# Patient Record
Sex: MALE | Race: WHITE | NOT HISPANIC OR LATINO | Employment: PART TIME | URBAN - NONMETROPOLITAN AREA
[De-identification: names, ages, dates, MRNs, and addresses within clinical notes are randomized per-mention and may not be internally consistent; named-entity substitution may affect disease eponyms.]

---

## 2017-01-19 DIAGNOSIS — C61 CANCER OF PROSTATE (HCC): ICD-10-CM

## 2017-01-19 LAB — PSA SERPL-MCNC: 0.07 NG/ML (ref 0–4)

## 2017-01-26 ENCOUNTER — OFFICE VISIT (OUTPATIENT)
Dept: UROLOGY | Facility: CLINIC | Age: 62
End: 2017-01-26

## 2017-01-26 VITALS
BODY MASS INDEX: 30.35 KG/M2 | TEMPERATURE: 96.7 F | DIASTOLIC BLOOD PRESSURE: 58 MMHG | WEIGHT: 216.8 LBS | HEIGHT: 71 IN | SYSTOLIC BLOOD PRESSURE: 140 MMHG

## 2017-01-26 DIAGNOSIS — N52.9 IMPOTENCE OF ORGANIC ORIGIN: ICD-10-CM

## 2017-01-26 DIAGNOSIS — C61 PROSTATE CANCER (HCC): Primary | ICD-10-CM

## 2017-01-26 DIAGNOSIS — N39.3 URINARY INCONTINENCE, MALE, STRESS: ICD-10-CM

## 2017-01-26 LAB
BILIRUB BLD-MCNC: NEGATIVE MG/DL
CLARITY, POC: CLEAR
COLOR UR: YELLOW
GLUCOSE UR STRIP-MCNC: NEGATIVE MG/DL
KETONES UR QL: NEGATIVE
LEUKOCYTE EST, POC: NEGATIVE
NITRITE UR-MCNC: NEGATIVE MG/ML
PH UR: 5.5 [PH] (ref 5–8)
PROT UR STRIP-MCNC: NEGATIVE MG/DL
RBC # UR STRIP: NEGATIVE /UL
SP GR UR: 1.03 (ref 1–1.03)
UROBILINOGEN UR QL: NORMAL

## 2017-01-26 PROCEDURE — 81003 URINALYSIS AUTO W/O SCOPE: CPT | Performed by: UROLOGY

## 2017-01-26 PROCEDURE — 99214 OFFICE O/P EST MOD 30 MIN: CPT | Performed by: UROLOGY

## 2017-01-26 RX ORDER — ASPIRIN 325 MG
325 TABLET ORAL DAILY
COMMUNITY
End: 2020-12-23

## 2017-01-26 NOTE — MR AVS SNAPSHOT
Dayron ENA Lubin   1/26/2017 9:10 AM   Office Visit    Dept Phone:  [phone]   Encounter #:  [id number]    Provider:  Dayron Love MD   Department:  [facility] UROLOGY                Your Full Care Plan              Your Updated Medication List          This list is accurate as of: 1/26/17  9:41 AM.  Always use your most recent med list.                aspirin 325 MG tablet       atorvastatin 20 MG tablet   Commonly known as:  LIPITOR       FLUVIRIN suspension injection   Generic drug:  Influenza Vac Typ A&B Surf Ant       losartan-hydrochlorothiazide 100-25 MG per tablet   Commonly known as:  HYZAAR       temazepam 30 MG capsule   Commonly known as:  RESTORIL       TOPROL XL 50 MG 24 hr tablet   Generic drug:  metoprolol succinate XL               We Performed the Following     POC Urinalysis Dipstick, Automated       You Were Diagnosed With        Codes Comments    Prostate cancer    -  Primary ICD-10-CM: C61  ICD-9-CM: 185     Impotence of organic origin     ICD-10-CM: N52.9  ICD-9-CM: 607.84     Urinary incontinence, male, stress     ICD-10-CM: N39.3  ICD-9-CM: 788.32       Instructions     None    Patient Instructions History      Upcoming Appointments     Visit Type Date Time Department    FOLLOW UP 1/26/2017  9:10 AM [facility]    [facility] 4/24/2017  8:10 AM [facility]    FOLLOW UP 5/3/2017  8:00 AM [facility]    FOLLOW UP - ONCOLOGY 6/23/2017  9:00 AM [facility] ONC PAD      MyChart Signup     Our records indicate that you have declined [facility] MyChart signup. If you would like to sign up for MyChart, please email [email or web address] or call [phone] to obtain an activation code.             Other Info from Your Visit           Your Appointments     Apr 24, 2017  8:10 AM CDT   [facility] with [facility]   [facility] UROLOGY (--)    [address] [place] [id number] [phone]    "           May 03, 2017  8:00 AM CDT   Follow Up with Dayron Love MD   [facility] UROLOGY (--)    [address]  [facility] [address] [phone]           Arrive 15 minutes prior to appointment.            Jun 23, 2017  9:00 AM CDT   FOLLOW UP with Eloy Lindsay III, MD   [facility] ONC PAD (--)    [address]  [facility] [address] [phone]              Allergies     No Known Allergies      Reason for Visit     Prostate Cancer           Vital Signs     Blood Pressure Temperature Height Weight Body Mass Index Smoking Status    140/58 96.7 °F (35.9 °C) 71\" (180.3 cm) 216 lb 12.8 oz (98.3 kg) 30.24 kg/m2 Never Smoker      Problems and Diagnoses Noted     Prostate cancer    -  Primary    Failure of erection        Urinary incontinence, male, stress          Results     POC Urinalysis Dipstick, Automated      Component Value Standard Range & Units    Color Yellow Yellow, Straw, Dark Yellow, Ying    Clarity, UA Clear Clear    Glucose, UA Negative Negative, 1000 mg/dL (3+) mg/dL    Bilirubin Negative Negative    Ketones, UA Negative Negative    Specific Gravity  1.030 1.005 - 1.030    Blood, UA Negative Negative    pH, Urine 5.5 5.0 - 8.0    Protein, POC Negative Negative mg/dL    Urobilinogen, UA Normal Normal    Leukocytes Negative Negative    Nitrite, UA Negative Negative                    "

## 2017-01-26 NOTE — LETTER
January 26, 2017     KWAN Vega  [address]  [place] [address]    Patient: Dayron Lubin   YOB: 1955   Date of Visit: 1/26/2017       Dear KWAN Quinones:    Thank you for referring Dayron Lubin to me for evaluation. Below are the relevant portions of my assessment and plan of care.    If you have questions, please do not hesitate to call me. I look forward to following Dayron along with you.         Sincerely,        Dayron Love MD        CC: No Recipients  Dayron Love MD  1/26/2017  9:39 AM  Signed  Subjective    Mr. Lubin is 61 y.o. male    Chief Complaint: Prostate Cancer    History of Present Illness    Prostate Cancer  Pt. presents with history of prostate cancer diagnosed in July 2015. Current disease state ishigh risk disease Pt. underwent robot assisted laparoscpic prostatectomy and external beam radiotherapy Path showing T3b 7 adenocarcinoma of prostate with Negative surgical margins. Patient has received XRT completed in May 2016 .He is on nothing for management of prostate cancer. Pt. having 1 ppd incontinence. Associated voiding symptoms include No evidence of voiding symptoms . Pt. Is having erectile dysfunction thatdoes not to PDE5 inhibitors. There has been no bone pain, weight loss, abdominal pain, back pain, or gross hematuria. Most recent PSA pending.     Erectile Dysfunction  Patient complains of erectile dysfunction. Onset of dysfunction was 2 years ago and was sudden in onset. Patient states the nature of difficulty is attaining erection. Full erections occur never. Partial erections occur never. Libido is not affected. Risk factors for ED include urologic disease, prostate cancer. Patient denies history of hypogonadism. Previous treatment of ED includes PDE5 inhibitors.     Urinary Incontinence  Patient complains of urinary incontinence. This has been present for 2 years. Symptoms have remained static . Patient uses 1 pads/day. The patient  leaks urine with bending, coughing, lifting, standing. Patient describes the symptoms as urine leakage with coughing/heavy physical activity. Factors associated with symptoms include prostate surgery. Evaluation to date includes none. Treatment to date includes Kegel exercises, which was somewhat effective.     The following portions of the patient's history were reviewed and updated as appropriate: allergies, current medications, past family history, past medical history, past social history, past surgical history and problem list.    Review of Systems   Constitutional: Negative for chills and fever.   Gastrointestinal: Negative for abdominal pain, anal bleeding and blood in stool.   Genitourinary: Negative for difficulty urinating, flank pain, frequency, hematuria, penile pain, penile swelling and urgency.         Current Outpatient Prescriptions:   •  aspirin 325 MG tablet, Take 325 mg by mouth Daily., Disp: , Rfl:   •  atorvastatin (LIPITOR) 20 MG tablet, , Disp: , Rfl:   •  FLUVIRIN suspension injection, ADM 0.5ML IM UTD, Disp: , Rfl: 0  •  losartan-hydrochlorothiazide (HYZAAR) 100-25 MG per tablet, , Disp: , Rfl:   •  temazepam (RESTORIL) 30 MG capsule, TAKE ONE CAPSULE BY MOUTH AT BEDTIME AS NEEDED FOR SLEEP, Disp: , Rfl: 2  •  TOPROL XL 50 MG 24 hr tablet, , Disp: , Rfl:     Past Medical History   Diagnosis Date   • Prostate cancer        Past Surgical History   Procedure Laterality Date   • Prostate surgery         Social History     Social History   • Marital status: Single     Spouse name: N/A   • Number of children: N/A   • Years of education: N/A     Social History Main Topics   • Smoking status: Never Smoker   • Smokeless tobacco: None   • Alcohol use Yes      Comment: very little    • Drug use: None   • Sexual activity: Not Asked     Other Topics Concern   • None     Social History Narrative       Family History   Problem Relation Age of Onset   • No Known Problems Father    • No Known Problems Mother  "       Objective    Visit Vitals   • /58   • Temp 96.7 °F (35.9 °C)   • Ht 71\" (180.3 cm)   • Wt 216 lb 12.8 oz (98.3 kg)   • BMI 30.24 kg/m2       Physical Exam   Constitutional: He is oriented to person, place, and time. He appears well-developed and well-nourished. No distress.   Pulmonary/Chest: Effort normal.   Abdominal: Soft. He exhibits no distension and no mass. There is no tenderness. There is no rebound and no guarding. No hernia.   Neurological: He is alert and oriented to person, place, and time.   Skin: Skin is warm and dry. He is not diaphoretic.   Psychiatric: He has a normal mood and affect.   Vitals reviewed.          Results for orders placed or performed in visit on 01/26/17   POC Urinalysis Dipstick, Automated   Result Value Ref Range    Color Yellow Yellow, Straw, Dark Yellow, Ying    Clarity, UA Clear Clear    Glucose, UA Negative Negative, 1000 mg/dL (3+) mg/dL    Bilirubin Negative Negative    Ketones, UA Negative Negative    Specific Gravity  1.030 1.005 - 1.030    Blood, UA Negative Negative    pH, Urine 5.5 5.0 - 8.0    Protein, POC Negative Negative mg/dL    Urobilinogen, UA Normal Normal    Leukocytes Negative Negative    Nitrite, UA Negative Negative     Assessment and Plan      Dayron was seen today for prostate cancer.    Diagnoses and all orders for this visit:    Prostate cancer  -     POC Urinalysis Dipstick, Automated  -     PSA; Future    Impotence of organic origin    Urinary incontinence, male, stress      Patient underwent R ALP on 7/28/15. His pretreatment PSA was 7.7. His pathology revealed [place] 3+4 = 7 adenocarcinoma the prostate. Pathology showed [id number] with negative surgical margins. His posttreatment PSA was 0.1 he completed XRT in May 2016. His last PSA was <0.07.      His last PSA was undetectable.  PSA today is 0.074.  I had a long discussion with him today.  I think the best course of action is to continue to observe this.  His PSA has barely gone up.  " If the trend is to continue to go up and I would recommend 2 years of hormonal deprivation.  We talked about hormonal deprivation including pros versus cons and risk of treatment.  He voiced understanding and wishes to proceed if his PSA continues to increase.  FU in 3 months with PSA.

## 2017-01-26 NOTE — PROGRESS NOTES
Subjective    Mr. Lubin is 61 y.o. male    Chief Complaint: Prostate Cancer    History of Present Illness    Prostate Cancer  Pt. presents with history of prostate cancer diagnosed in July 2015. Current disease state ishigh risk disease Pt. underwent robot assisted laparoscpic prostatectomy and external beam radiotherapy Path showing T3b 7 adenocarcinoma of prostate with Negative surgical margins. Patient has received XRT completed in May 2016 .He is on nothing for management of prostate cancer. Pt. having 1 ppd incontinence. Associated voiding symptoms include No evidence of voiding symptoms . Pt. Is having erectile dysfunction thatdoes not to PDE5 inhibitors. There has been no bone pain, weight loss, abdominal pain, back pain, or gross hematuria. Most recent PSA pending.     Erectile Dysfunction  Patient complains of erectile dysfunction. Onset of dysfunction was 2 years ago and was sudden in onset. Patient states the nature of difficulty is attaining erection. Full erections occur never. Partial erections occur never. Libido is not affected. Risk factors for ED include urologic disease, prostate cancer. Patient denies history of hypogonadism. Previous treatment of ED includes PDE5 inhibitors.     Urinary Incontinence  Patient complains of urinary incontinence. This has been present for 2 years. Symptoms have remained static . Patient uses 1 pads/day. The patient leaks urine with bending, coughing, lifting, standing. Patient describes the symptoms as urine leakage with coughing/heavy physical activity. Factors associated with symptoms include prostate surgery. Evaluation to date includes none. Treatment to date includes Kegel exercises, which was somewhat effective.     The following portions of the patient's history were reviewed and updated as appropriate: allergies, current medications, past family history, past medical history, past social history, past surgical history and problem list.    Review of Systems  "  Constitutional: Negative for chills and fever.   Gastrointestinal: Negative for abdominal pain, anal bleeding and blood in stool.   Genitourinary: Negative for difficulty urinating, flank pain, frequency, hematuria, penile pain, penile swelling and urgency.         Current Outpatient Prescriptions:   •  aspirin 325 MG tablet, Take 325 mg by mouth Daily., Disp: , Rfl:   •  atorvastatin (LIPITOR) 20 MG tablet, , Disp: , Rfl:   •  FLUVIRIN suspension injection, ADM 0.5ML IM UTD, Disp: , Rfl: 0  •  losartan-hydrochlorothiazide (HYZAAR) 100-25 MG per tablet, , Disp: , Rfl:   •  temazepam (RESTORIL) 30 MG capsule, TAKE ONE CAPSULE BY MOUTH AT BEDTIME AS NEEDED FOR SLEEP, Disp: , Rfl: 2  •  TOPROL XL 50 MG 24 hr tablet, , Disp: , Rfl:     Past Medical History   Diagnosis Date   • Prostate cancer        Past Surgical History   Procedure Laterality Date   • Prostate surgery         Social History     Social History   • Marital status: Single     Spouse name: N/A   • Number of children: N/A   • Years of education: N/A     Social History Main Topics   • Smoking status: Never Smoker   • Smokeless tobacco: None   • Alcohol use Yes      Comment: very little    • Drug use: None   • Sexual activity: Not Asked     Other Topics Concern   • None     Social History Narrative       Family History   Problem Relation Age of Onset   • No Known Problems Father    • No Known Problems Mother        Objective    Visit Vitals   • /58   • Temp 96.7 °F (35.9 °C)   • Ht 71\" (180.3 cm)   • Wt 216 lb 12.8 oz (98.3 kg)   • BMI 30.24 kg/m2       Physical Exam   Constitutional: He is oriented to person, place, and time. He appears well-developed and well-nourished. No distress.   Pulmonary/Chest: Effort normal.   Abdominal: Soft. He exhibits no distension and no mass. There is no tenderness. There is no rebound and no guarding. No hernia.   Neurological: He is alert and oriented to person, place, and time.   Skin: Skin is warm and dry. He is " not diaphoretic.   Psychiatric: He has a normal mood and affect.   Vitals reviewed.          Results for orders placed or performed in visit on 01/26/17   POC Urinalysis Dipstick, Automated   Result Value Ref Range    Color Yellow Yellow, Straw, Dark Yellow, Ying    Clarity, UA Clear Clear    Glucose, UA Negative Negative, 1000 mg/dL (3+) mg/dL    Bilirubin Negative Negative    Ketones, UA Negative Negative    Specific Gravity  1.030 1.005 - 1.030    Blood, UA Negative Negative    pH, Urine 5.5 5.0 - 8.0    Protein, POC Negative Negative mg/dL    Urobilinogen, UA Normal Normal    Leukocytes Negative Negative    Nitrite, UA Negative Negative     Assessment and Plan      Dayron was seen today for prostate cancer.    Diagnoses and all orders for this visit:    Prostate cancer  -     POC Urinalysis Dipstick, Automated  -     PSA; Future    Impotence of organic origin    Urinary incontinence, male, stress      Patient underwent R ALP on 7/28/15. His pretreatment PSA was 7.7. His pathology revealed [place] 3+4 = 7 adenocarcinoma the prostate. Pathology showed [id number] with negative surgical margins. His posttreatment PSA was 0.1 he completed XRT in May 2016. His last PSA was <0.07.      His last PSA was undetectable.  PSA today is 0.074.  I had a long discussion with him today.  I think the best course of action is to continue to observe this.  His PSA has barely gone up.  If the trend is to continue to go up and I would recommend 2 years of hormonal deprivation.  We talked about hormonal deprivation including pros versus cons and risk of treatment.  He voiced understanding and wishes to proceed if his PSA continues to increase.  FU in 3 months with PSA.

## 2017-04-24 LAB — PSA SERPL-MCNC: 0.12 NG/ML (ref 0–4)

## 2017-04-26 ENCOUNTER — RESULTS ENCOUNTER (OUTPATIENT)
Dept: UROLOGY | Facility: CLINIC | Age: 62
End: 2017-04-26

## 2017-04-26 DIAGNOSIS — C61 PROSTATE CANCER (HCC): ICD-10-CM

## 2017-05-03 ENCOUNTER — OFFICE VISIT (OUTPATIENT)
Dept: UROLOGY | Facility: CLINIC | Age: 62
End: 2017-05-03

## 2017-05-03 VITALS
HEIGHT: 71 IN | TEMPERATURE: 97.5 F | SYSTOLIC BLOOD PRESSURE: 142 MMHG | WEIGHT: 216 LBS | DIASTOLIC BLOOD PRESSURE: 86 MMHG | BODY MASS INDEX: 30.24 KG/M2

## 2017-05-03 DIAGNOSIS — N39.3 URINARY INCONTINENCE, MALE, STRESS: ICD-10-CM

## 2017-05-03 DIAGNOSIS — C61 PROSTATE CANCER (HCC): Primary | ICD-10-CM

## 2017-05-03 DIAGNOSIS — N52.9 IMPOTENCE OF ORGANIC ORIGIN: ICD-10-CM

## 2017-05-03 LAB
BILIRUB BLD-MCNC: NEGATIVE MG/DL
CLARITY, POC: CLEAR
COLOR UR: YELLOW
GLUCOSE UR STRIP-MCNC: NEGATIVE MG/DL
KETONES UR QL: NEGATIVE
LEUKOCYTE EST, POC: NEGATIVE
NITRITE UR-MCNC: NEGATIVE MG/ML
PH UR: 6.5 [PH] (ref 5–8)
PROT UR STRIP-MCNC: NEGATIVE MG/DL
RBC # UR STRIP: ABNORMAL /UL
SP GR UR: 1.02 (ref 1–1.03)
UROBILINOGEN UR QL: NORMAL

## 2017-05-03 PROCEDURE — 99213 OFFICE O/P EST LOW 20 MIN: CPT | Performed by: UROLOGY

## 2017-05-03 PROCEDURE — 81003 URINALYSIS AUTO W/O SCOPE: CPT | Performed by: UROLOGY

## 2017-06-23 ENCOUNTER — OFFICE VISIT (OUTPATIENT)
Dept: RADIATION ONCOLOGY | Facility: HOSPITAL | Age: 62
End: 2017-06-23

## 2017-06-23 VITALS
DIASTOLIC BLOOD PRESSURE: 78 MMHG | BODY MASS INDEX: 30.38 KG/M2 | WEIGHT: 217 LBS | SYSTOLIC BLOOD PRESSURE: 136 MMHG | HEIGHT: 71 IN

## 2017-06-23 DIAGNOSIS — Z92.3 HX OF RADIATION THERAPY: ICD-10-CM

## 2017-06-23 DIAGNOSIS — C61 PROSTATE CANCER (HCC): Primary | ICD-10-CM

## 2017-06-23 DIAGNOSIS — Z85.46 ENCOUNTER FOR FOLLOW-UP SURVEILLANCE OF PROSTATE CANCER: ICD-10-CM

## 2017-06-23 DIAGNOSIS — Z08 ENCOUNTER FOR FOLLOW-UP SURVEILLANCE OF PROSTATE CANCER: ICD-10-CM

## 2017-06-23 DIAGNOSIS — R97.20 ELEVATED PSA: ICD-10-CM

## 2017-06-23 NOTE — PROGRESS NOTES
[facility]  MD Maricel Sanchez MSN, APRN, FNP-BC  ANUM VillelaN, PA-ROSIE  ____________________________________________________________  [facility]  Department of Radiation Oncology  [address] [place] [id number]  Office:  [phone]  Fax: [phone]      06/23/2017    PATIENT:        Dayron Lubin     1955  MEDICAL RECORD #:  [id number]      Reason for visit:   1. Prostate cancer    2. Hx of radiation therapy    3. Elevated PSA    4. Encounter for follow-up surveillance of prostate cancer        BRIEF HISTORY:  Dayron Lubin is a very pleasant 61 y.o. male that is status post Prostatectomy in July 2015 for T3b [place] 7 prostate adenocarcinoma and now post completion of definitive radiation therapy for biochemical recurrence in March 2016. Mr. Lubin completed radiation in May 2016.  Pt received 6840 cGy without unexpected side effects. Patient returns to the clinic today for routine follow up exam.  Pt is doing well today with no new significant treatment or disease related complaints, continues to follow with Dr. Love.  Pretreatment PSA was 0.03, October 26, 2016 his PSA was 0.070, in January 2017 His PSA was 0.074 in April 2017, it had increased to 0.121. Patient states Dr. Love offered androgen deprivation therapy but Mr Lubin is hoping not to have to do that. He reports stress Incontinence which is stable since his surgery in 2015.  His next appointment with Dr. Love is in September 2017 with a repeat PSA.    ONCOLOGY HISTORY      Prostate cancer    1/12/2015 Procedure    PSA 7.7      7/28/2015 Initial Diagnosis    Stage III (T3b, N0, M0) Prostate cancer      7/28/2015 Surgery    Final Diagnosis  1.     Prostate, biopsy of prostatic tissue margin:        Benign fibromuscular stroma with rare cauterized glands, consistent with  prostatic parenchyma.     2.     Prostate, additional biopsies of prostatic margin:        Benign  fibromuscular stroma with urothelial and prostatic parenchyma,  non-cauterized margin negative for prostatic tissue.     3.     Prostate, prostatectomy:             A.     Prostatic adenocarcinoma (Ryann's grade 3+4 equals 7).             B.     Largest tumor focus measures 1.8 cm in greatest dimension.             C.     Tumor occupies approximately 67% of the submitted gland volume.              D.     Focal perineural invasion is identified.             E.     Areas highly suspicious for endolymphatic invasion are  identified.             F.     Focal right seminal vesicle invasion is identified.              G.     Surgical excision margins are negative for evidence of  malignancy.      4.     Lymph node, left obturator node dissection:             One lymph node, negative for evidence of malignancy.     5.     Lymph node, right obturator node dissection:             Benign mature adipose tissue, no lymph node tissue identified.      AJCC stage:   pT3b, pN0, pMX.          9/8/2015 Procedure    PSA 0.1      11/4/2015 Procedure    PSA 0.1      2/10/2016 Procedure    PSA 0.3      2/10/2016 Progression    Biochemical Recurrence      3/22/2016 - 5/12/2016 Radiation    Radiation OncologyTreatment Course:  Dayron Lubin received 6840 cGy in 38 fractions to prostate bed via external beam radiation therapy.      10/26/2016 Procedure    PSA <0.070      1/19/2017 Procedure    PSA 0.074      4/24/2017 Procedure    PSA 0.121       History obtained from  PATIENT, FAMILY and CHART    PAST MEDICAL HISTORY   Past Medical History:   Diagnosis Date   • History of external beam radiation therapy 05/12/2016    6840 cGy to prostate bed   • Prostate cancer       PAST SURGICAL HISTORY   Past Surgical History:   Procedure Laterality Date   • PROSTATE SURGERY        SOCIAL HISTORY   Social History   Substance Use Topics   • Smoking status: Never Smoker   • Smokeless tobacco: None   • Alcohol use Yes      Comment: very little      "  ALLERGIES   Review of patient's allergies indicates no known allergies.     MEDICATIONS   Current Outpatient Prescriptions   Medication Sig Dispense Refill   • aspirin 325 MG tablet Take 325 mg by mouth Daily.     • atorvastatin (LIPITOR) 20 MG tablet      • losartan-hydrochlorothiazide (HYZAAR) 100-25 MG per tablet      • temazepam (RESTORIL) 30 MG capsule TAKE ONE CAPSULE BY MOUTH AT BEDTIME AS NEEDED FOR SLEEP  2   • TOPROL XL 50 MG 24 hr tablet        No current facility-administered medications for this visit.         The following portions of the patient's history were reviewed and updated as appropriate: allergies, current medications, past family history, past medical history, past social history, past surgical history and problem list.    REVIEW OF SYSTEMS   Review of Systems   Constitutional: Negative for appetite change, fatigue and unexpected weight change.   HENT: Negative.    Eyes: Negative.    Respiratory: Negative.    Cardiovascular: Negative.    Gastrointestinal: Negative for constipation and diarrhea.   Endocrine: Negative.    Genitourinary: Positive for frequency (nocturia 2-3x). Negative for decreased urine volume, difficulty urinating, dysuria and urgency.        Stress incontinence     Musculoskeletal: Negative.    Skin: Negative.    Allergic/Immunologic: Negative.    Neurological: Negative.    Hematological: Negative.    Psychiatric/Behavioral: Negative.        PHYSICAL EXAM   VITAL SIGNS:   Vitals:    06/23/17 0907   BP: 136/78   Weight: 217 lb (98.4 kg)   Height: 71\" (180.3 cm)   PainSc: 0-No pain   Body mass index is 30.27 kg/(m^2).    General:  Alert and oriented, appears stated age, cooperative and no acute distress. Vitals reviewed.    HEENT:  Normocephalic, atraumatic, conjunctivae/corneas clear. PERRL, EOM's intact. Normal external ear canals bilaterally   Nares normal. Nasal septum midline. No drainage or sinus tenderness.  lips, mucosa, and tongue normal; teeth and gums normal.  " Neck supple, no JVD noted. Thyroid ML, not enlarged, no tenderness/mass/nodules        Heart: regular rate and rhythm, S1, S2 normal, no murmur, click, rub or gallop    Lungs: Thoracic dimension normal, lungs clear to auscultation bilaterally    Abdomen: soft, non-tender; bowel sounds normal; no masses,  no organomegaly.  No CVA tenderness.        Rectal exam: deferred     Extremities: CORBETT well, atraumatic, no cyanosis or edema. Pulse 2+ and symmetric.    Skin: Skin color, texture, turgor normal. No rashes or lesions    Lymph nodes: Cervical, supraclavicular, and axillary nodes normal.    Neurologic: Alert and oriented X 3, normal strength and tone. Normal symmetric reflexes. Normal coordination and gait    Psychiatric: Mood and affect are appropriate.    PERTINENT LABS and IMAGING       PSA   Date Value Ref Range Status   04/24/2017 0.121 0.000 - 4.000 ng/mL Final   01/19/2017 0.074 0.000 - 4.000 ng/mL Final   10/26/2016 <0.070 0.000 - 4.000 ng/mL Final        No Images in the past 120 days found..    Clinical Quality Measures   Pain Documented PQRS #131 Pain severity quantified; no pain present, no followup plan required.   Care Plan: ADVANCED CARE PQRS #47 Care plan discussed, no care plan provided   Performance Status: ECOG (0) Fully active, able to carry on all predisease performance without restriction   TOBACCO SCREENING AND INTERVENTION  PQRS #226 Screened & identified as tobacco non-user. Never smoker    Medications Documented PQRS #130 Medications documented   WEIGHT SCREENING/BMI  Calculated BMI within normal parameteres & documented      PROSTATE PQRS #102   Bone Scan Use: Bone Scan not done Pre-treatment or Post Diagnosis  Risk of Recurrence: Recurrent 2016  Measure #104  Adjuvant Hormonal TX: Hormonal Therapy Not Prescribed/Administered. Patient Reasons  Recurrence Risk: Recurrent  LUIS MIGUEL Score: Date: 06/23/2017  Result: 1    ASSESSMENT AND PLAN   1. Prostate cancer    2. Hx of radiation therapy    3.  Elevated PSA    4. Encounter for follow-up surveillance of prostate cancer        RECOMMENDATIONS: Mr. Lubin returns to our clinic today for routine follow up exam. He is doing well physically, he is concerned over the rising PSA. Long discussion over the rising PSA and the recommended androgen deprivation therapy.  Mr Lubin says he will think about it and will follow with Dr. Love and call us anytime as needed.        Today, total time spent with this patient was 35  minutes and of that time, well over 50% was spent in counselling and coordination of care as follows: diagnosis, post-treatment coordination of care, radiation therapy in general and standard of care in for this stage of this cancer  Damaris Mir PA-C for Dr. Eloy Lindsay  06/23/2017    EMR Dragon/Transcription Disclaimer:  Much of this encounter note is an electronic transcription/translation of spoken language to printed text. The electronic translation of spoken language may permit erroneous, or at times, nonsensical words or phrases to be inadvertently transcribed; although I have reviewed the note for such errors, some may still exist.

## 2017-06-26 PROBLEM — Z08 ENCOUNTER FOR FOLLOW-UP SURVEILLANCE OF PROSTATE CANCER: Status: ACTIVE | Noted: 2017-06-26

## 2017-06-26 PROBLEM — Z85.46 ENCOUNTER FOR FOLLOW-UP SURVEILLANCE OF PROSTATE CANCER: Status: ACTIVE | Noted: 2017-06-26

## 2017-06-26 PROBLEM — R97.20 ELEVATED PSA: Status: ACTIVE | Noted: 2017-06-26

## 2017-06-26 PROBLEM — Z92.3 HX OF RADIATION THERAPY: Status: ACTIVE | Noted: 2017-06-26

## 2017-07-17 ENCOUNTER — TELEPHONE (OUTPATIENT)
Dept: UROLOGY | Facility: CLINIC | Age: 62
End: 2017-07-17

## 2017-07-24 DIAGNOSIS — C61 PROSTATE CANCER (HCC): Primary | ICD-10-CM

## 2017-08-01 ENCOUNTER — TRANSCRIBE ORDERS (OUTPATIENT)
Dept: ADMINISTRATIVE | Facility: HOSPITAL | Age: 62
End: 2017-08-01

## 2017-08-01 DIAGNOSIS — C61 PROSTATE CANCER (HCC): Primary | ICD-10-CM

## 2017-08-04 ENCOUNTER — HOSPITAL ENCOUNTER (OUTPATIENT)
Dept: NUCLEAR MEDICINE | Facility: HOSPITAL | Age: 62
Discharge: HOME | End: 2017-08-04
Attending: INTERNAL MEDICINE

## 2017-08-04 ENCOUNTER — HOSPITAL ENCOUNTER (OUTPATIENT)
Dept: CT IMAGING | Facility: HOSPITAL | Age: 62
Discharge: HOME | End: 2017-08-04
Attending: INTERNAL MEDICINE | Admitting: INTERNAL MEDICINE

## 2017-08-04 ENCOUNTER — HOSPITAL ENCOUNTER (OUTPATIENT)
Dept: GENERAL RADIOLOGY | Facility: HOSPITAL | Age: 62
Discharge: HOME | End: 2017-08-04
Attending: INTERNAL MEDICINE

## 2017-08-04 ENCOUNTER — TRANSCRIBE ORDERS (OUTPATIENT)
Dept: ADMINISTRATIVE | Facility: HOSPITAL | Age: 62
End: 2017-08-04

## 2017-08-04 DIAGNOSIS — C61 PROSTATE CANCER (HCC): Primary | ICD-10-CM

## 2017-08-04 DIAGNOSIS — C61 PROSTATE CANCER (HCC): ICD-10-CM

## 2017-08-04 LAB — CREAT BLDA-MCNC: 1.1 MG/DL (ref 0.6–1.3)

## 2017-08-04 PROCEDURE — A9561 TC99M OXIDRONATE: HCPCS | Performed by: INTERNAL MEDICINE

## 2017-08-04 PROCEDURE — 82565 ASSAY OF CREATININE: CPT

## 2017-08-04 PROCEDURE — 71020 HC CHEST PA AND LATERAL: CPT

## 2017-08-04 PROCEDURE — 0 TECHNETIUM OXIDRONATE KIT: Performed by: INTERNAL MEDICINE

## 2017-08-04 PROCEDURE — 78306 BONE IMAGING WHOLE BODY: CPT

## 2017-08-04 PROCEDURE — 0 IOPAMIDOL 61 % SOLUTION: Performed by: INTERNAL MEDICINE

## 2017-08-04 PROCEDURE — 74177 CT ABD & PELVIS W/CONTRAST: CPT

## 2017-08-04 RX ADMIN — TECHNETIUM TC 99M OXIDRONATE 1 DOSE: 3.15 INJECTION, POWDER, LYOPHILIZED, FOR SOLUTION INTRAVENOUS at 10:45

## 2017-08-04 RX ADMIN — IOPAMIDOL 100 ML: 612 INJECTION, SOLUTION INTRAVENOUS at 10:30

## 2017-08-30 LAB — PSA SERPL-MCNC: 0.32 NG/ML (ref 0–4)

## 2017-08-31 ENCOUNTER — RESULTS ENCOUNTER (OUTPATIENT)
Dept: UROLOGY | Facility: CLINIC | Age: 62
End: 2017-08-31

## 2017-08-31 DIAGNOSIS — C61 PROSTATE CANCER (HCC): ICD-10-CM

## 2017-09-06 ENCOUNTER — OFFICE VISIT (OUTPATIENT)
Dept: UROLOGY | Facility: CLINIC | Age: 62
End: 2017-09-06

## 2017-09-06 VITALS
BODY MASS INDEX: 29.34 KG/M2 | HEIGHT: 71 IN | SYSTOLIC BLOOD PRESSURE: 138 MMHG | DIASTOLIC BLOOD PRESSURE: 84 MMHG | WEIGHT: 209.6 LBS | TEMPERATURE: 97.4 F

## 2017-09-06 DIAGNOSIS — N52.9 IMPOTENCE OF ORGANIC ORIGIN: ICD-10-CM

## 2017-09-06 DIAGNOSIS — N39.3 URINARY INCONTINENCE, MALE, STRESS: ICD-10-CM

## 2017-09-06 DIAGNOSIS — C61 PROSTATE CANCER (HCC): Primary | ICD-10-CM

## 2017-09-06 LAB
BILIRUB BLD-MCNC: NEGATIVE MG/DL
CLARITY, POC: CLEAR
COLOR UR: YELLOW
GLUCOSE UR STRIP-MCNC: NEGATIVE MG/DL
KETONES UR QL: NEGATIVE
LEUKOCYTE EST, POC: NEGATIVE
NITRITE UR-MCNC: NEGATIVE MG/ML
PH UR: 5.5 [PH] (ref 5–8)
PROT UR STRIP-MCNC: NEGATIVE MG/DL
RBC # UR STRIP: NEGATIVE /UL
SP GR UR: 1.02 (ref 1–1.03)
UROBILINOGEN UR QL: NORMAL

## 2017-09-06 PROCEDURE — 81003 URINALYSIS AUTO W/O SCOPE: CPT | Performed by: UROLOGY

## 2017-09-06 PROCEDURE — 99214 OFFICE O/P EST MOD 30 MIN: CPT | Performed by: UROLOGY

## 2017-09-06 NOTE — PROGRESS NOTES
Subjective    Mr. Lubin is 62 y.o. male    Chief Complaint: Prostate Cancer    History of Present Illness     Prostate Cancer  Pt. presents with history of prostate cancer diagnosed in July 2015. Current disease state ishigh risk disease Pt. underwent robot assisted laparoscpic prostatectomy and external beam radiotherapy Path showing T3b 7 adenocarcinoma of prostate with Negative surgical margins. Patient has received XRT completed in May 2016 .He is on nothing for management of prostate cancer. Pt. having 1 ppd incontinence. Associated voiding symptoms include No evidence of voiding symptoms . Pt. Is having erectile dysfunction thatdoes not to PDE5 inhibitors. There has been no bone pain, weight loss, abdominal pain, back pain, or gross hematuria. Most recent PSA 0.121.      Erectile Dysfunction  Patient complains of erectile dysfunction. Onset of dysfunction was 2 years ago and was sudden in onset. Patient states the nature of difficulty is attaining erection. Full erections occur never. Partial erections occur never. Libido is not affected. Risk factors for ED include urologic disease, prostate cancer. Patient denies history of hypogonadism. Previous treatment of ED includes PDE5 inhibitors.      Urinary Incontinence  Patient complains of urinary incontinence. This has been present for 2 years. Symptoms have remained static . Patient uses 1 pads/day. The patient leaks urine with bending, coughing, lifting, standing. Patient describes the symptoms as urine leakage with coughing/heavy physical activity. Factors associated with symptoms include prostate surgery. Evaluation to date includes none. Treatment to date includes Kegel exercises, which was somewhat effective.    The following portions of the patient's history were reviewed and updated as appropriate: allergies, current medications, past family history, past medical history, past social history, past surgical history and problem list.    Review of Systems  "  Constitutional: Negative for chills and fever.   Gastrointestinal: Negative for abdominal pain, anal bleeding and blood in stool.   Genitourinary: Negative for flank pain and hematuria.         Current Outpatient Prescriptions:   •  aspirin 325 MG tablet, Take 325 mg by mouth Daily., Disp: , Rfl:   •  atorvastatin (LIPITOR) 20 MG tablet, , Disp: , Rfl:   •  losartan-hydrochlorothiazide (HYZAAR) 100-25 MG per tablet, , Disp: , Rfl:   •  temazepam (RESTORIL) 30 MG capsule, TAKE ONE CAPSULE BY MOUTH AT BEDTIME AS NEEDED FOR SLEEP, Disp: , Rfl: 2  •  TOPROL XL 50 MG 24 hr tablet, , Disp: , Rfl:     Past Medical History:   Diagnosis Date   • History of external beam radiation therapy 05/12/2016    6840 cGy to prostate bed   • Prostate cancer        Past Surgical History:   Procedure Laterality Date   • PROSTATE SURGERY         Social History     Social History   • Marital status: Single     Spouse name: N/A   • Number of children: N/A   • Years of education: N/A     Social History Main Topics   • Smoking status: Never Smoker   • Smokeless tobacco: None   • Alcohol use Yes      Comment: very little    • Drug use: None   • Sexual activity: Not Asked     Other Topics Concern   • None     Social History Narrative       Family History   Problem Relation Age of Onset   • No Known Problems Father    • No Known Problems Mother        Objective    /84  Temp 97.4 °F (36.3 °C)  Ht 71\" (180.3 cm)  Wt 209 lb 9.6 oz (95.1 kg)  BMI 29.23 kg/m2    Physical Exam   Constitutional: He is oriented to person, place, and time. He appears well-developed and well-nourished. No distress.   Pulmonary/Chest: Effort normal.   Abdominal: Soft. He exhibits no distension and no mass. There is no tenderness. There is no rebound and no guarding. No hernia.   Neurological: He is alert and oriented to person, place, and time.   Skin: Skin is warm and dry. He is not diaphoretic.   Psychiatric: He has a normal mood and affect.   Vitals " reviewed.          Results for orders placed or performed in visit on 09/06/17   POC Urinalysis Dipstick, Automated   Result Value Ref Range    Color Yellow Yellow, Straw, Dark Yellow, Ying    Clarity, UA Clear Clear    Glucose, UA Negative Negative, 1000 mg/dL (3+) mg/dL    Bilirubin Negative Negative    Ketones, UA Negative Negative    Specific Gravity  1.025 1.005 - 1.030    Blood, UA Negative Negative    pH, Urine 5.5 5.0 - 8.0    Protein, POC Negative Negative mg/dL    Urobilinogen, UA Normal Normal    Leukocytes Negative Negative    Nitrite, UA Negative Negative   CT independent review  The CT scan of the abdomen/pelvis done without contrast is available for me to review.  Treatment recommendations require an independent review.  First I scanned the liver, spleen, and bowel pattern.  The retroperitoneum including the major vessels and lymphatic packages are briefly reviewed.  This film as been reviewed by the radiologist to determine any non urologic abnormalities that are present.  The kidneys are closely inspected for size, symmetry, contour, parenchymal thickness, perinephric reaction, presence of calcifications, and intrarenal dilation of the collecting system.  The ureters are inspected for their course, caliber, and any calcifications.  The bladder is inspected for its thickness, size, and presence of any calcifications.  This scan shows:    The right kidney appears normal on this non-contrasted CT scan.  The renal parenchymal is norml in thickness.  There are no solid masses or cysts.  There is no hydronephrosis.  There are no stones.      The left kidney appears normal on this non-contrasted CT scan.  The renal parenchymal is norml in thickness.  There are no solid masses or cysts.  There is no hydronephrosis.  There are no stones.      The bladder appears normal on this non-contrasted CT scan.  The bladder appears normal in thickness.  There no masses or stones seen on this exam.      Assessment and  Mark Padgett was seen today for prostate cancer.    Diagnoses and all orders for this visit:    Prostate cancer  -     POC Urinalysis Dipstick, Automated    Impotence of organic origin    Urinary incontinence, male, stress    Other orders  -     SCANNED - LABS          Patient underwent R ALP on 7/28/15. His pretreatment PSA was 7.7. His pathology revealed Ryann 3+4 = 7 adenocarcinoma the prostate. Pathology showed [id number] with negative surgical margins. His posttreatment PSA was 0.1 he completed XRT in May 2016. His PSA became undetectable to <0.7.  He was last seen in May 2017.  His PSA today is 0.3.  He saw Dr. Héctor Franco in the meantime and I reviewed records for him summarizes follows his PSA was 0.29.  Staging imaging was ordered including a chest x-ray bone scan and CT scan.  I personally reviewed his CT scan which showed no evidence of metastatic disease.      I have recommended 2 years of ADT.  I discussed the recent trial in the [place] Journal of Medical Which showed a survival advantage when patients have recurrent prostate cancer with radiation and androgen deprivation.  He does not meet the the typical picture of this study however I think this is the best option for him at this point.  We will start him on 6 month Lupron he will follow-up with me in 3 months with a repeat PSA.

## 2017-09-14 ENCOUNTER — PROCEDURE VISIT (OUTPATIENT)
Dept: UROLOGY | Facility: CLINIC | Age: 62
End: 2017-09-14

## 2017-09-14 DIAGNOSIS — C61 PROSTATE CANCER (HCC): Primary | ICD-10-CM

## 2017-09-14 PROCEDURE — 96402 CHEMO HORMON ANTINEOPL SQ/IM: CPT | Performed by: UROLOGY

## 2017-09-14 NOTE — PROGRESS NOTES
Patient of Dr. Love states he is here for his 6 month Lupron injection. Patient denies any fever, chills, N&V or hematuria. I administered 45 mg/6 month Lupron injection IM left hip with no complications. Dr. Santillan was in the office today at the time of injection. The patient was advised to follow up in 6 months for his next Lupron injection. Patient verbalized understanding.

## 2017-10-25 ENCOUNTER — OFFICE VISIT (OUTPATIENT)
Dept: GASTROENTEROLOGY | Age: 62
End: 2017-10-25
Payer: COMMERCIAL

## 2017-10-25 VITALS
HEART RATE: 71 BPM | HEIGHT: 71 IN | BODY MASS INDEX: 29.29 KG/M2 | OXYGEN SATURATION: 97 % | WEIGHT: 209.2 LBS | SYSTOLIC BLOOD PRESSURE: 130 MMHG | DIASTOLIC BLOOD PRESSURE: 70 MMHG

## 2017-10-25 DIAGNOSIS — Z83.71 FAMILY HISTORY OF COLONIC POLYPS: ICD-10-CM

## 2017-10-25 DIAGNOSIS — Z86.010 HISTORY OF COLON POLYPS: Primary | ICD-10-CM

## 2017-10-25 PROBLEM — Z83.719 FAMILY HISTORY OF COLONIC POLYPS: Status: ACTIVE | Noted: 2017-10-25

## 2017-10-25 PROCEDURE — 99203 OFFICE O/P NEW LOW 30 MIN: CPT | Performed by: NURSE PRACTITIONER

## 2017-10-25 RX ORDER — ATORVASTATIN CALCIUM 20 MG/1
20 TABLET, FILM COATED ORAL NIGHTLY
COMMUNITY
Start: 2016-09-07

## 2017-10-25 RX ORDER — TEMAZEPAM 30 MG/1
30 CAPSULE ORAL NIGHTLY PRN
COMMUNITY
Start: 2016-09-23

## 2017-10-25 RX ORDER — ASPIRIN 325 MG
325 TABLET ORAL
COMMUNITY
End: 2020-06-11 | Stop reason: ALTCHOICE

## 2017-10-25 ASSESSMENT — ENCOUNTER SYMPTOMS
DIARRHEA: 0
VOICE CHANGE: 0
BLOOD IN STOOL: 0
VOMITING: 0
CONSTIPATION: 0
SHORTNESS OF BREATH: 0
SORE THROAT: 0
ABDOMINAL PAIN: 0
NAUSEA: 0
COUGH: 0
RECTAL PAIN: 0
ABDOMINAL DISTENTION: 0
BACK PAIN: 0
CHEST TIGHTNESS: 0

## 2017-10-25 NOTE — PROGRESS NOTES
sore throat and voice change. Respiratory: Negative for cough, chest tightness and shortness of breath. Cardiovascular: Negative for chest pain, palpitations and leg swelling. Gastrointestinal: Negative for abdominal distention, abdominal pain, blood in stool, constipation, diarrhea, nausea, rectal pain and vomiting. Musculoskeletal: Negative for arthralgias, back pain and gait problem. Skin: Negative for pallor, rash and wound. Neurological: Negative for dizziness, weakness and light-headedness. Hematological: Negative for adenopathy. Does not bruise/bleed easily. All other systems reviewed and are negative. Objective:   Physical Exam   Constitutional: He is oriented to person, place, and time. He appears well-developed and well-nourished. No distress. /70   Pulse 71   Ht 5' 11\" (1.803 m)   Wt 209 lb 3.2 oz (94.9 kg)   SpO2 97%   BMI 29.18 kg/m²      Eyes: Conjunctivae are normal. No scleral icterus. Neck: No tracheal deviation present. Cardiovascular: Normal rate and regular rhythm. Exam reveals no gallop and no friction rub. No murmur heard. Pulmonary/Chest: Effort normal and breath sounds normal. No respiratory distress. He has no wheezes. He has no rhonchi. He has no rales. Abdominal: Soft. Normal appearance and bowel sounds are normal. He exhibits no distension and no mass. There is no hepatomegaly. There is no tenderness. There is no rebound and no guarding. Musculoskeletal: He exhibits no edema. Neurological: He is alert and oriented to person, place, and time. He has normal strength. Skin: Skin is warm, dry and intact. No cyanosis. No pallor. Psychiatric: He has a normal mood and affect. His behavior is normal. Thought content normal. Cognition and memory are normal.       Assessment:      1. History of colon polyps    2. Family history of colonic polyps            Plan:      Schedule colonoscopy  Instruct on bowel prep.    Nothing to eat or drink after midnight the day of the exam.  Unable to drive for 24 hours after the procedure. No aspirin or nonsteroidal anti-inflammatories for 5 days before procedure. Risks, benefits and alternatives to colonoscopy were discussed. Patient voices understanding of risk of perforation, bleeding and infection. Time was allowed for questions which were answered to patients satisfaction. Patient is agreeable to proceed. Plan for anesthesia: MAC  no reported complications    I have discussed with the patient and/or the patient representative the indication, alternatives, and the possible risks and/or complications of the planned anesthesia methods.

## 2017-10-25 NOTE — PATIENT INSTRUCTIONS
Schedule colonoscopy. No aspirin, ibuprofen, naproxen, fish oil or vitamin E for 5 days before procedure. Do not eat or drink after midnight the day of the procedure. Allowed medications can be taken with a small sip of water. Please review your prep instructions for allowed medications. You will not be able to drive for 24 hours after the procedure due to sedation. Bring a  with you the day of the procedure. If you are on blood thinners, clearance from the prescribing physician will be obtained before your procedure is scheduled. If it is determined it is not safe to hold these medications for a short time an alternative procedure for evaluation may be recommended. Risks of colonoscopy include, but are not limited to, perforation, bleeding, and infection, Risk of perforation and bleeding are increased if there is a polyp removed. Anesthesia risks will be reviewed with you before the procedure by a member of the anesthesia department. Your physician may also schedule a follow up appointment with the nurse practitioner to discuss pathology, symptoms or to check if you have had any problems related to your procedure. If you prefer not to return to the office after your procedure please discuss this with your physician on the day of your colonoscopy. The physician will talk with you and/or your family after the procedure is completed. Final recommendations are based on the pathologist report if biopsies or specimens are taken. For Colonoscopy: You will be given specific directions regarding restrictions to diet and bowel prep instructions including laxatives. Please read these instructions one week prior to your scheduled procedure to ensure that you are prepared. If you have any questions regarding these instructions please call our office Mon through Fri from 8:00 am to 4:00 pm.     Follow prep instructions provided for bowel prep. Take all of the bowel prep as directed.  If you are having problems with nausea, stop your prep for 30-45 min to allow the nausea to subside before resuming your prep. It is important to drink plenty of fluids throughout the day before taking your laxatives. This will help to protect your kidneys, prevent dehydration and maximize the effect of the bowel prep. If polyps are removed during the procedure they will be sent to a pathologist for analysis. Unless you have a follow up appointment scheduled, you will be notified by mail of the pathology results within 4 weeks. If you have not received results after 4 weeks you may call the office to obtain this information. Your diet before a colonoscopy bowel preparation is very important to ensure a successful colon exam. It is recommended to consider certain changes to your diet three to four days prior to the procedure. Remember that your bowels need to be empty for the exam.    What foods are good to eat? Cut down on heavy solid foods three to four days before the procedure and start introducing lighter meals to your diet. The following food suggestions are a good part of your diet before a colonoscopy bowel preparation. Light meat that is easily digestible such as chicken (without the skin)   Potatoes without skin   Cheese   Eggs   A light meal of steamed white fish   Light clear soups    Foods and drinks to avoid  Avoid foods that contain too much fiber. Stay clear of dark colored beverages. They can stick to the walls of the digestive tract and make it difficult to differentiate from blood. Some of these foods are:  Red meat, rice, nuts and vegetables   Milk, other milk based fluids and cream   Most fruit and puddings   Whole grain pasta   Cereals, bran and seeds   Colored beverages, especially those that are red or purple in color   Red colored Jell-O   On the day before the colonoscopy, continue to drink plenty of clear fluids.  It is important   to keep yourself hydrated before the exam.

## 2017-12-01 ENCOUNTER — ANESTHESIA EVENT (OUTPATIENT)
Dept: OPERATING ROOM | Age: 62
End: 2017-12-01

## 2017-12-04 LAB — PSA SERPL-MCNC: <0.07 NG/ML (ref 0–4)

## 2017-12-05 ENCOUNTER — RESULTS ENCOUNTER (OUTPATIENT)
Dept: UROLOGY | Facility: CLINIC | Age: 62
End: 2017-12-05

## 2017-12-05 DIAGNOSIS — C61 PROSTATE CANCER (HCC): ICD-10-CM

## 2017-12-06 ENCOUNTER — ANESTHESIA (OUTPATIENT)
Dept: OPERATING ROOM | Age: 62
End: 2017-12-06

## 2017-12-06 ENCOUNTER — HOSPITAL ENCOUNTER (OUTPATIENT)
Age: 62
Setting detail: OUTPATIENT SURGERY
Discharge: HOME | End: 2017-12-06
Attending: INTERNAL MEDICINE | Admitting: INTERNAL MEDICINE
Payer: COMMERCIAL

## 2017-12-06 VITALS
DIASTOLIC BLOOD PRESSURE: 65 MMHG | OXYGEN SATURATION: 97 % | RESPIRATION RATE: 18 BRPM | TEMPERATURE: 98.4 F | SYSTOLIC BLOOD PRESSURE: 118 MMHG | BODY MASS INDEX: 29.12 KG/M2 | WEIGHT: 208 LBS | HEIGHT: 71 IN | HEART RATE: 69 BPM

## 2017-12-06 VITALS — DIASTOLIC BLOOD PRESSURE: 56 MMHG | SYSTOLIC BLOOD PRESSURE: 105 MMHG | OXYGEN SATURATION: 97 %

## 2017-12-06 PROCEDURE — G0105 COLORECTAL SCRN; HI RISK IND: HCPCS

## 2017-12-06 PROCEDURE — G8907 PT DOC NO EVENTS ON DISCHARG: HCPCS | Performed by: NURSE PRACTITIONER

## 2017-12-06 PROCEDURE — G0105 COLORECTAL SCRN; HI RISK IND: HCPCS | Performed by: INTERNAL MEDICINE

## 2017-12-06 PROCEDURE — G8918 PT W/O PREOP ORDER IV AB PRO: HCPCS | Performed by: NURSE PRACTITIONER

## 2017-12-06 RX ORDER — PROPOFOL 10 MG/ML
INJECTION, EMULSION INTRAVENOUS PRN
Status: DISCONTINUED | OUTPATIENT
Start: 2017-12-06 | End: 2017-12-06 | Stop reason: SDUPTHER

## 2017-12-06 RX ORDER — LIDOCAINE HYDROCHLORIDE 10 MG/ML
1 INJECTION, SOLUTION EPIDURAL; INFILTRATION; INTRACAUDAL; PERINEURAL
Status: COMPLETED | OUTPATIENT
Start: 2017-12-06 | End: 2017-12-06

## 2017-12-06 RX ORDER — SODIUM CHLORIDE, SODIUM LACTATE, POTASSIUM CHLORIDE, CALCIUM CHLORIDE 600; 310; 30; 20 MG/100ML; MG/100ML; MG/100ML; MG/100ML
INJECTION, SOLUTION INTRAVENOUS CONTINUOUS
Status: DISCONTINUED | OUTPATIENT
Start: 2017-12-06 | End: 2017-12-06 | Stop reason: HOSPADM

## 2017-12-06 RX ADMIN — PROPOFOL 260 MG: 10 INJECTION, EMULSION INTRAVENOUS at 08:04

## 2017-12-06 RX ADMIN — SODIUM CHLORIDE, SODIUM LACTATE, POTASSIUM CHLORIDE, CALCIUM CHLORIDE: 600; 310; 30; 20 INJECTION, SOLUTION INTRAVENOUS at 08:00

## 2017-12-06 RX ADMIN — SODIUM CHLORIDE, SODIUM LACTATE, POTASSIUM CHLORIDE, CALCIUM CHLORIDE: 600; 310; 30; 20 INJECTION, SOLUTION INTRAVENOUS at 07:14

## 2017-12-06 RX ADMIN — LIDOCAINE HYDROCHLORIDE 5 ML: 10 INJECTION, SOLUTION EPIDURAL; INFILTRATION; INTRACAUDAL; PERINEURAL at 08:04

## 2017-12-06 NOTE — ANESTHESIA PRE PROCEDURE
History   Substance Use Topics    Smoking status: Never Smoker    Smokeless tobacco: Never Used    Alcohol use Yes      Comment: Rare social                                Counseling given: Not Answered      Vital Signs (Current):   Vitals:    12/06/17 0707   BP: (!) 145/89   Pulse: 69   Resp: 20   Temp: 98.4 °F (36.9 °C)   TempSrc: Temporal   SpO2: 94%   Weight: 208 lb (94.3 kg)   Height: 5' 11\" (1.803 m)                                              BP Readings from Last 3 Encounters:   12/06/17 (!) 145/89   10/25/17 130/70   01/26/12 160/80       NPO Status: Time of last liquid consumption: 2300                        Time of last solid consumption: 1600                        Date of last liquid consumption: 12/05/17                        Date of last solid food consumption: 12/04/17    BMI:   Wt Readings from Last 3 Encounters:   12/06/17 208 lb (94.3 kg)   10/25/17 209 lb 3.2 oz (94.9 kg)   01/26/12 229 lb (103.9 kg)     Body mass index is 29.01 kg/m². CBC: No results found for: WBC, RBC, HGB, HCT, MCV, RDW, PLT    CMP: No results found for: NA, K, CL, CO2, BUN, CREATININE, GFRAA, AGRATIO, LABGLOM, GLUCOSE, PROT, CALCIUM, BILITOT, ALKPHOS, AST, ALT    POC Tests: No results for input(s): POCGLU, POCNA, POCK, POCCL, POCBUN, POCHEMO, POCHCT in the last 72 hours.     Coags: No results found for: PROTIME, INR, APTT    HCG (If Applicable): No results found for: PREGTESTUR, PREGSERUM, HCG, HCGQUANT     ABGs: No results found for: PHART, PO2ART, WOA3ZPF, OYD7GUS, BEART, G3QIKBEZ     Type & Screen (If Applicable):  No results found for: LABABO, LABRH    Anesthesia Evaluation   no history of anesthetic complications:   Airway: Mallampati: II  TM distance: >3 FB   Neck ROM: full  Mouth opening: > = 3 FB Dental: normal exam         Pulmonary:Negative Pulmonary ROS and normal exam                               Cardiovascular:    (+) hypertension: moderate, valvular problems/murmurs (\"murmur per dr Marcie Sevilla"):, CABG/stent (stent in 2010.   ): no interval change, dysrhythmias (\"irregular heartbeat\" ):,          Beta Blocker:  Dose within 24 Hrs         Neuro/Psych:   Negative Neuro/Psych ROS              GI/Hepatic/Renal:   (+) bowel prep,           Endo/Other:    (+) malignancy/cancer (prostate cancer with radiation in 2016). Pt had no PAT visit       Abdominal:           Vascular: negative vascular ROS. Anesthesia Plan      general     ASA 2       Induction: intravenous. Anesthetic plan and risks discussed with patient.                       Eliza Kramer CRNA   12/6/2017

## 2017-12-06 NOTE — ANESTHESIA POSTPROCEDURE EVALUATION
Department of Anesthesiology  Postprocedure Note    Patient: Kenan Garcia  MRN: [id number]  YOB: 1955  Date of evaluation: 12/6/2017  Time:  8:20 AM     Procedure Summary     Date:  12/06/17 Room / Location:  [facility] ENDO 01 / [facility] OR    Anesthesia Start:  0800 Anesthesia Stop:      Procedure:  COLONOSCOPY DIAGNOSTIC OR SCREENING (N/A ) Diagnosis:  (SCREEN, HX POLYPS, FH CLN POLYPS)    Surgeon:  Diannah Rinne, DO Responsible Provider:  Duke Nguyễn CRNA    Anesthesia Type:  general ASA Status:  2          Anesthesia Type: general    Jessica Phase I:      Jessica Phase II:      Last vitals: Reviewed and per EMR flowsheets.        Anesthesia Post Evaluation    Patient location during evaluation: bedside  Patient participation: complete - patient participated  Level of consciousness: sleepy but conscious  Pain score: 0  Airway patency: patent  Nausea & Vomiting: no nausea and no vomiting  Complications: no  Cardiovascular status: hemodynamically stable and blood pressure returned to baseline  Respiratory status: acceptable and nasal cannula  Hydration status: stable

## 2017-12-07 ENCOUNTER — OFFICE VISIT (OUTPATIENT)
Dept: CARDIOLOGY | Facility: CLINIC | Age: 62
End: 2017-12-07

## 2017-12-07 VITALS
DIASTOLIC BLOOD PRESSURE: 72 MMHG | WEIGHT: 207 LBS | BODY MASS INDEX: 28.98 KG/M2 | HEART RATE: 64 BPM | OXYGEN SATURATION: 98 % | SYSTOLIC BLOOD PRESSURE: 130 MMHG | HEIGHT: 71 IN

## 2017-12-07 DIAGNOSIS — I10 ESSENTIAL HYPERTENSION: ICD-10-CM

## 2017-12-07 DIAGNOSIS — I42.1 HOCM (HYPERTROPHIC OBSTRUCTIVE CARDIOMYOPATHY) (HCC): Primary | ICD-10-CM

## 2017-12-07 DIAGNOSIS — I25.10 CORONARY ARTERY DISEASE INVOLVING NATIVE CORONARY ARTERY OF NATIVE HEART WITHOUT ANGINA PECTORIS: ICD-10-CM

## 2017-12-07 DIAGNOSIS — E78.49 OTHER HYPERLIPIDEMIA: ICD-10-CM

## 2017-12-07 PROCEDURE — 93000 ELECTROCARDIOGRAM COMPLETE: CPT | Performed by: NURSE PRACTITIONER

## 2017-12-07 PROCEDURE — 99213 OFFICE O/P EST LOW 20 MIN: CPT | Performed by: NURSE PRACTITIONER

## 2017-12-07 RX ORDER — NIACIN 500 MG
500 TABLET ORAL NIGHTLY
COMMUNITY

## 2017-12-07 NOTE — PROGRESS NOTES
"    Subjective:     Encounter Date:12/07/2017      Patient ID: Dayron Lubin is a 62 y.o. male.    Chief Complaint:  HPI Comments: The patient reports he is feeling well. He denies chest pain, shortness of breath, edema, weight gain, syncope, or pre syncope. He as undergone radiation treatment for prostate cancer and states he just received his first hormone treatment a few months ago. He also reports a recent normal colonoscopy.     Coronary Artery Disease   Presents for follow-up visit. Pertinent negatives include no chest pain, chest pressure, chest tightness, dizziness, leg swelling, muscle weakness, palpitations, shortness of breath or weight gain. The symptoms have been stable. Compliance with diet is good. Compliance with exercise is good. Compliance with medications is good.       The following portions of the patient's history were reviewed and updated as appropriate: allergies, current medications, past family history, past medical history, past social history, past surgical history and problem list.  /72 (BP Location: Left arm, Patient Position: Sitting)  Pulse 64  Ht 180.3 cm (71\")  Wt 93.9 kg (207 lb)  SpO2 98%  BMI 28.87 kg/m2  No Known Allergies    Current Outpatient Prescriptions:   •  aspirin 325 MG tablet, Take 325 mg by mouth Daily., Disp: , Rfl:   •  atorvastatin (LIPITOR) 20 MG tablet, Take 20 mg by mouth Daily., Disp: , Rfl:   •  losartan-hydrochlorothiazide (HYZAAR) 100-25 MG per tablet, Take 1 tablet by mouth Daily., Disp: , Rfl:   •  niacin 500 MG tablet, Take 500 mg by mouth Every Night., Disp: , Rfl:   •  temazepam (RESTORIL) 30 MG capsule, TAKE ONE CAPSULE BY MOUTH AT BEDTIME AS NEEDED FOR SLEEP, Disp: , Rfl: 2  •  TOPROL XL 50 MG 24 hr tablet, Take 50 mg by mouth Daily., Disp: , Rfl:   Past Medical History:   Diagnosis Date   • CAD (coronary artery disease)    • History of external beam radiation therapy 05/12/2016    6840 cGy to prostate bed   • Hyperlipidemia    • " Hypertension    • Prostate cancer      Past Surgical History:   Procedure Laterality Date   • CARDIAC CATHETERIZATION     • CORONARY ANGIOPLASTY WITH STENT PLACEMENT     • PROSTATE SURGERY     • TONSILLECTOMY       Social History     Social History   • Marital status: Single     Spouse name: N/A   • Number of children: N/A   • Years of education: N/A     Occupational History   • Not on file.     Social History Main Topics   • Smoking status: Never Smoker   • Smokeless tobacco: Never Used   • Alcohol use Yes      Comment: very little    • Drug use: No   • Sexual activity: Defer     Other Topics Concern   • Not on file     Social History Narrative     Family History   Problem Relation Age of Onset   • Coronary artery disease Mother    • Stroke Mother        Review of Systems   Constitution: Negative for chills, diaphoresis, fever, weakness and weight gain.   HENT: Negative for nosebleeds.    Eyes: Negative for visual disturbance.   Cardiovascular: Negative for chest pain, claudication, cyanosis, dyspnea on exertion, irregular heartbeat, leg swelling, near-syncope, orthopnea, palpitations, paroxysmal nocturnal dyspnea and syncope.   Respiratory: Negative for chest tightness, cough, hemoptysis, shortness of breath, sputum production and wheezing.    Hematologic/Lymphatic: Negative for bleeding problem.   Skin: Negative for color change and flushing.   Musculoskeletal: Negative for falls and muscle weakness.   Gastrointestinal: Negative for bloating, abdominal pain, hematemesis, hematochezia, melena, nausea and vomiting.   Genitourinary: Negative for hematuria.   Neurological: Negative for dizziness and light-headedness.   Psychiatric/Behavioral: Negative for altered mental status.         ECG 12 Lead  Date/Time: 12/7/2017 12:24 PM  Performed by: ANI TEMPLETON  Authorized by: ANI TEMPLETON   Comparison: compared with previous ECG from 7/21/2015  Similar to previous ECG  Comparison to previous ECG: Biphasic T waves V3-V6,  LVH, QRS duration now 124  Rhythm: sinus rhythm               Objective:     Physical Exam   Constitutional: He is oriented to person, place, and time. He appears well-developed and well-nourished. No distress.   HENT:   Head: Normocephalic and atraumatic.   Eyes: Pupils are equal, round, and reactive to light.   Neck: Normal range of motion. Neck supple. No JVD present. No thyromegaly present.   Cardiovascular: Normal rate, regular rhythm and intact distal pulses.  Exam reveals no gallop and no friction rub.    Murmur (3/6 systolic murmur ) heard.  Pulmonary/Chest: Effort normal and breath sounds normal. No respiratory distress. He has no wheezes. He has no rales. He exhibits no tenderness.   Abdominal: Soft. Bowel sounds are normal. He exhibits no distension. There is no tenderness.   Musculoskeletal: Normal range of motion. He exhibits no edema.   Neurological: He is alert and oriented to person, place, and time. No cranial nerve deficit.   Skin: Skin is warm and dry. He is not diaphoretic.   Psychiatric: He has a normal mood and affect. His behavior is normal.       Lab Review:       Assessment:          Diagnosis Plan   1. HOCM (hypertrophic obstructive cardiomyopathy)  Asymptomatic. 10/2014 echo normal LVEF, mild AS, mild MR, mild LVOT gradient, moderate to severe LVH, septal wall hypertrophy, LV diastolic dysfunction    2. Coronary artery disease involving native coronary artery of native heart without angina pectoris  PCI to RCA 2009. Negative stress echo 2014. Continue ASA, statin, BB, ARB   3. Essential hypertension  Controlled    4. Other hyperlipidemia  On statin, followed by pcp           Plan:       Follow up 1 year, sooner with new symptoms or concerns.  Continue current medical therapy.

## 2017-12-11 ENCOUNTER — OFFICE VISIT (OUTPATIENT)
Dept: UROLOGY | Facility: CLINIC | Age: 62
End: 2017-12-11

## 2017-12-11 VITALS
HEIGHT: 71 IN | BODY MASS INDEX: 29.15 KG/M2 | TEMPERATURE: 97.4 F | DIASTOLIC BLOOD PRESSURE: 68 MMHG | WEIGHT: 208.2 LBS | SYSTOLIC BLOOD PRESSURE: 116 MMHG

## 2017-12-11 DIAGNOSIS — N39.3 URINARY INCONTINENCE, MALE, STRESS: ICD-10-CM

## 2017-12-11 DIAGNOSIS — C61 PROSTATE CANCER (HCC): Primary | ICD-10-CM

## 2017-12-11 DIAGNOSIS — N52.9 IMPOTENCE OF ORGANIC ORIGIN: ICD-10-CM

## 2017-12-11 LAB
BILIRUB BLD-MCNC: NEGATIVE MG/DL
CLARITY, POC: CLEAR
COLOR UR: YELLOW
GLUCOSE UR STRIP-MCNC: NEGATIVE MG/DL
KETONES UR QL: NEGATIVE
LEUKOCYTE EST, POC: NEGATIVE
NITRITE UR-MCNC: NEGATIVE MG/ML
PH UR: 5 [PH] (ref 5–8)
PROT UR STRIP-MCNC: NEGATIVE MG/DL
RBC # UR STRIP: NEGATIVE /UL
SP GR UR: 1.02 (ref 1–1.03)
UROBILINOGEN UR QL: NORMAL

## 2017-12-11 PROCEDURE — 81003 URINALYSIS AUTO W/O SCOPE: CPT | Performed by: UROLOGY

## 2017-12-11 PROCEDURE — 99214 OFFICE O/P EST MOD 30 MIN: CPT | Performed by: UROLOGY

## 2017-12-11 NOTE — PROGRESS NOTES
Subjective    Mr. Lubin is 62 y.o. male    Chief Complaint: Prostate Cancer    History of Present Illness     Prostate Cancer  Pt. presents with history of prostate cancer diagnosed in July 2015. Current disease state ishigh risk disease Pt. underwent robot assisted laparoscpic prostatectomy and external beam radiotherapy Path showing T3b 7 adenocarcinoma of prostate with Negative surgical margins. Patient has received XRT completed in May 2016 .He is on Lupronfor management of prostate cancer. Pt. having 1 ppd incontinence. Associated voiding symptoms include No evidence of voiding symptoms . Pt. Is having erectile dysfunction thatdoes not to PDE5 inhibitors. There has been no bone pain, weight loss, abdominal pain, back pain, or gross hematuria. Most recent PSA 0<0.07.      Erectile Dysfunction  Patient complains of erectile dysfunction. Onset of dysfunction was 2 years ago and was sudden in onset. Patient states the nature of difficulty is attaining erection. Full erections occur never. Partial erections occur never. Libido is not affected. Risk factors for ED include urologic disease, prostate cancer. Patient denies history of hypogonadism. Previous treatment of ED includes PDE5 inhibitors.      Urinary Incontinence  Patient complains of urinary incontinence. This has been present for 2 years. Symptoms have remained static . Patient uses 1 pads/day. The patient leaks urine with bending, coughing, lifting, standing. Patient describes the symptoms as urine leakage with coughing/heavy physical activity. Factors associated with symptoms include prostate surgery. Evaluation to date includes none. Treatment to date includes Kegel exercises, which was somewhat effective.       The following portions of the patient's history were reviewed and updated as appropriate: allergies, current medications, past family history, past medical history, past social history, past surgical history and problem list.    Review of Systems  "  Constitutional: Negative for chills and fever.   Gastrointestinal: Negative for abdominal pain, anal bleeding and blood in stool.   Genitourinary: Negative for flank pain and hematuria.         Current Outpatient Prescriptions:   •  aspirin 325 MG tablet, Take 325 mg by mouth Daily., Disp: , Rfl:   •  atorvastatin (LIPITOR) 20 MG tablet, Take 20 mg by mouth Daily., Disp: , Rfl:   •  losartan-hydrochlorothiazide (HYZAAR) 100-25 MG per tablet, Take 1 tablet by mouth Daily., Disp: , Rfl:   •  niacin 500 MG tablet, Take 500 mg by mouth Every Night., Disp: , Rfl:   •  temazepam (RESTORIL) 30 MG capsule, TAKE ONE CAPSULE BY MOUTH AT BEDTIME AS NEEDED FOR SLEEP, Disp: , Rfl: 2  •  TOPROL XL 50 MG 24 hr tablet, Take 50 mg by mouth Daily., Disp: , Rfl:     Past Medical History:   Diagnosis Date   • CAD (coronary artery disease)    • History of external beam radiation therapy 05/12/2016    6840 cGy to prostate bed   • Hyperlipidemia    • Hypertension    • Prostate cancer        Past Surgical History:   Procedure Laterality Date   • CARDIAC CATHETERIZATION     • CORONARY ANGIOPLASTY WITH STENT PLACEMENT     • PROSTATE SURGERY     • TONSILLECTOMY         Social History     Social History   • Marital status: Single     Spouse name: N/A   • Number of children: N/A   • Years of education: N/A     Social History Main Topics   • Smoking status: Never Smoker   • Smokeless tobacco: Never Used   • Alcohol use Yes      Comment: very little    • Drug use: No   • Sexual activity: Defer     Other Topics Concern   • None     Social History Narrative       Family History   Problem Relation Age of Onset   • Coronary artery disease Mother    • Stroke Mother        Objective    /68  Temp 97.4 °F (36.3 °C)  Ht 180.3 cm (71\")  Wt 94.4 kg (208 lb 3.2 oz)  BMI 29.04 kg/m2    Physical Exam   Constitutional: He is oriented to person, place, and time. He appears well-developed and well-nourished. No distress.   Pulmonary/Chest: Effort " normal.   Abdominal: Soft. He exhibits no distension and no mass. There is no tenderness. There is no rebound and no guarding. No hernia.   Neurological: He is alert and oriented to person, place, and time.   Skin: Skin is warm and dry. He is not diaphoretic.   Psychiatric: He has a normal mood and affect.   Vitals reviewed.          Results for orders placed or performed in visit on 12/11/17   POC Urinalysis Dipstick, Automated   Result Value Ref Range    Color Yellow Yellow, Straw, Dark Yellow, Ying    Clarity, UA Clear Clear    Glucose, UA Negative Negative, 1000 mg/dL (3+) mg/dL    Bilirubin Negative Negative    Ketones, UA Negative Negative    Specific Gravity  1.025 1.005 - 1.030    Blood, UA Negative Negative    pH, Urine 5.0 5.0 - 8.0    Protein, POC Negative Negative mg/dL    Urobilinogen, UA Normal Normal    Leukocytes Negative Negative    Nitrite, UA Negative Negative     Assessment and Plan    Diagnoses and all orders for this visit:    Prostate cancer  -     POC Urinalysis Dipstick, Automated  -     PSA; Future    Impotence of organic origin    Urinary incontinence, male, stress          Patient underwent RALP on 7/28/15. His pretreatment PSA was 7.7. His pathology revealed [place] 3+4 = 7 adenocarcinoma the prostate. Pathology showed [id number] with negative surgical margins. His posttreatment PSA was 0.1 he completed XRT in May 2016 his PSA in September 2017 lavonne to 0.3 and he was started on 2 years of ADT.  His PSA is undetectable today he will follow-up in 6 months.  He is having some hot flashes.  I discussed giving him Megace.  He wants to hold off on this for now.

## 2018-03-19 ENCOUNTER — PROCEDURE VISIT (OUTPATIENT)
Dept: UROLOGY | Facility: CLINIC | Age: 63
End: 2018-03-19

## 2018-03-19 DIAGNOSIS — C61 PROSTATE CANCER (HCC): Primary | ICD-10-CM

## 2018-03-19 PROCEDURE — 96402 CHEMO HORMON ANTINEOPL SQ/IM: CPT | Performed by: UROLOGY

## 2018-03-19 NOTE — PROGRESS NOTES
Patient of Dr. Love states he is here for his 6 month Lupron injection. Patient denies any fever, chills, N&V or hematuria. I administered 45 mg/6 month Lupron injection IM right hip with no complications. Dr. Love was in the office today at the time of injection. The patient was advised to follow up in 6 months for his next Lupron injection. Patient verbalized understanding.

## 2018-06-07 LAB — PSA SERPL-MCNC: <0.07 NG/ML (ref 0–4)

## 2018-06-09 ENCOUNTER — RESULTS ENCOUNTER (OUTPATIENT)
Dept: UROLOGY | Facility: CLINIC | Age: 63
End: 2018-06-09

## 2018-06-09 DIAGNOSIS — C61 PROSTATE CANCER (HCC): ICD-10-CM

## 2018-06-12 NOTE — PROGRESS NOTES
Subjective    Mr. Lubin is 62 y.o. male    Chief Complaint: Prostate Cancer    History of Present Illness    Prostate Cancer  Pt. presents with history of prostate cancer diagnosed in July 2015. Current disease state ishigh risk disease Pt. underwent robot assisted laparoscpic prostatectomy and external beam radiotherapy Path showing T3b 7 adenocarcinoma of prostate with Negative surgical margins. Patient has received XRT completed in May 2016 .He is on Lupronfor management of prostate cancer. Pt. having 1 ppd incontinence. Associated voiding symptoms include No evidence of voiding symptoms . Pt. Is having erectile dysfunction thatdoes not to PDE5 inhibitors. There has been no bone pain, weight loss, abdominal pain, back pain, or gross hematuria. Most recent PSA <0.070 on 06/07/2018.      Erectile Dysfunction  Patient complains of erectile dysfunction. Onset of dysfunction was 2 years ago and was sudden in onset. Patient states the nature of difficulty is attaining erection. Full erections occur never. Partial erections occur never. Libido is not affected. Risk factors for ED include urologic disease, prostate cancer. Patient denies history of hypogonadism. Previous treatment of ED includes PDE5 inhibitors.      Urinary Incontinence  Patient complains of urinary incontinence. This has been present for 2 years. Symptoms have remained static . Patient uses 1 pads/day. The patient leaks urine with bending, coughing, lifting, standing. Patient describes the symptoms as urine leakage with coughing/heavy physical activity. Factors associated with symptoms include prostate surgery. Evaluation to date includes none. Treatment to date includes Kegel exercises, which was somewhat effective.    The following portions of the patient's history were reviewed and updated as appropriate: allergies, current medications, past family history, past medical history, past social history, past surgical history and problem list.    Review  "of Systems   Constitutional: Negative for chills and fever.   Gastrointestinal: Negative for abdominal pain, anal bleeding and blood in stool.   Genitourinary: Positive for frequency and urgency. Negative for flank pain and hematuria.         Current Outpatient Prescriptions:   •  aspirin 325 MG tablet, Take 325 mg by mouth Daily., Disp: , Rfl:   •  atorvastatin (LIPITOR) 20 MG tablet, Take 20 mg by mouth Daily., Disp: , Rfl:   •  losartan-hydrochlorothiazide (HYZAAR) 100-25 MG per tablet, Take 1 tablet by mouth Daily., Disp: , Rfl:   •  niacin 500 MG tablet, Take 500 mg by mouth Every Night., Disp: , Rfl:   •  temazepam (RESTORIL) 30 MG capsule, TAKE ONE CAPSULE BY MOUTH AT BEDTIME AS NEEDED FOR SLEEP, Disp: , Rfl: 2  •  TOPROL XL 50 MG 24 hr tablet, Take 50 mg by mouth Daily., Disp: , Rfl:     Past Medical History:   Diagnosis Date   • CAD (coronary artery disease)    • History of external beam radiation therapy 05/12/2016    6840 cGy to prostate bed   • Hyperlipidemia    • Hypertension    • Prostate cancer        Past Surgical History:   Procedure Laterality Date   • CARDIAC CATHETERIZATION     • CORONARY ANGIOPLASTY WITH STENT PLACEMENT     • PROSTATE SURGERY     • TONSILLECTOMY         Social History     Social History   • Marital status: Single     Social History Main Topics   • Smoking status: Never Smoker   • Smokeless tobacco: Never Used   • Alcohol use Yes      Comment: very little    • Drug use: No   • Sexual activity: Defer     Other Topics Concern   • Not on file       Family History   Problem Relation Age of Onset   • Coronary artery disease Mother    • Stroke Mother        Objective    /86   Temp 97.5 °F (36.4 °C)   Ht 180.3 cm (71\")   Wt 98.3 kg (216 lb 12.8 oz)   BMI 30.24 kg/m²     Physical Exam   Constitutional: He is oriented to person, place, and time. He appears well-developed and well-nourished. No distress.   Pulmonary/Chest: Effort normal.   Abdominal: Soft. He exhibits no " distension and no mass. There is no tenderness. There is no rebound and no guarding. No hernia.   Neurological: He is alert and oriented to person, place, and time.   Skin: Skin is warm and dry. He is not diaphoretic.   Psychiatric: He has a normal mood and affect.   Vitals reviewed.          Results for orders placed or performed in visit on 06/14/18   POC Urinalysis Dipstick, Multipro   Result Value Ref Range    Color Yellow Yellow, Straw, Dark Yellow, Ying    Clarity, UA Clear Clear    Glucose, UA Negative Negative, 1000 mg/dL (3+) mg/dL    Bilirubin Negative Negative    Ketones, UA Negative Negative    Specific Gravity  1.025 1.005 - 1.030    Blood, UA Trace (A) Negative    pH, Urine 5.0 5.0 - 8.0    Protein, POC Negative Negative mg/dL    Urobilinogen, UA Normal Normal    Nitrite, UA Negative Negative    Leukocytes Negative Negative     Assessment and Plan    Dayron was seen today for prostate cancer.    Diagnoses and all orders for this visit:    Impotence of organic origin    Prostate cancer  -     POC Urinalysis Dipstick, Multipro  -     PSA DIAGNOSTIC; Future    Urinary incontinence, male, stress          Patient underwent RALP on 7/28/15. His pretreatment PSA was 7.7. His pathology revealed [place] 3+4 = 7 adenocarcinoma the prostate. Pathology showed [id number] with negative surgical margins. His posttreatment PSA was 0.1 he completed XRT in May 2016 his PSA in September 2017 lavonne to 0.3 and he was started on 2 years of ADT.    His last Lupron injection was 03/19/2018 he will be due in September. He still having hot flashes I discussed starting Megace again with him today and he does not want to pursue this.      His PSA is undetectable today he will follow-up in 6 months.   We will continue Lupron.

## 2018-06-14 ENCOUNTER — OFFICE VISIT (OUTPATIENT)
Dept: UROLOGY | Facility: CLINIC | Age: 63
End: 2018-06-14

## 2018-06-14 VITALS
WEIGHT: 216.8 LBS | SYSTOLIC BLOOD PRESSURE: 132 MMHG | DIASTOLIC BLOOD PRESSURE: 86 MMHG | BODY MASS INDEX: 30.35 KG/M2 | TEMPERATURE: 97.5 F | HEIGHT: 71 IN

## 2018-06-14 DIAGNOSIS — N52.9 IMPOTENCE OF ORGANIC ORIGIN: Primary | ICD-10-CM

## 2018-06-14 DIAGNOSIS — C61 PROSTATE CANCER (HCC): ICD-10-CM

## 2018-06-14 DIAGNOSIS — N39.3 URINARY INCONTINENCE, MALE, STRESS: ICD-10-CM

## 2018-06-14 LAB
BILIRUB BLD-MCNC: NEGATIVE MG/DL
CLARITY, POC: CLEAR
COLOR UR: YELLOW
GLUCOSE UR STRIP-MCNC: NEGATIVE MG/DL
KETONES UR QL: NEGATIVE
LEUKOCYTE EST, POC: NEGATIVE
NITRITE UR-MCNC: NEGATIVE MG/ML
PH UR: 5 [PH] (ref 5–8)
PROT UR STRIP-MCNC: NEGATIVE MG/DL
RBC # UR STRIP: ABNORMAL /UL
SP GR UR: 1.02 (ref 1–1.03)
UROBILINOGEN UR QL: NORMAL

## 2018-06-14 PROCEDURE — 81001 URINALYSIS AUTO W/SCOPE: CPT | Performed by: UROLOGY

## 2018-06-14 PROCEDURE — 99214 OFFICE O/P EST MOD 30 MIN: CPT | Performed by: UROLOGY

## 2018-06-14 NOTE — PATIENT INSTRUCTIONS

## 2018-09-24 ENCOUNTER — PROCEDURE VISIT (OUTPATIENT)
Dept: UROLOGY | Facility: CLINIC | Age: 63
End: 2018-09-24

## 2018-09-24 DIAGNOSIS — C61 PROSTATE CANCER (HCC): Primary | ICD-10-CM

## 2018-09-24 PROCEDURE — 96402 CHEMO HORMON ANTINEOPL SQ/IM: CPT | Performed by: UROLOGY

## 2018-09-24 NOTE — PROGRESS NOTES
Patient of Dr. Love states he is here for his 6 month Lupron injection. Patient denies any fever, chills, N&V or hematuria. I administered 45 mg/6 month Lupron injection IM left hip with no complications. Dr. Ratliff was in the office today at the time of injection. The patient was advised to follow up in 6 months for his next Lupron injection. Patient verbalized understanding.     Reviewed and agreed with above

## 2018-12-12 ENCOUNTER — RESULTS ENCOUNTER (OUTPATIENT)
Dept: UROLOGY | Facility: CLINIC | Age: 63
End: 2018-12-12

## 2018-12-12 ENCOUNTER — OFFICE VISIT (OUTPATIENT)
Dept: CARDIOLOGY | Facility: CLINIC | Age: 63
End: 2018-12-12

## 2018-12-12 VITALS
OXYGEN SATURATION: 99 % | HEIGHT: 71 IN | WEIGHT: 218 LBS | BODY MASS INDEX: 30.52 KG/M2 | SYSTOLIC BLOOD PRESSURE: 136 MMHG | HEART RATE: 83 BPM | DIASTOLIC BLOOD PRESSURE: 76 MMHG

## 2018-12-12 DIAGNOSIS — C61 PROSTATE CANCER (HCC): ICD-10-CM

## 2018-12-12 DIAGNOSIS — I10 ESSENTIAL HYPERTENSION: ICD-10-CM

## 2018-12-12 DIAGNOSIS — I25.10 CORONARY ARTERY DISEASE INVOLVING NATIVE CORONARY ARTERY OF NATIVE HEART WITHOUT ANGINA PECTORIS: Primary | ICD-10-CM

## 2018-12-12 DIAGNOSIS — I42.1 HOCM (HYPERTROPHIC OBSTRUCTIVE CARDIOMYOPATHY) (HCC): ICD-10-CM

## 2018-12-12 LAB — PSA SERPL-MCNC: <0.07 NG/ML (ref 0–4)

## 2018-12-12 PROCEDURE — 99214 OFFICE O/P EST MOD 30 MIN: CPT | Performed by: INTERNAL MEDICINE

## 2018-12-12 NOTE — PROGRESS NOTES
Subjective:     Encounter Date:12/12/2018      Patient ID: Dayron Lubin is a 63 y.o. male with a history of coronary artery disease, status post PCI to the right coronary artery in 2009, hypertrophic obstructive cardiomyopathy on the basis of previous echocardiography, chronic diastolic dysfunction, hypertension, hyperlipidemia who presents today for yearly follow-up.    Chief Complaint: Follow-up    Coronary Artery Disease   Presents for follow-up visit. Pertinent negatives include no chest pain, chest pressure, chest tightness, dizziness, leg swelling, palpitations or shortness of breath. The symptoms have been stable. Compliance with diet is variable. Compliance with exercise is variable. Compliance with medications is good.   Hypertension   This is a chronic problem. The problem is unchanged. The problem is controlled. Pertinent negatives include no chest pain, headaches, malaise/fatigue, orthopnea, palpitations, peripheral edema, PND or shortness of breath. There are no associated agents to hypertension. Risk factors for coronary artery disease include male gender and dyslipidemia. Current antihypertension treatment includes beta blockers, angiotensin blockers and diuretics. The current treatment provides significant improvement. There are no compliance problems.  Hypertensive end-organ damage includes CAD/MI. There is no history of angina or CVA. There is no history of chronic renal disease.     This patient presents today for follow-up.  He has remote history of PCI to the right coronary artery.  He denies any recent chest pain, shortness breath, dyspnea on exertion.  Patient has a history of hypertrophic cardiomyopathy.  He denies palpitations, lightheadedness, dizziness comes a become orthopnea, PND, edema.  His blood pressure is generally well-controlled.  The patient checks this periodically.  He remains on statin therapy.  He tells me that he will have lipid panel checked sometime in the near  future.  He reports historically that his lipids have been well-controlled.  Otherwise, the patient is without any complaints at this time.  He is still undergoing hormonal treatment for prostate cancer and is tolerating this reasonably well.  Otherwise, no specific complaints or problems today.      Current Outpatient Medications:   •  aspirin 325 MG tablet, Take 325 mg by mouth Daily., Disp: , Rfl:   •  atorvastatin (LIPITOR) 20 MG tablet, Take 20 mg by mouth Daily., Disp: , Rfl:   •  leuprolide (LUPRON) 30 MG injection, Inject 30 mg into the appropriate muscle as directed by prescriber Every 6 (Six) Months., Disp: , Rfl:   •  losartan-hydrochlorothiazide (HYZAAR) 100-25 MG per tablet, Take 1 tablet by mouth Daily., Disp: , Rfl:   •  niacin 500 MG tablet, Take 500 mg by mouth Every Night., Disp: , Rfl:   •  temazepam (RESTORIL) 30 MG capsule, TAKE ONE CAPSULE BY MOUTH AT BEDTIME AS NEEDED FOR SLEEP, Disp: , Rfl: 2  •  TOPROL XL 50 MG 24 hr tablet, Take 50 mg by mouth Daily., Disp: , Rfl:     No Known Allergies    Social History     Tobacco Use   • Smoking status: Never Smoker   • Smokeless tobacco: Never Used   Substance Use Topics   • Alcohol use: Yes     Comment: very little      Review of Systems   Constitution: Negative for chills, fever, weakness, malaise/fatigue, night sweats and weight loss.   Cardiovascular: Negative for chest pain, claudication, dyspnea on exertion, irregular heartbeat, leg swelling, orthopnea, palpitations, paroxysmal nocturnal dyspnea and syncope.   Respiratory: Negative for chest tightness, cough, hemoptysis, shortness of breath and wheezing.    Endocrine: Negative for cold intolerance and heat intolerance.   Hematologic/Lymphatic: Negative for bleeding problem. Does not bruise/bleed easily.   Gastrointestinal: Negative for abdominal pain, hematemesis, hematochezia, melena, nausea and vomiting.   Genitourinary: Negative for dysuria and hematuria.   Neurological: Negative for dizziness,  "headaches, loss of balance and numbness.         ECG 12 Lead  Date/Time: 12/13/2018 8:06 AM  Performed by: Dayron Stephens MD  Authorized by: Dayron Stephens MD   Comparison: compared with previous ECG from 12/7/2017  Similar to previous ECG  Rhythm: sinus rhythm  Rate: normal  Conduction: conduction normal  QRS axis: normal  Other findings: LVH  Clinical impression: abnormal ECG  Comments: ST-T changes in the anterolateral leads (chronic)               Objective:     Physical Exam   Constitutional: He is oriented to person, place, and time. He appears well-developed and well-nourished. No distress.   HENT:   Head: Normocephalic and atraumatic.   Mouth/Throat: Oropharynx is clear and moist.   Eyes: EOM are normal. Pupils are equal, round, and reactive to light.   Neck: Normal range of motion. Neck supple. No JVD present. No thyromegaly present.   Cardiovascular: Normal rate, regular rhythm, S1 normal, S2 normal and intact distal pulses. Exam reveals no gallop and no friction rub.   Murmur heard.  Pulmonary/Chest: Effort normal and breath sounds normal.   Abdominal: Soft. Bowel sounds are normal. He exhibits no distension. There is no tenderness.   Musculoskeletal: Normal range of motion. He exhibits no edema.   Neurological: He is alert and oriented to person, place, and time. No cranial nerve deficit.   Skin: Skin is warm and dry. No rash noted. No cyanosis or erythema. Nails show no clubbing.   Psychiatric: He has a normal mood and affect.   Vitals reviewed.    /76   Pulse 83   Ht 180.3 cm (71\")   Wt 98.9 kg (218 lb)   SpO2 99%   BMI 30.40 kg/m²     Data/Lab Review:     I reviewed the report of an echocardiogram from October 2014: Left ventricular ejection fraction greater than 65%, abnormal diastolic function, moderate to severe concentric left ventricular hypertrophy.  Reportedly mild aortic stenosis, mild mitral and tricuspid regurgitation.      Assessment:          Diagnosis Plan "   1. Coronary artery disease involving native coronary artery of native heart without angina pectoris  ECG 12 Lead   2. HOCM (hypertrophic obstructive cardiomyopathy) (CMS/[facility])     3. Essential hypertension          Plan:       1.  Coronary artery disease: Clinically stable at this time with no ischemic symptoms.  Continue aspirin, beta blocker, statin therapies.    2.  Hypertrophic cardiomyopathy: The patient is stable at this time.  He does have a murmur is present on examination.  He also has a history of what is reported to be mild aortic stenosis.  He is not expressing any symptoms of hypertrophic cardiomyopathy or worsening aortic stenosis.  Consider repeat echocardiography at next visit as this will be approximately 5 years from his previous assessment.    3.  Essential hypertension: Blood pressure is reasonably well-controlled at this time.  Continue current medications.    Patient's Body mass index is 30.4 kg/m². BMI is above normal parameters. Recommendations include: exercise counseling and nutrition counseling.    Follow-up: 12 months unless needed sooner

## 2018-12-13 PROCEDURE — 93000 ELECTROCARDIOGRAM COMPLETE: CPT | Performed by: INTERNAL MEDICINE

## 2018-12-18 NOTE — PROGRESS NOTES
Subjective    Mr. Lubin is 63 y.o. male    Chief Complaint: Prostate Cancer    History of Present Illness     Prostate Cancer  Pt. presents with history of prostate cancer diagnosed in July 2015. Current disease state ishigh risk disease Pt. underwent robot assisted laparoscpic prostatectomy and external beam radiotherapy Path showing T3b 7 adenocarcinoma of prostate with Negative surgical margins. Patient has received XRT completed in May 2016 .He is on Lupronfor management of prostate cancer. Pt. having 1 ppd incontinence. Associated voiding symptoms include frequency, urgency, nocturia AUA score 3/35 . Pt. Is having erectile dysfunction thatdoes not to PDE5 inhibitors. There has been no bone pain, weight loss, abdominal pain, back pain, or gross hematuria. Most recent PSA <0.070.  Lab Results   Component Value Date    PSA <0.070 12/12/2018    PSA <0.070 06/07/2018    PSA <0.070 12/04/2017           Erectile Dysfunction  Patient complains of erectile dysfunction. Onset of dysfunction was 3 years ago and was sudden in onset. Patient states the nature of difficulty is attaining erection. Full erections occur never. Partial erections occur never. Libido is not affected. Risk factors for ED include urologic disease, prostate cancer. Patient denies history of hypogonadism. Previous treatment of ED includes PDE5 inhibitors.      Urinary Incontinence  Patient complains of urinary incontinence. This has been present for 3 years. Symptoms have remained static . Patient uses 1 pads/day. The patient leaks urine with bending, coughing, lifting, standing. Patient describes the symptoms as urine leakage with coughing/heavy physical activity. Factors associated with symptoms include prostate surgery. Evaluation to date includes none. Treatment to date includes Kegel exercises, which was somewhat effective.    The following portions of the patient's history were reviewed and updated as appropriate: allergies, current medications,  past family history, past medical history, past social history, past surgical history and problem list.    Review of Systems   Constitutional: Negative for chills and fever.   Gastrointestinal: Negative for abdominal pain, anal bleeding and blood in stool.   Genitourinary: Negative for decreased urine volume, difficulty urinating, discharge, dysuria, enuresis, flank pain, frequency, genital sores, hematuria, penile pain, penile swelling, scrotal swelling, testicular pain and urgency.         Current Outpatient Medications:   •  aspirin 325 MG tablet, Take 325 mg by mouth Daily., Disp: , Rfl:   •  atorvastatin (LIPITOR) 20 MG tablet, Take 20 mg by mouth Daily., Disp: , Rfl:   •  leuprolide (LUPRON) 30 MG injection, Inject 30 mg into the appropriate muscle as directed by prescriber Every 6 (Six) Months., Disp: , Rfl:   •  losartan-hydrochlorothiazide (HYZAAR) 100-25 MG per tablet, Take 1 tablet by mouth Daily., Disp: , Rfl:   •  niacin 500 MG tablet, Take 500 mg by mouth Every Night., Disp: , Rfl:   •  temazepam (RESTORIL) 30 MG capsule, TAKE ONE CAPSULE BY MOUTH AT BEDTIME AS NEEDED FOR SLEEP, Disp: , Rfl: 2  •  TOPROL XL 50 MG 24 hr tablet, Take 50 mg by mouth Daily., Disp: , Rfl:     Past Medical History:   Diagnosis Date   • CAD (coronary artery disease)    • History of external beam radiation therapy 05/12/2016    6840 cGy to prostate bed   • Hyperlipidemia    • Hypertension    • Prostate cancer (CMS/HCC)        Past Surgical History:   Procedure Laterality Date   • CARDIAC CATHETERIZATION     • CORONARY ANGIOPLASTY WITH STENT PLACEMENT     • PROSTATE SURGERY     • TONSILLECTOMY         Social History     Socioeconomic History   • Marital status: Single     Spouse name: Not on file   • Number of children: Not on file   • Years of education: Not on file   • Highest education level: Not on file   Tobacco Use   • Smoking status: Never Smoker   • Smokeless tobacco: Never Used   Substance and Sexual Activity   •  "Alcohol use: Yes     Comment: very little    • Drug use: No   • Sexual activity: Defer       Family History   Problem Relation Age of Onset   • Coronary artery disease Mother    • Stroke Mother        Objective    Temp 98.3 °F (36.8 °C)   Ht 180.3 cm (71\")   Wt 95.7 kg (211 lb)   BMI 29.43 kg/m²     Physical Exam   Constitutional: He is oriented to person, place, and time. He appears well-developed and well-nourished. No distress.   Pulmonary/Chest: Effort normal.   Abdominal: Soft. He exhibits no distension and no mass. There is no tenderness. There is no rebound and no guarding. No hernia.   Neurological: He is alert and oriented to person, place, and time.   Skin: Skin is warm and dry. He is not diaphoretic.   Psychiatric: He has a normal mood and affect.   Vitals reviewed.          Results for orders placed or performed in visit on 12/19/18   POC Urinalysis Dipstick, Multipro   Result Value Ref Range    Color Yellow Yellow, Straw, Dark Yellow, Ying    Clarity, UA Clear Clear    Glucose, UA Negative Negative, 1000 mg/dL (3+) mg/dL    Bilirubin Negative Negative    Ketones, UA Negative Negative    Specific Gravity  1.020 1.005 - 1.030    Blood, UA Negative Negative    pH, Urine 5.5 5.0 - 8.0    Protein, POC Negative Negative mg/dL    Urobilinogen, UA Normal Normal    Nitrite, UA Negative Negative    Leukocytes Negative Negative     Assessment and Plan    Diagnoses and all orders for this visit:    Prostate cancer (CMS/[facility])  -     POC Urinalysis Dipstick, Multipro    Patient underwent RALP on 7/28/15. His pretreatment PSA was 7.7. His pathology revealed Ryann 3+4 = 7 adenocarcinoma the prostate. Pathology showed [id number] with negative surgical margins. His posttreatment PSA was 0.1 he completed XRT in May 2016 his PSA in September 2017 lavonne to 0.3 and he was started on 2 years of ADT.     His last Lupron injection was 09/24/2018 he will be due in March, which will be his final injection. He still having hot " flashes I discussed starting Megace again with him today and he does not want to pursue this.       His PSA is undetectable today he will follow-up in 6 months.   We will continue Eve and March will be his last injection.

## 2018-12-19 ENCOUNTER — OFFICE VISIT (OUTPATIENT)
Dept: UROLOGY | Facility: CLINIC | Age: 63
End: 2018-12-19

## 2018-12-19 VITALS — BODY MASS INDEX: 29.54 KG/M2 | WEIGHT: 211 LBS | HEIGHT: 71 IN | TEMPERATURE: 98.3 F

## 2018-12-19 DIAGNOSIS — R23.2 HOT FLASHES: ICD-10-CM

## 2018-12-19 DIAGNOSIS — C61 PROSTATE CANCER (HCC): Primary | ICD-10-CM

## 2018-12-19 DIAGNOSIS — N39.3 URINARY INCONTINENCE, MALE, STRESS: ICD-10-CM

## 2018-12-19 DIAGNOSIS — N52.9 IMPOTENCE OF ORGANIC ORIGIN: ICD-10-CM

## 2018-12-19 PROCEDURE — 81001 URINALYSIS AUTO W/SCOPE: CPT | Performed by: UROLOGY

## 2018-12-19 PROCEDURE — 99214 OFFICE O/P EST MOD 30 MIN: CPT | Performed by: UROLOGY

## 2018-12-19 RX ORDER — MEGESTROL ACETATE 20 MG/1
20 TABLET ORAL 2 TIMES DAILY
Qty: 180 TABLET | Refills: 3 | Status: SHIPPED | OUTPATIENT
Start: 2018-12-19 | End: 2020-07-17

## 2018-12-19 NOTE — PATIENT INSTRUCTIONS

## 2019-03-11 ENCOUNTER — INFUSION (OUTPATIENT)
Dept: ONCOLOGY | Facility: HOSPITAL | Age: 64
End: 2019-03-11

## 2019-03-11 VITALS
OXYGEN SATURATION: 97 % | HEART RATE: 68 BPM | BODY MASS INDEX: 30.38 KG/M2 | DIASTOLIC BLOOD PRESSURE: 66 MMHG | HEIGHT: 71 IN | TEMPERATURE: 97.9 F | SYSTOLIC BLOOD PRESSURE: 123 MMHG | WEIGHT: 217 LBS

## 2019-03-11 DIAGNOSIS — C61 PROSTATE CANCER (HCC): Primary | ICD-10-CM

## 2019-03-11 PROCEDURE — 96402 CHEMO HORMON ANTINEOPL SQ/IM: CPT

## 2019-03-11 PROCEDURE — 96372 THER/PROPH/DIAG INJ SC/IM: CPT

## 2019-03-11 PROCEDURE — 25010000002 LEUPROLIDE 45 MG KIT: Performed by: UROLOGY

## 2019-03-11 RX ADMIN — LEUPROLIDE ACETATE 45 MG: KIT at 10:03

## 2019-06-14 DIAGNOSIS — C61 PROSTATE CANCER (HCC): ICD-10-CM

## 2019-06-15 LAB — PSA SERPL-MCNC: <0.014 NG/ML (ref 0–4)

## 2019-06-18 NOTE — PROGRESS NOTES
Subjective    Mr. Lubin is 63 y.o. male    Chief Complaint:  Prostate Cancer    History of Present Illness     Prostate Cancer  Pt. presents with history of prostate cancer diagnosed in July 2015. Current disease state ishigh risk disease Pt. underwent robot assisted laparoscpic prostatectomy and external beam radiotherapy Path showing T3b 7 adenocarcinoma of prostate with Negative surgical margins. Patient has received XRT completed in May 2016 .He has completed Lupron x 2 years management of prostate cancer. Pt. having 1 ppd incontinence. Associated voiding symptoms include frequency, urgency, nocturia AUA score 10/35 . Pt. Is having erectile dysfunction thatdoes not to PDE5 inhibitors. There has been no bone pain, weight loss, abdominal pain, back pain, or gross hematuria. Most recent PSA <0.014  Lab Results   Component Value Date    PSA <0.014 06/14/2019    PSA <0.070 12/12/2018    PSA <0.070 06/07/2018           The following portions of the patient's history were reviewed and updated as appropriate: allergies, current medications, past family history, past medical history, past social history, past surgical history and problem list.    Review of Systems   Constitutional: Negative for chills and fever.   Gastrointestinal: Negative for abdominal pain, anal bleeding and blood in stool.   Genitourinary: Positive for difficulty urinating (leakage ), frequency and urgency. Negative for decreased urine volume, discharge, dysuria, enuresis, flank pain, genital sores, hematuria, penile pain, penile swelling, scrotal swelling and testicular pain.         Current Outpatient Medications:   •  aspirin 325 MG tablet, Take 325 mg by mouth Daily., Disp: , Rfl:   •  atorvastatin (LIPITOR) 20 MG tablet, Take 20 mg by mouth Daily., Disp: , Rfl:   •  leuprolide (LUPRON) 30 MG injection, Inject 30 mg into the appropriate muscle as directed by prescriber Every 6 (Six) Months., Disp: , Rfl:   •  losartan-hydrochlorothiazide (HYZAAR)  "100-25 MG per tablet, Take 1 tablet by mouth Daily., Disp: , Rfl:   •  megestrol (MEGACE) 20 MG tablet, Take 1 tablet by mouth 2 (Two) Times a Day., Disp: 180 tablet, Rfl: 3  •  niacin 500 MG tablet, Take 500 mg by mouth Every Night., Disp: , Rfl:   •  temazepam (RESTORIL) 30 MG capsule, TAKE ONE CAPSULE BY MOUTH AT BEDTIME AS NEEDED FOR SLEEP, Disp: , Rfl: 2  •  TOPROL XL 50 MG 24 hr tablet, Take 50 mg by mouth Daily., Disp: , Rfl:     Past Medical History:   Diagnosis Date   • CAD (coronary artery disease)    • History of external beam radiation therapy 05/12/2016    6840 cGy to prostate bed   • Hyperlipidemia    • Hypertension    • Prostate cancer (CMS/HCC)        Past Surgical History:   Procedure Laterality Date   • CARDIAC CATHETERIZATION     • CORONARY ANGIOPLASTY WITH STENT PLACEMENT     • PROSTATE SURGERY     • TONSILLECTOMY         Social History     Socioeconomic History   • Marital status: Single     Spouse name: Not on file   • Number of children: Not on file   • Years of education: Not on file   • Highest education level: Not on file   Tobacco Use   • Smoking status: Never Smoker   • Smokeless tobacco: Never Used   Substance and Sexual Activity   • Alcohol use: Yes     Comment: very little    • Drug use: No   • Sexual activity: Defer       Family History   Problem Relation Age of Onset   • Coronary artery disease Mother    • Stroke Mother        Objective    Temp 98.3 °F (36.8 °C)   Ht 180.3 cm (71\")   Wt 96.6 kg (213 lb)   BMI 29.71 kg/m²     Physical Exam   Constitutional: He is oriented to person, place, and time. He appears well-developed and well-nourished. No distress.   Pulmonary/Chest: Effort normal.   Abdominal: Soft. He exhibits no distension and no mass. There is no tenderness. There is no rebound and no guarding. No hernia.   Neurological: He is alert and oriented to person, place, and time.   Skin: Skin is warm and dry. He is not diaphoretic.   Psychiatric: He has a normal mood and " affect.   Vitals reviewed.          Results for orders placed or performed in visit on 06/20/19   POC Urinalysis Dipstick, Multipro   Result Value Ref Range    Color Yellow Yellow, Straw, Dark Yellow, Ying    Clarity, UA Clear Clear    Glucose, UA Negative Negative, 1000 mg/dL (3+) mg/dL    Bilirubin Negative Negative    Ketones, UA Negative Negative    Specific Gravity  1.020 1.005 - 1.030    Blood, UA Negative Negative    pH, Urine 7.0 5.0 - 8.0    Protein, POC Negative Negative mg/dL    Urobilinogen, UA Normal Normal    Nitrite, UA Negative Negative    Leukocytes Negative Negative   Patient's Body mass index is 29.71 kg/m². BMI is above normal parameters. Recommendations include: educational material.    Assessment and Plan    Diagnoses and all orders for this visit:    Prostate cancer (CMS/[facility])  -     POC Urinalysis Dipstick, Multipro  -     PSA DIAGNOSTIC; Future    Impotence of organic origin    Urinary incontinence, male, stress      Patient underwent RALP on 7/28/15. His pretreatment PSA was 7.7. His pathology revealed Ryann 3+4 = 7 adenocarcinoma the prostate. Pathology showed [id number] with negative surgical margins. His posttreatment PSA was 0.1 he completed XRT in May 2016 his PSA in September 2017 lavonne to 0.3 and he was started on 2 years of ADT which he completed March 2019.        His PSA is undetectable today he will follow-up in 6 months with PSA.

## 2019-06-20 ENCOUNTER — OFFICE VISIT (OUTPATIENT)
Dept: UROLOGY | Facility: CLINIC | Age: 64
End: 2019-06-20

## 2019-06-20 VITALS — WEIGHT: 213 LBS | HEIGHT: 71 IN | BODY MASS INDEX: 29.82 KG/M2 | TEMPERATURE: 98.3 F

## 2019-06-20 DIAGNOSIS — N39.3 URINARY INCONTINENCE, MALE, STRESS: ICD-10-CM

## 2019-06-20 DIAGNOSIS — C61 PROSTATE CANCER (HCC): Primary | ICD-10-CM

## 2019-06-20 DIAGNOSIS — N52.9 IMPOTENCE OF ORGANIC ORIGIN: ICD-10-CM

## 2019-06-20 LAB
BILIRUB BLD-MCNC: NEGATIVE MG/DL
CLARITY, POC: CLEAR
COLOR UR: YELLOW
GLUCOSE UR STRIP-MCNC: NEGATIVE MG/DL
KETONES UR QL: NEGATIVE
LEUKOCYTE EST, POC: NEGATIVE
NITRITE UR-MCNC: NEGATIVE MG/ML
PH UR: 7 [PH] (ref 5–8)
PROT UR STRIP-MCNC: NEGATIVE MG/DL
RBC # UR STRIP: NEGATIVE /UL
SP GR UR: 1.02 (ref 1–1.03)
UROBILINOGEN UR QL: NORMAL

## 2019-06-20 PROCEDURE — 81001 URINALYSIS AUTO W/SCOPE: CPT | Performed by: UROLOGY

## 2019-06-20 PROCEDURE — 99213 OFFICE O/P EST LOW 20 MIN: CPT | Performed by: UROLOGY

## 2019-06-20 NOTE — PATIENT INSTRUCTIONS

## 2019-12-12 ENCOUNTER — OFFICE VISIT (OUTPATIENT)
Dept: CARDIOLOGY | Facility: CLINIC | Age: 64
End: 2019-12-12

## 2019-12-12 VITALS
SYSTOLIC BLOOD PRESSURE: 132 MMHG | HEART RATE: 78 BPM | WEIGHT: 218 LBS | HEIGHT: 71 IN | BODY MASS INDEX: 30.52 KG/M2 | DIASTOLIC BLOOD PRESSURE: 74 MMHG | OXYGEN SATURATION: 98 %

## 2019-12-12 DIAGNOSIS — I25.10 CORONARY ARTERY DISEASE INVOLVING NATIVE CORONARY ARTERY OF NATIVE HEART WITHOUT ANGINA PECTORIS: Primary | ICD-10-CM

## 2019-12-12 DIAGNOSIS — I10 ESSENTIAL HYPERTENSION: ICD-10-CM

## 2019-12-12 DIAGNOSIS — I42.1 HOCM (HYPERTROPHIC OBSTRUCTIVE CARDIOMYOPATHY) (HCC): ICD-10-CM

## 2019-12-12 DIAGNOSIS — C61 PROSTATE CANCER (HCC): ICD-10-CM

## 2019-12-12 LAB — PSA SERPL-MCNC: <0.014 NG/ML (ref 0–4)

## 2019-12-12 PROCEDURE — 93000 ELECTROCARDIOGRAM COMPLETE: CPT | Performed by: INTERNAL MEDICINE

## 2019-12-12 PROCEDURE — 99214 OFFICE O/P EST MOD 30 MIN: CPT | Performed by: INTERNAL MEDICINE

## 2019-12-12 RX ORDER — PHENOL 1.4 %
600 AEROSOL, SPRAY (ML) MUCOUS MEMBRANE DAILY
COMMUNITY
End: 2021-12-22

## 2019-12-12 NOTE — PROGRESS NOTES
Subjective:     Encounter Date:12/12/2019      Patient ID: Dayron Lubin is a 64 y.o. male with a history of coronary artery disease, status post PCI to the right coronary artery in 2009, hypertrophic obstructive cardiomyopathy on the basis of previous echocardiography, chronic diastolic dysfunction, hypertension, hyperlipidemia who presents today for yearly follow-up.    Chief Complaint: Routine follow-up    Coronary Artery Disease   Presents for follow-up visit. Pertinent negatives include no chest pain, dizziness, leg swelling, palpitations or shortness of breath. The symptoms have been stable. Compliance with diet is variable. Compliance with exercise is variable. Compliance with medications is good.   Hypertension   This is a chronic problem. The problem is unchanged. The problem is controlled. Associated symptoms include malaise/fatigue. Pertinent negatives include no chest pain, headaches, orthopnea, palpitations, peripheral edema, PND or shortness of breath. There are no associated agents to hypertension. Risk factors for coronary artery disease include male gender and dyslipidemia. Current antihypertension treatment includes angiotensin blockers, beta blockers and diuretics. The current treatment provides significant improvement. There are no compliance problems.  Hypertensive end-organ damage includes CAD/MI. There is no history of angina, CVA or PVD.     This patient presents today for routine follow-up of coronary artery disease.  He has been doing well since his last visit with no recurrent chest pain.  No significant shortness of breath, dyspnea on exertion, palpitations, lightheadedness, dizziness, syncope, orthopnea, PND or edema.  He reports that his blood pressure has been well controlled.  Dr. Nguyen recently checked his lipids and he reports that his lipids were well controlled.  He has not had any side effects or problems with his medications.  Overall, he says that he is doing well and  feeling well at this time.  He is receiving treatment for prostate cancer this gives him some hot flashes at times and causes some generalized weakness but otherwise he has noticed no decline in his exercise tolerance.      Current Outpatient Medications:   •  aspirin 325 MG tablet, Take 325 mg by mouth Daily., Disp: , Rfl:   •  atorvastatin (LIPITOR) 20 MG tablet, Take 20 mg by mouth Daily., Disp: , Rfl:   •  calcium carbonate (OS-NASIMA) 600 MG tablet, Take 600 mg by mouth Daily., Disp: , Rfl:   •  leuprolide (LUPRON) 30 MG injection, Inject 30 mg into the appropriate muscle as directed by prescriber Every 6 (Six) Months., Disp: , Rfl:   •  losartan-hydrochlorothiazide (HYZAAR) 100-25 MG per tablet, Take 1 tablet by mouth Daily., Disp: , Rfl:   •  megestrol (MEGACE) 20 MG tablet, Take 1 tablet by mouth 2 (Two) Times a Day., Disp: 180 tablet, Rfl: 3  •  niacin 500 MG tablet, Take 500 mg by mouth Every Night., Disp: , Rfl:   •  temazepam (RESTORIL) 30 MG capsule, TAKE ONE CAPSULE BY MOUTH AT BEDTIME AS NEEDED FOR SLEEP, Disp: , Rfl: 2  •  TOPROL XL 50 MG 24 hr tablet, Take 50 mg by mouth Daily., Disp: , Rfl:     No Known Allergies    Social History     Tobacco Use   • Smoking status: Never Smoker   • Smokeless tobacco: Never Used   Substance Use Topics   • Alcohol use: Yes     Comment: very little      Review of Systems   Constitution: Positive for malaise/fatigue. Negative for fever and weight loss.   Cardiovascular: Negative for chest pain, dyspnea on exertion, leg swelling, orthopnea, palpitations, paroxysmal nocturnal dyspnea and syncope.   Respiratory: Negative for cough, shortness of breath and wheezing.    Endocrine: Negative for cold intolerance and heat intolerance.   Hematologic/Lymphatic: Negative for bleeding problem. Does not bruise/bleed easily.   Skin: Positive for flushing (related to Lupron).   Gastrointestinal: Negative for abdominal pain, hematemesis, hematochezia, melena, nausea and vomiting.  "  Neurological: Negative for dizziness, headaches, loss of balance and weakness.         ECG 12 Lead  Date/Time: 12/12/2019 8:38 AM  Performed by: Dayron Stephens MD  Authorized by: Dayron Stephens MD   Comparison: compared with previous ECG from 12/12/2018  Similar to previous ECG  Rhythm: sinus rhythm  Rate: normal  BPM: 80  Conduction: conduction normal  T inversion: V2, V3, V5, V6 and V4  QRS axis: normal  Other findings: left ventricular hypertrophy               Objective:     Physical Exam   Constitutional: He is oriented to person, place, and time. He appears well-developed and well-nourished. No distress.   HENT:   Head: Normocephalic and atraumatic.   Mouth/Throat: Oropharynx is clear and moist.   Eyes: Pupils are equal, round, and reactive to light. EOM are normal.   Neck: Normal range of motion. Neck supple. No JVD present. No thyromegaly present.   Cardiovascular: Normal rate, regular rhythm, S1 normal, S2 normal and intact distal pulses. Exam reveals no gallop and no friction rub.   Murmur heard.   Harsh mid to late systolic murmur is present with a grade of 2/6 at the upper right sternal border. The intensity does not change with respiration.  Pulmonary/Chest: Effort normal and breath sounds normal.   Abdominal: Soft. Bowel sounds are normal. He exhibits no distension. There is no tenderness.   Musculoskeletal: Normal range of motion. He exhibits no edema.   Neurological: He is alert and oriented to person, place, and time. No cranial nerve deficit.   Skin: Skin is warm and dry. No rash noted. No cyanosis or erythema. Nails show no clubbing.   Psychiatric: He has a normal mood and affect.   Vitals reviewed.    /74   Pulse 78   Ht 180.3 cm (71\")   Wt 98.9 kg (218 lb)   SpO2 98%   BMI 30.40 kg/m²     Data/Lab Review:     Echocardiogram on 10/7/2014: Normal left ventricular chamber size with moderate to severe concentric left ventricular hypertrophy, considered hypertrophic " cardiomyopathy with MIGDALIA noted in an outflow gradient, normal left ventricular ejection fraction with grade 1 diastolic dysfunction.  Mild mitral valve regurgitation and tricuspid valve regurgitation noted.      Assessment:          Diagnosis Plan   1. Coronary artery disease involving native coronary artery of native heart without angina pectoris  ECG 12 Lead   2. HOCM (hypertrophic obstructive cardiomyopathy) (CMS/HCC)  Adult Transthoracic Echo Complete W/ Cont if Necessary Per Protocol   3. Essential hypertension            Plan:       1.  Coronary artery disease: Stable at this time with no ischemic symptoms.  The patient has been taking 325 mg daily of aspirin.  We discussed that 81 mg is reasonable for him therefore he will change his dose to 81 mg daily.  He remains on beta-blocker and statin therapies.  No other changes at this time.     2.  Hypertrophic cardiomyopathy: The patient had an echocardiogram in 2014 showing findings of hypertrophic cardiomyopathy.  Patient denies any unstable symptoms at this time but I think it is reasonable for a surveillance echocardiogram to be ordered to further evaluate.     3.  Essential hypertension: Blood pressure is well controlled.  Continue current medications.     Patient's Body mass index is 30.4 kg/m². BMI is above normal parameters. Recommendations include: exercise counseling and nutrition counseling.    Follow-up: 12 months unless needed sooner

## 2019-12-18 ENCOUNTER — HOSPITAL ENCOUNTER (OUTPATIENT)
Dept: CARDIOLOGY | Facility: HOSPITAL | Age: 64
Discharge: HOME | End: 2019-12-18
Admitting: INTERNAL MEDICINE

## 2019-12-18 VITALS
SYSTOLIC BLOOD PRESSURE: 132 MMHG | DIASTOLIC BLOOD PRESSURE: 74 MMHG | WEIGHT: 218 LBS | BODY MASS INDEX: 30.52 KG/M2 | HEIGHT: 71 IN

## 2019-12-18 DIAGNOSIS — I42.1 HOCM (HYPERTROPHIC OBSTRUCTIVE CARDIOMYOPATHY) (HCC): ICD-10-CM

## 2019-12-18 PROCEDURE — 93306 TTE W/DOPPLER COMPLETE: CPT

## 2019-12-18 PROCEDURE — 93306 TTE W/DOPPLER COMPLETE: CPT | Performed by: INTERNAL MEDICINE

## 2019-12-19 LAB
BH CV ECHO MEAS - AO MAX PG (FULL): 41.7 MMHG
BH CV ECHO MEAS - AO MAX PG: 44.1 MMHG
BH CV ECHO MEAS - AO MEAN PG (FULL): 18 MMHG
BH CV ECHO MEAS - AO MEAN PG: 19 MMHG
BH CV ECHO MEAS - AO ROOT AREA (BSA CORRECTED): 1.6
BH CV ECHO MEAS - AO ROOT AREA: 9.1 CM^2
BH CV ECHO MEAS - AO ROOT DIAM: 3.4 CM
BH CV ECHO MEAS - AO V2 MAX: 332 CM/SEC
BH CV ECHO MEAS - AO V2 MEAN: 198 CM/SEC
BH CV ECHO MEAS - AO V2 VTI: 45 CM
BH CV ECHO MEAS - AVA(I,A): 0.66 CM^2
BH CV ECHO MEAS - AVA(I,D): 0.66 CM^2
BH CV ECHO MEAS - AVA(V,A): 0.73 CM^2
BH CV ECHO MEAS - AVA(V,D): 0.73 CM^2
BH CV ECHO MEAS - BSA(HAYCOCK): 2.3 M^2
BH CV ECHO MEAS - BSA: 2.2 M^2
BH CV ECHO MEAS - BZI_BMI: 30.4 KILOGRAMS/M^2
BH CV ECHO MEAS - BZI_METRIC_HEIGHT: 180.3 CM
BH CV ECHO MEAS - BZI_METRIC_WEIGHT: 98.9 KG
BH CV ECHO MEAS - EDV(CUBED): 71.5 ML
BH CV ECHO MEAS - EDV(MOD-SP4): 98.5 ML
BH CV ECHO MEAS - EDV(TEICH): 76.4 ML
BH CV ECHO MEAS - EF(CUBED): 73.4 %
BH CV ECHO MEAS - EF(MOD-SP4): 66.4 %
BH CV ECHO MEAS - EF(TEICH): 65.6 %
BH CV ECHO MEAS - ESV(CUBED): 19 ML
BH CV ECHO MEAS - ESV(MOD-SP4): 33.1 ML
BH CV ECHO MEAS - ESV(TEICH): 26.3 ML
BH CV ECHO MEAS - FS: 35.7 %
BH CV ECHO MEAS - IVS/LVPW: 1.1
BH CV ECHO MEAS - IVSD: 1.4 CM
BH CV ECHO MEAS - LA DIMENSION: 3.8 CM
BH CV ECHO MEAS - LA/AO: 1.1
BH CV ECHO MEAS - LAT PEAK E' VEL: 6.4 CM/SEC
BH CV ECHO MEAS - LV DIASTOLIC VOL/BSA (35-75): 45 ML/M^2
BH CV ECHO MEAS - LV MASS(C)D: 191.4 GRAMS
BH CV ECHO MEAS - LV MASS(C)DI: 87.5 GRAMS/M^2
BH CV ECHO MEAS - LV MAX PG: 2.4 MMHG
BH CV ECHO MEAS - LV MEAN PG: 1 MMHG
BH CV ECHO MEAS - LV SYSTOLIC VOL/BSA (12-30): 15.1 ML/M^2
BH CV ECHO MEAS - LV V1 MAX: 77.6 CM/SEC
BH CV ECHO MEAS - LV V1 MEAN: 44.6 CM/SEC
BH CV ECHO MEAS - LV V1 VTI: 9.4 CM
BH CV ECHO MEAS - LVIDD: 4.2 CM
BH CV ECHO MEAS - LVIDS: 2.7 CM
BH CV ECHO MEAS - LVLD AP4: 11.4 CM
BH CV ECHO MEAS - LVLS AP4: 9.9 CM
BH CV ECHO MEAS - LVOT AREA (M): 3.1 CM^2
BH CV ECHO MEAS - LVOT AREA: 3.1 CM^2
BH CV ECHO MEAS - LVOT DIAM: 2 CM
BH CV ECHO MEAS - LVPWD: 1.2 CM
BH CV ECHO MEAS - MED PEAK E' VEL: 4.4 CM/SEC
BH CV ECHO MEAS - MR MAX PG: 70.2 MMHG
BH CV ECHO MEAS - MR MAX VEL: 419 CM/SEC
BH CV ECHO MEAS - MV A MAX VEL: 119 CM/SEC
BH CV ECHO MEAS - MV DEC SLOPE: 716 CM/SEC^2
BH CV ECHO MEAS - MV DEC TIME: 0.13 SEC
BH CV ECHO MEAS - MV E MAX VEL: 60.6 CM/SEC
BH CV ECHO MEAS - MV E/A: 0.51
BH CV ECHO MEAS - MV P1/2T MAX VEL: 97.7 CM/SEC
BH CV ECHO MEAS - MV P1/2T: 40 MSEC
BH CV ECHO MEAS - MVA P1/2T LCG: 2.3 CM^2
BH CV ECHO MEAS - MVA(P1/2T): 5.5 CM^2
BH CV ECHO MEAS - PA MAX PG: 17.6 MMHG
BH CV ECHO MEAS - PA V2 MAX: 210 CM/SEC
BH CV ECHO MEAS - RAP SYSTOLE: 5 MMHG
BH CV ECHO MEAS - RVSP: 62.2 MMHG
BH CV ECHO MEAS - SI(AO): 186.8 ML/M^2
BH CV ECHO MEAS - SI(CUBED): 24 ML/M^2
BH CV ECHO MEAS - SI(LVOT): 13.5 ML/M^2
BH CV ECHO MEAS - SI(MOD-SP4): 29.9 ML/M^2
BH CV ECHO MEAS - SI(TEICH): 22.9 ML/M^2
BH CV ECHO MEAS - SV(AO): 408.6 ML
BH CV ECHO MEAS - SV(CUBED): 52.4 ML
BH CV ECHO MEAS - SV(LVOT): 29.5 ML
BH CV ECHO MEAS - SV(MOD-SP4): 65.4 ML
BH CV ECHO MEAS - SV(TEICH): 50.1 ML
BH CV ECHO MEAS - TR MAX VEL: 378 CM/SEC
BH CV ECHO MEASUREMENTS AVERAGE E/E' RATIO: 11.22
LEFT ATRIUM VOLUME INDEX: 31 ML/M2
MAXIMAL PREDICTED HEART RATE: 155 BPM
STRESS TARGET HR: 132 BPM

## 2019-12-23 NOTE — PROGRESS NOTES
Subjective    Mr. Lubin is 64 y.o. male    Chief Complaint: Prostate Cancer    History of Present Illness   Prostate Cancer  Pt. presents with history of prostate cancer diagnosed in July 2015. Current disease state ishigh risk disease Pt. underwent robot assisted laparoscpic prostatectomy and external beam radiotherapy Path showing T3b 7 adenocarcinoma of prostate with Negative surgical margins. Patient has received XRT completed in May 2016 .He has completed Lupron x 2 years management of prostate cancer. Pt. having 1 ppd (security) incontinence. Associated voiding symptoms include frequency, urgency, nocturia AUA score 8/35 . Pt. Is having erectile dysfunction thatdoes not to PDE5 inhibitors. There has been no bone pain, weight loss, abdominal pain, back pain, or gross hematuria.   Lab Results   Component Value Date    PSA <0.014 12/12/2019    PSA <0.014 06/14/2019    PSA <0.070 12/12/2018           The following portions of the patient's history were reviewed and updated as appropriate: allergies, current medications, past family history, past medical history, past social history, past surgical history and problem list.    Review of Systems   Constitutional: Negative for chills and fever.   Gastrointestinal: Negative for abdominal pain, anal bleeding and blood in stool.   Genitourinary: Positive for frequency and urgency. Negative for decreased urine volume, difficulty urinating, discharge, dysuria, enuresis, flank pain, genital sores, hematuria, penile pain, penile swelling, scrotal swelling and testicular pain.         Current Outpatient Medications:   •  aspirin 325 MG tablet, Take 325 mg by mouth Daily., Disp: , Rfl:   •  atorvastatin (LIPITOR) 20 MG tablet, Take 20 mg by mouth Daily., Disp: , Rfl:   •  calcium carbonate (OS-NASIMA) 600 MG tablet, Take 600 mg by mouth Daily., Disp: , Rfl:   •  leuprolide (LUPRON) 30 MG injection, Inject 30 mg into the appropriate muscle as directed by prescriber Every 6 (Six)  "Months., Disp: , Rfl:   •  losartan-hydrochlorothiazide (HYZAAR) 100-25 MG per tablet, Take 1 tablet by mouth Daily., Disp: , Rfl:   •  megestrol (MEGACE) 20 MG tablet, Take 1 tablet by mouth 2 (Two) Times a Day., Disp: 180 tablet, Rfl: 3  •  niacin 500 MG tablet, Take 500 mg by mouth Every Night., Disp: , Rfl:   •  temazepam (RESTORIL) 30 MG capsule, TAKE ONE CAPSULE BY MOUTH AT BEDTIME AS NEEDED FOR SLEEP, Disp: , Rfl: 2  •  TOPROL XL 50 MG 24 hr tablet, Take 50 mg by mouth Daily., Disp: , Rfl:     Past Medical History:   Diagnosis Date   • CAD (coronary artery disease)    • History of external beam radiation therapy 05/12/2016    6840 cGy to prostate bed   • Hyperlipidemia    • Hypertension    • Prostate cancer (CMS/HCC)        Past Surgical History:   Procedure Laterality Date   • CARDIAC CATHETERIZATION     • CORONARY ANGIOPLASTY WITH STENT PLACEMENT     • PROSTATE SURGERY     • TONSILLECTOMY         Social History     Socioeconomic History   • Marital status: Single     Spouse name: Not on file   • Number of children: Not on file   • Years of education: Not on file   • Highest education level: Not on file   Tobacco Use   • Smoking status: Never Smoker   • Smokeless tobacco: Never Used   Substance and Sexual Activity   • Alcohol use: Yes     Comment: very little    • Drug use: No   • Sexual activity: Defer       Family History   Problem Relation Age of Onset   • Coronary artery disease Mother    • Stroke Mother        Objective    Temp 98 °F (36.7 °C)   Ht 180.3 cm (70.98\")   Wt 97.5 kg (215 lb)   BMI 30.00 kg/m²     Physical Exam   Constitutional: He is oriented to person, place, and time. He appears well-developed and well-nourished. No distress.   Pulmonary/Chest: Effort normal.   Abdominal: Soft. He exhibits no distension and no mass. There is no tenderness. There is no rebound and no guarding. No hernia.   Neurological: He is alert and oriented to person, place, and time.   Skin: Skin is warm and dry. " He is not diaphoretic.   Psychiatric: He has a normal mood and affect.   Vitals reviewed.          Results for orders placed or performed in visit on 12/27/19   POC Urinalysis Dipstick, Multipro   Result Value Ref Range    Color Yellow Yellow, Straw, Dark Yellow, Ying    Clarity, UA Clear Clear    Glucose, UA Negative Negative, 1000 mg/dL (3+) mg/dL    Bilirubin Negative Negative    Ketones, UA Negative Negative    Specific Gravity  1.010 1.005 - 1.030    Blood, UA Trace (A) Negative    pH, Urine 5.5 5.0 - 8.0    Protein, POC Negative Negative mg/dL    Urobilinogen, UA Normal Normal    Nitrite, UA Negative Negative    Leukocytes Negative Negative     Assessment and Plan    Diagnoses and all orders for this visit:    Prostate cancer (CMS/[facility])  -     POC Urinalysis Dipstick, Multipro    Impotence of organic origin      Patient underwent RALP on 7/28/15. His pretreatment PSA was 7.7. His pathology revealed Ryann 3+4 = 7 adenocarcinoma the prostate. Pathology showed [id number] with negative surgical margins. His posttreatment PSA was 0.1 he completed XRT in May 2016 his PSA in September 2017 lavonne to 0.3 and he was started on 2 years of ADT which he completed March 2019.         His PSA is undetectable today he will follow-up in 6 months with PSA.

## 2019-12-27 ENCOUNTER — OFFICE VISIT (OUTPATIENT)
Dept: UROLOGY | Facility: CLINIC | Age: 64
End: 2019-12-27

## 2019-12-27 VITALS — BODY MASS INDEX: 30.1 KG/M2 | WEIGHT: 215 LBS | TEMPERATURE: 98 F | HEIGHT: 71 IN

## 2019-12-27 DIAGNOSIS — C61 PROSTATE CANCER (HCC): Primary | ICD-10-CM

## 2019-12-27 DIAGNOSIS — N52.9 IMPOTENCE OF ORGANIC ORIGIN: ICD-10-CM

## 2019-12-27 LAB
BILIRUB BLD-MCNC: NEGATIVE MG/DL
CLARITY, POC: CLEAR
COLOR UR: YELLOW
GLUCOSE UR STRIP-MCNC: NEGATIVE MG/DL
KETONES UR QL: NEGATIVE
LEUKOCYTE EST, POC: NEGATIVE
NITRITE UR-MCNC: NEGATIVE MG/ML
PH UR: 5.5 [PH] (ref 5–8)
PROT UR STRIP-MCNC: NEGATIVE MG/DL
RBC # UR STRIP: ABNORMAL /UL
SP GR UR: 1.01 (ref 1–1.03)
UROBILINOGEN UR QL: NORMAL

## 2019-12-27 PROCEDURE — 81003 URINALYSIS AUTO W/O SCOPE: CPT | Performed by: UROLOGY

## 2019-12-27 PROCEDURE — 99213 OFFICE O/P EST LOW 20 MIN: CPT | Performed by: UROLOGY

## 2020-06-10 RX ORDER — FERROUS SULFATE 325(65) MG
325 TABLET ORAL 2 TIMES DAILY
COMMUNITY
End: 2022-03-08

## 2020-06-10 RX ORDER — ONDANSETRON 4 MG/1
4 TABLET, FILM COATED ORAL EVERY 8 HOURS PRN
COMMUNITY
End: 2020-06-11 | Stop reason: ALTCHOICE

## 2020-06-10 RX ORDER — PANTOPRAZOLE SODIUM 40 MG/1
40 TABLET, DELAYED RELEASE ORAL DAILY
COMMUNITY
End: 2020-07-07 | Stop reason: SDUPTHER

## 2020-06-10 RX ORDER — METOPROLOL SUCCINATE 50 MG/1
100 TABLET, EXTENDED RELEASE ORAL
COMMUNITY

## 2020-06-11 ENCOUNTER — OFFICE VISIT (OUTPATIENT)
Dept: GASTROENTEROLOGY | Age: 65
End: 2020-06-11
Payer: MEDICARE

## 2020-06-11 VITALS
WEIGHT: 211.4 LBS | DIASTOLIC BLOOD PRESSURE: 86 MMHG | HEIGHT: 71 IN | HEART RATE: 74 BPM | SYSTOLIC BLOOD PRESSURE: 146 MMHG | BODY MASS INDEX: 29.6 KG/M2

## 2020-06-11 PROCEDURE — 99214 OFFICE O/P EST MOD 30 MIN: CPT | Performed by: NURSE PRACTITIONER

## 2020-06-11 ASSESSMENT — ENCOUNTER SYMPTOMS
SORE THROAT: 0
CONSTIPATION: 0
NAUSEA: 0
ABDOMINAL DISTENTION: 0
COUGH: 0
RECTAL PAIN: 0
DIARRHEA: 0
BACK PAIN: 0
ABDOMINAL PAIN: 0
CHEST TIGHTNESS: 0
BLOOD IN STOOL: 0
VOMITING: 0
SHORTNESS OF BREATH: 0
VOICE CHANGE: 0

## 2020-06-11 NOTE — PROGRESS NOTES
Subjective:      Patient ID: Shirley Lui is a 71 y.o. male  MD Zohreh Carey MD    HPI  Chief Complaint   Patient presents with    Anemia       Patient referred for anemia and blood in stool. Labs sent for review from 5/19/2020. hgb 7.7  Last c-scope 12/2017 per Dr. Rosmery Rene. He has personal and family history of colon polyps. Anemia  Patient presents for evaluation of anemia. It has been present for a few months. Associated signs & symptoms: fatigue. Patient denies abdominal pain, dyspnea, hematochezia and melena    In November he began to have persistent indigestion and burping. He was prescribed PPI and has taken since that time. The indigestion seems to have improved but still has burping throughout the day every day. He denies dysphagia, n/v.     He denies any lower gi complaints. No weight loss      Assessment:      1. Iron deficiency anemia, unspecified iron deficiency anemia type    2. Heme positive stool    3. Indigestion    4. Burping    5. History of colon polyps    6. Family history of colonic polyps            Plan:      Schedule colonoscopy and endoscopy   Biopsies r/o celiac sprue      Instruct on bowel prep. Nothing to eat or drink after midnight the day of the exam.  Unable to drive for 24 hours after the procedure. No aspirin or nonsteroidal anti-inflammatories for 5 days before procedure. I have discussed the benefits, alternatives, and risks (including bleeding, perforation and death)  for pursuing Endoscopy (EGD/Colonscopy/EUS/ERCP) with the patient and they are willing to continue. We also discussed the need for anesthesia, IV access, proper dietary changes, medication changes if necessary, and need for bowel prep (if ordered) prior to their Endoscopic procedure. They are aware they must have someone accompany them to their scheduled procedure to drive them home - they agree to the above and are willing to continue.       Plan for anesthesia: voice change. Respiratory: Negative for cough, chest tightness and shortness of breath. Cardiovascular: Negative for chest pain, palpitations and leg swelling. Gastrointestinal: Negative for abdominal distention, abdominal pain, blood in stool, constipation, diarrhea, nausea, rectal pain and vomiting. Indigestion   Musculoskeletal: Negative for arthralgias, back pain and gait problem. Skin: Negative for pallor, rash and wound. Neurological: Negative for dizziness, weakness and light-headedness. Hematological: Negative for adenopathy. Does not bruise/bleed easily. Anemia   All other systems reviewed and are negative. Objective:   Physical Exam  Constitutional:       General: He is not in acute distress. Appearance: Normal appearance. He is well-developed. Comments: BP (!) 146/86   Pulse 74   Ht 5' 11\" (1.803 m)   Wt 211 lb 6.4 oz (95.9 kg)   BMI 29.48 kg/m²      Eyes:      General: No scleral icterus. Conjunctiva/sclera: Conjunctivae normal.   Neck:      Trachea: No tracheal deviation. Cardiovascular:      Rate and Rhythm: Normal rate and regular rhythm. Heart sounds: No murmur. No friction rub. No gallop. Pulmonary:      Effort: Pulmonary effort is normal. No respiratory distress. Breath sounds: Normal breath sounds. No wheezing, rhonchi or rales. Abdominal:      General: Bowel sounds are normal. There is no distension. Palpations: Abdomen is soft. There is no hepatomegaly or mass. Tenderness: There is no abdominal tenderness. There is no guarding or rebound. Skin:     General: Skin is warm and dry. Coloration: Skin is not pale. Neurological:      Mental Status: He is alert and oriented to person, place, and time. Psychiatric:         Behavior: Behavior normal.         Thought Content:  Thought content normal.

## 2020-06-11 NOTE — PATIENT INSTRUCTIONS
Schedule colonoscopy and endoscopy. Do not eat or drink after midnight the day of the procedure. Allowed medications can be taken with a small sip of water. Please review your prep instructions for allowed medications. You will not be able to drive for 24 hours after the procedure due to sedation. Bring a  with you the day of the procedure. If you are on blood thinners, clearance from the prescribing physician will be obtained before your procedure is scheduled. If it is determined it is not safe to hold these medications for a short time an alternative procedure for evaluation may be recommended. No aspirin, ibuprofen, naproxen, fish oil or vitamin E for 5 days before procedure. Risks of colonoscopy and endoscopy include, but are not limited to, perforation, bleeding, and infection, Risk of perforation and bleeding are increased if there is a polyp removed or dilation completed. Anesthesia risks will be reviewed with you before the procedure by a member of the anesthesia department. Your physician may also schedule a follow up appointment with the nurse practitioner to discuss pathology, symptoms or to check if you have had any problems related to your procedure. If you prefer not to return to the office after your procedure please discuss this with your physician on the day of your colonoscopy. The physician will talk with you and/or your family after the procedure is completed. Final recommendations are based on the pathologist report if biopsies or specimens are taken. For Colonoscopy: You will be given specific directions regarding restrictions to diet and bowel prep instructions including laxatives. Please read these instructions one week prior to your scheduled procedure to ensure that you are prepared.  If you have any questions regarding these instructions please call our office Mon through Fri from 8:00 am to 4:00 pm.     Follow prep instructions provided for bowel to keep yourself hydrated before the exam.     Please follow all instructions as provided for cleansing the bowel. Failure to have an adequately prepped colon may cause you to have incomplete exam with further testing required.      [email or web address]

## 2020-06-18 ENCOUNTER — TELEPHONE (OUTPATIENT)
Dept: GASTROENTEROLOGY | Age: 65
End: 2020-06-18

## 2020-06-24 LAB — PSA SERPL-MCNC: <0.014 NG/ML (ref 0–4)

## 2020-06-25 ENCOUNTER — ANESTHESIA EVENT (OUTPATIENT)
Dept: OPERATING ROOM | Age: 65
End: 2020-06-25

## 2020-06-25 ENCOUNTER — OFFICE VISIT (OUTPATIENT)
Age: 65
End: 2020-06-25

## 2020-06-25 ENCOUNTER — RESULTS ENCOUNTER (OUTPATIENT)
Dept: UROLOGY | Facility: CLINIC | Age: 65
End: 2020-06-25

## 2020-06-25 VITALS — TEMPERATURE: 97.1 F | OXYGEN SATURATION: 97 %

## 2020-06-25 DIAGNOSIS — C61 PROSTATE CANCER (HCC): ICD-10-CM

## 2020-06-25 NOTE — PROGRESS NOTES
Patient was not seen//assessed by provider in the flu clinic today. COVID swab was order in compliance with requirement for testing prior to surgery or invasive procedure. Nasopharyngeal swab was collected and ordered through [facility] vendor.

## 2020-06-26 LAB
REPORT: NORMAL
SARS-COV-2: NOT DETECTED
THIS TEST SENT TO: NORMAL

## 2020-06-29 ENCOUNTER — HOSPITAL ENCOUNTER (OUTPATIENT)
Age: 65
Setting detail: OUTPATIENT SURGERY
Discharge: HOME | End: 2020-06-29
Attending: INTERNAL MEDICINE | Admitting: INTERNAL MEDICINE
Payer: MEDICARE

## 2020-06-29 ENCOUNTER — APPOINTMENT (OUTPATIENT)
Dept: OPERATING ROOM | Age: 65
End: 2020-06-29

## 2020-06-29 ENCOUNTER — ANESTHESIA (OUTPATIENT)
Dept: OPERATING ROOM | Age: 65
End: 2020-06-29

## 2020-06-29 ENCOUNTER — HOSPITAL ENCOUNTER (OUTPATIENT)
Age: 65
Setting detail: SPECIMEN
Discharge: HOME | End: 2020-06-29
Payer: MEDICARE

## 2020-06-29 VITALS
DIASTOLIC BLOOD PRESSURE: 86 MMHG | WEIGHT: 210 LBS | RESPIRATION RATE: 16 BRPM | BODY MASS INDEX: 29.4 KG/M2 | SYSTOLIC BLOOD PRESSURE: 146 MMHG | HEART RATE: 68 BPM | TEMPERATURE: 97 F | OXYGEN SATURATION: 98 % | HEIGHT: 71 IN

## 2020-06-29 VITALS — DIASTOLIC BLOOD PRESSURE: 79 MMHG | OXYGEN SATURATION: 97 % | SYSTOLIC BLOOD PRESSURE: 127 MMHG

## 2020-06-29 PROCEDURE — 88342 IMHCHEM/IMCYTCHM 1ST ANTB: CPT

## 2020-06-29 PROCEDURE — 88305 TISSUE EXAM BY PATHOLOGIST: CPT

## 2020-06-29 PROCEDURE — 43239 EGD BIOPSY SINGLE/MULTIPLE: CPT

## 2020-06-29 PROCEDURE — G8907 PT DOC NO EVENTS ON DISCHARG: HCPCS

## 2020-06-29 PROCEDURE — 45385 COLONOSCOPY W/LESION REMOVAL: CPT

## 2020-06-29 PROCEDURE — 43239 EGD BIOPSY SINGLE/MULTIPLE: CPT | Performed by: INTERNAL MEDICINE

## 2020-06-29 PROCEDURE — G8918 PT W/O PREOP ORDER IV AB PRO: HCPCS

## 2020-06-29 PROCEDURE — 45385 COLONOSCOPY W/LESION REMOVAL: CPT | Performed by: INTERNAL MEDICINE

## 2020-06-29 RX ORDER — PROPOFOL 10 MG/ML
INJECTION, EMULSION INTRAVENOUS PRN
Status: DISCONTINUED | OUTPATIENT
Start: 2020-06-29 | End: 2020-06-29 | Stop reason: SDUPTHER

## 2020-06-29 RX ORDER — FENTANYL CITRATE 50 UG/ML
INJECTION, SOLUTION INTRAMUSCULAR; INTRAVENOUS PRN
Status: DISCONTINUED | OUTPATIENT
Start: 2020-06-29 | End: 2020-06-29 | Stop reason: SDUPTHER

## 2020-06-29 RX ORDER — LIDOCAINE HYDROCHLORIDE 10 MG/ML
INJECTION, SOLUTION INFILTRATION; PERINEURAL PRN
Status: DISCONTINUED | OUTPATIENT
Start: 2020-06-29 | End: 2020-06-29 | Stop reason: SDUPTHER

## 2020-06-29 RX ORDER — SODIUM CHLORIDE 9 MG/ML
INJECTION, SOLUTION INTRAVENOUS CONTINUOUS
Status: DISCONTINUED | OUTPATIENT
Start: 2020-06-29 | End: 2020-06-29 | Stop reason: HOSPADM

## 2020-06-29 RX ORDER — LABETALOL HYDROCHLORIDE 5 MG/ML
INJECTION, SOLUTION INTRAVENOUS PRN
Status: DISCONTINUED | OUTPATIENT
Start: 2020-06-29 | End: 2020-06-29 | Stop reason: SDUPTHER

## 2020-06-29 RX ADMIN — FENTANYL CITRATE 50 MCG: 50 INJECTION, SOLUTION INTRAMUSCULAR; INTRAVENOUS at 12:56

## 2020-06-29 RX ADMIN — LABETALOL HYDROCHLORIDE 5 MG: 5 INJECTION, SOLUTION INTRAVENOUS at 12:54

## 2020-06-29 RX ADMIN — LIDOCAINE HYDROCHLORIDE 40 MG: 10 INJECTION, SOLUTION INFILTRATION; PERINEURAL at 12:55

## 2020-06-29 RX ADMIN — SODIUM CHLORIDE: 9 INJECTION, SOLUTION INTRAVENOUS at 11:51

## 2020-06-29 RX ADMIN — PROPOFOL 650 MG: 10 INJECTION, EMULSION INTRAVENOUS at 12:55

## 2020-06-29 NOTE — H&P
Patient Name: Wing Miller  : 1955  MRN: [id number]  DATE: 20    Allergies: No Known Allergies     ENDOSCOPY  History and Physical    Procedure:    [x] Diagnostic Colonoscopy       [] Screening Colonoscopy  [x] EGD      [] ERCP      [] EUS       [] Other    [x] Previous office notes/History and Physical reviewed from the patients chart. Please see EMR for further details of HPI. I have examined the patient's status immediately prior to the procedure and:      Indications/HPI:    []Abdominal Pain   []Cancer- GI/Lung     []Fhx of colon CA/polyps  []History of Polyps  []Barretts            []Melena  []Abnormal Imaging              []Dysphagia              []Persistent Pneumonia   [x]Anemia                            []Food Impaction        []History of Polyps  [] GI Bleed             []Pulmonary nodule/Mass   []Change in bowel habits []Heartburn/Reflux  []Rectal Bleed (BRBPR)  []Chest Pain - Non Cardiac []Heme (+) Stool []Ulcers  []Constipation  []Hemoptysis  []Varices  []Diarrhea  []Hypoxemia    []Nausea/Vomiting   []Screening   []Crohns/Colitis  []Other:     Anesthesia:   [x] MAC [] Moderate Sedation   [] General   [] None     ROS: 12 pt Review of Symptoms was negative unless mentioned above    Medications:   Prior to Admission medications    Medication Sig Start Date End Date Taking?  Authorizing Provider   pantoprazole (PROTONIX) 40 MG tablet Take 40 mg by mouth daily    Historical Provider, MD   metoprolol succinate (TOPROL XL) 50 MG extended release tablet Take 50 mg by mouth daily    Historical Provider, MD   ferrous sulfate (IRON 325) 325 (65 Fe) MG tablet Take 325 mg by mouth 2 times daily    Historical Provider, MD   atorvastatin (LIPITOR) 20 MG tablet nightly  16   Historical Provider, MD   temazepam (RESTORIL) 30 MG capsule TAKE ONE CAPSULE BY MOUTH AT BEDTIME AS NEEDED FOR SLEEP 16   Historical Provider, MD   niacin (NIASPAN) 500 MG extended release tablet Take 1 capsule by mouth nightly     Historical Provider, MD   losartan-hydrochlorothiazide (HYZAAR) 100-25 MG per tablet Take 1 tablet by mouth daily     Historical Provider, MD       Past Medical History:  Past Medical History:   Diagnosis Date    Adjustment disorder with anxiety     Adjustment insomnia     Asymptomatic arteriosclerosis of coronary artery     Dyspepsia     GERD (gastroesophageal reflux disease)     Heart block 2010    Hyperlipidemia     Hypertension     Iron deficiency anemia     Irregular heart beat     [facility] ([facility] [address]     Vitamin D deficiency        Past Surgical History:  Past Surgical History:   Procedure Laterality Date    CARDIAC SURGERY  2009    COLONOSCOPY  03/2012    [facility] (5 yr)    NC COLONOSCOPY FLX DX W/COLLJ SPEC WHEN PFRMD N/A 12/6/2017    Dr Isatu Bolton, 5 yr recall    PROSTATECTOMY  06/2015    da [place]    TONSILLECTOMY AND ADENOIDECTOMY         Social History:  Social History     Tobacco Use    Smoking status: Never Smoker    Smokeless tobacco: Never Used   Substance Use Topics    Alcohol use: Yes     Comment: Rare social    Drug use: No       Vital Signs:   Vitals:    06/29/20 1344   BP: 113/66   Pulse: 73   Resp: 18   Temp: 97 °F (36.1 °C)   SpO2: 96%        Physical Exam:  Cardiac:  [x]WNL  []Comments:  Pulmonary:  [x]WNL   []Comments:  Neuro/Mental Status:  [x]WNL  []Comments:  Abdominal:  [x]WNL    []Comments:  Other:   []WNL  []Comments:    Informed Consent:  The risks and benefits of the procedure have been discussed with either the patient or if they cannot consent, their representative. Assessment:  Patient examined and appropriate for planned sedation and procedure. Plan:  Proceed with planned sedation and procedure as above.          Anne Marie Alvarado MD

## 2020-06-29 NOTE — OP NOTE
Patient: Waqar Álvarez : [birth year]  [facility] Rec#: [id number] Acc#: [id number]   Primary Care Provider Marcelino Olivarez MD    Date of Procedure:  2020    Endoscopist: Lacie Mitchell MD    Referring Provider: Marcelino Olivarez MD, Sonja RAY    Operation Performed: Colonoscopy with snare polypectomy    Indications: anemia, heme + stools, hx of polyps    Anesthesia:  Sedation was administered by anesthesia who monitored the patient during the procedure. I met with Waqar Álvarez prior to procedure. We discussed the procedure itself, and I have discussed the risks of endoscopy (including-- but not limited to-- pain, discomfort, bleeding potentially requiring second endoscopic procedure and/or blood transfusion, organ perforation requiring operative repair, damage to organs near the colon, infection, aspiration, cardiopulmonary/allergic reaction), benefits, indications to endoscopy. Additionally, we discussed options other than colonoscopy. The patient expressed understanding. All questions answered. The patient decided to proceed with the procedure. Signed informed consent was placed on the chart. Blood Loss: minimal    Withdrawal time: > 6 minutes  Bowel Prep: adequate     Complications: no immediate complications    DESCRIPTION OF PROCEDURE:     A time out was performed. After written informed consent was obtained, the patient was placed in the left lateral position. The perianal area was inspected, and a digital rectal exam was performed. A rectal exam was performed: normal tone, no palpable lesions. At this point, a forward viewing Olympus colonoscope was inserted into the anus and carefully advanced to the cecum. The cecum was identified by the ileocecal valve and the appendiceal orifice. The colonoscope was then slowly withdrawn with careful inspection of the mucosa in a linear and circumferential fashion. The scope was retroflexed in the rectum.  Suction was utilized during the procedure to

## 2020-07-07 RX ORDER — AMOXICILLIN 500 MG/1
CAPSULE ORAL
Qty: 56 CAPSULE | Refills: 0 | Status: SHIPPED | OUTPATIENT
Start: 2020-07-07 | End: 2020-07-28 | Stop reason: ALTCHOICE

## 2020-07-07 RX ORDER — CLARITHROMYCIN 500 MG/1
500 TABLET, COATED ORAL 2 TIMES DAILY
Qty: 28 TABLET | Refills: 0 | Status: SHIPPED | OUTPATIENT
Start: 2020-07-07 | End: 2020-07-28 | Stop reason: ALTCHOICE

## 2020-07-07 RX ORDER — PANTOPRAZOLE SODIUM 40 MG/1
40 TABLET, DELAYED RELEASE ORAL 2 TIMES DAILY
Qty: 28 TABLET | Refills: 0 | Status: SHIPPED | OUTPATIENT
Start: 2020-07-07 | End: 2020-07-28

## 2020-07-16 NOTE — PROGRESS NOTES
Subjective    Mr. Lubin is 65 y.o. male    Chief Complaint: Prostate Cancer.    History of Present Illness  Prostate Cancer  Pt. presents with history of prostate cancer diagnosed in July 2015. Current disease state ishigh risk disease Pt. underwent robot assisted laparoscpic prostatectomy and external beam radiotherapy Path showing T3b 7 adenocarcinoma of prostate with Negative surgical margins. Patient has received XRT completed in May 2016 .He has completed Lupron x 2 years management of prostate cancer. Pt. having 1 ppd (security) incontinence. Associated voiding symptoms include frequency, urgency, nocturia AUA score 8/35 . Pt. Is having erectile dysfunction thatdoes not to PDE5 inhibitors. There has been no bone pain, weight loss, abdominal pain, back pain, or gross hematuria.     Lab Results   Component Value Date    PSA <0.014 06/23/2020    PSA <0.014 12/12/2019    PSA <0.014 06/14/2019         The following portions of the patient's history were reviewed and updated as appropriate: allergies, current medications, past family history, past medical history, past social history, past surgical history and problem list.    Review of Systems   Constitutional: Negative for chills and fever.   Gastrointestinal: Negative for abdominal pain, anal bleeding and blood in stool.   Genitourinary: Negative for dysuria, frequency, hematuria and urgency.         Current Outpatient Medications:   •  amoxicillin (AMOXIL) 500 MG capsule, 2 tablets  p.o. BID x 14 days for treatment of  h pylori infection, Disp: , Rfl:   •  atorvastatin (LIPITOR) 20 MG tablet, Take 20 mg by mouth Daily., Disp: , Rfl:   •  calcium carbonate (OS-NASIMA) 600 MG tablet, Take 600 mg by mouth Daily., Disp: , Rfl:   •  clarithromycin (BIAXIN) 500 MG tablet, Take 500 mg by mouth., Disp: , Rfl:   •  leuprolide (LUPRON) 30 MG injection, Inject 30 mg into the appropriate muscle as directed by prescriber Every 6 (Six) Months., Disp: , Rfl:   •   "losartan-hydrochlorothiazide (HYZAAR) 100-25 MG per tablet, Take 1 tablet by mouth Daily., Disp: , Rfl:   •  niacin 500 MG tablet, Take 500 mg by mouth Every Night., Disp: , Rfl:   •  pantoprazole (PROTONIX) 40 MG EC tablet, Take 40 mg by mouth., Disp: , Rfl:   •  temazepam (RESTORIL) 30 MG capsule, TAKE ONE CAPSULE BY MOUTH AT BEDTIME AS NEEDED FOR SLEEP, Disp: , Rfl: 2  •  TOPROL XL 50 MG 24 hr tablet, Take 50 mg by mouth Daily., Disp: , Rfl:   •  aspirin 325 MG tablet, Take 325 mg by mouth Daily., Disp: , Rfl:   •  ferrous sulfate 325 (65 FE) MG tablet, Take 325 mg by mouth., Disp: , Rfl:   •  megestrol (MEGACE) 20 MG tablet, Take 1 tablet by mouth 2 (Two) Times a Day., Disp: 180 tablet, Rfl: 3    Past Medical History:   Diagnosis Date   • CAD (coronary artery disease)    • History of external beam radiation therapy 05/12/2016    6840 cGy to prostate bed   • Hyperlipidemia    • Hypertension    • Prostate cancer (CMS/HCC)        Past Surgical History:   Procedure Laterality Date   • CARDIAC CATHETERIZATION     • CORONARY ANGIOPLASTY WITH STENT PLACEMENT     • PROSTATE SURGERY     • TONSILLECTOMY         Social History     Socioeconomic History   • Marital status: Single     Spouse name: Not on file   • Number of children: Not on file   • Years of education: Not on file   • Highest education level: Not on file   Tobacco Use   • Smoking status: Never Smoker   • Smokeless tobacco: Never Used   Substance and Sexual Activity   • Alcohol use: Yes     Comment: very little    • Drug use: No   • Sexual activity: Defer       Family History   Problem Relation Age of Onset   • Coronary artery disease Mother    • Stroke Mother        Objective    Temp 96.6 °F (35.9 °C) (Temporal)   Ht 180.3 cm (71\")   Wt 99 kg (218 lb 3.2 oz)   BMI 30.43 kg/m²     Physical Exam   Constitutional: He is oriented to person, place, and time. He appears well-developed and well-nourished. No distress.   Pulmonary/Chest: Effort normal. "   Abdominal: Soft. He exhibits no distension and no mass. There is no tenderness. There is no rebound and no guarding. No hernia.   Neurological: He is alert and oriented to person, place, and time.   Skin: Skin is warm and dry. He is not diaphoretic.   Psychiatric: He has a normal mood and affect.   Vitals reviewed.          Results for orders placed or performed in visit on 07/17/20   POC Urinalysis Dipstick, Multipro   Result Value Ref Range    Color Yellow Yellow, Straw, Dark Yellow, Ying    Clarity, UA Clear Clear    Glucose, UA Negative Negative, 1000 mg/dL (3+) mg/dL    Bilirubin Negative Negative    Ketones, UA Negative Negative    Specific Gravity  1.020 1.005 - 1.030    Blood, UA Negative Negative    pH, Urine 0.2 (A) 5.0 - 8.0    Protein, POC Negative Negative mg/dL    Urobilinogen, UA Normal Normal    Nitrite, UA Negative Negative    Leukocytes Negative Negative     Assessment and Plan    Diagnoses and all orders for this visit:    Prostate cancer (CMS/[facility])  -     POC Urinalysis Dipstick, Multipro  -     PSA DIAGNOSTIC; Future    Impotence of organic origin    Urinary incontinence, male, stress    Patient underwent RALP on 7/28/15. His pretreatment PSA was 7.7. His pathology revealed [place] 3+4 = 7 adenocarcinoma the prostate. Pathology showed [id number] with negative surgical margins. His posttreatment PSA was 0.1 he completed XRT in May 2016 his PSA in September 2017 [place] to 0.3 and he was started on 2 years of ADT which he completed March 2019.         His PSA is undetectable today he will follow-up in 6 months with PSA.

## 2020-07-17 ENCOUNTER — OFFICE VISIT (OUTPATIENT)
Dept: UROLOGY | Facility: CLINIC | Age: 65
End: 2020-07-17

## 2020-07-17 VITALS — HEIGHT: 71 IN | WEIGHT: 218.2 LBS | TEMPERATURE: 96.6 F | BODY MASS INDEX: 30.55 KG/M2

## 2020-07-17 DIAGNOSIS — C61 PROSTATE CANCER (HCC): Primary | ICD-10-CM

## 2020-07-17 DIAGNOSIS — N39.3 URINARY INCONTINENCE, MALE, STRESS: ICD-10-CM

## 2020-07-17 DIAGNOSIS — N52.9 IMPOTENCE OF ORGANIC ORIGIN: ICD-10-CM

## 2020-07-17 LAB
BILIRUB BLD-MCNC: NEGATIVE MG/DL
CLARITY, POC: CLEAR
COLOR UR: YELLOW
GLUCOSE UR STRIP-MCNC: NEGATIVE MG/DL
KETONES UR QL: NEGATIVE
LEUKOCYTE EST, POC: NEGATIVE
NITRITE UR-MCNC: NEGATIVE MG/ML
PH UR: 0.2 [PH] (ref 5–8)
PROT UR STRIP-MCNC: NEGATIVE MG/DL
RBC # UR STRIP: NEGATIVE /UL
SP GR UR: 1.02 (ref 1–1.03)
UROBILINOGEN UR QL: NORMAL

## 2020-07-17 PROCEDURE — 99213 OFFICE O/P EST LOW 20 MIN: CPT | Performed by: UROLOGY

## 2020-07-17 PROCEDURE — 81003 URINALYSIS AUTO W/O SCOPE: CPT | Performed by: UROLOGY

## 2020-07-17 RX ORDER — PANTOPRAZOLE SODIUM 40 MG/1
40 TABLET, DELAYED RELEASE ORAL
COMMUNITY
Start: 2020-07-07 | End: 2020-12-23

## 2020-07-17 RX ORDER — FERROUS SULFATE 325(65) MG
325 TABLET ORAL
COMMUNITY
End: 2020-12-23

## 2020-07-17 RX ORDER — AMOXICILLIN 500 MG/1
CAPSULE ORAL
COMMUNITY
Start: 2020-07-07 | End: 2020-12-23

## 2020-07-17 RX ORDER — CLARITHROMYCIN 500 MG/1
500 TABLET, COATED ORAL
COMMUNITY
Start: 2020-07-07 | End: 2020-12-23

## 2020-07-23 NOTE — PROGRESS NOTES
Subjective:      Patient ID: Wing Miller is a 76 y.o. male  Denver Garfinkel, MD  No ref. provider found    HPI  Chief Complaint   Patient presents with    Anemia     4 wk egd/colon f/u       F/u after egd and colonoscopy. Seen in office prior to procedure with following complaints:   1. Iron deficiency anemia, unspecified iron deficiency anemia type    2. Heme positive stool    3. Indigestion    4. Burping      Findings:   Esophagus: normal     Stomach:  abnormal: mild mucosal changes suggestive of gastritis noted -  Gastric biopsies were taken from the antrum and body to rule out Helicobacter pylori infection. Duodenum: normal mucosal appearance - biopsied for histology. IMPRESSION:  1. No obvious cause for anemia noted. - biopsies pending     RECOMMENDATIONS:    1. Await path results, the patient will be contacted in 7-10 days with biopsy results. 2.  NSAID avoidance    Findings: The mucosa appeared normal throughout the entire examined colon   In proximal the transverse colon, a 1 cm sessile polyp was removed completely with hot snare polypectomy. There was evidence of diverticular disease throughout the left colon. Retroflexion in the rectum was normal and revealed no further abnormalities       Recommendations:  1. Repeat colonoscopy: pending pathology - max of 3 yrs given size of polyp    A. Small bowel, duodenum, biopsy:   Benign duodenal mucosa   Negative for evidence of celiac disease   Negative for active inflammation   Negative for Giardia organisms     B. Stomach, gastric biopsy:   Fundic and antral-type gastric mucosa with moderate chronic active   inflammation   Positive for Helicobacter pylori organisms by immunohistochemical   staining   Negative for intestinal metaplasia   Negative for dysplasia     C. Colon, transverse polyp, polypectomy:   Tubular adenoma   Negative for high-grade dysplasia    CLR/CLR       h- pylori was treated with amoxicillin and flagyl.    He had stool testing done after treatment, negative for h pylori    He continues to have some indigestion and belching. He has history of prostate cancer with prostatectomy and also radiation treatments and hormones. Feels unwell a lot of the time since this. He has f/u with his pcp next month to get labs rechecked. He has not seen heme / onc  He denies BRRB. He takes iron and says his stools have been darker since starting this medication. Assessment:      1. Iron deficiency anemia, unspecified iron deficiency anemia type    2. Indigestion    3. History of adenomatous polyp of colon    4. Family history of colonic polyps    5. History of Helicobacter pylori infection            Plan:      Repeat colonoscopy 3 years  Repeat egd prn    Continue ppi for symptoms of indigestion and belching    F/u with pcp for lab monitoring. If no improvement with iron supplement we discussed possible referral to heme/onc, proceeding with Video Capsule Endoscopy to check small bowel for bleeding source, and/or transfusion of iron or blood.  He will discuss these with Dr. Kimberlyn Lehman    rto one year or prn problems            Family History   Problem Relation Age of Onset    Heart Disease Mother     Colon Polyps Father     Colon Cancer Neg Hx     Liver Cancer Neg Hx     Liver Disease Neg Hx     Esophageal Cancer Neg Hx     Rectal Cancer Neg Hx     Stomach Cancer Neg Hx        Past Medical History:   Diagnosis Date    Adjustment disorder with anxiety     Adjustment insomnia     Asymptomatic arteriosclerosis of coronary artery     Dyspepsia     GERD (gastroesophageal reflux disease)     Heart block 2010    Hyperlipidemia     Hypertension     Iron deficiency anemia     Irregular heart beat     Prostate cancer (HCC)     Vitamin D deficiency        Past Surgical History:   Procedure Laterality Date    CARDIAC SURGERY  2009    COLONOSCOPY  03/2012    [facility] (5 yr)    COLONOSCOPY N/A 6/29/2020    Dr Josette Hdz [place]-Diverticular disease-AP, 3 yr recall    ID COLONOSCOPY FLX DX W/COLLJ SPEC WHEN PFRMD N/A 12/6/2017    Dr Feliz Davis, 5 yr recall    PROSTATECTOMY  06/2015    da [place]    TONSILLECTOMY AND ADENOIDECTOMY      UPPER GASTROINTESTINAL ENDOSCOPY N/A 6/29/2020    Dr Veronica Junior-Gastritis, (+) H pylori       Current Outpatient Medications   Medication Sig Dispense Refill    amoxicillin (AMOXIL) 500 MG capsule 2 tablets  p.o. BID x 14 days for treatment of  h pylori infection 56 capsule 0    clarithromycin (BIAXIN) 500 MG tablet Take 1 tablet by mouth 2 times daily Take 1 tablet p.o. BID for 14 days for treatment of  h pylori infection. 28 tablet 0    pantoprazole (PROTONIX) 40 MG tablet Take 1 tablet by mouth 2 times daily 28 tablet 0    metoprolol succinate (TOPROL XL) 50 MG extended release tablet Take 50 mg by mouth daily      ferrous sulfate (IRON 325) 325 (65 Fe) MG tablet Take 325 mg by mouth 2 times daily      atorvastatin (LIPITOR) 20 MG tablet nightly       temazepam (RESTORIL) 30 MG capsule TAKE ONE CAPSULE BY MOUTH AT BEDTIME AS NEEDED FOR SLEEP      niacin (NIASPAN) 500 MG extended release tablet Take 1 capsule by mouth nightly       losartan-hydrochlorothiazide (HYZAAR) 100-25 MG per tablet Take 1 tablet by mouth daily        No current facility-administered medications for this visit. No Known Allergies     reports that he has never smoked. He has never used smokeless tobacco. He reports current alcohol use. He reports that he does not use drugs. Review of Systems   Constitutional: Positive for fatigue. Negative for appetite change, fever and unexpected weight change. HENT: Negative for sore throat and voice change. Respiratory: Negative for cough, chest tightness and shortness of breath. Cardiovascular: Negative for chest pain, palpitations and leg swelling.    Gastrointestinal: Negative for abdominal distention, abdominal pain, blood in stool, constipation, diarrhea,

## 2020-07-28 ENCOUNTER — OFFICE VISIT (OUTPATIENT)
Dept: GASTROENTEROLOGY | Age: 65
End: 2020-07-28
Payer: MEDICARE

## 2020-07-28 VITALS — HEART RATE: 74 BPM | HEIGHT: 71 IN | WEIGHT: 216 LBS | BODY MASS INDEX: 30.24 KG/M2 | OXYGEN SATURATION: 98 %

## 2020-07-28 PROCEDURE — 99214 OFFICE O/P EST MOD 30 MIN: CPT | Performed by: NURSE PRACTITIONER

## 2020-07-28 ASSESSMENT — ENCOUNTER SYMPTOMS
SORE THROAT: 0
RECTAL PAIN: 0
CONSTIPATION: 0
DIARRHEA: 0
VOICE CHANGE: 0
VOMITING: 0
BLOOD IN STOOL: 0
BACK PAIN: 0
NAUSEA: 0
ABDOMINAL DISTENTION: 0
CHEST TIGHTNESS: 0
ABDOMINAL PAIN: 0
SHORTNESS OF BREATH: 0
COUGH: 0

## 2020-12-23 ENCOUNTER — OFFICE VISIT (OUTPATIENT)
Dept: CARDIOLOGY | Facility: CLINIC | Age: 65
End: 2020-12-23

## 2020-12-23 VITALS
OXYGEN SATURATION: 99 % | SYSTOLIC BLOOD PRESSURE: 138 MMHG | HEART RATE: 74 BPM | WEIGHT: 208 LBS | BODY MASS INDEX: 29.12 KG/M2 | HEIGHT: 71 IN | DIASTOLIC BLOOD PRESSURE: 80 MMHG

## 2020-12-23 DIAGNOSIS — I42.1 HOCM (HYPERTROPHIC OBSTRUCTIVE CARDIOMYOPATHY) (HCC): ICD-10-CM

## 2020-12-23 DIAGNOSIS — I25.10 CORONARY ARTERY DISEASE INVOLVING NATIVE CORONARY ARTERY OF NATIVE HEART WITHOUT ANGINA PECTORIS: Primary | ICD-10-CM

## 2020-12-23 DIAGNOSIS — E78.49 OTHER HYPERLIPIDEMIA: ICD-10-CM

## 2020-12-23 DIAGNOSIS — I10 ESSENTIAL HYPERTENSION: ICD-10-CM

## 2020-12-23 PROCEDURE — 99214 OFFICE O/P EST MOD 30 MIN: CPT | Performed by: NURSE PRACTITIONER

## 2020-12-23 PROCEDURE — 93000 ELECTROCARDIOGRAM COMPLETE: CPT | Performed by: NURSE PRACTITIONER

## 2020-12-23 RX ORDER — ASPIRIN 81 MG/1
81 TABLET ORAL DAILY
Start: 2020-12-23

## 2020-12-23 NOTE — PATIENT INSTRUCTIONS
Coronary Artery Disease, Male  Coronary artery disease (CAD) is a condition in which the arteries that lead to the heart (coronary arteries) become narrow or blocked. The narrowing or blockage can lead to decreased blood flow to the heart. Prolonged reduced blood flow can cause a heart attack (myocardial infarction or MI). This condition may also be called coronary heart disease.  Because CAD is the leading cause of death in men, it is important to understand what causes this condition and how it is treated.  What are the causes?  CAD is most often caused by atherosclerosis. This is the buildup of fat and cholesterol (plaque) on the inside of the arteries. Over time, the plaque may narrow or block the artery, reducing blood flow to the heart. Plaque can also become weak and break off within a coronary artery and cause a sudden blockage. Other less common causes of CAD include:  · A blood clot or a piece of a blood clot or other substance that blocks the flow of blood in a coronary artery (embolism).  · A tearing of the artery (spontaneous coronary artery dissection).  · An enlargement of an artery (aneurysm).  · Inflammation (vasculitis) in the artery wall.  What increases the risk?  The following factors may make you more likely to develop this condition:  · Age. Men over age 45 are at a greater risk of CAD.  · Family history of CAD.  · Gender. Men often develop CAD earlier in life than women.  · High blood pressure (hypertension).  · Diabetes.  · High cholesterol levels.  · Tobacco use.  · Excessive alcohol use.  · Lack of exercise.  · A diet high in saturated and trans fats, such as fried food and processed meat.  Other possible risk factors include:  · High stress levels.  · Depression.  · Obesity.  · Sleep apnea.  What are the signs or symptoms?  Many people do not have any symptoms during the early stages of CAD. As the condition progresses, symptoms may include:  · Chest pain (angina). The pain can:  ? Feel  like crushing or squeezing, or like a tightness, pressure, fullness, or heaviness in the chest.  ? Last more than a few minutes or can stop and recur. The pain tends to get worse with exercise or stress and to fade with rest.  · Pain in the arms, neck, jaw, ear, or back.  · Unexplained heartburn or indigestion.  · Shortness of breath.  · Nausea or vomiting.  · Sudden light-headedness.  · Sudden cold sweats.  · Fluttering or fast heartbeat (palpitations).  How is this diagnosed?  This condition is diagnosed based on:  · Your family and medical history.  · A physical exam.  · Tests, including:  ? A test to check the electrical signals in your heart (electrocardiogram).  ? Exercise stress test. This looks for signs of blockage when the heart is stressed with exercise, such as running on a treadmill.  ? Pharmacologic stress test. This test looks for signs of blockage when the heart is being stressed with a medicine.  ? Blood tests.  ? Coronary angiogram. This is a procedure to look at the coronary arteries to see if there is any blockage. During this test, a dye is injected into your arteries so they appear on an X-ray.  ? Coronary artery CT scan. This CT scan helps detect calcium deposits in your coronary arteries. Calcium deposits are an indicator of CAD.  ? A test that uses sound waves to take a picture of your heart (echocardiogram).  ? Chest X-ray.  How is this treated?  This condition may be treated by:  · Healthy lifestyle changes to reduce risk factors.  · Medicines such as:  ? Antiplatelet medicines and blood-thinning medicines, such as aspirin. These help to prevent blood clots.  ? Nitroglycerin.  ? Blood pressure medicines.  ? Cholesterol-lowering medicine.  · Coronary angioplasty and stenting. During this procedure, a thin, flexible tube is inserted through a blood vessel and into a blocked artery. A balloon or similar device on the end of the tube is inflated to open up the artery. In some cases, a small,  mesh tube (stent) is inserted into the artery to keep it open.  · Coronary artery bypass surgery. During this surgery, veins or arteries from other parts of the body are used to create a bypass around the blockage and allow blood to reach your heart.  Follow these instructions at home:  Medicines  · Take over-the-counter and prescription medicines only as told by your health care provider.  · Do not take the following medicines unless your health care provider approves:  ? NSAIDs, such as ibuprofen, naproxen, or celecoxib.  ? Vitamin supplements that contain vitamin A, vitamin E, or both.  Lifestyle  · Follow an exercise program approved by your health care provider. Aim for 150 minutes of moderate exercise or 75 minutes of vigorous exercise each week.  · Maintain a healthy weight or lose weight as approved by your health care provider.  · Learn to manage stress or try to limit your stress. Ask your health care provider for suggestions if you need help.  · Get screened for depression and seek treatment, if needed.  · Do not use any products that contain nicotine or tobacco, such as cigarettes, e-cigarettes, and chewing tobacco. If you need help quitting, ask your health care provider.  · Do not use illegal drugs.  Eating and drinking    · Follow a heart-healthy diet. A dietitian can help educate you about healthy food options and changes. In general, eat plenty of fruits and vegetables, lean meats, and whole grains.  · Avoid foods high in:  ? Sugar.  ? Salt (sodium).  ? Saturated fat, such as processed or fatty meat.  ? Trans fat, such as fried foods.  · Use healthy cooking methods such as roasting, grilling, broiling, baking, poaching, steaming, or stir-frying.  · Do not drink alcohol if your health care provider tells you not to drink.  · If you drink alcohol:  ? Limit how much you have to 0-2 drinks per day.  ? Be aware of how much alcohol is in your drink. In the [place] one drink equals one 12 oz bottle of beer  (355 mL), one 5 oz glass of wine (148 mL), or one 1½ oz glass of hard liquor (44 mL).  General instructions  · Manage any other health conditions, such as hypertension and diabetes. These conditions affect your heart.  · Your health care provider may ask you to monitor your blood pressure. Ideally, your blood pressure should be below 130/80.  · Keep all follow-up visits as told by your health care provider. This is important.  Get help right away if:  · You have pain in your chest, neck, ear, arm, jaw, stomach, or back that:  ? Lasts more than a few minutes.  ? Is recurring.  ? Is not relieved by taking medicine under your tongue (sublingual nitroglycerin).  · You have profuse sweating without cause.  · You have unexplained:  ? Heartburn or indigestion.  ? Shortness of breath or difficulty breathing.  ? Fluttering or fast heartbeat (palpitations).  ? Nausea or vomiting.  ? Fatigue.  ? Feelings of nervousness or anxiety.  ? Weakness.  ? Diarrhea.  · You have sudden light-headedness or dizziness.  · You faint.  · You feel like hurting yourself or think about taking your own life.  These symptoms may represent a serious problem that is an emergency. Do not wait to see if the symptoms will go away. Get medical help right away. Call your local emergency services (911 in the [place] Do not drive yourself to the hospital.  Summary  · Coronary artery disease (CAD) is a condition in which the arteries that lead to the heart (coronary arteries) become narrow or blocked. The narrowing or blockage can lead to a heart attack.  · Many people do not have any symptoms during the early stages of CAD.  · CAD can be treated with lifestyle changes, medicines, surgery, or a combination of these treatments.  This information is not intended to replace advice given to you by your health care provider. Make sure you discuss any questions you have with your health care provider.  Document Revised: 09/06/2019 Document Reviewed:  08/27/2019  Elsevier Patient Education © 2020 [employer]    Advance Care Planning and Advance Directives     You make decisions on a daily basis - decisions about where you want to live, your career, your home, your life. Perhaps one of the most important decisions you face is your choice for future medical care. Take time to talk with your family and your healthcare team and start planning today.  Advance Care Planning is a process that can help you:  · Understand possible future healthcare decisions in light of your own experiences  · Reflect on those decision in light of your goals and values  · Discuss your decisions with those closest to you and the healthcare professionals that care for you  · Make a plan by creating a document that reflects your wishes    Surrogate Decision Maker  In the event of a medical emergency, which has left you unable to communicate or to make your own decisions, you would need someone to make decisions for you.  It is important to discuss your preferences for medical treatment with this person while you are in good health.     Qualities of a surrogate decision maker:  • Willing to take on this role and responsibility  • Knows what you want for future medical care  • Willing to follow your wishes even if they don't agree with them  • Able to make difficult medical decisions under stressful circumstances    Advance Directives  These are legal documents you can create that will guide your healthcare team and decision maker(s) when needed. These documents can be stored in the electronic medical record.    · Living Will - a legal document to guide your care if you have a terminal condition or a serious illness and are unable to communicate. States vary by statute in document names/types, but most forms may include one or more of the following:        -  Directions regarding life-prolonging treatments        -  Directions regarding artificially provided nutrition/hydration        -   Choosing a healthcare decision maker        -  Direction regarding organ/tissue donation    · Durable Power of  for Healthcare - this document names an -in-fact to make medical decisions for you, but it may also allow this person to make personal and financial decisions for you. Please seek the advice of an  if you need this type of document.    **Advance Directives are not required and no one may discriminate against you if you do not sign one.    Medical Orders  Many states allow specific forms/orders signed by your physician to record your wishes for medical treatment in your current state of health. This form, signed in personal communication with your physician, addresses resuscitation and other medical interventions that you may or may not want.      For more information or to schedule a time with a [facility] Advance Care Planning Facilitator contact: [email or web address] or call [phone] and someone will contact you directly.

## 2020-12-23 NOTE — PROGRESS NOTES
Subjective:     Encounter Date:12/23/2020      Patient ID: Dayron Lubin is a 65 y.o. male with a previous medical history of coronary artery disease status post PCI to the right coronary artery in 2009, hypertrophic obstructive cardiomyopathy, chronic diastolic dysfunction, hypertension, hyperlipidemia    Chief Complaint: Follow-up  Coronary Artery Disease  Presents for follow-up visit. Pertinent negatives include no chest pain, chest pressure, chest tightness, dizziness, leg swelling, palpitations or shortness of breath. The symptoms have been stable. Compliance with diet is good. Compliance with exercise is good. Compliance with medications is good.   Hypertension  This is a chronic problem. The current episode started more than 1 year ago. The problem is controlled. Pertinent negatives include no chest pain, headaches, malaise/fatigue, orthopnea, palpitations, peripheral edema, PND or shortness of breath. Risk factors for coronary artery disease include dyslipidemia, obesity and male gender. Current antihypertension treatment includes diuretics, beta blockers and angiotensin blockers. The current treatment provides significant improvement. There are no compliance problems.  Hypertensive end-organ damage includes CAD/MI. There is no history of heart failure.     Mr. Lubin presents to the office today for routine follow-up.  He reports that he has been doing well since his previous visit in our office.  Denies any chest pain, chest pressure, chest discomfort.  He reports no significant shortness of breath, dyspnea on exertion, palpitations, lightheadedness, dizziness, near syncope.  He denies any issues with lower extremity edema, abdominal bloating, weight gain.  He denies any orthopnea or PND.  He continues to follow with his primary care physician for management of his lab work.  He reports that he was taken off aspirin by his primary care physician due to anemia.  He reports no significant findings on  upper and lower scopes by GI.  He reports a stable hemoglobin recently.    The following portions of the patient's history were reviewed and updated as appropriate: allergies, current medications, past family history, past medical history, past social history and past surgical history.    No Known Allergies       Current Outpatient Medications:   •  atorvastatin (LIPITOR) 20 MG tablet, Take 20 mg by mouth Daily., Disp: , Rfl:   •  calcium carbonate (OS-NASIMA) 600 MG tablet, Take 600 mg by mouth Daily., Disp: , Rfl:   •  losartan-hydrochlorothiazide (HYZAAR) 100-25 MG per tablet, Take 1 tablet by mouth Daily., Disp: , Rfl:   •  niacin 500 MG tablet, Take 500 mg by mouth Every Night., Disp: , Rfl:   •  temazepam (RESTORIL) 30 MG capsule, TAKE ONE CAPSULE BY MOUTH AT BEDTIME AS NEEDED FOR SLEEP, Disp: , Rfl: 2  •  TOPROL XL 50 MG 24 hr tablet, Take 50 mg by mouth Daily., Disp: , Rfl:   •  aspirin (aspirin) 81 MG EC tablet, Take 1 tablet by mouth Daily., Disp:  , Rfl:     Review of Systems   Constitution: Negative for fever and malaise/fatigue.   HENT: Negative for nosebleeds.    Cardiovascular: Negative for chest pain, dyspnea on exertion, irregular heartbeat, leg swelling, near-syncope, orthopnea, palpitations, paroxysmal nocturnal dyspnea and syncope.   Respiratory: Negative for chest tightness, cough, shortness of breath, sputum production and wheezing.    Hematologic/Lymphatic: Negative for bleeding problem. Does not bruise/bleed easily.   Gastrointestinal: Negative for bloating, abdominal pain, nausea and vomiting.   Neurological: Negative for dizziness, focal weakness, headaches, light-headedness and weakness.   Psychiatric/Behavioral: Negative for altered mental status.         ECG 12 Lead    Date/Time: 12/23/2020 1:43 PM  Performed by: Edda Li APRN  Authorized by: Edda Li APRN   Comparison: compared with previous ECG from 12/12/2019  Similar to previous ECG  Rhythm: sinus rhythm  Rate:  "normal  BPM: 75  Conduction: conduction normal  QRS axis: normal  Other findings: T wave abnormality and left ventricular hypertrophy    Clinical impression: abnormal EKG          /80   Pulse 74   Ht 180.3 cm (71\")   Wt 94.3 kg (208 lb)   SpO2 99%   BMI 29.01 kg/m²        Objective:     Vitals signs reviewed.   Constitutional:       Appearance: Not in distress. Chronically ill-appearing.   Neck:      Musculoskeletal: Normal range of motion and neck supple.   Pulmonary:      Effort: Pulmonary effort is normal.      Breath sounds: Normal breath sounds.   Cardiovascular:      Normal rate. Regular rhythm.      Murmurs: There is a systolic murmur.      No gallop. No rub.   Edema:     Peripheral edema absent.   Abdominal:      General: There is no distension.      Palpations: Abdomen is soft.      Tenderness: There is no abdominal tenderness.   Musculoskeletal: Normal range of motion.   Skin:     General: Skin is warm and dry.   Neurological:      Mental Status: Alert and oriented to person, place and time.   Psychiatric:         Attention and Perception: Attention normal.         Speech: Speech normal.         Behavior: Behavior normal. Behavior is cooperative.         Cognition and Memory: Cognition normal.         Lab Review:   Results for orders placed during the hospital encounter of 12/18/19   Adult Transthoracic Echo Complete W/ Cont if Necessary Per Protocol    Narrative · Left ventricular systolic function is normal. Estimated EF appears to be   in the range of 61 - 65%.  · Left ventricular wall thickness is consistent with moderate concentric   hypertrophy.  · Mid left ventricular cavitary gradient is present.  · The findings are consistent with obstructive, hypertrophic   cardiomyopathy.  · Left ventricular diastolic dysfunction (grade I) consistent with   impaired relaxation.  · No hemodynamically significant valvular abnormality identified on the   study.  · Estimated right ventricular systolic " pressure from tricuspid   regurgitation is markedly elevated (>55 mmHg).              Assessment:          Diagnosis Plan   1. Coronary artery disease involving native coronary artery of native heart without angina pectoris     2. Essential hypertension     3. Other hyperlipidemia     4. HOCM (hypertrophic obstructive cardiomyopathy) (CMS/[facility])            Plan:       -Coronary artery disease: The patient has a history of previous PCI in 2009 with 3.5 x 13 mm stent placement to the RCA.  Last year the patient was noted to be on 325 mg daily of aspirin.  It was discussed at that time the 81 mg was a reasonable dose for him and noted that he would change to this.  The patient tells me today that he was on low-dose aspirin however his primary care physician stopped this after lab work revealing a low hemoglobin.  He states he was seen by GI had upper and lower scopes without significant findings was on iron for a short period of time and has been off aspirin since then.  We will restart aspirin 81 mg daily.  He should continue to monitor his blood counts with his primary care physician.  Due to his previous stent placement to his right coronary artery it would be recommended that the patient remain on 81 mg of aspirin indefinitely.  He can discuss the need for a proton pump inhibitor with his primary care physician.    Essential hypertension: The patient has a history of elevated blood pressure that is well controlled on his current medications.    Hyperlipidemia: The patient is on statin therapy with atorvastatin 20 mg daily.  His lab work is monitored through his primary care physician's office.  No recent lipid panel is available for me to review.  His LDL goal would be less than 70 given his history of CAD.    Hypertrophic obstructive cardiomyopathy: The patient has a previous history of this based on echocardiographic findings.  His most recent echocardiogram as noted above.  The patient denies any lightheadedness,  dizziness, near-syncope, syncope.  We have discussed importance of staying hydrated.  The patient is on beta-blocker therapies.  Of note, the patient is on losartan and HCTZ however has been on this medication for some time and tolerates it well.  If the patient develops significant symptoms that are thought to be resulting from his HOCM consider transition from ARB/diuretic therapy to calcium channel blocker for management of blood pressure.    Continue current medications.  Follow-up in 12 months.  Call sooner if needed.  Start 81 mg aspirin daily.    Advance Care Planning   ACP discussion was held with the patient during this visit. Patient does not have an advance directive, information provided.

## 2021-01-12 NOTE — PROGRESS NOTES
Subjective    Mr. Lubin is 65 y.o. male    Chief Complaint: Prostate Cancer.    History of Present Illness  Prostate Cancer  Pt. presents with history of prostate cancer diagnosed in July 2015. Current disease state ishigh risk disease Pt. underwent robot assisted laparoscpic prostatectomy and external beam radiotherapy Path showing T3b 7 adenocarcinoma of prostate with Negative surgical margins. Patient has received XRT completed in May 2016 .He has completed Lupron x 2 years management of prostate cancer. Pt. having 1 ppd (security) incontinence. Associated voiding symptoms include frequency, urgency, nocturia AUA score 8/35 . Pt. Is having erectile dysfunction thatdoes not to PDE5 inhibitors. There has been no bone pain, weight loss, abdominal pain, back pain, or gross hematuria. Hot flashes improving    Lab Results   Component Value Date    PSA <0.014 01/11/2021    PSA <0.014 06/23/2020    PSA <0.014 12/12/2019         The following portions of the patient's history were reviewed and updated as appropriate: allergies, current medications, past family history, past medical history, past social history, past surgical history and problem list.    Review of Systems   Constitutional: Negative for chills and fever.   Gastrointestinal: Negative for abdominal pain, anal bleeding and blood in stool.   Genitourinary: Positive for frequency and urgency. Negative for dysuria and hematuria.         Current Outpatient Medications:   •  aspirin (aspirin) 81 MG EC tablet, Take 1 tablet by mouth Daily., Disp:  , Rfl:   •  atorvastatin (LIPITOR) 20 MG tablet, Take 20 mg by mouth Daily., Disp: , Rfl:   •  calcium carbonate (OS-NASIMA) 600 MG tablet, Take 600 mg by mouth Daily., Disp: , Rfl:   •  losartan-hydrochlorothiazide (HYZAAR) 100-25 MG per tablet, Take 1 tablet by mouth Daily., Disp: , Rfl:   •  niacin 500 MG tablet, Take 500 mg by mouth Every Night., Disp: , Rfl:   •  temazepam (RESTORIL) 30 MG capsule, TAKE ONE CAPSULE BY  "MOUTH AT BEDTIME AS NEEDED FOR SLEEP, Disp: , Rfl: 2  •  TOPROL XL 50 MG 24 hr tablet, Take 50 mg by mouth Daily., Disp: , Rfl:     Past Medical History:   Diagnosis Date   • CAD (coronary artery disease)    • History of external beam radiation therapy 05/12/2016    6840 cGy to prostate bed   • Hyperlipidemia    • Hypertension    • Prostate cancer (CMS/HCC)        Past Surgical History:   Procedure Laterality Date   • CARDIAC CATHETERIZATION     • CORONARY ANGIOPLASTY WITH STENT PLACEMENT     • PROSTATE SURGERY     • TONSILLECTOMY         Social History     Socioeconomic History   • Marital status: Single     Spouse name: Not on file   • Number of children: Not on file   • Years of education: Not on file   • Highest education level: Not on file   Tobacco Use   • Smoking status: Never Smoker   • Smokeless tobacco: Never Used   Substance and Sexual Activity   • Alcohol use: Yes     Comment: very little    • Drug use: No   • Sexual activity: Defer       Family History   Problem Relation Age of Onset   • Coronary artery disease Mother    • Stroke Mother        Objective    Temp 97.1 °F (36.2 °C) (Temporal)   Ht 180.3 cm (71\")   Wt 96.2 kg (212 lb)   BMI 29.57 kg/m²     Physical Exam        Results for orders placed or performed in visit on 01/18/21   POC Urinalysis Dipstick, Multipro    Specimen: Urine   Result Value Ref Range    Color Yellow Yellow, Straw, Dark Yellow, Ying    Clarity, UA Clear Clear    Glucose, UA Negative Negative, 1000 mg/dL (3+) mg/dL    Bilirubin Negative Negative    Ketones, UA Negative Negative    Specific Gravity  1.025 1.005 - 1.030    Blood, UA Trace (A) Negative    pH, Urine 5.5 5.0 - 8.0    Protein, POC Negative Negative mg/dL    Urobilinogen, UA Normal Normal    Nitrite, UA Negative Negative    Leukocytes Negative Negative     Assessment and Plan    Diagnoses and all orders for this visit:    1. Prostate cancer (CMS/HCC) (Primary)  -     POC Urinalysis Dipstick, Multipro  -     PSA " DIAGNOSTIC; Future    2. Impotence of organic origin    3. Urinary incontinence, male, stress    4. Hot flashes    Patient underwent RALP on 7/28/15. His pretreatment PSA was 7.7. His pathology revealed [place] 3+4 = 7 adenocarcinoma the prostate. Pathology showed [id number] with negative surgical margins. His posttreatment PSA was 0.1 he completed XRT in May 2016 his PSA in September 2017 lavonne to 0.3 and he was started on 2 years of ADT which he completed March 2019.         His PSA is undetectable today he will follow-up in 6 months with PSA.

## 2021-01-18 ENCOUNTER — OFFICE VISIT (OUTPATIENT)
Dept: UROLOGY | Facility: CLINIC | Age: 66
End: 2021-01-18

## 2021-01-18 VITALS — HEIGHT: 71 IN | BODY MASS INDEX: 29.68 KG/M2 | WEIGHT: 212 LBS | TEMPERATURE: 97.1 F

## 2021-01-18 DIAGNOSIS — N39.3 URINARY INCONTINENCE, MALE, STRESS: ICD-10-CM

## 2021-01-18 DIAGNOSIS — C61 PROSTATE CANCER (HCC): Primary | ICD-10-CM

## 2021-01-18 DIAGNOSIS — N52.9 IMPOTENCE OF ORGANIC ORIGIN: ICD-10-CM

## 2021-01-18 DIAGNOSIS — R23.2 HOT FLASHES: ICD-10-CM

## 2021-01-18 LAB
BILIRUB BLD-MCNC: NEGATIVE MG/DL
CLARITY, POC: CLEAR
COLOR UR: YELLOW
GLUCOSE UR STRIP-MCNC: NEGATIVE MG/DL
KETONES UR QL: NEGATIVE
LEUKOCYTE EST, POC: NEGATIVE
NITRITE UR-MCNC: NEGATIVE MG/ML
PH UR: 5.5 [PH] (ref 5–8)
PROT UR STRIP-MCNC: NEGATIVE MG/DL
RBC # UR STRIP: ABNORMAL /UL
SP GR UR: 1.02 (ref 1–1.03)
UROBILINOGEN UR QL: NORMAL

## 2021-01-18 PROCEDURE — 81003 URINALYSIS AUTO W/O SCOPE: CPT | Performed by: UROLOGY

## 2021-01-18 PROCEDURE — 99213 OFFICE O/P EST LOW 20 MIN: CPT | Performed by: UROLOGY

## 2021-03-08 ENCOUNTER — IMMUNIZATION (OUTPATIENT)
Dept: VACCINE CLINIC | Facility: HOSPITAL | Age: 66
End: 2021-03-08

## 2021-03-08 PROCEDURE — 91301 HC SARSCO02 VAC 100MCG/0.5ML IM: CPT | Performed by: OBSTETRICS & GYNECOLOGY

## 2021-03-08 PROCEDURE — 0011A: CPT | Performed by: OBSTETRICS & GYNECOLOGY

## 2021-04-05 ENCOUNTER — IMMUNIZATION (OUTPATIENT)
Dept: VACCINE CLINIC | Facility: HOSPITAL | Age: 66
End: 2021-04-05

## 2021-04-05 PROCEDURE — 0012A: CPT | Performed by: OBSTETRICS & GYNECOLOGY

## 2021-04-05 PROCEDURE — 91301 HC SARSCO02 VAC 100MCG/0.5ML IM: CPT | Performed by: OBSTETRICS & GYNECOLOGY

## 2021-04-20 NOTE — H&P
other pertinent GI symptoms negative. Physical Exam:  Cardiac:  [x]WNL  []Comments:  Pulmonary:  [x]WNL   []Comments:  Neuro/Mental Status:  [x]WNL  []Comments:  Abdominal:  [x]WNL    []Comments:  Other:   []WNL  []Comments:    Informed Consent:  The risks and benefits of the procedure have been discussed with either the patient or if they cannot consent, their representative. Assessment:  Patient examined and appropriate for planned sedation and procedure. Plan:  Proceed with planned sedation and procedure as above.     Pedro Pichardo DO  7:57 AM [General Appearance - Well Developed] : well developed [Normal Appearance] : normal appearance [Well Groomed] : well groomed [General Appearance - Well Nourished] : well nourished [No Deformities] : no deformities [General Appearance - In No Acute Distress] : no acute distress [Normal Conjunctiva] : the conjunctiva exhibited no abnormalities [Eyelids - No Xanthelasma] : the eyelids demonstrated no xanthelasmas [Normal Oral Mucosa] : normal oral mucosa [No Oral Pallor] : no oral pallor [No Oral Cyanosis] : no oral cyanosis [Normal Jugular Venous A Waves Present] : normal jugular venous A waves present [Normal Jugular Venous V Waves Present] : normal jugular venous V waves present [No Jugular Venous Pickard A Waves] : no jugular venous pickard A waves [Respiration, Rhythm And Depth] : normal respiratory rhythm and effort [Exaggerated Use Of Accessory Muscles For Inspiration] : no accessory muscle use [Auscultation Breath Sounds / Voice Sounds] : lungs were clear to auscultation bilaterally [Heart Rate And Rhythm] : heart rate and rhythm were normal [Heart Sounds] : normal S1 and S2 [Murmurs] : no murmurs present [Abdomen Soft] : soft [Abdomen Tenderness] : non-tender [Abdomen Mass (___ Cm)] : no abdominal mass palpated [Abnormal Walk] : normal gait [Gait - Sufficient For Exercise Testing] : the gait was sufficient for exercise testing [Nail Clubbing] : no clubbing of the fingernails [Cyanosis, Localized] : no localized cyanosis [Petechial Hemorrhages (___cm)] : no petechial hemorrhages [Skin Color & Pigmentation] : normal skin color and pigmentation [] : no rash [No Venous Stasis] : no venous stasis [Skin Lesions] : no skin lesions [No Skin Ulcers] : no skin ulcer [No Xanthoma] : no  xanthoma was observed [Oriented To Time, Place, And Person] : oriented to person, place, and time [Affect] : the affect was normal [No Anxiety] : not feeling anxious [Mood] : the mood was normal

## 2021-07-08 DIAGNOSIS — C61 PROSTATE CANCER (HCC): ICD-10-CM

## 2021-07-10 LAB — PSA SERPL-MCNC: <0.014 NG/ML (ref 0–4)

## 2021-07-15 NOTE — PROGRESS NOTES
Subjective    Mr. Lubin is 66 y.o. male    Chief Complaint: Prostate Cancer.     History of Present Illness  Prostate Cancer  Pt. presents with history of prostate cancer diagnosed in July 2015. Current disease state ishigh risk disease Pt. underwent robot assisted laparoscpic prostatectomy and external beam radiotherapy Path showing T3b 7 adenocarcinoma of prostate with Negative surgical margins. Patient has received XRT completed in May 2016 .He has completed Lupron x 2 years management of prostate cancer. Pt. having 1 ppd (security) incontinence. Associated voiding symptoms include frequency, urgency, nocturia AUA score 8/35 . Pt. Is having erectile dysfunction thatdoes not to PDE5 inhibitors. There has been no bone pain, weight loss, abdominal pain, back pain, or gross hematuria. Hot flashes improving       Lab Results   Component Value Date    PSA <0.014 07/09/2021    PSA <0.014 01/11/2021    PSA <0.014 06/23/2020         The following portions of the patient's history were reviewed and updated as appropriate: allergies, current medications, past family history, past medical history, past social history, past surgical history and problem list.    Review of Systems   Constitutional: Negative for chills and fever.   Gastrointestinal: Negative for abdominal pain, anal bleeding and blood in stool.   Genitourinary: Positive for frequency and urgency. Negative for dysuria and hematuria.         Current Outpatient Medications:   •  amoxicillin-clavulanate (AUGMENTIN) 875-125 MG per tablet, , Disp: , Rfl:   •  aspirin (aspirin) 81 MG EC tablet, Take 1 tablet by mouth Daily., Disp:  , Rfl:   •  atorvastatin (LIPITOR) 20 MG tablet, Take 20 mg by mouth Daily., Disp: , Rfl:   •  calcium carbonate (OS-NASIMA) 600 MG tablet, Take 600 mg by mouth Daily., Disp: , Rfl:   •  losartan-hydrochlorothiazide (HYZAAR) 100-25 MG per tablet, Take 1 tablet by mouth Daily., Disp: , Rfl:   •  niacin 500 MG tablet, Take 500 mg by mouth Every  "Night., Disp: , Rfl:   •  temazepam (RESTORIL) 30 MG capsule, TAKE ONE CAPSULE BY MOUTH AT BEDTIME AS NEEDED FOR SLEEP, Disp: , Rfl: 2  •  TOPROL XL 50 MG 24 hr tablet, Take 50 mg by mouth Daily., Disp: , Rfl:     Past Medical History:   Diagnosis Date   • CAD (coronary artery disease)    • History of external beam radiation therapy 05/12/2016    6840 cGy to prostate bed   • Hyperlipidemia    • Hypertension    • Prostate cancer (CMS/HCC)        Past Surgical History:   Procedure Laterality Date   • CARDIAC CATHETERIZATION     • CORONARY ANGIOPLASTY WITH STENT PLACEMENT     • PROSTATE SURGERY     • TONSILLECTOMY         Social History     Socioeconomic History   • Marital status: Single     Spouse name: Not on file   • Number of children: Not on file   • Years of education: Not on file   • Highest education level: Not on file   Tobacco Use   • Smoking status: Never Smoker   • Smokeless tobacco: Never Used   Vaping Use   • Vaping Use: Never used   Substance and Sexual Activity   • Alcohol use: Yes     Comment: very little    • Drug use: No   • Sexual activity: Defer       Family History   Problem Relation Age of Onset   • Coronary artery disease Mother    • Stroke Mother        Objective    Temp 97.4 °F (36.3 °C) (Temporal)   Ht 177.8 cm (70\")   Wt 93.5 kg (206 lb 3.2 oz)   BMI 29.59 kg/m²     Physical Exam        Results for orders placed or performed in visit on 07/16/21   POC Urinalysis Dipstick, Multipro    Specimen: Urine   Result Value Ref Range    Color Yellow Yellow, Straw, Dark Yellow, Ying    Clarity, UA Clear Clear    Glucose, UA Negative Negative, 1000 mg/dL (3+) mg/dL    Bilirubin Negative Negative    Ketones, UA Negative Negative    Specific Gravity  1.020 1.005 - 1.030    Blood, UA Trace (A) Negative    pH, Urine 5.5 5.0 - 8.0    Protein, POC 1+ (A) Negative mg/dL    Urobilinogen, UA Normal Normal    Nitrite, UA Negative Negative    Leukocytes Negative Negative     Assessment and Plan    Diagnoses " and all orders for this visit:    1. Prostate cancer (CMS/HCC) (Primary)  -     POC Urinalysis Dipstick, Multipro  -     PSA DIAGNOSTIC; Future    2. Impotence of organic origin    3. Hot flashes    Patient underwent RALP on 7/28/15. His pretreatment PSA was 7.7. His pathology revealed Ryann 3+4 = 7 adenocarcinoma the prostate. Pathology showed [id number] with negative surgical margins. His posttreatment PSA was 0.1 he completed XRT in May 2016 his PSA in September 2017 lavonne to 0.3 and he was started on 2 years of ADT which he completed March 2019.         His PSA is undetectable today he will follow-up in 6 months with PSA.

## 2021-07-16 ENCOUNTER — OFFICE VISIT (OUTPATIENT)
Dept: UROLOGY | Facility: CLINIC | Age: 66
End: 2021-07-16

## 2021-07-16 VITALS — TEMPERATURE: 97.4 F | BODY MASS INDEX: 29.52 KG/M2 | WEIGHT: 206.2 LBS | HEIGHT: 70 IN

## 2021-07-16 DIAGNOSIS — N52.9 IMPOTENCE OF ORGANIC ORIGIN: ICD-10-CM

## 2021-07-16 DIAGNOSIS — C61 PROSTATE CANCER (HCC): Primary | ICD-10-CM

## 2021-07-16 DIAGNOSIS — R23.2 HOT FLASHES: ICD-10-CM

## 2021-07-16 LAB
BILIRUB BLD-MCNC: NEGATIVE MG/DL
CLARITY, POC: CLEAR
COLOR UR: YELLOW
GLUCOSE UR STRIP-MCNC: NEGATIVE MG/DL
KETONES UR QL: NEGATIVE
LEUKOCYTE EST, POC: NEGATIVE
NITRITE UR-MCNC: NEGATIVE MG/ML
PH UR: 5.5 [PH] (ref 5–8)
PROT UR STRIP-MCNC: ABNORMAL MG/DL
RBC # UR STRIP: ABNORMAL /UL
SP GR UR: 1.02 (ref 1–1.03)
UROBILINOGEN UR QL: NORMAL

## 2021-07-16 PROCEDURE — 99213 OFFICE O/P EST LOW 20 MIN: CPT | Performed by: UROLOGY

## 2021-07-16 PROCEDURE — 81003 URINALYSIS AUTO W/O SCOPE: CPT | Performed by: UROLOGY

## 2021-07-16 RX ORDER — AMOXICILLIN AND CLAVULANATE POTASSIUM 875; 125 MG/1; MG/1
TABLET, FILM COATED ORAL
COMMUNITY
Start: 2021-07-14 | End: 2021-12-22

## 2021-12-22 ENCOUNTER — OFFICE VISIT (OUTPATIENT)
Dept: CARDIOLOGY | Facility: CLINIC | Age: 66
End: 2021-12-22

## 2021-12-22 VITALS
OXYGEN SATURATION: 98 % | HEIGHT: 70 IN | HEART RATE: 77 BPM | RESPIRATION RATE: 18 BRPM | WEIGHT: 211 LBS | SYSTOLIC BLOOD PRESSURE: 148 MMHG | DIASTOLIC BLOOD PRESSURE: 80 MMHG | BODY MASS INDEX: 30.21 KG/M2

## 2021-12-22 DIAGNOSIS — E66.2 CLASS 1 OBESITY WITH ALVEOLAR HYPOVENTILATION, SERIOUS COMORBIDITY, AND BODY MASS INDEX (BMI) OF 30.0 TO 30.9 IN ADULT (HCC): Chronic | ICD-10-CM

## 2021-12-22 DIAGNOSIS — E78.2 MIXED HYPERLIPIDEMIA: Chronic | ICD-10-CM

## 2021-12-22 DIAGNOSIS — I42.1 HOCM (HYPERTROPHIC OBSTRUCTIVE CARDIOMYOPATHY) (HCC): Chronic | ICD-10-CM

## 2021-12-22 DIAGNOSIS — I10 ESSENTIAL HYPERTENSION: Chronic | ICD-10-CM

## 2021-12-22 DIAGNOSIS — I25.10 CORONARY ARTERY DISEASE INVOLVING NATIVE CORONARY ARTERY OF NATIVE HEART WITHOUT ANGINA PECTORIS: Primary | Chronic | ICD-10-CM

## 2021-12-22 PROBLEM — E66.811 CLASS 1 OBESITY WITH ALVEOLAR HYPOVENTILATION, SERIOUS COMORBIDITY, AND BODY MASS INDEX (BMI) OF 30.0 TO 30.9 IN ADULT: Status: ACTIVE | Noted: 2021-12-22

## 2021-12-22 PROBLEM — E66.811 CLASS 1 OBESITY WITH ALVEOLAR HYPOVENTILATION, SERIOUS COMORBIDITY, AND BODY MASS INDEX (BMI) OF 30.0 TO 30.9 IN ADULT: Chronic | Status: ACTIVE | Noted: 2021-12-22

## 2021-12-22 PROCEDURE — 99214 OFFICE O/P EST MOD 30 MIN: CPT | Performed by: NURSE PRACTITIONER

## 2021-12-22 PROCEDURE — 93000 ELECTROCARDIOGRAM COMPLETE: CPT | Performed by: NURSE PRACTITIONER

## 2021-12-22 RX ORDER — PANTOPRAZOLE SODIUM 40 MG/1
40 TABLET, DELAYED RELEASE ORAL DAILY
COMMUNITY

## 2021-12-22 RX ORDER — FERROUS SULFATE 325(65) MG
325 TABLET ORAL 2 TIMES DAILY
COMMUNITY
End: 2022-03-02

## 2021-12-22 NOTE — PATIENT INSTRUCTIONS
Hypertension, Adult  Hypertension is another name for high blood pressure. High blood pressure forces your heart to work harder to pump blood. This can cause problems over time.  There are two numbers in a blood pressure reading. There is a top number (systolic) over a bottom number (diastolic). It is best to have a blood pressure that is below 120/80. Healthy choices can help lower your blood pressure, or you may need medicine to help lower it.  What are the causes?  The cause of this condition is not known. Some conditions may be related to high blood pressure.  What increases the risk?  · Smoking.  · Having type 2 diabetes mellitus, high cholesterol, or both.  · Not getting enough exercise or physical activity.  · Being overweight.  · Having too much fat, sugar, calories, or salt (sodium) in your diet.  · Drinking too much alcohol.  · Having long-term (chronic) kidney disease.  · Having a family history of high blood pressure.  · Age. Risk increases with age.  · Race. You may be at higher risk if you are .  · Gender. Men are at higher risk than women before age 45. After age 65, women are at higher risk than men.  · Having obstructive sleep apnea.  · Stress.  What are the signs or symptoms?  · High blood pressure may not cause symptoms. Very high blood pressure (hypertensive crisis) may cause:  ? Headache.  ? Feelings of worry or nervousness (anxiety).  ? Shortness of breath.  ? Nosebleed.  ? A feeling of being sick to your stomach (nausea).  ? Throwing up (vomiting).  ? Changes in how you see.  ? Very bad chest pain.  ? Seizures.  How is this treated?  · This condition is treated by making healthy lifestyle changes, such as:  ? Eating healthy foods.  ? Exercising more.  ? Drinking less alcohol.  · Your health care provider may prescribe medicine if lifestyle changes are not enough to get your blood pressure under control, and if:  ? Your top number is above 130.  ? Your bottom number is above  80.  · Your personal target blood pressure may vary.  Follow these instructions at home:  Eating and drinking    · If told, follow the DASH eating plan. To follow this plan:  ? Fill one half of your plate at each meal with fruits and vegetables.  ? Fill one fourth of your plate at each meal with whole grains. Whole grains include whole-wheat pasta, brown rice, and whole-grain bread.  ? Eat or drink low-fat dairy products, such as skim milk or low-fat yogurt.  ? Fill one fourth of your plate at each meal with low-fat (lean) proteins. Low-fat proteins include fish, chicken without skin, eggs, beans, and tofu.  ? Avoid fatty meat, cured and processed meat, or chicken with skin.  ? Avoid pre-made or processed food.  · Eat less than 1,500 mg of salt each day.  · Do not drink alcohol if:  ? Your doctor tells you not to drink.  ? You are pregnant, may be pregnant, or are planning to become pregnant.  · If you drink alcohol:  ? Limit how much you use to:  § 0-1 drink a day for women.  § 0-2 drinks a day for men.  ? Be aware of how much alcohol is in your drink. In the [place] one drink equals one 12 oz bottle of beer (355 mL), one 5 oz glass of wine (148 mL), or one 1½ oz glass of hard liquor (44 mL).    Lifestyle    · Work with your doctor to stay at a healthy weight or to lose weight. Ask your doctor what the best weight is for you.  · Get at least 30 minutes of exercise most days of the week. This may include walking, swimming, or biking.  · Get at least 30 minutes of exercise that strengthens your muscles (resistance exercise) at least 3 days a week. This may include lifting weights or doing Pilates.  · Do not use any products that contain nicotine or tobacco, such as cigarettes, e-cigarettes, and chewing tobacco. If you need help quitting, ask your doctor.  · Check your blood pressure at home as told by your doctor.  · Keep all follow-up visits as told by your doctor. This is important.    Medicines  · Take  over-the-counter and prescription medicines only as told by your doctor. Follow directions carefully.  · Do not skip doses of blood pressure medicine. The medicine does not work as well if you skip doses. Skipping doses also puts you at risk for problems.  · Ask your doctor about side effects or reactions to medicines that you should watch for.  Contact a doctor if you:  · Think you are having a reaction to the medicine you are taking.  · Have headaches that keep coming back (recurring).  · Feel dizzy.  · Have swelling in your ankles.  · Have trouble with your vision.  Get help right away if you:  · Get a very bad headache.  · Start to feel mixed up (confused).  · Feel weak or numb.  · Feel faint.  · Have very bad pain in your:  ? Chest.  ? Belly (abdomen).  · Throw up more than once.  · Have trouble breathing.  Summary  · Hypertension is another name for high blood pressure.  · High blood pressure forces your heart to work harder to pump blood.  · For most people, a normal blood pressure is less than 120/80.  · Making healthy choices can help lower blood pressure. If your blood pressure does not get lower with healthy choices, you may need to take medicine.  This information is not intended to replace advice given to you by your health care provider. Make sure you discuss any questions you have with your health care provider.  Document Revised: 08/28/2019 Document Reviewed: 08/28/2019  [employer] Patient Education © 2021 [employer]  Coronary Artery Disease, Male  Coronary artery disease (CAD) is a condition in which the arteries that lead to the heart (coronary arteries) become narrow or blocked. The narrowing or blockage can lead to decreased blood flow to the heart. Prolonged reduced blood flow can cause a heart attack (myocardial infarction or MI). This condition may also be called coronary heart disease.  Because CAD is the leading cause of death in men, it is important to understand what causes this condition  and how it is treated.  What are the causes?  CAD is most often caused by atherosclerosis. This is the buildup of fat and cholesterol (plaque) on the inside of the arteries. Over time, the plaque may narrow or block the artery, reducing blood flow to the heart. Plaque can also become weak and break off within a coronary artery and cause a sudden blockage. Other less common causes of CAD include:  · A blood clot or a piece of a blood clot or other substance that blocks the flow of blood in a coronary artery (embolism).  · A tearing of the artery (spontaneous coronary artery dissection).  · An enlargement of an artery (aneurysm).  · Inflammation (vasculitis) in the artery wall.  What increases the risk?  The following factors may make you more likely to develop this condition:  · Age. Men over age 45 are at a greater risk of CAD.  · Family history of CAD.  · Gender. Men often develop CAD earlier in life than women.  · High blood pressure (hypertension).  · Diabetes.  · High cholesterol levels.  · Tobacco use.  · Excessive alcohol use.  · Lack of exercise.  · A diet high in saturated and trans fats, such as fried food and processed meat.  Other possible risk factors include:  · High stress levels.  · Depression.  · Obesity.  · Sleep apnea.  What are the signs or symptoms?  Many people do not have any symptoms during the early stages of CAD. As the condition progresses, symptoms may include:  · Chest pain (angina). The pain can:  ? Feel like crushing or squeezing, or like a tightness, pressure, fullness, or heaviness in the chest.  ? Last more than a few minutes or can stop and recur. The pain tends to get worse with exercise or stress and to fade with rest.  · Pain in the arms, neck, jaw, ear, or back.  · Unexplained heartburn or indigestion.  · Shortness of breath.  · Nausea or vomiting.  · Sudden light-headedness.  · Sudden cold sweats.  · Fluttering or fast heartbeat (palpitations).  How is this diagnosed?  This  condition is diagnosed based on:  · Your family and medical history.  · A physical exam.  · Tests, including:  ? A test to check the electrical signals in your heart (electrocardiogram).  ? Exercise stress test. This looks for signs of blockage when the heart is stressed with exercise, such as running on a treadmill.  ? Pharmacologic stress test. This test looks for signs of blockage when the heart is being stressed with a medicine.  ? Blood tests.  ? Coronary angiogram. This is a procedure to look at the coronary arteries to see if there is any blockage. During this test, a dye is injected into your arteries so they appear on an X-ray.  ? Coronary artery CT scan. This CT scan helps detect calcium deposits in your coronary arteries. Calcium deposits are an indicator of CAD.  ? A test that uses sound waves to take a picture of your heart (echocardiogram).  ? Chest X-ray.  How is this treated?  This condition may be treated by:  · Healthy lifestyle changes to reduce risk factors.  · Medicines such as:  ? Antiplatelet medicines and blood-thinning medicines, such as aspirin. These help to prevent blood clots.  ? Nitroglycerin.  ? Blood pressure medicines.  ? Cholesterol-lowering medicine.  · Coronary angioplasty and stenting. During this procedure, a thin, flexible tube is inserted through a blood vessel and into a blocked artery. A balloon or similar device on the end of the tube is inflated to open up the artery. In some cases, a small, mesh tube (stent) is inserted into the artery to keep it open.  · Coronary artery bypass surgery. During this surgery, veins or arteries from other parts of the body are used to create a bypass around the blockage and allow blood to reach your heart.  Follow these instructions at home:  Medicines  · Take over-the-counter and prescription medicines only as told by your health care provider.  · Do not take the following medicines unless your health care provider approves:  ? NSAIDs, such  as ibuprofen, naproxen, or celecoxib.  ? Vitamin supplements that contain vitamin A, vitamin E, or both.  Lifestyle  · Follow an exercise program approved by your health care provider. Aim for 150 minutes of moderate exercise or 75 minutes of vigorous exercise each week.  · Maintain a healthy weight or lose weight as approved by your health care provider.  · Learn to manage stress or try to limit your stress. Ask your health care provider for suggestions if you need help.  · Get screened for depression and seek treatment, if needed.  · Do not use any products that contain nicotine or tobacco, such as cigarettes, e-cigarettes, and chewing tobacco. If you need help quitting, ask your health care provider.  · Do not use illegal drugs.  Eating and drinking    · Follow a heart-healthy diet. A dietitian can help educate you about healthy food options and changes. In general, eat plenty of fruits and vegetables, lean meats, and whole grains.  · Avoid foods high in:  ? Sugar.  ? Salt (sodium).  ? Saturated fat, such as processed or fatty meat.  ? Trans fat, such as fried foods.  · Use healthy cooking methods such as roasting, grilling, broiling, baking, poaching, steaming, or stir-frying.  · Do not drink alcohol if your health care provider tells you not to drink.  · If you drink alcohol:  ? Limit how much you have to 0-2 drinks per day.  ? Be aware of how much alcohol is in your drink. In the [place] one drink equals one 12 oz bottle of beer (355 mL), one 5 oz glass of wine (148 mL), or one 1½ oz glass of hard liquor (44 mL).    General instructions  · Manage any other health conditions, such as hypertension and diabetes. These conditions affect your heart.  · Your health care provider may ask you to monitor your blood pressure. Ideally, your blood pressure should be below 130/80.  · Keep all follow-up visits as told by your health care provider. This is important.  Get help right away if:  · You have pain in your chest,  neck, ear, arm, jaw, stomach, or back that:  ? Lasts more than a few minutes.  ? Is recurring.  ? Is not relieved by taking medicine under your tongue (sublingual nitroglycerin).  · You have profuse sweating without cause.  · You have unexplained:  ? Heartburn or indigestion.  ? Shortness of breath or difficulty breathing.  ? Fluttering or fast heartbeat (palpitations).  ? Nausea or vomiting.  ? Fatigue.  ? Feelings of nervousness or anxiety.  ? Weakness.  ? Diarrhea.  · You have sudden light-headedness or dizziness.  · You faint.  · You feel like hurting yourself or think about taking your own life.  These symptoms may represent a serious problem that is an emergency. Do not wait to see if the symptoms will go away. Get medical help right away. Call your local emergency services (911 in the [place] Do not drive yourself to the hospital.  Summary  · Coronary artery disease (CAD) is a condition in which the arteries that lead to the heart (coronary arteries) become narrow or blocked. The narrowing or blockage can lead to a heart attack.  · Many people do not have any symptoms during the early stages of CAD.  · CAD can be treated with lifestyle changes, medicines, surgery, or a combination of these treatments.  This information is not intended to replace advice given to you by your health care provider. Make sure you discuss any questions you have with your health care provider.  Document Revised: 09/06/2019 Document Reviewed: 08/27/2019  [employer] Patient Education © 2021 [employer]

## 2021-12-22 NOTE — PROGRESS NOTES
Subjective:     Encounter Date:12/22/2021      Patient ID: Dayron Lubin is a 66 y.o. male with known coronary artery disease status post PCI to the right coronary artery in 2009, hypertrophic obstructive cardiomyopathy, hypertension, hyperlipidemia, and obesity.  He presents today for follow up.     Chief Complaint: CAD Follow-up  Coronary Artery Disease  Presents for follow-up visit. Pertinent negatives include no chest pain, chest pressure, chest tightness, dizziness, leg swelling, palpitations or shortness of breath. Risk factors include hyperlipidemia and obesity. The symptoms have been stable. Compliance with diet is good. Compliance with exercise is good. Compliance with medications is good.   Hypertension  This is a chronic problem. The current episode started more than 1 year ago. The problem is controlled. Pertinent negatives include no chest pain, headaches, malaise/fatigue, orthopnea, palpitations, peripheral edema, PND or shortness of breath. Risk factors for coronary artery disease include dyslipidemia, obesity and male gender. Current antihypertension treatment includes diuretics, beta blockers and angiotensin blockers. The current treatment provides significant improvement. There are no compliance problems.  Hypertensive end-organ damage includes CAD/MI. There is no history of heart failure.   Hyperlipidemia  This is a chronic problem. The current episode started more than 1 year ago. The problem is controlled. Recent lipid tests were reviewed and are normal (LDL 80.). Exacerbating diseases include obesity. He has no history of diabetes. Factors aggravating his hyperlipidemia include beta blockers. Pertinent negatives include no chest pain, focal weakness, myalgias or shortness of breath. Current antihyperlipidemic treatment includes statins. The current treatment provides significant improvement of lipids. There are no compliance problems.  Risk factors for coronary artery disease include male  sex, obesity, hypertension and dyslipidemia.     Mr. Lubin presents to the office today for routine follow-up.  He denies any symptoms from his previous visit last year.  He continues to follow routinely with his primary care physician, Dr. Herberth Nguyen.  His most recent cholesterol was from October 2021.  His LDL was 80.  He is on atorvastatin 20 mg daily.  He is due for repeat labs around March or April.  We have discussed increase of his atorvastatin if LDL is not to goal, less than 70.  He denies any chest pain, chest pressure, chest discomfort.  He denies any significant shortness of breath or dyspnea on exertion.  He reports a well-controlled blood pressure and is compliant with his daily home medications.  Overall, he is feeling well without any complaints.    The following portions of the patient's history were reviewed and updated as appropriate: allergies, current medications, past family history, past medical history, past social history and past surgical history.    No Known Allergies       Current Outpatient Medications:   •  aspirin (aspirin) 81 MG EC tablet, Take 1 tablet by mouth Daily., Disp:  , Rfl:   •  atorvastatin (LIPITOR) 20 MG tablet, Take 20 mg by mouth Daily., Disp: , Rfl:   •  ferrous sulfate 325 (65 FE) MG tablet, Take 325 mg by mouth 2 (Two) Times a Day., Disp: , Rfl:   •  losartan-hydrochlorothiazide (HYZAAR) 100-25 MG per tablet, Take 1 tablet by mouth Daily., Disp: , Rfl:   •  niacin 500 MG tablet, Take 500 mg by mouth Every Night., Disp: , Rfl:   •  pantoprazole (PROTONIX) 40 MG EC tablet, Take 40 mg by mouth Daily., Disp: , Rfl:   •  temazepam (RESTORIL) 30 MG capsule, TAKE ONE CAPSULE BY MOUTH AT BEDTIME AS NEEDED FOR SLEEP, Disp: , Rfl: 2  •  TOPROL XL 50 MG 24 hr tablet, Take 50 mg by mouth Daily., Disp: , Rfl:     Review of Systems   Constitutional: Negative for fever and malaise/fatigue.   HENT: Negative for nosebleeds.    Cardiovascular: Negative for chest pain, dyspnea on  "exertion, irregular heartbeat, leg swelling, near-syncope, orthopnea, palpitations, paroxysmal nocturnal dyspnea and syncope.   Respiratory: Negative for chest tightness, cough, shortness of breath, sputum production and wheezing.    Hematologic/Lymphatic: Negative for bleeding problem. Does not bruise/bleed easily.   Musculoskeletal: Negative for myalgias.   Gastrointestinal: Negative for bloating, abdominal pain, nausea and vomiting.   Neurological: Negative for dizziness, focal weakness, headaches, light-headedness and weakness.   Psychiatric/Behavioral: Negative for altered mental status.         ECG 12 Lead    Date/Time: 12/22/2021 11:40 AM  Performed by: Edda Li APRN  Authorized by: Edda Li APRN   Comparison: compared with previous ECG from 12/23/2020  Similar to previous ECG  Comparison to previous ECG: This EKG is consistent with previous EKGs even dating back into 2013  Rhythm: sinus rhythm  Rate: normal  BPM: 77  T inversion: I, aVL, V2, V3, V5 and V6  QRS axis: normal  Other findings: T wave abnormality    Clinical impression: abnormal EKG          /80 (BP Location: Right arm, Patient Position: Sitting, Cuff Size: Adult)   Pulse 77   Resp 18   Ht 177.8 cm (70\")   Wt 95.7 kg (211 lb)   SpO2 98%   BMI 30.28 kg/m²        Objective:     Vitals reviewed.   Constitutional:       Appearance: Not in distress. Chronically ill-appearing.   Pulmonary:      Effort: Pulmonary effort is normal.      Breath sounds: Normal breath sounds.   Cardiovascular:      Normal rate. Regular rhythm.      Murmurs: There is a systolic murmur.      No gallop. No rub.   Edema:     Peripheral edema absent.   Abdominal:      General: There is no distension.      Palpations: Abdomen is soft.      Tenderness: There is no abdominal tenderness.   Musculoskeletal: Normal range of motion.      Cervical back: Normal range of motion and neck supple. Skin:     General: Skin is warm and dry.   Neurological:      " Mental Status: Alert and oriented to person, place and time.   Psychiatric:         Attention and Perception: Attention normal.         Speech: Speech normal.         Behavior: Behavior normal. Behavior is cooperative.         Cognition and Memory: Cognition normal.       Lab Review:   Results for orders placed during the hospital encounter of 12/18/19    Adult Transthoracic Echo Complete W/ Cont if Necessary Per Protocol    Interpretation Summary  · Left ventricular systolic function is normal. Estimated EF appears to be in the range of 61 - 65%.  · Left ventricular wall thickness is consistent with moderate concentric hypertrophy.  · Mid left ventricular cavitary gradient is present.  · The findings are consistent with obstructive, hypertrophic cardiomyopathy.  · Left ventricular diastolic dysfunction (grade I) consistent with impaired relaxation.  · No hemodynamically significant valvular abnormality identified on the study.  · Estimated right ventricular systolic pressure from tricuspid regurgitation is markedly elevated (>55 mmHg).          Assessment:          Diagnosis Plan   1. Coronary artery disease involving native coronary artery of native heart without angina pectoris     2. Essential hypertension     3. Mixed hyperlipidemia     4. HOCM (hypertrophic obstructive cardiomyopathy) ([facility])     5. Class 1 obesity with alveolar hypoventilation, serious comorbidity, and body mass index (BMI) of 30.0 to 30.9 in adult ([facility])            Plan:       -Coronary artery disease: Stable.  Previous PCI in October 2009 with 3.5 x 13 mm stent placement to the RCA.  He is currently managed on aspirin, beta blocker, and statin therapies.  He has chronic T wave inversion dating back to 2013.  The patient is completely asymptomatic at this time.    -Essential hypertension: Stable.  His blood pressure is well controlled on current medications.     -Hyperlipidemia: The patient is on statin therapy with atorvastatin 20 mg daily.   His lab work is monitored through his primary care physician's office.  No recent lipid panel is available for me to review.  His LDL goal would be less than 70 given his history of CAD.  Most recent LDL, 80.  We discussed increase of atorvastatin to 40 mg daily.  He will have his most recent lipids that he is due to get at the beginning of the year faxed to our office for further evaluation and recommendations of medication changes will be made at that time.    -Hypertrophic obstructive cardiomyopathy: The patient has a previous history of this based on echocardiographic findings.  His most recent echocardiogram as noted above.  The patient denies any lightheadedness, dizziness, near-syncope, syncope.  We have discussed importance of staying hydrated.  The patient is on beta-blocker therapies.  Of note, the patient is on losartan and HCTZ however has been on this medication for some time and tolerates it well.  If the patient develops significant symptoms that are thought to be resulting from his HOCM consider transition from ARB/diuretic therapy to calcium channel blocker for management of blood pressure or increase of his Toprol-XL.    Continue current medications.  Follow-up in 12 months.  Call sooner if needed.          Advance Care Planning   ACP discussion was held with the patient during this visit. Patient does not have an advance directive, information provided.

## 2022-01-14 NOTE — PROGRESS NOTES
Subjective    Mr. Lubin is 66 y.o. male    Chief Complaint: Prostate Cancer    History of Present Illness  Prostate Cancer  Pt. presents with history of prostate cancer diagnosed in July 2015. Current disease state ishigh risk disease Pt. underwent robot assisted laparoscpic prostatectomy and external beam radiotherapy Path showing T3b 7 adenocarcinoma of prostate with Negative surgical margins. Patient has received XRT completed in May 2016 .He has completed Lupron x 2 years management of prostate cancer. Pt. having 1 ppd (security) incontinence. Associated voiding symptoms include frequency, urgency, nocturia AUA score 13/35 . Pt. Is having erectile dysfunction thatdoes not to PDE5 inhibitors. There has been no bone pain, weight loss, abdominal pain, back pain, or gross hematuria. Hot flashes resolved    Lab Results   Component Value Date    PSA <0.014 01/10/2022    PSA <0.014 07/09/2021    PSA <0.014 01/11/2021         The following portions of the patient's history were reviewed and updated as appropriate: allergies, current medications, past family history, past medical history, past social history, past surgical history and problem list.    Review of Systems   Constitutional: Negative for chills and fever.   Gastrointestinal: Negative for abdominal pain, anal bleeding and blood in stool.   Genitourinary: Positive for frequency and urgency. Negative for dysuria and hematuria.         Current Outpatient Medications:   •  aspirin (aspirin) 81 MG EC tablet, Take 1 tablet by mouth Daily., Disp:  , Rfl:   •  atorvastatin (LIPITOR) 20 MG tablet, Take 20 mg by mouth Daily., Disp: , Rfl:   •  ferrous sulfate 325 (65 FE) MG tablet, Take 325 mg by mouth 2 (Two) Times a Day., Disp: , Rfl:   •  losartan-hydrochlorothiazide (HYZAAR) 100-25 MG per tablet, Take 1 tablet by mouth Daily., Disp: , Rfl:   •  niacin 500 MG tablet, Take 500 mg by mouth Every Night., Disp: , Rfl:   •  pantoprazole (PROTONIX) 40 MG EC tablet, Take 40  "mg by mouth Daily., Disp: , Rfl:   •  temazepam (RESTORIL) 30 MG capsule, TAKE ONE CAPSULE BY MOUTH AT BEDTIME AS NEEDED FOR SLEEP, Disp: , Rfl: 2  •  TOPROL XL 50 MG 24 hr tablet, Take 50 mg by mouth Daily., Disp: , Rfl:     Past Medical History:   Diagnosis Date   • CAD (coronary artery disease)    • History of external beam radiation therapy 05/12/2016    6840 cGy to prostate bed   • Hyperlipidemia    • Hypertension    • Prostate cancer (HCC)        Past Surgical History:   Procedure Laterality Date   • CARDIAC CATHETERIZATION     • CORONARY ANGIOPLASTY WITH STENT PLACEMENT     • PROSTATE SURGERY     • TONSILLECTOMY         Social History     Socioeconomic History   • Marital status: Single   Tobacco Use   • Smoking status: Never Smoker   • Smokeless tobacco: Never Used   Vaping Use   • Vaping Use: Never used   Substance and Sexual Activity   • Alcohol use: Yes     Comment: very little    • Drug use: No   • Sexual activity: Defer       Family History   Problem Relation Age of Onset   • Coronary artery disease Mother    • Stroke Mother        Objective    Temp 97.7 °F (36.5 °C) (Temporal)   Ht 180.3 cm (71\")   Wt 97 kg (213 lb 12.8 oz)   BMI 29.82 kg/m²     Physical Exam        Results for orders placed or performed in visit on 01/17/22   POC Urinalysis Dipstick, Multipro    Specimen: Urine   Result Value Ref Range    Color Yellow Yellow, Straw, Dark Yellow, Ying    Clarity, UA Clear Clear    Glucose, UA Negative Negative, 1000 mg/dL (3+) mg/dL    Bilirubin Negative Negative    Ketones, UA Negative Negative    Specific Gravity  1.025 1.005 - 1.030    Blood, UA Trace (A) Negative    pH, Urine 5.5 5.0 - 8.0    Protein, POC 30 mg/dL (A) Negative mg/dL    Urobilinogen, UA Normal Normal    Nitrite, UA Negative Negative    Leukocytes Negative Negative     Assessment and Plan    Diagnoses and all orders for this visit:    1. Prostate cancer (HCC) (Primary)  -     POC Urinalysis Dipstick, Multipro  -     PSA " DIAGNOSTIC; Future    2. Urinary incontinence, male, stress    3. Impotence of organic origin    Patient underwent RALP on 7/28/15. His pretreatment PSA was 7.7. His pathology revealed Ryann 3+4 = 7 adenocarcinoma the prostate. Pathology showed [id number] with negative surgical margins. His posttreatment PSA was 0.1 he completed XRT in May 2016 his PSA in September 2017 lavonne to 0.3 and he was started on 2 years of ADT which he completed March 2019.         His PSA is undetectable today.  He will follow-up in 6 months with PSA.    1ppd REBECA.    Not interested in ED therapy.

## 2022-01-17 ENCOUNTER — OFFICE VISIT (OUTPATIENT)
Dept: UROLOGY | Facility: CLINIC | Age: 67
End: 2022-01-17

## 2022-01-17 VITALS — BODY MASS INDEX: 29.93 KG/M2 | TEMPERATURE: 97.7 F | WEIGHT: 213.8 LBS | HEIGHT: 71 IN

## 2022-01-17 DIAGNOSIS — C61 PROSTATE CANCER: Primary | ICD-10-CM

## 2022-01-17 DIAGNOSIS — N39.3 URINARY INCONTINENCE, MALE, STRESS: ICD-10-CM

## 2022-01-17 DIAGNOSIS — N52.9 IMPOTENCE OF ORGANIC ORIGIN: ICD-10-CM

## 2022-01-17 PROCEDURE — 99213 OFFICE O/P EST LOW 20 MIN: CPT | Performed by: UROLOGY

## 2022-01-17 PROCEDURE — 81003 URINALYSIS AUTO W/O SCOPE: CPT | Performed by: UROLOGY

## 2022-03-02 ENCOUNTER — HOSPITAL ENCOUNTER (OUTPATIENT)
Dept: GENERAL RADIOLOGY | Facility: HOSPITAL | Age: 67
Discharge: HOME | End: 2022-03-02
Admitting: NURSE PRACTITIONER

## 2022-03-02 PROCEDURE — 73610 X-RAY EXAM OF ANKLE: CPT

## 2022-03-03 ENCOUNTER — TELEPHONE (OUTPATIENT)
Dept: CARDIOLOGY | Facility: CLINIC | Age: 67
End: 2022-03-03

## 2022-03-03 NOTE — TELEPHONE ENCOUNTER
"In general, for urgent surgery, the patient would not necessarily need to undergo \"clearance \". He was evaluated in our office in December and found to be clinically stable. He does have coronary artery disease with previous PCI, therefore I would recommend he continue aspirin 81 mg daily. He also has a history of hypertrophic obstructive cardiomyopathy but was clinically stable at last check. So long as the patient remained stable, he would not need any further cardiac procedures prior to his urgent surgery.  "

## 2022-03-03 NOTE — TELEPHONE ENCOUNTER
Patient is needing cardiac clearance for urgent surgery of right fibula fracture repair. Takes 81 mg aspirin daily. Please advise.

## 2022-03-08 ENCOUNTER — HOSPITAL ENCOUNTER (OUTPATIENT)
Dept: PREADMISSION TESTING | Age: 67
Discharge: HOME | End: 2022-03-12
Payer: MEDICARE

## 2022-03-08 VITALS — BODY MASS INDEX: 30.1 KG/M2 | HEIGHT: 71 IN | WEIGHT: 215 LBS

## 2022-03-08 LAB
ANION GAP SERPL CALCULATED.3IONS-SCNC: 14 MMOL/L (ref 7–19)
BASOPHILS ABSOLUTE: 0 K/UL (ref 0–0.2)
BASOPHILS RELATIVE PERCENT: 0.3 % (ref 0–1)
BUN BLDV-MCNC: 22 MG/DL (ref 8–23)
CALCIUM SERPL-MCNC: 9.8 MG/DL (ref 8.8–10.2)
CHLORIDE BLD-SCNC: 99 MMOL/L (ref 98–111)
CO2: 26 MMOL/L (ref 22–29)
CREAT SERPL-MCNC: 0.9 MG/DL (ref 0.5–1.2)
EKG P AXIS: 62 DEGREES
EKG P-R INTERVAL: 182 MS
EKG Q-T INTERVAL: 422 MS
EKG QRS DURATION: 102 MS
EKG QTC CALCULATION (BAZETT): 446 MS
EKG T AXIS: -180 DEGREES
EOSINOPHILS ABSOLUTE: 0.1 K/UL (ref 0–0.6)
EOSINOPHILS RELATIVE PERCENT: 1.1 % (ref 0–5)
GFR AFRICAN AMERICAN: >59
GFR NON-AFRICAN AMERICAN: >60
GLUCOSE BLD-MCNC: 87 MG/DL (ref 74–109)
HCT VFR BLD CALC: 35.5 % (ref 42–52)
HEMOGLOBIN: 11.9 G/DL (ref 14–18)
IMMATURE GRANULOCYTES #: 0 K/UL
LYMPHOCYTES ABSOLUTE: 1.6 K/UL (ref 1.1–4.5)
LYMPHOCYTES RELATIVE PERCENT: 18.6 % (ref 20–40)
MCH RBC QN AUTO: 32.2 PG (ref 27–31)
MCHC RBC AUTO-ENTMCNC: 33.5 G/DL (ref 33–37)
MCV RBC AUTO: 95.9 FL (ref 80–94)
MONOCYTES ABSOLUTE: 0.8 K/UL (ref 0–0.9)
MONOCYTES RELATIVE PERCENT: 9.5 % (ref 0–10)
NEUTROPHILS ABSOLUTE: 6.1 K/UL (ref 1.5–7.5)
NEUTROPHILS RELATIVE PERCENT: 70 % (ref 50–65)
PDW BLD-RTO: 12.9 % (ref 11.5–14.5)
PLATELET # BLD: 169 K/UL (ref 130–400)
PMV BLD AUTO: 11.1 FL (ref 9.4–12.4)
POTASSIUM SERPL-SCNC: 3.9 MMOL/L (ref 3.5–5)
RBC # BLD: 3.7 M/UL (ref 4.7–6.1)
SARS-COV-2, PCR: NOT DETECTED
SODIUM BLD-SCNC: 139 MMOL/L (ref 136–145)
WBC # BLD: 8.7 K/UL (ref 4.8–10.8)

## 2022-03-08 PROCEDURE — U0005 INFEC AGEN DETEC AMPLI PROBE: HCPCS

## 2022-03-08 PROCEDURE — U0003 INFECTIOUS AGENT DETECTION BY NUCLEIC ACID (DNA OR RNA); SEVERE ACUTE RESPIRATORY SYNDROME CORONAVIRUS 2 (SARS-COV-2) (CORONAVIRUS DISEASE [COVID-19]), AMPLIFIED PROBE TECHNIQUE, MAKING USE OF HIGH THROUGHPUT TECHNOLOGIES AS DESCRIBED BY CMS-2020-01-R: HCPCS

## 2022-03-08 PROCEDURE — 85025 COMPLETE CBC W/AUTO DIFF WBC: CPT

## 2022-03-08 PROCEDURE — 93005 ELECTROCARDIOGRAM TRACING: CPT | Performed by: ORTHOPAEDIC SURGERY

## 2022-03-08 PROCEDURE — 80048 BASIC METABOLIC PNL TOTAL CA: CPT

## 2022-03-08 PROCEDURE — 93010 ELECTROCARDIOGRAM REPORT: CPT | Performed by: INTERNAL MEDICINE

## 2022-03-08 RX ORDER — HYDROCODONE BITARTRATE AND ACETAMINOPHEN 7.5; 325 MG/1; MG/1
1 TABLET ORAL EVERY 6 HOURS PRN
Status: ON HOLD | COMMUNITY
End: 2022-03-10 | Stop reason: HOSPADM

## 2022-03-08 RX ORDER — ASPIRIN 81 MG/1
81 TABLET ORAL DAILY
COMMUNITY

## 2022-03-09 PROBLEM — S82.61XA DISPLACED FRACTURE OF LATERAL MALLEOLUS OF RIGHT FIBULA, INITIAL ENCOUNTER FOR CLOSED FRACTURE: Status: ACTIVE | Noted: 2022-03-09

## 2022-03-09 NOTE — OP NOTE
mm Hg and total tourniquet time was <1 hour. Attention was turned to the lateral aspect of the ankle where a longitudinal incision was made along the posterolateral border of the fibula. Soft tissue was dissected down to the level of the fracture where soft tissue interposition and hematoma was removed. A provisional reduction was performed and stabilized with clamps and k-wires. A Synthes plate was inserted along the lateral aspect of the bone, a series of locking screws were placed distally and cortical screws proximally maintaining length, alignment, and rotation. C-arm images verified a near anatomic reduction. A dorsiflexion-external rotation stress test was performed verfiying the syndesmosis to be stable. Incisions were irrigated, closed in layers. The skin was closed with nylon sutures. A sterile dressing was placed followed by a well-padded short leg splint. The patient was awakened by anesthesia, transported to the recovery room in stable condition.     POSTOPERATIVE PLAN:  1) Discharge home with family  2) NWB operative leg  3) based on the stability of fixation and the patient's bone quality, non-weight bearing status will be for approximately 5 weeks     Electronically signed by Corby Gross MD on 3/10/2022 at 1:11 PM

## 2022-03-10 ENCOUNTER — ANESTHESIA (OUTPATIENT)
Dept: OPERATING ROOM | Age: 67
End: 2022-03-10
Payer: MEDICARE

## 2022-03-10 ENCOUNTER — ANESTHESIA EVENT (OUTPATIENT)
Dept: OPERATING ROOM | Age: 67
End: 2022-03-10
Payer: MEDICARE

## 2022-03-10 ENCOUNTER — APPOINTMENT (OUTPATIENT)
Dept: GENERAL RADIOLOGY | Age: 67
End: 2022-03-10
Attending: ORTHOPAEDIC SURGERY
Payer: MEDICARE

## 2022-03-10 ENCOUNTER — HOSPITAL ENCOUNTER (OUTPATIENT)
Age: 67
Setting detail: OUTPATIENT SURGERY
Discharge: HOME | End: 2022-03-10
Attending: ORTHOPAEDIC SURGERY | Admitting: ORTHOPAEDIC SURGERY
Payer: MEDICARE

## 2022-03-10 VITALS
OXYGEN SATURATION: 97 % | HEIGHT: 71 IN | TEMPERATURE: 96.8 F | RESPIRATION RATE: 16 BRPM | SYSTOLIC BLOOD PRESSURE: 171 MMHG | WEIGHT: 215 LBS | BODY MASS INDEX: 30.1 KG/M2 | DIASTOLIC BLOOD PRESSURE: 84 MMHG | HEART RATE: 78 BPM

## 2022-03-10 VITALS — OXYGEN SATURATION: 97 % | TEMPERATURE: 97 F | DIASTOLIC BLOOD PRESSURE: 74 MMHG | SYSTOLIC BLOOD PRESSURE: 155 MMHG

## 2022-03-10 DIAGNOSIS — S82.61XA DISPLACED FRACTURE OF LATERAL MALLEOLUS OF RIGHT FIBULA, INITIAL ENCOUNTER FOR CLOSED FRACTURE: Primary | ICD-10-CM

## 2022-03-10 PROCEDURE — 3600000004 HC SURGERY LEVEL 4 BASE: Performed by: ORTHOPAEDIC SURGERY

## 2022-03-10 PROCEDURE — 2720000010 HC SURG SUPPLY STERILE: Performed by: ORTHOPAEDIC SURGERY

## 2022-03-10 PROCEDURE — 6360000002 HC RX W HCPCS: Performed by: ANESTHESIOLOGY

## 2022-03-10 PROCEDURE — 2580000003 HC RX 258

## 2022-03-10 PROCEDURE — 7100000000 HC PACU RECOVERY - FIRST 15 MIN: Performed by: ORTHOPAEDIC SURGERY

## 2022-03-10 PROCEDURE — 3600000014 HC SURGERY LEVEL 4 ADDTL 15MIN: Performed by: ORTHOPAEDIC SURGERY

## 2022-03-10 PROCEDURE — 2500000003 HC RX 250 WO HCPCS

## 2022-03-10 PROCEDURE — C1713 ANCHOR/SCREW BN/BN,TIS/BN: HCPCS | Performed by: ORTHOPAEDIC SURGERY

## 2022-03-10 PROCEDURE — 7100000001 HC PACU RECOVERY - ADDTL 15 MIN: Performed by: ORTHOPAEDIC SURGERY

## 2022-03-10 PROCEDURE — 73610 X-RAY EXAM OF ANKLE: CPT

## 2022-03-10 PROCEDURE — 6360000002 HC RX W HCPCS

## 2022-03-10 PROCEDURE — 64445 NJX AA&/STRD SCIATIC NRV IMG: CPT

## 2022-03-10 PROCEDURE — 2709999900 HC NON-CHARGEABLE SUPPLY: Performed by: ORTHOPAEDIC SURGERY

## 2022-03-10 PROCEDURE — 7100000010 HC PHASE II RECOVERY - FIRST 15 MIN: Performed by: ORTHOPAEDIC SURGERY

## 2022-03-10 PROCEDURE — 3700000001 HC ADD 15 MINUTES (ANESTHESIA): Performed by: ORTHOPAEDIC SURGERY

## 2022-03-10 PROCEDURE — 6370000000 HC RX 637 (ALT 250 FOR IP): Performed by: ORTHOPAEDIC SURGERY

## 2022-03-10 PROCEDURE — 7100000011 HC PHASE II RECOVERY - ADDTL 15 MIN: Performed by: ORTHOPAEDIC SURGERY

## 2022-03-10 PROCEDURE — 2580000003 HC RX 258: Performed by: ANESTHESIOLOGY

## 2022-03-10 PROCEDURE — 3209999900 FLUORO FOR SURGICAL PROCEDURES

## 2022-03-10 PROCEDURE — 3700000000 HC ANESTHESIA ATTENDED CARE: Performed by: ORTHOPAEDIC SURGERY

## 2022-03-10 DEVICE — SCREW BNE L16MM DIA2.7MM CORT S STL ST LOK FULL THRD T8: Type: IMPLANTABLE DEVICE | Site: ANKLE | Status: FUNCTIONAL

## 2022-03-10 DEVICE — SCREW BNE L14MM DIA2.7MM CORT S STL ST LOK FULL THRD T8: Type: IMPLANTABLE DEVICE | Site: ANKLE | Status: FUNCTIONAL

## 2022-03-10 DEVICE — PLATE BNE L86MM 4 H R DST LAT FIBULAR S STL LOK COMPR FOR: Type: IMPLANTABLE DEVICE | Site: ANKLE | Status: FUNCTIONAL

## 2022-03-10 DEVICE — SCREW BNE L14MM DIA3.5MM CORT S STL ST NONCANNULATED LOK: Type: IMPLANTABLE DEVICE | Site: ANKLE | Status: FUNCTIONAL

## 2022-03-10 DEVICE — SCREW BNE L16MM DIA3.5MM CORT S STL ST NONCANNULATED LOK: Type: IMPLANTABLE DEVICE | Site: ANKLE | Status: FUNCTIONAL

## 2022-03-10 DEVICE — SCREW BNE L10MM DIA2.7MM CORT S STL ST LOK FULL THRD T8: Type: IMPLANTABLE DEVICE | Site: ANKLE | Status: FUNCTIONAL

## 2022-03-10 RX ORDER — HYDROMORPHONE HYDROCHLORIDE 1 MG/ML
0.25 INJECTION, SOLUTION INTRAMUSCULAR; INTRAVENOUS; SUBCUTANEOUS EVERY 5 MIN PRN
Status: DISCONTINUED | OUTPATIENT
Start: 2022-03-10 | End: 2022-03-10 | Stop reason: HOSPADM

## 2022-03-10 RX ORDER — DIPHENHYDRAMINE HYDROCHLORIDE 50 MG/ML
12.5 INJECTION INTRAMUSCULAR; INTRAVENOUS
Status: DISCONTINUED | OUTPATIENT
Start: 2022-03-10 | End: 2022-03-10 | Stop reason: HOSPADM

## 2022-03-10 RX ORDER — LIDOCAINE HYDROCHLORIDE 10 MG/ML
INJECTION, SOLUTION INFILTRATION; PERINEURAL PRN
Status: DISCONTINUED | OUTPATIENT
Start: 2022-03-10 | End: 2022-03-10 | Stop reason: SDUPTHER

## 2022-03-10 RX ORDER — SODIUM CHLORIDE, SODIUM LACTATE, POTASSIUM CHLORIDE, CALCIUM CHLORIDE 600; 310; 30; 20 MG/100ML; MG/100ML; MG/100ML; MG/100ML
INJECTION, SOLUTION INTRAVENOUS CONTINUOUS
Status: DISCONTINUED | OUTPATIENT
Start: 2022-03-10 | End: 2022-03-10 | Stop reason: HOSPADM

## 2022-03-10 RX ORDER — OXYCODONE AND ACETAMINOPHEN 7.5; 325 MG/1; MG/1
1 TABLET ORAL EVERY 6 HOURS PRN
Qty: 20 TABLET | Refills: 0 | Status: SHIPPED | OUTPATIENT
Start: 2022-03-10 | End: 2022-03-15

## 2022-03-10 RX ORDER — METOCLOPRAMIDE HYDROCHLORIDE 5 MG/ML
10 INJECTION INTRAMUSCULAR; INTRAVENOUS
Status: DISCONTINUED | OUTPATIENT
Start: 2022-03-10 | End: 2022-03-10 | Stop reason: HOSPADM

## 2022-03-10 RX ORDER — ROPIVACAINE HYDROCHLORIDE 5 MG/ML
INJECTION, SOLUTION EPIDURAL; INFILTRATION; PERINEURAL
Status: COMPLETED
Start: 2022-03-10 | End: 2022-03-10

## 2022-03-10 RX ORDER — MIDAZOLAM HYDROCHLORIDE 1 MG/ML
2 INJECTION INTRAMUSCULAR; INTRAVENOUS
Status: COMPLETED | OUTPATIENT
Start: 2022-03-10 | End: 2022-03-10

## 2022-03-10 RX ORDER — CEFAZOLIN SODIUM 1 G/3ML
INJECTION, POWDER, FOR SOLUTION INTRAMUSCULAR; INTRAVENOUS PRN
Status: DISCONTINUED | OUTPATIENT
Start: 2022-03-10 | End: 2022-03-10 | Stop reason: SDUPTHER

## 2022-03-10 RX ORDER — DEXAMETHASONE SODIUM PHOSPHATE 10 MG/ML
INJECTION, SOLUTION INTRAMUSCULAR; INTRAVENOUS PRN
Status: DISCONTINUED | OUTPATIENT
Start: 2022-03-10 | End: 2022-03-10 | Stop reason: SDUPTHER

## 2022-03-10 RX ORDER — SODIUM CHLORIDE 9 MG/ML
25 INJECTION, SOLUTION INTRAVENOUS PRN
Status: DISCONTINUED | OUTPATIENT
Start: 2022-03-10 | End: 2022-03-10 | Stop reason: HOSPADM

## 2022-03-10 RX ORDER — LIDOCAINE HYDROCHLORIDE 10 MG/ML
INJECTION, SOLUTION INFILTRATION; PERINEURAL
Status: COMPLETED | OUTPATIENT
Start: 2022-03-10 | End: 2022-03-10

## 2022-03-10 RX ORDER — SODIUM CHLORIDE, SODIUM LACTATE, POTASSIUM CHLORIDE, CALCIUM CHLORIDE 600; 310; 30; 20 MG/100ML; MG/100ML; MG/100ML; MG/100ML
INJECTION, SOLUTION INTRAVENOUS CONTINUOUS PRN
Status: DISCONTINUED | OUTPATIENT
Start: 2022-03-10 | End: 2022-03-10 | Stop reason: SDUPTHER

## 2022-03-10 RX ORDER — ONDANSETRON 4 MG/1
4 TABLET, FILM COATED ORAL EVERY 8 HOURS PRN
Qty: 10 TABLET | Refills: 0 | Status: SHIPPED | OUTPATIENT
Start: 2022-03-10

## 2022-03-10 RX ORDER — OXYCODONE AND ACETAMINOPHEN 7.5; 325 MG/1; MG/1
1 TABLET ORAL
Status: COMPLETED | OUTPATIENT
Start: 2022-03-10 | End: 2022-03-10

## 2022-03-10 RX ORDER — PROPOFOL 10 MG/ML
INJECTION, EMULSION INTRAVENOUS PRN
Status: DISCONTINUED | OUTPATIENT
Start: 2022-03-10 | End: 2022-03-10 | Stop reason: SDUPTHER

## 2022-03-10 RX ORDER — SODIUM CHLORIDE 0.9 % (FLUSH) 0.9 %
5-40 SYRINGE (ML) INJECTION PRN
Status: DISCONTINUED | OUTPATIENT
Start: 2022-03-10 | End: 2022-03-10 | Stop reason: HOSPADM

## 2022-03-10 RX ORDER — ROPIVACAINE HYDROCHLORIDE 5 MG/ML
INJECTION, SOLUTION EPIDURAL; INFILTRATION; PERINEURAL
Status: COMPLETED | OUTPATIENT
Start: 2022-03-10 | End: 2022-03-10

## 2022-03-10 RX ORDER — MEPERIDINE HYDROCHLORIDE 25 MG/ML
12.5 INJECTION INTRAMUSCULAR; INTRAVENOUS; SUBCUTANEOUS EVERY 5 MIN PRN
Status: DISCONTINUED | OUTPATIENT
Start: 2022-03-10 | End: 2022-03-10 | Stop reason: HOSPADM

## 2022-03-10 RX ORDER — ROCURONIUM BROMIDE 10 MG/ML
INJECTION, SOLUTION INTRAVENOUS PRN
Status: DISCONTINUED | OUTPATIENT
Start: 2022-03-10 | End: 2022-03-10 | Stop reason: SDUPTHER

## 2022-03-10 RX ORDER — SODIUM CHLORIDE 0.9 % (FLUSH) 0.9 %
5-40 SYRINGE (ML) INJECTION EVERY 12 HOURS SCHEDULED
Status: DISCONTINUED | OUTPATIENT
Start: 2022-03-10 | End: 2022-03-10 | Stop reason: HOSPADM

## 2022-03-10 RX ORDER — LIDOCAINE HYDROCHLORIDE 10 MG/ML
1 INJECTION, SOLUTION EPIDURAL; INFILTRATION; INTRACAUDAL; PERINEURAL
Status: DISCONTINUED | OUTPATIENT
Start: 2022-03-10 | End: 2022-03-10 | Stop reason: HOSPADM

## 2022-03-10 RX ORDER — HYDROMORPHONE HYDROCHLORIDE 1 MG/ML
0.5 INJECTION, SOLUTION INTRAMUSCULAR; INTRAVENOUS; SUBCUTANEOUS EVERY 5 MIN PRN
Status: DISCONTINUED | OUTPATIENT
Start: 2022-03-10 | End: 2022-03-10 | Stop reason: HOSPADM

## 2022-03-10 RX ORDER — FAMOTIDINE 20 MG/1
20 TABLET, FILM COATED ORAL ONCE
Status: DISCONTINUED | OUTPATIENT
Start: 2022-03-10 | End: 2022-03-10 | Stop reason: HOSPADM

## 2022-03-10 RX ORDER — SCOLOPAMINE TRANSDERMAL SYSTEM 1 MG/1
1 PATCH, EXTENDED RELEASE TRANSDERMAL
Status: DISCONTINUED | OUTPATIENT
Start: 2022-03-10 | End: 2022-03-10 | Stop reason: HOSPADM

## 2022-03-10 RX ORDER — ONDANSETRON 2 MG/ML
INJECTION INTRAMUSCULAR; INTRAVENOUS PRN
Status: DISCONTINUED | OUTPATIENT
Start: 2022-03-10 | End: 2022-03-10 | Stop reason: SDUPTHER

## 2022-03-10 RX ORDER — FENTANYL CITRATE 50 UG/ML
INJECTION, SOLUTION INTRAMUSCULAR; INTRAVENOUS PRN
Status: DISCONTINUED | OUTPATIENT
Start: 2022-03-10 | End: 2022-03-10 | Stop reason: SDUPTHER

## 2022-03-10 RX ADMIN — ROCURONIUM BROMIDE 50 MG: 10 INJECTION, SOLUTION INTRAVENOUS at 12:14

## 2022-03-10 RX ADMIN — HYDROMORPHONE HYDROCHLORIDE 0.5 MG: 1 INJECTION, SOLUTION INTRAMUSCULAR; INTRAVENOUS; SUBCUTANEOUS at 13:22

## 2022-03-10 RX ADMIN — HYDROMORPHONE HYDROCHLORIDE 0.5 MG: 1 INJECTION, SOLUTION INTRAMUSCULAR; INTRAVENOUS; SUBCUTANEOUS at 13:34

## 2022-03-10 RX ADMIN — PROPOFOL 100 MG: 10 INJECTION, EMULSION INTRAVENOUS at 12:16

## 2022-03-10 RX ADMIN — SUGAMMADEX 300 MG: 100 INJECTION, SOLUTION INTRAVENOUS at 13:07

## 2022-03-10 RX ADMIN — SODIUM CHLORIDE, SODIUM LACTATE, POTASSIUM CHLORIDE, AND CALCIUM CHLORIDE: 600; 310; 30; 20 INJECTION, SOLUTION INTRAVENOUS at 11:50

## 2022-03-10 RX ADMIN — OXYCODONE HYDROCHLORIDE AND ACETAMINOPHEN 1 TABLET: 7.5; 325 TABLET ORAL at 14:54

## 2022-03-10 RX ADMIN — CEFAZOLIN SODIUM 2000 MG: 1 INJECTION, POWDER, FOR SOLUTION INTRAMUSCULAR; INTRAVENOUS at 12:15

## 2022-03-10 RX ADMIN — DEXAMETHASONE SODIUM PHOSPHATE 10 MG: 10 INJECTION, SOLUTION INTRAMUSCULAR; INTRAVENOUS at 12:28

## 2022-03-10 RX ADMIN — LIDOCAINE HYDROCHLORIDE 50 MG: 10 INJECTION, SOLUTION INFILTRATION; PERINEURAL at 12:14

## 2022-03-10 RX ADMIN — SODIUM CHLORIDE, SODIUM LACTATE, POTASSIUM CHLORIDE, AND CALCIUM CHLORIDE: 600; 310; 30; 20 INJECTION, SOLUTION INTRAVENOUS at 11:54

## 2022-03-10 RX ADMIN — FENTANYL CITRATE 50 MCG: 50 INJECTION, SOLUTION INTRAMUSCULAR; INTRAVENOUS at 12:16

## 2022-03-10 RX ADMIN — ROPIVACAINE HYDROCHLORIDE 20 ML: 5 INJECTION, SOLUTION EPIDURAL; INFILTRATION; PERINEURAL at 12:00

## 2022-03-10 RX ADMIN — HYDROMORPHONE HYDROCHLORIDE 0.5 MG: 1 INJECTION, SOLUTION INTRAMUSCULAR; INTRAVENOUS; SUBCUTANEOUS at 13:46

## 2022-03-10 RX ADMIN — FENTANYL CITRATE 50 MCG: 50 INJECTION, SOLUTION INTRAMUSCULAR; INTRAVENOUS at 13:15

## 2022-03-10 RX ADMIN — FENTANYL CITRATE 50 MCG: 50 INJECTION, SOLUTION INTRAMUSCULAR; INTRAVENOUS at 12:11

## 2022-03-10 RX ADMIN — PROPOFOL 200 MG: 10 INJECTION, EMULSION INTRAVENOUS at 12:14

## 2022-03-10 RX ADMIN — FENTANYL CITRATE 50 MCG: 50 INJECTION, SOLUTION INTRAMUSCULAR; INTRAVENOUS at 13:02

## 2022-03-10 RX ADMIN — HYDROMORPHONE HYDROCHLORIDE 0.25 MG: 1 INJECTION, SOLUTION INTRAMUSCULAR; INTRAVENOUS; SUBCUTANEOUS at 13:59

## 2022-03-10 RX ADMIN — ONDANSETRON 4 MG: 2 INJECTION INTRAMUSCULAR; INTRAVENOUS at 12:55

## 2022-03-10 RX ADMIN — LIDOCAINE HYDROCHLORIDE 1 ML: 10 INJECTION, SOLUTION INFILTRATION; PERINEURAL at 12:00

## 2022-03-10 RX ADMIN — MIDAZOLAM 2 MG: 1 INJECTION INTRAMUSCULAR; INTRAVENOUS at 11:50

## 2022-03-10 ASSESSMENT — PAIN DESCRIPTION - ORIENTATION
ORIENTATION: RIGHT

## 2022-03-10 ASSESSMENT — PAIN - FUNCTIONAL ASSESSMENT: PAIN_FUNCTIONAL_ASSESSMENT: 0-10

## 2022-03-10 ASSESSMENT — PAIN SCALES - GENERAL
PAINLEVEL_OUTOF10: 3
PAINLEVEL_OUTOF10: 7
PAINLEVEL_OUTOF10: 7
PAINLEVEL_OUTOF10: 5
PAINLEVEL_OUTOF10: 7
PAINLEVEL_OUTOF10: 6

## 2022-03-10 ASSESSMENT — PAIN DESCRIPTION - PAIN TYPE
TYPE: SURGICAL PAIN

## 2022-03-10 ASSESSMENT — PAIN DESCRIPTION - LOCATION
LOCATION: ANKLE

## 2022-03-10 ASSESSMENT — PAIN DESCRIPTION - DESCRIPTORS
DESCRIPTORS: SORE
DESCRIPTORS: ACHING
DESCRIPTORS: SORE

## 2022-03-10 ASSESSMENT — LIFESTYLE VARIABLES: SMOKING_STATUS: 0

## 2022-03-10 NOTE — ANESTHESIA POSTPROCEDURE EVALUATION
Department of Anesthesiology  Postprocedure Note    Patient: Scot Halsted  MRN: [id number]  [place]: 1955  Date of evaluation: 3/10/2022  Time:  1:19 PM     Procedure Summary     Date: 03/10/22 Room / Location: [address]    Anesthesia Start: 1207 Anesthesia Stop: 1319    Procedure: OPEN REDUCTION INTERNAL FIXATION RIGHT ANKLE LATERAL MALLEOLUS (Right ) Diagnosis: (U06.536H)    Surgeons: Conor Hannah MD Responsible Provider: Daralene Galeazzi, APRN - CRNA    Anesthesia Type: general, regional ASA Status: 3          Anesthesia Type: general, regional    Jessica Phase I: Jessica Score: 10    Jessica Phase II:      Last vitals: Reviewed and per EMR flowsheets.        Anesthesia Post Evaluation    Patient location during evaluation: PACU  Patient participation: complete - patient participated  Level of consciousness: awake and alert  Pain score: 0  Airway patency: patent  Nausea & Vomiting: no vomiting and no nausea  Complications: no  Cardiovascular status: hemodynamically stable  Respiratory status: acceptable and room air  Hydration status: stable

## 2022-03-10 NOTE — ANESTHESIA PROCEDURE NOTES
Peripheral Block    Patient location during procedure: holding area  Staffing  Performed: anesthesiologist   Anesthesiologist: Tonya Buchanan MD  Preanesthetic Checklist  Completed: patient identified, IV checked, site marked, risks and benefits discussed, surgical consent, monitors and equipment checked, pre-op evaluation, timeout performed, anesthesia consent given, oxygen available and patient being monitored  Peripheral Block  Patient position: supine  Prep: ChloraPrep  Patient monitoring: cardiac monitor, continuous pulse ox, frequent blood pressure checks and IV access  Block type: Sciatic  Laterality: right  Injection technique: single-shot  Guidance: ultrasound guided  Popliteal  Provider prep: mask and sterile gloves  Needle  Needle type: combined needle/nerve stimulator   Needle gauge: 22 G  Needle length: 10 cm  Needle localization: ultrasound guidance  Assessment  Injection assessment: negative aspiration for heme, no paresthesia on injection and local visualized surrounding nerve on ultrasound  Paresthesia pain: none  Slow fractionated injection: yes  Hemodynamics: stable  Additional Notes  20 ml 0.5% Ropivacaine injected    Under ultrasound (\"US\") guidance, a 22 gauge needle was inserted and placed in close proximity to the sciatic nerve. Ultrasound was also used to visualize the spread of the anesthetic in close proximity to the nerve being blocked. The nerve appeared anatomically normal, and there were no abnormal pathological findings. A permanent image was recorded.    Medications Administered  Lidocaine injection 1%, 1 mL  ropivacaine (NAROPIN) injection 0.5%, 20 mL  Reason for block: post-op pain management

## 2022-03-10 NOTE — ANESTHESIA PRE PROCEDURE
Department of Anesthesiology  Preprocedure Note       Name:  Mccoy Felty   Age:  78 y.o.  :  1955                                          MRN:  [id number]         Date:  3/10/2022      Surgeon: Corinthia Goodpasture):  Michael Glover MD    Procedure: Procedure(s):  OPEN REDUCTION INTERNAL FIXATION RIGHT ANKLE LATERAL MALLEOLUS    Medications prior to admission:   Prior to Admission medications    Medication Sig Start Date End Date Taking? Authorizing Provider   HYDROcodone-acetaminophen (NORCO) 7.5-325 MG per tablet Take 1 tablet by mouth every 6 hours as needed for Pain. Historical Provider, MD   aspirin 81 MG EC tablet Take 81 mg by mouth daily    Historical Provider, MD   metoprolol succinate (TOPROL XL) 50 MG extended release tablet Take 100 mg by mouth daily (before dinner) Takes 3pm daily    Historical Provider, MD   atorvastatin (LIPITOR) 20 MG tablet Take 20 mg by mouth nightly  16   Historical Provider, MD   temazepam (RESTORIL) 30 MG capsule Take 30 mg by mouth nightly as needed. 16   Historical Provider, MD   niacin (NIASPAN) 500 MG extended release tablet Take 1 capsule by mouth nightly     Historical Provider, MD   losartan-hydrochlorothiazide (HYZAAR) 100-25 MG per tablet Take 1 tablet by mouth daily (before dinner) Takes 3pm    Historical Provider, MD       Current medications:    No current facility-administered medications for this encounter.        Allergies:  No Known Allergies    Problem List:    Patient Active Problem List   Diagnosis Code    History of colon polyps Z86.010    Family history of colonic polyps Z83.71    Displaced fracture of lateral malleolus of right fibula, initial encounter for closed fracture S82.61XA       Past Medical History:        Diagnosis Date    Adjustment disorder with anxiety     Adjustment insomnia     Anemia     Ankle fracture     trip/fall on rug    Asymptomatic arteriosclerosis of coronary artery     sees [facility]/dr. Wilber Celis GERD (gastroesophageal reflux disease)     Heart block 2010    Hyperlipidemia     Hypertension     Iron deficiency anemia     hx of iron/oral    Irregular heart beat     Prostate cancer (HCC)     prostate; surgery/radiation/hormone tx    Vitamin D deficiency        Past Surgical History:        Procedure Laterality Date    CARDIAC SURGERY  2009    stent at     COLONOSCOPY  03/2012    [facility] (5 yr)    COLONOSCOPY N/A 6/29/2020    Dr Gaviota De Luna disease-AP, 3 yr recall    ENDOSCOPY, COLON, DIAGNOSTIC      IA COLONOSCOPY FLX DX W/COLLJ SPEC WHEN PFRMD N/A 12/6/2017    Dr Humberto Au, 5 yr recall    PROSTATECTOMY  06/2015    da [facility]    TONSILLECTOMY AND ADENOIDECTOMY      UPPER GASTROINTESTINAL ENDOSCOPY N/A 6/29/2020    Dr Tata Junior-Gastritis, (+) H pylori       Social History:    Social History     Tobacco Use    Smoking status: Never Smoker    Smokeless tobacco: Never Used   Substance Use Topics    Alcohol use: Yes     Comment: Rare social                                Counseling given: Not Answered      Vital Signs (Current): There were no vitals filed for this visit.                                            BP Readings from Last 3 Encounters:   06/29/20 127/79   06/29/20 (!) 146/86   06/11/20 (!) 146/86       NPO Status:                                                                                 BMI:   Wt Readings from Last 3 Encounters:   03/08/22 215 lb (97.5 kg)   07/28/20 216 lb (98 kg)   06/29/20 210 lb (95.3 kg)     There is no height or weight on file to calculate BMI.    CBC:   Lab Results   Component Value Date    WBC 8.7 03/08/2022    RBC 3.70 03/08/2022    HGB 11.9 03/08/2022    HCT 35.5 03/08/2022    MCV 95.9 03/08/2022    RDW 12.9 03/08/2022     03/08/2022       CMP:   Lab Results   Component Value Date     03/08/2022    K 3.9 03/08/2022    CL 99 03/08/2022    CO2 26 03/08/2022    BUN 22 03/08/2022    CREATININE 0.9 03/08/2022    GFRAA >59 03/08/2022    LABGLOM >60 2022    GLUCOSE 87 2022    CALCIUM 9.8 2022       POC Tests: No results for input(s): POCGLU, POCNA, POCK, POCCL, POCBUN, POCHEMO, POCHCT in the last 72 hours. Coags: No results found for: PROTIME, INR, APTT    HCG (If Applicable): No results found for: PREGTESTUR, PREGSERUM, HCG, HCGQUANT     ABGs: No results found for: PHART, PO2ART, VIN9XKL, ODW0FFR, BEART, U7OXDHKZ     Type & Screen (If Applicable):  No results found for: LABABO, LABRH    Drug/Infectious Status (If Applicable):  No results found for: HIV, HEPCAB    COVID-19 Screening (If Applicable):   Lab Results   Component Value Date    COVID19 Not Detected 2022           Anesthesia Evaluation  Patient summary reviewed and Nursing notes reviewed no history of anesthetic complications:   Airway: Mallampati: II  TM distance: >3 FB   Neck ROM: full  Mouth opening: > = 3 FB Dental:          Pulmonary:normal exam        (-) sleep apnea and not a current smoker                           Cardiovascular:    (+) hypertension:, CAD:, CABG/stent:, hyperlipidemia         Beta Blocker:  Dose within 24 Hrs         Neuro/Psych:   (+) depression/anxiety    (-) seizures and CVA           GI/Hepatic/Renal:   (+) GERD: well controlled,           Endo/Other:        (-) diabetes mellitus, hypothyroidism               Abdominal:             Vascular: negative vascular ROS. Other Findings:           Anesthesia Plan      general and regional     ASA 3     (Sciatic nerve block)  Induction: intravenous. MIPS: Prophylactic antiemetics administered. Anesthetic plan and risks discussed with patient.                       Coral Nicolas MD   3/10/2022

## 2022-03-10 NOTE — BRIEF OP NOTE
Brief Postoperative Note      Patient: Theodore Aguilar  YOB: 1955  MRN: [id number]    Date of Procedure: 3/10/2022    Pre-Op Diagnosis: S82.831A    Post-Op Diagnosis: Same       Procedure(s):  OPEN REDUCTION INTERNAL FIXATION RIGHT ANKLE LATERAL MALLEOLUS    Surgeon(s):  Sharmin Fam MD    Assistant:  * No surgical staff found *    Anesthesia: General    Estimated Blood Loss (mL): Minimal    Complications: None    Specimens:   * No specimens in log *    Implants:  * No implants in log *      Drains: * No LDAs found *    Findings: see op note    Electronically signed by Sharmin Fam MD on 3/10/2022 at 1:10 PM

## 2022-03-10 NOTE — H&P
Pt Name: Rhonda Mena  MRN: [id number]  [place]: 1955  Date of evaluation: 3/10/2022    H&P including current review of systems was updated in the paper chart and/or the document previously scanned into the record. There have been no significant changes or new problems since the original evaluation. The patient's problems continue and indications for contemplated procedure have not changed.     Electronically signed by Montserrat Haines MD on 3/10/2022 at [id number]

## 2022-03-10 NOTE — PROGRESS NOTES
CLINICAL PHARMACY NOTE: MEDS TO BEDS    Total # of Prescriptions Filled: 2   The following medications were delivered to the patient:  · Ondansetron 4 mg  · Percocet 7.5/325 mg    Additional Documentation:    Handed scripts to patients family and patient was alert as well in [place]

## 2022-07-11 ENCOUNTER — LAB (OUTPATIENT)
Dept: UROLOGY | Facility: CLINIC | Age: 67
End: 2022-07-11

## 2022-07-11 DIAGNOSIS — C61 PROSTATE CANCER: ICD-10-CM

## 2022-07-12 LAB — PSA SERPL-MCNC: <0.014 NG/ML (ref 0–4)

## 2022-07-15 NOTE — PROGRESS NOTES
Subjective    Mr. Lubin is 67 y.o. male    Chief Complaint: Prostate Cancer          History of Present Illness  Prostate Cancer  Pt. presents with history of prostate cancer diagnosed in July 2015. Current disease state ishigh risk disease Pt. underwent robot assisted laparoscpic prostatectomy and external beam radiotherapy Path showing T3b 7 adenocarcinoma of prostate with Negative surgical margins. Patient has received XRT completed in May 2016 .He has completed Lupron x 2 years management of prostate cancer in March 2019. Pt. having 1 ppd (security) incontinence. Associated voiding symptoms include incomplete emptying, frequency . Pt. Is having erectile dysfunction that does not respond to PDE5 inhibitors. There has been no bone pain, weight loss, abdominal pain, back pain, or gross hematuria. Hot flashes resolved.     Lab Results   Component Value Date    PSA <0.014 07/11/2022    PSA <0.014 01/10/2022    PSA <0.014 07/09/2021     The following portions of the patient's history were reviewed and updated as appropriate: allergies, current medications, past family history, past medical history, past social history, past surgical history and problem list.    Review of Systems   Constitutional: Negative for chills and fever.   Gastrointestinal: Negative for abdominal pain, anal bleeding and blood in stool.   Genitourinary: Positive for frequency. Negative for dysuria and hematuria.         Current Outpatient Medications:   •  aspirin (aspirin) 81 MG EC tablet, Take 1 tablet by mouth Daily., Disp:  , Rfl:   •  atorvastatin (LIPITOR) 20 MG tablet, Take 20 mg by mouth Daily., Disp: , Rfl:   •  losartan-hydrochlorothiazide (HYZAAR) 100-25 MG per tablet, Take 1 tablet by mouth Daily., Disp: , Rfl:   •  metoprolol succinate XL (TOPROL-XL) 100 MG 24 hr tablet, Take 100 mg by mouth Daily., Disp: , Rfl:   •  niacin 500 MG tablet, Take 500 mg by mouth Every Night., Disp: , Rfl:   •  pantoprazole (PROTONIX) 40 MG EC tablet,  "Take 40 mg by mouth Daily., Disp: , Rfl:   •  temazepam (RESTORIL) 30 MG capsule, TAKE ONE CAPSULE BY MOUTH AT BEDTIME AS NEEDED FOR SLEEP, Disp: , Rfl: 2    Past Medical History:   Diagnosis Date   • CAD (coronary artery disease)    • History of external beam radiation therapy 05/12/2016    6840 cGy to prostate bed   • Hyperlipidemia    • Hypertension    • Prostate cancer (HCC)        Past Surgical History:   Procedure Laterality Date   • CARDIAC CATHETERIZATION     • CORONARY ANGIOPLASTY WITH STENT PLACEMENT     • PROSTATE SURGERY     • TONSILLECTOMY         Social History     Socioeconomic History   • Marital status: Single   Tobacco Use   • Smoking status: Never Smoker   • Smokeless tobacco: Never Used   Vaping Use   • Vaping Use: Never used   Substance and Sexual Activity   • Alcohol use: Yes     Comment: very little    • Drug use: No   • Sexual activity: Defer       Family History   Problem Relation Age of Onset   • Coronary artery disease Mother    • Stroke Mother        Objective    Ht 182.9 cm (72\")   Wt 97.3 kg (214 lb 6.4 oz)   BMI 29.08 kg/m²     Physical Exam        Results for orders placed or performed in visit on 07/18/22   POC Urinalysis Dipstick, Multipro    Specimen: Urine   Result Value Ref Range    Color Yellow Yellow, Straw, Dark Yellow, Ying    Clarity, UA Clear Clear    Glucose, UA Negative Negative mg/dL    Bilirubin Negative Negative    Ketones, UA Negative Negative    Specific Gravity  1.015 1.005 - 1.030    Blood, UA Trace (A) Negative    pH, Urine 7.0 5.0 - 8.0    Protein, POC Negative Negative mg/dL    Urobilinogen, UA Normal Normal    Nitrite, UA Negative Negative    Leukocytes Negative Negative     Assessment and Plan    Diagnoses and all orders for this visit:    1. Prostate cancer (HCC) (Primary)  -     POC Urinalysis Dipstick, Multipro  -     PSA DIAGNOSTIC; Future    2. Impotence of organic origin    3. Urinary incontinence, male, stress        Patient underwent RALP on " 7/28/15. His pretreatment PSA was 7.7. His pathology revealed [place] 3+4 = 7 adenocarcinoma the prostate. Pathology showed [id number] with negative surgical margins. His posttreatment PSA was 0.1 he completed XRT in May 2016 his PSA in September 2017 [place] to 0.3 and he was started on 2 years of ADT which he completed March 2019.         His PSA is undetectable today.  He will follow-up in 6 months with PSA.

## 2022-07-18 ENCOUNTER — OFFICE VISIT (OUTPATIENT)
Dept: UROLOGY | Facility: CLINIC | Age: 67
End: 2022-07-18

## 2022-07-18 VITALS — BODY MASS INDEX: 29.04 KG/M2 | WEIGHT: 214.4 LBS | HEIGHT: 72 IN

## 2022-07-18 DIAGNOSIS — N52.9 IMPOTENCE OF ORGANIC ORIGIN: ICD-10-CM

## 2022-07-18 DIAGNOSIS — N39.3 URINARY INCONTINENCE, MALE, STRESS: ICD-10-CM

## 2022-07-18 DIAGNOSIS — C61 PROSTATE CANCER: Primary | ICD-10-CM

## 2022-07-18 PROBLEM — Z86.0100 HISTORY OF COLON POLYPS: Status: ACTIVE | Noted: 2017-10-25

## 2022-07-18 PROBLEM — Z86.010 HISTORY OF COLON POLYPS: Status: ACTIVE | Noted: 2017-10-25

## 2022-07-18 PROBLEM — S82.61XA DISPLACED FRACTURE OF LATERAL MALLEOLUS OF RIGHT FIBULA, INITIAL ENCOUNTER FOR CLOSED FRACTURE: Status: ACTIVE | Noted: 2022-03-09

## 2022-07-18 PROBLEM — Z83.719 FAMILY HISTORY OF COLONIC POLYPS: Status: ACTIVE | Noted: 2017-10-25

## 2022-07-18 PROBLEM — Z83.71 FAMILY HISTORY OF COLONIC POLYPS: Status: ACTIVE | Noted: 2017-10-25

## 2022-07-18 LAB
BILIRUB BLD-MCNC: NEGATIVE MG/DL
CLARITY, POC: CLEAR
COLOR UR: YELLOW
GLUCOSE UR STRIP-MCNC: NEGATIVE MG/DL
KETONES UR QL: NEGATIVE
LEUKOCYTE EST, POC: NEGATIVE
NITRITE UR-MCNC: NEGATIVE MG/ML
PH UR: 7 [PH] (ref 5–8)
PROT UR STRIP-MCNC: NEGATIVE MG/DL
RBC # UR STRIP: ABNORMAL /UL
SP GR UR: 1.01 (ref 1–1.03)
UROBILINOGEN UR QL: NORMAL

## 2022-07-18 PROCEDURE — 81003 URINALYSIS AUTO W/O SCOPE: CPT | Performed by: UROLOGY

## 2022-07-18 PROCEDURE — 99213 OFFICE O/P EST LOW 20 MIN: CPT | Performed by: UROLOGY

## 2022-12-19 ENCOUNTER — OFFICE VISIT (OUTPATIENT)
Dept: CARDIOLOGY | Facility: CLINIC | Age: 67
End: 2022-12-19

## 2022-12-19 VITALS
OXYGEN SATURATION: 98 % | DIASTOLIC BLOOD PRESSURE: 90 MMHG | HEIGHT: 72 IN | BODY MASS INDEX: 28.79 KG/M2 | WEIGHT: 212.6 LBS | HEART RATE: 76 BPM | SYSTOLIC BLOOD PRESSURE: 158 MMHG

## 2022-12-19 DIAGNOSIS — I25.10 CORONARY ARTERY DISEASE INVOLVING NATIVE CORONARY ARTERY OF NATIVE HEART WITHOUT ANGINA PECTORIS: Primary | Chronic | ICD-10-CM

## 2022-12-19 DIAGNOSIS — I10 ESSENTIAL HYPERTENSION: Chronic | ICD-10-CM

## 2022-12-19 DIAGNOSIS — E78.2 MIXED HYPERLIPIDEMIA: Chronic | ICD-10-CM

## 2022-12-19 DIAGNOSIS — I42.1 HOCM (HYPERTROPHIC OBSTRUCTIVE CARDIOMYOPATHY): Chronic | ICD-10-CM

## 2022-12-19 PROCEDURE — 93000 ELECTROCARDIOGRAM COMPLETE: CPT | Performed by: NURSE PRACTITIONER

## 2022-12-19 PROCEDURE — 99214 OFFICE O/P EST MOD 30 MIN: CPT | Performed by: NURSE PRACTITIONER

## 2022-12-19 NOTE — PROGRESS NOTES
Subjective:     Encounter Date:12/19/2022      Patient ID: Dayron Lubin is a 67 y.o. male with known coronary artery disease status post PCI to the right coronary artery in 2009, hypertrophic obstructive cardiomyopathy, hypertension, hyperlipidemia, and obesity.  He presents today for follow up.     Chief Complaint: Routine CAD Follow-up  Coronary Artery Disease  Presents for follow-up visit. Pertinent negatives include no chest pain, chest pressure, chest tightness, dizziness, leg swelling, palpitations or shortness of breath. Risk factors include hyperlipidemia and obesity. The symptoms have been stable. Compliance with diet is good. Compliance with exercise is good. Compliance with medications is good.   Hypertension  This is a chronic problem. The current episode started more than 1 year ago. The problem is controlled. Pertinent negatives include no chest pain, headaches, malaise/fatigue, orthopnea, palpitations, peripheral edema, PND or shortness of breath. Risk factors for coronary artery disease include dyslipidemia, obesity and male gender. Current antihypertension treatment includes diuretics, beta blockers and angiotensin blockers. The current treatment provides significant improvement. There are no compliance problems.  Hypertensive end-organ damage includes CAD/MI. There is no history of heart failure.   Hyperlipidemia  This is a chronic problem. The current episode started more than 1 year ago. The problem is controlled. Recent lipid tests were reviewed and are normal (LDL 80.). Exacerbating diseases include obesity. He has no history of diabetes. Factors aggravating his hyperlipidemia include beta blockers. Pertinent negatives include no chest pain, focal weakness, myalgias or shortness of breath. Current antihyperlipidemic treatment includes statins. The current treatment provides significant improvement of lipids. There are no compliance problems.  Risk factors for coronary artery disease  include male sex, obesity, hypertension and dyslipidemia.     Mr. Lubin presents to the office today for routine follow-up.  He has been feeling well since his visit with us last year. He denies any chest pain, pressure, or tightness. He denies any shortness of breath, orthopnea, PND, or leg swelling.  He reports compliance with his home medications and follows routinely with his PCP office. He does report white coat syndrome with elevated BP here and PCP offices routinely. He checked his BP prior to his visit today which was normal. He denies any changes in stamina or significant fatigue.     The following portions of the patient's history were reviewed and updated as appropriate: allergies, current medications, past family history, past medical history, past social history and past surgical history.    No Known Allergies       Current Outpatient Medications:   •  aspirin (aspirin) 81 MG EC tablet, Take 1 tablet by mouth Daily., Disp:  , Rfl:   •  atorvastatin (LIPITOR) 20 MG tablet, Take 20 mg by mouth Daily., Disp: , Rfl:   •  losartan-hydrochlorothiazide (HYZAAR) 100-25 MG per tablet, Take 1 tablet by mouth Daily., Disp: , Rfl:   •  metoprolol succinate XL (TOPROL-XL) 100 MG 24 hr tablet, Take 100 mg by mouth Daily., Disp: , Rfl:   •  niacin 500 MG tablet, Take 500 mg by mouth Every Night., Disp: , Rfl:   •  pantoprazole (PROTONIX) 40 MG EC tablet, Take 40 mg by mouth Daily., Disp: , Rfl:   •  temazepam (RESTORIL) 30 MG capsule, TAKE ONE CAPSULE BY MOUTH AT BEDTIME AS NEEDED FOR SLEEP, Disp: , Rfl: 2    Review of Systems   Constitutional: Negative for fever and malaise/fatigue.   HENT: Negative for nosebleeds.    Cardiovascular: Negative for chest pain, dyspnea on exertion, irregular heartbeat, leg swelling, near-syncope, orthopnea, palpitations, paroxysmal nocturnal dyspnea and syncope.   Respiratory: Negative for chest tightness, cough, shortness of breath, sputum production and wheezing.   "  Hematologic/Lymphatic: Negative for bleeding problem. Does not bruise/bleed easily.   Musculoskeletal: Negative for myalgias.   Gastrointestinal: Negative for bloating, abdominal pain, nausea and vomiting.   Neurological: Negative for dizziness, focal weakness, headaches, light-headedness and weakness.   Psychiatric/Behavioral: Negative for altered mental status.       ECG 12 Lead    Date/Time: 12/19/2022 8:39 AM  Performed by: Edda Li APRN  Authorized by: Edda Li APRN   Comparison: compared with previous ECG from 12/22/2021  Similar to previous ECG  Rhythm: sinus rhythm  Rate: normal  BPM: 73  Conduction: incomplete left bundle branch block  Q waves: II, III, aVF, V4, V5 and V6    QRS axis: normal  Other findings: T wave abnormality    Clinical impression: abnormal EKG          /90   Pulse 76   Ht 182.9 cm (72\")   Wt 96.4 kg (212 lb 9.6 oz)   SpO2 98%   BMI 28.83 kg/m²        Objective:     Vitals reviewed.   Constitutional:       General: Awake. Not in acute distress.     Appearance: Normal appearance. Well-developed, well-groomed, overweight and not in distress. Chronically ill-appearing. Not ill-appearing.   HENT:      Head: Normocephalic and atraumatic.   Pulmonary:      Effort: Pulmonary effort is normal.      Breath sounds: Normal breath sounds. No wheezing. No rhonchi. No rales.   Cardiovascular:      Normal rate. Regular rhythm.      Murmurs: There is a systolic murmur.      No gallop. No rub.   Edema:     Peripheral edema absent.   Abdominal:      General: There is no distension.      Palpations: Abdomen is soft.      Tenderness: There is no abdominal tenderness.   Musculoskeletal: Normal range of motion.      Cervical back: Normal range of motion and neck supple. Skin:     General: Skin is warm and dry.   Neurological:      Mental Status: Alert, oriented to person, place, and time and oriented to person, place and time.   Psychiatric:         Attention and Perception: " Attention normal.         Mood and Affect: Mood normal.         Speech: Speech normal.         Behavior: Behavior normal. Behavior is cooperative.         Cognition and Memory: Cognition normal.         Judgment: Judgment normal.       Lab Review:   Results for orders placed during the hospital encounter of 12/18/19    Adult Transthoracic Echo Complete W/ Cont if Necessary Per Protocol    Interpretation Summary  · Left ventricular systolic function is normal. Estimated EF appears to be in the range of 61 - 65%.  · Left ventricular wall thickness is consistent with moderate concentric hypertrophy.  · Mid left ventricular cavitary gradient is present.  · The findings are consistent with obstructive, hypertrophic cardiomyopathy.  · Left ventricular diastolic dysfunction (grade I) consistent with impaired relaxation.  · No hemodynamically significant valvular abnormality identified on the study.  · Estimated right ventricular systolic pressure from tricuspid regurgitation is markedly elevated (>55 mmHg).          Assessment:          Diagnosis Plan   1. Coronary artery disease involving native coronary artery of native heart without angina pectoris        2. Essential hypertension        3. Mixed hyperlipidemia        4. HOCM (hypertrophic obstructive cardiomyopathy) ([facility])               Plan:       -Coronary artery disease: Stable.  Previous PCI in October 2009 with 3.5 x 13 mm stent placement to the RCA.  He is currently managed on aspirin, beta blocker, and statin therapies.  He has chronic STT abnormalities likely due to his hypertrophic cardiomyopathy. The patient is completely asymptomatic at this time. Continue current medications.     -Essential hypertension: Stable.  His blood pressure is well controlled on current medications. His BP this am was 117/78 at home, he historically has elevated readings in the doctors office.      -Hyperlipidemia: The patient is on statin therapy with atorvastatin 20 mg daily.  His  lab work is monitored through his primary care physician's office.  No recent lipid panel is available for me to review.  His LDL goal would be less than 70 given his history of CAD.     -Hypertrophic obstructive cardiomyopathy: The patient has a previous echocardiographic findings of hypertrophic cardiomyopathy.  His most recent echocardiogram as noted above.  The patient denies any lightheadedness, dizziness, near-syncope, syncope.  He is managed on beta blocker therapy.    Continue current medications  Follow up in 1 year, sooner if needed.       Advance Care Planning   ACP discussion was held with the patient during this visit. Patient does not have an advance directive, information provided.      I attest that all portions of this note have been reviewed and updated to reflect the patient's current status.

## 2023-01-16 NOTE — PROGRESS NOTES
Subjective    Mr. Lubin is 67 y.o. male    Chief Complaint: Prostate Cancer    History of Present Illness    Pt. presents with history of prostate cancer diagnosed in July 2015. Current disease state ishigh risk disease.  Pt. underwent robot assisted laparoscpic prostatectomy and external beam radiotherapy Path showing T3b 7 adenocarcinoma of prostate with Negative surgical margins. Patient has received XRT completed in May 2016 .He has completed Lupron x 2 years management of prostate cancer in March 2019. Pt. having 1 ppd (security) incontinence. Associated voiding symptoms include nocturia. Pt. Is having erectile dysfunction that does not respond to PDE5 inhibitors. Hot flashes resolved.     Lab Results   Component Value Date    PSA <0.014 01/13/2023    PSA <0.014 07/11/2022    PSA <0.014 01/10/2022       The following portions of the patient's history were reviewed and updated as appropriate: allergies, current medications, past family history, past medical history, past social history, past surgical history and problem list.    Review of Systems   Constitutional: Negative for chills and fever.   Gastrointestinal: Negative for abdominal pain, anal bleeding and blood in stool.   Genitourinary: Negative for difficulty urinating, dysuria, frequency, hematuria and urgency.         Current Outpatient Medications:   •  aspirin (aspirin) 81 MG EC tablet, Take 1 tablet by mouth Daily., Disp:  , Rfl:   •  atorvastatin (LIPITOR) 20 MG tablet, Take 20 mg by mouth Daily., Disp: , Rfl:   •  losartan-hydrochlorothiazide (HYZAAR) 100-25 MG per tablet, Take 1 tablet by mouth Daily., Disp: , Rfl:   •  metoprolol succinate XL (TOPROL-XL) 100 MG 24 hr tablet, Take 100 mg by mouth Daily., Disp: , Rfl:   •  niacin 500 MG tablet, Take 500 mg by mouth Every Night., Disp: , Rfl:   •  pantoprazole (PROTONIX) 40 MG EC tablet, Take 40 mg by mouth Daily., Disp: , Rfl:   •  sucralfate (CARAFATE) 1 g tablet, TAKE 1 TABLET EVERY 6 HOURS, 30  "MINUTES BEFORE MEALS AND AT BEDTIME, Disp: , Rfl:   •  temazepam (RESTORIL) 30 MG capsule, TAKE ONE CAPSULE BY MOUTH AT BEDTIME AS NEEDED FOR SLEEP, Disp: , Rfl: 2    Past Medical History:   Diagnosis Date   • CAD (coronary artery disease)    • History of external beam radiation therapy 05/12/2016    6840 cGy to prostate bed   • Hyperlipidemia    • Hypertension    • Prostate cancer (HCC)        Past Surgical History:   Procedure Laterality Date   • CARDIAC CATHETERIZATION     • CORONARY ANGIOPLASTY WITH STENT PLACEMENT     • PROSTATE SURGERY     • TONSILLECTOMY         Social History     Socioeconomic History   • Marital status: Single   Tobacco Use   • Smoking status: Never   • Smokeless tobacco: Never   Vaping Use   • Vaping Use: Never used   Substance and Sexual Activity   • Alcohol use: Yes     Comment: very little    • Drug use: No   • Sexual activity: Defer       Family History   Problem Relation Age of Onset   • Coronary artery disease Mother    • Stroke Mother        Objective    Temp 97.1 °F (36.2 °C)   Ht 182.9 cm (72\")   Wt 93 kg (205 lb)   BMI 27.80 kg/m²     Physical Exam        Results for orders placed or performed in visit on 01/20/23   POC Urinalysis Dipstick, Multipro    Specimen: Urine   Result Value Ref Range    Color Yellow Yellow, Straw, Dark Yellow, Ying    Clarity, UA Clear Clear    Glucose, UA Negative Negative mg/dL    Bilirubin Negative Negative    Ketones, UA Negative Negative    Specific Gravity  1.025 1.005 - 1.030    Blood, UA Negative Negative    pH, Urine 5.5 5.0 - 8.0    Protein, POC Trace (A) Negative mg/dL    Urobilinogen, UA 0.2 E.U./dL Normal, 0.2 E.U./dL    Nitrite, UA Negative Negative    Leukocytes Negative Negative         IPSS Questionnaire (AUA-7):  Incomplete emptying  Over the past month, how often have you had a sensation of not emptying your bladder completely after you finish?: Not at all (01/20/23 0800)  Frequency  Over the past month, how often have you had to " urinate again less than two hours after you finishing urinating ?: Not at all (01/20/23 0800)  Intermittency  Over the past month, how often have you found you stopped and started again several time when you urinated ?: Not at all (01/20/23 0800)  Urgency  Over the last month, how difficult  have you found it to postpone urination ?: Not at all (01/20/23 0800)  Weak Stream  Over the past month, how often have you had a weak urinary stream ?: Not at all (01/20/23 0800)  Straining  Over the past month, how often have you had to push or strain to begin urination ?: Not at all (01/20/23 0800)  Nocturia  Over the past month, how many times did you most typically get up to urinate from the time you went to bed until the time you got up in the morning ?: About half the time (01/20/23 0800)  Quality of life due to urinary symptoms  If you were to spend the rest of your life with your urinary condition the way it is now, how would feel about that?: Pleased (01/20/23 0800)    Scores  Total IPSS Score: (!) 3 (01/20/23 0800)  Total Score = Symtomatic Level: Mildly symptomatic: 0-7 (01/20/23 0800)        Assessment and Plan    Diagnoses and all orders for this visit:    1. Prostate cancer (HCC) (Primary)  -     POC Urinalysis Dipstick, Multipro    2. Impotence of organic origin    3. Nocturia          Patient underwent RALP on 7/28/15. His pretreatment PSA was 7.7. His pathology revealed Ryann 3+4 = 7 adenocarcinoma the prostate. Pathology showed [id number] with negative surgical margins. His posttreatment PSA was 0.1 he completed XRT in May 2016 his PSA in September 2017 lavonne to 0.3 and he was started on 2 years of ADT which he completed March 2019.         His PSA is undetectable today.      He will follow-up in 6 months with PSA.

## 2023-01-20 ENCOUNTER — OFFICE VISIT (OUTPATIENT)
Dept: UROLOGY | Facility: CLINIC | Age: 68
End: 2023-01-20
Payer: MEDICARE

## 2023-01-20 VITALS — BODY MASS INDEX: 27.77 KG/M2 | TEMPERATURE: 97.1 F | HEIGHT: 72 IN | WEIGHT: 205 LBS

## 2023-01-20 DIAGNOSIS — R35.1 NOCTURIA: ICD-10-CM

## 2023-01-20 DIAGNOSIS — C61 PROSTATE CANCER: Primary | ICD-10-CM

## 2023-01-20 DIAGNOSIS — N52.9 IMPOTENCE OF ORGANIC ORIGIN: ICD-10-CM

## 2023-01-20 LAB
BILIRUB BLD-MCNC: NEGATIVE MG/DL
CLARITY, POC: CLEAR
COLOR UR: YELLOW
GLUCOSE UR STRIP-MCNC: NEGATIVE MG/DL
KETONES UR QL: NEGATIVE
LEUKOCYTE EST, POC: NEGATIVE
NITRITE UR-MCNC: NEGATIVE MG/ML
PH UR: 5.5 [PH] (ref 5–8)
PROT UR STRIP-MCNC: ABNORMAL MG/DL
RBC # UR STRIP: NEGATIVE /UL
SP GR UR: 1.02 (ref 1–1.03)
UROBILINOGEN UR QL: ABNORMAL

## 2023-01-20 PROCEDURE — 81003 URINALYSIS AUTO W/O SCOPE: CPT | Performed by: UROLOGY

## 2023-01-20 PROCEDURE — 99213 OFFICE O/P EST LOW 20 MIN: CPT | Performed by: UROLOGY

## 2023-01-20 RX ORDER — SUCRALFATE 1 G/1
TABLET ORAL
COMMUNITY
Start: 2022-12-21

## 2023-07-13 PROBLEM — N17.9 AKI (ACUTE KIDNEY INJURY): Status: ACTIVE | Noted: 2023-07-13

## 2023-07-13 PROBLEM — R79.89 ELEVATED BRAIN NATRIURETIC PEPTIDE (BNP) LEVEL: Status: ACTIVE | Noted: 2023-07-13

## 2023-07-13 PROBLEM — F32.A DEPRESSION: Status: ACTIVE | Noted: 2023-07-13

## 2023-07-13 PROBLEM — F10.10 ETOH ABUSE: Status: ACTIVE | Noted: 2023-07-13

## 2023-07-13 PROBLEM — F41.8 ANXIETY ASSOCIATED WITH DEPRESSION: Status: ACTIVE | Noted: 2023-07-13

## 2023-07-13 PROBLEM — R63.0 ANOREXIA: Status: ACTIVE | Noted: 2023-07-13

## 2023-07-13 PROBLEM — K70.10 ALCOHOLIC HEPATITIS: Status: ACTIVE | Noted: 2023-07-13

## 2023-07-13 PROBLEM — E86.9 VOLUME DEPLETION: Status: ACTIVE | Noted: 2023-07-13

## 2023-08-07 ENCOUNTER — APPOINTMENT (OUTPATIENT)
Dept: CT IMAGING | Facility: HOSPITAL | Age: 68
DRG: 896 | End: 2023-08-07
Payer: MEDICARE

## 2023-08-07 ENCOUNTER — APPOINTMENT (OUTPATIENT)
Dept: GENERAL RADIOLOGY | Facility: HOSPITAL | Age: 68
DRG: 896 | End: 2023-08-07
Payer: MEDICARE

## 2023-08-07 ENCOUNTER — HOSPITAL ENCOUNTER (INPATIENT)
Facility: HOSPITAL | Age: 68
LOS: 8 days | Discharge: NURSING, REHAB OR LONG-TERM CARE | DRG: 896 | End: 2023-08-15
Attending: EMERGENCY MEDICINE | Admitting: FAMILY MEDICINE
Payer: MEDICARE

## 2023-08-07 DIAGNOSIS — F10.10 ETOH ABUSE: ICD-10-CM

## 2023-08-07 DIAGNOSIS — Z78.9 IMPAIRED MOBILITY AND ADLS: ICD-10-CM

## 2023-08-07 DIAGNOSIS — R13.10 DYSPHAGIA, UNSPECIFIED TYPE: ICD-10-CM

## 2023-08-07 DIAGNOSIS — E87.20 METABOLIC ACIDOSIS: ICD-10-CM

## 2023-08-07 DIAGNOSIS — L89.313 PRESSURE INJURY OF RIGHT BUTTOCK, STAGE 3: ICD-10-CM

## 2023-08-07 DIAGNOSIS — I50.813 ACUTE ON CHRONIC RIGHT-SIDED CONGESTIVE HEART FAILURE: Primary | ICD-10-CM

## 2023-08-07 DIAGNOSIS — I25.10 CORONARY ARTERY DISEASE DUE TO LIPID RICH PLAQUE: ICD-10-CM

## 2023-08-07 DIAGNOSIS — I25.83 CORONARY ARTERY DISEASE DUE TO LIPID RICH PLAQUE: ICD-10-CM

## 2023-08-07 DIAGNOSIS — Z74.09 IMPAIRED MOBILITY AND ADLS: ICD-10-CM

## 2023-08-07 DIAGNOSIS — L97.522 NON-PRESSURE CHRONIC ULCER OF OTHER PART OF LEFT FOOT WITH FAT LAYER EXPOSED: ICD-10-CM

## 2023-08-07 DIAGNOSIS — K29.80 DUODENITIS: ICD-10-CM

## 2023-08-07 DIAGNOSIS — Z87.898 HISTORY OF ALCOHOL USE: ICD-10-CM

## 2023-08-07 DIAGNOSIS — Z74.09 IMPAIRED MOBILITY: ICD-10-CM

## 2023-08-07 DIAGNOSIS — L89.320 PRESSURE INJURY OF LEFT BUTTOCK, UNSTAGEABLE: ICD-10-CM

## 2023-08-07 PROBLEM — E87.6 HYPOKALEMIA: Status: ACTIVE | Noted: 2023-08-07

## 2023-08-07 PROBLEM — I50.9 CHF (CONGESTIVE HEART FAILURE): Status: ACTIVE | Noted: 2023-08-07

## 2023-08-07 PROBLEM — R26.81 GAIT INSTABILITY: Status: ACTIVE | Noted: 2023-08-07

## 2023-08-07 LAB
ALBUMIN SERPL-MCNC: 4.5 G/DL (ref 3.5–5.2)
ALBUMIN/GLOB SERPL: 1.7 G/DL
ALP SERPL-CCNC: 80 U/L (ref 39–117)
ALT SERPL W P-5'-P-CCNC: 17 U/L (ref 1–41)
ANION GAP SERPL CALCULATED.3IONS-SCNC: 19 MMOL/L (ref 5–15)
ANION GAP SERPL CALCULATED.3IONS-SCNC: 24 MMOL/L (ref 5–15)
AST SERPL-CCNC: 34 U/L (ref 1–40)
BASOPHILS # BLD AUTO: 0.01 10*3/MM3 (ref 0–0.2)
BASOPHILS NFR BLD AUTO: 0.1 % (ref 0–1.5)
BILIRUB SERPL-MCNC: 0.8 MG/DL (ref 0–1.2)
BUN SERPL-MCNC: 20 MG/DL (ref 8–23)
BUN SERPL-MCNC: 20 MG/DL (ref 8–23)
BUN/CREAT SERPL: 18.7 (ref 7–25)
BUN/CREAT SERPL: 20 (ref 7–25)
CALCIUM SPEC-SCNC: 8.9 MG/DL (ref 8.6–10.5)
CALCIUM SPEC-SCNC: 9.3 MG/DL (ref 8.6–10.5)
CHLORIDE SERPL-SCNC: 89 MMOL/L (ref 98–107)
CHLORIDE SERPL-SCNC: 91 MMOL/L (ref 98–107)
CO2 SERPL-SCNC: 20 MMOL/L (ref 22–29)
CO2 SERPL-SCNC: 22 MMOL/L (ref 22–29)
CREAT SERPL-MCNC: 1 MG/DL (ref 0.76–1.27)
CREAT SERPL-MCNC: 1.07 MG/DL (ref 0.76–1.27)
D DIMER PPP FEU-MCNC: 2.42 MCGFEU/ML (ref 0–0.69)
DEPRECATED RDW RBC AUTO: 50.6 FL (ref 37–54)
EGFRCR SERPLBLD CKD-EPI 2021: 75.1 ML/MIN/1.73
EGFRCR SERPLBLD CKD-EPI 2021: 81.5 ML/MIN/1.73
EOSINOPHIL # BLD AUTO: 0.01 10*3/MM3 (ref 0–0.4)
EOSINOPHIL NFR BLD AUTO: 0.1 % (ref 0.3–6.2)
ERYTHROCYTE [DISTWIDTH] IN BLOOD BY AUTOMATED COUNT: 14.6 % (ref 12.3–15.4)
ETHANOL UR QL: <0.01 %
GEN 5 2HR TROPONIN T REFLEX: 29 NG/L
GLOBULIN UR ELPH-MCNC: 2.7 GM/DL
GLUCOSE SERPL-MCNC: 131 MG/DL (ref 65–99)
GLUCOSE SERPL-MCNC: 200 MG/DL (ref 65–99)
HCT VFR BLD AUTO: 36.3 % (ref 37.5–51)
HGB BLD-MCNC: 12.4 G/DL (ref 13–17.7)
HOLD SPECIMEN: NORMAL
IMM GRANULOCYTES # BLD AUTO: 0.08 10*3/MM3 (ref 0–0.05)
IMM GRANULOCYTES NFR BLD AUTO: 0.7 % (ref 0–0.5)
LYMPHOCYTES # BLD AUTO: 1.26 10*3/MM3 (ref 0.7–3.1)
LYMPHOCYTES NFR BLD AUTO: 11.8 % (ref 19.6–45.3)
MAGNESIUM SERPL-MCNC: 2.1 MG/DL (ref 1.6–2.4)
MCH RBC QN AUTO: 33.2 PG (ref 26.6–33)
MCHC RBC AUTO-ENTMCNC: 34.2 G/DL (ref 31.5–35.7)
MCV RBC AUTO: 97.1 FL (ref 79–97)
MONOCYTES # BLD AUTO: 0.48 10*3/MM3 (ref 0.1–0.9)
MONOCYTES NFR BLD AUTO: 4.5 % (ref 5–12)
NEUTROPHILS NFR BLD AUTO: 8.84 10*3/MM3 (ref 1.7–7)
NEUTROPHILS NFR BLD AUTO: 82.8 % (ref 42.7–76)
NRBC BLD AUTO-RTO: 0.2 /100 WBC (ref 0–0.2)
NT-PROBNP SERPL-MCNC: ABNORMAL PG/ML (ref 0–900)
PLATELET # BLD AUTO: 248 10*3/MM3 (ref 140–450)
PMV BLD AUTO: 11.3 FL (ref 6–12)
POTASSIUM SERPL-SCNC: 2.9 MMOL/L (ref 3.5–5.2)
POTASSIUM SERPL-SCNC: 2.9 MMOL/L (ref 3.5–5.2)
PROT SERPL-MCNC: 7.2 G/DL (ref 6–8.5)
QT INTERVAL: 358 MS
QTC INTERVAL: 493 MS
RBC # BLD AUTO: 3.74 10*6/MM3 (ref 4.14–5.8)
SODIUM SERPL-SCNC: 132 MMOL/L (ref 136–145)
SODIUM SERPL-SCNC: 133 MMOL/L (ref 136–145)
TROPONIN T DELTA: 1 NG/L
TROPONIN T SERPL HS-MCNC: 28 NG/L
WBC NRBC COR # BLD: 10.68 10*3/MM3 (ref 3.4–10.8)
WHOLE BLOOD HOLD COAG: NORMAL
WHOLE BLOOD HOLD COAG: NORMAL
WHOLE BLOOD HOLD SPECIMEN: NORMAL
WHOLE BLOOD HOLD SPECIMEN: NORMAL

## 2023-08-07 PROCEDURE — 83735 ASSAY OF MAGNESIUM: CPT | Performed by: EMERGENCY MEDICINE

## 2023-08-07 PROCEDURE — 85379 FIBRIN DEGRADATION QUANT: CPT | Performed by: EMERGENCY MEDICINE

## 2023-08-07 PROCEDURE — 36415 COLL VENOUS BLD VENIPUNCTURE: CPT

## 2023-08-07 PROCEDURE — 25010000002 ENOXAPARIN PER 10 MG: Performed by: EMERGENCY MEDICINE

## 2023-08-07 PROCEDURE — 85025 COMPLETE CBC W/AUTO DIFF WBC: CPT | Performed by: EMERGENCY MEDICINE

## 2023-08-07 PROCEDURE — 84484 ASSAY OF TROPONIN QUANT: CPT | Performed by: EMERGENCY MEDICINE

## 2023-08-07 PROCEDURE — 71275 CT ANGIOGRAPHY CHEST: CPT

## 2023-08-07 PROCEDURE — 25010000002 FUROSEMIDE PER 20 MG: Performed by: EMERGENCY MEDICINE

## 2023-08-07 PROCEDURE — 83880 ASSAY OF NATRIURETIC PEPTIDE: CPT | Performed by: EMERGENCY MEDICINE

## 2023-08-07 PROCEDURE — 99285 EMERGENCY DEPT VISIT HI MDM: CPT

## 2023-08-07 PROCEDURE — 25010000002 THIAMINE PER 100 MG: Performed by: NURSE PRACTITIONER

## 2023-08-07 PROCEDURE — 93005 ELECTROCARDIOGRAM TRACING: CPT | Performed by: EMERGENCY MEDICINE

## 2023-08-07 PROCEDURE — 0 POTASSIUM CHLORIDE PER 2 MEQ: Performed by: EMERGENCY MEDICINE

## 2023-08-07 PROCEDURE — 80053 COMPREHEN METABOLIC PANEL: CPT | Performed by: EMERGENCY MEDICINE

## 2023-08-07 PROCEDURE — 71045 X-RAY EXAM CHEST 1 VIEW: CPT

## 2023-08-07 PROCEDURE — 25510000001 IOPAMIDOL PER 1 ML: Performed by: EMERGENCY MEDICINE

## 2023-08-07 PROCEDURE — 82077 ASSAY SPEC XCP UR&BREATH IA: CPT | Performed by: EMERGENCY MEDICINE

## 2023-08-07 RX ORDER — ASPIRIN 81 MG/1
324 TABLET, CHEWABLE ORAL ONCE
Status: DISCONTINUED | OUTPATIENT
Start: 2023-08-07 | End: 2023-08-15 | Stop reason: HOSPADM

## 2023-08-07 RX ORDER — ENOXAPARIN SODIUM 100 MG/ML
1 INJECTION SUBCUTANEOUS ONCE
Status: COMPLETED | OUTPATIENT
Start: 2023-08-07 | End: 2023-08-07

## 2023-08-07 RX ORDER — THIAMINE HYDROCHLORIDE 100 MG/ML
200 INJECTION, SOLUTION INTRAMUSCULAR; INTRAVENOUS EVERY 8 HOURS SCHEDULED
Status: COMPLETED | OUTPATIENT
Start: 2023-08-07 | End: 2023-08-12

## 2023-08-07 RX ORDER — SODIUM CHLORIDE 0.9 % (FLUSH) 0.9 %
10 SYRINGE (ML) INJECTION AS NEEDED
Status: DISCONTINUED | OUTPATIENT
Start: 2023-08-07 | End: 2023-08-15 | Stop reason: HOSPADM

## 2023-08-07 RX ORDER — ACETAMINOPHEN 650 MG/1
650 SUPPOSITORY RECTAL EVERY 4 HOURS PRN
Status: DISCONTINUED | OUTPATIENT
Start: 2023-08-07 | End: 2023-08-15 | Stop reason: HOSPADM

## 2023-08-07 RX ORDER — PANTOPRAZOLE SODIUM 40 MG/1
40 TABLET, DELAYED RELEASE ORAL
Status: DISCONTINUED | OUTPATIENT
Start: 2023-08-07 | End: 2023-08-15 | Stop reason: HOSPADM

## 2023-08-07 RX ORDER — LORAZEPAM 1 MG/1
2 TABLET ORAL
Status: DISCONTINUED | OUTPATIENT
Start: 2023-08-07 | End: 2023-08-11

## 2023-08-07 RX ORDER — ASPIRIN 81 MG/1
81 TABLET ORAL DAILY
Status: DISCONTINUED | OUTPATIENT
Start: 2023-08-08 | End: 2023-08-15 | Stop reason: HOSPADM

## 2023-08-07 RX ORDER — LORAZEPAM 1 MG/1
1 TABLET ORAL
Status: DISPENSED | OUTPATIENT
Start: 2023-08-07 | End: 2023-08-14

## 2023-08-07 RX ORDER — LORAZEPAM 1 MG/1
1 TABLET ORAL EVERY 6 HOURS
Status: DISCONTINUED | OUTPATIENT
Start: 2023-08-08 | End: 2023-08-09

## 2023-08-07 RX ORDER — HYDROCHLOROTHIAZIDE 25 MG/1
25 TABLET ORAL
Status: DISCONTINUED | OUTPATIENT
Start: 2023-08-07 | End: 2023-08-09

## 2023-08-07 RX ORDER — BISACODYL 10 MG
10 SUPPOSITORY, RECTAL RECTAL DAILY PRN
Status: DISCONTINUED | OUTPATIENT
Start: 2023-08-07 | End: 2023-08-15 | Stop reason: HOSPADM

## 2023-08-07 RX ORDER — FOLIC ACID 1 MG/1
1 TABLET ORAL DAILY
Status: DISCONTINUED | OUTPATIENT
Start: 2023-08-07 | End: 2023-08-15 | Stop reason: HOSPADM

## 2023-08-07 RX ORDER — LORAZEPAM 2 MG/ML
2 INJECTION INTRAMUSCULAR
Status: DISCONTINUED | OUTPATIENT
Start: 2023-08-07 | End: 2023-08-11

## 2023-08-07 RX ORDER — POLYETHYLENE GLYCOL 3350 17 G/17G
17 POWDER, FOR SOLUTION ORAL DAILY PRN
Status: DISCONTINUED | OUTPATIENT
Start: 2023-08-07 | End: 2023-08-15 | Stop reason: HOSPADM

## 2023-08-07 RX ORDER — ONDANSETRON 4 MG/1
4 TABLET, FILM COATED ORAL EVERY 6 HOURS PRN
Status: DISCONTINUED | OUTPATIENT
Start: 2023-08-07 | End: 2023-08-15 | Stop reason: HOSPADM

## 2023-08-07 RX ORDER — SODIUM CHLORIDE 9 MG/ML
40 INJECTION, SOLUTION INTRAVENOUS AS NEEDED
Status: DISCONTINUED | OUTPATIENT
Start: 2023-08-07 | End: 2023-08-15 | Stop reason: HOSPADM

## 2023-08-07 RX ORDER — ACETAMINOPHEN 325 MG/1
650 TABLET ORAL EVERY 4 HOURS PRN
Status: DISCONTINUED | OUTPATIENT
Start: 2023-08-07 | End: 2023-08-15 | Stop reason: HOSPADM

## 2023-08-07 RX ORDER — LORAZEPAM 2 MG/ML
1 INJECTION INTRAMUSCULAR
Status: DISPENSED | OUTPATIENT
Start: 2023-08-07 | End: 2023-08-14

## 2023-08-07 RX ORDER — SUCRALFATE 1 G/1
1 TABLET ORAL
Status: DISCONTINUED | OUTPATIENT
Start: 2023-08-07 | End: 2023-08-15 | Stop reason: HOSPADM

## 2023-08-07 RX ORDER — METOPROLOL SUCCINATE 100 MG/1
100 TABLET, EXTENDED RELEASE ORAL DAILY
Status: DISCONTINUED | OUTPATIENT
Start: 2023-08-07 | End: 2023-08-09

## 2023-08-07 RX ORDER — ENOXAPARIN SODIUM 100 MG/ML
40 INJECTION SUBCUTANEOUS DAILY
Status: DISCONTINUED | OUTPATIENT
Start: 2023-08-08 | End: 2023-08-07 | Stop reason: SDUPTHER

## 2023-08-07 RX ORDER — BISACODYL 5 MG/1
5 TABLET, DELAYED RELEASE ORAL DAILY PRN
Status: DISCONTINUED | OUTPATIENT
Start: 2023-08-07 | End: 2023-08-15 | Stop reason: HOSPADM

## 2023-08-07 RX ORDER — AMOXICILLIN 250 MG
1 CAPSULE ORAL NIGHTLY PRN
Status: DISCONTINUED | OUTPATIENT
Start: 2023-08-07 | End: 2023-08-15 | Stop reason: HOSPADM

## 2023-08-07 RX ORDER — FUROSEMIDE 10 MG/ML
80 INJECTION INTRAMUSCULAR; INTRAVENOUS ONCE
Status: COMPLETED | OUTPATIENT
Start: 2023-08-07 | End: 2023-08-07

## 2023-08-07 RX ORDER — LOSARTAN POTASSIUM 50 MG/1
100 TABLET ORAL
Status: DISCONTINUED | OUTPATIENT
Start: 2023-08-07 | End: 2023-08-09

## 2023-08-07 RX ORDER — ONDANSETRON 2 MG/ML
4 INJECTION INTRAMUSCULAR; INTRAVENOUS EVERY 6 HOURS PRN
Status: DISCONTINUED | OUTPATIENT
Start: 2023-08-07 | End: 2023-08-15 | Stop reason: HOSPADM

## 2023-08-07 RX ORDER — ACETAMINOPHEN 160 MG/5ML
650 SOLUTION ORAL EVERY 4 HOURS PRN
Status: DISCONTINUED | OUTPATIENT
Start: 2023-08-07 | End: 2023-08-15 | Stop reason: HOSPADM

## 2023-08-07 RX ORDER — ENOXAPARIN SODIUM 100 MG/ML
40 INJECTION SUBCUTANEOUS
Status: DISCONTINUED | OUTPATIENT
Start: 2023-08-08 | End: 2023-08-15 | Stop reason: HOSPADM

## 2023-08-07 RX ORDER — SODIUM CHLORIDE 0.9 % (FLUSH) 0.9 %
10 SYRINGE (ML) INJECTION EVERY 12 HOURS SCHEDULED
Status: DISCONTINUED | OUTPATIENT
Start: 2023-08-07 | End: 2023-08-15 | Stop reason: HOSPADM

## 2023-08-07 RX ORDER — LORAZEPAM 1 MG/1
2 TABLET ORAL EVERY 6 HOURS
Status: COMPLETED | OUTPATIENT
Start: 2023-08-07 | End: 2023-08-08

## 2023-08-07 RX ORDER — PAROXETINE 10 MG/1
10 TABLET, FILM COATED ORAL NIGHTLY
Status: DISCONTINUED | OUTPATIENT
Start: 2023-08-07 | End: 2023-08-11

## 2023-08-07 RX ORDER — POTASSIUM CHLORIDE 29.8 MG/ML
20 INJECTION INTRAVENOUS ONCE
Status: COMPLETED | OUTPATIENT
Start: 2023-08-07 | End: 2023-08-07

## 2023-08-07 RX ADMIN — THIAMINE HYDROCHLORIDE 200 MG: 100 INJECTION, SOLUTION INTRAMUSCULAR; INTRAVENOUS at 18:02

## 2023-08-07 RX ADMIN — ENOXAPARIN SODIUM 80 MG: 100 INJECTION SUBCUTANEOUS at 18:02

## 2023-08-07 RX ADMIN — FUROSEMIDE 80 MG: 10 INJECTION, SOLUTION INTRAVENOUS at 15:42

## 2023-08-07 RX ADMIN — LORAZEPAM 2 MG: 1 TABLET ORAL at 22:26

## 2023-08-07 RX ADMIN — PANTOPRAZOLE SODIUM 40 MG: 40 TABLET, DELAYED RELEASE ORAL at 18:02

## 2023-08-07 RX ADMIN — IOPAMIDOL 86 ML: 755 INJECTION, SOLUTION INTRAVENOUS at 14:57

## 2023-08-07 RX ADMIN — LOSARTAN POTASSIUM 100 MG: 50 TABLET, FILM COATED ORAL at 18:02

## 2023-08-07 RX ADMIN — LORAZEPAM 2 MG: 1 TABLET ORAL at 16:59

## 2023-08-07 RX ADMIN — POTASSIUM CHLORIDE 20 MEQ: 29.8 INJECTION, SOLUTION INTRAVENOUS at 15:32

## 2023-08-07 RX ADMIN — SUCRALFATE 1 G: 1 TABLET ORAL at 21:25

## 2023-08-07 RX ADMIN — Medication 10 ML: at 21:26

## 2023-08-07 RX ADMIN — PAROXETINE 10 MG: 10 TABLET, FILM COATED ORAL at 21:25

## 2023-08-07 RX ADMIN — SUCRALFATE 1 G: 1 TABLET ORAL at 18:02

## 2023-08-07 RX ADMIN — THIAMINE HYDROCHLORIDE 200 MG: 100 INJECTION, SOLUTION INTRAMUSCULAR; INTRAVENOUS at 21:25

## 2023-08-07 RX ADMIN — METOPROLOL SUCCINATE 100 MG: 100 TABLET, FILM COATED, EXTENDED RELEASE ORAL at 18:02

## 2023-08-07 RX ADMIN — FOLIC ACID 1 MG: 1 TABLET ORAL at 21:25

## 2023-08-07 RX ADMIN — HYDROCHLOROTHIAZIDE 25 MG: 25 TABLET ORAL at 18:02

## 2023-08-07 NOTE — H&P
"    [facility] Medicine Services  HISTORY AND PHYSICAL    Date of Admission: 8/7/2023  Primary Care Physician: Herberth Nguyen Jr., MD    Subjective   Primary Historian: Patient    Chief Complaint: Weakness    History of Present Illness  Dayron Lubin is a 68-year-old male with a past medical history of CAD, prostate cancer, hyperlipidemia, hypertension, alcohol abuse.  Patient presented to emergency department with current plaints of weakness.  He states he thinks his last alcohol use was 2 days ago.  When asked how much he drinks he states \"a lot\".  He is unable to quantify.  He denies shortness of breathing, chest pain, abdominal pain.  Work-up reveals hypokalemia, elevated proBNP, mild hyponatremia.  Imaging reveals mild duodenitis.  Patient currently complaining only of weakness, he is anxious and jittery.  He states he does have DTs.  He states he is having difficulty ambulating due to weakness.  He is admitted for further evaluation and treatment.    Review of Systems   Otherwise complete ROS reviewed and negative except as mentioned in the HPI.    Past Medical History:   Past Medical History:   Diagnosis Date    Alcoholic hepatitis 7/13/2023    CAD (coronary artery disease)     History of external beam radiation therapy 05/12/2016    6840 cGy to prostate bed    Hyperlipidemia     Hypertension     Prostate cancer      Past Surgical History:  Past Surgical History:   Procedure Laterality Date    CARDIAC CATHETERIZATION      CORONARY ANGIOPLASTY WITH STENT PLACEMENT      PROSTATE SURGERY      TONSILLECTOMY       Social History:  reports that he has never smoked. He has never used smokeless tobacco. He reports current alcohol use. He reports current drug use. Drug: Marijuana.    Family History: family history includes Coronary artery disease in his mother; Stroke in his mother.       Allergies:  No Known Allergies    Medications:  Prior to Admission medications    Medication " "Sig Start Date End Date Taking? Authorizing Provider   aspirin (aspirin) 81 MG EC tablet Take 1 tablet by mouth Daily. 12/23/20   Edda Li APRN   losartan-hydrochlorothiazide (HYZAAR) 100-25 MG per tablet Take 1 tablet by mouth Daily. 9/27/16   Steve Devries MD   metoprolol succinate XL (TOPROL-XL) 100 MG 24 hr tablet Take 1 tablet by mouth Daily. 2/3/22   Steve Devries MD   niacin 500 MG tablet Take 1 tablet by mouth Every Night.    Steve Devries MD   pantoprazole (PROTONIX) 40 MG EC tablet Take 1 tablet by mouth Daily.    Steve Devries MD   PARoxetine (PAXIL) 10 MG tablet Take 1 tablet by mouth Every Night. 7/15/23   Dick Carrillo MD   sucralfate (CARAFATE) 1 g tablet TAKE 1 TABLET EVERY 6 HOURS, 30 MINUTES BEFORE MEALS AND AT BEDTIME 12/21/22   Steve Devries MD   temazepam (RESTORIL) 30 MG capsule TAKE ONE CAPSULE BY MOUTH AT BEDTIME AS NEEDED FOR SLEEP 9/23/16   Steve Devries MD     I have utilized all available immediate resources to obtain, update, or review the patient's current medications (including all prescriptions, over-the-counter products, herbals, cannabis/cannabidiol products, and vitamin/mineral/dietary (nutritional) supplements).    Objective     Vital Signs: BP (!) 149/102 (BP Location: Right arm, Patient Position: Lying) Comment: nurse was notfied  Pulse 113   Temp 98.4 øF (36.9 øC) (Oral)   Resp 18   Ht 180.3 cm (71\")   Wt 77.1 kg (170 lb)   SpO2 97%   BMI 23.71 kg/mý   Physical Exam  Vitals reviewed.   Constitutional:       Appearance: He is ill-appearing.   HENT:      Head: Normocephalic and atraumatic.      Mouth/Throat:      Mouth: Mucous membranes are moist.      Pharynx: Oropharynx is clear.   Eyes:      Extraocular Movements: Extraocular movements intact.      Conjunctiva/sclera: Conjunctivae normal.   Cardiovascular:      Rate and Rhythm: Regular rhythm. Tachycardia present.   Pulmonary:      Effort: Pulmonary effort is " normal.      Comments: Tachypneic  Abdominal:      General: There is no distension.      Palpations: Abdomen is soft.   Musculoskeletal:      Cervical back: Normal range of motion and neck supple.      Right lower leg: No edema.      Left lower leg: No edema.      Comments: Generalized weakness and debility   Skin:     General: Skin is warm and dry.   Neurological:      General: No focal deficit present.      Mental Status: He is alert and oriented to person, place, and time.      Comments: Alert and oriented   Psychiatric:         Mood and Affect: Mood is anxious.         Behavior: Behavior is slowed. Behavior is cooperative.        Results Reviewed:  Lab Results (last 24 hours)       Procedure Component Value Units Date/Time    Ethanol [[id number]] Collected: 08/07/23 1406    Specimen: Blood Updated: 08/07/23 1600     Ethanol % <0.010 %     Magnesium [[id number]]  (Normal) Collected: 08/07/23 1406    Specimen: Blood Updated: 08/07/23 1500     Magnesium 2.1 mg/dL     Comprehensive Metabolic Panel [[id number]]  (Abnormal) Collected: 08/07/23 1406    Specimen: Blood Updated: 08/07/23 1446     Glucose 200 mg/dL      BUN 20 mg/dL      Creatinine 1.07 mg/dL      Sodium 133 mmol/L      Potassium 2.9 mmol/L      Chloride 89 mmol/L      CO2 20.0 mmol/L      Calcium 9.3 mg/dL      Total Protein 7.2 g/dL      Albumin 4.5 g/dL      ALT (SGPT) 17 U/L      AST (SGOT) 34 U/L      Alkaline Phosphatase 80 U/L      Total Bilirubin 0.8 mg/dL      Globulin 2.7 gm/dL      A/G Ratio 1.7 g/dL      BUN/Creatinine Ratio 18.7     Anion Gap 24.0 mmol/L      eGFR 75.1 mL/min/1.73     BNP [[id number]]  (Abnormal) Collected: 08/07/23 1406    Specimen: Blood Updated: 08/07/23 1444     proBNP 15,027.0 pg/mL     High Sensitivity Troponin T [[id number]]  (Abnormal) Collected: 08/07/23 1406    Specimen: Blood Updated: 08/07/23 1443     HS Troponin T 28 ng/L     D-dimer, Quantitative [[id number]]  (Abnormal) Collected: 08/07/23 1406    Specimen: Blood  Updated: 08/07/23 1440     D-Dimer, Quantitative 2.42 MCGFEU/mL     CBC Auto Differential [[id number]]  (Abnormal) Collected: 08/07/23 1406    Specimen: Blood Updated: 08/07/23 1427     WBC 10.68 10*3/mm3      RBC 3.74 10*6/mm3      Hemoglobin 12.4 g/dL      Hematocrit 36.3 %      MCV 97.1 fL      MCH 33.2 pg      MCHC 34.2 g/dL      RDW 14.6 %      RDW-SD 50.6 fl      MPV 11.3 fL      Platelets 248 10*3/mm3      Neutrophil % 82.8 %      Lymphocyte % 11.8 %      Monocyte % 4.5 %      Eosinophil % 0.1 %      Basophil % 0.1 %      Immature Grans % 0.7 %      Neutrophils, Absolute 8.84 10*3/mm3      Lymphocytes, Absolute 1.26 10*3/mm3      Monocytes, Absolute 0.48 10*3/mm3      Eosinophils, Absolute 0.01 10*3/mm3      Basophils, Absolute 0.01 10*3/mm3      Immature Grans, Absolute 0.08 10*3/mm3      nRBC 0.2 /100 WBC           Imaging Results (Last 24 Hours)       Procedure Component Value Units Date/Time    CT Angiogram Chest [[id number]] Collected: 08/07/23 1503     Updated: 08/07/23 1531    Narrative:      Exam: CT angiogram of the of the chest with intravenous contrast.     CLINICAL HISTORY:  Suspected pulmonary embolism. Sudden onset shortness  of breath.     DOSE:  212 mGycm. All CT scans are performed using dose optimization  techniques as appropriate to the performed exam and including at least  one of the following: Automated exposure control, adjustment of the mA  and/or kV according to size, and the use of the iterative reconstruction  technique.     TECHNIQUE: Contiguous axial images were obtained through the thorax  following intravenous contrast administration with reformatted images  obtained in the sagittal and coronal projections from the original data  set. Three-dimensional reconstructions are also obtained.     COMPARISON:  07/17/2015     FINDINGS:     Pulmonary arteries:  There is normal enhancement of the pulmonary  arteries with no evidence of pulmonary thromboembolic disease.     Aorta:  There is  mild dilatation of ascending thoracic aorta with a  transverse diameter of 3.8 cm. No evidence of focal aneurysm.  Radiodensity is noted at the level of the aortic valvular related to  heavy calcification of the aortic valve leaflets or previous aortic  valve replacement. The proximal great vessels demonstrate mild  atheromatous calcification without rate limiting stenosis or aneurysm.     LUNGS:  The lungs are clear. No evidence of lobar consolidation.     Pleural spaces: Unremarkable. No evidence of pleural effusion or  pneumothorax.     HEART: There is mild cardiomegaly. Heavy coronary calcifications are  present. There is elevated right heart pressure with reflux of contrast  into the intrahepatic IVC. There is no pericardial effusion.     Bones: No acute osseous abnormalities are demonstrated.     CHEST WALL: No chest wall abnormalities are demonstrated.     Lymph nodes: No enlarged mediastinal or axillary lymphadenopathy is  present.     Upper abdomen: Steatosis of the liver is present. There is prominence of  the wall of the proximal descending duodenum with mild adjacent  stranding suspicious for duodenitis. No evidence of gastric outlet  obstruction.       Impression:      1. No evidence of pulmonary thromboembolic disease. The thoracic aorta  and great vessels are ectatic. No evidence of focal aneurysm or  dissection.  2. Mild cardiomegaly with heavy coronary calcifications. Radiodensity at  the level of the aortic root is present either related to previous  aortic valve replacement or heavy calcification of the aortic valve  leaflets. There is elevated right heart pressure with reflux of contrast  into the intrahepatic IVC.  3. Steatosis of the liver.  4. Prominence of the wall of the descending duodenum with mild adjacent  stranding suspicious for duodenitis.  This report was finalized on 08/07/2023 15:28 by Dr. Vicente Sam MD.    XR Chest 1 View [[id number]] Collected: 08/07/23 2077     Updated:  08/07/23 1454    Narrative:      EXAMINATION: XR CHEST 1 VW- 8/7/2023 2:47 PM CDT     HISTORY: Chest Pain Protocol     REPORT: A frontal view of the chest was obtained.     COMPARISON: Chest x-ray 07/13/2023.     There is mild bibasilar atelectasis, no focal pulmonary infiltrate is  identified. There appears be mild pleural thickening at the left  costophrenic angle. No free flowing effusion is identified. There is no  pneumothorax. Heart size is normal. The osseous structures are  unremarkable.       Impression:      Hypoaeration of lungs with bibasilar atelectasis, no  evidence of pneumonia. Probable mild chronic pleural thickening at the  left costophrenic angle.  This report was finalized on 08/07/2023 14:49 by Dr. Nitin Stone MD.          Assessment / Plan   Assessment:   Active Hospital Problems    Diagnosis     Hypokalemia     Duodenitis     Gait instability     Elevated brain natriuretic peptide (BNP) level     ETOH abuse        Treatment Plan  1.  The patient will be admitted to Dr. Orozco's service here at [facility].   2.  CIWA protocol  3.  Home medications reviewed and restarted as appropriate, will not continue full dose Lovenox nor IV diuretics  4.  Place potassium  5.  Increase Carafate dose from twice daily to 4 times a day and Protonix from daily to twice daily  6.  Continuous pulse oximetry, incentive spirometry, oxygen therapy  7.  Cardiac monitoring, daily weights, safety for cautions, seizure precaution  8.  Consult PT and OT  9.  Consult  as patient may benefit benefit from rehab placement  10.  Labs in a.m.    Medical Decision Making  Number and Complexity of problems: 5  Differential Diagnosis: None    Conditions and Status        Condition is unchanged.     [facility] Data  External documents reviewed: None  Cardiac tracing (EKG, telemetry) interpretation: Reviewed  Radiology interpretation: Reviewed  Labs reviewed: Yes  Any tests that were considered but not ordered:  No     Decision rules/scores evaluated (example [id number], Wells, etc): No     Discussed with: Patient and Dr. Campos     Care Planning  Shared decision making: Patient and Dr. Orozco  Code status and discussions: Full    Disposition  Social Determinants of Health that impact treatment or disposition: None  Estimated length of stay is 2+ days.     I confirmed that the patient's advanced care plan is present, code status is documented, and a surrogate decision maker is listed in the patient's medical record.     The patient's surrogate decision maker is Sister Yuliya.     The patient was seen and examined by me on 8/7/2023 at 408.    Electronically signed by BRI Paredes, 08/07/23, 17:23 CDT.

## 2023-08-07 NOTE — ED PROVIDER NOTES
Subjective   History of Present Illness  Patient is a 68-year-old gentleman who came the ER, concern onset of shortness of breath and chest discomfort.  Given the ER for evaluation he is tachycardic at this time.    Shortness of Breath  Severity:  Moderate  Onset quality:  Sudden  Timing:  Constant  Progression:  Worsening  Chronicity:  Recurrent  Context: activity    Context: not animal exposure, not emotional upset, not occupational exposure, not pollens and not URI    Relieved by:  Nothing  Worsened by:  Exertion  Ineffective treatments:  None tried  Associated symptoms: diaphoresis, neck pain and vomiting    Associated symptoms: no abdominal pain, no chest pain, no claudication, no cough, no headaches, no hemoptysis, no sore throat, no sputum production and no wheezing    Risk factors: no recent alcohol use, no hx of PE/DVT and no recent surgery    Vomiting  The primary symptoms include nausea and vomiting. Primary symptoms do not include abdominal pain.   The illness does not include back pain.   Nausea  The primary symptoms include nausea and vomiting. Primary symptoms do not include abdominal pain.   The illness does not include back pain.     Review of Systems   Constitutional:  Positive for diaphoresis.   HENT: Negative.  Negative for sore throat.    Respiratory:  Positive for shortness of breath. Negative for cough, hemoptysis, sputum production and wheezing.    Cardiovascular:  Negative for chest pain and claudication.   Gastrointestinal:  Positive for nausea and vomiting. Negative for abdominal distention and abdominal pain.   Endocrine: Negative.    Genitourinary: Negative.    Musculoskeletal:  Positive for neck pain. Negative for back pain.   Skin:  Negative for color change and pallor.   Neurological: Negative.  Negative for syncope, weakness, light-headedness, numbness and headaches.   Hematological: Negative.  Does not bruise/bleed easily.   All other systems reviewed and are negative.    Past  Medical History:   Diagnosis Date    Alcoholic hepatitis 7/13/2023    CAD (coronary artery disease)     History of external beam radiation therapy 05/12/2016    6840 cGy to prostate bed    Hyperlipidemia     Hypertension     Prostate cancer        No Known Allergies    Past Surgical History:   Procedure Laterality Date    CARDIAC CATHETERIZATION      CORONARY ANGIOPLASTY WITH STENT PLACEMENT      PROSTATE SURGERY      TONSILLECTOMY         Family History   Problem Relation Age of Onset    Coronary artery disease Mother     Stroke Mother        Social History     Socioeconomic History    Marital status: Single   Tobacco Use    Smoking status: Never    Smokeless tobacco: Never   Vaping Use    Vaping Use: Never used   Substance and Sexual Activity    Alcohol use: Yes     Comment: very little     Drug use: Yes     Types: Marijuana    Sexual activity: Defer           Objective   Physical Exam  Vitals and nursing note reviewed. Exam conducted with a chaperone present.   Constitutional:       General: He is not in acute distress.     Appearance: Normal appearance. He is ill-appearing.   HENT:      Head: Normocephalic.      Nose: Nose normal.      Mouth/Throat:      Mouth: Mucous membranes are moist.   Eyes:      Pupils: Pupils are equal, round, and reactive to light.   Cardiovascular:      Rate and Rhythm: Tachycardia present. No extrasystoles are present.     Chest Wall: PMI is not displaced.      Pulses: Normal pulses.      Heart sounds: Normal heart sounds. No murmur heard.  No diastolic murmur is present.     No gallop. S3 sounds present.   Pulmonary:      Effort: Pulmonary effort is normal. Tachypnea present. No respiratory distress or retractions.      Breath sounds: No stridor or decreased air movement. Examination of the right-lower field reveals rales. Examination of the left-lower field reveals rales. No decreased breath sounds or rales.   Chest:      Chest wall: No tenderness.   Abdominal:      General: Abdomen  is protuberant. Bowel sounds are normal. There is no distension or abdominal bruit.      Palpations: Abdomen is soft. There is no mass or pulsatile mass.      Tenderness: There is no abdominal tenderness.   Musculoskeletal:      Cervical back: Normal range of motion.      Right lower leg: No edema.      Left lower leg: No edema.   Skin:     General: Skin is warm.      Capillary Refill: Capillary refill takes less than 2 seconds.      Coloration: Skin is not cyanotic, mottled or pale.   Neurological:      General: No focal deficit present.      Mental Status: He is alert and oriented to person, place, and time. He is confused.      GCS: GCS eye subscore is 4. GCS verbal subscore is 5. GCS motor subscore is 6.      Cranial Nerves: Cranial nerves 2-12 are intact. No cranial nerve deficit.      Sensory: No sensory deficit.      Motor: No weakness.   Psychiatric:         Mood and Affect: Mood normal.         Thought Content: Thought content normal.       Procedures           ED Course  ED Course as of 08/28/23 1849   Mon Aug 07, 2023   1411 Tachycardia nonspecific ST wave changes nothing acute [TS]   1422 Discussed with Dr. Rojo [TS]   1555 This patient came in with sudden onset of shortness of breath.  Was tachycardic.  Well score of 4.5.  CT of the chest abdomen is negative for PE.  Patient is hypokalemic.  Review of the chart reveals that he is to be admitted to the hospital for the same thing last echo was in 2019 patient has history of alcohol use his anion gap is slightly elevated alcohol level is pending.  The first troponin level is 28.  His initial EKG sent by the EMS was called in as a possible STEMI I have reviewed these EKGs with cardiology the patient does not appear to be having a STEMI at this time.  The patient will be admitted to the medicine service. [TS]   1557 No clinical evidence of DVT in the lower extremities. [TS]   1557 1. No evidence of pulmonary thromboembolic disease. The thoracic aorta  and  great vessels are ectatic. No evidence of focal aneurysm or  dissection.  2. Mild cardiomegaly with heavy coronary calcifications. Radiodensity at  the level of the aortic root is present either related to previous  aortic valve replacement or heavy calcification of the aortic valve  leaflets. There is elevated right heart pressure with reflux of contrast  into the intrahepatic IVC.  3. Steatosis of the liver.  4. Prominence of the wall of the descending duodenum with mild adjacent  stranding suspicious for duodenitis.  This was discussed with the patient [TS]   1601 Patient's dimer is elevated with a CT of the chest is negative for PE no evidence of DVT.  Hypokalemia [TS]   1609 Heart score 5 [TS]      ED Course User Index  [TS] Gautam Bell MD                                           Medical Decision Making  Differential Diagnosis:  I considered pulmonary etiology, asthma, chronic obstructive pulmonary disease, pneumonia, pulmonary embolism, adult respiratory distress syndrome, pneumothorax, pleural effusion, pulmonary fibrosis, cardiac etiology, congestive heart failure, myocardial infarction, metabolic etiology, diabetic ketoacidosis, uremia, acidosis, sepsis, anemia, drug related etiology, hyperventilation and CNS disease as a possible cause of dyspnea in this patient. This is a partial list of diagnoses considered.           Problems Addressed:  Acute on chronic right-sided congestive heart failure: complicated acute illness or injury     Details: Patient's last echo was 2019 he is got a history of cardiomyopathy which could be secondary to alcohol use.  Versus ischemia.  Denies any chest pain at this time.  Is very teary eyed.  Was given Lasix 80 mg IV and is going be admitted to medicine service not have any pain at this time.  Coronary artery disease due to lipid rich plaque: complicated acute illness or injury     Details: Coronary disease with last stent about 7 years ago.  He states that he is  compliant with his medications.  Duodenitis: complicated acute illness or injury     Details: cxT shows duodenitis there is no clinical evidence of any epigastric pain or abdominal pain at this time.  History of alcohol use: complicated acute illness or injury     Details: Patient has got history alcohol abuse.  Metabolic acidosis: complicated acute illness or injury    Amount and/or Complexity of Data Reviewed  Labs: ordered.     Details: Labs reviewed  Radiology: ordered.  ECG/medicine tests: ordered.     Details: EKG shows no evidence of acute ischemia except some nonspecific changes in the high lateral leads.  Which have been seen in the past also.  Discussion of management or test interpretation with external provider(s): Discussed with Dr. Rojo    Discussed with Dr. Nowak    Risk  OTC drugs.  Prescription drug management.  Decision regarding hospitalization.  Risk Details: Patient will be admitted for rule out protocol elevated cardiac markers.  With CHF and right heart failure.  Not having chest pain at this time.        Final diagnoses:   Acute on chronic right-sided congestive heart failure   Coronary artery disease due to lipid rich plaque   Duodenitis   History of alcohol use   Metabolic acidosis       ED Disposition  ED Disposition       ED Disposition   Decision to Admit    Condition   --    Comment   Level of Care: Telemetry [5]   Diagnosis: CHF (congestive heart failure) [162355]   Admitting Physician: JAMA NOWAK [[id number]]   Attending Physician: JAMA NOWAK [[id number]]   Certification: I Certify That Inpatient Hospital Services Are Medically Necessary For Greater Than 2 Midnights                 [facility]  [address] [place] [address]  [facility] [id number] [phone]  Follow up on 8/16/2023  @ 8:15    [facility]  [address] [facility] [address]  [phone]        Herberth Nguyen Jr., MD  [address] [facility]  [address]  [phone]      1 week after discharge from [facility]         Medication List        New Prescriptions      chlordiazePOXIDE 10 MG capsule  Commonly known as: LIBRIUM  Take 1 capsule by mouth 3 (Three) Times a Day As Needed for Anxiety for up to 9 doses.     collagenase 250 UNIT/GM ointment  Apply 1 application  topically to the appropriate area as directed Daily.     folic acid 1 MG tablet  Commonly known as: FOLVITE  Take 1 tablet by mouth Daily.     multivitamin with minerals tablet tablet  Take 1 tablet by mouth Daily.     risperiDONE 1 MG tablet  Commonly known as: risperDAL  Take 1 tablet by mouth Every Night.            Changed      metoprolol succinate XL 25 MG 24 hr tablet  Commonly known as: TOPROL-XL  Take 1 tablet by mouth Daily.  What changed:   medication strength  how much to take  when to take this     pantoprazole 40 MG EC tablet  Commonly known as: PROTONIX  Take 1 tablet by mouth 2 (Two) Times a Day Before Meals.  What changed: when to take this     PARoxetine 20 MG tablet  Commonly known as: PAXIL  Take 1 tablet by mouth Every Night.  What changed:   medication strength  how much to take            Stop      losartan-hydrochlorothiazide 100-25 MG per tablet  Commonly known as: HYZAAR     niacin 500 MG tablet               Where to Get Your Medications        These medications were sent to [place], [place] - [address] [place] [address] [phone] [place] [phone] [address] [address]      Phone: [phone]   chlordiazePOXIDE 10 MG capsule  collagenase 250 UNIT/GM ointment  folic acid 1 MG tablet  metoprolol succinate XL 25 MG 24 hr tablet  multivitamin with minerals tablet tablet  risperiDONE 1 MG tablet       Information about where to get these medications is not yet available    Ask your nurse or doctor about these medications  pantoprazole 40 MG EC tablet  PARoxetine 20 MG tablet            Gautam Bell MD  08/07/23 1601       Arabella  MD Gautam  08/07/23 1606       Gautam Bell MD  08/07/23 1601       Gautam Bell MD  08/28/23 0211

## 2023-08-07 NOTE — PLAN OF CARE
Goal Outcome Evaluation:  Plan of Care Reviewed With: patient        Progress: no change  Outcome Evaluation: Admitted from ER with CHF. VSS. Denies pain since arrival to floor. CIWA protocol in place. Pt states he has 10 beverages of vodka per day. Medicated per CIWA protocol. Pt does have multiple pressure injuries. Mateusz protocol in place. Safety maintained. Will cont to monitor and call MD with any changes.

## 2023-08-08 LAB
ANION GAP SERPL CALCULATED.3IONS-SCNC: 23 MMOL/L (ref 5–15)
BUN SERPL-MCNC: 27 MG/DL (ref 8–23)
BUN/CREAT SERPL: 22.3 (ref 7–25)
CALCIUM SPEC-SCNC: 9.7 MG/DL (ref 8.6–10.5)
CHLORIDE SERPL-SCNC: 86 MMOL/L (ref 98–107)
CO2 SERPL-SCNC: 26 MMOL/L (ref 22–29)
CREAT SERPL-MCNC: 1.21 MG/DL (ref 0.76–1.27)
EGFRCR SERPLBLD CKD-EPI 2021: 64.8 ML/MIN/1.73
GLUCOSE SERPL-MCNC: 149 MG/DL (ref 65–99)
MAGNESIUM SERPL-MCNC: 2.1 MG/DL (ref 1.6–2.4)
POTASSIUM SERPL-SCNC: 2.7 MMOL/L (ref 3.5–5.2)
SODIUM SERPL-SCNC: 135 MMOL/L (ref 136–145)

## 2023-08-08 PROCEDURE — 97166 OT EVAL MOD COMPLEX 45 MIN: CPT | Performed by: OCCUPATIONAL THERAPIST

## 2023-08-08 PROCEDURE — 0 POTASSIUM CHLORIDE 10 MEQ/100ML SOLUTION: Performed by: FAMILY MEDICINE

## 2023-08-08 PROCEDURE — 80048 BASIC METABOLIC PNL TOTAL CA: CPT | Performed by: NURSE PRACTITIONER

## 2023-08-08 PROCEDURE — 25010000002 ENOXAPARIN PER 10 MG: Performed by: NURSE PRACTITIONER

## 2023-08-08 PROCEDURE — 97161 PT EVAL LOW COMPLEX 20 MIN: CPT

## 2023-08-08 PROCEDURE — 83735 ASSAY OF MAGNESIUM: CPT | Performed by: FAMILY MEDICINE

## 2023-08-08 PROCEDURE — 36415 COLL VENOUS BLD VENIPUNCTURE: CPT | Performed by: NURSE PRACTITIONER

## 2023-08-08 PROCEDURE — 25010000002 THIAMINE PER 100 MG: Performed by: NURSE PRACTITIONER

## 2023-08-08 PROCEDURE — 92610 EVALUATE SWALLOWING FUNCTION: CPT | Performed by: SPEECH-LANGUAGE PATHOLOGIST

## 2023-08-08 RX ORDER — POTASSIUM CHLORIDE 7.45 MG/ML
10 INJECTION INTRAVENOUS
Status: DISPENSED | OUTPATIENT
Start: 2023-08-08 | End: 2023-08-08

## 2023-08-08 RX ORDER — POTASSIUM CHLORIDE 7.45 MG/ML
10 INJECTION INTRAVENOUS
Status: COMPLETED | OUTPATIENT
Start: 2023-08-09 | End: 2023-08-09

## 2023-08-08 RX ORDER — POTASSIUM CHLORIDE 750 MG/1
40 CAPSULE, EXTENDED RELEASE ORAL EVERY 4 HOURS
Status: DISCONTINUED | OUTPATIENT
Start: 2023-08-08 | End: 2023-08-08

## 2023-08-08 RX ORDER — CHLORDIAZEPOXIDE HYDROCHLORIDE 10 MG/1
10 CAPSULE, GELATIN COATED ORAL EVERY 8 HOURS SCHEDULED
Status: DISCONTINUED | OUTPATIENT
Start: 2023-08-08 | End: 2023-08-09

## 2023-08-08 RX ADMIN — PAROXETINE 10 MG: 10 TABLET, FILM COATED ORAL at 20:49

## 2023-08-08 RX ADMIN — HYDROCHLOROTHIAZIDE 25 MG: 25 TABLET ORAL at 09:22

## 2023-08-08 RX ADMIN — POTASSIUM CHLORIDE 10 MEQ: 7.46 INJECTION, SOLUTION INTRAVENOUS at 16:16

## 2023-08-08 RX ADMIN — CHLORDIAZEPOXIDE HYDROCHLORIDE 10 MG: 10 CAPSULE ORAL at 16:00

## 2023-08-08 RX ADMIN — LORAZEPAM 1 MG: 1 TABLET ORAL at 17:55

## 2023-08-08 RX ADMIN — SUCRALFATE 1 G: 1 TABLET ORAL at 18:35

## 2023-08-08 RX ADMIN — CHLORDIAZEPOXIDE HYDROCHLORIDE 10 MG: 10 CAPSULE ORAL at 23:11

## 2023-08-08 RX ADMIN — FOLIC ACID 1 MG: 1 TABLET ORAL at 09:22

## 2023-08-08 RX ADMIN — THIAMINE HYDROCHLORIDE 200 MG: 100 INJECTION, SOLUTION INTRAMUSCULAR; INTRAVENOUS at 06:22

## 2023-08-08 RX ADMIN — PANTOPRAZOLE SODIUM 40 MG: 40 TABLET, DELAYED RELEASE ORAL at 17:52

## 2023-08-08 RX ADMIN — LORAZEPAM 2 MG: 1 TABLET ORAL at 03:43

## 2023-08-08 RX ADMIN — LORAZEPAM 1 MG: 1 TABLET ORAL at 23:11

## 2023-08-08 RX ADMIN — SUCRALFATE 1 G: 1 TABLET ORAL at 22:40

## 2023-08-08 RX ADMIN — POTASSIUM CHLORIDE 10 MEQ: 7.46 INJECTION, SOLUTION INTRAVENOUS at 12:37

## 2023-08-08 RX ADMIN — THIAMINE HYDROCHLORIDE 200 MG: 100 INJECTION, SOLUTION INTRAMUSCULAR; INTRAVENOUS at 23:53

## 2023-08-08 RX ADMIN — POTASSIUM CHLORIDE 10 MEQ: 7.46 INJECTION, SOLUTION INTRAVENOUS at 22:39

## 2023-08-08 RX ADMIN — POTASSIUM CHLORIDE 10 MEQ: 7.46 INJECTION, SOLUTION INTRAVENOUS at 20:51

## 2023-08-08 RX ADMIN — METOPROLOL SUCCINATE 100 MG: 100 TABLET, FILM COATED, EXTENDED RELEASE ORAL at 09:22

## 2023-08-08 RX ADMIN — THIAMINE HYDROCHLORIDE 200 MG: 100 INJECTION, SOLUTION INTRAMUSCULAR; INTRAVENOUS at 18:35

## 2023-08-08 RX ADMIN — POTASSIUM CHLORIDE 10 MEQ: 7.46 INJECTION, SOLUTION INTRAVENOUS at 17:53

## 2023-08-08 RX ADMIN — LORAZEPAM 2 MG: 1 TABLET ORAL at 09:37

## 2023-08-08 RX ADMIN — ASPIRIN 81 MG: 81 TABLET, COATED ORAL at 09:22

## 2023-08-08 RX ADMIN — PANTOPRAZOLE SODIUM 40 MG: 40 TABLET, DELAYED RELEASE ORAL at 09:22

## 2023-08-08 RX ADMIN — POTASSIUM CHLORIDE 10 MEQ: 7.46 INJECTION, SOLUTION INTRAVENOUS at 19:32

## 2023-08-08 RX ADMIN — LORAZEPAM 2 MG: 1 TABLET ORAL at 05:35

## 2023-08-08 RX ADMIN — Medication 10 ML: at 20:49

## 2023-08-08 RX ADMIN — ACETAMINOPHEN 650 MG: 325 TABLET, FILM COATED ORAL at 20:48

## 2023-08-08 RX ADMIN — LOSARTAN POTASSIUM 100 MG: 50 TABLET, FILM COATED ORAL at 09:22

## 2023-08-08 RX ADMIN — ENOXAPARIN SODIUM 40 MG: 100 INJECTION SUBCUTANEOUS at 17:52

## 2023-08-08 RX ADMIN — SUCRALFATE 1 G: 1 TABLET ORAL at 09:22

## 2023-08-08 NOTE — CASE MANAGEMENT/SOCIAL WORK
Discharge Planning Assessment   [place]     Patient Name: Dayron Lubin  MRN: [id number]  Today's Date: 8/8/2023    Admit Date: 8/7/2023    Plan: Undetermined   Discharge Needs Assessment       Row Name 08/08/23 1451       Living Environment    People in Home alone    Current Living Arrangements home    Potentially Unsafe Housing Conditions none    Primary Care Provided by self;other (see comments)    Provides Primary Care For no one    Family Caregiver if Needed sibling(s)    Quality of Family Relationships helpful;involved;supportive    Able to Return to Prior Arrangements yes       Resource/Environmental Concerns    Resource/Environmental Concerns none       Food Insecurity    Within the past 12 months, you worried that your food would run out before you got the money to buy more. Never true    Within the past 12 months, the food you bought just didn't last and you didn't have money to get more. Never true       Transition Planning    Patient/Family Anticipates Transition to home    Patient/Family Anticipated Services at Transition none    Transportation Anticipated family or friend will provide       Discharge Needs Assessment    Readmission Within the Last 30 Days no previous admission in last 30 days    Discharge Coordination/Progress Spoke to patient and his brother and his sister in room re discharge planning. Patient lives alone and does not use any DME. Patient has PCP and Rx coverage. Patient's brother and sister are both very involved with patient and have been able to assist as needed. SW asked patient about short term rehab at a SNF and patient adamantly refuses this. Patient did say that he will consider home health if physician feels necessary. Patient does not want any DME and says that he was able to walk today without a walker. SS will follow.                  CHUCK Verma

## 2023-08-08 NOTE — PLAN OF CARE
Goal Outcome Evaluation:  Plan of Care Reviewed With: patient        Progress: no change  Outcome Evaluation: OT evaluation completed. Pt is alert and oriented x3-4. Pt is conversationally confused and is intermittently disoriented. Pt noted to have auditory and visual hallucinations during OT evaluation, RN notified. Pt was SBA for all bed mobility but pt is impulsive throughout. Pt sits EOB impulsively but then returns to supine impulsively. Decreased command following and safety awareness. Pt was max-dependent to shiva socks. Pt would benefit from skilled OT to address adls, functional mobility and safety awareness. Pt is a high fall risk. Recommended d/c SNF.      Anticipated Discharge Disposition (OT): home with assist

## 2023-08-08 NOTE — CASE MANAGEMENT/SOCIAL WORK
Continued Stay Note   [place]     Patient Name: Dayron Lubin  MRN: [id number]  Today's Date: 8/8/2023    Admit Date: 8/7/2023    Plan: Undetermined   Discharge Plan       Row Name 08/08/23 0804       Plan    Plan Undetermined    Plan Comments Received consult stating patient needs rehab for strengthening. PT/OT evals are ordered and will follow for those but d/t drug and alcohol use, dc disposition will likely be return to home. Will complete full dc planning assessment later today and will follow for therapy evals. Will provide patient with a chemical dependency resource packet.              CHUCK Verma

## 2023-08-08 NOTE — PAYOR COMM NOTE
"  8/8/23 [facility] [phone]  [phone]    ER ADMIT TO INPATIENT ON 8/7/23.            Dayron Lubin (68 y.o. Male)       Date of Birth   1955    Social Security Number       Address   [address] [place] [address]    Home Phone   [phone]    MRN   [id number]       [facility]    Marital Status   Single                            Admission Date   8/7/23    Admission Type   Emergency    Admitting Provider   Kathleen Tovar    Attending Provider   Kathleen Tovar    Department, Room/Bed   [address], 430/1       Discharge Date       Discharge Disposition       Discharge Destination                                 Attending Provider: Kathleen Tovar    Allergies: No Known Allergies    Isolation: None   Infection: None   Code Status: CPR    Ht: 180.3 cm (71\")   Wt: 77.1 kg (170 lb)    Admission Cmt: None   Principal Problem: Hypokalemia [E87.6]                   Active Insurance as of 8/7/2023       Primary Coverage       Payor Plan Insurance Group Employer/Plan Group    HUMANA MEDICARE REPLACEMENT HUMANA MEDICARE REPLACEMENT [id number]       Payor Plan Address Payor Plan Phone Number Payor Plan Fax Number Effective Dates    [address] [address] [phone]  2/1/2023 - None Entered    [facility] [id number]         Subscriber Name Subscriber Birth Date Member ID       DAYRON LUBIN 1955 [id number]                     Emergency Contacts        (Rel.) Home Phone Work Phone Mobile Phone    Yuliya Stone (Sister) [phone] -- --    LILLY JAK (Brother) -- -- [phone]             [facility] Encounter Date/Time: 8/7/2023 [facility]   Hospital Account: [id number]    MRN: [id number]   Patient:  Dayron Lubin   Contact Serial #: [id number]   SSN:          ENCOUNTER             Patient Class: Inpatient   Unit: [address] Service: Medicine     Bed: 430/1   Admitting Provider: " Kathleen Tovar   Referring Physician: Gautam Bell   Attending Provider: Kathleen Tovar   Adm Diagnosis: CHF (congestive heart fa*               PATIENT          Name: Dayron Carr : 1955 (68 yrs)   Address: [address] [place] [address] Sex: Male   City: [place] [address]   County: [facility]   Marital Status: SINGLE Ethnicity: NOT                                                                              Race: WHITE   Primary Care Provider: Herbreth Nguyen Jr., * Patients Phone: Home Phone: [phone]     Mobile Phone: [phone]   EMERGENCY CONTACT   Contact Name Legal Guardian? Relationship to Patient Home Phone Work Phone   1. Yuliya Stone  2. LILLYJAK CHEN No    Sister  Brother [phone]              GUARANTOR            Guarantor: Dayron Carr     : 1955   Address: [address] [place] [address] Sex: [place] [address]     Relation to Patient: Self       Home Phone: [phone]   Guarantor ID: [id number]       Work Phone:     GUARANTOR EMPLOYER   Employer: SELF-EMPLOYED         Status: PART TIME   COVERAGE          PRIMARY INSURANCE   Payor: HUMANA MEDICARE REPLACEMENT Plan: HUMANA MEDICARE REPLACEMENT   Group Number: [id number] Insurance Type: INDEMNITY   Subscriber Name: DAYRON CARR Subscriber : 1955   Subscriber ID: [id number] Coverage Address: [address] [address]   Pat. Rel. to Subscriber: Self Coverage Phone: [phone]   SECONDARY INSURANCE   Payor: N/A Plan: N/A   Group Number:   Insurance Type:     Subscriber Name:   Subscriber :     Subscriber ID:   Coverage Address:     Pat. Rel. to Subscriber:   Coverage Phone:        Contact Serial # ([id number])         2023    Chart ID ([id number] [facility] [id number])         Kandis Griffiths APRN   Nurse Practitioner  Hospitalist     H&P      Cosign Needed     Date of Service: 23  Creation Time: 23     Cosign Needed       Expand All Collapse All         [facility]  "[facility] Medicine Services  HISTORY AND PHYSICAL     Date of Admission: 8/7/2023  Primary Care Physician: Herberth Nguyen Jr., MD     Subjective   Primary Historian: Patient     Chief Complaint: Weakness     History of Present Illness  Dayron Lubin is a 68-year-old male with a past medical history of CAD, prostate cancer, hyperlipidemia, hypertension, alcohol abuse.  Patient presented to emergency department with current plaints of weakness.  He states he thinks his last alcohol use was 2 days ago.  When asked how much he drinks he states \"a lot\".  He is unable to quantify.  He denies shortness of breathing, chest pain, abdominal pain.  Work-up reveals hypokalemia, elevated proBNP, mild hyponatremia.  Imaging reveals mild duodenitis.  Patient currently complaining only of weakness, he is anxious and jittery.  He states he does have DTs.  He states he is having difficulty ambulating due to weakness.  He is admitted for further evaluation and treatment.     Review of Systems   Otherwise complete ROS reviewed and negative except as mentioned in the HPI.     Past Medical History:   Medical History[]Expand by Default        Past Medical History:   Diagnosis Date    Alcoholic hepatitis 7/13/2023    CAD (coronary artery disease)      History of external beam radiation therapy 05/12/2016     6840 cGy to prostate bed    Hyperlipidemia      Hypertension      Prostate cancer           Past Surgical History:  Surgical History         Past Surgical History:   Procedure Laterality Date    CARDIAC CATHETERIZATION        CORONARY ANGIOPLASTY WITH STENT PLACEMENT        PROSTATE SURGERY        TONSILLECTOMY             Social History:  reports that he has never smoked. He has never used smokeless tobacco. He reports current alcohol use. He reports current drug use. Drug: Marijuana.     Family History: family history includes Coronary artery disease in his mother; Stroke in his mother.      " "  Allergies:  No Known Allergies     Medications:          Prior to Admission medications    Medication Sig Start Date End Date Taking? Authorizing Provider   aspirin (aspirin) 81 MG EC tablet Take 1 tablet by mouth Daily. 12/23/20     Edda Li APRN   losartan-hydrochlorothiazide (HYZAAR) 100-25 MG per tablet Take 1 tablet by mouth Daily. 9/27/16     Steve Devries MD   metoprolol succinate XL (TOPROL-XL) 100 MG 24 hr tablet Take 1 tablet by mouth Daily. 2/3/22     Steve Devries MD   niacin 500 MG tablet Take 1 tablet by mouth Every Night.       Steve Devries MD   pantoprazole (PROTONIX) 40 MG EC tablet Take 1 tablet by mouth Daily.       Steve Devries MD   PARoxetine (PAXIL) 10 MG tablet Take 1 tablet by mouth Every Night. 7/15/23     Dick Carrillo MD   sucralfate (CARAFATE) 1 g tablet TAKE 1 TABLET EVERY 6 HOURS, 30 MINUTES BEFORE MEALS AND AT BEDTIME 12/21/22     Steve Devries MD   temazepam (RESTORIL) 30 MG capsule TAKE ONE CAPSULE BY MOUTH AT BEDTIME AS NEEDED FOR SLEEP 9/23/16     Steve Devries MD      I have utilized all available immediate resources to obtain, update, or review the patient's current medications (including all prescriptions, over-the-counter products, herbals, cannabis/cannabidiol products, and vitamin/mineral/dietary (nutritional) supplements).     Objective      Vital Signs: BP (!) 149/102 (BP Location: Right arm, Patient Position: Lying) Comment: nurse was notfied  Pulse 113   Temp 98.4 øF (36.9 øC) (Oral)   Resp 18   Ht 180.3 cm (71\")   Wt 77.1 kg (170 lb)   SpO2 97%   BMI 23.71 kg/mý   Physical Exam  Vitals reviewed.   Constitutional:       Appearance: He is ill-appearing.   HENT:      Head: Normocephalic and atraumatic.      Mouth/Throat:      Mouth: Mucous membranes are moist.      Pharynx: Oropharynx is clear.   Eyes:      Extraocular Movements: Extraocular movements intact.      Conjunctiva/sclera: Conjunctivae normal. "   Cardiovascular:      Rate and Rhythm: Regular rhythm. Tachycardia present.   Pulmonary:      Effort: Pulmonary effort is normal.      Comments: Tachypneic  Abdominal:      General: There is no distension.      Palpations: Abdomen is soft.   Musculoskeletal:      Cervical back: Normal range of motion and neck supple.      Right lower leg: No edema.      Left lower leg: No edema.      Comments: Generalized weakness and debility   Skin:     General: Skin is warm and dry.   Neurological:      General: No focal deficit present.      Mental Status: He is alert and oriented to person, place, and time.      Comments: Alert and oriented   Psychiatric:         Mood and Affect: Mood is anxious.         Behavior: Behavior is slowed. Behavior is cooperative.         Results Reviewed:  Lab Results (last 24 hours)         Procedure Component Value Units Date/Time     Ethanol [[id number]] Collected: 08/07/23 1406     Specimen: Blood Updated: 08/07/23 1600       Ethanol % <0.010 %       Magnesium [[id number]]  (Normal) Collected: 08/07/23 1406     Specimen: Blood Updated: 08/07/23 1500       Magnesium 2.1 mg/dL       Comprehensive Metabolic Panel [[id number]]  (Abnormal) Collected: 08/07/23 1406     Specimen: Blood Updated: 08/07/23 1446       Glucose 200 mg/dL         BUN 20 mg/dL         Creatinine 1.07 mg/dL         Sodium 133 mmol/L         Potassium 2.9 mmol/L         Chloride 89 mmol/L         CO2 20.0 mmol/L         Calcium 9.3 mg/dL         Total Protein 7.2 g/dL         Albumin 4.5 g/dL         ALT (SGPT) 17 U/L         AST (SGOT) 34 U/L         Alkaline Phosphatase 80 U/L         Total Bilirubin 0.8 mg/dL         Globulin 2.7 gm/dL         A/G Ratio 1.7 g/dL         BUN/Creatinine Ratio 18.7       Anion Gap 24.0 mmol/L         eGFR 75.1 mL/min/1.73       BNP [[id number]]  (Abnormal) Collected: 08/07/23 1406     Specimen: Blood Updated: 08/07/23 1444       proBNP 15,027.0 pg/mL       High Sensitivity Troponin T [[id number]]   (Abnormal) Collected: 08/07/23 1406     Specimen: Blood Updated: 08/07/23 1443       HS Troponin T 28 ng/L       D-dimer, Quantitative [[id number]]  (Abnormal) Collected: 08/07/23 1406     Specimen: Blood Updated: 08/07/23 1440       D-Dimer, Quantitative 2.42 MCGFEU/mL       CBC Auto Differential [[id number]]  (Abnormal) Collected: 08/07/23 1406     Specimen: Blood Updated: 08/07/23 1427       WBC 10.68 10*3/mm3         RBC 3.74 10*6/mm3         Hemoglobin 12.4 g/dL         Hematocrit 36.3 %         MCV 97.1 fL         MCH 33.2 pg         MCHC 34.2 g/dL         RDW 14.6 %         RDW-SD 50.6 fl         MPV 11.3 fL         Platelets 248 10*3/mm3         Neutrophil % 82.8 %         Lymphocyte % 11.8 %         Monocyte % 4.5 %         Eosinophil % 0.1 %         Basophil % 0.1 %         Immature Grans % 0.7 %         Neutrophils, Absolute 8.84 10*3/mm3         Lymphocytes, Absolute 1.26 10*3/mm3         Monocytes, Absolute 0.48 10*3/mm3         Eosinophils, Absolute 0.01 10*3/mm3         Basophils, Absolute 0.01 10*3/mm3         Immature Grans, Absolute 0.08 10*3/mm3         nRBC 0.2 /100 WBC               Imaging Results (Last 24 Hours)         Procedure Component Value Units Date/Time     CT Angiogram Chest [[id number]] Collected: 08/07/23 1503       Updated: 08/07/23 1531     Narrative:       Exam: CT angiogram of the of the chest with intravenous contrast.     CLINICAL HISTORY:  Suspected pulmonary embolism. Sudden onset shortness  of breath.     DOSE:  212 mGycm. All CT scans are performed using dose optimization  techniques as appropriate to the performed exam and including at least  one of the following: Automated exposure control, adjustment of the mA  and/or kV according to size, and the use of the iterative reconstruction  technique.     TECHNIQUE: Contiguous axial images were obtained through the thorax  following intravenous contrast administration with reformatted images  obtained in the sagittal and coronal  projections from the original data  set. Three-dimensional reconstructions are also obtained.     COMPARISON:  07/17/2015     FINDINGS:     Pulmonary arteries:  There is normal enhancement of the pulmonary  arteries with no evidence of pulmonary thromboembolic disease.     Aorta:  There is mild dilatation of ascending thoracic aorta with a  transverse diameter of 3.8 cm. No evidence of focal aneurysm.  Radiodensity is noted at the level of the aortic valvular related to  heavy calcification of the aortic valve leaflets or previous aortic  valve replacement. The proximal great vessels demonstrate mild  atheromatous calcification without rate limiting stenosis or aneurysm.     LUNGS:  The lungs are clear. No evidence of lobar consolidation.     Pleural spaces: Unremarkable. No evidence of pleural effusion or  pneumothorax.     HEART: There is mild cardiomegaly. Heavy coronary calcifications are  present. There is elevated right heart pressure with reflux of contrast  into the intrahepatic IVC. There is no pericardial effusion.     Bones: No acute osseous abnormalities are demonstrated.     CHEST WALL: No chest wall abnormalities are demonstrated.     Lymph nodes: No enlarged mediastinal or axillary lymphadenopathy is  present.     Upper abdomen: Steatosis of the liver is present. There is prominence of  the wall of the proximal descending duodenum with mild adjacent  stranding suspicious for duodenitis. No evidence of gastric outlet  obstruction.        Impression:       1. No evidence of pulmonary thromboembolic disease. The thoracic aorta  and great vessels are ectatic. No evidence of focal aneurysm or  dissection.  2. Mild cardiomegaly with heavy coronary calcifications. Radiodensity at  the level of the aortic root is present either related to previous  aortic valve replacement or heavy calcification of the aortic valve  leaflets. There is elevated right heart pressure with reflux of contrast  into the  intrahepatic IVC.  3. Steatosis of the liver.  4. Prominence of the wall of the descending duodenum with mild adjacent  stranding suspicious for duodenitis.  This report was finalized on 08/07/2023 15:28 by Dr. Vicente Sam MD.     XR Chest 1 View [[id number]] Collected: 08/07/23 1447       Updated: 08/07/23 1454     Narrative:       EXAMINATION: XR CHEST 1 VW- 8/7/2023 2:47 PM CDT     HISTORY: Chest Pain Protocol     REPORT: A frontal view of the chest was obtained.     COMPARISON: Chest x-ray 07/13/2023.     There is mild bibasilar atelectasis, no focal pulmonary infiltrate is  identified. There appears be mild pleural thickening at the left  costophrenic angle. No free flowing effusion is identified. There is no  pneumothorax. Heart size is normal. The osseous structures are  unremarkable.        Impression:       Hypoaeration of lungs with bibasilar atelectasis, no  evidence of pneumonia. Probable mild chronic pleural thickening at the  left costophrenic angle.  This report was finalized on 08/07/2023 14:49 by Dr. Nitin Stone MD.             Assessment / Plan   Assessment:        Active Hospital Problems     Diagnosis      Hypokalemia      Duodenitis      Gait instability      Elevated brain natriuretic peptide (BNP) level      ETOH abuse           Treatment Plan  1.  The patient will be admitted to Dr. Orozco's service here at [facility].   2.  CIWA protocol  3.  Home medications reviewed and restarted as appropriate, will not continue full dose Lovenox nor IV diuretics  4.  Place potassium  5.  Increase Carafate dose from twice daily to 4 times a day and Protonix from daily to twice daily  6.  Continuous pulse oximetry, incentive spirometry, oxygen therapy  7.  Cardiac monitoring, daily weights, safety for cautions, seizure precaution  8.  Consult PT and OT  9.  Consult  as patient may benefit benefit from rehab placement  10.  Labs in a.m.     Medical Decision Making  Number  and Complexity of problems: 5  Differential Diagnosis: None     Conditions and Status        Condition is unchanged.     [facility] Data  External documents reviewed: None  Cardiac tracing (EKG, telemetry) interpretation: Reviewed  Radiology interpretation: Reviewed  Labs reviewed: Yes  Any tests that were considered but not ordered: No     Decision rules/scores evaluated (example [id number], Wells, etc): No     Discussed with: Patient and Dr. Campos     Care Planning  Shared decision making: Patient and Dr. Orozco  Code status and discussions: Full     Disposition  Social Determinants of Health that impact treatment or disposition: None  Estimated length of stay is 2+ days.      I confirmed that the patient's advanced care plan is present, code status is documented, and a surrogate decision maker is listed in the patient's medical record.      The patient's surrogate decision maker is Sister Yuliya.      The patient was seen and examined by me on 8/7/2023 at 408.     Electronically signed by BRI Paredes, 08/07/23, 17:23 CDT.                              Kathleen Tovar   Physician  Specialty: Hospitalist     Progress Notes      Signed     Date of Service: 08/08/23 1009  Creation Time: 08/08/23 1009     Signed              [facility] Medicine Services  INPATIENT PROGRESS NOTE     Patient Name: Dayron Lubin  Date of Admission: 8/7/2023  Today's Date: 08/08/23  Length of Stay: 1  Primary Care Physician: Herberth Nguyen Jr., MD     Subjective   Chief Complaint: Patient is confused.   HPI   Answers questions randomly and incorrectly.  Denies pain and shortness of breath.   Thinks it has been a couple of days since last drink.      CT chest and Xray chest reviewed.  No heart failure.            Review of Systems   All pertinent negatives and positives are as above. All other systems have been reviewed and are negative unless otherwise stated.      Objective    Temp:  [97.4  øF (36.3 øC)-99.4 øF (37.4 øC)] 98.4 øF (36.9 øC)  Heart Rate:  [100-122] 118  Resp:  [16-18] 18  BP: (122-149)/() 122/86  Physical Exam  Vitals and nursing note reviewed.   Constitutional:       Comments: confused   HENT:      Head: Normocephalic and atraumatic.      Right Ear: External ear normal.      Left Ear: External ear normal.      Nose: Nose normal.      Mouth/Throat:      Mouth: Mucous membranes are moist.   Eyes:      Extraocular Movements: Extraocular movements intact.      Conjunctiva/sclera: Conjunctivae normal.      Pupils: Pupils are equal, round, and reactive to light.   Cardiovascular:      Rate and Rhythm: Normal rate and regular rhythm.      Pulses: Normal pulses.      Heart sounds: Normal heart sounds.   Pulmonary:      Effort: Pulmonary effort is normal.      Breath sounds: Normal breath sounds.   Abdominal:      General: Bowel sounds are normal.      Palpations: Abdomen is soft.   Musculoskeletal:         General: No swelling. Normal range of motion.      Cervical back: Normal range of motion and neck supple.   Skin:     General: Skin is warm and dry.      Capillary Refill: Capillary refill takes less than 2 seconds.   Neurological:      General: No focal deficit present.      Mental Status: He is alert.   Psychiatric:      Comments: Confused                Results Review:  I have reviewed the labs, radiology results, and diagnostic studies.     Laboratory Data:        Results from last 7 days   Lab Units 08/07/23  1406   WBC 10*3/mm3 10.68   HEMOGLOBIN g/dL 12.4*   HEMATOCRIT % 36.3*   PLATELETS 10*3/mm3 248                Results from last 7 days   Lab Units 08/08/23  0341 08/07/23  1609 08/07/23  1406   SODIUM mmol/L 135* 132* 133*   POTASSIUM mmol/L 2.7* 2.9* 2.9*   CHLORIDE mmol/L 86* 91* 89*   CO2 mmol/L 26.0 22.0 20.0*   BUN mg/dL 27* 20 20   CREATININE mg/dL 1.21 1.00 1.07   CALCIUM mg/dL 9.7 8.9 9.3   BILIRUBIN mg/dL  --   --  0.8   ALK PHOS U/L  --   --  80   ALT (SGPT) U/L  --    --  17   AST (SGOT) U/L  --   --  34   GLUCOSE mg/dL 149* 131* 200*         Culture Data:   No results found for: BLOODCX, URINECX, WOUNDCX, MRSACX, RESPCX, STOOLCX     Radiology Data:   Imaging Results (Last 24 Hours)         Procedure Component Value Units Date/Time     CT Angiogram Chest [[id number]] Collected: 08/07/23 1503       Updated: 08/07/23 1531     Narrative:       Exam: CT angiogram of the of the chest with intravenous contrast.     CLINICAL HISTORY:  Suspected pulmonary embolism. Sudden onset shortness  of breath.     DOSE:  212 mGycm. All CT scans are performed using dose optimization  techniques as appropriate to the performed exam and including at least  one of the following: Automated exposure control, adjustment of the mA  and/or kV according to size, and the use of the iterative reconstruction  technique.     TECHNIQUE: Contiguous axial images were obtained through the thorax  following intravenous contrast administration with reformatted images  obtained in the sagittal and coronal projections from the original data  set. Three-dimensional reconstructions are also obtained.     COMPARISON:  07/17/2015     FINDINGS:     Pulmonary arteries:  There is normal enhancement of the pulmonary  arteries with no evidence of pulmonary thromboembolic disease.     Aorta:  There is mild dilatation of ascending thoracic aorta with a  transverse diameter of 3.8 cm. No evidence of focal aneurysm.  Radiodensity is noted at the level of the aortic valvular related to  heavy calcification of the aortic valve leaflets or previous aortic  valve replacement. The proximal great vessels demonstrate mild  atheromatous calcification without rate limiting stenosis or aneurysm.     LUNGS:  The lungs are clear. No evidence of lobar consolidation.     Pleural spaces: Unremarkable. No evidence of pleural effusion or  pneumothorax.     HEART: There is mild cardiomegaly. Heavy coronary calcifications are  present. There is  elevated right heart pressure with reflux of contrast  into the intrahepatic IVC. There is no pericardial effusion.     Bones: No acute osseous abnormalities are demonstrated.     CHEST WALL: No chest wall abnormalities are demonstrated.     Lymph nodes: No enlarged mediastinal or axillary lymphadenopathy is  present.     Upper abdomen: Steatosis of the liver is present. There is prominence of  the wall of the proximal descending duodenum with mild adjacent  stranding suspicious for duodenitis. No evidence of gastric outlet  obstruction.        Impression:       1. No evidence of pulmonary thromboembolic disease. The thoracic aorta  and great vessels are ectatic. No evidence of focal aneurysm or  dissection.  2. Mild cardiomegaly with heavy coronary calcifications. Radiodensity at  the level of the aortic root is present either related to previous  aortic valve replacement or heavy calcification of the aortic valve  leaflets. There is elevated right heart pressure with reflux of contrast  into the intrahepatic IVC.  3. Steatosis of the liver.  4. Prominence of the wall of the descending duodenum with mild adjacent  stranding suspicious for duodenitis.  This report was finalized on 08/07/2023 15:28 by Dr. Vicente Sam MD.     XR Chest 1 View [[id number]] Collected: 08/07/23 1447       Updated: 08/07/23 1454     Narrative:       EXAMINATION: XR CHEST 1 VW- 8/7/2023 2:47 PM CDT     HISTORY: Chest Pain Protocol     REPORT: A frontal view of the chest was obtained.     COMPARISON: Chest x-ray 07/13/2023.     There is mild bibasilar atelectasis, no focal pulmonary infiltrate is  identified. There appears be mild pleural thickening at the left  costophrenic angle. No free flowing effusion is identified. There is no  pneumothorax. Heart size is normal. The osseous structures are  unremarkable.        Impression:       Hypoaeration of lungs with bibasilar atelectasis, no  evidence of pneumonia. Probable mild chronic  pleural thickening at the  left costophrenic angle.  This report was finalized on 08/07/2023 14:49 by Dr. Nitin Stone MD.                I have reviewed the patient's current medications.      Assessment/Plan   Assessment       Active Hospital Problems     Diagnosis      **Hypokalemia      Duodenitis      Gait instability      Elevated brain natriuretic peptide (BNP) level      ETOH abuse           Treatment Plan  Replace potassium  Check magnesium  Social service  PT OT        Medical Decision Making  Number and Complexity of problems:   Hypokalemia high complexity  Duodenitis moderate complexity  Gait instability moderate complexity  ETOH abuse moderate complexity   Elevated BNP moderate complexity     Differential Diagnosis:      Conditions and Status        stable     MDM Data  External documents reviewed: none  Cardiac tracing (EKG, telemetry) interpretation: reviewed  Radiology interpretation: reviewed  Labs reviewed: reviewed  Any tests that were considered but not ordered: none     Decision rules/scores evaluated (example [id number], [place], etc): none     Discussed with: patient     Care Planning  Shared decision making: patient  Code status and discussions: full     Disposition  Social Determinants of Health that impact treatment or disposition: none  I expect the patient to be discharged to home in 1-2 days.      Electronically signed by Kathleen Tovar, 08/08/23, 10:09 CDT.                       Andres Lambert, OTR/L   Occupational Therapist  Occupational Therapy     Plan of Care      Signed     Date of Service: 08/08/23 1413  Creation Time: 08/08/23 1413     Signed         Goal Outcome Evaluation:  Plan of Care Reviewed With: patient  Progress: no change  Outcome Evaluation: OT evaluation completed. Pt is alert and oriented x3-4. Pt is conversationally confused and is intermittently disoriented. Pt noted to have auditory and visual hallucinations during OT evaluation, RN notified. Pt was SBA  for all bed mobility but pt is impulsive throughout. Pt sits EOB impulsively but then returns to supine impulsively. Decreased command following and safety awareness. Pt was max-dependent to shiva socks. Pt would benefit from skilled OT to address adls, functional mobility and safety awareness. Pt is a high fall risk. Recommended d/c SNF.                     /Time Temp Pulse Resp BP Patient Position Device (Oxygen Therapy) Flow (L/min) SpO2   08/08/23 1500 97.3 (36.3) 102 18 120/64 Lying nasal cannula 4 92   08/08/23 1300 -- -- -- -- -- nasal cannula 4 94   08/08/23 1100 97.9 (36.6) 111 18 110/71 Lying nasal cannula 4 98   08/08/23 0800 -- -- -- -- -- nasal cannula -- --   08/0                HEST WALL: No chest wall abnormalities are demonstrated.     Lymph nodes: No enlarged mediastinal or axillary lymphadenopathy is  present.     Upper abdomen: Steatosis of the liver is present. There is prominence of  the wall of the proximal descending duodenum with mild adjacent  stranding suspicious for duodenitis. No evidence of gastric outlet  obstruction.     IMPRESSION:  1. No evidence of pulmonary thromboembolic disease. The thoracic aorta  and great vessels are ectatic. No evidence of focal aneurysm or  dissection.  2. Mild cardiomegaly with heavy coronary calcifications. Radiodensity at  the level of the aortic root is present either related to previous  aortic valve replacement or heavy calcification of the aortic valve  leaflets. There is elevated right heart pressure with reflux of contrast  into the intrahepatic IVC.  3. Steatosis of the liver.  4. Prominence of the wall of the descending duodenum with mild adjacent  stranding suspicious for duodenitis.  This report was finalized on 08/07/2023 15:28 by Dr. Vicente Sam MD      Patient Class Location   Inpatient [facility] 4B, 430, 1     [phone]     Appointment Information    PACS Images     Radiology Images  Study Result    Narrative & Impression    EXAMINATION: XR CHEST 1 VW- 8/7/2023 2:47 PM CDT     HISTORY: Chest Pain Protocol     REPORT: A frontal view of the chest was obtained.     COMPARISON: Chest x-ray 07/13/2023.     There is mild bibasilar atelectasis, no focal pulmonary infiltrate is  identified. There appears be mild pleural thickening at the left  costophrenic angle. No free flowing effusion is identified. There is no  pneumothorax. Heart size is normal. The osseous structures are  unremarkable.     IMPRESSION:  Hypoaeration of lungs with bibasilar atelectasis, no  evidence of pneumonia. Probable mild chronic pleural thickening at the  left costophrenic angle.  This report was finalized on 08/07/2023 14:49 by Dr. Nitin Stone MD.         Current Facility-Administered Medications   Medication Dose Route Frequency Provider Last Rate Last Admin    acetaminophen (TYLENOL) tablet 650 mg  650 mg Oral Q4H PRN Kandis Griffiths APRN        Or    acetaminophen (TYLENOL) 160 MG/5ML solution 650 mg  650 mg Oral Q4H PRN Kandis Griffiths APRN        Or    acetaminophen (TYLENOL) suppository 650 mg  650 mg Rectal Q4H PRN Kandis Griffiths APRN        aspirin chewable tablet 324 mg  324 mg Oral Once Gautam Bell MD        aspirin EC tablet 81 mg  81 mg Oral Daily Kandis Griffiths APRN   81 mg at 08/08/23 0922    sennosides-docusate (PERICOLACE) 8.6-50 MG per tablet 1 tablet  1 tablet Oral Nightly PRN Kandis Griffiths APRN        And    polyethylene glycol (MIRALAX) packet 17 g  17 g Oral Daily PRN Kandis Griffiths APRN        And    bisacodyl (DULCOLAX) EC tablet 5 mg  5 mg Oral Daily PRN Kandis Griffiths APRN        And    bisacodyl (DULCOLAX) suppository 10 mg  10 mg Rectal Daily PRN Kandis Griffiths APRN        chlordiazePOXIDE (LIBRIUM) capsule 10 mg  10 mg Oral Q8H Kathleen Tovar        Enoxaparin Sodium (LOVENOX) syringe 40 mg  40 mg Subcutaneous Q24H Kandis Griffiths APRN        folic acid (FOLVITE) tablet 1 mg  1 mg Oral Daily  Kandis Griffiths APRN   1 mg at 08/08/23 0922    losartan (COZAAR) tablet 100 mg  100 mg Oral Q24H Kandis Griffiths APRN   100 mg at 08/08/23 0922    And    hydroCHLOROthiazide (HYDRODIURIL) tablet 25 mg  25 mg Oral Q24H Kandis Griffiths, APRN   25 mg at 08/08/23 0922    LORazepam (ATIVAN) tablet 1 mg  1 mg Oral Q1H PRN Kandis Griffiths APRN        Or    LORazepam (ATIVAN) injection 1 mg  1 mg Intravenous Q1H PRN Kandis Griffiths APRN        Or    LORazepam (ATIVAN) tablet 2 mg  2 mg Oral Q1H PRN Kandis Griffiths APRN   2 mg at 08/08/23 0343    Or    LORazepam (ATIVAN) injection 2 mg  2 mg Intravenous Q1H PRN Kandis Griffiths APRN        Or    LORazepam (ATIVAN) injection 2 mg  2 mg Intravenous Q15 Min PRN Kandis Griffiths APRN        Or    LORazepam (ATIVAN) injection 2 mg  2 mg Intramuscular Q15 Min PRN Kandis Griffiths APRN        LORazepam (ATIVAN) tablet 1 mg  1 mg Oral Q6H Kandis Griffiths APRN        Magnesium Standard Dose Replacement - Follow Nurse / BPA Driven Protocol   Does not apply PRN Kandis Griffiths APRN        metoprolol succinate XL (TOPROL-XL) 24 hr tablet 100 mg  100 mg Oral Daily Kandis Griffiths APRN   100 mg at 08/08/23 0922    ondansetron (ZOFRAN) tablet 4 mg  4 mg Oral Q6H PRN Kandis Griffiths APRN        Or    ondansetron (ZOFRAN) injection 4 mg  4 mg Intravenous Q6H PRN Kandis Griffiths APRN        pantoprazole (PROTONIX) EC tablet 40 mg  40 mg Oral BID AC Kandis Griffiths APRN   40 mg at 08/08/23 0922    PARoxetine (PAXIL) tablet 10 mg  10 mg Oral Nightly Kandis Griffiths APRN   10 mg at 08/07/23 2125    potassium chloride 10 mEq in 100 mL IVPB  10 mEq Intravenous Q1H Kathleen Tovar 100 mL/hr at 08/08/23 1237 10 mEq at 08/08/23 1237    sodium chloride 0.9 % flush 10 mL  10 mL Intravenous PRN Gautam Bell MD        sodium chloride 0.9 % flush 10 mL  10 mL Intravenous Q12H Kandis Griffiths APRN   10 mL at 08/07/23 2126    sodium chloride 0.9 % flush  10 mL  10 mL Intravenous PRN Kandis Griffiths APRN        sodium chloride 0.9 % infusion 40 mL  40 mL Intravenous PRN Kandis Griffiths APRN        sucralfate (CARAFATE) tablet 1 g  1 g Oral 4x Daily AC & at Bedtime Kandis Griffiths APRN   1 g at 08/08/23 0922    thiamine (B-1) injection 200 mg  200 mg Intravenous Q8H Kandis Griffiths APRN   200 mg at 08/08/23 0622    Followed by    [START ON 8/12/2023] thiamine (VITAMIN B-1) tablet 100 mg  100 mg Oral Daily Kandis Griffiths, APRN

## 2023-08-08 NOTE — THERAPY EVALUATION
Patient Name: Dayron Lubin  : 1955    MRN: [id number]                              Today's Date: 2023       Admit Date: 2023    Visit Dx:     ICD-10-CM ICD-9-CM   1. Acute on chronic right-sided congestive heart failure  I50.813 428.0   2. Coronary artery disease due to lipid rich plaque  I25.10 414.00    I25.83 414.3   3. Duodenitis  K29.80 535.60   4. History of alcohol use  Z87.898 V11.3   5. Metabolic acidosis  E87.20 276.2   6. Dysphagia, unspecified type  R13.10 787.20   7. Impaired mobility [Z74.09 (ICD-10-CM)]  Z74.09 799.89     Patient Active Problem List   Diagnosis    Prostate cancer    Hx of radiation therapy    Elevated PSA    Encounter for follow-up surveillance of prostate cancer    HOCM (hypertrophic obstructive cardiomyopathy)    Coronary artery disease involving native coronary artery of native heart without angina pectoris    Essential hypertension    Mixed hyperlipidemia    Class 1 obesity with alveolar hypoventilation, serious comorbidity, and body mass index (BMI) of 30.0 to 30.9 in adult    Displaced fracture of lateral malleolus of right fibula, initial encounter for closed fracture    Family history of colonic polyps    History of colon polyps    Elevated brain natriuretic peptide (BNP) level    MIKEY (acute kidney injury)    Anxiety associated with depression    ETOH abuse    Alcoholic hepatitis    Volume depletion    Anorexia    CHF (congestive heart failure)    Hypokalemia    Duodenitis    Gait instability     Past Medical History:   Diagnosis Date    Alcoholic hepatitis 2023    CAD (coronary artery disease)     History of external beam radiation therapy 2016    6840 cGy to prostate bed    Hyperlipidemia     Hypertension     Prostate cancer      Past Surgical History:   Procedure Laterality Date    CARDIAC CATHETERIZATION      CORONARY ANGIOPLASTY WITH STENT PLACEMENT      PROSTATE SURGERY      TONSILLECTOMY        General Information       Row Name 23  1310          Physical Therapy Time and Intention    Document Type evaluation  -     Mode of Treatment physical therapy  -       Row Name 08/08/23 1310          General Information    Patient Profile Reviewed yes  -     Prior Level of Function independent:;community mobility;ADL's;driving;shopping;cooking  brother and sister in room providing PLOF information  -     Existing Precautions/Restrictions fall  -     Barriers to Rehab cognitive status  -       Row Name 08/08/23 1310          Living Environment    People in Home alone  walk in shower  -       Row Name 08/08/23 1310          Home Main Entrance    Number of Stairs, Main Entrance two  -     Stair Railings, Main Entrance none  -       Row Name 08/08/23 1310          Stairs Within Home, Primary    Number of Stairs, Within Home, Primary none  -       Row Name 08/08/23 1310          Cognition    Orientation Status (Cognition) oriented to;person;disoriented to;place;situation;time  -       Row Name 08/08/23 1310          Safety Issues, Functional Mobility    Safety Issues Affecting Function (Mobility) ability to follow commands;at risk behavior observed;awareness of need for assistance;impulsivity;insight into deficits/self-awareness;judgment;safety precautions follow-through/compliance;safety precaution awareness;problem-solving  -     Impairments Affecting Function (Mobility) balance;cognition;endurance/activity tolerance;shortness of breath  -               User Key  (r) = Recorded By, (t) = Taken By, (c) = Cosigned By      Initials Name Provider Type     Justyn Briggs, PT Physical Therapist                   Mobility       Row Name 08/08/23 1310          Bed Mobility    Bed Mobility bed mobility (all) activities  -     All Activities, [place] (Bed Mobility) standby assist  -     Comment, (Bed Mobility) impulsive  -       Row Name 08/08/23 1310          Sit-Stand Transfer    Sit-Stand [place] (Transfers) contact guard   -[facility] Name 08/08/23 1310          Gait/Stairs (Locomotion)    [place] Level (Gait) moderate assist (50% patient effort)  -     Distance in Feet (Gait) 20  -               User Key  (r) = Recorded By, (t) = Taken By, (c) = Cosigned By      Initials Name Provider Type     Justyn Briggs, PT Physical Therapist                   Obj/Interventions       [facility] Name 08/08/23 1310          Range of Motion Comprehensive    General Range of Motion bilateral upper extremity ROM WFL;bilateral lower extremity ROM WFL  -[facility] Name 08/08/23 1310          Strength Comprehensive (MMT)    General Manual Muscle Testing (MMT) Assessment no strength deficits identified  -               User Key  (r) = Recorded By, (t) = Taken By, (c) = Cosigned By      Initials Name Provider Type     Justyn Briggs, PT Physical Therapist                   Goals/Plan       [facility] Name 08/08/23 1310          Bed Mobility Goal 1 (PT)    Activity/Assistive Device (Bed Mobility Goal 1, PT) bed mobility activities, all  -[facility] Name 08/08/23 1310          Gait Training Goal 1 (PT)    Activity/Assistive Device (Gait Training Goal 1, PT) gait (walking locomotion);decrease fall risk;diminish gait deviation;increase endurance/gait distance  -     [place] Level (Gait Training Goal 1, PT) contact guard required  -[facility]     Distance (Gait Training Goal 1, PT) 50ft  -     Time Frame (Gait Training Goal 1, PT) 10 days  -     Progress/Outcome (Gait Training Goal 1, PT) new goal  -[facility] Name 08/08/23 1310          Therapy Assessment/Plan (PT)    Planned Therapy Interventions (PT) balance training;gait training;patient/family education;transfer training  -               User Key  (r) = Recorded By, (t) = Taken By, (c) = Cosigned By      Initials Name Provider Type     Justyn Briggs, PT Physical Therapist                   Clinical Impression       Row Name 08/08/23 1310          Pain    Pretreatment Pain Rating 0/10 - no  pain  -     Posttreatment Pain Rating 0/10 - no pain  -     Pain Intervention(s) Medication (See MAR)  -       Row Name 08/08/23 1310          Plan of Care Review    Plan of Care Reviewed With patient;sibling  -     Outcome Evaluation PT IE complete.  A&O to person only.  Brother and sister in room report pt is independent PLOF.  Today he is SBA to sit EOB.  CGA sit<>stand.  Ambulated 20ft mod A x1 with 4L O2.  Gait slow, did not follow straight line, running into objects.  O2sat 94% post activity.  PT to see for progressive ambulation and balance activties.  Recommend SNF at [place].  Thank you for referral.  -       Row Name 08/08/23 1310          Therapy Assessment/Plan (PT)    Patient/Family Therapy Goals Statement (PT) did not state  -     Rehab Potential (PT) good, to achieve stated therapy goals  -     Criteria for Skilled Interventions Met (PT) yes;skilled treatment is necessary  [facility]     Therapy Frequency (PT) 2 times/day  -     Predicted Duration of Therapy Intervention (PT) until OK  -       Row Name 08/08/23 1310          Positioning and Restraints    Pre-Treatment Position in bed  -     Post Treatment Position bed  -     In Bed fowlers;call light within reach;encouraged to call for assist;exit alarm on;side rails up x2;with SLP  -               User Key  (r) = Recorded By, (t) = Taken By, (c) = Cosigned By      Initials Name Provider Type     Justyn Briggs, PT Physical Therapist                   Outcome Measures       Row Name 08/08/23 1310 08/08/23 0800       How much help from another person do you currently need...    Turning from your back to your side while in flat bed without using bedrails? 4  - 4  -HT    Moving from lying on back to sitting on the side of a flat bed without bedrails? 4  - 3  -HT    Moving to and from a bed to a chair (including a wheelchair)? 2  - 2  -HT    Standing up from a chair using your arms (e.g., wheelchair, bedside chair)? 3  - 2  -HT     Climbing 3-5 steps with a railing? 2  - 1  -HT    To walk in hospital room? 2  - 2  -HT    AM-PAC 6 Clicks Score (PT) 17  - 14  -    Highest level of mobility 5 --> Static standing  - 4 --> Transferred to chair/commode  -      Row Name 08/08/23 1310          Functional Assessment    Outcome Measure Options AM-PAC 6 Clicks Basic Mobility (PT)  -               User Key  (r) = Recorded By, (t) = Taken By, (c) = Cosigned By      Initials Name Provider Type     Justyn Briggs, PT Physical Therapist     Katrin Calix, RN Registered Nurse                                 Physical Therapy Education       Title: PT OT SLP Therapies (Done)       Topic: Physical Therapy (Done)       Point: Mobility training (Done)       Learning Progress Summary             Patient Acceptance, E,D, NR,DU,VU by  at 8/8/2023 1359    Comment: benefits of PT and POC, call for A OOB                         Point: Precautions (Done)       Learning Progress Summary             Patient Acceptance, E,D, NR,DU,VU by  at 8/8/2023 1359    Comment: benefits of PT and POC, call for A OOB                                         User Key       Initials Effective Dates Name Provider Type Discipline     02/03/23 -  Justyn Briggs, PT Physical Therapist PT                  PT Recommendation and Plan  Planned Therapy Interventions (PT): balance training, gait training, patient/family education, transfer training  Plan of Care Reviewed With: patient, sibling  Outcome Evaluation: PT IE complete.  A&O to person only.  Brother and sister in room report pt is independent PLOF.  Today he is SBA to sit EOB.  CGA sit<>stand.  Ambulated 20ft mod A x1 with 4L O2.  Gait slow, did not follow straight line, running into objects.  O2sat 94% post activity.  PT to see for progressive ambulation and balance activties.  Recommend SNF at [place].  Thank you for referral.     Time Calculation:         PT Charges       Row Name 08/08/23 1401             Time  Calculation    Start Time 1310  -      Stop Time 1350  -      Time Calculation (min) 40 min  -      PT Received On 08/08/23  -      PT Goal Re-Cert Due Date 08/18/23  -         Untimed Charges    PT Eval/Re-eval Minutes 40  -         Total Minutes    Untimed Charges Total Minutes 40  -       Total Minutes 40  -                User Key  (r) = Recorded By, (t) = Taken By, (c) = Cosigned By      Initials Name Provider Type     Justyn Briggs, PT Physical Therapist                  Therapy Charges for Today       Code Description Service Date Service Provider Modifiers Qty    36241090643 HC PT EVAL LOW COMPLEXITY 3 8/8/2023 Justyn Briggs PT GP 1            PT G-Codes  Outcome Measure Options: AM-PAC 6 Clicks Basic Mobility (PT)  AM-PAC 6 Clicks Score (PT): 17  PT Discharge Summary  Anticipated Discharge Disposition (PT): skilled nursing facility    Justyn Briggs PT  8/8/2023

## 2023-08-08 NOTE — PROGRESS NOTES
[facility] Medicine Services  INPATIENT PROGRESS NOTE    Patient Name: Dayron Lubin  Date of Admission: 8/7/2023  Today's Date: 08/08/23  Length of Stay: 1  Primary Care Physician: Herberth Nguyen Jr., MD    Subjective   Chief Complaint: Patient is confused.   HPI   Answers questions randomly and incorrectly.  Denies pain and shortness of breath.   Thinks it has been a couple of days since last drink.     CT chest and Xray chest reviewed.  No heart failure.          Review of Systems   All pertinent negatives and positives are as above. All other systems have been reviewed and are negative unless otherwise stated.     Objective    Temp:  [97.4 øF (36.3 øC)-99.4 øF (37.4 øC)] 98.4 øF (36.9 øC)  Heart Rate:  [100-122] 118  Resp:  [16-18] 18  BP: (122-149)/() 122/86  Physical Exam  Vitals and nursing note reviewed.   Constitutional:       Comments: confused   HENT:      Head: Normocephalic and atraumatic.      Right Ear: External ear normal.      Left Ear: External ear normal.      Nose: Nose normal.      Mouth/Throat:      Mouth: Mucous membranes are moist.   Eyes:      Extraocular Movements: Extraocular movements intact.      Conjunctiva/sclera: Conjunctivae normal.      Pupils: Pupils are equal, round, and reactive to light.   Cardiovascular:      Rate and Rhythm: Normal rate and regular rhythm.      Pulses: Normal pulses.      Heart sounds: Normal heart sounds.   Pulmonary:      Effort: Pulmonary effort is normal.      Breath sounds: Normal breath sounds.   Abdominal:      General: Bowel sounds are normal.      Palpations: Abdomen is soft.   Musculoskeletal:         General: No swelling. Normal range of motion.      Cervical back: Normal range of motion and neck supple.   Skin:     General: Skin is warm and dry.      Capillary Refill: Capillary refill takes less than 2 seconds.   Neurological:      General: No focal deficit present.      Mental Status: He is alert.    Psychiatric:      Comments: Confused            Results Review:  I have reviewed the labs, radiology results, and diagnostic studies.    Laboratory Data:   Results from last 7 days   Lab Units 08/07/23  1406   WBC 10*3/mm3 10.68   HEMOGLOBIN g/dL 12.4*   HEMATOCRIT % 36.3*   PLATELETS 10*3/mm3 248        Results from last 7 days   Lab Units 08/08/23  0341 08/07/23  1609 08/07/23  1406   SODIUM mmol/L 135* 132* 133*   POTASSIUM mmol/L 2.7* 2.9* 2.9*   CHLORIDE mmol/L 86* 91* 89*   CO2 mmol/L 26.0 22.0 20.0*   BUN mg/dL 27* 20 20   CREATININE mg/dL 1.21 1.00 1.07   CALCIUM mg/dL 9.7 8.9 9.3   BILIRUBIN mg/dL  --   --  0.8   ALK PHOS U/L  --   --  80   ALT (SGPT) U/L  --   --  17   AST (SGOT) U/L  --   --  34   GLUCOSE mg/dL 149* 131* 200*       Culture Data:   No results found for: BLOODCX, URINECX, WOUNDCX, MRSACX, RESPCX, STOOLCX    Radiology Data:   Imaging Results (Last 24 Hours)       Procedure Component Value Units Date/Time    CT Angiogram Chest [[id number]] Collected: 08/07/23 1503     Updated: 08/07/23 1531    Narrative:      Exam: CT angiogram of the of the chest with intravenous contrast.     CLINICAL HISTORY:  Suspected pulmonary embolism. Sudden onset shortness  of breath.     DOSE:  212 mGycm. All CT scans are performed using dose optimization  techniques as appropriate to the performed exam and including at least  one of the following: Automated exposure control, adjustment of the mA  and/or kV according to size, and the use of the iterative reconstruction  technique.     TECHNIQUE: Contiguous axial images were obtained through the thorax  following intravenous contrast administration with reformatted images  obtained in the sagittal and coronal projections from the original data  set. Three-dimensional reconstructions are also obtained.     COMPARISON:  07/17/2015     FINDINGS:     Pulmonary arteries:  There is normal enhancement of the pulmonary  arteries with no evidence of pulmonary thromboembolic  disease.     Aorta:  There is mild dilatation of ascending thoracic aorta with a  transverse diameter of 3.8 cm. No evidence of focal aneurysm.  Radiodensity is noted at the level of the aortic valvular related to  heavy calcification of the aortic valve leaflets or previous aortic  valve replacement. The proximal great vessels demonstrate mild  atheromatous calcification without rate limiting stenosis or aneurysm.     LUNGS:  The lungs are clear. No evidence of lobar consolidation.     Pleural spaces: Unremarkable. No evidence of pleural effusion or  pneumothorax.     HEART: There is mild cardiomegaly. Heavy coronary calcifications are  present. There is elevated right heart pressure with reflux of contrast  into the intrahepatic IVC. There is no pericardial effusion.     Bones: No acute osseous abnormalities are demonstrated.     CHEST WALL: No chest wall abnormalities are demonstrated.     Lymph nodes: No enlarged mediastinal or axillary lymphadenopathy is  present.     Upper abdomen: Steatosis of the liver is present. There is prominence of  the wall of the proximal descending duodenum with mild adjacent  stranding suspicious for duodenitis. No evidence of gastric outlet  obstruction.       Impression:      1. No evidence of pulmonary thromboembolic disease. The thoracic aorta  and great vessels are ectatic. No evidence of focal aneurysm or  dissection.  2. Mild cardiomegaly with heavy coronary calcifications. Radiodensity at  the level of the aortic root is present either related to previous  aortic valve replacement or heavy calcification of the aortic valve  leaflets. There is elevated right heart pressure with reflux of contrast  into the intrahepatic IVC.  3. Steatosis of the liver.  4. Prominence of the wall of the descending duodenum with mild adjacent  stranding suspicious for duodenitis.  This report was finalized on 08/07/2023 15:28 by Dr. Vicente Sam MD.    XR Chest 1 View [[id number]] Collected:  08/07/23 1447     Updated: 08/07/23 1454    Narrative:      EXAMINATION: XR CHEST 1 VW- 8/7/2023 2:47 PM CDT     HISTORY: Chest Pain Protocol     REPORT: A frontal view of the chest was obtained.     COMPARISON: Chest x-ray 07/13/2023.     There is mild bibasilar atelectasis, no focal pulmonary infiltrate is  identified. There appears be mild pleural thickening at the left  costophrenic angle. No free flowing effusion is identified. There is no  pneumothorax. Heart size is normal. The osseous structures are  unremarkable.       Impression:      Hypoaeration of lungs with bibasilar atelectasis, no  evidence of pneumonia. Probable mild chronic pleural thickening at the  left costophrenic angle.  This report was finalized on 08/07/2023 14:49 by Dr. Nitin Stone MD.            I have reviewed the patient's current medications.     Assessment/Plan   Assessment  Active Hospital Problems    Diagnosis     **Hypokalemia     Duodenitis     Gait instability     Elevated brain natriuretic peptide (BNP) level     ETOH abuse        Treatment Plan  Replace potassium  Check magnesium  Social service  PT OT      Medical Decision Making  Number and Complexity of problems:   Hypokalemia high complexity  Duodenitis moderate complexity  Gait instability moderate complexity  ETOH abuse moderate complexity   Elevated BNP moderate complexity    Differential Diagnosis:     Conditions and Status        stable     MDM Data  External documents reviewed: none  Cardiac tracing (EKG, telemetry) interpretation: reviewed  Radiology interpretation: reviewed  Labs reviewed: reviewed  Any tests that were considered but not ordered: none     Decision rules/scores evaluated (example [id number], Wells, etc): none     Discussed with: patient     Care Planning  Shared decision making: patient  Code status and discussions: full    Disposition  Social Determinants of Health that impact treatment or disposition: none  I expect the patient to be  discharged to home in 1-2 days.     Electronically signed by Kathleen Tovar, 08/08/23, 10:09 CDT.

## 2023-08-08 NOTE — PLAN OF CARE
Goal Outcome Evaluation:              Outcome Evaluation: NTN assessment complete. PO 25% of one meal, 240 mL oral fluid total. Wt 170lb stated; pt family member reported 15-20 lb loss in the last 4-6 weeks due to poor PO intake. Pt family member stated pt initially wanted to lose some wt and stopped eating desserts/sweets. Will perform NFPE when pt able to provide consent. Pt did open eyes and answer one questions, but fell asleep during conversation. No food allergies. Diarrhea is normal bowel per pt family member. NTN following per protocol.

## 2023-08-08 NOTE — THERAPY EVALUATION
Acute Care - Speech Language Pathology   Swallow Initial Evaluation [facility]     Patient Name: Dayron Lubin  : 1955  MRN: [id number]  Today's Date: 2023               Admit Date: 2023  SPEECH-LANGUAGE PATHOLOGY EVALUATION - SWALLOW  Subjective: The patient was seen on this date for a Clinical Swallow evaluation.  Patient was alert and cooperative. Confused at times. Sister and brother present in the room.   Significant history: Presented to ER with weakness. ETOH abuse. SLP consulted due to concerns this AM with increased congestion and coughing following medication administration.   Objective: Textures given included thin liquid, nectar thick liquid, honey thick liquid, puree consistency, and regular consistency.  Assessment:   Observations: Patient had delayed coughing with all consistencies except for thin and regular solids. It is difficult to discern if coughing is related to PO intake. Cough present without presentation of liquids as well and congested in nature.   SLP Findings:  Patient presents with suspected pharyngeal dysphagia, without esophageal component. Follow up and further assessment may be warranted to rule out aspiration.   Recommendations: Diet Textures: thin liquid, soft to chew food with chopped meats.  Medications should be taken whole with puree.  Recommended Strategies: Upright for PO and small bites and sips. At least intermittent supervision with meals. Oral care before breakfast, after all meals and PRN.  Other Recommended Evaluations: VFSS  with any increasing concerns. SLP spoke with GIANLUCA Wilkes regarding PO diet and close monitoring. With any increasing congestion or overt s/s of aspiration at the bedside, patient should be made NPO for re-eval by SLP and subsequent VFSS tomorrow.   Dysphagia therapy is recommended.   Keke Brooks MS CCC-SLP 2023 14:16 CDT    Visit Dx:     ICD-10-CM ICD-9-CM   1. Acute on chronic right-sided congestive heart failure   I50.813 428.0   2. Coronary artery disease due to lipid rich plaque  I25.10 414.00    I25.83 414.3   3. Duodenitis  K29.80 535.60   4. History of alcohol use  Z87.898 V11.3   5. Metabolic acidosis  E87.20 276.2   6. Dysphagia, unspecified type  R13.10 787.20   7. Impaired mobility [Z74.09 (ICD-10-CM)]  Z74.09 799.89   8. Impaired mobility and ADLs [Z74.09, Z78.9 (ICD-10-CM)]  Z74.09 V49.89    Z78.9      Patient Active Problem List   Diagnosis    Prostate cancer    Hx of radiation therapy    Elevated PSA    Encounter for follow-up surveillance of prostate cancer    HOCM (hypertrophic obstructive cardiomyopathy)    Coronary artery disease involving native coronary artery of native heart without angina pectoris    Essential hypertension    Mixed hyperlipidemia    Class 1 obesity with alveolar hypoventilation, serious comorbidity, and body mass index (BMI) of 30.0 to 30.9 in adult    Displaced fracture of lateral malleolus of right fibula, initial encounter for closed fracture    Family history of colonic polyps    History of colon polyps    Elevated brain natriuretic peptide (BNP) level    MIKEY (acute kidney injury)    Anxiety associated with depression    ETOH abuse    Alcoholic hepatitis    Volume depletion    Anorexia    CHF (congestive heart failure)    Hypokalemia    Duodenitis    Gait instability     Past Medical History:   Diagnosis Date    Alcoholic hepatitis 7/13/2023    CAD (coronary artery disease)     History of external beam radiation therapy 05/12/2016    6840 cGy to prostate bed    Hyperlipidemia     Hypertension     Prostate cancer      Past Surgical History:   Procedure Laterality Date    CARDIAC CATHETERIZATION      CORONARY ANGIOPLASTY WITH STENT PLACEMENT      PROSTATE SURGERY      TONSILLECTOMY         SLP Recommendation and Plan  SLP Swallowing Diagnosis: R/O pharyngeal dysphagia, functional oral phase (08/08/23 1322)  SLP Diet Recommendation: soft to chew textures, chopped, thin liquids (08/08/23  1322)  Recommended Precautions and Strategies: upright posture during/after eating, small bites of food and sips of liquid, general aspiration precautions, fatigue precautions (at least intermittent supervision with meals) (08/08/23 1322)  SLP Rec. for Method of Medication Administration: meds whole, with puree, as tolerated (08/08/23 1322)     Monitor for Signs of Aspiration: yes, notify SLP if any concerns (08/08/23 1322)  Recommended Diagnostics: VFSS (MBS) (with any increasing concerns) (08/08/23 1322)  Swallow Criteria for Skilled Therapeutic Interventions Met: demonstrates skilled criteria (08/08/23 1322)  Anticipated Discharge Disposition (SLP): unknown (08/08/23 1322)  Rehab Potential/Prognosis, Swallowing: adequate, monitor progress closely (08/08/23 1322)  Therapy Frequency (Swallow): at least, 3 days per week (08/08/23 1322)  Predicted Duration Therapy Intervention (Days): until discharge (08/08/23 1322)  Oral Care Recommendations: Oral Care before breakfast, after meals and PRN, Toothbrush (08/08/23 1322)                                      Oral Care Recommendations: Oral Care before breakfast, after meals and PRN, Toothbrush (08/08/23 1322)    Plan of Care Reviewed With: patient, caregiver, sibling  Progress: no change      SWALLOW EVALUATION (last 72 hours)       SLP Adult Swallow Evaluation       Row Name 08/08/23 1322                   Rehab Evaluation    Document Type evaluation  -MG        Subjective Information no complaints  -MG        Patient Observations alert;cooperative;agree to therapy  -MG        Patient/Family/Caregiver Comments/Observations Sister and brother present.  -MG        Patient Effort good  -MG        Symptoms Noted During/After Treatment none  -MG           General Information    Patient Profile Reviewed yes  -MG        Pertinent History Of Current Problem Presented to ER with weakness. ETOH abuse. SLP consulted due to concerns this AM with increased congestion and coughing  following medication administration.  -MG        Current Method of Nutrition NPO  -MG        Precautions/Limitations, Vision WFL;for purposes of eval  -MG        Precautions/Limitations, Hearing WFL;for purposes of eval  -MG        Prior Level of Function-Communication WFL  -MG        Prior Level of Function-Swallowing no diet consistency restrictions  -MG        Plans/Goals Discussed with patient and family;agreed upon  -MG        Barriers to Rehab cognitive status  -MG        Patient's Goals for Discharge return to PO diet  wants a drink  -MG        Family Goals for Discharge patient able to return to all previous activities/roles  -MG           Pain    Additional Documentation Pain Scale: FACES Pre/Post-Treatment (Group)  -MG           Pain Scale: FACES Pre/Post-Treatment    Pain: FACES Scale, Pretreatment 0-->no hurt  -MG        Posttreatment Pain Rating 0-->no hurt  -MG           Oral Motor Structure and Function    Dentition Assessment natural, present and adequate  -MG        Secretion Management WNL/WFL  -MG        Mucosal Quality moist, healthy  -MG        Volitional Swallow WFL  -MG        Volitional Cough WFL  -MG           Oral Musculature and Cranial Nerve Assessment    Oral Motor General Assessment WFL  -MG           General Eating/Swallowing Observations    Respiratory Support Currently in Use nasal cannula  -MG        Eating/Swallowing Skills fed by SLP;self-fed  -MG        Positioning During Eating upright in bed  -MG        Utensils Used spoon;straw  -MG        Consistencies Trialed pureed;honey-thick liquids;nectar/syrup-thick liquids;thin liquids;regular textures  -MG           Clinical Swallow Eval    Oral Prep Phase WFL  -MG        Oral Transit WFL  -MG        Oral Residue WFL  -MG        Pharyngeal Phase --  dfficult to discern; unable to fully rule out  -MG        Esophageal Phase unremarkable  -MG        Clinical Swallow Evaluation Summary See note.  -MG           SLP Evaluation Clinical  Impression    SLP Swallowing Diagnosis R/O pharyngeal dysphagia;functional oral phase  -MG        Functional Impact risk of aspiration/pneumonia  -MG        Rehab Potential/Prognosis, Swallowing adequate, monitor progress closely  -MG        Swallow Criteria for Skilled Therapeutic Interventions Met demonstrates skilled criteria  -MG           Recommendations    Therapy Frequency (Swallow) at least;3 days per week  -MG        Predicted Duration Therapy Intervention (Days) until discharge  -MG        SLP Diet Recommendation soft to chew textures;chopped;thin liquids  -MG        Recommended Diagnostics VFSS (MBS)  with any increasing concerns  -MG        Recommended Precautions and Strategies upright posture during/after eating;small bites of food and sips of liquid;general aspiration precautions;fatigue precautions  at least intermittent supervision with meals  -MG        Oral Care Recommendations Oral Care before breakfast, after meals and PRN;Toothbrush  -MG        SLP Rec. for Method of Medication Administration meds whole;with puree;as tolerated  -MG        Monitor for Signs of Aspiration yes;notify SLP if any concerns  -MG        Anticipated Discharge Disposition (SLP) unknown  -MG           Swallow Goals (SLP)    Swallow LTGs Swallow Long Term Goal (free text)  -MG        Swallow STGs diet tolerance goal selection (SLP)  -MG        Diet Tolerance Goal Selection (SLP) Patient will tolerate trials of  -MG           (LTG) Swallow    (LTG) Swallow Patient will tolerate least restrictive diet without s/s of aspiration.  -MG        [place] (Swallow Long Term Goal) independently (over 90% accuracy)  -MG        Time Frame (Swallow Long Term Goal) by discharge  -MG        Barriers (Swallow Long Term Goal) none  -MG        Progress/Outcomes (Swallow Long Term Goal) new goal  -MG           (STG) Patient will tolerate trials of    Consistencies Trialed (Tolerate trials) soft to chew (chopped) textures;thin  liquids;regular textures  -MG        Desired Outcome (Tolerate trials) without signs/symptoms of aspiration;without signs of distress;with adequate oral prep/transit/clearance  -MG        [place] (Tolerate trials) independently (over 90% accuracy)  -MG        Time Frame (Tolerate trials) by discharge  -MG        Progress/Outcomes (Tolerate trials) new goal  -MG                  User Key  (r) = Recorded By, (t) = Taken By, (c) = Cosigned By      Initials Name Effective Dates    Keke Dunn MS CCC-SLP 07/11/23 -                     EDUCATION  The patient has been educated in the following areas:   Dysphagia (Swallowing Impairment) Oral Care/Hydration Modified Diet Instruction.        SLP GOALS       Row Name 08/08/23 1322             (LTG) Swallow    (LTG) Swallow Patient will tolerate least restrictive diet without s/s of aspiration.  -MG      [place] (Swallow Long Term Goal) independently (over 90% accuracy)  -MG      Time Frame (Swallow Long Term Goal) by discharge  -MG      Barriers (Swallow Long Term Goal) none  -MG      Progress/Outcomes (Swallow Long Term Goal) new goal  -MG         (STG) Patient will tolerate trials of    Consistencies Trialed (Tolerate trials) soft to chew (chopped) textures;thin liquids;regular textures  -MG      Desired Outcome (Tolerate trials) without signs/symptoms of aspiration;without signs of distress;with adequate oral prep/transit/clearance  -MG      [place] (Tolerate trials) independently (over 90% accuracy)  -MG      Time Frame (Tolerate trials) by discharge  -MG      Progress/Outcomes (Tolerate trials) new goal  -MG                User Key  (r) = Recorded By, (t) = Taken By, (c) = Cosigned By      Initials Name Provider Type    Keke Dunn MS CCC-SLP Speech and Language Pathologist                       Time Calculation:    Time Calculation- SLP       Row Name 08/08/23 1415             Time Calculation- SLP    SLP Start Time 1322  -MG      SLP  Stop Time 1415  -MG      SLP Time Calculation (min) 53 min  -MG      SLP Received On 08/08/23  -MG      SLP Goal Re-Cert Due Date 08/18/23  -MG         Untimed Charges    SLP Eval/Re-eval  ST Eval Oral Pharyng Swallow - 87840  -MG      31527-PT Eval Oral Pharyng Swallow Minutes 53  -MG         Total Minutes    Untimed Charges Total Minutes 53  -MG       Total Minutes 53  -MG                User Key  (r) = Recorded By, (t) = Taken By, (c) = Cosigned By      Initials Name Provider Type    MG Keke Brooks MS CCC-SLP Speech and Language Pathologist                    Therapy Charges for Today       Code Description Service Date Service Provider Modifiers Qty    [id number] HC ST EVAL ORAL PHARYNG SWALLOW 4 8/8/2023 Keke Brooks MS CCC-SLP GN 1                 Keke Brooks MS CCC-SLP  8/8/2023

## 2023-08-08 NOTE — PLAN OF CARE
Goal Outcome Evaluation:  Plan of Care Reviewed With: patient        Progress: improving  Outcome Evaluation: Scheduled meds for alcohol withdrawal have diminished confusion and agitation, no hallucinations noted. Patient seems alert and oriented at this time, except when first awakening he picks at things in the air. External catheter placed per pt request due to chronic stress incontinence. C/O back discomfort from being in bed, repositioned. Receiving potassium replacement IV.

## 2023-08-08 NOTE — THERAPY EVALUATION
Patient Name: Dayron Lubin  : 1955    MRN: [id number]                              Today's Date: 2023       Admit Date: 2023    Visit Dx:     ICD-10-CM ICD-9-CM   1. Acute on chronic right-sided congestive heart failure  I50.813 428.0   2. Coronary artery disease due to lipid rich plaque  I25.10 414.00    I25.83 414.3   3. Duodenitis  K29.80 535.60   4. History of alcohol use  Z87.898 V11.3   5. Metabolic acidosis  E87.20 276.2   6. Dysphagia, unspecified type  R13.10 787.20   7. Impaired mobility [Z74.09 (ICD-10-CM)]  Z74.09 799.89   8. Impaired mobility and ADLs [Z74.09, Z78.9 (ICD-10-CM)]  Z74.09 V49.89    Z78.9      Patient Active Problem List   Diagnosis    Prostate cancer    Hx of radiation therapy    Elevated PSA    Encounter for follow-up surveillance of prostate cancer    HOCM (hypertrophic obstructive cardiomyopathy)    Coronary artery disease involving native coronary artery of native heart without angina pectoris    Essential hypertension    Mixed hyperlipidemia    Class 1 obesity with alveolar hypoventilation, serious comorbidity, and body mass index (BMI) of 30.0 to 30.9 in adult    Displaced fracture of lateral malleolus of right fibula, initial encounter for closed fracture    Family history of colonic polyps    History of colon polyps    Elevated brain natriuretic peptide (BNP) level    MIKEY (acute kidney injury)    Anxiety associated with depression    ETOH abuse    Alcoholic hepatitis    Volume depletion    Anorexia    CHF (congestive heart failure)    Hypokalemia    Duodenitis    Gait instability     Past Medical History:   Diagnosis Date    Alcoholic hepatitis 2023    CAD (coronary artery disease)     History of external beam radiation therapy 2016    6840 cGy to prostate bed    Hyperlipidemia     Hypertension     Prostate cancer      Past Surgical History:   Procedure Laterality Date    CARDIAC CATHETERIZATION      CORONARY ANGIOPLASTY WITH STENT PLACEMENT       PROSTATE SURGERY      TONSILLECTOMY        General Information       Row Name 08/08/23 0940          OT Time and Intention    Document Type evaluation  Pt admitted with SOA, chest discomfort and weakness. Dx: A/C CHF, CAD, duodenitis, metabolic acidosis, elevated BNP, hypokalemia, and ETOH abuse.  -MM     Mode of Treatment occupational therapy  -MM       Row Name 08/08/23 0940          General Information    Patient Profile Reviewed yes  -MM     Prior Level of Function independent:;all household mobility;community mobility;ADL's;driving;shopping;cleaning;cooking  -MM     Existing Precautions/Restrictions fall  -MM     Barriers to Rehab medically complex;previous functional deficit;cognitive status  -MM       Row Name 08/08/23 0940          Living Environment    People in Home alone;other (see comments)  brothfransico Pedroza can assist if needed, walk in shower  -MM       Row Name 08/08/23 0940          Home Main Entrance    Number of Stairs, Main Entrance two  -MM     Stair Railings, Main Entrance none  -MM       Row Name 08/08/23 0940          Stairs Within Home, Primary    Number of Stairs, Within Home, Primary none  -MM     Stair Railings, Within Home, Primary none  -MM       Row Name 08/08/23 0940          Cognition    Orientation Status (Cognition) oriented x 3;disoriented to;place  conversationally confused, visual and auditory hallucinations noted during evaluation  -MM       Row Name 08/08/23 0940          Safety Issues, Functional Mobility    Safety Issues Affecting Function (Mobility) ability to follow commands;at risk behavior observed;awareness of need for assistance;impulsivity;insight into deficits/self-awareness;judgment;problem-solving;safety precaution awareness;safety precautions follow-through/compliance;sequencing abilities  -MM     Impairments Affecting Function (Mobility) balance;cognition;endurance/activity tolerance;shortness of breath;pain;motor planning  -MM     Cognitive Impairments, Mobility  Safety/Performance attention;awareness, need for assistance;impulsivity;insight into deficits/self-awareness;judgment;problem-solving/reasoning;safety precaution awareness;safety precaution follow-through;sequencing abilities  -MM               User Key  (r) = Recorded By, (t) = Taken By, (c) = Cosigned By      Initials Name Provider Type    Andres Rose, OTR/L Occupational Therapist                     Mobility/ADL's       Row Name 08/08/23 0940          Bed Mobility    Bed Mobility bed mobility (all) activities  -MM     All Activities, [place] (Bed Mobility) standby assist  -MM     Comment, (Bed Mobility) pt would impulsively sit EOB, but then would quickly impulsively return to supine. unsafe at this time 2' cognition for further mobility.  -MM       Row Name 08/08/23 0940          Activities of Daily Living    BADL Assessment/Intervention lower body dressing  -MM       Row Name 08/08/23 0940          Lower Body Dressing Assessment/Training    [place] Level (Lower Body Dressing) maximum assist (25% patient effort);dependent (less than 25% patient effort);verbal cues;don;socks  -MM     Position (Lower Body Dressing) supine  -MM               User Key  (r) = Recorded By, (t) = Taken By, (c) = Cosigned By      Initials Name Provider Type    Andres Rose, OTR/L Occupational Therapist                   Obj/Interventions       Row Name 08/08/23 0935          Sensory Assessment (Somatosensory)    Sensory Assessment (Somatosensory) sensation intact  -MM       Row Name 08/08/23 0935          Range of Motion Comprehensive    General Range of Motion bilateral upper extremity ROM WNL  -MM       Row Name 08/08/23 0935          Strength Comprehensive (MMT)    Comment, General Manual Muscle Testing (MMT) Assessment BUE 4+/5  -MM       Row Name 08/08/23 0935          Balance    Balance Assessment sitting static balance  -MM     Static Sitting Balance standby assist;verbal cues  -MM     Position, Sitting  Balance supported;unsupported;sitting edge of bed  -MM               User Key  (r) = Recorded By, (t) = Taken By, (c) = Cosigned By      Initials Name Provider Type    Andres Rose, OTR/L Occupational Therapist                   Goals/Plan       Row Name 08/08/23 0935          Dressing Goal 1 (OT)    Activity/Device (Dressing Goal 1, OT) dressing skills, all  -MM     [place]/Cues Needed (Dressing Goal 1, OT) minimum assist (75% or more patient effort);set-up required;verbal cues required  -MM     Time Frame (Dressing Goal 1, OT) long term goal (LTG);by discharge  -MM     Progress/Outcome (Dressing Goal 1, OT) new goal  -MM       Row Name 08/08/23 0935          Toileting Goal 1 (OT)    Activity/Device (Toileting Goal 1, OT) toileting skills, all  -MM     [place] Level/Cues Needed (Toileting Goal 1, OT) standby assist  -MM     Time Frame (Toileting Goal 1, OT) long term goal (LTG);by discharge  -MM     Progress/Outcome (Toileting Goal 1, OT) new goal  -MM       Row Name 08/08/23 0935          Grooming Goal 1 (OT)    Activity/Device (Grooming Goal 1, OT) grooming skills, all  -MM     [place] (Grooming Goal 1, OT) standby assist  -MM     Time Frame (Grooming Goal 1, OT) long term goal (LTG);by discharge  -MM     Strategies/Barriers (Grooming Goal 1, OT) standing at sink  -MM     Progress/Outcome (Grooming Goal 1, OT) new goal  -MM       Row Name 08/08/23 0935          Therapy Assessment/Plan (OT)    Planned Therapy Interventions (OT) activity tolerance training;adaptive equipment training;BADL retraining;functional balance retraining;cognitive/visual perception retraining;occupation/activity based interventions;passive ROM/stretching;ROM/therapeutic exercise;strengthening exercise;patient/caregiver education/training;transfer/mobility retraining  -MM               User Key  (r) = Recorded By, (t) = Taken By, (c) = Cosigned By      Initials Name Provider Type    Andres Rose, OTR/L  Occupational Therapist                   Clinical Impression       Row Name 08/08/23 0953          Pain Assessment    Pretreatment Pain Rating 0/10 - no pain  -MM     Posttreatment Pain Rating 0/10 - no pain  -MM     Pre/Posttreatment Pain Comment weakness  -MM       Row Name 08/08/23 0953          Plan of Care Review    Plan of Care Reviewed With patient  -MM     Progress no change  -MM     Outcome Evaluation OT evaluation completed. Pt is alert and oriented x3-4. Pt is conversationally confused and is intermittently disoriented. Pt noted to have auditory and visual hallucinations during OT evaluation, RN notified. Pt was SBA for all bed mobility but pt is impulsive throughout. Pt sits EOB impulsively but then returns to supine impulsively. Decreased command following and safety awareness. Pt was max-dependent to shiva socks. Pt would benefit from skilled OT to address adls, functional mobility and safety awareness. Pt is a high fall risk. Recommended d/c SNF.  -MM       Row Name 08/08/23 0953          Therapy Assessment/Plan (OT)    Patient/Family Therapy Goal Statement (OT) pt does not state  -MM     Rehab Potential (OT) good, to achieve stated therapy goals  -MM     Criteria for Skilled Therapeutic Interventions Met (OT) yes;meets criteria;skilled treatment is necessary  -MM     Therapy Frequency (OT) 5 times/wk  -MM     Predicted Duration of Therapy Intervention (OT) until d/c  -MM       Row Name 08/08/23 0953          Therapy Plan Review/Discharge Plan (OT)    Anticipated Discharge Disposition (OT) home with assist  -MM       Row Name 08/08/23 0953          Positioning and Restraints    Pre-Treatment Position in bed  -MM     Post Treatment Position bed  -MM     In Bed notified nsg;fowlers;call light within reach;encouraged to call for assist;exit alarm on;side rails up x3  -MM               User Key  (r) = Recorded By, (t) = Taken By, (c) = Cosigned By      Initials Name Provider Type    MM Andres Lambert,  OTR/L Occupational Therapist                   Outcome Measures       Row Name 08/08/23 0935          How much help from another is currently needed...    Putting on and taking off regular lower body clothing? 2  -MM     Bathing (including washing, rinsing, and drying) 2  -MM     Toileting (which includes using toilet bed pan or urinal) 2  -MM     Putting on and taking off regular upper body clothing 2  -MM     Taking care of personal grooming (such as brushing teeth) 3  -MM     Eating meals 3  -MM     AM-PAC 6 Clicks Score (OT) 14  -MM       Row Name 08/08/23 1310 08/08/23 0800       How much help from another person do you currently need...    Turning from your back to your side while in flat bed without using bedrails? 4  -LH 4  -HT    Moving from lying on back to sitting on the side of a flat bed without bedrails? 4  -LH 3  -HT    Moving to and from a bed to a chair (including a wheelchair)? 2  - 2  -HT    Standing up from a chair using your arms (e.g., wheelchair, bedside chair)? 3  - 2  -HT    Climbing 3-5 steps with a railing? 2  - 1  -HT    To walk in hospital room? 2  -LH 2  -HT    AM-PAC 6 Clicks Score (PT) 17  -LH 14  -HT    Highest level of mobility 5 --> Static standing  - 4 --> Transferred to chair/commode  -HT      Row Name 08/08/23 1310 08/08/23 0935       Functional Assessment    Outcome Measure Options AM-PAC 6 Clicks Basic Mobility (PT)  - AM-PAC 6 Clicks Daily Activity (OT)  -MM              User Key  (r) = Recorded By, (t) = Taken By, (c) = Cosigned By      Initials Name Provider Type     Justyn Briggs, PT Physical Therapist     Katrin Calix, RN Registered Nurse    MM Andres Lambert, OTR/L Occupational Therapist                    Occupational Therapy Education       Title: PT OT SLP Therapies (In Progress)       Topic: Occupational Therapy (In Progress)       Point: ADL training (In Progress)       Description:   Instruct learner(s) on proper safety adaptation and  remediation techniques during self care or transfers.   Instruct in proper use of assistive devices.                  Learning Progress Summary             Patient Acceptance, E, NR by MM at 8/8/2023 1412    Comment: OT role, benefits, POC, d/c planning, safety awareness                         Point: Home exercise program (Not Started)       Description:   Instruct learner(s) on appropriate technique for monitoring, assisting and/or progressing therapeutic exercises/activities.                  Learner Progress:  Not documented in this visit.              Point: Precautions (In Progress)       Description:   Instruct learner(s) on prescribed precautions during self-care and functional transfers.                  Learning Progress Summary             Patient Acceptance, E, NR by MM at 8/8/2023 1412    Comment: OT role, benefits, POC, d/c planning, safety awareness                         Point: Body mechanics (In Progress)       Description:   Instruct learner(s) on proper positioning and spine alignment during self-care, functional mobility activities and/or exercises.                  Learning Progress Summary             Patient Acceptance, E, NR by MM at 8/8/2023 1412    Comment: OT role, benefits, POC, d/c planning, safety awareness                                         User Key       Initials Effective Dates Name Provider Type Discipline     07/11/23 -  Andres Lambert, OTR/L Occupational Therapist OT                  OT Recommendation and Plan  Planned Therapy Interventions (OT): activity tolerance training, adaptive equipment training, BADL retraining, functional balance retraining, cognitive/visual perception retraining, occupation/activity based interventions, passive ROM/stretching, ROM/therapeutic exercise, strengthening exercise, patient/caregiver education/training, transfer/mobility retraining  Therapy Frequency (OT): 5 times/wk  Plan of Care Review  Plan of Care Reviewed With:  patient  Progress: no change  Outcome Evaluation: OT evaluation completed. Pt is alert and oriented x3-4. Pt is conversationally confused and is intermittently disoriented. Pt noted to have auditory and visual hallucinations during OT evaluation, RN notified. Pt was SBA for all bed mobility but pt is impulsive throughout. Pt sits EOB impulsively but then returns to supine impulsively. Decreased command following and safety awareness. Pt was max-dependent to shiva socks. Pt would benefit from skilled OT to address adls, functional mobility and safety awareness. Pt is a high fall risk. Recommended d/c SNF.     Time Calculation:         Time Calculation- OT       Row Name 08/08/23 0935             Time Calculation- OT    OT Start Time 0935  -MM      OT Stop Time 1013  -MM      OT Time Calculation (min) 38 min  -MM      OT Received On 08/08/23  -MM      OT Goal Re-Cert Due Date 08/18/23  -MM                User Key  (r) = Recorded By, (t) = Taken By, (c) = Cosigned By      Initials Name Provider Type    MM Andres Lambert, OTR/L Occupational Therapist                  Therapy Charges for Today       Code Description Service Date Service Provider Modifiers Qty    [id number]  OT EVAL MOD COMPLEXITY 3 8/8/2023 Andres Lambert OTR/L GO 1                 ELLIE Carpio/ZOLTAN  8/8/2023

## 2023-08-08 NOTE — PLAN OF CARE
Goal Outcome Evaluation:  Plan of Care Reviewed With: patient, caregiver, sibling        Progress: no change                SPEECH-LANGUAGE PATHOLOGY EVALUATION - SWALLOW  Subjective: The patient was seen on this date for a Clinical Swallow evaluation.  Patient was alert and cooperative. Confused at times. Sister and brother present in the room.   Significant history: Presented to ER with weakness. ETOH abuse. SLP consulted due to concerns this AM with increased congestion and coughing following medication administration.   Objective: Textures given included thin liquid, nectar thick liquid, honey thick liquid, puree consistency, and regular consistency.  Assessment:   Observations: Patient had delayed coughing with all consistencies except for thin and regular solids. It is difficult to discern if coughing is related to PO intake. Cough present without presentation of liquids as well and congested in nature.   SLP Findings:  Patient presents with suspected pharyngeal dysphagia, without esophageal component. Follow up and further assessment may be warranted to rule out aspiration.   Recommendations: Diet Textures: thin liquid,  soft to chew  food with chopped meats.  Medications should be taken whole with puree.  Recommended Strategies: Upright for PO and small bites and sips. At least intermittent supervision with meals. Oral care before breakfast, after all meals and PRN.  Other Recommended Evaluations: VFSS  with any increasing concerns. SLP spoke with GIANLUCA Wilkes regarding PO diet and close monitoring. With any increasing congestion or overt s/s of aspiration at the bedside, patient should be made NPO for re-eval by SLP and subsequent VFSS tomorrow.   Dysphagia therapy is recommended.

## 2023-08-08 NOTE — PLAN OF CARE
Goal Outcome Evaluation:  Plan of Care Reviewed With: patient        Progress: no change  Outcome Evaluation: Pt A&O. VSS. NSR-ST on tele rate . One episode of emesis. PRN ativan given per CIWA protocol. Denies pain. Will continue to monitor.          [General Appearance - Alert] : alert [General Appearance - In No Acute Distress] : in no acute distress [General Appearance - Well Nourished] : well nourished [PERRL With Normal Accommodation] : pupils were equal in size, round, and reactive to light [Sclera] : the sclera and conjunctiva were normal [Extraocular Movements] : extraocular movements were intact [Outer Ear] : the ears and nose were normal in appearance [Hearing Threshold Finger Rub Not [place]] : hearing was normal [Both Tympanic Membranes Were Examined] : both tympanic membranes were normal [Neck Appearance] : the appearance of the neck was normal [Neck Cervical Mass (___cm)] : no neck mass was observed [Respiration, Rhythm And Depth] : normal respiratory rhythm and effort [Exaggerated Use Of Accessory Muscles For Inspiration] : no accessory muscle use [Auscultation Breath Sounds / Voice Sounds] : lungs were clear to auscultation bilaterally [Apical Impulse] : the apical impulse was normal [Heart Rate And Rhythm] : heart rate was normal and rhythm regular [Heart Sounds] : normal S1 and S2 [Examination Of The Chest] : the chest was normal in appearance [2+] : left 2+ [No Abnormalities] : the abdominal aorta was not enlarged and no bruit was heard [Bowel Sounds] : normal bowel sounds [Abdomen Soft] : soft [Abdomen Tenderness] : non-tender [No CVA Tenderness] : no ~M costovertebral angle tenderness [Nail Clubbing] : no clubbing  or cyanosis of the fingernails [Involuntary Movements] : no involuntary movements were seen [Limping On The Right] : limping on the right [Skin Color & Pigmentation] : normal skin color and pigmentation [Skin Turgor] : normal skin turgor [] : no rash [Cranial Nerves] : cranial nerves 2-12 were intact [Deep Tendon Reflexes (DTR)] : deep tendon reflexes were 2+ and symmetric [Sensation] : the sensory exam was normal to light touch and pinprick [Oriented To Time, Place, And Person] : oriented to person, place, and time [Impaired Insight] : insight and judgment were intact [Affect] : the affect was normal [Mood] : the mood was normal [Varicose Veins Of The Right Leg] : the patient has no varicose veins of the right leg [Varicose Veins Of The Left Leg] : the patient has no varicose veins of the left leg [FreeTextEntry1] : deferred

## 2023-08-08 NOTE — PLAN OF CARE
Problem: Adult Inpatient Plan of Care  Goal: Plan of Care Review  Recent Flowsheet Documentation  Taken 8/8/2023 1310 by Justyn Briggs, PT  Plan of Care Reviewed With:   patient   sibling  Outcome Evaluation: PT IE complete.  A&O to person only.  Brother and sister in room report pt is independent PLOF.  Today he is SBA to sit EOB.  CGA sit<>stand.  Ambulated 20ft mod A x1 with 4L O2.  Gait slow, did not follow straight line, running into objects.  O2sat 94% post activity.  PT to see for progressive ambulation and balance activties.  Recommend SNF at [place].  Thank you for referral.   Goal Outcome Evaluation:  Plan of Care Reviewed With: patient, sibling           Outcome Evaluation: PT IE complete.  A&O to person only.  Brother and sister in room report pt is independent PLOF.  Today he is SBA to sit EOB.  CGA sit<>stand.  Ambulated 20ft mod A x1 with 4L O2.  Gait slow, did not follow straight line, running into objects.  O2sat 94% post activity.  PT to see for progressive ambulation and balance activties.  Recommend SNF at [place].  Thank you for referral.      Anticipated Discharge Disposition (PT): skilled nursing facility

## 2023-08-09 LAB
ALBUMIN SERPL-MCNC: 3.6 G/DL (ref 3.5–5.2)
ALBUMIN/GLOB SERPL: 1.6 G/DL
ALP SERPL-CCNC: 52 U/L (ref 39–117)
ALT SERPL W P-5'-P-CCNC: 13 U/L (ref 1–41)
ANION GAP SERPL CALCULATED.3IONS-SCNC: 14 MMOL/L (ref 5–15)
AST SERPL-CCNC: 28 U/L (ref 1–40)
BILIRUB SERPL-MCNC: 0.5 MG/DL (ref 0–1.2)
BUN SERPL-MCNC: 45 MG/DL (ref 8–23)
BUN/CREAT SERPL: 22.5 (ref 7–25)
CALCIUM SPEC-SCNC: 7.9 MG/DL (ref 8.6–10.5)
CHLORIDE SERPL-SCNC: 94 MMOL/L (ref 98–107)
CO2 SERPL-SCNC: 24 MMOL/L (ref 22–29)
CREAT SERPL-MCNC: 2 MG/DL (ref 0.76–1.27)
EGFRCR SERPLBLD CKD-EPI 2021: 35.5 ML/MIN/1.73
GLOBULIN UR ELPH-MCNC: 2.3 GM/DL
GLUCOSE SERPL-MCNC: 96 MG/DL (ref 65–99)
POTASSIUM SERPL-SCNC: 3.3 MMOL/L (ref 3.5–5.2)
POTASSIUM SERPL-SCNC: 3.3 MMOL/L (ref 3.5–5.2)
PROT SERPL-MCNC: 5.9 G/DL (ref 6–8.5)
SODIUM SERPL-SCNC: 132 MMOL/L (ref 136–145)

## 2023-08-09 PROCEDURE — 25010000002 ENOXAPARIN PER 10 MG: Performed by: NURSE PRACTITIONER

## 2023-08-09 PROCEDURE — 0 POTASSIUM CHLORIDE 10 MEQ/100ML SOLUTION: Performed by: FAMILY MEDICINE

## 2023-08-09 PROCEDURE — 97530 THERAPEUTIC ACTIVITIES: CPT

## 2023-08-09 PROCEDURE — 84132 ASSAY OF SERUM POTASSIUM: CPT | Performed by: FAMILY MEDICINE

## 2023-08-09 PROCEDURE — 97110 THERAPEUTIC EXERCISES: CPT

## 2023-08-09 PROCEDURE — 25010000002 LORAZEPAM PER 2 MG: Performed by: NURSE PRACTITIONER

## 2023-08-09 PROCEDURE — 97535 SELF CARE MNGMENT TRAINING: CPT

## 2023-08-09 PROCEDURE — 99221 1ST HOSP IP/OBS SF/LOW 40: CPT | Performed by: NURSE PRACTITIONER

## 2023-08-09 PROCEDURE — 25010000002 THIAMINE PER 100 MG: Performed by: NURSE PRACTITIONER

## 2023-08-09 PROCEDURE — 80053 COMPREHEN METABOLIC PANEL: CPT | Performed by: FAMILY MEDICINE

## 2023-08-09 RX ORDER — CHLORDIAZEPOXIDE HYDROCHLORIDE 10 MG/1
10 CAPSULE, GELATIN COATED ORAL EVERY 6 HOURS SCHEDULED
Status: DISCONTINUED | OUTPATIENT
Start: 2023-08-09 | End: 2023-08-15 | Stop reason: HOSPADM

## 2023-08-09 RX ORDER — LOPERAMIDE HYDROCHLORIDE 2 MG/1
2 CAPSULE ORAL 4 TIMES DAILY PRN
Status: DISCONTINUED | OUTPATIENT
Start: 2023-08-09 | End: 2023-08-15 | Stop reason: HOSPADM

## 2023-08-09 RX ADMIN — THIAMINE HYDROCHLORIDE 200 MG: 100 INJECTION, SOLUTION INTRAMUSCULAR; INTRAVENOUS at 13:50

## 2023-08-09 RX ADMIN — POTASSIUM CHLORIDE 10 MEQ: 7.46 INJECTION, SOLUTION INTRAVENOUS at 00:00

## 2023-08-09 RX ADMIN — POTASSIUM CHLORIDE 10 MEQ: 7.46 INJECTION, SOLUTION INTRAVENOUS at 01:35

## 2023-08-09 RX ADMIN — ENOXAPARIN SODIUM 40 MG: 100 INJECTION SUBCUTANEOUS at 17:07

## 2023-08-09 RX ADMIN — Medication 10 ML: at 09:10

## 2023-08-09 RX ADMIN — LOPERAMIDE HYDROCHLORIDE 2 MG: 2 CAPSULE ORAL at 20:49

## 2023-08-09 RX ADMIN — LORAZEPAM 1 MG: 2 INJECTION INTRAMUSCULAR; INTRAVENOUS at 15:03

## 2023-08-09 RX ADMIN — THIAMINE HYDROCHLORIDE 200 MG: 100 INJECTION, SOLUTION INTRAMUSCULAR; INTRAVENOUS at 06:44

## 2023-08-09 RX ADMIN — CHLORDIAZEPOXIDE HYDROCHLORIDE 10 MG: 10 CAPSULE ORAL at 17:07

## 2023-08-09 RX ADMIN — LOPERAMIDE HYDROCHLORIDE 2 MG: 2 CAPSULE ORAL at 15:48

## 2023-08-09 RX ADMIN — SUCRALFATE 1 G: 1 TABLET ORAL at 17:07

## 2023-08-09 RX ADMIN — PANTOPRAZOLE SODIUM 40 MG: 40 TABLET, DELAYED RELEASE ORAL at 09:10

## 2023-08-09 RX ADMIN — THIAMINE HYDROCHLORIDE 200 MG: 100 INJECTION, SOLUTION INTRAMUSCULAR; INTRAVENOUS at 21:04

## 2023-08-09 RX ADMIN — SUCRALFATE 1 G: 1 TABLET ORAL at 20:49

## 2023-08-09 RX ADMIN — SUCRALFATE 1 G: 1 TABLET ORAL at 09:10

## 2023-08-09 RX ADMIN — COLLAGENASE SANTYL 1 APPLICATION: 250 OINTMENT TOPICAL at 18:45

## 2023-08-09 RX ADMIN — LORAZEPAM 1 MG: 2 INJECTION INTRAMUSCULAR; INTRAVENOUS at 13:55

## 2023-08-09 RX ADMIN — PAROXETINE 10 MG: 10 TABLET, FILM COATED ORAL at 20:50

## 2023-08-09 RX ADMIN — ASPIRIN 81 MG: 81 TABLET, COATED ORAL at 09:10

## 2023-08-09 RX ADMIN — FOLIC ACID 1 MG: 1 TABLET ORAL at 09:10

## 2023-08-09 RX ADMIN — SODIUM CHLORIDE 1000 ML: 9 INJECTION, SOLUTION INTRAVENOUS at 01:35

## 2023-08-09 RX ADMIN — PANTOPRAZOLE SODIUM 40 MG: 40 TABLET, DELAYED RELEASE ORAL at 17:07

## 2023-08-09 RX ADMIN — Medication 10 ML: at 20:50

## 2023-08-09 NOTE — PROGRESS NOTES
[facility] Medicine Services  INPATIENT PROGRESS NOTE    Patient Name: Dayron Lubin  Date of Admission: 8/7/2023  Today's Date: 08/09/23  Length of Stay: 2  Primary Care Physician: Herberth Nguyen Jr., MD    Subjective   Chief Complaint: Patient feels okay today.  He is not complaining of any pain or shortness of breath.  No nausea or vomiting  HPI   He is sitting up in bed eating breakfast.  Tolerating well.  No choking noted.  Did have potassium replacement yesterday as potassium was up to 3.3.  Magnesium was 2.1.        Review of Systems   All pertinent negatives and positives are as above. All other systems have been reviewed and are negative unless otherwise stated.     Objective    Temp:  [97.3 øF (36.3 øC)-98.5 øF (36.9 øC)] 98.5 øF (36.9 øC)  Heart Rate:  [] 89  Resp:  [18] 18  BP: ()/(45-64) 99/61  Physical Exam  Vitals and nursing note reviewed.   Constitutional:       Appearance: Normal appearance.   HENT:      Head: Normocephalic and atraumatic.      Right Ear: External ear normal.      Left Ear: External ear normal.      Nose: Nose normal.      Mouth/Throat:      Mouth: Mucous membranes are moist.   Eyes:      Extraocular Movements: Extraocular movements intact.      Conjunctiva/sclera: Conjunctivae normal.      Pupils: Pupils are equal, round, and reactive to light.   Cardiovascular:      Rate and Rhythm: Normal rate and regular rhythm.      Pulses: Normal pulses.      Heart sounds: No murmur heard.    No friction rub. No gallop.   Pulmonary:      Effort: Pulmonary effort is normal.      Breath sounds: Normal breath sounds.   Abdominal:      General: Bowel sounds are normal.      Palpations: Abdomen is soft.   Musculoskeletal:         General: Normal range of motion.      Cervical back: Normal range of motion and neck supple.   Skin:     General: Skin is warm and dry.      Capillary Refill: Capillary refill takes less than 2 seconds.   Neurological:       General: No focal deficit present.      Mental Status: He is alert and oriented to person, place, and time.      Cranial Nerves: No cranial nerve deficit.      Comments: Patient is able to answer all orientation questions.     Psychiatric:         Mood and Affect: Mood normal.         Behavior: Behavior normal.           Results Review:  I have reviewed the labs, radiology results, and diagnostic studies.    Laboratory Data:   Results from last 7 days   Lab Units 08/07/23  1406   WBC 10*3/mm3 10.68   HEMOGLOBIN g/dL 12.4*   HEMATOCRIT % 36.3*   PLATELETS 10*3/mm3 248        Results from last 7 days   Lab Units 08/09/23  0352 08/08/23  0341 08/07/23  1609 08/07/23  1406   SODIUM mmol/L 132* 135* 132* 133*   POTASSIUM mmol/L 3.3*  3.3* 2.7* 2.9* 2.9*   CHLORIDE mmol/L 94* 86* 91* 89*   CO2 mmol/L 24.0 26.0 22.0 20.0*   BUN mg/dL 45* 27* 20 20   CREATININE mg/dL 2.00* 1.21 1.00 1.07   CALCIUM mg/dL 7.9* 9.7 8.9 9.3   BILIRUBIN mg/dL 0.5  --   --  0.8   ALK PHOS U/L 52  --   --  80   ALT (SGPT) U/L 13  --   --  17   AST (SGOT) U/L 28  --   --  34   GLUCOSE mg/dL 96 149* 131* 200*       Culture Data:   No results found for: BLOODCX, URINECX, WOUNDCX, MRSACX, RESPCX, STOOLCX    Radiology Data:   Imaging Results (Last 24 Hours)       ** No results found for the last 24 hours. **            I have reviewed the patient's current medications.     Assessment/Plan   Assessment  Active Hospital Problems    Diagnosis     **Hypokalemia     Duodenitis     Gait instability     Elevated brain natriuretic peptide (BNP) level     ETOH abuse        Treatment Plan  Increase patient activity chair for meals, ambulate in marcial.  Patient adamantly refuses to go to skilled nursing facility.  Replace potassium again today.  Repeat CMP and CBC in a.m.      Medical Decision Making  Number and Complexity of problems:   Hypokalemia high complexity  Duodenitis moderate complexity  Gait instability moderate complexity  ETOH abuse moderate  complexity   Elevated BNP moderate complexity     Differential Diagnosis:      Conditions and Status        stable     MDM Data  External documents reviewed: none  Cardiac tracing (EKG, telemetry) interpretation: reviewed  Radiology interpretation: reviewed  Labs reviewed: reviewed  Any tests that were considered but not ordered: none     Decision rules/scores evaluated (example [id number], [place], etc): none     Discussed with: patient     Care Planning  Shared decision making: patient  Code status and discussions: full     Disposition  Social Determinants of Health that impact treatment or disposition: none  I expect the patient to be discharged to home in 1-2 days.     Electronically signed by Kathleen Tovar, 08/09/23, 11:16 CDT.

## 2023-08-09 NOTE — THERAPY TREATMENT NOTE
Acute Care - Occupational Therapy Treatment Note  [facility]     Patient Name: Dayron Lubin  : 1955  MRN: [id number]  Today's Date: 2023             Admit Date: 2023       ICD-10-CM ICD-9-CM   1. Acute on chronic right-sided congestive heart failure  I50.813 428.0   2. Coronary artery disease due to lipid rich plaque  I25.10 414.00    I25.83 414.3   3. Duodenitis  K29.80 535.60   4. History of alcohol use  Z87.898 V11.3   5. Metabolic acidosis  E87.20 276.2   6. Dysphagia, unspecified type  R13.10 787.20   7. Impaired mobility [Z74.09 (ICD-10-CM)]  Z74.09 799.89   8. Impaired mobility and ADLs [Z74.09, Z78.9 (ICD-10-CM)]  Z74.09 V49.89    Z78.9    9. Pressure injury of left buttock, unstageable  L89.320 707.05     707.25   10. Pressure injury of right buttock, stage 3  L89.313 707.05     707.23   11. Non-pressure chronic ulcer of other part of left foot with fat layer exposed  L97.522 707.15     Patient Active Problem List   Diagnosis    Prostate cancer    Hx of radiation therapy    Elevated PSA    Encounter for follow-up surveillance of prostate cancer    HOCM (hypertrophic obstructive cardiomyopathy)    Coronary artery disease involving native coronary artery of native heart without angina pectoris    Essential hypertension    Mixed hyperlipidemia    Class 1 obesity with alveolar hypoventilation, serious comorbidity, and body mass index (BMI) of 30.0 to 30.9 in adult    Displaced fracture of lateral malleolus of right fibula, initial encounter for closed fracture    Family history of colonic polyps    History of colon polyps    Elevated brain natriuretic peptide (BNP) level    MIKEY (acute kidney injury)    Anxiety associated with depression    ETOH abuse    Alcoholic hepatitis    Volume depletion    Anorexia    CHF (congestive heart failure)    Hypokalemia    Duodenitis    Gait instability     Past Medical History:   Diagnosis Date    Alcoholic hepatitis 2023    CAD (coronary artery disease)      History of external beam radiation therapy 05/12/2016    6840 cGy to prostate bed    Hyperlipidemia     Hypertension     Prostate cancer      Past Surgical History:   Procedure Laterality Date    CARDIAC CATHETERIZATION      CORONARY ANGIOPLASTY WITH STENT PLACEMENT      PROSTATE SURGERY      TONSILLECTOMY           OT ASSESSMENT FLOWSHEET (last 12 hours)       OT Evaluation and Treatment       Row Name 08/09/23 1012                   OT Time and Intention    Subjective Information complains of;pain;weakness  -        Document Type therapy note (daily note)  -        Mode of Treatment occupational therapy  -        Symptoms Noted During/After Treatment fatigue  -           General Information    Existing Precautions/Restrictions fall  -           Pain Assessment    Pretreatment Pain Rating 7/10  -        Posttreatment Pain Rating 7/10  -        Pain Location - Side/Orientation Bilateral  -        Pain Location - ankle  -        Pre/Posttreatment Pain Comment L ankle due to wounds, R ankle previous injury and surgery  -        Pain Intervention(s) Repositioned;Ambulation/increased activity;Nursing Notified  -           Cognition    Personal Safety Interventions fall prevention program maintained;gait belt;muscle strengthening facilitated;nonskid shoes/slippers when out of bed;supervised activity  -           Activities of Daily Living    BADL Assessment/Intervention bathing;lower body dressing;toileting  -           Bathing Assessment/Intervention    [place] Level (Bathing) lower body;moderate assist (50% patient effort);upper body;minimum assist (75% patient effort)  -        Assistive Devices (Bathing) grab bar, tub/shower;hand-held shower spray hose;shower chair  -        Position (Bathing) supported sitting;supported standing  -           Lower Body Dressing Assessment/Training    [place] Level (Lower Body Dressing) don;doff;socks;shoes/slippers;maximum assist (25%  patient effort)  -        Position (Lower Body Dressing) edge of bed sitting;supported sitting  -           Toileting Assessment/Training    [place] Level (Toileting) toileting skills;supervision;perform perineal hygiene;moderate assist (50% patient effort)  -        Assistive Devices (Toileting) commode  -        Position (Toileting) supported sitting;supported standing  -           Bed Mobility    Bed Mobility supine-sit;sit-supine  -        All Activities, [place] (Bed Mobility) standby assist  -        Supine-Sit [place] (Bed Mobility) standby assist  -        Assistive Device (Bed Mobility) bed rails;head of bed elevated  -           Functional Mobility    Functional Mobility- Ind. Level contact guard assist  -        Functional Mobility- Device walker, front-wheeled  -        Functional Mobility- Comment to BR, to bed  -           Transfer Assessment/Treatment    Transfers sit-stand transfer;stand-sit transfer;toilet transfer;shower transfer  -           Sit-Stand Transfer    Sit-Stand [place] (Transfers) contact guard;verbal cues  -        Assistive Device (Sit-Stand Transfers) walker, front-wheeled  -AC           Stand-Sit Transfer    Stand-Sit [place] (Transfers) contact guard  -           Toilet Transfer    Type (Toilet Transfer) sit-stand;stand-sit  -        [place] Level (Toilet Transfer) contact guard  -        Assistive Device (Toilet Transfer) commode;grab bars/safety frame  -           Shower Transfer    Type (Shower Transfer) sit-stand;stand-sit  -        [place] Level (Shower Transfer) contact guard  -        Assistive Device (Shower Transfer) shower chair;grab bar, tub/shower  -           Safety Issues, Functional Mobility    Safety Issues Affecting Function (Mobility) safety precaution awareness;safety precautions follow-through/compliance  -           Wound 08/07/23 1655 Left gluteal Pressure Injury    Wound -  Properties Group Placement Date: 08/07/23 -ACA Placement Time: 1655  -KRISTIAN Present on Hospital Admission: Y  -KRISTIAN Side: Left  -KRISTIAN Location: gluteal  -KRISTIAN Primary Wound Type: Pressure inj  -KRISTIAN    Retired Wound - Properties Group Placement Date: 08/07/23 -ACA Placement Time: 1655  -KRISTIAN Present on Hospital Admission: Y  -KRISTIAN Side: Left  -KRISTIAN Location: gluteal  -KRISTIAN Primary Wound Type: Pressure inj  -KRISTIAN    Retired Wound - Properties Group Date first assessed: 08/07/23 -ACA Time first assessed: 1655  -KRISTIAN Present on Hospital Admission: Y  -KRISTIAN Side: Left  -KRISTIAN Location: gluteal  -KRISTIAN Primary Wound Type: Pressure inj  -KRISTIAN       Wound 08/07/23 1655 Right gluteal Pressure Injury    Wound - Properties Group Placement Date: 08/07/23 -ACA Placement Time: 1655  -KRISTIAN Present on Hospital Admission: Y  -KRISTIAN Side: Right  -KRISTIAN Location: gluteal  -KRISTIAN Primary Wound Type: Pressure inj  -KRISTIAN    Retired Wound - Properties Group Placement Date: 08/07/23 -ACA Placement Time: 1655 -KRISTIAN Present on Hospital Admission: Y  -KRISTIAN Side: Right  -KRISTIAN Location: gluteal  -KRISTIAN Primary Wound Type: Pressure inj  -KRISTIAN    Retired Wound - Properties Group Date first assessed: 08/07/23 -ACA Time first assessed: 1655  -KRISTIAN Present on Hospital Admission: Y  -KRISTIAN Side: Right  -KRISTIAN Location: gluteal  -KRISTIAN Primary Wound Type: Pressure inj  -KRISTIAN       Wound 08/07/23 1655 Left posterior heel Pressure Injury    Wound - Properties Group Placement Date: 08/07/23 -ACA Placement Time: 1655  -KRISTIAN Present on Hospital Admission: Y  -KRISTIAN Side: Left  -KRISTIAN Orientation: posterior  -KRISTIAN Location: heel  -KRISTIAN Primary Wound Type: Pressure inj  -KRISTIAN    Retired Wound - Properties Group Placement Date: 08/07/23 -ACA Placement Time: 1655  -KRISTIAN Present on Hospital Admission: Y  -KRISTIAN Side: Left  -KRISTIAN Orientation: posterior  -KRISTIAN Location: heel  -KRISTIAN Primary Wound Type: Pressure inj  -KRISTIAN    Retired Wound - Properties Group Date first assessed: 08/07/23  -ACA Time first  assessed: 1655 -ACA Present on Hospital Admission: Y  -KRISTIAN Side: Left  -KRISTIAN Location: heel  -KRISTIAN Primary Wound Type: Pressure inj  -KRSITIAN       Wound 08/07/23 1656 Left posterior foot Pressure Injury    Wound - Properties Group Placement Date: 08/07/23 -ACA Placement Time: 1656 -ACA Present on Hospital Admission: Y  -KRISTIAN Side: Left  -KRISTIAN Orientation: posterior  -KRISTIAN Location: foot  -KRISTIAN Primary Wound Type: Pressure inj  -KRISTIAN    Retired Wound - Properties Group Placement Date: 08/07/23 -ACA Placement Time: 1656 -ACA Present on Hospital Admission: Y  -KRISTIAN Side: Left  -KRISTIAN Orientation: posterior  -KRISTIAN Location: foot  -KRISTIAN Primary Wound Type: Pressure inj  -KRISTIAN    Retired Wound - Properties Group Date first assessed: 08/07/23 -ACA Time first assessed: 1656 -ACA Present on Hospital Admission: Y  -KRISTIAN Side: Left  -KRISTIAN Location: foot  -KRISTIAN Primary Wound Type: Pressure inj  -KRISTIAN       Plan of Care Review    Plan of Care Reviewed With patient  -AC        Progress improving  -AC        Outcome Evaluation OT tx completed.  Pt's mentation improved.  He is alert and oriented x4, follows all commands.  C/o weakness and fatigue during treatment and was hesitant to participate.  Pt came to EOB with SBA with bed rails.  Transferred CGA but had pain in L foot  due to pressure on multiple wounds on foot.  Provided pt with rw to assist with offloading pressure to L foot while ambulating. Pt completed TB bathing with min-modA.  Also completed toileting with modA for hygiene and LB dressing with maxA.  Gradually became more fatigued during treatment.  Pt realizes he cannot go home at discharge and is interested in rehab placement.  OT will continue to treat to increase pt's strength, endurance and safety with ADL.  Recommend skilled setting for rehab at discharge.  -AC           Positioning and Restraints    Pre-Treatment Position in bed  -AC        Post Treatment Position bed  -AC        In Bed fowlers;call light within  reach;encouraged to call for assist;with family/caregiver;side rails up x2;heels elevated  -                  User Key  (r) = Recorded By, (t) = Taken By, (c) = Cosigned By      Initials Name Effective Dates    AC Good Prado, OTR/L, CNT 02/03/23 -     KRISTIAN Boston Echevarria, RN 06/16/21 -                      Occupational Therapy Education       Title: PT OT SLP Therapies (In Progress)       Topic: Occupational Therapy (In Progress)       Point: ADL training (Done)       Description:   Instruct learner(s) on proper safety adaptation and remediation techniques during self care or transfers.   Instruct in proper use of assistive devices.                  Learning Progress Summary             Patient Acceptance, E,TB, VU by AC at 8/9/2023 1209    Comment: safe transfers, role of OT, benefits of rehab    Acceptance, E, NR by MM at 8/8/2023 1412    Comment: OT role, benefits, POC, d/c planning, safety awareness                         Point: Home exercise program (Not Started)       Description:   Instruct learner(s) on appropriate technique for monitoring, assisting and/or progressing therapeutic exercises/activities.                  Learner Progress:  Not documented in this visit.              Point: Precautions (In Progress)       Description:   Instruct learner(s) on prescribed precautions during self-care and functional transfers.                  Learning Progress Summary             Patient Acceptance, E, NR by MM at 8/8/2023 1412    Comment: OT role, benefits, POC, d/c planning, safety awareness                         Point: Body mechanics (In Progress)       Description:   Instruct learner(s) on proper positioning and spine alignment during self-care, functional mobility activities and/or exercises.                  Learning Progress Summary             Patient Acceptance, E, NR by MM at 8/8/2023 1412    Comment: OT role, benefits, POC, d/c planning, safety awareness                                          User Key       Initials Effective Dates Name Provider Type Discipline     02/03/23 -  Good Prado, OTR/L, CNT Occupational Therapist OT    MM 07/11/23 -  Andres Lambert, OTR/L Occupational Therapist OT                      OT Recommendation and Plan     Plan of Care Review  Plan of Care Reviewed With: patient  Progress: improving  Outcome Evaluation: OT tx completed.  Pt's mentation improved.  He is alert and oriented x4, follows all commands.  C/o weakness and fatigue during treatment and was hesitant to participate.  Pt came to EOB with SBA with bed rails.  Transferred CGA but had pain in L foot  due to pressure on multiple wounds on foot.  Provided pt with rw to assist with offloading pressure to L foot while ambulating. Pt completed TB bathing with min-modA.  Also completed toileting with modA for hygiene and LB dressing with maxA.  Gradually became more fatigued during treatment.  Pt realizes he cannot go home at discharge and is interested in rehab placement.  OT will continue to treat to increase pt's strength, endurance and safety with ADL.  Recommend skilled setting for rehab at discharge.  Plan of Care Reviewed With: patient  Outcome Evaluation: OT tx completed.  Pt's mentation improved.  He is alert and oriented x4, follows all commands.  C/o weakness and fatigue during treatment and was hesitant to participate.  Pt came to EOB with SBA with bed rails.  Transferred CGA but had pain in L foot  due to pressure on multiple wounds on foot.  Provided pt with rw to assist with offloading pressure to L foot while ambulating. Pt completed TB bathing with min-modA.  Also completed toileting with modA for hygiene and LB dressing with maxA.  Gradually became more fatigued during treatment.  Pt realizes he cannot go home at discharge and is interested in rehab placement.  OT will continue to treat to increase pt's strength, endurance and safety with ADL.  Recommend skilled setting for rehab at  discharge.     Outcome Measures       Row Name 08/09/23 1012             How much help from another is currently needed...    Putting on and taking off regular lower body clothing? 2  -AC      Bathing (including washing, rinsing, and drying) 2  -AC      Toileting (which includes using toilet bed pan or urinal) 2  -AC      Putting on and taking off regular upper body clothing 3  -AC      Taking care of personal grooming (such as brushing teeth) 4  -AC      Eating meals 4  -AC      AM-PAC 6 Clicks Score (OT) 17  -AC         Functional Assessment    Outcome Measure Options AM-PAC 6 Clicks Daily Activity (OT)  -AC                User Key  (r) = Recorded By, (t) = Taken By, (c) = Cosigned By      Initials Name Provider Type    Good Holbrook OTR/L, JENA Occupational Therapist                    Time Calculation:    Time Calculation- OT       Row Name 08/09/23 1144             Time Calculation- OT    OT Start Time 1012  -AC      OT Stop Time 1138  -AC      OT Time Calculation (min) 86 min  -      Total Timed Code Minutes- OT 86 minute(s)  -AC      OT Received On 08/09/23  -                User Key  (r) = Recorded By, (t) = Taken By, (c) = Cosigned By      Initials Name Provider Type    Good Holbrook OTR/L, JENA Occupational Therapist                  Therapy Charges for Today       Code Description Service Date Service Provider Modifiers Qty    [id number]  OT SELF CARE/MGMT/TRAIN EA 15 MIN 8/9/2023 Good Prado OTR/L, CNT GO 6                 ELLIE White/L, CNT  8/9/2023

## 2023-08-09 NOTE — PLAN OF CARE
Goal Outcome Evaluation:  Plan of Care Reviewed With: patient        Progress: declining    Outcome Evaluation: BP low, MD notifie and 1L bolus of normal saline given, see orders for d/c'd medications, sinus 66-85 with f-pvc's coup, and 3 in a row.

## 2023-08-09 NOTE — PLAN OF CARE
Goal Outcome Evaluation:  Plan of Care Reviewed With: patient        Progress: improving  Outcome Evaluation: OT tx completed.  Pt's mentation improved.  He is alert and oriented x4, follows all commands.  C/o weakness and fatigue during treatment and was hesitant to participate.  Pt came to EOB with SBA with bed rails.  Transferred CGA but had pain in L foot  due to pressure on multiple wounds on foot.  Provided pt with rw to assist with offloading pressure to L foot while ambulating. Pt completed TB bathing with min-modA.  Also completed toileting with modA for hygiene and LB dressing with maxA.  Gradually became more fatigued during treatment.  Pt realizes he cannot go home at discharge and is interested in rehab placement.  OT will continue to treat to increase pt's strength, endurance and safety with ADL.  Recommend skilled setting for rehab at discharge.

## 2023-08-09 NOTE — THERAPY TREATMENT NOTE
Acute Care - Physical Therapy Treatment Note  [facility]     Patient Name: Dayron Lubin  : 1955  MRN: [id number]  Today's Date: 2023      Visit Dx:     ICD-10-CM ICD-9-CM   1. Acute on chronic right-sided congestive heart failure  I50.813 428.0   2. Coronary artery disease due to lipid rich plaque  I25.10 414.00    I25.83 414.3   3. Duodenitis  K29.80 535.60   4. History of alcohol use  Z87.898 V11.3   5. Metabolic acidosis  E87.20 276.2   6. Dysphagia, unspecified type  R13.10 787.20   7. Impaired mobility [Z74.09 (ICD-10-CM)]  Z74.09 799.89   8. Impaired mobility and ADLs [Z74.09, Z78.9 (ICD-10-CM)]  Z74.09 V49.89    Z78.9    9. Pressure injury of left buttock, unstageable  L89.320 707.05     707.25   10. Pressure injury of right buttock, stage 3  L89.313 707.05     707.23   11. Non-pressure chronic ulcer of other part of left foot with fat layer exposed  L97.522 707.15     Patient Active Problem List   Diagnosis    Prostate cancer    Hx of radiation therapy    Elevated PSA    Encounter for follow-up surveillance of prostate cancer    HOCM (hypertrophic obstructive cardiomyopathy)    Coronary artery disease involving native coronary artery of native heart without angina pectoris    Essential hypertension    Mixed hyperlipidemia    Class 1 obesity with alveolar hypoventilation, serious comorbidity, and body mass index (BMI) of 30.0 to 30.9 in adult    Displaced fracture of lateral malleolus of right fibula, initial encounter for closed fracture    Family history of colonic polyps    History of colon polyps    Elevated brain natriuretic peptide (BNP) level    MIKEY (acute kidney injury)    Anxiety associated with depression    ETOH abuse    Alcoholic hepatitis    Volume depletion    Anorexia    CHF (congestive heart failure)    Hypokalemia    Duodenitis    Gait instability     Past Medical History:   Diagnosis Date    Alcoholic hepatitis 2023    CAD (coronary artery disease)     History of external  beam radiation therapy 05/12/2016    6840 cGy to prostate bed    Hyperlipidemia     Hypertension     Prostate cancer      Past Surgical History:   Procedure Laterality Date    CARDIAC CATHETERIZATION      CORONARY ANGIOPLASTY WITH STENT PLACEMENT      PROSTATE SURGERY      TONSILLECTOMY       PT Assessment (last 12 hours)       PT Evaluation and Treatment       Row Name 08/09/23 1510          Physical Therapy Time and Intention    Subjective Information complains of;fatigue  -TB     Document Type therapy note (daily note)  -TB     Mode of Treatment physical therapy  -TB       Row Name 08/09/23 1510          General Information    Existing Precautions/Restrictions fall  -TB       Row Name 08/09/23 1510          Bed Mobility    Bed Mobility scooting/bridging;supine-sit;sit-supine  -TB     All Activities, Mitchell (Bed Mobility) standby assist  -TB     Scooting/Bridging Mitchell (Bed Mobility) standby assist  -TB     Supine-Sit [place] (Bed Mobility) standby assist  -TB     Assistive Device (Bed Mobility) head of bed elevated  -TB       Row Name 08/09/23 1510          Transfers    Comment, (Transfers) Declined due to fatigue  -TB       Row Name 08/09/23 1510          Balance    Balance Assessment sitting static balance;sitting dynamic balance  -TB     Static Sitting Balance independent  -TB     Dynamic Sitting Balance independent  -TB     Position, Sitting Balance unsupported;sitting edge of bed  -TB       Row Name 08/09/23 1510          Motor Skills    Therapeutic Exercise --  AROM BLE x15  -TB       Row Name             Wound 08/07/23 1655 Left gluteal Pressure Injury    Wound - Properties Group Placement Date: 08/07/23  -AC Placement Time: 1655 -AC Present on Hospital Admission: Y  -AC Side: Left  -AC Location: gluteal  -AC Primary Wound Type: Pressure inj  -AC    Retired Wound - Properties Group Placement Date: 08/07/23  -AC Placement Time: 1655 -AC Present on Hospital Admission: Y  -AC Side: Left   -AC Location: gluteal  -AC Primary Wound Type: Pressure inj  -AC    Retired Wound - Properties Group Date first assessed: 08/07/23  - Time first assessed: 1655  -AC Present on Hospital Admission: Y  -AC Side: Left  -AC Location: gluteal  -AC Primary Wound Type: Pressure inj  -AC      Row Name             Wound 08/07/23 1655 Right gluteal Pressure Injury    Wound - Properties Group Placement Date: 08/07/23  -AC Placement Time: 1655  -AC Present on Hospital Admission: Y  -AC Side: Right  -AC Location: gluteal  -AC Primary Wound Type: Pressure inj  -AC    Retired Wound - Properties Group Placement Date: 08/07/23  - Placement Time: 1655  -AC Present on Hospital Admission: Y  -AC Side: Right  -AC Location: gluteal  -AC Primary Wound Type: Pressure inj  -AC    Retired Wound - Properties Group Date first assessed: 08/07/23  - Time first assessed: 1655  -AC Present on Hospital Admission: Y  -AC Side: Right  -AC Location: gluteal  -AC Primary Wound Type: Pressure inj  -AC      Row Name             Wound 08/07/23 1655 Left posterior heel Pressure Injury    Wound - Properties Group Placement Date: 08/07/23  - Placement Time: 1655  -AC Present on Hospital Admission: Y  -AC Side: Left  -AC Orientation: posterior  -AC Location: heel  -AC Primary Wound Type: Pressure inj  -AC    Retired Wound - Properties Group Placement Date: 08/07/23  - Placement Time: 1655  -AC Present on Hospital Admission: Y  -AC Side: Left  -AC Orientation: posterior  -AC Location: heel  -AC Primary Wound Type: Pressure inj  -AC    Retired Wound - Properties Group Date first assessed: 08/07/23  - Time first assessed: 1655  -AC Present on Hospital Admission: Y  -AC Side: Left  -AC Location: heel  -AC Primary Wound Type: Pressure inj  -AC      Row Name             Wound 08/07/23 1656 Left posterior foot Pressure Injury    Wound - Properties Group Placement Date: 08/07/23  -AC Placement Time: 1656  -AC Present on Hospital Admission: Y  -AC Side:  Left  -AC Orientation: posterior  -AC Location: foot  -AC Primary Wound Type: Pressure inj  -AC    Retired Wound - Properties Group Placement Date: 08/07/23  -AC Placement Time: 1656  -AC Present on Hospital Admission: Y  -AC Side: Left  -AC Orientation: posterior  -AC Location: foot  -AC Primary Wound Type: Pressure inj  -AC    Retired Wound - Properties Group Date first assessed: 08/07/23  -AC Time first assessed: 1656  -AC Present on Hospital Admission: Y  -AC Side: Left  -AC Location: foot  -AC Primary Wound Type: Pressure inj  -AC      Row Name 08/09/23 1510          Positioning and Restraints    Pre-Treatment Position in bed  -TB     Post Treatment Position bed  -TB     In Bed fowlers;call light within reach;encouraged to call for assist;with family/caregiver;side rails up x2  -TB               User Key  (r) = Recorded By, (t) = Taken By, (c) = Cosigned By      Initials Name Provider Type     [place] Echevarria, RN Registered Nurse    Nasir Boyer, PTA Physical Therapist Assistant                    Physical Therapy Education       Title: PT OT SLP Therapies (In Progress)       Topic: Physical Therapy (Done)       Point: Mobility training (Done)       Learning Progress Summary             Patient Acceptance, E,D, NR,DU,VU by  at 8/8/2023 1359    Comment: benefits of PT and POC, call for A OOB                         Point: Precautions (Done)       Learning Progress Summary             Patient Acceptance, E,D, NR,DU,VU by  at 8/8/2023 1359    Comment: benefits of PT and POC, call for A OOB                                         User Key       Initials Effective Dates Name Provider Type Discipline     02/03/23 -  Justyn Briggs, PT Physical Therapist PT                  PT Recommendation and Plan         Outcome Measures       Row Name 08/09/23 1012             How much help from another is currently needed...    Putting on and taking off regular lower body clothing? 2  -AC      Bathing  (including washing, rinsing, and drying) 2  -AC      Toileting (which includes using toilet bed pan or urinal) 2  -AC      Putting on and taking off regular upper body clothing 3  -AC      Taking care of personal grooming (such as brushing teeth) 4  -AC      Eating meals 4  -AC      AM-PAC 6 Clicks Score (OT) 17  -AC         Functional Assessment    Outcome Measure Options AM-PAC 6 Clicks Daily Activity (OT)  -AC                User Key  (r) = Recorded By, (t) = Taken By, (c) = Cosigned By      Initials Name Provider Type    AC Good Prado, OTR/L, CNT Occupational Therapist                     Time Calculation:    PT Charges       Row Name 08/09/23 1550             Time Calculation    Start Time 1510  -TB      Stop Time 1535  -TB      Time Calculation (min) 25 min  -TB      PT Received On 08/09/23  -TB         Time Calculation- PT    Total Timed Code Minutes- PT 25 minute(s)  -TB                User Key  (r) = Recorded By, (t) = Taken By, (c) = Cosigned By      Initials Name Provider Type    TB Nasir Perez PTA Physical Therapist Assistant                  Therapy Charges for Today       Code Description Service Date Service Provider Modifiers Qty    81444668404 HC PT THER PROC EA 15 MIN 8/9/2023 Nasir Perez PTA GP 1    54570779109 HC PT THERAPEUTIC ACT EA 15 MIN 8/9/2023 Nasir Perez PTA GP 1            PT G-Codes  Outcome Measure Options: AM-PAC 6 Clicks Daily Activity (OT)  AM-PAC 6 Clicks Score (PT): 17  AM-PAC 6 Clicks Score (OT): 17    Nasir Perez PTA  8/9/2023

## 2023-08-09 NOTE — CONSULTS
[facility]  INPATIENT WOUND & OSTOMY CONSULTATION    Today's Date: 08/09/23    Patient Name: Dayron Lubin  MRN: [id number]  CSN: [id number]  PCP: Herberth Nguyen Jr., MD  Referring Provider:   Consulting Provider (From admission, onward)      Start Ordered     Status Ordering Provider    08/08/23 1426 08/08/23 1425  Inpatient Wound Care Provider Consult  Once        Specialty:  Wound Care  Provider:  Katrin Sandoval APRN    Acknowledged KATHLEEN TOVAR    08/07/23 1658 08/07/23 1657    Once        Specialty:  Wound Care  Provider:  Katrin Sandoval APRN    Canceled JAMA NOWAK           Attending Provider: Kathleen Tovar  Length of Stay: 2    SUBJECTIVE   Chief Complaint: Bilateral buttocks, bilateral heels    HPI: Dayron Lubin, a 68 y.o.male, presents with a past medical history of coronary artery disease, hypertension, hyperlipidemia, prostate cancer, alcoholic hepatitis.  A full past medical history as listed below.  Patient presented to the emergency department due to complaints of weakness.  He states that his last alcohol use was 2 days prior to admission.  He was noted to be weak, anxious, and jittery.  Workup revealed hypokalemia, elevated proBNP, and mild hyponatremia.  He does have a history of DTs.  Patient was admitted for further investigation and treatment.    Inpatient wound care consulted due to wounds of bilateral buttocks and bilateral heels.  Patient was found to have wounds of left distal lateral foot, left lateral middle foot, left lateral heel which she states was caused from trauma when slipping down step at home.  He states wounds occurred approximately 2 weeks ago and been treating with Neosporin and keeping area wrapped.  Wounds are directly located over bony prominences.  He states he does lay on left side at times with pressure to that area.  Patient also has unstageable pressure injury of left buttocks with eschar present over area.  Stage  3 pressure injury of right buttocks with subcutaneous tissue present.  Patient reports he keeps Vaseline over area and this is caused from moisture as he sits in his chair at home and his incontinence briefs is continuously wet during the day.  Discussed pressure injuries with patient.      Visit Dx:    ICD-10-CM ICD-9-CM   1. Acute on chronic right-sided congestive heart failure  I50.813 428.0   2. Coronary artery disease due to lipid rich plaque  I25.10 414.00    I25.83 414.3   3. Duodenitis  K29.80 535.60   4. History of alcohol use  Z87.898 V11.3   5. Metabolic acidosis  E87.20 276.2   6. Dysphagia, unspecified type  R13.10 787.20   7. Impaired mobility [Z74.09 (ICD-10-CM)]  Z74.09 799.89   8. Impaired mobility and ADLs [Z74.09, Z78.9 (ICD-10-CM)]  Z74.09 V49.89    Z78.9        Hospital Problem List:     Hypokalemia    Elevated brain natriuretic peptide (BNP) level    ETOH abuse    Duodenitis    Gait instability      History:   Past Medical History:   Diagnosis Date    Alcoholic hepatitis 7/13/2023    CAD (coronary artery disease)     History of external beam radiation therapy 05/12/2016    6840 cGy to prostate bed    Hyperlipidemia     Hypertension     Prostate cancer      Past Surgical History:   Procedure Laterality Date    CARDIAC CATHETERIZATION      CORONARY ANGIOPLASTY WITH STENT PLACEMENT      PROSTATE SURGERY      TONSILLECTOMY       Social History     Socioeconomic History    Marital status: Single   Tobacco Use    Smoking status: Never    Smokeless tobacco: Never   Vaping Use    Vaping Use: Never used   Substance and Sexual Activity    Alcohol use: Yes     Comment: very little     Drug use: Yes     Types: Marijuana    Sexual activity: Defer     Family History   Problem Relation Age of Onset    Coronary artery disease Mother     Stroke Mother        Allergies:  No Known Allergies    Medications:    Current Facility-Administered Medications:     acetaminophen (TYLENOL) tablet 650 mg, 650 mg, Oral, Q4H  PRN, 650 mg at 08/08/23 2048 **OR** acetaminophen (TYLENOL) 160 MG/5ML solution 650 mg, 650 mg, Oral, Q4H PRN **OR** acetaminophen (TYLENOL) suppository 650 mg, 650 mg, Rectal, Q4H PRN, Kandis Griffiths, APRN    aspirin chewable tablet 324 mg, 324 mg, Oral, Once, Gautam Bell MD    aspirin EC tablet 81 mg, 81 mg, Oral, Daily, Kandis Griffiths APRN, 81 mg at 08/09/23 0910    sennosides-docusate (PERICOLACE) 8.6-50 MG per tablet 1 tablet, 1 tablet, Oral, Nightly PRN **AND** polyethylene glycol (MIRALAX) packet 17 g, 17 g, Oral, Daily PRN **AND** bisacodyl (DULCOLAX) EC tablet 5 mg, 5 mg, Oral, Daily PRN **AND** bisacodyl (DULCOLAX) suppository 10 mg, 10 mg, Rectal, Daily PRN, Kandis Griffiths, APRN    Enoxaparin Sodium (LOVENOX) syringe 40 mg, 40 mg, Subcutaneous, Q24H, Kandis Griffiths APRN, 40 mg at 08/08/23 1752    folic acid (FOLVITE) tablet 1 mg, 1 mg, Oral, Daily, Kandis Griffiths APRN, 1 mg at 08/09/23 0910    LORazepam (ATIVAN) tablet 1 mg, 1 mg, Oral, Q1H PRN **OR** LORazepam (ATIVAN) injection 1 mg, 1 mg, Intravenous, Q1H PRN **OR** LORazepam (ATIVAN) tablet 2 mg, 2 mg, Oral, Q1H PRN, 2 mg at 08/08/23 0343 **OR** LORazepam (ATIVAN) injection 2 mg, 2 mg, Intravenous, Q1H PRN **OR** LORazepam (ATIVAN) injection 2 mg, 2 mg, Intravenous, Q15 Min PRN **OR** LORazepam (ATIVAN) injection 2 mg, 2 mg, Intramuscular, Q15 Min PRN, Kandis Griffiths, BRI    Magnesium Standard Dose Replacement - Follow Nurse / BPA Driven Protocol, , Does not apply, PRN, Kandis Griffiths, APRN    ondansetron (ZOFRAN) tablet 4 mg, 4 mg, Oral, Q6H PRN **OR** ondansetron (ZOFRAN) injection 4 mg, 4 mg, Intravenous, Q6H PRN, Kandis Griffiths, APRN    pantoprazole (PROTONIX) EC tablet 40 mg, 40 mg, Oral, BID AC, Kandis Griffiths, APRN, 40 mg at 08/09/23 0910    PARoxetine (PAXIL) tablet 10 mg, 10 mg, Oral, Nightly, Kandis Griffiths, APRN, 10 mg at 08/08/23 2049    sodium chloride 0.9 % flush 10 mL, 10 mL, Intravenous, PRN,  Gautam Bell MD    sodium chloride 0.9 % flush 10 mL, 10 mL, Intravenous, Q12H, Kandis Griffiths APRN, 10 mL at 08/09/23 0910    sodium chloride 0.9 % flush 10 mL, 10 mL, Intravenous, PRN, Kandis Griffiths, BRI    sodium chloride 0.9 % infusion 40 mL, 40 mL, Intravenous, PRN, Kandis Griffiths, APRN    sucralfate (CARAFATE) tablet 1 g, 1 g, Oral, 4x Daily AC & at Bedtime, Kandis Griffiths APRN, 1 g at 08/09/23 0910    thiamine (B-1) injection 200 mg, 200 mg, Intravenous, Q8H, 200 mg at 08/09/23 0644 **FOLLOWED BY** [START ON 8/12/2023] thiamine (VITAMIN B-1) tablet 100 mg, 100 mg, Oral, Daily, Kandis Griffiths APRN    Review of Systems:   Review of Systems   Constitutional:  Positive for activity change, chills and fatigue. Negative for fever.   HENT:  Negative for rhinorrhea and sore throat.    Respiratory:  Negative for cough and shortness of breath.    Cardiovascular:  Negative for chest pain and palpitations.   Gastrointestinal:  Negative for diarrhea, nausea and vomiting.   Genitourinary:  Negative for flank pain and hematuria.   Musculoskeletal:  Negative for arthralgias and myalgias.   Skin:  Positive for color change and wound.   Neurological:  Positive for weakness. Negative for dizziness and headaches.   Psychiatric/Behavioral:  Negative for agitation and behavioral problems.        OBJECTIVE     Vitals:    08/09/23 0700   BP: 99/61   Pulse: 89   Resp: 18   Temp: 98.5 øF (36.9 øC)   SpO2: 97%       PHYSICAL EXAM:   Physical Exam  Vitals and nursing note reviewed.   Constitutional:       General: He is awake.      Appearance: He is normal weight.   HENT:      Head: Normocephalic and atraumatic.   Eyes:      General: Lids are normal. Gaze aligned appropriately.   Cardiovascular:      Rate and Rhythm: Normal rate and regular rhythm.   Pulmonary:      Effort: Pulmonary effort is normal. No respiratory distress.   Abdominal:      General: There is no distension.      Palpations: Abdomen is soft.    Genitourinary:     Comments: External urinary catheter  Musculoskeletal:      Cervical back: Normal range of motion and neck supple.        Feet:    Feet:      Right foot:      Skin integrity: Skin integrity normal. No ulcer.      Left foot:      Skin integrity: Ulcer present.      Comments: Ulcerations of left lateral distal foot, left lateral middle foot, and left lateral heel.  Wounds are down to subcutaneous tissue with a large amount of slough covering area.  Irregular edges attached wound bed.  No undermining or tunneling noted.  Areas are located over bony prominences.  Slight erythema periwound area.  Small amount of serous drainage.  Skin:     General: Skin is warm and dry.      Findings: Erythema and wound present.      Comments: Unstageable pressure injury of left buttock.  Large amount of eschar covering wound bed.  Irregular edges.  No undermining or tunneling visualized.  Slight slough at edge of eschar.  Blanchable erythema present to periwound area.  Small amount of serous drainage.  Stage 3 pressure injury of right buttock with subcutaneous tissue present.  Irregular jagged edges attached to wound bed.  No undermining or tunneling noted.  Periwound area is erythemic and blanchable.  Small amount of serous drainage.   Neurological:      Mental Status: He is alert and oriented to person, place, and time.      Motor: Weakness present.   Psychiatric:         Attention and Perception: Attention normal.         Mood and Affect: Affect is flat.         Speech: Speech normal.         Behavior: Behavior is cooperative.       Pictures taken by nursing staff on admission       Results Review:  Lab Results (last 48 hours)       Procedure Component Value Units Date/Time    Comprehensive Metabolic Panel [[id number]]  (Abnormal) Collected: 08/09/23 0352    Specimen: Blood Updated: 08/09/23 0457     Glucose 96 mg/dL      BUN 45 mg/dL      Creatinine 2.00 mg/dL      Sodium 132 mmol/L      Potassium 3.3 mmol/L       Comment: Slight hemolysis detected by analyzer. Results may be affected.        Chloride 94 mmol/L      CO2 24.0 mmol/L      Calcium 7.9 mg/dL      Total Protein 5.9 g/dL      Albumin 3.6 g/dL      ALT (SGPT) 13 U/L      AST (SGOT) 28 U/L      Comment: Slight hemolysis detected by analyzer. Results may be affected.        Alkaline Phosphatase 52 U/L      Total Bilirubin 0.5 mg/dL      Globulin 2.3 gm/dL      A/G Ratio 1.6 g/dL      BUN/Creatinine Ratio 22.5     Anion Gap 14.0 mmol/L      eGFR 35.5 mL/min/1.73     Narrative:      GFR Normal >60  Chronic Kidney Disease <60  Kidney Failure <15      Potassium [[id number]]  (Abnormal) Collected: 08/09/23 0352    Specimen: Blood Updated: 08/09/23 0441     Potassium 3.3 mmol/L      Comment: Slight hemolysis detected by analyzer. Results may be affected.       Magnesium [[id number]]  (Normal) Collected: 08/08/23 1038    Specimen: Blood Updated: 08/08/23 1122     Magnesium 2.1 mg/dL     Basic Metabolic Panel [[id number]]  (Abnormal) Collected: 08/08/23 0341    Specimen: Blood Updated: 08/08/23 0407     Glucose 149 mg/dL      BUN 27 mg/dL      Creatinine 1.21 mg/dL      Sodium 135 mmol/L      Potassium 2.7 mmol/L      Comment: Slight hemolysis detected by analyzer. Results may be affected.        Chloride 86 mmol/L      CO2 26.0 mmol/L      Calcium 9.7 mg/dL      BUN/Creatinine Ratio 22.3     Anion Gap 23.0 mmol/L      eGFR 64.8 mL/min/1.73     Narrative:      GFR Normal >60  Chronic Kidney Disease <60  Kidney Failure <15      [place] Draw [[id number]] Collected: 08/07/23 1405    Specimen: Blood Updated: 08/07/23 1815    Narrative:      The following orders were created for panel order [place] Draw.  Procedure                               Abnormality         Status                     ---------                               -----------         ------                     Green Top (Gel)[[id number]]                                  Final result               Lavender Top[[id number]]                                      Final result               Gray Top[[id number]]                                         Final result               Light Blue Top[[id number]]                                   Final result                 Please view results for these tests on the individual orders.    Gray Top [[id number]] Collected: 08/07/23 1405    Specimen: Blood Updated: 08/07/23 1815     Extra Tube Hold for add-ons.     Comment: Auto resulted.       Basic Metabolic Panel [[id number]]  (Abnormal) Collected: 08/07/23 1609    Specimen: Blood Updated: 08/07/23 1648     Glucose 131 mg/dL      BUN 20 mg/dL      Creatinine 1.00 mg/dL      Sodium 132 mmol/L      Potassium 2.9 mmol/L      Comment: Slight hemolysis detected by analyzer. Results may be affected.        Chloride 91 mmol/L      CO2 22.0 mmol/L      Calcium 8.9 mg/dL      BUN/Creatinine Ratio 20.0     Anion Gap 19.0 mmol/L      eGFR 81.5 mL/min/1.73     Narrative:      GFR Normal >60  Chronic Kidney Disease <60  Kidney Failure <15      High Sensitivity Troponin T 2Hr [[id number]]  (Abnormal) Collected: 08/07/23 1609    Specimen: Blood Updated: 08/07/23 1644     HS Troponin T 29 ng/L      Troponin T Delta 1 ng/L     Narrative:      High Sensitive Troponin T Reference Range:  <10.0 ng/L- Negative Female for AMI  <15.0 ng/L- Negative Male for AMI  >=10 - Abnormal Female indicating possible myocardial injury.  >=15 - Abnormal Male indicating possible myocardial injury.   Clinicians would have to utilize clinical acumen, EKG, Troponin, and serial changes to determine if it is an Acute Myocardial Infarction or myocardial injury due to an underlying chronic condition.         Ethanol [[id number]] Collected: 08/07/23 1406    Specimen: Blood Updated: 08/07/23 1600     Ethanol % <0.010 %     Narrative:      Not for legal purposes. Chain of Custody not followed.     Lavender Top [[id number]] Collected: 08/07/23 1405    Specimen: Blood Updated: 08/07/23 1515     Extra Tube  hold for add-on     Comment: Auto resulted       Light Blue Top [[id number]] Collected: 08/07/23 1405    Specimen: Blood Updated: 08/07/23 1515     Extra Tube Hold for add-ons.     Comment: Auto resulted       Green Top (Gel) [[id number]] Collected: 08/07/23 1405    Specimen: Blood Updated: 08/07/23 1515     Extra Tube Hold for add-ons.     Comment: Auto resulted.       [place] Draw [[id number]] Collected: 08/07/23 1406    Specimen: Blood Updated: 08/07/23 1515    Narrative:      The following orders were created for panel order [place] Draw.  Procedure                               Abnormality         Status                     ---------                               -----------         ------                     Green Top (Gel)[[id number]]                                  Final result               Lavender Top[[id number]]                                     Final result               Red Top[[id number]]                                          Final result               Light Blue Top[[id number]]                                   Final result                 Please view results for these tests on the individual orders.    Green Top (Gel) [[id number]] Collected: 08/07/23 1406    Specimen: Blood Updated: 08/07/23 1515     Extra Tube Hold for add-ons.     Comment: Auto resulted.       Light Blue Top [[id number]] Collected: 08/07/23 1406    Specimen: Blood Updated: 08/07/23 1515     Extra Tube Hold for add-ons.     Comment: Auto resulted       Lavender Top [[id number]] Collected: 08/07/23 1406    Specimen: Blood Updated: 08/07/23 1515     Extra Tube hold for add-on     Comment: Auto resulted       Red Top [[id number]] Collected: 08/07/23 1406    Specimen: Blood Updated: 08/07/23 1515     Extra Tube Hold for add-ons.     Comment: Auto resulted.       Magnesium [[id number]]  (Normal) Collected: 08/07/23 1406    Specimen: Blood Updated: 08/07/23 1500     Magnesium 2.1 mg/dL     Comprehensive Metabolic Panel [[id number]]  (Abnormal)  Collected: 08/07/23 1406    Specimen: Blood Updated: 08/07/23 1446     Glucose 200 mg/dL      BUN 20 mg/dL      Creatinine 1.07 mg/dL      Sodium 133 mmol/L      Potassium 2.9 mmol/L      Chloride 89 mmol/L      CO2 20.0 mmol/L      Calcium 9.3 mg/dL      Total Protein 7.2 g/dL      Albumin 4.5 g/dL      ALT (SGPT) 17 U/L      AST (SGOT) 34 U/L      Alkaline Phosphatase 80 U/L      Total Bilirubin 0.8 mg/dL      Globulin 2.7 gm/dL      A/G Ratio 1.7 g/dL      BUN/Creatinine Ratio 18.7     Anion Gap 24.0 mmol/L      eGFR 75.1 mL/min/1.73     Narrative:      GFR Normal >60  Chronic Kidney Disease <60  Kidney Failure <15      BNP [[id number]]  (Abnormal) Collected: 08/07/23 1406    Specimen: Blood Updated: 08/07/23 1444     proBNP 15,027.0 pg/mL     Narrative:      Among patients with dyspnea, NT-proBNP is highly sensitive for the detection of acute congestive heart failure. In addition NT-proBNP of <300 pg/ml effectively rules out acute congestive heart failure with 99% negative predictive value.      High Sensitivity Troponin T [[id number]]  (Abnormal) Collected: 08/07/23 1406    Specimen: Blood Updated: 08/07/23 1443     HS Troponin T 28 ng/L     Narrative:      High Sensitive Troponin T Reference Range:  <10.0 ng/L- Negative Female for AMI  <15.0 ng/L- Negative Male for AMI  >=10 - Abnormal Female indicating possible myocardial injury.  >=15 - Abnormal Male indicating possible myocardial injury.   Clinicians would have to utilize clinical acumen, EKG, Troponin, and serial changes to determine if it is an Acute Myocardial Infarction or myocardial injury due to an underlying chronic condition.         D-dimer, Quantitative [[id number]]  (Abnormal) Collected: 08/07/23 1406    Specimen: Blood Updated: 08/07/23 1440     D-Dimer, Quantitative 2.42 MCGFEU/mL     Narrative:      According to the assay 's published package insert, a normal (<0.50 MCGFEU/mL) D-dimer result in conjunction with a non-high clinical  "probability assessment, excludes deep vein thrombosis (DVT) and pulmonary embolism (PE) with high sensitivity.    D-dimer values increase with age and this can make VTE exclusion of an older population difficult. To address this, the [employer], based on best available evidence and recent guidelines, recommends that clinicians use age-adjusted D-dimer thresholds in patients greater than 50 years of age with: a) a low probability of PE who do not meet all Pulmonary Embolism Rule Out Criteria, or b) in those with intermediate probability of PE.   The formula for an age-adjusted D-dimer cut-off is \"age/100\".  For example, a 60 year old patient would have an age-adjusted cut-off of 0.60 MCGFEU/mL and an 80 year old 0.80 MCGFEU/mL.    CBC & Differential [[id number]]  (Abnormal) Collected: 08/07/23 1406    Specimen: Blood Updated: 08/07/23 1427    Narrative:      The following orders were created for panel order CBC & Differential.  Procedure                               Abnormality         Status                     ---------                               -----------         ------                     CBC Auto Differential[[id number]]        Abnormal            Final result                 Please view results for these tests on the individual orders.    CBC Auto Differential [[id number]]  (Abnormal) Collected: 08/07/23 1406    Specimen: Blood Updated: 08/07/23 1427     WBC 10.68 10*3/mm3      RBC 3.74 10*6/mm3      Hemoglobin 12.4 g/dL      Hematocrit 36.3 %      MCV 97.1 fL      MCH 33.2 pg      MCHC 34.2 g/dL      RDW 14.6 %      RDW-SD 50.6 fl      MPV 11.3 fL      Platelets 248 10*3/mm3      Neutrophil % 82.8 %      Lymphocyte % 11.8 %      Monocyte % 4.5 %      Eosinophil % 0.1 %      Basophil % 0.1 %      Immature Grans % 0.7 %      Neutrophils, Absolute 8.84 10*3/mm3      Lymphocytes, Absolute 1.26 10*3/mm3      Monocytes, Absolute 0.48 10*3/mm3      Eosinophils, Absolute 0.01 10*3/mm3      " Basophils, Absolute 0.01 10*3/mm3      Immature Grans, Absolute 0.08 10*3/mm3      nRBC 0.2 /100 WBC           Imaging Results (Last 72 Hours)       Procedure Component Value Units Date/Time    CT Angiogram Chest [[id number]] Collected: 08/07/23 1503     Updated: 08/07/23 1531    Narrative:      Exam: CT angiogram of the of the chest with intravenous contrast.     CLINICAL HISTORY:  Suspected pulmonary embolism. Sudden onset shortness  of breath.     DOSE:  212 mGycm. All CT scans are performed using dose optimization  techniques as appropriate to the performed exam and including at least  one of the following: Automated exposure control, adjustment of the mA  and/or kV according to size, and the use of the iterative reconstruction  technique.     TECHNIQUE: Contiguous axial images were obtained through the thorax  following intravenous contrast administration with reformatted images  obtained in the sagittal and coronal projections from the original data  set. Three-dimensional reconstructions are also obtained.     COMPARISON:  07/17/2015     FINDINGS:     Pulmonary arteries:  There is normal enhancement of the pulmonary  arteries with no evidence of pulmonary thromboembolic disease.     Aorta:  There is mild dilatation of ascending thoracic aorta with a  transverse diameter of 3.8 cm. No evidence of focal aneurysm.  Radiodensity is noted at the level of the aortic valvular related to  heavy calcification of the aortic valve leaflets or previous aortic  valve replacement. The proximal great vessels demonstrate mild  atheromatous calcification without rate limiting stenosis or aneurysm.     LUNGS:  The lungs are clear. No evidence of lobar consolidation.     Pleural spaces: Unremarkable. No evidence of pleural effusion or  pneumothorax.     HEART: There is mild cardiomegaly. Heavy coronary calcifications are  present. There is elevated right heart pressure with reflux of contrast  into the intrahepatic IVC. There is  no pericardial effusion.     Bones: No acute osseous abnormalities are demonstrated.     CHEST WALL: No chest wall abnormalities are demonstrated.     Lymph nodes: No enlarged mediastinal or axillary lymphadenopathy is  present.     Upper abdomen: Steatosis of the liver is present. There is prominence of  the wall of the proximal descending duodenum with mild adjacent  stranding suspicious for duodenitis. No evidence of gastric outlet  obstruction.       Impression:      1. No evidence of pulmonary thromboembolic disease. The thoracic aorta  and great vessels are ectatic. No evidence of focal aneurysm or  dissection.  2. Mild cardiomegaly with heavy coronary calcifications. Radiodensity at  the level of the aortic root is present either related to previous  aortic valve replacement or heavy calcification of the aortic valve  leaflets. There is elevated right heart pressure with reflux of contrast  into the intrahepatic IVC.  3. Steatosis of the liver.  4. Prominence of the wall of the descending duodenum with mild adjacent  stranding suspicious for duodenitis.  This report was finalized on 08/07/2023 15:28 by Dr. Vicente Sam MD.    XR Chest 1 View [[id number]] Collected: 08/07/23 1447     Updated: 08/07/23 1454    Narrative:      EXAMINATION: XR CHEST 1 VW- 8/7/2023 2:47 PM CDT     HISTORY: Chest Pain Protocol     REPORT: A frontal view of the chest was obtained.     COMPARISON: Chest x-ray 07/13/2023.     There is mild bibasilar atelectasis, no focal pulmonary infiltrate is  identified. There appears be mild pleural thickening at the left  costophrenic angle. No free flowing effusion is identified. There is no  pneumothorax. Heart size is normal. The osseous structures are  unremarkable.       Impression:      Hypoaeration of lungs with bibasilar atelectasis, no  evidence of pneumonia. Probable mild chronic pleural thickening at the  left costophrenic angle.  This report was finalized on 08/07/2023 14:49 by  Dr. Nitin Stone MD.               ASSESSMENT/PLAN       Examination and evaluation of wound(s) was performed.    DIAGNOSIS:   Pressure injury of left buttock, unstageable  Pressure injury of right buttock, stage 3  Open wounds of left lateral foot with fat layer exposed  Impaired mobility  Generalized weakness    Hypokalemia    Elevated brain natriuretic peptide (BNP) level    ETOH abuse    Duodenitis    Gait instability        PLAN:   Orders placed for wound care and moisture/pressure management as listed below.    Case management consult order placed for discharge planning as patient refuses placement at rehab facility.  Patient states he wants to go home and is okay with home health assisting with wound care.  Sister is present during discussion.    Suggest patient follow-up with wound care center after discharge        Start     Ordered    08/09/23 1047  Case Management  Consult  Once        Comments: Patient refusing placement at rehab facility   Provider:  (Not yet assigned)   Question Answer Comment   Is discharge planning needed? If yes, who is requesting discharge planning? Provider    Reason for Consult Home health for wound care        08/09/23 1046    08/09/23 1046  Wound Care  Every 12 Hours        Question Answer Comment   Wound Locations Bilateral buttocks and left lateral foot    Wound Care Instructions Clean all wounds with normal saline.  Apply nickel thick layer of Santyl to wound bed.  Cover with saline moistened gauze and secured with silicone border dressing.    Cleanse Normal Saline    Intervention Santyl - Thick Layer    Intervention Fluffed Gauze    Moistened? Yes    Moisten With Normal Saline    Securement Silicone Border Dressing        08/09/23 1045    08/09/23 1045  Use Seat Cushion When Up In Chair  Continuous         08/09/23 1045    08/09/23 1045  Apply Waffle mattress overlay Until Discontinued  Until Discontinued        Question:  Supply to be applied:  Answer:   Waffle mattress overlay    08/09/23 1045    08/09/23 1044  Elevate Heels Off of Bed  Until Discontinued         08/09/23 1045    08/09/23 1044  Turn Patient  Now Then Every 2 Hours         08/09/23 1045    08/09/23 1044  Use Repositioning Wedge to Position Patient  Continuous        Comments: Use Comfort Glide repositioning sheet and wedges to position patient.    08/09/23 1045    08/09/23 0000  Ambulatory Referral to Wound Clinic         08/09/23 1049            Discussed findings and treatment plan including risks, benefits, and treatment options with patient and patient's sister in detail. Patient agreed with treatment plan.      This document has been electronically signed by BRI Jauregui on 8/9/2023 09:45 CDT

## 2023-08-09 NOTE — PLAN OF CARE
Problem: Adult Inpatient Plan of Care  Goal: Plan of Care Review  Outcome: Ongoing, Progressing  Flowsheets (Taken 8/9/2023 1819)  Progress: no change  Plan of Care Reviewed With: patient  Outcome Evaluation: PRN ativan given x2 for pt c/o feeling very anxious. Pt states it did not help much, MD notified and librium restarted this evening. PRN imodium ordered for diarrhea, given x1. Wound care done to LLE, coccyx unable to remain done d/t diarrhea. Once this slows down, new dressings will be changed/applied. Dolphin mattress orderd, to be delivered. VSS, safety maintained. Will update MD as needed

## 2023-08-10 LAB
ALBUMIN SERPL-MCNC: 3.9 G/DL (ref 3.5–5.2)
ALBUMIN/GLOB SERPL: 1.6 G/DL
ALP SERPL-CCNC: 54 U/L (ref 39–117)
ALT SERPL W P-5'-P-CCNC: 14 U/L (ref 1–41)
ANION GAP SERPL CALCULATED.3IONS-SCNC: 16 MMOL/L (ref 5–15)
AST SERPL-CCNC: 26 U/L (ref 1–40)
BASOPHILS # BLD AUTO: 0.02 10*3/MM3 (ref 0–0.2)
BASOPHILS NFR BLD AUTO: 0.2 % (ref 0–1.5)
BILIRUB SERPL-MCNC: 0.5 MG/DL (ref 0–1.2)
BUN SERPL-MCNC: 31 MG/DL (ref 8–23)
BUN/CREAT SERPL: 31.6 (ref 7–25)
CALCIUM SPEC-SCNC: 8.7 MG/DL (ref 8.6–10.5)
CHLORIDE SERPL-SCNC: 98 MMOL/L (ref 98–107)
CO2 SERPL-SCNC: 21 MMOL/L (ref 22–29)
CREAT SERPL-MCNC: 0.98 MG/DL (ref 0.76–1.27)
DEPRECATED RDW RBC AUTO: 51.7 FL (ref 37–54)
EGFRCR SERPLBLD CKD-EPI 2021: 83.5 ML/MIN/1.73
EOSINOPHIL # BLD AUTO: 0.02 10*3/MM3 (ref 0–0.4)
EOSINOPHIL NFR BLD AUTO: 0.2 % (ref 0.3–6.2)
ERYTHROCYTE [DISTWIDTH] IN BLOOD BY AUTOMATED COUNT: 14.1 % (ref 12.3–15.4)
GLOBULIN UR ELPH-MCNC: 2.4 GM/DL
GLUCOSE SERPL-MCNC: 106 MG/DL (ref 65–99)
HCT VFR BLD AUTO: 33.3 % (ref 37.5–51)
HGB BLD-MCNC: 10.8 G/DL (ref 13–17.7)
IMM GRANULOCYTES # BLD AUTO: 0.07 10*3/MM3 (ref 0–0.05)
IMM GRANULOCYTES NFR BLD AUTO: 0.6 % (ref 0–0.5)
LYMPHOCYTES # BLD AUTO: 2.01 10*3/MM3 (ref 0.7–3.1)
LYMPHOCYTES NFR BLD AUTO: 17.1 % (ref 19.6–45.3)
MAGNESIUM SERPL-MCNC: 2.3 MG/DL (ref 1.6–2.4)
MCH RBC QN AUTO: 32.6 PG (ref 26.6–33)
MCHC RBC AUTO-ENTMCNC: 32.4 G/DL (ref 31.5–35.7)
MCV RBC AUTO: 100.6 FL (ref 79–97)
MONOCYTES # BLD AUTO: 0.9 10*3/MM3 (ref 0.1–0.9)
MONOCYTES NFR BLD AUTO: 7.6 % (ref 5–12)
NEUTROPHILS NFR BLD AUTO: 74.3 % (ref 42.7–76)
NEUTROPHILS NFR BLD AUTO: 8.76 10*3/MM3 (ref 1.7–7)
NRBC BLD AUTO-RTO: 0 /100 WBC (ref 0–0.2)
PLATELET # BLD AUTO: 156 10*3/MM3 (ref 140–450)
PMV BLD AUTO: 11.7 FL (ref 6–12)
POTASSIUM SERPL-SCNC: 2.8 MMOL/L (ref 3.5–5.2)
POTASSIUM SERPL-SCNC: 4.2 MMOL/L (ref 3.5–5.2)
PROT SERPL-MCNC: 6.3 G/DL (ref 6–8.5)
RBC # BLD AUTO: 3.31 10*6/MM3 (ref 4.14–5.8)
SODIUM SERPL-SCNC: 135 MMOL/L (ref 136–145)
WBC NRBC COR # BLD: 11.78 10*3/MM3 (ref 3.4–10.8)

## 2023-08-10 PROCEDURE — 97110 THERAPEUTIC EXERCISES: CPT

## 2023-08-10 PROCEDURE — 25010000002 ENOXAPARIN PER 10 MG: Performed by: NURSE PRACTITIONER

## 2023-08-10 PROCEDURE — 25010000002 LORAZEPAM PER 2 MG: Performed by: NURSE PRACTITIONER

## 2023-08-10 PROCEDURE — 25010000002 THIAMINE PER 100 MG: Performed by: NURSE PRACTITIONER

## 2023-08-10 PROCEDURE — 84132 ASSAY OF SERUM POTASSIUM: CPT | Performed by: FAMILY MEDICINE

## 2023-08-10 PROCEDURE — 83735 ASSAY OF MAGNESIUM: CPT | Performed by: FAMILY MEDICINE

## 2023-08-10 PROCEDURE — 85025 COMPLETE CBC W/AUTO DIFF WBC: CPT | Performed by: FAMILY MEDICINE

## 2023-08-10 PROCEDURE — 97116 GAIT TRAINING THERAPY: CPT

## 2023-08-10 PROCEDURE — 80053 COMPREHEN METABOLIC PANEL: CPT | Performed by: FAMILY MEDICINE

## 2023-08-10 PROCEDURE — 97535 SELF CARE MNGMENT TRAINING: CPT | Performed by: OCCUPATIONAL THERAPIST

## 2023-08-10 RX ORDER — POTASSIUM CHLORIDE 750 MG/1
40 CAPSULE, EXTENDED RELEASE ORAL EVERY 4 HOURS
Status: COMPLETED | OUTPATIENT
Start: 2023-08-10 | End: 2023-08-10

## 2023-08-10 RX ADMIN — LORAZEPAM 1 MG: 2 INJECTION INTRAMUSCULAR; INTRAVENOUS at 21:31

## 2023-08-10 RX ADMIN — LORAZEPAM 1 MG: 2 INJECTION INTRAMUSCULAR; INTRAVENOUS at 00:35

## 2023-08-10 RX ADMIN — POTASSIUM CHLORIDE 40 MEQ: 10 CAPSULE, COATED, EXTENDED RELEASE ORAL at 18:05

## 2023-08-10 RX ADMIN — THIAMINE HYDROCHLORIDE 200 MG: 100 INJECTION, SOLUTION INTRAMUSCULAR; INTRAVENOUS at 21:31

## 2023-08-10 RX ADMIN — SUCRALFATE 1 G: 1 TABLET ORAL at 09:00

## 2023-08-10 RX ADMIN — ENOXAPARIN SODIUM 40 MG: 100 INJECTION SUBCUTANEOUS at 18:05

## 2023-08-10 RX ADMIN — LORAZEPAM 2 MG: 2 INJECTION INTRAMUSCULAR; INTRAVENOUS at 22:51

## 2023-08-10 RX ADMIN — SUCRALFATE 1 G: 1 TABLET ORAL at 18:05

## 2023-08-10 RX ADMIN — Medication 10 ML: at 09:00

## 2023-08-10 RX ADMIN — SUCRALFATE 1 G: 1 TABLET ORAL at 21:31

## 2023-08-10 RX ADMIN — POTASSIUM CHLORIDE 40 MEQ: 10 CAPSULE, COATED, EXTENDED RELEASE ORAL at 14:22

## 2023-08-10 RX ADMIN — THIAMINE HYDROCHLORIDE 200 MG: 100 INJECTION, SOLUTION INTRAMUSCULAR; INTRAVENOUS at 05:06

## 2023-08-10 RX ADMIN — ASPIRIN 81 MG: 81 TABLET, COATED ORAL at 09:00

## 2023-08-10 RX ADMIN — PANTOPRAZOLE SODIUM 40 MG: 40 TABLET, DELAYED RELEASE ORAL at 09:00

## 2023-08-10 RX ADMIN — POTASSIUM CHLORIDE 40 MEQ: 10 CAPSULE, COATED, EXTENDED RELEASE ORAL at 09:54

## 2023-08-10 RX ADMIN — CHLORDIAZEPOXIDE HYDROCHLORIDE 10 MG: 10 CAPSULE ORAL at 18:05

## 2023-08-10 RX ADMIN — CHLORDIAZEPOXIDE HYDROCHLORIDE 10 MG: 10 CAPSULE ORAL at 12:37

## 2023-08-10 RX ADMIN — Medication 10 ML: at 21:32

## 2023-08-10 RX ADMIN — SUCRALFATE 1 G: 1 TABLET ORAL at 12:37

## 2023-08-10 RX ADMIN — COLLAGENASE SANTYL 1 APPLICATION: 250 OINTMENT TOPICAL at 09:01

## 2023-08-10 RX ADMIN — THIAMINE HYDROCHLORIDE 200 MG: 100 INJECTION, SOLUTION INTRAMUSCULAR; INTRAVENOUS at 14:22

## 2023-08-10 RX ADMIN — CHLORDIAZEPOXIDE HYDROCHLORIDE 10 MG: 10 CAPSULE ORAL at 23:55

## 2023-08-10 RX ADMIN — CHLORDIAZEPOXIDE HYDROCHLORIDE 10 MG: 10 CAPSULE ORAL at 05:06

## 2023-08-10 RX ADMIN — LORAZEPAM 1 MG: 1 TABLET ORAL at 14:31

## 2023-08-10 RX ADMIN — PANTOPRAZOLE SODIUM 40 MG: 40 TABLET, DELAYED RELEASE ORAL at 18:05

## 2023-08-10 RX ADMIN — FOLIC ACID 1 MG: 1 TABLET ORAL at 09:00

## 2023-08-10 RX ADMIN — CHLORDIAZEPOXIDE HYDROCHLORIDE 10 MG: 10 CAPSULE ORAL at 00:41

## 2023-08-10 RX ADMIN — PAROXETINE 10 MG: 10 TABLET, FILM COATED ORAL at 21:31

## 2023-08-10 NOTE — PROGRESS NOTES
[facility] Medicine Services  INPATIENT PROGRESS NOTE    Patient Name: Daryon Lubin  Date of Admission: 8/7/2023  Today's Date: 08/10/23  Length of Stay: 3  Primary Care Physician: Herberth Nguyen Jr., MD    Subjective   Chief Complaint:  No nausea or vomiting.  HPI   He is sitting up in bed eating breakfast.  Tolerating well.  No choking noted.  Discussed need for increased mobility.  Patient notes he is very weak.  Lives by self.  Recommend SNF on DC for strengthening      Review of Systems   All pertinent negatives and positives are as above. All other systems have been reviewed and are negative unless otherwise stated.     Objective    Temp:  [97.6 øF (36.4 øC)-98.6 øF (37 øC)] 98 øF (36.7 øC)  Heart Rate:  [] 98  Resp:  [18] 18  BP: ()/(60-75) 104/66  Physical Exam  Vitals and nursing note reviewed.   Constitutional:       Appearance: Normal appearance.   HENT:      Head: Normocephalic and atraumatic.      Right Ear: External ear normal.      Left Ear: External ear normal.      Nose: Nose normal.      Mouth/Throat:      Mouth: Mucous membranes are moist.   Eyes:      Extraocular Movements: Extraocular movements intact.      Conjunctiva/sclera: Conjunctivae normal.      Pupils: Pupils are equal, round, and reactive to light.   Cardiovascular:      Rate and Rhythm: Normal rate and regular rhythm.      Pulses: Normal pulses.      Heart sounds: No murmur heard.    No friction rub. No gallop.   Pulmonary:      Effort: Pulmonary effort is normal.      Breath sounds: Normal breath sounds.   Abdominal:      General: Bowel sounds are normal.      Palpations: Abdomen is soft.   Musculoskeletal:         General: Normal range of motion.      Cervical back: Normal range of motion and neck supple.   Skin:     General: Skin is warm and dry.      Capillary Refill: Capillary refill takes less than 2 seconds.   Neurological:      General: No focal deficit present.      Mental  Status: He is alert and oriented to person, place, and time.      Cranial Nerves: No cranial nerve deficit.   Psychiatric:         Mood and Affect: Mood normal.         Behavior: Behavior normal.           Results Review:  I have reviewed the labs, radiology results, and diagnostic studies.    Laboratory Data:   Results from last 7 days   Lab Units 08/10/23  0435 08/07/23  1406   WBC 10*3/mm3 11.78* 10.68   HEMOGLOBIN g/dL 10.8* 12.4*   HEMATOCRIT % 33.3* 36.3*   PLATELETS 10*3/mm3 156 248          Results from last 7 days   Lab Units 08/10/23  0435 08/09/23  0352 08/08/23  0341 08/07/23  1609 08/07/23  1406   SODIUM mmol/L 135* 132* 135*   < > 133*   POTASSIUM mmol/L 2.8* 3.3*  3.3* 2.7*   < > 2.9*   CHLORIDE mmol/L 98 94* 86*   < > 89*   CO2 mmol/L 21.0* 24.0 26.0   < > 20.0*   BUN mg/dL 31* 45* 27*   < > 20   CREATININE mg/dL 0.98 2.00* 1.21   < > 1.07   CALCIUM mg/dL 8.7 7.9* 9.7   < > 9.3   BILIRUBIN mg/dL 0.5 0.5  --   --  0.8   ALK PHOS U/L 54 52  --   --  80   ALT (SGPT) U/L 14 13  --   --  17   AST (SGOT) U/L 26 28  --   --  34   GLUCOSE mg/dL 106* 96 149*   < > 200*    < > = values in this interval not displayed.         Culture Data:   No results found for: BLOODCX, URINECX, WOUNDCX, MRSACX, RESPCX, STOOLCX    Radiology Data:   Imaging Results (Last 24 Hours)       ** No results found for the last 24 hours. **            I have reviewed the patient's current medications.     Assessment/Plan   Assessment  Active Hospital Problems    Diagnosis     **Hypokalemia     Duodenitis     Gait instability     Elevated brain natriuretic peptide (BNP) level     ETOH abuse        Treatment Plan  Increase patient activity chair for meals, ambulate in marcial.  PT OT    Replace potassium.  Repeat CMP and CBC in a.m.      Medical Decision Making  Number and Complexity of problems:   Hypokalemia high complexity  Duodenitis moderate complexity  Gait instability moderate complexity  ETOH abuse moderate complexity   Elevated  BNP moderate complexity     Differential Diagnosis:      Conditions and Status        stable     MDM Data  External documents reviewed: none  Cardiac tracing (EKG, telemetry) interpretation: reviewed  Radiology interpretation: reviewed  Labs reviewed: reviewed  Any tests that were considered but not ordered: none     Decision rules/scores evaluated (example [id number], Wells, etc): none     Discussed with: patient     Care Planning  Shared decision making: patient  Code status and discussions: full     Disposition  Social Determinants of Health that impact treatment or disposition: none  I expect the patient to be discharged to home in 1-2 days.     Electronically signed by Kathleen Tovar, 08/10/23, 16:27 CDT.

## 2023-08-10 NOTE — PLAN OF CARE
Goal Outcome Evaluation:  Plan of Care Reviewed With: patient        Progress: no change         Outcome Evaluation: HR elevated at times S-ST  with f-pvc's and couplets, prn ativan given x1 for anxiety, immodium given x1 for diarrhea, dressings changed to left foot and applied to right and left buttocks, waffle mattress applied to bed, turned q 2 hours

## 2023-08-10 NOTE — PLAN OF CARE
Goal Outcome Evaluation:  Plan of Care Reviewed With: patient        Progress: improving  Outcome Evaluation: PT tx complete. Pt c/o fatigue. Bed mob SBA to EOB. Sitting balance Id. Stood SBA. Amb 50' at a time w/ RW and CGA/SBA. Increased fatigue w/ activity. Once back in room RN came and notified us that pts HR reached 190 breifly during tx. Pt had no symptoms. Will monitor HR closley next therapy session.

## 2023-08-10 NOTE — PLAN OF CARE
Goal Outcome Evaluation:  Plan of Care Reviewed With: patient           Outcome Evaluation: OT tx completed. Pt presents alert and oriented, family present in room. Required encouragement to participate in therapy session this am, but eventually agreeable to session. He was able to bring self to sitting at EOB with SBA. CGA for sit <> stand t/f from EOB and all short distance mobility from bed to bathroom. He complete grooming task standing sink side, completed with set-up and CGA. Amb back to chair and left sitting in chair at end of session. Continue OT POC.      Anticipated Discharge Disposition (OT): home with assist

## 2023-08-10 NOTE — THERAPY TREATMENT NOTE
Patient Name: Dayron Lubin  : 1955    MRN: [id number]                              Today's Date: 8/10/2023       Admit Date: 2023    Visit Dx:     ICD-10-CM ICD-9-CM   1. Acute on chronic right-sided congestive heart failure  I50.813 428.0   2. Coronary artery disease due to lipid rich plaque  I25.10 414.00    I25.83 414.3   3. Duodenitis  K29.80 535.60   4. History of alcohol use  Z87.898 V11.3   5. Metabolic acidosis  E87.20 276.2   6. Dysphagia, unspecified type  R13.10 787.20   7. Impaired mobility [Z74.09 (ICD-10-CM)]  Z74.09 799.89   8. Impaired mobility and ADLs [Z74.09, Z78.9 (ICD-10-CM)]  Z74.09 V49.89    Z78.9    9. Pressure injury of left buttock, unstageable  L89.320 707.05     707.25   10. Pressure injury of right buttock, stage 3  L89.313 707.05     707.23   11. Non-pressure chronic ulcer of other part of left foot with fat layer exposed  L97.522 707.15     Patient Active Problem List   Diagnosis    Prostate cancer    Hx of radiation therapy    Elevated PSA    Encounter for follow-up surveillance of prostate cancer    HOCM (hypertrophic obstructive cardiomyopathy)    Coronary artery disease involving native coronary artery of native heart without angina pectoris    Essential hypertension    Mixed hyperlipidemia    Class 1 obesity with alveolar hypoventilation, serious comorbidity, and body mass index (BMI) of 30.0 to 30.9 in adult    Displaced fracture of lateral malleolus of right fibula, initial encounter for closed fracture    Family history of colonic polyps    History of colon polyps    Elevated brain natriuretic peptide (BNP) level    MIKEY (acute kidney injury)    Anxiety associated with depression    ETOH abuse    Alcoholic hepatitis    Volume depletion    Anorexia    CHF (congestive heart failure)    Hypokalemia    Duodenitis    Gait instability     Past Medical History:   Diagnosis Date    Alcoholic hepatitis 2023    CAD (coronary artery disease)     History of external beam  radiation therapy 05/12/2016    6840 cGy to prostate bed    Hyperlipidemia     Hypertension     Prostate cancer      Past Surgical History:   Procedure Laterality Date    CARDIAC CATHETERIZATION      CORONARY ANGIOPLASTY WITH STENT PLACEMENT      PROSTATE SURGERY      TONSILLECTOMY        General Information       Row Name 08/10/23 1123          OT Time and Intention    Document Type therapy note (daily note)  -     Mode of Treatment occupational therapy  -[facility] Name 08/10/23 1123          General Information    Patient Profile Reviewed yes  -     Existing Precautions/Restrictions fall  -       Row Name 08/10/23 1123          Cognition    Orientation Status (Cognition) oriented to;person;disoriented to;place;situation;time  -       Row Name 08/10/23 1123          Safety Issues, Functional Mobility    Impairments Affecting Function (Mobility) balance;cognition;endurance/activity tolerance;shortness of breath  -               User Key  (r) = Recorded By, (t) = Taken By, (c) = Cosigned By      Initials Name Provider Type     Bri Cavazos, OTR/L, CSRS Occupational Therapist                     Mobility/ADL's       Row Name 08/10/23 1128          Bed Mobility    Bed Mobility supine-sit  -     Supine-Sit [place] (Bed Mobility) standby assist  -     Assistive Device (Bed Mobility) head of bed elevated  -       Row Name 08/10/23 1128          Transfers    Transfers sit-stand transfer;stand-sit transfer;bed-chair transfer  -       Row Name 08/10/23 1128          Bed-Chair Transfer    Bed-Chair [place] (Transfers) contact guard  -[facility] Name 08/10/23 1128          Sit-Stand Transfer    Sit-Stand [place] (Transfers) contact guard  -     Assistive Device (Sit-Stand Transfers) walker, front-wheeled  -       Row Name 08/10/23 1128          Stand-Sit Transfer    Stand-Sit [place] (Transfers) contact guard  -     Assistive Device (Stand-Sit Transfers) walker,  front-wheeled  -JJ       Row Name 08/10/23 1128          Functional Mobility    Functional Mobility- Ind. Level contact guard assist  -     Functional Mobility- Device walker, front-wheeled  -J     Functional Mobility- Comment to bathroom and back to chair  -       Row Name 08/10/23 1128          Activities of Daily Living    BADL Assessment/Intervention grooming;lower body dressing  -       Row Name 08/10/23 1128          Lower Body Dressing Assessment/Training    [place] Level (Lower Body Dressing) don;doff;socks;shoes/slippers;maximum assist (25% patient effort)  -     Position (Lower Body Dressing) edge of bed sitting;supported sitting  -       Row Name 08/10/23 1128          Grooming Assessment/Training    [place] Level (Grooming) oral care regimen;wash face, hands;hair care, combing/brushing;set up;contact guard assist  -J     Position (Grooming) sink side;supported standing  -               User Key  (r) = Recorded By, (t) = Taken By, (c) = Cosigned By      Initials Name Provider Type    Bri Abdi, LEELAR/L, CSRS Occupational Therapist                   Obj/Interventions    No documentation.                  Goals/Plan    No documentation.                  Clinical Impression       Row Name 08/10/23 1123          Pain Assessment    Pretreatment Pain Rating 6/10  -JJ     Pain Location - Side/Orientation Bilateral  -JJ     Pain Location - ankle  -JJ     Pain Intervention(s) Medication (See MAR);Repositioned;Ambulation/increased activity  -       Row Name 08/10/23 1123          Plan of Care Review    Plan of Care Reviewed With patient  -JJ     Outcome Evaluation OT tx completed. Pt presents alert and oriented, family present in room. Required encouragement to participate in therapy session this am, but eventually agreeable to session. He was able to bring self to sitting at EOB with SBA. CGA for sit <> stand t/f from EOB and all short distance mobility from bed to bathroom. He  complete grooming task standing sink side, completed with set-up and CGA. Amb back to chair and left sitting in chair at end of session. Continue OT POC.  -       Row Name 08/10/23 1123          Therapy Plan Review/Discharge Plan (OT)    Anticipated Discharge Disposition (OT) home with assist  -BRANT       Row Name 08/10/23 1123          Positioning and Restraints    Pre-Treatment Position in bed  -JJ     Post Treatment Position chair  -JJ     In Chair notified nsg;reclined;call light within reach;encouraged to call for assist;with family/caregiver  -               User Key  (r) = Recorded By, (t) = Taken By, (c) = Cosigned By      Initials Name Provider Type    Bri Abdi OTR/L, SYLVIA Occupational Therapist                   Outcome Measures       Row Name 08/10/23 1123          How much help from another is currently needed...    Putting on and taking off regular lower body clothing? 2  -JJ     Bathing (including washing, rinsing, and drying) 2  -JJ     Toileting (which includes using toilet bed pan or urinal) 2  -JJ     Putting on and taking off regular upper body clothing 3  -JJ     Taking care of personal grooming (such as brushing teeth) 4  -JJ     Eating meals 4  -JJ     AM-PAC 6 Clicks Score (OT) 17  -       Row Name 08/10/23 1123          Functional Assessment    Outcome Measure Options AM-PAC 6 Clicks Daily Activity (OT)  -               User Key  (r) = Recorded By, (t) = Taken By, (c) = Cosigned By      Initials Name Provider Type    Bri Abdi OTR/L, SYLVIA Occupational Therapist                    Occupational Therapy Education       Title: PT OT SLP Therapies (In Progress)       Topic: Occupational Therapy (In Progress)       Point: ADL training (Done)       Description:   Instruct learner(s) on proper safety adaptation and remediation techniques during self care or transfers.   Instruct in proper use of assistive devices.                  Learning Progress Summary              Patient Acceptance, E, VU by JJ at 8/10/2023 1146    Acceptance, E,TB, VU by AC at 8/9/2023 1209    Comment: safe transfers, role of OT, benefits of rehab    Acceptance, E, NR by MM at 8/8/2023 1412    Comment: OT role, benefits, POC, d/c planning, safety awareness                         Point: Home exercise program (Not Started)       Description:   Instruct learner(s) on appropriate technique for monitoring, assisting and/or progressing therapeutic exercises/activities.                  Learner Progress:  Not documented in this visit.              Point: Precautions (Done)       Description:   Instruct learner(s) on prescribed precautions during self-care and functional transfers.                  Learning Progress Summary             Patient Acceptance, E, VU by BRETT at 8/10/2023 1146    Acceptance, E, NR by MM at 8/8/2023 1412    Comment: OT role, benefits, POC, d/c planning, safety awareness                         Point: Body mechanics (In Progress)       Description:   Instruct learner(s) on proper positioning and spine alignment during self-care, functional mobility activities and/or exercises.                  Learning Progress Summary             Patient Acceptance, E, NR by MM at 8/8/2023 1412    Comment: OT role, benefits, POC, d/c planning, safety awareness                                         User Key       Initials Effective Dates Name Provider Type Discipline     02/03/23 -  Good [place], OTR/L, CNT Occupational Therapist OT    MM 07/11/23 -  Andres Lambert, OTR/L Occupational Therapist OT    J 07/11/23 -  Bri Cavazos OTR/L, CSRS Occupational Therapist OT                  OT Recommendation and Plan     Plan of Care Review  Plan of Care Reviewed With: patient  Outcome Evaluation: OT tx completed. Pt presents alert and oriented, family present in room. Required encouragement to participate in therapy session this am, but eventually agreeable to session. He was able to bring self  to sitting at EOB with SBA. CGA for sit <> stand t/f from EOB and all short distance mobility from bed to bathroom. He complete grooming task standing sink side, completed with set-up and CGA. Amb back to chair and left sitting in chair at end of session. Continue OT POC.     Time Calculation:         Time Calculation- OT       Row Name 08/10/23 1123             Time Calculation- OT    OT Start Time 1123  -      OT Stop Time 1147  -      OT Time Calculation (min) 24 min  -      Total Timed Code Minutes- OT 24 minute(s)  -      OT Received On 08/10/23  -                User Key  (r) = Recorded By, (t) = Taken By, (c) = Cosigned By      Initials Name Provider Type    Bri Abdi OTR/L, SYLVIA Occupational Therapist                  Therapy Charges for Today       Code Description Service Date Service Provider Modifiers Qty    [id number] HC OT SELF CARE/MGMT/TRAIN EA 15 MIN 8/10/2023 Bri Cavazos OTR/L, CSRS GO 2                 Bri Cavazos, OTR/L, CSRS  8/10/2023

## 2023-08-10 NOTE — THERAPY TREATMENT NOTE
Acute Care - Physical Therapy Treatment Note  [facility]     Patient Name: Dayron Lubin  : 1955  MRN: [id number]  Today's Date: 8/10/2023      Visit Dx:     ICD-10-CM ICD-9-CM   1. Acute on chronic right-sided congestive heart failure  I50.813 428.0   2. Coronary artery disease due to lipid rich plaque  I25.10 414.00    I25.83 414.3   3. Duodenitis  K29.80 535.60   4. History of alcohol use  Z87.898 V11.3   5. Metabolic acidosis  E87.20 276.2   6. Dysphagia, unspecified type  R13.10 787.20   7. Impaired mobility [Z74.09 (ICD-10-CM)]  Z74.09 799.89   8. Impaired mobility and ADLs [Z74.09, Z78.9 (ICD-10-CM)]  Z74.09 V49.89    Z78.9    9. Pressure injury of left buttock, unstageable  L89.320 707.05     707.25   10. Pressure injury of right buttock, stage 3  L89.313 707.05     707.23   11. Non-pressure chronic ulcer of other part of left foot with fat layer exposed  L97.522 707.15     Patient Active Problem List   Diagnosis    Prostate cancer    Hx of radiation therapy    Elevated PSA    Encounter for follow-up surveillance of prostate cancer    HOCM (hypertrophic obstructive cardiomyopathy)    Coronary artery disease involving native coronary artery of native heart without angina pectoris    Essential hypertension    Mixed hyperlipidemia    Class 1 obesity with alveolar hypoventilation, serious comorbidity, and body mass index (BMI) of 30.0 to 30.9 in adult    Displaced fracture of lateral malleolus of right fibula, initial encounter for closed fracture    Family history of colonic polyps    History of colon polyps    Elevated brain natriuretic peptide (BNP) level    MIKEY (acute kidney injury)    Anxiety associated with depression    ETOH abuse    Alcoholic hepatitis    Volume depletion    Anorexia    CHF (congestive heart failure)    Hypokalemia    Duodenitis    Gait instability     Past Medical History:   Diagnosis Date    Alcoholic hepatitis 2023    CAD (coronary artery disease)     History of  external beam radiation therapy 05/12/2016    6840 cGy to prostate bed    Hyperlipidemia     Hypertension     Prostate cancer      Past Surgical History:   Procedure Laterality Date    CARDIAC CATHETERIZATION      CORONARY ANGIOPLASTY WITH STENT PLACEMENT      PROSTATE SURGERY      TONSILLECTOMY       PT Assessment (last 12 hours)       PT Evaluation and Treatment       Row Name 08/10/23 1447          Physical Therapy Time and Intention    Subjective Information complains of;fatigue  -TB     Document Type therapy note (daily note)  -TB     Mode of Treatment physical therapy  -TB     Comment HR up to 190s w/ amb  -TB       Row Name 08/10/23 1447          General Information    Existing Precautions/Restrictions fall  -TB       Row Name 08/10/23 1447          Bed Mobility    Bed Mobility scooting/bridging;supine-sit;sit-supine  -TB     All Activities, Isabella (Bed Mobility) standby assist  -TB     Scooting/Bridging Isabella (Bed Mobility) standby assist  -TB     Supine-Sit Isabella (Bed Mobility) standby assist  -TB     Assistive Device (Bed Mobility) head of bed elevated  -TB       Row Name 08/10/23 1447          Transfers    Transfers sit-stand transfer;stand-sit transfer  -TB       Row Name 08/10/23 1447          Sit-Stand Transfer    Sit-Stand Isabella (Transfers) standby assist  -TB       Row Name 08/10/23 1447          Stand-Sit Transfer    Stand-Sit Isabella (Transfers) standby assist  -TB       Row Name 08/10/23 1447          Gait/Stairs (Locomotion)    Isabella Level (Gait) contact guard;standby assist  -TB     Assistive Device (Gait) walker, front-wheeled  -TB     Distance in Feet (Gait) 50'  X2  -TB     Comment, (Gait/Stairs) Increased fatigue during amb  -TB       Row Name 08/10/23 1447          Balance    Balance Assessment sitting static balance;sitting dynamic balance  -TB     Static Sitting Balance independent  -TB     Dynamic Sitting Balance independent  -TB     Position,  Sitting Balance sitting edge of bed  -TB       Row Name             Wound 08/07/23 1655 Left gluteal Pressure Injury    Wound - Properties Group Placement Date: 08/07/23  -AC Placement Time: 1655  -AC Present on Hospital Admission: Y  -AC Side: Left  -AC Location: gluteal  -AC Primary Wound Type: Pressure inj  -AC    Retired Wound - Properties Group Placement Date: 08/07/23  -AC Placement Time: 1655  -AC Present on Hospital Admission: Y  -AC Side: Left  -AC Location: gluteal  -AC Primary Wound Type: Pressure inj  -AC    Retired Wound - Properties Group Date first assessed: 08/07/23  -AC Time first assessed: 1655  -AC Present on Hospital Admission: Y  -AC Side: Left  -AC Location: gluteal  -AC Primary Wound Type: Pressure inj  -AC      Row Name             Wound 08/07/23 1655 Right gluteal Pressure Injury    Wound - Properties Group Placement Date: 08/07/23  -AC Placement Time: 1655  -AC Present on Hospital Admission: Y  -AC Side: Right  -AC Location: gluteal  -AC Primary Wound Type: Pressure inj  -AC    Retired Wound - Properties Group Placement Date: 08/07/23  -AC Placement Time: 1655  -AC Present on Hospital Admission: Y  -AC Side: Right  -AC Location: gluteal  -AC Primary Wound Type: Pressure inj  -AC    Retired Wound - Properties Group Date first assessed: 08/07/23  -AC Time first assessed: 1655  -AC Present on Hospital Admission: Y  -AC Side: Right  -AC Location: gluteal  -AC Primary Wound Type: Pressure inj  -AC      Row Name             Wound 08/07/23 1655 Left posterior heel Pressure Injury    Wound - Properties Group Placement Date: 08/07/23  -AC Placement Time: 1655  -AC Present on Hospital Admission: Y  -AC Side: Left  -AC Orientation: posterior  -AC Location: heel  -AC Primary Wound Type: Pressure inj  -AC    Retired Wound - Properties Group Placement Date: 08/07/23  -AC Placement Time: 1655  -AC Present on Hospital Admission: Y  -AC Side: Left  -AC Orientation: posterior  -AC Location: heel  -AC Primary  Wound Type: Pressure inj  -AC    Retired Wound - Properties Group Date first assessed: 08/07/23  - Time first assessed: 1655  -AC Present on Hospital Admission: Y  -AC Side: Left  -AC Location: heel  -AC Primary Wound Type: Pressure inj  -AC      Row Name             Wound 08/07/23 1656 Left posterior foot Pressure Injury    Wound - Properties Group Placement Date: 08/07/23  -AC Placement Time: 1656  -AC Present on Hospital Admission: Y  -AC Side: Left  -AC Orientation: posterior  -AC Location: foot  -AC Primary Wound Type: Pressure inj  -AC    Retired Wound - Properties Group Placement Date: 08/07/23  -AC Placement Time: 1656  -AC Present on Hospital Admission: Y  -AC Side: Left  -AC Orientation: posterior  -AC Location: foot  -AC Primary Wound Type: Pressure inj  -AC    Retired Wound - Properties Group Date first assessed: 08/07/23  - Time first assessed: 1656  -AC Present on Hospital Admission: Y  -AC Side: Left  -AC Location: foot  -AC Primary Wound Type: Pressure inj  -AC      Row Name 08/10/23 1447          Plan of Care Review    Plan of Care Reviewed With patient  -TB     Progress improving  -TB     Outcome Evaluation PT tx complete. Pt c/o fatigue. Bed mob SBA to EOB. Sitting balance Id. Stood SBA. Amb 50' at a time w/ RW and CGA/SBA. Increased fatigue w/ activity. Once back in room RN came and notified us that pts HR reached 190 breifly during tx. Pt had no symptoms. Will monitor HR closley next therapy session.  -TB       Row Name 08/10/23 1447          Positioning and Restraints    Pre-Treatment Position in bed  -TB     Post Treatment Position bed  -TB     In Bed fowlers;call light within reach;encouraged to call for assist;side rails up x2  -TB               User Key  (r) = Recorded By, (t) = Taken By, (c) = Cosigned By      Initials Name Provider Type    Boston Vargas, RN Registered Nurse    Nasir Boyer, PTA Physical Therapist Assistant                    Physical Therapy  Education       Title: PT OT SLP Therapies (In Progress)       Topic: Physical Therapy (Done)       Point: Mobility training (Done)       Learning Progress Summary             Patient Acceptance, E,D, NR,DU,VU by  at 8/8/2023 1359    Comment: benefits of PT and POC, call for A OOB                         Point: Precautions (Done)       Learning Progress Summary             Patient Acceptance, E,D, NR,DU,VU by  at 8/8/2023 1359    Comment: benefits of PT and POC, call for A OOB                                         User Key       Initials Effective Dates Name Provider Type Discipline     02/03/23 -  Justyn Briggs D, PT Physical Therapist PT                  PT Recommendation and Plan     Plan of Care Reviewed With: patient  Progress: improving  Outcome Evaluation: PT tx complete. Pt c/o fatigue. Bed mob SBA to EOB. Sitting balance Id. Stood SBA. Amb 50' at a time w/ RW and CGA/SBA. Increased fatigue w/ activity. Once back in room RN came and notified us that pts HR reached 190 breifly during tx. Pt had no symptoms. Will monitor HR closley next therapy session.   Outcome Measures       Row Name 08/09/23 1012             How much help from another is currently needed...    Putting on and taking off regular lower body clothing? 2  -AC      Bathing (including washing, rinsing, and drying) 2  -AC      Toileting (which includes using toilet bed pan or urinal) 2  -AC      Putting on and taking off regular upper body clothing 3  -AC      Taking care of personal grooming (such as brushing teeth) 4  -AC      Eating meals 4  -AC      AM-PAC 6 Clicks Score (OT) 17  -AC         Functional Assessment    Outcome Measure Options AM-PAC 6 Clicks Daily Activity (OT)  -AC                User Key  (r) = Recorded By, (t) = Taken By, (c) = Cosigned By      Initials Name Provider Type     Good [place], OTR/L, CNT Occupational Therapist                     Time Calculation:    PT Charges       Row Name 08/10/23 5799              Time Calculation    Start Time 1447  -TB      Stop Time 1511  -TB      Time Calculation (min) 24 min  -TB      PT Received On 08/10/23  -TB         Time Calculation- PT    Total Timed Code Minutes- PT 24 minute(s)  -TB                User Key  (r) = Recorded By, (t) = Taken By, (c) = Cosigned By      Initials Name Provider Type    TB Nasir Perez, DAVID Physical Therapist Assistant                  Therapy Charges for Today       Code Description Service Date Service Provider Modifiers Qty    86779404219 HC PT THER PROC EA 15 MIN 8/9/2023 Nasir Perez, PTA GP 1    79142847841 HC PT THERAPEUTIC ACT EA 15 MIN 8/9/2023 Nasir Perez, PTA GP 1    93325239870 HC GAIT TRAINING EA 15 MIN 8/10/2023 Nasir Perez, PTA GP 1    76950646697 HC PT THER PROC EA 15 MIN 8/10/2023 Nasir Perez, PTA GP 1            PT G-Codes  Outcome Measure Options: AM-PAC 6 Clicks Daily Activity (OT)  AM-PAC 6 Clicks Score (PT): 17  AM-PAC 6 Clicks Score (OT): 17    Nasir Perez PTA  8/10/2023

## 2023-08-11 ENCOUNTER — APPOINTMENT (OUTPATIENT)
Dept: CARDIOLOGY | Facility: HOSPITAL | Age: 68
DRG: 896 | End: 2023-08-11
Payer: MEDICARE

## 2023-08-11 ENCOUNTER — APPOINTMENT (OUTPATIENT)
Dept: GENERAL RADIOLOGY | Facility: HOSPITAL | Age: 68
DRG: 896 | End: 2023-08-11
Payer: MEDICARE

## 2023-08-11 PROBLEM — I47.1 SVT (SUPRAVENTRICULAR TACHYCARDIA): Status: ACTIVE | Noted: 2023-08-11

## 2023-08-11 PROBLEM — I47.10 SVT (SUPRAVENTRICULAR TACHYCARDIA): Status: ACTIVE | Noted: 2023-08-11

## 2023-08-11 LAB
ALBUMIN SERPL-MCNC: 3.6 G/DL (ref 3.5–5.2)
ALBUMIN/GLOB SERPL: 1.4 G/DL
ALP SERPL-CCNC: 53 U/L (ref 39–117)
ALT SERPL W P-5'-P-CCNC: 14 U/L (ref 1–41)
ANION GAP SERPL CALCULATED.3IONS-SCNC: 10 MMOL/L (ref 5–15)
AST SERPL-CCNC: 25 U/L (ref 1–40)
BASOPHILS # BLD AUTO: 0.02 10*3/MM3 (ref 0–0.2)
BASOPHILS NFR BLD AUTO: 0.2 % (ref 0–1.5)
BILIRUB SERPL-MCNC: 0.4 MG/DL (ref 0–1.2)
BUN SERPL-MCNC: 20 MG/DL (ref 8–23)
BUN/CREAT SERPL: 20.2 (ref 7–25)
CALCIUM SPEC-SCNC: 9 MG/DL (ref 8.6–10.5)
CHLORIDE SERPL-SCNC: 104 MMOL/L (ref 98–107)
CO2 SERPL-SCNC: 21 MMOL/L (ref 22–29)
CREAT SERPL-MCNC: 0.99 MG/DL (ref 0.76–1.27)
DEPRECATED RDW RBC AUTO: 52.2 FL (ref 37–54)
EGFRCR SERPLBLD CKD-EPI 2021: 82.5 ML/MIN/1.73
EOSINOPHIL # BLD AUTO: 0.08 10*3/MM3 (ref 0–0.4)
EOSINOPHIL NFR BLD AUTO: 0.9 % (ref 0.3–6.2)
ERYTHROCYTE [DISTWIDTH] IN BLOOD BY AUTOMATED COUNT: 14.3 % (ref 12.3–15.4)
GLOBULIN UR ELPH-MCNC: 2.6 GM/DL
GLUCOSE SERPL-MCNC: 93 MG/DL (ref 65–99)
HCT VFR BLD AUTO: 32.2 % (ref 37.5–51)
HGB BLD-MCNC: 10.7 G/DL (ref 13–17.7)
IMM GRANULOCYTES # BLD AUTO: 0.09 10*3/MM3 (ref 0–0.05)
IMM GRANULOCYTES NFR BLD AUTO: 1 % (ref 0–0.5)
LYMPHOCYTES # BLD AUTO: 2.16 10*3/MM3 (ref 0.7–3.1)
LYMPHOCYTES NFR BLD AUTO: 25 % (ref 19.6–45.3)
MCH RBC QN AUTO: 33.1 PG (ref 26.6–33)
MCHC RBC AUTO-ENTMCNC: 33.2 G/DL (ref 31.5–35.7)
MCV RBC AUTO: 99.7 FL (ref 79–97)
MONOCYTES # BLD AUTO: 0.74 10*3/MM3 (ref 0.1–0.9)
MONOCYTES NFR BLD AUTO: 8.6 % (ref 5–12)
NEUTROPHILS NFR BLD AUTO: 5.54 10*3/MM3 (ref 1.7–7)
NEUTROPHILS NFR BLD AUTO: 64.3 % (ref 42.7–76)
NRBC BLD AUTO-RTO: 0 /100 WBC (ref 0–0.2)
PLATELET # BLD AUTO: 152 10*3/MM3 (ref 140–450)
PMV BLD AUTO: 11.7 FL (ref 6–12)
POTASSIUM SERPL-SCNC: 4.2 MMOL/L (ref 3.5–5.2)
PROT SERPL-MCNC: 6.2 G/DL (ref 6–8.5)
RBC # BLD AUTO: 3.23 10*6/MM3 (ref 4.14–5.8)
SODIUM SERPL-SCNC: 135 MMOL/L (ref 136–145)
WBC NRBC COR # BLD: 8.63 10*3/MM3 (ref 3.4–10.8)

## 2023-08-11 PROCEDURE — 25010000002 ENOXAPARIN PER 10 MG: Performed by: NURSE PRACTITIONER

## 2023-08-11 PROCEDURE — 93306 TTE W/DOPPLER COMPLETE: CPT

## 2023-08-11 PROCEDURE — 25010000002 LORAZEPAM PER 2 MG: Performed by: NURSE PRACTITIONER

## 2023-08-11 PROCEDURE — 25010000002 THIAMINE PER 100 MG: Performed by: NURSE PRACTITIONER

## 2023-08-11 PROCEDURE — 25510000001 PERFLUTREN PROTEIN A MICROSPH SUSPENSION: Performed by: FAMILY MEDICINE

## 2023-08-11 PROCEDURE — 71046 X-RAY EXAM CHEST 2 VIEWS: CPT

## 2023-08-11 PROCEDURE — 93010 ELECTROCARDIOGRAM REPORT: CPT | Performed by: INTERNAL MEDICINE

## 2023-08-11 PROCEDURE — 97116 GAIT TRAINING THERAPY: CPT

## 2023-08-11 PROCEDURE — 97535 SELF CARE MNGMENT TRAINING: CPT

## 2023-08-11 PROCEDURE — 85025 COMPLETE CBC W/AUTO DIFF WBC: CPT | Performed by: FAMILY MEDICINE

## 2023-08-11 PROCEDURE — 92526 ORAL FUNCTION THERAPY: CPT | Performed by: SPEECH-LANGUAGE PATHOLOGIST

## 2023-08-11 PROCEDURE — 80053 COMPREHEN METABOLIC PANEL: CPT | Performed by: FAMILY MEDICINE

## 2023-08-11 PROCEDURE — 93306 TTE W/DOPPLER COMPLETE: CPT | Performed by: INTERNAL MEDICINE

## 2023-08-11 PROCEDURE — 93005 ELECTROCARDIOGRAM TRACING: CPT | Performed by: FAMILY MEDICINE

## 2023-08-11 RX ORDER — RISPERIDONE 1 MG/1
1 TABLET ORAL NIGHTLY
Status: DISCONTINUED | OUTPATIENT
Start: 2023-08-11 | End: 2023-08-15 | Stop reason: HOSPADM

## 2023-08-11 RX ORDER — PAROXETINE HYDROCHLORIDE 20 MG/1
20 TABLET, FILM COATED ORAL NIGHTLY
Status: DISCONTINUED | OUTPATIENT
Start: 2023-08-11 | End: 2023-08-15 | Stop reason: HOSPADM

## 2023-08-11 RX ADMIN — ACETAMINOPHEN 650 MG: 325 TABLET, FILM COATED ORAL at 22:03

## 2023-08-11 RX ADMIN — CHLORDIAZEPOXIDE HYDROCHLORIDE 10 MG: 10 CAPSULE ORAL at 11:04

## 2023-08-11 RX ADMIN — PAROXETINE 20 MG: 20 TABLET, FILM COATED ORAL at 21:35

## 2023-08-11 RX ADMIN — HUMAN ALBUMIN MICROSPHERES AND PERFLUTREN 0.44 MG: 10; .22 INJECTION, SOLUTION INTRAVENOUS at 16:14

## 2023-08-11 RX ADMIN — LORAZEPAM 1 MG: 2 INJECTION INTRAMUSCULAR; INTRAVENOUS at 18:31

## 2023-08-11 RX ADMIN — LORAZEPAM 1 MG: 1 TABLET ORAL at 22:03

## 2023-08-11 RX ADMIN — Medication 10 ML: at 21:35

## 2023-08-11 RX ADMIN — THIAMINE HYDROCHLORIDE 200 MG: 100 INJECTION, SOLUTION INTRAMUSCULAR; INTRAVENOUS at 06:33

## 2023-08-11 RX ADMIN — ASPIRIN 81 MG: 81 TABLET, COATED ORAL at 10:40

## 2023-08-11 RX ADMIN — CHLORDIAZEPOXIDE HYDROCHLORIDE 10 MG: 10 CAPSULE ORAL at 18:23

## 2023-08-11 RX ADMIN — SUCRALFATE 1 G: 1 TABLET ORAL at 18:23

## 2023-08-11 RX ADMIN — THIAMINE HYDROCHLORIDE 200 MG: 100 INJECTION, SOLUTION INTRAMUSCULAR; INTRAVENOUS at 18:23

## 2023-08-11 RX ADMIN — ENOXAPARIN SODIUM 40 MG: 100 INJECTION SUBCUTANEOUS at 18:23

## 2023-08-11 RX ADMIN — SUCRALFATE 1 G: 1 TABLET ORAL at 10:40

## 2023-08-11 RX ADMIN — COLLAGENASE SANTYL 1 APPLICATION: 250 OINTMENT TOPICAL at 11:04

## 2023-08-11 RX ADMIN — Medication 10 ML: at 10:40

## 2023-08-11 RX ADMIN — LORAZEPAM 2 MG: 1 TABLET ORAL at 04:40

## 2023-08-11 RX ADMIN — SUCRALFATE 1 G: 1 TABLET ORAL at 21:35

## 2023-08-11 RX ADMIN — PANTOPRAZOLE SODIUM 40 MG: 40 TABLET, DELAYED RELEASE ORAL at 06:33

## 2023-08-11 RX ADMIN — LORAZEPAM 2 MG: 2 INJECTION INTRAMUSCULAR; INTRAVENOUS at 11:01

## 2023-08-11 RX ADMIN — FOLIC ACID 1 MG: 1 TABLET ORAL at 10:40

## 2023-08-11 RX ADMIN — CHLORDIAZEPOXIDE HYDROCHLORIDE 10 MG: 10 CAPSULE ORAL at 06:33

## 2023-08-11 RX ADMIN — PANTOPRAZOLE SODIUM 40 MG: 40 TABLET, DELAYED RELEASE ORAL at 18:23

## 2023-08-11 RX ADMIN — RISPERIDONE 1 MG: 1 TABLET, FILM COATED ORAL at 21:35

## 2023-08-11 NOTE — DISCHARGE PLACEMENT REQUEST
"Dayron Carr (68 y.o. Male)       Date of Birth   1955    Social Security Number       Address   [address] [address]    Home Phone   [phone]    MRN   [id number]       [facility]    Marital Status   Single                            Admission Date   8/7/23    Admission Type   Emergency    Admitting Provider   Kathleen Tovar    Attending Provider   Kathleen Tovar    Department, Room/Bed   [facility] 4B, 430/1       Discharge Date       Discharge Disposition       Discharge Destination                                 Attending Provider: Kathleen Tovar    Allergies: No Known Allergies    Isolation: None   Infection: None   Code Status: CPR    Ht: 180.3 cm (71\")   Wt: 76.6 kg (168 lb 12.8 oz)    Admission Cmt: None   Principal Problem: Hypokalemia [E87.6]                   Active Insurance as of 8/7/2023       Primary Coverage       Payor Plan Insurance Group Employer/Plan Group    HUMANA MEDICARE REPLACEMENT HUMANA MEDICARE REPLACEMENT [id number]       Payor Plan Address Payor Plan Phone Number Payor Plan Fax Number Effective Dates    [address] [address] [phone]  2/1/2023 - None Entered    [facility] [address]         Subscriber Name Subscriber Birth Date Member ID       DAYRON CARR 1955 [id number]                     Emergency Contacts        (Rel.) Home Phone Work Phone Mobile Phone    Yuliya Stone (Sister) [phone] -- --    JAK CARR (Brother) -- -- [phone]                 History & Physical        Kandis Griffiths APRN at 08/07/23 1608       Attestation signed by Tayo Orozco MD at 08/09/23 1746    This visit was performed by both a physician and an APC. I performed all aspects of the MDM as documented.        Electronically signed by Tayo Orozco MD, 8/9/2023, 17:46 CDT.                        [facility] Medicine Services  HISTORY AND PHYSICAL    Date of " "Admission: 8/7/2023  Primary Care Physician: Herberth Nguyen Jr., MD    Subjective   Primary Historian: Patient    Chief Complaint: Weakness    History of Present Illness  Dayron Lubin is a 68-year-old male with a past medical history of CAD, prostate cancer, hyperlipidemia, hypertension, alcohol abuse.  Patient presented to emergency department with current plaints of weakness.  He states he thinks his last alcohol use was 2 days ago.  When asked how much he drinks he states \"a lot\".  He is unable to quantify.  He denies shortness of breathing, chest pain, abdominal pain.  Work-up reveals hypokalemia, elevated proBNP, mild hyponatremia.  Imaging reveals mild duodenitis.  Patient currently complaining only of weakness, he is anxious and jittery.  He states he does have DTs.  He states he is having difficulty ambulating due to weakness.  He is admitted for further evaluation and treatment.    Review of Systems   Otherwise complete ROS reviewed and negative except as mentioned in the HPI.    Past Medical History:   Past Medical History:   Diagnosis Date    Alcoholic hepatitis 7/13/2023    CAD (coronary artery disease)     History of external beam radiation therapy 05/12/2016    6840 cGy to prostate bed    Hyperlipidemia     Hypertension     Prostate cancer      Past Surgical History:  Past Surgical History:   Procedure Laterality Date    CARDIAC CATHETERIZATION      CORONARY ANGIOPLASTY WITH STENT PLACEMENT      PROSTATE SURGERY      TONSILLECTOMY       Social History:  reports that he has never smoked. He has never used smokeless tobacco. He reports current alcohol use. He reports current drug use. Drug: Marijuana.    Family History: family history includes Coronary artery disease in his mother; Stroke in his mother.       Allergies:  No Known Allergies    Medications:  Prior to Admission medications    Medication Sig Start Date End Date Taking? Authorizing Provider   aspirin (aspirin) 81 MG EC tablet Take 1 " "tablet by mouth Daily. 12/23/20   Edda Li APRN   losartan-hydrochlorothiazide (HYZAAR) 100-25 MG per tablet Take 1 tablet by mouth Daily. 9/27/16   Steve Devries MD   metoprolol succinate XL (TOPROL-XL) 100 MG 24 hr tablet Take 1 tablet by mouth Daily. 2/3/22   Steve Devries MD   niacin 500 MG tablet Take 1 tablet by mouth Every Night.    Steve Devries MD   pantoprazole (PROTONIX) 40 MG EC tablet Take 1 tablet by mouth Daily.    Steve Devries MD   PARoxetine (PAXIL) 10 MG tablet Take 1 tablet by mouth Every Night. 7/15/23   Dick Carrillo MD   sucralfate (CARAFATE) 1 g tablet TAKE 1 TABLET EVERY 6 HOURS, 30 MINUTES BEFORE MEALS AND AT BEDTIME 12/21/22   Steve Devries MD   temazepam (RESTORIL) 30 MG capsule TAKE ONE CAPSULE BY MOUTH AT BEDTIME AS NEEDED FOR SLEEP 9/23/16   Steve Devries MD     I have utilized all available immediate resources to obtain, update, or review the patient's current medications (including all prescriptions, over-the-counter products, herbals, cannabis/cannabidiol products, and vitamin/mineral/dietary (nutritional) supplements).    Objective     Vital Signs: BP (!) 149/102 (BP Location: Right arm, Patient Position: Lying) Comment: nurse was notfied  Pulse 113   Temp 98.4 øF (36.9 øC) (Oral)   Resp 18   Ht 180.3 cm (71\")   Wt 77.1 kg (170 lb)   SpO2 97%   BMI 23.71 kg/mý   Physical Exam  Vitals reviewed.   Constitutional:       Appearance: He is ill-appearing.   HENT:      Head: Normocephalic and atraumatic.      Mouth/Throat:      Mouth: Mucous membranes are moist.      Pharynx: Oropharynx is clear.   Eyes:      Extraocular Movements: Extraocular movements intact.      Conjunctiva/sclera: Conjunctivae normal.   Cardiovascular:      Rate and Rhythm: Regular rhythm. Tachycardia present.   Pulmonary:      Effort: Pulmonary effort is normal.      Comments: Tachypneic  Abdominal:      General: There is no distension.      " Palpations: Abdomen is soft.   Musculoskeletal:      Cervical back: Normal range of motion and neck supple.      Right lower leg: No edema.      Left lower leg: No edema.      Comments: Generalized weakness and debility   Skin:     General: Skin is warm and dry.   Neurological:      General: No focal deficit present.      Mental Status: He is alert and oriented to person, place, and time.      Comments: Alert and oriented   Psychiatric:         Mood and Affect: Mood is anxious.         Behavior: Behavior is slowed. Behavior is cooperative.        Results Reviewed:  Lab Results (last 24 hours)       Procedure Component Value Units Date/Time    Ethanol [[id number]] Collected: 08/07/23 1406    Specimen: Blood Updated: 08/07/23 1600     Ethanol % <0.010 %     Magnesium [[id number]]  (Normal) Collected: 08/07/23 1406    Specimen: Blood Updated: 08/07/23 1500     Magnesium 2.1 mg/dL     Comprehensive Metabolic Panel [[id number]]  (Abnormal) Collected: 08/07/23 1406    Specimen: Blood Updated: 08/07/23 1446     Glucose 200 mg/dL      BUN 20 mg/dL      Creatinine 1.07 mg/dL      Sodium 133 mmol/L      Potassium 2.9 mmol/L      Chloride 89 mmol/L      CO2 20.0 mmol/L      Calcium 9.3 mg/dL      Total Protein 7.2 g/dL      Albumin 4.5 g/dL      ALT (SGPT) 17 U/L      AST (SGOT) 34 U/L      Alkaline Phosphatase 80 U/L      Total Bilirubin 0.8 mg/dL      Globulin 2.7 gm/dL      A/G Ratio 1.7 g/dL      BUN/Creatinine Ratio 18.7     Anion Gap 24.0 mmol/L      eGFR 75.1 mL/min/1.73     BNP [[id number]]  (Abnormal) Collected: 08/07/23 1406    Specimen: Blood Updated: 08/07/23 1444     proBNP 15,027.0 pg/mL     High Sensitivity Troponin T [[id number]]  (Abnormal) Collected: 08/07/23 1406    Specimen: Blood Updated: 08/07/23 1443     HS Troponin T 28 ng/L     D-dimer, Quantitative [[id number]]  (Abnormal) Collected: 08/07/23 1406    Specimen: Blood Updated: 08/07/23 1440     D-Dimer, Quantitative 2.42 MCGFEU/mL     CBC Auto Differential  [[id number]]  (Abnormal) Collected: 08/07/23 1406    Specimen: Blood Updated: 08/07/23 1427     WBC 10.68 10*3/mm3      RBC 3.74 10*6/mm3      Hemoglobin 12.4 g/dL      Hematocrit 36.3 %      MCV 97.1 fL      MCH 33.2 pg      MCHC 34.2 g/dL      RDW 14.6 %      RDW-SD 50.6 fl      MPV 11.3 fL      Platelets 248 10*3/mm3      Neutrophil % 82.8 %      Lymphocyte % 11.8 %      Monocyte % 4.5 %      Eosinophil % 0.1 %      Basophil % 0.1 %      Immature Grans % 0.7 %      Neutrophils, Absolute 8.84 10*3/mm3      Lymphocytes, Absolute 1.26 10*3/mm3      Monocytes, Absolute 0.48 10*3/mm3      Eosinophils, Absolute 0.01 10*3/mm3      Basophils, Absolute 0.01 10*3/mm3      Immature Grans, Absolute 0.08 10*3/mm3      nRBC 0.2 /100 WBC           Imaging Results (Last 24 Hours)       Procedure Component Value Units Date/Time    CT Angiogram Chest [[id number]] Collected: 08/07/23 1503     Updated: 08/07/23 1531    Narrative:      Exam: CT angiogram of the of the chest with intravenous contrast.     CLINICAL HISTORY:  Suspected pulmonary embolism. Sudden onset shortness  of breath.     DOSE:  212 mGycm. All CT scans are performed using dose optimization  techniques as appropriate to the performed exam and including at least  one of the following: Automated exposure control, adjustment of the mA  and/or kV according to size, and the use of the iterative reconstruction  technique.     TECHNIQUE: Contiguous axial images were obtained through the thorax  following intravenous contrast administration with reformatted images  obtained in the sagittal and coronal projections from the original data  set. Three-dimensional reconstructions are also obtained.     COMPARISON:  07/17/2015     FINDINGS:     Pulmonary arteries:  There is normal enhancement of the pulmonary  arteries with no evidence of pulmonary thromboembolic disease.     Aorta:  There is mild dilatation of ascending thoracic aorta with a  transverse diameter of 3.8 cm. No  evidence of focal aneurysm.  Radiodensity is noted at the level of the aortic valvular related to  heavy calcification of the aortic valve leaflets or previous aortic  valve replacement. The proximal great vessels demonstrate mild  atheromatous calcification without rate limiting stenosis or aneurysm.     LUNGS:  The lungs are clear. No evidence of lobar consolidation.     Pleural spaces: Unremarkable. No evidence of pleural effusion or  pneumothorax.     HEART: There is mild cardiomegaly. Heavy coronary calcifications are  present. There is elevated right heart pressure with reflux of contrast  into the intrahepatic IVC. There is no pericardial effusion.     Bones: No acute osseous abnormalities are demonstrated.     CHEST WALL: No chest wall abnormalities are demonstrated.     Lymph nodes: No enlarged mediastinal or axillary lymphadenopathy is  present.     Upper abdomen: Steatosis of the liver is present. There is prominence of  the wall of the proximal descending duodenum with mild adjacent  stranding suspicious for duodenitis. No evidence of gastric outlet  obstruction.       Impression:      1. No evidence of pulmonary thromboembolic disease. The thoracic aorta  and great vessels are ectatic. No evidence of focal aneurysm or  dissection.  2. Mild cardiomegaly with heavy coronary calcifications. Radiodensity at  the level of the aortic root is present either related to previous  aortic valve replacement or heavy calcification of the aortic valve  leaflets. There is elevated right heart pressure with reflux of contrast  into the intrahepatic IVC.  3. Steatosis of the liver.  4. Prominence of the wall of the descending duodenum with mild adjacent  stranding suspicious for duodenitis.  This report was finalized on 08/07/2023 15:28 by Dr. Vicente Sam MD.    XR Chest 1 View [[id number]] Collected: 08/07/23 1447     Updated: 08/07/23 1454    Narrative:      EXAMINATION: XR CHEST 1 VW- 8/7/2023 2:47 PM CDT      HISTORY: Chest Pain Protocol     REPORT: A frontal view of the chest was obtained.     COMPARISON: Chest x-ray 07/13/2023.     There is mild bibasilar atelectasis, no focal pulmonary infiltrate is  identified. There appears be mild pleural thickening at the left  costophrenic angle. No free flowing effusion is identified. There is no  pneumothorax. Heart size is normal. The osseous structures are  unremarkable.       Impression:      Hypoaeration of lungs with bibasilar atelectasis, no  evidence of pneumonia. Probable mild chronic pleural thickening at the  left costophrenic angle.  This report was finalized on 08/07/2023 14:49 by Dr. Nitin Stone MD.          Assessment / Plan   Assessment:   Active Hospital Problems    Diagnosis     Hypokalemia     Duodenitis     Gait instability     Elevated brain natriuretic peptide (BNP) level     ETOH abuse        Treatment Plan  1.  The patient will be admitted to Dr. Orozco's service here at [facility].   2.  CIWA protocol  3.  Home medications reviewed and restarted as appropriate, will not continue full dose Lovenox nor IV diuretics  4.  Place potassium  5.  Increase Carafate dose from twice daily to 4 times a day and Protonix from daily to twice daily  6.  Continuous pulse oximetry, incentive spirometry, oxygen therapy  7.  Cardiac monitoring, daily weights, safety for cautions, seizure precaution  8.  Consult PT and OT  9.  Consult  as patient may benefit benefit from rehab placement  10.  Labs in a.m.    Medical Decision Making  Number and Complexity of problems: 5  Differential Diagnosis: None    Conditions and Status        Condition is unchanged.     [facility] Data  External documents reviewed: None  Cardiac tracing (EKG, telemetry) interpretation: Reviewed  Radiology interpretation: Reviewed  Labs reviewed: Yes  Any tests that were considered but not ordered: No     Decision rules/scores evaluated (example [id number], Wells, etc): No      Discussed with: Patient and Dr. Campos     Care Planning  Shared decision making: Patient and Dr. Orozco  Code status and discussions: Full    Disposition  Social Determinants of Health that impact treatment or disposition: None  Estimated length of stay is 2+ days.     I confirmed that the patient's advanced care plan is present, code status is documented, and a surrogate decision maker is listed in the patient's medical record.     The patient's surrogate decision maker is Sister Yuliya.     The patient was seen and examined by me on 8/7/2023 at 408.    Electronically signed by BRI Paredes, 08/07/23, 17:23 CDT.               Electronically signed by Tayo Orozco MD at 08/09/23 1746          Physician Progress Notes (last 24 hours)        Kathleen Tovar at 08/11/23 1449              [facility] Medicine Services  INPATIENT PROGRESS NOTE    Patient Name: Dayron Lubin  Date of Admission: 8/7/2023  Today's Date: 08/11/23  Length of Stay: 4  Primary Care Physician: Herberth Nguyen Jr., MD    Subjective   Chief Complaint: patient resting in bed.   Sisters at bed side.   Patient feels tired.  Has been napping.  Had a dose of ativan.     HPI   Patient denies pain.  Is not short of breath.   Notes heart rate yesterday jumped to 190 with PT.    No chest pain.   No delerium.   Per family feel the paxil is not helping much.   Has been on for about 3 weeks. Added during last hospitalization.           Review of Systems   All pertinent negatives and positives are as above. All other systems have been reviewed and are negative unless otherwise stated.     Objective    Temp:  [97.8 øF (36.6 øC)-99.3 øF (37.4 øC)] 98.2 øF (36.8 øC)  Heart Rate:  [] 85  Resp:  [18] 18  BP: (104-129)/(65-91) 118/91  Physical Exam  Vitals and nursing note reviewed.   Constitutional:       Appearance: Normal appearance.   HENT:      Head: Normocephalic and atraumatic.      Right  Ear: External ear normal.      Left Ear: External ear normal.      Nose: Nose normal.      Mouth/Throat:      Mouth: Mucous membranes are moist.   Eyes:      Extraocular Movements: Extraocular movements intact.      Conjunctiva/sclera: Conjunctivae normal.      Pupils: Pupils are equal, round, and reactive to light.   Cardiovascular:      Rate and Rhythm: Normal rate and regular rhythm.      Pulses: Normal pulses.      Heart sounds: No murmur heard.    No friction rub. No gallop.   Pulmonary:      Effort: Pulmonary effort is normal.      Breath sounds: Normal breath sounds.   Abdominal:      General: Bowel sounds are normal.      Palpations: Abdomen is soft.   Musculoskeletal:         General: Normal range of motion.      Cervical back: Normal range of motion and neck supple.   Skin:     General: Skin is warm and dry.      Capillary Refill: Capillary refill takes less than 2 seconds.   Neurological:      General: No focal deficit present.      Mental Status: He is alert and oriented to person, place, and time.      Cranial Nerves: No cranial nerve deficit.   Psychiatric:         Mood and Affect: Mood normal.         Behavior: Behavior normal.           Results Review:  I have reviewed the labs, radiology results, and diagnostic studies.    Laboratory Data:   Results from last 7 days   Lab Units 08/11/23  0320 08/10/23  0435 08/07/23  1406   WBC 10*3/mm3 8.63 11.78* 10.68   HEMOGLOBIN g/dL 10.7* 10.8* 12.4*   HEMATOCRIT % 32.2* 33.3* 36.3*   PLATELETS 10*3/mm3 152 156 248        Results from last 7 days   Lab Units 08/11/23  0320 08/10/23  2205 08/10/23  0435 08/09/23  0352   SODIUM mmol/L 135*  --  135* 132*   POTASSIUM mmol/L 4.2 4.2 2.8* 3.3*  3.3*   CHLORIDE mmol/L 104  --  98 94*   CO2 mmol/L 21.0*  --  21.0* 24.0   BUN mg/dL 20  --  31* 45*   CREATININE mg/dL 0.99  --  0.98 2.00*   CALCIUM mg/dL 9.0  --  8.7 7.9*   BILIRUBIN mg/dL 0.4  --  0.5 0.5   ALK PHOS U/L 53  --  54 52   ALT (SGPT) U/L 14  --  14 13    AST (SGOT) U/L 25  --  26 28   GLUCOSE mg/dL 93  --  106* 96       Culture Data:   No results found for: BLOODCX, URINECX, WOUNDCX, MRSACX, RESPCX, STOOLCX    Radiology Data:   Imaging Results (Last 24 Hours)       ** No results found for the last 24 hours. **            I have reviewed the patient's current medications.     Assessment/Plan   Assessment  Active Hospital Problems    Diagnosis     **Hypokalemia     SVT (supraventricular tachycardia)     Duodenitis     Gait instability     Elevated brain natriuretic peptide (BNP) level     ETOH abuse        Treatment Plan  Increase paxil 20 mg po daily  Risperdal 1 mg qhs  Continue PT OT  Monitor cmp cbc in AM  Echocardiogram 2 d  Chest xray        Medical Decision Making  Number and Complexity of problems:   Hypokalemia high complexity  Duodenitis moderate complexity  Gait instability moderate complexity  ETOH abuse moderate complexity   Elevated BNP moderate complexity  Differential Diagnosis:     Conditions and Status        improving     [facility] Data  External documents reviewed: no new  Cardiac tracing (EKG, telemetry) interpretation: reviewed  Radiology interpretation: reviewed  Labs reviewed: reviewed  Any tests that were considered but not ordered: none     Decision rules/scores evaluated (example [id number], Wells, etc): none     Discussed with: patient     Care Planning  Shared decision making: patient  Code status and discussions: full    Disposition  Social Determinants of Health that impact treatment or disposition: none  I expect the patient to be discharged to snf in 1-2 days.     Electronically signed by Kathleen Tovar, 08/11/23, 14:57 CDT.      Electronically signed by Kathleen Tovar at 08/11/23 1459       Kathleen Tovar at 08/10/23 1627              [facility] Medicine Services  INPATIENT PROGRESS NOTE    Patient Name: Dayron Lubin  Date of Admission: 8/7/2023  Today's Date: 08/10/23  Length of Stay:  3  Primary Care Physician: Herberth Nguyen Jr., MD    Subjective   Chief Complaint:  No nausea or vomiting.  HPI   He is sitting up in bed eating breakfast.  Tolerating well.  No choking noted.  Discussed need for increased mobility.  Patient notes he is very weak.  Lives by self.  Recommend SNF on DC for strengthening      Review of Systems   All pertinent negatives and positives are as above. All other systems have been reviewed and are negative unless otherwise stated.     Objective    Temp:  [97.6 øF (36.4 øC)-98.6 øF (37 øC)] 98 øF (36.7 øC)  Heart Rate:  [] 98  Resp:  [18] 18  BP: ()/(60-75) 104/66  Physical Exam  Vitals and nursing note reviewed.   Constitutional:       Appearance: Normal appearance.   HENT:      Head: Normocephalic and atraumatic.      Right Ear: External ear normal.      Left Ear: External ear normal.      Nose: Nose normal.      Mouth/Throat:      Mouth: Mucous membranes are moist.   Eyes:      Extraocular Movements: Extraocular movements intact.      Conjunctiva/sclera: Conjunctivae normal.      Pupils: Pupils are equal, round, and reactive to light.   Cardiovascular:      Rate and Rhythm: Normal rate and regular rhythm.      Pulses: Normal pulses.      Heart sounds: No murmur heard.    No friction rub. No gallop.   Pulmonary:      Effort: Pulmonary effort is normal.      Breath sounds: Normal breath sounds.   Abdominal:      General: Bowel sounds are normal.      Palpations: Abdomen is soft.   Musculoskeletal:         General: Normal range of motion.      Cervical back: Normal range of motion and neck supple.   Skin:     General: Skin is warm and dry.      Capillary Refill: Capillary refill takes less than 2 seconds.   Neurological:      General: No focal deficit present.      Mental Status: He is alert and oriented to person, place, and time.      Cranial Nerves: No cranial nerve deficit.   Psychiatric:         Mood and Affect: Mood normal.         Behavior: Behavior  normal.           Results Review:  I have reviewed the labs, radiology results, and diagnostic studies.    Laboratory Data:   Results from last 7 days   Lab Units 08/10/23  0435 08/07/23  1406   WBC 10*3/mm3 11.78* 10.68   HEMOGLOBIN g/dL 10.8* 12.4*   HEMATOCRIT % 33.3* 36.3*   PLATELETS 10*3/mm3 156 248          Results from last 7 days   Lab Units 08/10/23  0435 08/09/23  0352 08/08/23  0341 08/07/23  1609 08/07/23  1406   SODIUM mmol/L 135* 132* 135*   < > 133*   POTASSIUM mmol/L 2.8* 3.3*  3.3* 2.7*   < > 2.9*   CHLORIDE mmol/L 98 94* 86*   < > 89*   CO2 mmol/L 21.0* 24.0 26.0   < > 20.0*   BUN mg/dL 31* 45* 27*   < > 20   CREATININE mg/dL 0.98 2.00* 1.21   < > 1.07   CALCIUM mg/dL 8.7 7.9* 9.7   < > 9.3   BILIRUBIN mg/dL 0.5 0.5  --   --  0.8   ALK PHOS U/L 54 52  --   --  80   ALT (SGPT) U/L 14 13  --   --  17   AST (SGOT) U/L 26 28  --   --  34   GLUCOSE mg/dL 106* 96 149*   < > 200*    < > = values in this interval not displayed.         Culture Data:   No results found for: BLOODCX, URINECX, WOUNDCX, MRSACX, RESPCX, STOOLCX    Radiology Data:   Imaging Results (Last 24 Hours)       ** No results found for the last 24 hours. **            I have reviewed the patient's current medications.     Assessment/Plan   Assessment  Active Hospital Problems    Diagnosis     **Hypokalemia     Duodenitis     Gait instability     Elevated brain natriuretic peptide (BNP) level     ETOH abuse        Treatment Plan  Increase patient activity chair for meals, ambulate in marcial.  PT OT    Replace potassium.  Repeat CMP and CBC in a.m.      Medical Decision Making  Number and Complexity of problems:   Hypokalemia high complexity  Duodenitis moderate complexity  Gait instability moderate complexity  ETOH abuse moderate complexity   Elevated BNP moderate complexity     Differential Diagnosis:      Conditions and Status        stable     MDM Data  External documents reviewed: none  Cardiac tracing (EKG, telemetry) interpretation:  reviewed  Radiology interpretation: reviewed  Labs reviewed: reviewed  Any tests that were considered but not ordered: none     Decision rules/scores evaluated (example [id number], Wells, etc): none     Discussed with: patient     Care Planning  Shared decision making: patient  Code status and discussions: full     Disposition  Social Determinants of Health that impact treatment or disposition: none  I expect the patient to be discharged to home in 1-2 days.     Electronically signed by Kathleen Tovar, 08/10/23, 16:27 CDT.      Electronically signed by Kathleen Tovar at 08/10/23 1630          Physical Therapy Notes (last 48 hours)        Nasir Perez PTA at 23 1552  Version 1 of 1         Acute Care - Physical Therapy Treatment Note   Mary     Patient Name: Dayron Lubin  : 1955  MRN: [id number]  Today's Date: 2023      Visit Dx:     ICD-10-CM ICD-9-CM   1. Acute on chronic right-sided congestive heart failure  I50.813 428.0   2. Coronary artery disease due to lipid rich plaque  I25.10 414.00    I25.83 414.3   3. Duodenitis  K29.80 535.60   4. History of alcohol use  Z87.898 V11.3   5. Metabolic acidosis  E87.20 276.2   6. Dysphagia, unspecified type  R13.10 787.20   7. Impaired mobility [Z74.09 (ICD-10-CM)]  Z74.09 799.89   8. Impaired mobility and ADLs [Z74.09, Z78.9 (ICD-10-CM)]  Z74.09 V49.89    Z78.9    9. Pressure injury of left buttock, unstageable  L89.320 707.05     707.25   10. Pressure injury of right buttock, stage 3  L89.313 707.05     707.23   11. Non-pressure chronic ulcer of other part of left foot with fat layer exposed  L97.522 707.15     Patient Active Problem List   Diagnosis    Prostate cancer    Hx of radiation therapy    Elevated PSA    Encounter for follow-up surveillance of prostate cancer    HOCM (hypertrophic obstructive cardiomyopathy)    Coronary artery disease involving native coronary artery of native heart without angina pectoris     Essential hypertension    Mixed hyperlipidemia    Class 1 obesity with alveolar hypoventilation, serious comorbidity, and body mass index (BMI) of 30.0 to 30.9 in adult    Displaced fracture of lateral malleolus of right fibula, initial encounter for closed fracture    Family history of colonic polyps    History of colon polyps    Elevated brain natriuretic peptide (BNP) level    MIKEY (acute kidney injury)    Anxiety associated with depression    ETOH abuse    Alcoholic hepatitis    Volume depletion    Anorexia    CHF (congestive heart failure)    Hypokalemia    Duodenitis    Gait instability     Past Medical History:   Diagnosis Date    Alcoholic hepatitis 7/13/2023    CAD (coronary artery disease)     History of external beam radiation therapy 05/12/2016    6840 cGy to prostate bed    Hyperlipidemia     Hypertension     Prostate cancer      Past Surgical History:   Procedure Laterality Date    CARDIAC CATHETERIZATION      CORONARY ANGIOPLASTY WITH STENT PLACEMENT      PROSTATE SURGERY      TONSILLECTOMY       PT Assessment (last 12 hours)       PT Evaluation and Treatment       Row Name 08/09/23 1510          Physical Therapy Time and Intention    Subjective Information complains of;fatigue  -TB     Document Type therapy note (daily note)  -TB     Mode of Treatment physical therapy  -TB       Row Name 08/09/23 1510          General Information    Existing Precautions/Restrictions fall  -TB       Row Name 08/09/23 1510          Bed Mobility    Bed Mobility scooting/bridging;supine-sit;sit-supine  -TB     All Activities, [place] (Bed Mobility) standby assist  -TB     Scooting/Bridging [place] (Bed Mobility) standby assist  -TB     Supine-Sit [place] (Bed Mobility) standby assist  -TB     Assistive Device (Bed Mobility) head of bed elevated  -TB       Row Name 08/09/23 1510          Transfers    Comment, (Transfers) Declined due to fatigue  -TB       Row Name 08/09/23 1510          Balance    Balance  Assessment sitting static balance;sitting dynamic balance  -TB     Static Sitting Balance independent  -TB     Dynamic Sitting Balance independent  -TB     Position, Sitting Balance unsupported;sitting edge of bed  -TB       Row Name 08/09/23 1510          Motor Skills    Therapeutic Exercise --  AROM BLE x15  -TB       Row Name             Wound 08/07/23 1655 Left gluteal Pressure Injury    Wound - Properties Group Placement Date: 08/07/23  -AC Placement Time: 1655  -AC Present on Hospital Admission: Y  -AC Side: Left  -AC Location: gluteal  -AC Primary Wound Type: Pressure inj  -AC    Retired Wound - Properties Group Placement Date: 08/07/23  -AC Placement Time: 1655  -AC Present on Hospital Admission: Y  -AC Side: Left  -AC Location: gluteal  -AC Primary Wound Type: Pressure inj  -AC    Retired Wound - Properties Group Date first assessed: 08/07/23  - Time first assessed: 1655  -AC Present on Hospital Admission: Y  -AC Side: Left  -AC Location: gluteal  -AC Primary Wound Type: Pressure inj  -AC      Row Name             Wound 08/07/23 1655 Right gluteal Pressure Injury    Wound - Properties Group Placement Date: 08/07/23  -AC Placement Time: 1655  -AC Present on Hospital Admission: Y  -AC Side: Right  -AC Location: gluteal  -AC Primary Wound Type: Pressure inj  -AC    Retired Wound - Properties Group Placement Date: 08/07/23  -AC Placement Time: 1655  -AC Present on Hospital Admission: Y  -AC Side: Right  -AC Location: gluteal  -AC Primary Wound Type: Pressure inj  -AC    Retired Wound - Properties Group Date first assessed: 08/07/23  -AC Time first assessed: 1655  -AC Present on Hospital Admission: Y  -AC Side: Right  -AC Location: gluteal  -AC Primary Wound Type: Pressure inj  -AC      Row Name             Wound 08/07/23 1655 Left posterior heel Pressure Injury    Wound - Properties Group Placement Date: 08/07/23  -AC Placement Time: 1655 -AC Present on Hospital Admission: Y  -AC Side: Left  -AC Orientation:  posterior  -AC Location: heel  -AC Primary Wound Type: Pressure inj  -AC    Retired Wound - Properties Group Placement Date: 08/07/23  - Placement Time: 1655  -AC Present on Hospital Admission: Y  -AC Side: Left  -AC Orientation: posterior  -AC Location: heel  -AC Primary Wound Type: Pressure inj  -AC    Retired Wound - Properties Group Date first assessed: 08/07/23  -AC Time first assessed: 1655  -AC Present on Hospital Admission: Y  -AC Side: Left  -AC Location: heel  -AC Primary Wound Type: Pressure inj  -AC      Row Name             Wound 08/07/23 1656 Left posterior foot Pressure Injury    Wound - Properties Group Placement Date: 08/07/23  -AC Placement Time: 1656  -AC Present on Hospital Admission: Y  -AC Side: Left  -AC Orientation: posterior  -AC Location: foot  -AC Primary Wound Type: Pressure inj  -AC    Retired Wound - Properties Group Placement Date: 08/07/23  - Placement Time: 1656  -AC Present on Hospital Admission: Y  -AC Side: Left  -AC Orientation: posterior  -AC Location: foot  -AC Primary Wound Type: Pressure inj  -AC    Retired Wound - Properties Group Date first assessed: 08/07/23  - Time first assessed: 1656  -AC Present on Hospital Admission: Y  -AC Side: Left  -AC Location: foot  -AC Primary Wound Type: Pressure inj  -AC      Row Name 08/09/23 1510          Positioning and Restraints    Pre-Treatment Position in bed  -TB     Post Treatment Position bed  -TB     In Bed fowlers;call light within reach;encouraged to call for assist;with family/caregiver;side rails up x2  -TB               User Key  (r) = Recorded By, (t) = Taken By, (c) = Cosigned By      Initials Name Provider Type    AC Boston Echevarria, RN Registered Nurse    Nasir Boyer, DAVID Physical Therapist Assistant                    Physical Therapy Education       Title: PT OT SLP Therapies (In Progress)       Topic: Physical Therapy (Done)       Point: Mobility training (Done)       Learning Progress Summary              Patient Acceptance, E,D, NR,DU,VU by  at 8/8/2023 1359    Comment: benefits of PT and POC, call for A OOB                         Point: Precautions (Done)       Learning Progress Summary             Patient Acceptance, E,D, NR,DU,VU by  at 8/8/2023 1359    Comment: benefits of PT and POC, call for A OOB                                         User Key       Initials Effective Dates Name Provider Type Discipline     02/03/23 -  Justyn Briggs, PT Physical Therapist PT                  PT Recommendation and Plan         Outcome Measures       Row Name 08/09/23 1012             How much help from another is currently needed...    Putting on and taking off regular lower body clothing? 2  -AC      Bathing (including washing, rinsing, and drying) 2  -AC      Toileting (which includes using toilet bed pan or urinal) 2  -AC      Putting on and taking off regular upper body clothing 3  -AC      Taking care of personal grooming (such as brushing teeth) 4  -AC      Eating meals 4  -AC      AM-PAC 6 Clicks Score (OT) 17  -AC         Functional Assessment    Outcome Measure Options AM-PAC 6 Clicks Daily Activity (OT)  -AC                User Key  (r) = Recorded By, (t) = Taken By, (c) = Cosigned By      Initials Name Provider Type    AC [place], OTR/L, CNT Occupational Therapist                     Time Calculation:    PT Charges       Row Name 08/09/23 1550             Time Calculation    Start Time 1510  -TB      Stop Time 1535  -TB      Time Calculation (min) 25 min  -TB      PT Received On 08/09/23  -TB         Time Calculation- PT    Total Timed Code Minutes- PT 25 minute(s)  -                User Key  (r) = Recorded By, (t) = Taken By, (c) = Cosigned By      Initials Name Provider Type    TB Nasir Perez, PTA Physical Therapist Assistant                  Therapy Charges for Today       Code Description Service Date Service Provider Modifiers Qty    33816126431 HC PT THER PROC EA 15 MIN  2023 Nasir Perez PTA GP 1    [id number]  PT THERAPEUTIC ACT EA 15 MIN 2023 Nasir Perez PTA GP 1            PT G-Codes  Outcome Measure Options: AM-PAC 6 Clicks Daily Activity (OT)  AM-PAC 6 Clicks Score (PT): 17  AM-PAC 6 Clicks Score (OT): 17    Nasir Perez PTA  2023      Electronically signed by Nasir Perez PTA at 23 1552       Nasir Perez PTA at 08/10/23 1527  Version 2 of 2         Goal Outcome Evaluation:  Plan of Care Reviewed With: patient        Progress: improving  Outcome Evaluation: PT tx complete. Pt c/o fatigue. Bed mob SBA to EOB. Sitting balance Id. Stood SBA. Amb 50' at a time w/ RW and CGA/SBA. Increased fatigue w/ activity. Once back in room RN came and notified us that pts HR reached 190 breifly during tx. Pt had no symptoms. Will monitor HR closley next therapy session.           Electronically signed by Nasir Perez PTA at 08/10/23 152       Nasir Perez PTA at 08/10/23 1510  Version 1 of 2         Goal Outcome Evaluation:  Plan of Care Reviewed With: patient        Progress: improving  Outcome Evaluation: PT tx complete. Pt c/o fatigue. Bed mob SBA to EOB. Sitting balance Id. Stood SBA. Amb 50' at a time w/ RW and CGA/SBA. Increased fatigue w/ activity. Once back in room RN came and notified us that pts HR reached 190 breifly during tx. Pt had no symptoms. Will monitor HR closley next therapy session.           Electronically signed by Nasir Perez PTA at 08/10/23 1510       Nasir Perez PTA at 08/10/23 1527  Version 1 of 1         Acute Care - Physical Therapy Treatment Note   [facility]     Patient Name: Dayron Lubin  : 1955  MRN: [id number]  Today's Date: 8/10/2023      Visit Dx:     ICD-10-CM ICD-9-CM   1. Acute on chronic right-sided congestive heart failure  I50.813 428.0   2. Coronary artery disease due to lipid rich plaque  I25.10 414.00    I25.83 414.3   3.  Duodenitis  K29.80 535.60   4. History of alcohol use  Z87.898 V11.3   5. Metabolic acidosis  E87.20 276.2   6. Dysphagia, unspecified type  R13.10 787.20   7. Impaired mobility [Z74.09 (ICD-10-CM)]  Z74.09 799.89   8. Impaired mobility and ADLs [Z74.09, Z78.9 (ICD-10-CM)]  Z74.09 V49.89    Z78.9    9. Pressure injury of left buttock, unstageable  L89.320 707.05     707.25   10. Pressure injury of right buttock, stage 3  L89.313 707.05     707.23   11. Non-pressure chronic ulcer of other part of left foot with fat layer exposed  L97.522 707.15     Patient Active Problem List   Diagnosis    Prostate cancer    Hx of radiation therapy    Elevated PSA    Encounter for follow-up surveillance of prostate cancer    HOCM (hypertrophic obstructive cardiomyopathy)    Coronary artery disease involving native coronary artery of native heart without angina pectoris    Essential hypertension    Mixed hyperlipidemia    Class 1 obesity with alveolar hypoventilation, serious comorbidity, and body mass index (BMI) of 30.0 to 30.9 in adult    Displaced fracture of lateral malleolus of right fibula, initial encounter for closed fracture    Family history of colonic polyps    History of colon polyps    Elevated brain natriuretic peptide (BNP) level    MIKEY (acute kidney injury)    Anxiety associated with depression    ETOH abuse    Alcoholic hepatitis    Volume depletion    Anorexia    CHF (congestive heart failure)    Hypokalemia    Duodenitis    Gait instability     Past Medical History:   Diagnosis Date    Alcoholic hepatitis 7/13/2023    CAD (coronary artery disease)     History of external beam radiation therapy 05/12/2016    6840 cGy to prostate bed    Hyperlipidemia     Hypertension     Prostate cancer      Past Surgical History:   Procedure Laterality Date    CARDIAC CATHETERIZATION      CORONARY ANGIOPLASTY WITH STENT PLACEMENT      PROSTATE SURGERY      TONSILLECTOMY       PT Assessment (last 12 hours)       PT Evaluation and  Treatment       Row Name 08/10/23 1447          Physical Therapy Time and Intention    Subjective Information complains of;fatigue  -TB     Document Type therapy note (daily note)  -TB     Mode of Treatment physical therapy  -TB     Comment HR up to 190s w/ amb  -TB       Row Name 08/10/23 1447          General Information    Existing Precautions/Restrictions fall  -TB       Row Name 08/10/23 1447          Bed Mobility    Bed Mobility scooting/bridging;supine-sit;sit-supine  -TB     All Activities, [place] (Bed Mobility) standby assist  -TB     Scooting/Bridging [place] (Bed Mobility) standby assist  -TB     Supine-Sit [place] (Bed Mobility) standby assist  -TB     Assistive Device (Bed Mobility) head of bed elevated  -TB       Row Name 08/10/23 1447          Transfers    Transfers sit-stand transfer;stand-sit transfer  -TB       Row Name 08/10/23 1447          Sit-Stand Transfer    Sit-Stand [place] (Transfers) standby assist  -TB       Row Name 08/10/23 1447          Stand-Sit Transfer    Stand-Sit [place] (Transfers) standby assist  -TB       Row Name 08/10/23 1447          Gait/Stairs (Locomotion)    [place] Level (Gait) contact guard;standby assist  -TB     Assistive Device (Gait) walker, front-wheeled  -TB     Distance in Feet (Gait) 50'  X2  -TB     Comment, (Gait/Stairs) Increased fatigue during amb  -TB       Row Name 08/10/23 1447          Balance    Balance Assessment sitting static balance;sitting dynamic balance  -TB     Static Sitting Balance independent  -TB     Dynamic Sitting Balance independent  -TB     Position, Sitting Balance sitting edge of bed  -TB       Row Name             Wound 08/07/23 1655 Left gluteal Pressure Injury    Wound - Properties Group Placement Date: 08/07/23  -AC Placement Time: 1655 -AC Present on Hospital Admission: Y  -AC Side: Left  -AC Location: gluteal  -AC Primary Wound Type: Pressure inj  -AC    Retired Wound - Properties Group Placement  Date: 08/07/23  - Placement Time: 1655  -AC Present on Hospital Admission: Y  -AC Side: Left  -AC Location: gluteal  -AC Primary Wound Type: Pressure inj  -AC    Retired Wound - Properties Group Date first assessed: 08/07/23  - Time first assessed: 1655  -AC Present on Hospital Admission: Y  -AC Side: Left  -AC Location: gluteal  -AC Primary Wound Type: Pressure inj  -AC      Row Name             Wound 08/07/23 1655 Right gluteal Pressure Injury    Wound - Properties Group Placement Date: 08/07/23  -AC Placement Time: 1655  -AC Present on Hospital Admission: Y  -AC Side: Right  -AC Location: gluteal  -AC Primary Wound Type: Pressure inj  -AC    Retired Wound - Properties Group Placement Date: 08/07/23  - Placement Time: 1655  -AC Present on Hospital Admission: Y  -AC Side: Right  -AC Location: gluteal  -AC Primary Wound Type: Pressure inj  -AC    Retired Wound - Properties Group Date first assessed: 08/07/23  - Time first assessed: 1655  -AC Present on Hospital Admission: Y  -AC Side: Right  -AC Location: gluteal  -AC Primary Wound Type: Pressure inj  -AC      Row Name             Wound 08/07/23 1655 Left posterior heel Pressure Injury    Wound - Properties Group Placement Date: 08/07/23  - Placement Time: 1655 -AC Present on Hospital Admission: Y  -AC Side: Left  -AC Orientation: posterior  -AC Location: heel  -AC Primary Wound Type: Pressure inj  -AC    Retired Wound - Properties Group Placement Date: 08/07/23  -AC Placement Time: 1655  -AC Present on Hospital Admission: Y  -AC Side: Left  -AC Orientation: posterior  -AC Location: heel  -AC Primary Wound Type: Pressure inj  -AC    Retired Wound - Properties Group Date first assessed: 08/07/23  - Time first assessed: 1655  -AC Present on Hospital Admission: Y  -AC Side: Left  -AC Location: heel  -AC Primary Wound Type: Pressure inj  -AC      Row Name             Wound 08/07/23 1656 Left posterior foot Pressure Injury    Wound - Properties Group  Placement Date: 08/07/23  - Placement Time: 1656 -AC Present on Hospital Admission: Y  -AC Side: Left  -AC Orientation: posterior  -AC Location: foot  -AC Primary Wound Type: Pressure inj  -AC    Retired Wound - Properties Group Placement Date: 08/07/23  - Placement Time: 1656 -AC Present on Hospital Admission: Y  -AC Side: Left  -AC Orientation: posterior  -AC Location: foot  -AC Primary Wound Type: Pressure inj  -AC    Retired Wound - Properties Group Date first assessed: 08/07/23  - Time first assessed: 1656 -AC Present on Hospital Admission: Y  -AC Side: Left  -AC Location: foot  -AC Primary Wound Type: Pressure inj  -AC      Row Name 08/10/23 1447          Plan of Care Review    Plan of Care Reviewed With patient  -TB     Progress improving  -TB     Outcome Evaluation PT tx complete. Pt c/o fatigue. Bed mob SBA to EOB. Sitting balance Id. Stood SBA. Amb 50' at a time w/ RW and CGA/SBA. Increased fatigue w/ activity. Once back in room RN came and notified us that pts HR reached 190 breifly during tx. Pt had no symptoms. Will monitor HR closley next therapy session.  -TB       Row Name 08/10/23 1447          Positioning and Restraints    Pre-Treatment Position in bed  -TB     Post Treatment Position bed  -TB     In Bed fowlers;call light within reach;encouraged to call for assist;side rails up x2  -TB               User Key  (r) = Recorded By, (t) = Taken By, (c) = Cosigned By      Initials Name Provider Type    AC [place] Echevarria, RN Registered Nurse    Nasir Boyer, PTA Physical Therapist Assistant                    Physical Therapy Education       Title: PT OT SLP Therapies (In Progress)       Topic: Physical Therapy (Done)       Point: Mobility training (Done)       Learning Progress Summary             Patient Acceptance, E,D, NR,DU,VU by  at 8/8/2023 8184    Comment: benefits of PT and POC, call for A OOB                         Point: Precautions (Done)       Learning Progress  Summary             Patient Acceptance, E,D, NR,DU,VU by  at 8/8/2023 0918    Comment: benefits of PT and POC, call for A OOB                                         User Key       Initials Effective Dates Name Provider Type Discipline     02/03/23 -  Justyn Briggs, PT Physical Therapist PT                  PT Recommendation and Plan     Plan of Care Reviewed With: patient  Progress: improving  Outcome Evaluation: PT tx complete. Pt c/o fatigue. Bed mob SBA to EOB. Sitting balance Id. Stood SBA. Amb 50' at a time w/ RW and CGA/SBA. Increased fatigue w/ activity. Once back in room RN came and notified us that pts HR reached 190 breifly during tx. Pt had no symptoms. Will monitor HR closley next therapy session.   Outcome Measures       Row Name 08/09/23 1012             How much help from another is currently needed...    Putting on and taking off regular lower body clothing? 2  -AC      Bathing (including washing, rinsing, and drying) 2  -AC      Toileting (which includes using toilet bed pan or urinal) 2  -AC      Putting on and taking off regular upper body clothing 3  -AC      Taking care of personal grooming (such as brushing teeth) 4  -AC      Eating meals 4  -AC      AM-PAC 6 Clicks Score (OT) 17  -AC         Functional Assessment    Outcome Measure Options AM-PAC 6 Clicks Daily Activity (OT)  -AC                User Key  (r) = Recorded By, (t) = Taken By, (c) = Cosigned By      Initials Name Provider Type    AC Good [place] N, OTR/L, CNT Occupational Therapist                     Time Calculation:    PT Charges       Row Name 08/10/23 1510             Time Calculation    Start Time 1447  -TB      Stop Time 1511  -TB      Time Calculation (min) 24 min  -TB      PT Received On 08/10/23  -TB         Time Calculation- PT    Total Timed Code Minutes- PT 24 minute(s)  -TB                User Key  (r) = Recorded By, (t) = Taken By, (c) = Cosigned By      Initials Name Provider Type    TB Nasir Perez,  DAVID Physical Therapist Assistant                  Therapy Charges for Today       Code Description Service Date Service Provider Modifiers Qty    12588336311 HC PT THER PROC EA 15 MIN 2023 Nasir Perez, PTA GP 1    38026408062 HC PT THERAPEUTIC ACT EA 15 MIN 2023 Nasir Perez, PTA GP 1    14871170312 HC GAIT TRAINING EA 15 MIN 8/10/2023 Nasir Perez, PTA GP 1    38007127628 HC PT THER PROC EA 15 MIN 8/10/2023 Nasir Perez, PTA GP 1            PT G-Codes  Outcome Measure Options: AM-PAC 6 Clicks Daily Activity (OT)  AM-PAC 6 Clicks Score (PT): 17  AM-PAC 6 Clicks Score (OT): 17    Nasir Perez PTA  8/10/2023      Electronically signed by Nsair Perez PTA at 08/10/23 1527       Nasir Perez PTA at 23 1344  Version 1 of          Goal Outcome Evaluation:  Plan of Care Reviewed With: patient, family        Progress: improving  Outcome Evaluation: PT tx complete. Bed mob Min A to EOB w/ HOB elevated and use of bed rails. Pt needs encouragment to prgoress therapy and for overall activity. Sitting balance Ind. Assist donning shoes.  while EOB. Stood SBA. Amb 50' at a tiem w/ slow even gait speed and use of RW. HR reached as high as 137 during gait. Pt w/ no complaints during activity.           Electronically signed by Nasir Perez PTA at 23 1508       Nasir Perez PTA at 23 1509  Version 1 of 1         Acute Care - Physical Therapy Treatment Note  [facility]     Patient Name: Dayron Lubin  : 1955  MRN: [id number]  Today's Date: 2023      Visit Dx:     ICD-10-CM ICD-9-CM   1. Acute on chronic right-sided congestive heart failure  I50.813 428.0   2. Coronary artery disease due to lipid rich plaque  I25.10 414.00    I25.83 414.3   3. Duodenitis  K29.80 535.60   4. History of alcohol use  Z87.898 V11.3   5. Metabolic acidosis  E87.20 276.2   6. Dysphagia, unspecified type  R13.10 787.20   7.  Impaired mobility [Z74.09 (ICD-10-CM)]  Z74.09 799.89   8. Impaired mobility and ADLs [Z74.09, Z78.9 (ICD-10-CM)]  Z74.09 V49.89    Z78.9    9. Pressure injury of left buttock, unstageable  L89.320 707.05     707.25   10. Pressure injury of right buttock, stage 3  L89.313 707.05     707.23   11. Non-pressure chronic ulcer of other part of left foot with fat layer exposed  L97.522 707.15     Patient Active Problem List   Diagnosis    Prostate cancer    Hx of radiation therapy    Elevated PSA    Encounter for follow-up surveillance of prostate cancer    HOCM (hypertrophic obstructive cardiomyopathy)    Coronary artery disease involving native coronary artery of native heart without angina pectoris    Essential hypertension    Mixed hyperlipidemia    Class 1 obesity with alveolar hypoventilation, serious comorbidity, and body mass index (BMI) of 30.0 to 30.9 in adult    Displaced fracture of lateral malleolus of right fibula, initial encounter for closed fracture    Family history of colonic polyps    History of colon polyps    Elevated brain natriuretic peptide (BNP) level    MIKEY (acute kidney injury)    Anxiety associated with depression    ETOH abuse    Alcoholic hepatitis    Volume depletion    Anorexia    CHF (congestive heart failure)    Hypokalemia    Duodenitis    Gait instability    SVT (supraventricular tachycardia)     Past Medical History:   Diagnosis Date    Alcoholic hepatitis 7/13/2023    CAD (coronary artery disease)     History of external beam radiation therapy 05/12/2016    6840 cGy to prostate bed    Hyperlipidemia     Hypertension     Prostate cancer      Past Surgical History:   Procedure Laterality Date    CARDIAC CATHETERIZATION      CORONARY ANGIOPLASTY WITH STENT PLACEMENT      PROSTATE SURGERY      TONSILLECTOMY       PT Assessment (last 12 hours)       PT Evaluation and Treatment       Row Name 08/11/23 1320          Physical Therapy Time and Intention    Subjective Information complains  of;fatigue  -TB     Document Type therapy note (daily note)  -TB     Mode of Treatment physical therapy  -TB     Comment Monitor HR  -TB       Row Name 08/11/23 1320          General Information    Existing Precautions/Restrictions fall  -TB       Row Name 08/11/23 1320          Bed Mobility    Bed Mobility scooting/bridging;supine-sit;sit-supine  -TB     Scooting/Bridging Sabine (Bed Mobility) standby assist  -TB     Supine-Sit [place] (Bed Mobility) minimum assist (75% patient effort);verbal cues  -TB     Sit-Supine Sabine (Bed Mobility) standby assist  -TB     Assistive Device (Bed Mobility) bed rails;head of bed elevated  -TB       Row Name 08/11/23 1320          Transfers    Transfers sit-stand transfer;stand-sit transfer  -TB       Row Name 08/11/23 1320          Sit-Stand Transfer    Sit-Stand Sabine (Transfers) standby assist  -TB       Row Name 08/11/23 1320          Stand-Sit Transfer    Stand-Sit Sabine (Transfers) standby assist  -TB       Row Name 08/11/23 1320          Gait/Stairs (Locomotion)    Sabine Level (Gait) contact guard;verbal cues  -TB     Assistive Device (Gait) walker, front-wheeled  -TB     Distance in Feet (Gait) 50'  x2  -TB     Deviations/Abnormal Patterns (Gait) base of support, narrow;gait speed decreased;stride length decreased  -TB       Row Name 08/11/23 1320          Balance    Balance Assessment sitting static balance;sitting dynamic balance  -TB     Static Sitting Balance independent  -TB     Dynamic Sitting Balance independent  -TB     Position, Sitting Balance sitting edge of bed  -TB       Row Name             Wound 08/07/23 1655 Left gluteal Pressure Injury    Wound - Properties Group Placement Date: 08/07/23  - Placement Time: 1655  -AC Present on Hospital Admission: Y  -AC Side: Left  -AC Location: gluteal  -AC Primary Wound Type: Pressure inj  -AC    Retired Wound - Properties Group Placement Date: 08/07/23  - Placement Time: 1655   -AC Present on Hospital Admission: Y  -AC Side: Left  -AC Location: gluteal  -AC Primary Wound Type: Pressure inj  -AC    Retired Wound - Properties Group Date first assessed: 08/07/23  - Time first assessed: 1655  -AC Present on Hospital Admission: Y  -AC Side: Left  -AC Location: gluteal  -AC Primary Wound Type: Pressure inj  -AC      Row Name             Wound 08/07/23 1655 Right gluteal Pressure Injury    Wound - Properties Group Placement Date: 08/07/23  - Placement Time: 1655  -AC Present on Hospital Admission: Y  -AC Side: Right  -AC Location: gluteal  -AC Primary Wound Type: Pressure inj  -AC    Retired Wound - Properties Group Placement Date: 08/07/23  - Placement Time: 1655  -AC Present on Hospital Admission: Y  -AC Side: Right  -AC Location: gluteal  -AC Primary Wound Type: Pressure inj  -AC    Retired Wound - Properties Group Date first assessed: 08/07/23  - Time first assessed: 1655  -AC Present on Hospital Admission: Y  -AC Side: Right  -AC Location: gluteal  -AC Primary Wound Type: Pressure inj  -AC      Row Name             Wound 08/07/23 1655 Left posterior heel Pressure Injury    Wound - Properties Group Placement Date: 08/07/23  - Placement Time: 1655  -AC Present on Hospital Admission: Y  -AC Side: Left  -AC Orientation: posterior  -AC Location: heel  -AC Primary Wound Type: Pressure inj  -AC    Retired Wound - Properties Group Placement Date: 08/07/23  - Placement Time: 1655  -AC Present on Hospital Admission: Y  -AC Side: Left  -AC Orientation: posterior  -AC Location: heel  -AC Primary Wound Type: Pressure inj  -AC    Retired Wound - Properties Group Date first assessed: 08/07/23  - Time first assessed: 1655  -AC Present on Hospital Admission: Y  -AC Side: Left  -AC Location: heel  -AC Primary Wound Type: Pressure inj  -AC      Row Name             Wound 08/07/23 1656 Left posterior foot Pressure Injury    Wound - Properties Group Placement Date: 08/07/23  - Placement Time:  1656 -AC Present on Hospital Admission: Y  -AC Side: Left  -AC Orientation: posterior  -AC Location: foot  -AC Primary Wound Type: Pressure inj  -AC    Retired Wound - Properties Group Placement Date: 08/07/23  - Placement Time: 1656 -AC Present on Hospital Admission: Y  -AC Side: Left  -AC Orientation: posterior  -AC Location: foot  -AC Primary Wound Type: Pressure inj  -AC    Retired Wound - Properties Group Date first assessed: 08/07/23  - Time first assessed: 1656 -AC Present on Hospital Admission: Y  -AC Side: Left  -AC Location: foot  -AC Primary Wound Type: Pressure inj  -AC      Row Name 08/11/23 1320          Plan of Care Review    Plan of Care Reviewed With patient;family  -TB     Progress improving  -TB     Outcome Evaluation PT tx complete. Bed mob Min A to EOB w/ HOB elevated and use of bed rails. Pt needs encouragment to prgoress therapy and for overall activity. Sitting balance Ind. Assist donning shoes.  while EOB. Stood SBA. Amb 50' at a tiem w/ slow even gait speed and use of RW. HR reached as high as 137 during gait. Pt w/ no complaints during activity.  -TB       Row Name 08/11/23 1320          Vital Signs    Pretreatment Heart Rate (beats/min) 120  -TB     Intratreatment Heart Rate (beats/min) 137  -TB     Posttreatment Heart Rate (beats/min) 110  -TB       Row Name 08/11/23 1320          Positioning and Restraints    Pre-Treatment Position in bed  -TB     Post Treatment Position bed  -TB     In Bed fowlers;call light within reach;encouraged to call for assist;with family/caregiver;side rails up x2  -TB               User Key  (r) = Recorded By, (t) = Taken By, (c) = Cosigned By      Initials Name Provider Type    Boston Vargas, RN Registered Nurse    Nasir Boyer, PTA Physical Therapist Assistant                    Physical Therapy Education       Title: PT OT SLP Therapies (In Progress)       Topic: Physical Therapy (Done)       Point: Mobility training (Done)        Learning Progress Summary             Patient Acceptance, E,D, NR,DU,VU by  at 8/8/2023 1359    Comment: benefits of PT and POC, call for A OOB                         Point: Precautions (Done)       Learning Progress Summary             Patient Acceptance, E,D, NR,DU,VU by  at 8/8/2023 1359    Comment: benefits of PT and POC, call for A OOB                                         User Key       Initials Effective Dates Name Provider Type Discipline     02/03/23 -  Justyn Briggs, PT Physical Therapist PT                  PT Recommendation and Plan     Plan of Care Reviewed With: patient, family  Progress: improving  Outcome Evaluation: PT tx complete. Bed mob Min A to EOB w/ HOB elevated and use of bed rails. Pt needs encouragment to prgoress therapy and for overall activity. Sitting balance Ind. Assist donning shoes.  while EOB. Stood SBA. Amb 50' at a tiem w/ slow even gait speed and use of RW. HR reached as high as 137 during gait. Pt w/ no complaints during activity.   Outcome Measures       Row Name 08/11/23 1320 08/11/23 0910 08/09/23 1012       How much help from another person do you currently need...    Turning from your back to your side while in flat bed without using bedrails? 4  -TB -- --    Moving from lying on back to sitting on the side of a flat bed without bedrails? 3  -TB -- --    Moving to and from a bed to a chair (including a wheelchair)? 3  -TB -- --    Standing up from a chair using your arms (e.g., wheelchair, bedside chair)? 3  -TB -- --    Climbing 3-5 steps with a railing? 2  -TB -- --    To walk in hospital room? 3  -TB -- --    AM-PAC 6 Clicks Score (PT) 18  -TB -- --       How much help from another is currently needed...    Putting on and taking off regular lower body clothing? -- 2  -AC 2  -AC    Bathing (including washing, rinsing, and drying) -- 2  -AC 2  -AC    Toileting (which includes using toilet bed pan or urinal) -- 2  -AC 2  -AC    Putting on and taking off  regular upper body clothing -- 3  -AC 3  -AC    Taking care of personal grooming (such as brushing teeth) -- 3  -AC 4  -AC    Eating meals -- 4  -AC 4  -AC    AM-PAC 6 Clicks Score (OT) -- 16  -AC 17  -AC       Functional Assessment    Outcome Measure Options AM-PAC 6 Clicks Basic Mobility (PT)  -TB AM-PAC 6 Clicks Daily Activity (OT)  -AC AM-PAC 6 Clicks Daily Activity (OT)  -AC              User Key  (r) = Recorded By, (t) = Taken By, (c) = Cosigned By      Initials Name Provider Type    AC Good Prado, OTR/L, JENA Occupational Therapist    TB Nasir Perez PTA Physical Therapist Assistant                     Time Calculation:    PT Charges       Row Name 08/11/23 1508             Time Calculation    Start Time 1320  -TB      Stop Time 1344  -TB      Time Calculation (min) 24 min  -TB      PT Received On 08/11/23  -TB      PT - Next Appointment 08/11/23  -TB         Time Calculation- PT    Total Timed Code Minutes- PT 24 minute(s)  -TB                User Key  (r) = Recorded By, (t) = Taken By, (c) = Cosigned By      Initials Name Provider Type    TB Nasir Perez PTA Physical Therapist Assistant                  Therapy Charges for Today       Code Description Service Date Service Provider Modifiers Qty    38788630754 HC GAIT TRAINING EA 15 MIN 8/10/2023 Nasir Perez, DAVID GP 1    16082342458 HC PT THER PROC EA 15 MIN 8/10/2023 Nasir Perez PTA GP 1    89424318569 HC GAIT TRAINING EA 15 MIN 8/11/2023 Nasir Perez PTA GP 2            PT G-Codes  Outcome Measure Options: AM-PAC 6 Clicks Basic Mobility (PT)  AM-PAC 6 Clicks Score (PT): 18  AM-PAC 6 Clicks Score (OT): 16    Nasir Perez PTA  8/11/2023      Electronically signed by Nasir Perez PTA at 08/11/23 1509          Occupational Therapy Notes (last 48 hours)        Good Prado, OTR/L, JENA at 08/11/23 0948          Acute Care - Occupational Therapy Treatment Note  [facility]     Patient  Name: Dayron Lubin  : 1955  MRN: [id number]  Today's Date: 2023             Admit Date: 2023       ICD-10-CM ICD-9-CM   1. Acute on chronic right-sided congestive heart failure  I50.813 428.0   2. Coronary artery disease due to lipid rich plaque  I25.10 414.00    I25.83 414.3   3. Duodenitis  K29.80 535.60   4. History of alcohol use  Z87.898 V11.3   5. Metabolic acidosis  E87.20 276.2   6. Dysphagia, unspecified type  R13.10 787.20   7. Impaired mobility [Z74.09 (ICD-10-CM)]  Z74.09 799.89   8. Impaired mobility and ADLs [Z74.09, Z78.9 (ICD-10-CM)]  Z74.09 V49.89    Z78.9    9. Pressure injury of left buttock, unstageable  L89.320 707.05     707.25   10. Pressure injury of right buttock, stage 3  L89.313 707.05     707.23   11. Non-pressure chronic ulcer of other part of left foot with fat layer exposed  L97.522 707.15     Patient Active Problem List   Diagnosis    Prostate cancer    Hx of radiation therapy    Elevated PSA    Encounter for follow-up surveillance of prostate cancer    HOCM (hypertrophic obstructive cardiomyopathy)    Coronary artery disease involving native coronary artery of native heart without angina pectoris    Essential hypertension    Mixed hyperlipidemia    Class 1 obesity with alveolar hypoventilation, serious comorbidity, and body mass index (BMI) of 30.0 to 30.9 in adult    Displaced fracture of lateral malleolus of right fibula, initial encounter for closed fracture    Family history of colonic polyps    History of colon polyps    Elevated brain natriuretic peptide (BNP) level    MIKEY (acute kidney injury)    Anxiety associated with depression    ETOH abuse    Alcoholic hepatitis    Volume depletion    Anorexia    CHF (congestive heart failure)    Hypokalemia    Duodenitis    Gait instability     Past Medical History:   Diagnosis Date    Alcoholic hepatitis 2023    CAD (coronary artery disease)     History of external beam radiation therapy 2016    6840 cGy  to prostate bed    Hyperlipidemia     Hypertension     Prostate cancer      Past Surgical History:   Procedure Laterality Date    CARDIAC CATHETERIZATION      CORONARY ANGIOPLASTY WITH STENT PLACEMENT      PROSTATE SURGERY      TONSILLECTOMY           OT ASSESSMENT FLOWSHEET (last 12 hours)       OT Evaluation and Treatment       Row Name 08/11/23 0952                   OT Time and Intention    Subjective Information complains of;pain  -AC        Document Type therapy note (daily note)  -        Mode of Treatment occupational therapy  -           General Information    Existing Precautions/Restrictions fall  -           Pain Assessment    Pretreatment Pain Rating 9/10  -AC        Posttreatment Pain Rating 9/10  -        Pain Location - Side/Orientation Left  -        Pain Location - --  heel  -        Pain Intervention(s) Repositioned;Ambulation/increased activity;Rest  -           Activities of Daily Living    BADL Assessment/Intervention grooming  -           Grooming Assessment/Training    Lander Level (Grooming) shave face;wash face, hands;oral care regimen;set up;supervision  -        Position (Grooming) supported sitting  -        Comment, (Grooming) too fatigued to stand  -           Bed Mobility    Supine-Sit Lander (Bed Mobility) standby assist  -        Assistive Device (Bed Mobility) bed rails  -           Functional Mobility    Functional Mobility- Ind. Level contact guard assist  -        Functional Mobility- Device walker, front-wheeled  -        Functional Mobility- Comment to BR, to chair  -           Transfer Assessment/Treatment    Transfers sit-stand transfer;stand-sit transfer  -           Sit-Stand Transfer    Sit-Stand Lander (Transfers) contact guard;verbal cues  -        Assistive Device (Sit-Stand Transfers) walker, front-wheeled  -        Comment, (Sit-Stand Transfer) needs verbal cues for safe technique, pulls on walker for sit>stand   -AC           Wound 08/07/23 1655 Left gluteal Pressure Injury    Wound - Properties Group Placement Date: 08/07/23  -ACA Placement Time: 1655  -KRISTIAN Present on Hospital Admission: Y  -KRISTIAN Side: Left  -KRISTIAN Location: gluteal  -KRISTIAN Primary Wound Type: Pressure inj  -KRISTIAN    Retired Wound - Properties Group Placement Date: 08/07/23  -ACA Placement Time: 1655 -KRISTIAN Present on Hospital Admission: Y  -KRISTIAN Side: Left  -KRISTIAN Location: gluteal  -KRISTIAN Primary Wound Type: Pressure inj  -KRISTIAN    Retired Wound - Properties Group Date first assessed: 08/07/23  -ACA Time first assessed: 1655  -KRISTIAN Present on Hospital Admission: Y  -KRISTIAN Side: Left  -KRISTIAN Location: gluteal  -KRISTIAN Primary Wound Type: Pressure inj  -KRISTIAN       Wound 08/07/23 1655 Right gluteal Pressure Injury    Wound - Properties Group Placement Date: 08/07/23  -ACA Placement Time: 1655  -KRISTIAN Present on Hospital Admission: Y  -KRISTIAN Side: Right  -KRISTIAN Location: gluteal  -KRISTIAN Primary Wound Type: Pressure inj  -KRISTIAN    Retired Wound - Properties Group Placement Date: 08/07/23  -ACA Placement Time: 1655  -KRISTIAN Present on Hospital Admission: Y  -KRISTIAN Side: Right  -KRISTIAN Location: gluteal  -KRISITAN Primary Wound Type: Pressure inj  -KRISTIAN    Retired Wound - Properties Group Date first assessed: 08/07/23  -ACA Time first assessed: 1655 -KRISTIAN Present on Hospital Admission: Y  -KRISTIAN Side: Right  -KRISTIAN Location: gluteal  -KRISTIAN Primary Wound Type: Pressure inj  -KRISTIAN       Wound 08/07/23 1655 Left posterior heel Pressure Injury    Wound - Properties Group Placement Date: 08/07/23  -ACA Placement Time: 1655 -KRISTIAN Present on Hospital Admission: Y  -KRISTIAN Side: Left  -KRISTIAN Orientation: posterior  -KRISTIAN Location: heel  -KRISTIAN Primary Wound Type: Pressure inj  -KRISTIAN    Retired Wound - Properties Group Placement Date: 08/07/23  -ACA Placement Time: 1655 -ACA Present on Hospital Admission: Y  -KRISTIAN Side: Left  -KRISTIAN Orientation: posterior  -KRISTIAN Location: heel  -KRISTIAN Primary Wound Type: Pressure inj  -KRISTIAN    Retired  Wound - Properties Group Date first assessed: 08/07/23 -ACA Time first assessed: 1655 -ACA Present on Hospital Admission: Y  -KRISTIAN Side: Left  -KRISTIAN Location: heel  -KRISTIAN Primary Wound Type: Pressure inj  -KRISTIAN       Wound 08/07/23 1656 Left posterior foot Pressure Injury    Wound - Properties Group Placement Date: 08/07/23 -ACA Placement Time: 1656 -ACA Present on Hospital Admission: Y  -KRISTIAN Side: Left  -KRISTIAN Orientation: posterior  -KRISTIAN Location: foot  -KRISTIAN Primary Wound Type: Pressure inj  -KRISTIAN    Retired Wound - Properties Group Placement Date: 08/07/23 -ACA Placement Time: 1656 -KRISTIAN Present on Hospital Admission: Y  -KRISTIAN Side: Left  -KRISTIAN Orientation: posterior  -KRISTIAN Location: foot  -KRISTIAN Primary Wound Type: Pressure inj  -KRISTIAN    Retired Wound - Properties Group Date first assessed: 08/07/23 -ACA Time first assessed: 1656 -ACA Present on Hospital Admission: Y  -KRISTIAN Side: Left  -KRISTIAN Location: foot  -KRISTIAN Primary Wound Type: Pressure inj  -KRISTIAN       Plan of Care Review    Plan of Care Reviewed With patient  -AC        Progress improving  -AC        Outcome Evaluation OT tx completed.  Pt hasa paini in L heel 9/10.  Came to EOB SBA with bed rails.  Needs verbal cues for safe transfer technique, he tends to try to pull up with walker.  CGA to sit>stand.  Pt amb to BR and completed grooming while seated.  Too fatigued to stand through task.  Completed shaving, hand washing and oral hygiene.  Pt amb back to chair with CGA and rw.  Pt is weak and is at an elevated fall risk.  OT will continue to treat to increase pt's strength and endurance for ADL.  -AC           Vital Signs    Posttreatment Heart Rate (beats/min) 130  -AC        Post Patient Position Sitting  -AC           Positioning and Restraints    Pre-Treatment Position in bed  -AC        Post Treatment Position chair  -AC        In Chair reclined;call light within reach;encouraged to call for assist;legs elevated  -AC                  User Key  (r) = Recorded  By, (t) = Taken By, (c) = Cosigned By      Initials Name Effective Dates    Good [place], OTR/L, CNT 02/03/23 -     KRISTIAN Boston Ecehvarria RN 06/16/21 -                      Occupational Therapy Education       Title: PT OT SLP Therapies (In Progress)       Topic: Occupational Therapy (In Progress)       Point: ADL training (Done)       Description:   Instruct learner(s) on proper safety adaptation and remediation techniques during self care or transfers.   Instruct in proper use of assistive devices.                  Learning Progress Summary             Patient Acceptance, E, VU by  at 8/11/2023 0932    Comment: safe transfers    Acceptance, E, VU by JBRANT at 8/10/2023 1146    Acceptance, E,TB, VU by ARLEN at 8/9/2023 1209    Comment: safe transfers, role of OT, benefits of rehab    Acceptance, E, NR by MM at 8/8/2023 1412    Comment: OT role, benefits, POC, d/c planning, safety awareness                         Point: Home exercise program (Not Started)       Description:   Instruct learner(s) on appropriate technique for monitoring, assisting and/or progressing therapeutic exercises/activities.                  Learner Progress:  Not documented in this visit.              Point: Precautions (Done)       Description:   Instruct learner(s) on prescribed precautions during self-care and functional transfers.                  Learning Progress Summary             Patient Acceptance, E, VU by BRETT at 8/10/2023 1146    Acceptance, E, NR by MM at 8/8/2023 1412    Comment: OT role, benefits, POC, d/c planning, safety awareness                         Point: Body mechanics (In Progress)       Description:   Instruct learner(s) on proper positioning and spine alignment during self-care, functional mobility activities and/or exercises.                  Learning Progress Summary             Patient Acceptance, E, NR by MM at 8/8/2023 1412    Comment: OT role, benefits, POC, d/c planning, safety awareness                                          User Key       Initials Effective Dates Name Provider Type Discipline     02/03/23 -  Good Prado, OTR/L, CNT Occupational Therapist OT    MM 07/11/23 -  Andres Lambert, OTR/L Occupational Therapist OT    JJ 07/11/23 -  Bri Cavazos OTR/L, CSRS Occupational Therapist OT                      OT Recommendation and Plan     Plan of Care Review  Plan of Care Reviewed With: patient  Progress: improving  Outcome Evaluation: OT tx completed.  Pt hasa paini in L heel 9/10.  Came to EOB SBA with bed rails.  Needs verbal cues for safe transfer technique, he tends to try to pull up with walker.  CGA to sit>stand.  Pt amb to BR and completed grooming while seated.  Too fatigued to stand through task.  Completed shaving, hand washing and oral hygiene.  Pt amb back to chair with CGA and rw.  Pt is weak and is at an elevated fall risk.  OT will continue to treat to increase pt's strength and endurance for ADL.  Plan of Care Reviewed With: patient  Outcome Evaluation: OT tx completed.  Pt hasa paini in L heel 9/10.  Came to EOB SBA with bed rails.  Needs verbal cues for safe transfer technique, he tends to try to pull up with walker.  CGA to sit>stand.  Pt amb to BR and completed grooming while seated.  Too fatigued to stand through task.  Completed shaving, hand washing and oral hygiene.  Pt amb back to chair with CGA and rw.  Pt is weak and is at an elevated fall risk.  OT will continue to treat to increase pt's strength and endurance for ADL.     Outcome Measures       Row Name 08/11/23 0910 08/09/23 1012          How much help from another is currently needed...    Putting on and taking off regular lower body clothing? 2  -AC 2  -AC     Bathing (including washing, rinsing, and drying) 2  -AC 2  -AC     Toileting (which includes using toilet bed pan or urinal) 2  -AC 2  -AC     Putting on and taking off regular upper body clothing 3  -AC 3  -AC     Taking care of personal grooming (such as  brushing teeth) 3  -AC 4  -AC     Eating meals 4  -AC 4  -AC     AM-PAC 6 Clicks Score (OT) 16  -AC 17  -AC        Functional Assessment    Outcome Measure Options AM-PAC 6 Clicks Daily Activity (OT)  -AC AM-PAC 6 Clicks Daily Activity (OT)  -AC               User Key  (r) = Recorded By, (t) = Taken By, (c) = Cosigned By      Initials Name Provider Type     Good Prado OTR/L, JENA Occupational Therapist                    Time Calculation:    Time Calculation- OT       Row Name 08/11/23 0946             Time Calculation- OT    OT Start Time 0907  -AC      OT Stop Time 0946  -AC      OT Time Calculation (min) 39 min  -AC      Total Timed Code Minutes- OT 39 minute(s)  -AC      OT Received On 08/11/23  -                User Key  (r) = Recorded By, (t) = Taken By, (c) = Cosigned By      Initials Name Provider Type     Good Prado OTR/L, JENA Occupational Therapist                  Therapy Charges for Today       Code Description Service Date Service Provider Modifiers Qty    32019862243  OT SELF CARE/MGMT/TRAIN EA 15 MIN 8/11/2023 Good Prado OTR/L, JENA GO 3                 Good ESPINOZA. ELLIE Prado/L, CNT  8/11/2023    Electronically signed by Good Prado OTR/LJENA at 08/11/23 0948       Good Prado OTR/LJENA at 08/11/23 0947          Goal Outcome Evaluation:  Plan of Care Reviewed With: patient        Progress: improving  Outcome Evaluation: OT tx completed.  Pt hasa paini in L heel 9/10.  Came to EOB SBA with bed rails.  Needs verbal cues for safe transfer technique, he tends to try to pull up with walker.  CGA to sit>stand.  Pt amb to BR and completed grooming while seated.  Too fatigued to stand through task.  Completed shaving, hand washing and oral hygiene.  Pt amb back to chair with CGA and rw.  Pt is weak and is at an elevated fall risk.  OT will continue to treat to increase pt's strength and endurance for ADL.           Electronically signed by Good Prado OTR/LJENA  at 23 0947       Bri Cavazos, OTR/L, CSRS at 08/10/23 1149          Patient Name: Dayron Lubin  : 1955    MRN: [id number]                              Today's Date: 8/10/2023       Admit Date: 2023    Visit Dx:     ICD-10-CM ICD-9-CM   1. Acute on chronic right-sided congestive heart failure  I50.813 428.0   2. Coronary artery disease due to lipid rich plaque  I25.10 414.00    I25.83 414.3   3. Duodenitis  K29.80 535.60   4. History of alcohol use  Z87.898 V11.3   5. Metabolic acidosis  E87.20 276.2   6. Dysphagia, unspecified type  R13.10 787.20   7. Impaired mobility [Z74.09 (ICD-10-CM)]  Z74.09 799.89   8. Impaired mobility and ADLs [Z74.09, Z78.9 (ICD-10-CM)]  Z74.09 V49.89    Z78.9    9. Pressure injury of left buttock, unstageable  L89.320 707.05     707.25   10. Pressure injury of right buttock, stage 3  L89.313 707.05     707.23   11. Non-pressure chronic ulcer of other part of left foot with fat layer exposed  L97.522 707.15     Patient Active Problem List   Diagnosis    Prostate cancer    Hx of radiation therapy    Elevated PSA    Encounter for follow-up surveillance of prostate cancer    HOCM (hypertrophic obstructive cardiomyopathy)    Coronary artery disease involving native coronary artery of native heart without angina pectoris    Essential hypertension    Mixed hyperlipidemia    Class 1 obesity with alveolar hypoventilation, serious comorbidity, and body mass index (BMI) of 30.0 to 30.9 in adult    Displaced fracture of lateral malleolus of right fibula, initial encounter for closed fracture    Family history of colonic polyps    History of colon polyps    Elevated brain natriuretic peptide (BNP) level    MIKEY (acute kidney injury)    Anxiety associated with depression    ETOH abuse    Alcoholic hepatitis    Volume depletion    Anorexia    CHF (congestive heart failure)    Hypokalemia    Duodenitis    Gait instability     Past Medical History:   Diagnosis Date    Alcoholic  hepatitis 7/13/2023    CAD (coronary artery disease)     History of external beam radiation therapy 05/12/2016    6840 cGy to prostate bed    Hyperlipidemia     Hypertension     Prostate cancer      Past Surgical History:   Procedure Laterality Date    CARDIAC CATHETERIZATION      CORONARY ANGIOPLASTY WITH STENT PLACEMENT      PROSTATE SURGERY      TONSILLECTOMY        General Information       Row Name 08/10/23 1123          OT Time and Intention    Document Type therapy note (daily note)  -     Mode of Treatment occupational therapy  -       Row Name 08/10/23 1123          General Information    Patient Profile Reviewed yes  -     Existing Precautions/Restrictions fall  -       Row Name 08/10/23 1123          Cognition    Orientation Status (Cognition) oriented to;person;disoriented to;place;situation;time  -       Row Name 08/10/23 1123          Safety Issues, Functional Mobility    Impairments Affecting Function (Mobility) balance;cognition;endurance/activity tolerance;shortness of breath  -               User Key  (r) = Recorded By, (t) = Taken By, (c) = Cosigned By      Initials Name Provider Type     Bri Cavazos, LEELAR/L, CSRS Occupational Therapist                     Mobility/ADL's       Row Name 08/10/23 1128          Bed Mobility    Bed Mobility supine-sit  -     Supine-Sit [place] (Bed Mobility) standby assist  -     Assistive Device (Bed Mobility) head of bed elevated  -       Row Name 08/10/23 1128          Transfers    Transfers sit-stand transfer;stand-sit transfer;bed-chair transfer  -       Row Name 08/10/23 1128          Bed-Chair Transfer    Bed-Chair [place] (Transfers) contact guard  -       Row Name 08/10/23 1128          Sit-Stand Transfer    Sit-Stand [place] (Transfers) contact guard  -     Assistive Device (Sit-Stand Transfers) walker, front-wheeled  -       Row Name 08/10/23 1128          Stand-Sit Transfer    Stand-Sit [place]  (Transfers) contact guard  -JJ     Assistive Device (Stand-Sit Transfers) walker, front-wheeled  -JJ       Row Name 08/10/23 1128          Functional Mobility    Functional Mobility- Ind. Level contact guard assist  -JJ     Functional Mobility- Device walker, front-wheeled  -JJ     Functional Mobility- Comment to bathroom and back to chair  -J       Row Name 08/10/23 1128          Activities of Daily Living    BADL Assessment/Intervention grooming;lower body dressing  -       Row Name 08/10/23 1128          Lower Body Dressing Assessment/Training    [place] Level (Lower Body Dressing) don;doff;socks;shoes/slippers;maximum assist (25% patient effort)  -J     Position (Lower Body Dressing) edge of bed sitting;supported sitting  -       Row Name 08/10/23 1128          Grooming Assessment/Training    [place] Level (Grooming) oral care regimen;wash face, hands;hair care, combing/brushing;set up;contact guard assist  -JJ     Position (Grooming) sink side;supported standing  -               User Key  (r) = Recorded By, (t) = Taken By, (c) = Cosigned By      Initials Name Provider Type    J Bri Cavazos, OTR/L, CSRS Occupational Therapist                   Obj/Interventions    No documentation.                  Goals/Plan    No documentation.                  Clinical Impression       Row Name 08/10/23 1123          Pain Assessment    Pretreatment Pain Rating 6/10  -JJ     Pain Location - Side/Orientation Bilateral  -JJ     Pain Location - ankle  -JJ     Pain Intervention(s) Medication (See MAR);Repositioned;Ambulation/increased activity  -       Row Name 08/10/23 1123          Plan of Care Review    Plan of Care Reviewed With patient  -JJ     Outcome Evaluation OT tx completed. Pt presents alert and oriented, family present in room. Required encouragement to participate in therapy session this am, but eventually agreeable to session. He was able to bring self to sitting at EOB with SBA. CGA for  sit <> stand t/f from EOB and all short distance mobility from bed to bathroom. He complete grooming task standing sink side, completed with set-up and CGA. Amb back to chair and left sitting in chair at end of session. Continue OT POC.  -BRETT       Row Name 08/10/23 1123          Therapy Plan Review/Discharge Plan (OT)    Anticipated Discharge Disposition (OT) home with assist  -BRANT       Row Name 08/10/23 1123          Positioning and Restraints    Pre-Treatment Position in bed  -JJ     Post Treatment Position chair  -JJ     In Chair notified nsg;reclined;call light within reach;encouraged to call for assist;with family/caregiver  -BRETT               User Key  (r) = Recorded By, (t) = Taken By, (c) = Cosigned By      Initials Name Provider Type    Bri Abdi OTR/L, CSRS Occupational Therapist                   Outcome Measures       Row Name 08/10/23 1123          How much help from another is currently needed...    Putting on and taking off regular lower body clothing? 2  -JJ     Bathing (including washing, rinsing, and drying) 2  -JJ     Toileting (which includes using toilet bed pan or urinal) 2  -JJ     Putting on and taking off regular upper body clothing 3  -JJ     Taking care of personal grooming (such as brushing teeth) 4  -JJ     Eating meals 4  -JJ     AM-PAC 6 Clicks Score (OT) 17  -BRANT       Row Name 08/10/23 1123          Functional Assessment    Outcome Measure Options AM-PAC 6 Clicks Daily Activity (OT)  -               User Key  (r) = Recorded By, (t) = Taken By, (c) = Cosigned By      Initials Name Provider Type    Bri Abdi OTR/L, SYLVIA Occupational Therapist                    Occupational Therapy Education       Title: PT OT SLP Therapies (In Progress)       Topic: Occupational Therapy (In Progress)       Point: ADL training (Done)       Description:   Instruct learner(s) on proper safety adaptation and remediation techniques during self care or transfers.   Instruct in  proper use of assistive devices.                  Learning Progress Summary             Patient Acceptance, E, VU by J at 8/10/2023 1146    Acceptance, E,TB, VU by  at 8/9/2023 1209    Comment: safe transfers, role of OT, benefits of rehab    Acceptance, E, NR by MM at 8/8/2023 1412    Comment: OT role, benefits, POC, d/c planning, safety awareness                         Point: Home exercise program (Not Started)       Description:   Instruct learner(s) on appropriate technique for monitoring, assisting and/or progressing therapeutic exercises/activities.                  Learner Progress:  Not documented in this visit.              Point: Precautions (Done)       Description:   Instruct learner(s) on prescribed precautions during self-care and functional transfers.                  Learning Progress Summary             Patient Acceptance, E, VU by BRANT at 8/10/2023 1146    Acceptance, E, NR by MM at 8/8/2023 1412    Comment: OT role, benefits, POC, d/c planning, safety awareness                         Point: Body mechanics (In Progress)       Description:   Instruct learner(s) on proper positioning and spine alignment during self-care, functional mobility activities and/or exercises.                  Learning Progress Summary             Patient Acceptance, E, NR by MM at 8/8/2023 1412    Comment: OT role, benefits, POC, d/c planning, safety awareness                                         User Key       Initials Effective Dates Name Provider Type Discipline     02/03/23 -  Good Prado, OTR/L, CNT Occupational Therapist OT     07/11/23 -  Andres Lambert, OTR/L Occupational Therapist OT     07/11/23 -  Bri Cavazos OTR/L, CSRS Occupational Therapist OT                  OT Recommendation and Plan     Plan of Care Review  Plan of Care Reviewed With: patient  Outcome Evaluation: OT tx completed. Pt presents alert and oriented, family present in room. Required encouragement to participate in  therapy session this am, but eventually agreeable to session. He was able to bring self to sitting at EOB with SBA. CGA for sit <> stand t/f from EOB and all short distance mobility from bed to bathroom. He complete grooming task standing sink side, completed with set-up and CGA. Amb back to chair and left sitting in chair at end of session. Continue OT POC.     Time Calculation:         Time Calculation- OT       Row Name 08/10/23 1123             Time Calculation- OT    OT Start Time 1123  -J      OT Stop Time 1147  -      OT Time Calculation (min) 24 min  -      Total Timed Code Minutes- OT 24 minute(s)  -      OT Received On 08/10/23  -                User Key  (r) = Recorded By, (t) = Taken By, (c) = Cosigned By      Initials Name Provider Type    Bri Abdi OTR/L, SYVLIA Occupational Therapist                  Therapy Charges for Today       Code Description Service Date Service Provider Modifiers Qty    54341516020 HC OT SELF CARE/MGMT/TRAIN EA 15 MIN 8/10/2023 Bri Cavazos OTR/L, CSRS GO 2                 Bri Cavazos, OTR/L, CSRS  8/10/2023    Electronically signed by Bri Cavazos OTR/L, CSRS at 08/10/23 1150       Bri Cavazos OTR/L, CSRS at 08/10/23 1149          Goal Outcome Evaluation:  Plan of Care Reviewed With: patient           Outcome Evaluation: OT tx completed. Pt presents alert and oriented, family present in room. Required encouragement to participate in therapy session this am, but eventually agreeable to session. He was able to bring self to sitting at EOB with SBA. CGA for sit <> stand t/f from EOB and all short distance mobility from bed to bathroom. He complete grooming task standing sink side, completed with set-up and CGA. Amb back to chair and left sitting in chair at end of session. Continue OT POC.      Anticipated Discharge Disposition (OT): home with assist    Electronically signed by Bri Cavazos OTR/L, CSRS at 08/10/23 7852

## 2023-08-11 NOTE — THERAPY TREATMENT NOTE
Acute Care - Occupational Therapy Treatment Note  [facility]     Patient Name: Dayron Lubin  : 1955  MRN: [id number]  Today's Date: 2023             Admit Date: 2023       ICD-10-CM ICD-9-CM   1. Acute on chronic right-sided congestive heart failure  I50.813 428.0   2. Coronary artery disease due to lipid rich plaque  I25.10 414.00    I25.83 414.3   3. Duodenitis  K29.80 535.60   4. History of alcohol use  Z87.898 V11.3   5. Metabolic acidosis  E87.20 276.2   6. Dysphagia, unspecified type  R13.10 787.20   7. Impaired mobility [Z74.09 (ICD-10-CM)]  Z74.09 799.89   8. Impaired mobility and ADLs [Z74.09, Z78.9 (ICD-10-CM)]  Z74.09 V49.89    Z78.9    9. Pressure injury of left buttock, unstageable  L89.320 707.05     707.25   10. Pressure injury of right buttock, stage 3  L89.313 707.05     707.23   11. Non-pressure chronic ulcer of other part of left foot with fat layer exposed  L97.522 707.15     Patient Active Problem List   Diagnosis    Prostate cancer    Hx of radiation therapy    Elevated PSA    Encounter for follow-up surveillance of prostate cancer    HOCM (hypertrophic obstructive cardiomyopathy)    Coronary artery disease involving native coronary artery of native heart without angina pectoris    Essential hypertension    Mixed hyperlipidemia    Class 1 obesity with alveolar hypoventilation, serious comorbidity, and body mass index (BMI) of 30.0 to 30.9 in adult    Displaced fracture of lateral malleolus of right fibula, initial encounter for closed fracture    Family history of colonic polyps    History of colon polyps    Elevated brain natriuretic peptide (BNP) level    MIKEY (acute kidney injury)    Anxiety associated with depression    ETOH abuse    Alcoholic hepatitis    Volume depletion    Anorexia    CHF (congestive heart failure)    Hypokalemia    Duodenitis    Gait instability     Past Medical History:   Diagnosis Date    Alcoholic hepatitis 2023    CAD (coronary artery  disease)     History of external beam radiation therapy 05/12/2016    6840 cGy to prostate bed    Hyperlipidemia     Hypertension     Prostate cancer      Past Surgical History:   Procedure Laterality Date    CARDIAC CATHETERIZATION      CORONARY ANGIOPLASTY WITH STENT PLACEMENT      PROSTATE SURGERY      TONSILLECTOMY           OT ASSESSMENT FLOWSHEET (last 12 hours)       OT Evaluation and Treatment       Row Name 08/11/23 0910                   OT Time and Intention    Subjective Information complains of;pain  -AC        Document Type therapy note (daily note)  -        Mode of Treatment occupational therapy  -           General Information    Existing Precautions/Restrictions fall  -           Pain Assessment    Pretreatment Pain Rating 9/10  -        Posttreatment Pain Rating 9/10  -        Pain Location - Side/Orientation Left  -        Pain Location - --  heel  -        Pain Intervention(s) Repositioned;Ambulation/increased activity;Rest  -           Activities of Daily Living    BADL Assessment/Intervention grooming  -           Grooming Assessment/Training    [place] Level (Grooming) shave face;wash face, hands;oral care regimen;set up;supervision  -        Position (Grooming) supported sitting  -        Comment, (Grooming) too fatigued to stand  -           Bed Mobility    Supine-Sit [place] (Bed Mobility) standby assist  -        Assistive Device (Bed Mobility) bed rails  -           Functional Mobility    Functional Mobility- Ind. Level contact guard assist  -        Functional Mobility- Device walker, front-wheeled  -        Functional Mobility- Comment to BR, to chair  -           Transfer Assessment/Treatment    Transfers sit-stand transfer;stand-sit transfer  -           Sit-Stand Transfer    Sit-Stand [place] (Transfers) contact guard;verbal cues  -        Assistive Device (Sit-Stand Transfers) walker, front-wheeled  -        Comment, (Sit-Stand  Transfer) needs verbal cues for safe technique, pulls on walker for sit>stand  -AC           Wound 08/07/23 1655 Left gluteal Pressure Injury    Wound - Properties Group Placement Date: 08/07/23  -ACA Placement Time: 1655 -ACA Present on Hospital Admission: Y  -KRISTIAN Side: Left  -KRISTIAN Location: gluteal  -KRISTIAN Primary Wound Type: Pressure inj  -KRISTIAN    Retired Wound - Properties Group Placement Date: 08/07/23  -ACA Placement Time: 1655 -ACA Present on Hospital Admission: Y  -KRISTIAN Side: Left  -KRISTIAN Location: gluteal  -KRISTIAN Primary Wound Type: Pressure inj  -KRISTIAN    Retired Wound - Properties Group Date first assessed: 08/07/23  -ACA Time first assessed: 1655 -ACA Present on Hospital Admission: Y  -KRISTIAN Side: Left  -KRISTIAN Location: gluteal  -KRISTIAN Primary Wound Type: Pressure inj  -KRISTIAN       Wound 08/07/23 1655 Right gluteal Pressure Injury    Wound - Properties Group Placement Date: 08/07/23  -ACA Placement Time: 1655 -ACA Present on Hospital Admission: Y  -KRISTIAN Side: Right  -KRISTIAN Location: gluteal  -KRISTIAN Primary Wound Type: Pressure inj  -KRISTIAN    Retired Wound - Properties Group Placement Date: 08/07/23  -ACA Placement Time: 1655  -ACA Present on Hospital Admission: Y  -KRISTIAN Side: Right  -KRISTIAN Location: gluteal  -KRISTIAN Primary Wound Type: Pressure inj  -KRISTIAN    Retired Wound - Properties Group Date first assessed: 08/07/23  -ACA Time first assessed: 1655 -ACA Present on Hospital Admission: Y  -KRISTIAN Side: Right  -KRISTIAN Location: gluteal  -KRISTIAN Primary Wound Type: Pressure inj  -KRISTIAN       Wound 08/07/23 1655 Left posterior heel Pressure Injury    Wound - Properties Group Placement Date: 08/07/23  -KRISTIAN Placement Time: 1655 -KRISTIAN Present on Hospital Admission: Y  -KRISTIAN Side: Left  -KRISTIAN Orientation: posterior  -KRISTIAN Location: heel  -KRISTIAN Primary Wound Type: Pressure inj  -KRISTIAN    Retired Wound - Properties Group Placement Date: 08/07/23  -ACA Placement Time: 1655 -ACA Present on Hospital Admission: Y  -KRISTIAN Side: Left  -KRISTIAN Orientation: posterior   -KRISTIAN Location: heel  -KRISTIAN Primary Wound Type: Pressure inj  -KRISTIAN    Retired Wound - Properties Group Date first assessed: 08/07/23 -ACA Time first assessed: 1655 -ACA Present on Hospital Admission: Y  -KRISTIAN Side: Left  -KRISTIAN Location: heel  -KRISTIAN Primary Wound Type: Pressure inj  -KRISTIAN       Wound 08/07/23 1656 Left posterior foot Pressure Injury    Wound - Properties Group Placement Date: 08/07/23 -ACA Placement Time: 1656 -KRISTIAN Present on Hospital Admission: Y  -KRISTIAN Side: Left  -KRISTIAN Orientation: posterior  -KRISTIAN Location: foot  -KRISTIAN Primary Wound Type: Pressure inj  -KRISTIAN    Retired Wound - Properties Group Placement Date: 08/07/23  -ACA Placement Time: 1656  -KRISTIAN Present on Hospital Admission: Y  -KRISTIAN Side: Left  -KRISTIAN Orientation: posterior  -KRISTIAN Location: foot  -KRISTIAN Primary Wound Type: Pressure inj  -KRISTIAN    Retired Wound - Properties Group Date first assessed: 08/07/23 -ACA Time first assessed: 1656  -KRISTIAN Present on Hospital Admission: Y  -KRISTIAN Side: Left  -KRISTIAN Location: foot  -KRISTIAN Primary Wound Type: Pressure inj  -KRISTIAN       Plan of Care Review    Plan of Care Reviewed With patient  -AC        Progress improving  -AC        Outcome Evaluation OT tx completed.  Pt hasa paini in L heel 9/10.  Came to EOB SBA with bed rails.  Needs verbal cues for safe transfer technique, he tends to try to pull up with walker.  CGA to sit>stand.  Pt amb to BR and completed grooming while seated.  Too fatigued to stand through task.  Completed shaving, hand washing and oral hygiene.  Pt amb back to chair with CGA and rw.  Pt is weak and is at an elevated fall risk.  OT will continue to treat to increase pt's strength and endurance for ADL.  -AC           Vital Signs    Posttreatment Heart Rate (beats/min) 130  -AC        Post Patient Position Sitting  -AC           Positioning and Restraints    Pre-Treatment Position in bed  -AC        Post Treatment Position chair  -AC        In Chair reclined;call light within reach;encouraged to  call for assist;legs elevated  -                  User Key  (r) = Recorded By, (t) = Taken By, (c) = Cosigned By      Initials Name Effective Dates    AC Good Prado, OTR/L, CNT 02/03/23 -     KRISTIAN Boston Echevarria, RN 06/16/21 -                      Occupational Therapy Education       Title: PT OT SLP Therapies (In Progress)       Topic: Occupational Therapy (In Progress)       Point: ADL training (Done)       Description:   Instruct learner(s) on proper safety adaptation and remediation techniques during self care or transfers.   Instruct in proper use of assistive devices.                  Learning Progress Summary             Patient Acceptance, E, VU by ARLEN at 8/11/2023 0932    Comment: safe transfers    Acceptance, E, VU by JJ at 8/10/2023 1146    Acceptance, E,TB, VU by ARLEN at 8/9/2023 1209    Comment: safe transfers, role of OT, benefits of rehab    Acceptance, E, NR by MM at 8/8/2023 1412    Comment: OT role, benefits, POC, d/c planning, safety awareness                         Point: Home exercise program (Not Started)       Description:   Instruct learner(s) on appropriate technique for monitoring, assisting and/or progressing therapeutic exercises/activities.                  Learner Progress:  Not documented in this visit.              Point: Precautions (Done)       Description:   Instruct learner(s) on prescribed precautions during self-care and functional transfers.                  Learning Progress Summary             Patient Acceptance, E, VU by BRETT at 8/10/2023 1146    Acceptance, E, NR by MM at 8/8/2023 1412    Comment: OT role, benefits, POC, d/c planning, safety awareness                         Point: Body mechanics (In Progress)       Description:   Instruct learner(s) on proper positioning and spine alignment during self-care, functional mobility activities and/or exercises.                  Learning Progress Summary             Patient Acceptance, E, NR by MM at 8/8/2023 1412     Comment: OT role, benefits, POC, d/c planning, safety awareness                                         User Key       Initials Effective Dates Name Provider Type Discipline     02/03/23 -  Good Prado, OTR/L, CNT Occupational Therapist OT    MM 07/11/23 -  Anrdes Lambert, OTR/L Occupational Therapist OT    JJ 07/11/23 -  Bri Cavazos OTR/L, CSRS Occupational Therapist OT                      OT Recommendation and Plan     Plan of Care Review  Plan of Care Reviewed With: patient  Progress: improving  Outcome Evaluation: OT tx completed.  Pt hasa paini in L heel 9/10.  Came to EOB SBA with bed rails.  Needs verbal cues for safe transfer technique, he tends to try to pull up with walker.  CGA to sit>stand.  Pt amb to BR and completed grooming while seated.  Too fatigued to stand through task.  Completed shaving, hand washing and oral hygiene.  Pt amb back to chair with CGA and rw.  Pt is weak and is at an elevated fall risk.  OT will continue to treat to increase pt's strength and endurance for ADL.  Plan of Care Reviewed With: patient  Outcome Evaluation: OT tx completed.  Pt hasa paini in L heel 9/10.  Came to EOB SBA with bed rails.  Needs verbal cues for safe transfer technique, he tends to try to pull up with walker.  CGA to sit>stand.  Pt amb to BR and completed grooming while seated.  Too fatigued to stand through task.  Completed shaving, hand washing and oral hygiene.  Pt amb back to chair with CGA and rw.  Pt is weak and is at an elevated fall risk.  OT will continue to treat to increase pt's strength and endurance for ADL.     Outcome Measures       Row Name 08/11/23 0910 08/09/23 1012          How much help from another is currently needed...    Putting on and taking off regular lower body clothing? 2  -AC 2  -AC     Bathing (including washing, rinsing, and drying) 2  -AC 2  -AC     Toileting (which includes using toilet bed pan or urinal) 2  -AC 2  -AC     Putting on and taking off  regular upper body clothing 3  -AC 3  -AC     Taking care of personal grooming (such as brushing teeth) 3  -AC 4  -AC     Eating meals 4  -AC 4  -AC     AM-PAC 6 Clicks Score (OT) 16  -AC 17  -AC        Functional Assessment    Outcome Measure Options AM-PAC 6 Clicks Daily Activity (OT)  -AC AM-PAC 6 Clicks Daily Activity (OT)  -AC               User Key  (r) = Recorded By, (t) = Taken By, (c) = Cosigned By      Initials Name Provider Type    Good Holbrook OTR/L, CNT Occupational Therapist                    Time Calculation:    Time Calculation- OT       Row Name 08/11/23 0946             Time Calculation- OT    OT Start Time 0907  -      OT Stop Time 0946  -      OT Time Calculation (min) 39 min  -      Total Timed Code Minutes- OT 39 minute(s)  -      OT Received On 08/11/23  -                User Key  (r) = Recorded By, (t) = Taken By, (c) = Cosigned By      Initials Name Provider Type    Good Holbrook OTR/L, CNT Occupational Therapist                  Therapy Charges for Today       Code Description Service Date Service Provider Modifiers Qty    [id number] HC OT SELF CARE/MGMT/TRAIN EA 15 MIN 8/11/2023 Good Prado OTR/L, CNT GO 3                 ELLIE White/L, CNT  8/11/2023

## 2023-08-11 NOTE — PROGRESS NOTES
[facility] Medicine Services  INPATIENT PROGRESS NOTE    Patient Name: Dayron Lubin  Date of Admission: 8/7/2023  Today's Date: 08/11/23  Length of Stay: 4  Primary Care Physician: Herberth Nguyen Jr., MD    Subjective   Chief Complaint: patient resting in bed.   Sisters at bed side.   Patient feels tired.  Has been napping.  Had a dose of ativan.     HPI   Patient denies pain.  Is not short of breath.   Notes heart rate yesterday jumped to 190 with PT.    No chest pain.   No delerium.   Per family feel the paxil is not helping much.   Has been on for about 3 weeks. Added during last hospitalization.           Review of Systems   All pertinent negatives and positives are as above. All other systems have been reviewed and are negative unless otherwise stated.     Objective    Temp:  [97.8 øF (36.6 øC)-99.3 øF (37.4 øC)] 98.2 øF (36.8 øC)  Heart Rate:  [] 85  Resp:  [18] 18  BP: (104-129)/(65-91) 118/91  Physical Exam  Vitals and nursing note reviewed.   Constitutional:       Appearance: Normal appearance.   HENT:      Head: Normocephalic and atraumatic.      Right Ear: External ear normal.      Left Ear: External ear normal.      Nose: Nose normal.      Mouth/Throat:      Mouth: Mucous membranes are moist.   Eyes:      Extraocular Movements: Extraocular movements intact.      Conjunctiva/sclera: Conjunctivae normal.      Pupils: Pupils are equal, round, and reactive to light.   Cardiovascular:      Rate and Rhythm: Normal rate and regular rhythm.      Pulses: Normal pulses.      Heart sounds: No murmur heard.    No friction rub. No gallop.   Pulmonary:      Effort: Pulmonary effort is normal.      Breath sounds: Normal breath sounds.   Abdominal:      General: Bowel sounds are normal.      Palpations: Abdomen is soft.   Musculoskeletal:         General: Normal range of motion.      Cervical back: Normal range of motion and neck supple.   Skin:     General: Skin is warm  and dry.      Capillary Refill: Capillary refill takes less than 2 seconds.   Neurological:      General: No focal deficit present.      Mental Status: He is alert and oriented to person, place, and time.      Cranial Nerves: No cranial nerve deficit.   Psychiatric:         Mood and Affect: Mood normal.         Behavior: Behavior normal.           Results Review:  I have reviewed the labs, radiology results, and diagnostic studies.    Laboratory Data:   Results from last 7 days   Lab Units 08/11/23  0320 08/10/23  0435 08/07/23  1406   WBC 10*3/mm3 8.63 11.78* 10.68   HEMOGLOBIN g/dL 10.7* 10.8* 12.4*   HEMATOCRIT % 32.2* 33.3* 36.3*   PLATELETS 10*3/mm3 152 156 248        Results from last 7 days   Lab Units 08/11/23  0320 08/10/23  2205 08/10/23  0435 08/09/23  0352   SODIUM mmol/L 135*  --  135* 132*   POTASSIUM mmol/L 4.2 4.2 2.8* 3.3*  3.3*   CHLORIDE mmol/L 104  --  98 94*   CO2 mmol/L 21.0*  --  21.0* 24.0   BUN mg/dL 20  --  31* 45*   CREATININE mg/dL 0.99  --  0.98 2.00*   CALCIUM mg/dL 9.0  --  8.7 7.9*   BILIRUBIN mg/dL 0.4  --  0.5 0.5   ALK PHOS U/L 53  --  54 52   ALT (SGPT) U/L 14  --  14 13   AST (SGOT) U/L 25  --  26 28   GLUCOSE mg/dL 93  --  106* 96       Culture Data:   No results found for: BLOODCX, URINECX, WOUNDCX, MRSACX, RESPCX, STOOLCX    Radiology Data:   Imaging Results (Last 24 Hours)       ** No results found for the last 24 hours. **            I have reviewed the patient's current medications.     Assessment/Plan   Assessment  Active Hospital Problems    Diagnosis     **Hypokalemia     SVT (supraventricular tachycardia)     Duodenitis     Gait instability     Elevated brain natriuretic peptide (BNP) level     ETOH abuse        Treatment Plan  Increase paxil 20 mg po daily  Risperdal 1 mg qhs  Continue PT OT  Monitor cmp cbc in AM  Echocardiogram 2 d  Chest xray        Medical Decision Making  Number and Complexity of problems:   Hypokalemia high complexity  Duodenitis moderate  complexity  Gait instability moderate complexity  ETOH abuse moderate complexity   Elevated BNP moderate complexity  Differential Diagnosis:     Conditions and Status        improving     MDM Data  External documents reviewed: no new  Cardiac tracing (EKG, telemetry) interpretation: reviewed  Radiology interpretation: reviewed  Labs reviewed: reviewed  Any tests that were considered but not ordered: none     Decision rules/scores evaluated (example [id number], [place], etc): none     Discussed with: patient     Care Planning  Shared decision making: patient  Code status and discussions: full    Disposition  Social Determinants of Health that impact treatment or disposition: none  I expect the patient to be discharged to snf in 1-2 days.     Electronically signed by Kathleen Tovar, 08/11/23, 14:57 CDT.

## 2023-08-11 NOTE — PLAN OF CARE
Goal Outcome Evaluation:    Problem: Adult Inpatient Plan of Care  Goal: Plan of Care Review  Outcome: Ongoing, Progressing  Flowsheets (Taken 8/11/2023 0421)  Progress: no change  Plan of Care Reviewed With: patient  Outcome Evaluation: Pt has c/o shaking of hands and feet this shift. PRN ativan given x2, as of now. S/ST  DEISY GILL. Will update MD as needed.

## 2023-08-11 NOTE — PLAN OF CARE
Goal Outcome Evaluation:  Plan of Care Reviewed With: patient, sibling        Progress: improving            Swallow treatment completed for diet toleration purposes. The patient was alert and cooperative. Sitting up in chair. 2 sisters present in room. The patient and family report no concerns with toleration of current diet. Patient no longer is audibly congested or coughing. Vocal quality clear and baseline. The patient completed trials of regular solids and thin liquids. NO overt s/s of aspiration observed. Functional rotary chew given regular solids. Patient OK to upgrade to regular solids and continue thin liquids.     SLP signing off at this time. If any acute changes or concerns, please re-consult.     Keke Brooks MS CCC-SLP 8/11/2023 10:07 CDT

## 2023-08-11 NOTE — PLAN OF CARE
Goal Outcome Evaluation:  Plan of Care Reviewed With: patient        Progress: improving  Outcome Evaluation: OT tx completed.  Pt nya pineda in L heel 9/10.  Came to EOB SBA with bed rails.  Needs verbal cues for safe transfer technique, he tends to try to pull up with walker.  CGA to sit>stand.  Pt amb to BR and completed grooming while seated.  Too fatigued to stand through task.  Completed shaving, hand washing and oral hygiene.  Pt amb back to chair with CGA and rw.  Pt is weak and is at an elevated fall risk.  OT will continue to treat to increase pt's strength and endurance for ADL.

## 2023-08-11 NOTE — DISCHARGE PLACEMENT REQUEST
Justyn Briggs, PT   Physical Therapist  Physical Therapy     Therapy Evaluation      Signed     Date of Service: 23  Creation Time: 23     Signed       Expand All Collapse All    Patient Name: Dayron Lubin                   : 1955                        MRN: [id number]                              Today's Date: 2023                                     Admit Date: 2023               Visit Dx:   Visit Diagnosis       ICD-10-CM ICD-9-CM   1. Acute on chronic right-sided congestive heart failure  I50.813 428.0   2. Coronary artery disease due to lipid rich plaque  I25.10 414.00     I25.83 414.3   3. Duodenitis  K29.80 535.60   4. History of alcohol use  Z87.898 V11.3   5. Metabolic acidosis  E87.20 276.2   6. Dysphagia, unspecified type  R13.10 787.20   7. Impaired mobility [Z74.09 (ICD-10-CM)]  Z74.09 799.89         Problem List       Patient Active Problem List   Diagnosis    Prostate cancer    Hx of radiation therapy    Elevated PSA    Encounter for follow-up surveillance of prostate cancer    HOCM (hypertrophic obstructive cardiomyopathy)    Coronary artery disease involving native coronary artery of native heart without angina pectoris    Essential hypertension    Mixed hyperlipidemia    Class 1 obesity with alveolar hypoventilation, serious comorbidity, and body mass index (BMI) of 30.0 to 30.9 in adult    Displaced fracture of lateral malleolus of right fibula, initial encounter for closed fracture    Family history of colonic polyps    History of colon polyps    Elevated brain natriuretic peptide (BNP) level    MIKEY (acute kidney injury)    Anxiety associated with depression    ETOH abuse    Alcoholic hepatitis    Volume depletion    Anorexia    CHF (congestive heart failure)    Hypokalemia    Duodenitis    Gait instability         Medical History        Past Medical History:   Diagnosis Date    Alcoholic hepatitis 2023    CAD (coronary artery disease)       History of external beam radiation therapy 05/12/2016     6840 cGy to prostate bed    Hyperlipidemia      Hypertension      Prostate cancer           Surgical History[]Expand by Default         Past Surgical History:   Procedure Laterality Date    CARDIAC CATHETERIZATION        CORONARY ANGIOPLASTY WITH STENT PLACEMENT        PROSTATE SURGERY        TONSILLECTOMY               General Information         [facility] Name 08/08/23 1310                 Physical Therapy Time and Intention     Document Type evaluation  -       Mode of Treatment physical therapy  -          Row Name 08/08/23 1310                 General Information     Patient Profile Reviewed yes  -       Prior Level of Function independent:;community mobility;ADL's;driving;shopping;cooking  brother and sister in room providing PLOF information  -       Existing Precautions/Restrictions fall  -       Barriers to Rehab cognitive status  -          Row Name 08/08/23 1310                 Living Environment     People in Home alone  walk in shower  -[facility] Name 08/08/23 1310                 Home Main Entrance     Number of Stairs, Main Entrance two  -       Stair Railings, Main Entrance none  -[facility] Name 08/08/23 1310                 Stairs Within Home, Primary     Number of Stairs, Within Home, Primary none  -          Row Name 08/08/23 1310                 Cognition     Orientation Status (Cognition) oriented to;person;disoriented to;place;situation;time  -          Row Name 08/08/23 1310                 Safety Issues, Functional Mobility     Safety Issues Affecting Function (Mobility) ability to follow commands;at risk behavior observed;awareness of need for assistance;impulsivity;insight into deficits/self-awareness;judgment;safety precautions follow-through/compliance;safety precaution awareness;problem-solving  [facility]       Impairments Affecting Function (Mobility) balance;cognition;endurance/activity tolerance;shortness of  breath  -                       User Key  (r) = Recorded By, (t) = Taken By, (c) = Cosigned By        Initials Name Provider Type      Justyn Briggs, PT Physical Therapist                            Mobility         Row Name 08/08/23 1310                 Bed Mobility     Bed Mobility bed mobility (all) activities  -       All Activities, [place] (Bed Mobility) standby assist  -       Comment, (Bed Mobility) impulsive  -          Row Name 08/08/23 1310                 Sit-Stand Transfer     Sit-Stand [place] (Transfers) contact guard  -          Row Name 08/08/23 1310                 Gait/Stairs (Locomotion)     [place] Level (Gait) moderate assist (50% patient effort)  -       Distance in Feet (Gait) 20  -                       User Key  (r) = Recorded By, (t) = Taken By, (c) = Cosigned By        Initials Name Provider Type      Justyn Briggs, PT Physical Therapist                            Obj/Interventions         Row Name 08/08/23 1310                 Range of Motion Comprehensive     General Range of Motion bilateral upper extremity ROM WFL;bilateral lower extremity ROM WFL  -          Row Name 08/08/23 1310                 Strength Comprehensive (MMT)     General Manual Muscle Testing (MMT) Assessment no strength deficits identified  -                       User Key  (r) = Recorded By, (t) = Taken By, (c) = Cosigned By        Initials Name Provider Type      Justyn Briggs, PT Physical Therapist                            Goals/Plan         Row Name 08/08/23 1310                 Bed Mobility Goal 1 (PT)     Activity/Assistive Device (Bed Mobility Goal 1, PT) bed mobility activities, all  -          Row Name 08/08/23 1310                 Gait Training Goal 1 (PT)     Activity/Assistive Device (Gait Training Goal 1, PT) gait (walking locomotion);decrease fall risk;diminish gait deviation;increase endurance/gait distance  -       [place] Level (Gait Training Goal 1,  PT) contact guard required  -       Distance (Gait Training Goal 1, PT) 50ft  -       Time Frame (Gait Training Goal 1, PT) 10 days  -       Progress/Outcome (Gait Training Goal 1, PT) new goal  -          Row Name 08/08/23 1310                 Therapy Assessment/Plan (PT)     Planned Therapy Interventions (PT) balance training;gait training;patient/family education;transfer training  -                    User Key  (r) = Recorded By, (t) = Taken By, (c) = Cosigned By        Initials Name Provider Type      Justyn Briggs, PT Physical Therapist                         Clinical Impression         Row Name 08/08/23 1310                 Pain     Pretreatment Pain Rating 0/10 - no pain  -       Posttreatment Pain Rating 0/10 - no pain  -       Pain Intervention(s) Medication (See MAR)  -          Row Name 08/08/23 1310                 Plan of Care Review     Plan of Care Reviewed With patient;sibling  -       Outcome Evaluation PT IE complete.  A&O to person only.  Brother and sister in room report pt is independent PLOF.  Today he is SBA to sit EOB.  CGA sit<>stand.  Ambulated 20ft mod A x1 with 4L O2.  Gait slow, did not follow straight line, running into objects.  O2sat 94% post activity.  PT to see for progressive ambulation and balance activties.  Recommend SNF at [place].  Thank you for referral.  -          Row Name 08/08/23 1310                 Therapy Assessment/Plan (PT)     Patient/Family Therapy Goals Statement (PT) did not state  -       Rehab Potential (PT) good, to achieve stated therapy goals  -       Criteria for Skilled Interventions Met (PT) yes;skilled treatment is necessary  -       Therapy Frequency (PT) 2 times/day  -       Predicted Duration of Therapy Intervention (PT) until dc  -          Row Name 08/08/23 1310                 Positioning and Restraints     Pre-Treatment Position in bed  -       Post Treatment Position bed  -       In Bed fowlers;call light within  reach;encouraged to call for assist;exit alarm on;side rails up x2;with SLP  -                       User Key  (r) = Recorded By, (t) = Taken By, (c) = Cosigned By        Initials Name Provider Type      Justyn Briggs, PT Physical Therapist                            Outcome Measures         Row Name 08/08/23 1310 08/08/23 0800            How much help from another person do you currently need...     Turning from your back to your side while in flat bed without using bedrails? 4  - 4  -HT     Moving from lying on back to sitting on the side of a flat bed without bedrails? 4  - 3  -HT     Moving to and from a bed to a chair (including a wheelchair)? 2  - 2  -HT     Standing up from a chair using your arms (e.g., wheelchair, bedside chair)? 3  - 2  -HT     Climbing 3-5 steps with a railing? 2  - 1  -HT     To walk in hospital room? 2  - 2  -HT     AM-PAC 6 Clicks Score (PT) 17  - 14  -HT     Highest level of mobility 5 --> Static standing  - 4 --> Transferred to chair/commode  -HT        Row Name 08/08/23 1310                 Functional Assessment     Outcome Measure Options AM-PAC 6 Clicks Basic Mobility (PT)  -                       User Key  (r) = Recorded By, (t) = Taken By, (c) = Cosigned By        Initials Name Provider Type      Justyn Briggs, PT Physical Therapist      Katrin Calix, RN Registered Nurse                          Physical Therapy Education            Title: PT OT SLP Therapies (Done)         Topic: Physical Therapy (Done)         Point: Mobility training (Done)         Learning Progress Summary               Patient Acceptance, E,D, NR,DU,VU by  at 8/8/2023 1359     Comment: benefits of PT and POC, call for A OOB                               Point: Precautions (Done)         Learning Progress Summary               Patient Acceptance, E,D, NR,DU,VU by  at 8/8/2023 1359     Comment: benefits of PT and POC, call for A OOB                                                    User Key         Initials Effective Dates Name Provider Type Discipline      02/03/23 -  Justyn Briggs, PT Physical Therapist PT                          PT Recommendation and Plan  Planned Therapy Interventions (PT): balance training, gait training, patient/family education, transfer training  Plan of Care Reviewed With: patient, sibling  Outcome Evaluation: PT IE complete.  A&O to person only.  Brother and sister in room report pt is independent PLOF.  Today he is SBA to sit EOB.  CGA sit<>stand.  Ambulated 20ft mod A x1 with 4L O2.  Gait slow, did not follow straight line, running into objects.  O2sat 94% post activity.  PT to see for progressive ambulation and balance activties.  Recommend SNF at [place].  Thank you for referral.      Time Calculation:        PT Charges         Row Name 08/08/23 1401                       Time Calculation     Start Time 1310  -         Stop Time 1350  -         Time Calculation (min) 40 min  -         PT Received On 08/08/23  -         PT Goal Re-Cert Due Date 08/18/23  -                 Untimed Charges     PT Eval/Re-eval Minutes 40  -                 Total Minutes     Untimed Charges Total Minutes 40  -          Total Minutes 40  -                      User Key  (r) = Recorded By, (t) = Taken By, (c) = Cosigned By        Initials Name Provider Type      Justyn Briggs, PT Physical Therapist                       Therapy Charges for Today         Code Description Service Date Service Provider Modifiers Qty     [id number] HC PT EVAL LOW COMPLEXITY 3 8/8/2023 Justyn Briggs PT GP 1                PT G-Codes  Outcome Measure Options: AM-PAC 6 Clicks Basic Mobility (PT)  AM-PAC 6 Clicks Score (PT): 17  PT Discharge Summary  Anticipated Discharge Disposition (PT): skilled nursing facility     Justyn Briggs PT                  8/8/2023

## 2023-08-11 NOTE — PLAN OF CARE
Goal Outcome Evaluation:  Plan of Care Reviewed With: patient, family        Progress: improving  Outcome Evaluation: PT tx complete. Bed mob Min A to EOB w/ HOB elevated and use of bed rails. Pt needs encouragment to prgoress therapy and for overall activity. Sitting balance Ind. Assist donning shoes.  while EOB. Stood SBA. Amb 50' at a tiem w/ slow even gait speed and use of RW. HR reached as high as 137 during gait. Pt w/ no complaints during activity.

## 2023-08-11 NOTE — CASE MANAGEMENT/SOCIAL WORK
Continued Stay Note   [place]     Patient Name: Dayron Lubin  MRN: [id number]  Today's Date: 8/11/2023    Admit Date: 8/7/2023    Plan: [facility] - Pending Precert   Discharge Plan       Row Name 08/11/23 1532       Plan    Plan [facility] - Pending Precert    Plan Comments Patient is accepted to [facility]. VINOD Hughes Director, is starting precert with [facility]. Anticipate discharge when insurance approval received.             CHUCK Verma

## 2023-08-11 NOTE — CASE MANAGEMENT/SOCIAL WORK
Continued Stay Note   [place]     Patient Name: Dayron Lubin  MRN: [id number]  Today's Date: 8/11/2023    Admit Date: 8/7/2023    Plan: Referral to [facility]   Discharge Plan       Row Name 08/11/23 1131       Plan    Plan Referral to [facility]    Plan Comments Referral made to [facility] on 8/10. Mary Grace in admissions will follow for patient to be out of DTs. Will await definite decision at [facility].               CHUCK Verma

## 2023-08-11 NOTE — THERAPY TREATMENT NOTE
Acute Care - Physical Therapy Treatment Note  [facility]     Patient Name: Dayron Lubin  : 1955  MRN: [id number]  Today's Date: 2023      Visit Dx:     ICD-10-CM ICD-9-CM   1. Acute on chronic right-sided congestive heart failure  I50.813 428.0   2. Coronary artery disease due to lipid rich plaque  I25.10 414.00    I25.83 414.3   3. Duodenitis  K29.80 535.60   4. History of alcohol use  Z87.898 V11.3   5. Metabolic acidosis  E87.20 276.2   6. Dysphagia, unspecified type  R13.10 787.20   7. Impaired mobility [Z74.09 (ICD-10-CM)]  Z74.09 799.89   8. Impaired mobility and ADLs [Z74.09, Z78.9 (ICD-10-CM)]  Z74.09 V49.89    Z78.9    9. Pressure injury of left buttock, unstageable  L89.320 707.05     707.25   10. Pressure injury of right buttock, stage 3  L89.313 707.05     707.23   11. Non-pressure chronic ulcer of other part of left foot with fat layer exposed  L97.522 707.15     Patient Active Problem List   Diagnosis    Prostate cancer    Hx of radiation therapy    Elevated PSA    Encounter for follow-up surveillance of prostate cancer    HOCM (hypertrophic obstructive cardiomyopathy)    Coronary artery disease involving native coronary artery of native heart without angina pectoris    Essential hypertension    Mixed hyperlipidemia    Class 1 obesity with alveolar hypoventilation, serious comorbidity, and body mass index (BMI) of 30.0 to 30.9 in adult    Displaced fracture of lateral malleolus of right fibula, initial encounter for closed fracture    Family history of colonic polyps    History of colon polyps    Elevated brain natriuretic peptide (BNP) level    MIKEY (acute kidney injury)    Anxiety associated with depression    ETOH abuse    Alcoholic hepatitis    Volume depletion    Anorexia    CHF (congestive heart failure)    Hypokalemia    Duodenitis    Gait instability    SVT (supraventricular tachycardia)     Past Medical History:   Diagnosis Date    Alcoholic hepatitis 2023    CAD (coronary  artery disease)     History of external beam radiation therapy 05/12/2016    6840 cGy to prostate bed    Hyperlipidemia     Hypertension     Prostate cancer      Past Surgical History:   Procedure Laterality Date    CARDIAC CATHETERIZATION      CORONARY ANGIOPLASTY WITH STENT PLACEMENT      PROSTATE SURGERY      TONSILLECTOMY       PT Assessment (last 12 hours)       PT Evaluation and Treatment       Row Name 08/11/23 1320          Physical Therapy Time and Intention    Subjective Information complains of;fatigue  -TB     Document Type therapy note (daily note)  -TB     Mode of Treatment physical therapy  -TB     Comment Monitor HR  -TB       Row Name 08/11/23 1320          General Information    Existing Precautions/Restrictions fall  -TB       Row Name 08/11/23 1320          Bed Mobility    Bed Mobility scooting/bridging;supine-sit;sit-supine  -TB     Scooting/Bridging [place] (Bed Mobility) standby assist  -TB     Supine-Sit [place] (Bed Mobility) minimum assist (75% patient effort);verbal cues  -TB     Sit-Supine [place] (Bed Mobility) standby assist  -TB     Assistive Device (Bed Mobility) bed rails;head of bed elevated  -TB       Row Name 08/11/23 1320          Transfers    Transfers sit-stand transfer;stand-sit transfer  -TB       Row Name 08/11/23 1320          Sit-Stand Transfer    Sit-Stand [place] (Transfers) standby assist  -TB       Row Name 08/11/23 1320          Stand-Sit Transfer    Stand-Sit [place] (Transfers) standby assist  -TB       Row Name 08/11/23 1320          Gait/Stairs (Locomotion)    [place] Level (Gait) contact guard;verbal cues  -TB     Assistive Device (Gait) walker, front-wheeled  -TB     Distance in Feet (Gait) 50'  x2  -TB     Deviations/Abnormal Patterns (Gait) base of support, narrow;gait speed decreased;stride length decreased  -TB       Row Name 08/11/23 1320          Balance    Balance Assessment sitting static balance;sitting dynamic balance  -TB      Static Sitting Balance independent  -TB     Dynamic Sitting Balance independent  -TB     Position, Sitting Balance sitting edge of bed  -TB       Row Name             Wound 08/07/23 1655 Left gluteal Pressure Injury    Wound - Properties Group Placement Date: 08/07/23  -AC Placement Time: 1655 -AC Present on Hospital Admission: Y  -AC Side: Left  -AC Location: gluteal  -AC Primary Wound Type: Pressure inj  -AC    Retired Wound - Properties Group Placement Date: 08/07/23  -AC Placement Time: 1655 -AC Present on Hospital Admission: Y  -AC Side: Left  -AC Location: gluteal  -AC Primary Wound Type: Pressure inj  -AC    Retired Wound - Properties Group Date first assessed: 08/07/23  - Time first assessed: 1655  -AC Present on Hospital Admission: Y  -AC Side: Left  -AC Location: gluteal  -AC Primary Wound Type: Pressure inj  -AC      Row Name             Wound 08/07/23 1655 Right gluteal Pressure Injury    Wound - Properties Group Placement Date: 08/07/23  -AC Placement Time: 1655 -AC Present on Hospital Admission: Y  -AC Side: Right  -AC Location: gluteal  -AC Primary Wound Type: Pressure inj  -AC    Retired Wound - Properties Group Placement Date: 08/07/23  - Placement Time: 1655  -AC Present on Hospital Admission: Y  -AC Side: Right  -AC Location: gluteal  -AC Primary Wound Type: Pressure inj  -AC    Retired Wound - Properties Group Date first assessed: 08/07/23  - Time first assessed: 1655  -AC Present on Hospital Admission: Y  -AC Side: Right  -AC Location: gluteal  -AC Primary Wound Type: Pressure inj  -AC      Row Name             Wound 08/07/23 1655 Left posterior heel Pressure Injury    Wound - Properties Group Placement Date: 08/07/23  -AC Placement Time: 1655 -AC Present on Hospital Admission: Y  -AC Side: Left  -AC Orientation: posterior  -AC Location: heel  -AC Primary Wound Type: Pressure inj  -AC    Retired Wound - Properties Group Placement Date: 08/07/23  - Placement Time: 1655 -AC  Present on Hospital Admission: Y  -AC Side: Left  -AC Orientation: posterior  -AC Location: heel  -AC Primary Wound Type: Pressure inj  -AC    Retired Wound - Properties Group Date first assessed: 08/07/23  - Time first assessed: 1655 -AC Present on Hospital Admission: Y  -AC Side: Left  -AC Location: heel  -AC Primary Wound Type: Pressure inj  -AC      Row Name             Wound 08/07/23 1656 Left posterior foot Pressure Injury    Wound - Properties Group Placement Date: 08/07/23  -AC Placement Time: 1656 -AC Present on Hospital Admission: Y  -AC Side: Left  -AC Orientation: posterior  -AC Location: foot  -AC Primary Wound Type: Pressure inj  -AC    Retired Wound - Properties Group Placement Date: 08/07/23  -AC Placement Time: 1656 -AC Present on Hospital Admission: Y  -AC Side: Left  -AC Orientation: posterior  -AC Location: foot  -AC Primary Wound Type: Pressure inj  -AC    Retired Wound - Properties Group Date first assessed: 08/07/23  - Time first assessed: 1656 -AC Present on Hospital Admission: Y  -AC Side: Left  -AC Location: foot  -AC Primary Wound Type: Pressure inj  -AC      Row Name 08/11/23 1320          Plan of Care Review    Plan of Care Reviewed With patient;family  -TB     Progress improving  -TB     Outcome Evaluation PT tx complete. Bed mob Min A to EOB w/ HOB elevated and use of bed rails. Pt needs encouragment to prgoress therapy and for overall activity. Sitting balance Ind. Assist donning shoes.  while EOB. Stood SBA. Amb 50' at a tiem w/ slow even gait speed and use of RW. HR reached as high as 137 during gait. Pt w/ no complaints during activity.  -TB       Row Name 08/11/23 1320          Vital Signs    Pretreatment Heart Rate (beats/min) 120  -TB     Intratreatment Heart Rate (beats/min) 137  -TB     Posttreatment Heart Rate (beats/min) 110  -TB       Row Name 08/11/23 1320          Positioning and Restraints    Pre-Treatment Position in bed  -TB     Post Treatment Position  bed  -TB     In Bed fowlers;call light within reach;encouraged to call for assist;with family/caregiver;side rails up x2  -TB               User Key  (r) = Recorded By, (t) = Taken By, (c) = Cosigned By      Initials Name Provider Type    AC Boston Echevarria, RN Registered Nurse    TB Nasir Perez, PTA Physical Therapist Assistant                    Physical Therapy Education       Title: PT OT SLP Therapies (In Progress)       Topic: Physical Therapy (Done)       Point: Mobility training (Done)       Learning Progress Summary             Patient Acceptance, E,D, NR,DU,VU by  at 8/8/2023 1359    Comment: benefits of PT and POC, call for A OOB                         Point: Precautions (Done)       Learning Progress Summary             Patient Acceptance, E,D, NR,DU,VU by  at 8/8/2023 1359    Comment: benefits of PT and POC, call for A OOB                                         User Key       Initials Effective Dates Name Provider Type Discipline     02/03/23 -  Justyn Briggs, PT Physical Therapist PT                  PT Recommendation and Plan     Plan of Care Reviewed With: patient, family  Progress: improving  Outcome Evaluation: PT tx complete. Bed mob Min A to EOB w/ HOB elevated and use of bed rails. Pt needs encouragment to prgoress therapy and for overall activity. Sitting balance Ind. Assist donning shoes.  while EOB. Stood SBA. Amb 50' at a tiem w/ slow even gait speed and use of RW. HR reached as high as 137 during gait. Pt w/ no complaints during activity.   Outcome Measures       Row Name 08/11/23 1320 08/11/23 0910 08/09/23 1012       How much help from another person do you currently need...    Turning from your back to your side while in flat bed without using bedrails? 4  -TB -- --    Moving from lying on back to sitting on the side of a flat bed without bedrails? 3  -TB -- --    Moving to and from a bed to a chair (including a wheelchair)? 3  -TB -- --    Standing up from a  chair using your arms (e.g., wheelchair, bedside chair)? 3  -TB -- --    Climbing 3-5 steps with a railing? 2  -TB -- --    To walk in hospital room? 3  -TB -- --    AM-PAC 6 Clicks Score (PT) 18  -TB -- --       How much help from another is currently needed...    Putting on and taking off regular lower body clothing? -- 2  -AC 2  -AC    Bathing (including washing, rinsing, and drying) -- 2  -AC 2  -AC    Toileting (which includes using toilet bed pan or urinal) -- 2  -AC 2  -AC    Putting on and taking off regular upper body clothing -- 3  -AC 3  -AC    Taking care of personal grooming (such as brushing teeth) -- 3  -AC 4  -AC    Eating meals -- 4  -AC 4  -AC    AM-PAC 6 Clicks Score (OT) -- 16  -AC 17  -AC       Functional Assessment    Outcome Measure Options AM-PAC 6 Clicks Basic Mobility (PT)  -TB AM-PAC 6 Clicks Daily Activity (OT)  -AC AM-PAC 6 Clicks Daily Activity (OT)  -AC              User Key  (r) = Recorded By, (t) = Taken By, (c) = Cosigned By      Initials Name Provider Type    AC [place], OTR/L, CNT Occupational Therapist    TB Nasir Perez, PTA Physical Therapist Assistant                     Time Calculation:    PT Charges       Row Name 08/11/23 1508             Time Calculation    Start Time 1320  -TB      Stop Time 1344  -TB      Time Calculation (min) 24 min  -TB      PT Received On 08/11/23  -TB      PT - Next Appointment 08/11/23  -TB         Time Calculation- PT    Total Timed Code Minutes- PT 24 minute(s)  -TB                User Key  (r) = Recorded By, (t) = Taken By, (c) = Cosigned By      Initials Name Provider Type    TB Nasir Perez, PTA Physical Therapist Assistant                  Therapy Charges for Today       Code Description Service Date Service Provider Modifiers Qty    14330031005  GAIT TRAINING EA 15 MIN 8/10/2023 Nasir Perez, PTA GP 1    60035894929 HC PT THER PROC EA 15 MIN 8/10/2023 Nasir Perez, PTA GP 1    [id number]   GAIT TRAINING EA 15 MIN 8/11/2023 Nasir Perez, PTA GP 2            PT G-Codes  Outcome Measure Options: AM-PAC 6 Clicks Basic Mobility (PT)  AM-PAC 6 Clicks Score (PT): 18  AM-PAC 6 Clicks Score (OT): 16    Nasir Perez PTA  8/11/2023

## 2023-08-11 NOTE — THERAPY DISCHARGE NOTE
Acute Care - Speech Language Pathology   Swallow Treatment Note/Discharge  [place]     Patient Name: Dayron Lubin  : 1955  MRN: [id number]  Today's Date: 2023               Admit Date: 2023  Swallow treatment completed for diet toleration purposes. The patient was alert and cooperative. Sitting up in chair. 2 sisters present in room. The patient and family report no concerns with toleration of current diet. Patient no longer is audibly congested or coughing. Vocal quality clear and baseline. The patient completed trials of regular solids and thin liquids. NO overt s/s of aspiration observed. Functional rotary chew given regular solids. Patient OK to upgrade to regular solids and continue thin liquids.     SLP signing off at this time. If any acute changes or concerns, please re-consult.     Keke Brooks, MS CCC-SLP 2023 10:09 CDT    Visit Dx:    ICD-10-CM ICD-9-CM   1. Acute on chronic right-sided congestive heart failure  I50.813 428.0   2. Coronary artery disease due to lipid rich plaque  I25.10 414.00    I25.83 414.3   3. Duodenitis  K29.80 535.60   4. History of alcohol use  Z87.898 V11.3   5. Metabolic acidosis  E87.20 276.2   6. Dysphagia, unspecified type  R13.10 787.20   7. Impaired mobility [Z74.09 (ICD-10-CM)]  Z74.09 799.89   8. Impaired mobility and ADLs [Z74.09, Z78.9 (ICD-10-CM)]  Z74.09 V49.89    Z78.9    9. Pressure injury of left buttock, unstageable  L89.320 707.05     707.25   10. Pressure injury of right buttock, stage 3  L89.313 707.05     707.23   11. Non-pressure chronic ulcer of other part of left foot with fat layer exposed  L97.522 707.15     Patient Active Problem List   Diagnosis    Prostate cancer    Hx of radiation therapy    Elevated PSA    Encounter for follow-up surveillance of prostate cancer    HOCM (hypertrophic obstructive cardiomyopathy)    Coronary artery disease involving native coronary artery of native heart without angina pectoris     Essential hypertension    Mixed hyperlipidemia    Class 1 obesity with alveolar hypoventilation, serious comorbidity, and body mass index (BMI) of 30.0 to 30.9 in adult    Displaced fracture of lateral malleolus of right fibula, initial encounter for closed fracture    Family history of colonic polyps    History of colon polyps    Elevated brain natriuretic peptide (BNP) level    MIKEY (acute kidney injury)    Anxiety associated with depression    ETOH abuse    Alcoholic hepatitis    Volume depletion    Anorexia    CHF (congestive heart failure)    Hypokalemia    Duodenitis    Gait instability     Past Medical History:   Diagnosis Date    Alcoholic hepatitis 7/13/2023    CAD (coronary artery disease)     History of external beam radiation therapy 05/12/2016    6840 cGy to prostate bed    Hyperlipidemia     Hypertension     Prostate cancer      Past Surgical History:   Procedure Laterality Date    CARDIAC CATHETERIZATION      CORONARY ANGIOPLASTY WITH STENT PLACEMENT      PROSTATE SURGERY      TONSILLECTOMY         SLP Recommendation and Plan     SLP Diet Recommendation: regular textures, thin liquids (08/11/23 0951)              Anticipated Discharge Disposition (SLP): unknown (08/11/23 1008)              Daily Summary of Progress (SLP): progress toward functional goals as expected (08/11/23 0951)     Anticipated Discharge Disposition (SLP): unknown (08/11/23 1008)           Reason for Discharge: all goals and outcomes met, no further needs identified (08/11/23 1008)     Treatment Assessment (SLP): improved (08/11/23 0951)  Treatment Assessment Comments (SLP): See note (08/11/23 0951)  Plan for Continued Treatment (SLP): treatment no longer indicated as all goals met (08/11/23 0951)    Plan of Care Reviewed With: patient, sibling (08/11/23 1005)  Progress: improving (08/11/23 1005)    SWALLOW EVALUATION (last 72 hours)       SLP Adult Swallow Evaluation       Row Name 08/11/23 0951 08/08/23 1322                Rehab  Evaluation    Document Type therapy note (daily note)  -MG evaluation  -MG       Subjective Information no complaints  -MG no complaints  -MG       Patient Observations alert;cooperative;agree to therapy  -MG alert;cooperative;agree to therapy  -MG       Patient/Family/Caregiver Comments/Observations 2 sister present.  -MG Sister and brother present.  -MG       Patient Effort good  -MG good  -MG       Symptoms Noted During/After Treatment none  -MG none  -MG          General Information    Patient Profile Reviewed -- yes  -MG       Pertinent History Of Current Problem -- Presented to ER with weakness. ETOH abuse. SLP consulted due to concerns this AM with increased congestion and coughing following medication administration.  -MG       Current Method of Nutrition -- NPO  -MG       Precautions/Limitations, Vision -- WFL;for purposes of eval  -MG       Precautions/Limitations, Hearing -- WFL;for purposes of eval  -MG       Prior Level of Function-Communication -- WFL  -MG       Prior Level of Function-Swallowing -- no diet consistency restrictions  -MG       Plans/Goals Discussed with -- patient and family;agreed upon  -MG       Barriers to Rehab -- cognitive status  -MG       Patient's Goals for Discharge -- return to PO diet  wants a drink  -MG       Family Goals for Discharge -- patient able to return to all previous activities/roles  -MG          Pain    Additional Documentation Pain Scale: FACES Pre/Post-Treatment (Group)  -MG Pain Scale: FACES Pre/Post-Treatment (Group)  -MG          Pain Scale: FACES Pre/Post-Treatment    Pain: FACES Scale, Pretreatment 0-->no hurt  -MG 0-->no hurt  -MG       Posttreatment Pain Rating 0-->no hurt  -MG 0-->no hurt  -MG          Oral Motor Structure and Function    Dentition Assessment -- natural, present and adequate  -MG       Secretion Management -- WNL/WFL  -MG       Mucosal Quality -- moist, healthy  -MG       Volitional Swallow -- WFL  -MG       Volitional Cough -- WFL  -MG           Oral Musculature and Cranial Nerve Assessment    Oral Motor General Assessment -- WFL  -MG          General Eating/Swallowing Observations    Respiratory Support Currently in Use -- nasal cannula  -MG       Eating/Swallowing Skills -- fed by SLP;self-fed  -MG       Positioning During Eating -- upright in bed  -MG       Utensils Used -- spoon;straw  -MG       Consistencies Trialed -- pureed;honey-thick liquids;nectar/syrup-thick liquids;thin liquids;regular textures  -MG          Clinical Swallow Eval    Oral Prep Phase -- WFL  -MG       Oral Transit -- WFL  -MG       Oral Residue -- WFL  -MG       Pharyngeal Phase -- --  dfficult to discern; unable to fully rule out  -MG       Esophageal Phase -- unremarkable  -MG       Clinical Swallow Evaluation Summary -- See note.  -MG          SLP Evaluation Clinical Impression    SLP Swallowing Diagnosis -- R/O pharyngeal dysphagia;functional oral phase  -MG       Functional Impact -- risk of aspiration/pneumonia  -MG       Rehab Potential/Prognosis, Swallowing -- adequate, monitor progress closely  -MG       Swallow Criteria for Skilled Therapeutic Interventions Met -- demonstrates skilled criteria  -MG          SLP Treatment Clinical Impressions    Treatment Assessment (SLP) improved  -MG --       Treatment Assessment Comments (SLP) See note  -MG --       Daily Summary of Progress (SLP) progress toward functional goals as expected  -MG --       Plan for Continued Treatment (SLP) treatment no longer indicated as all goals met  -MG --       Care Plan Review evaluation/treatment results reviewed;care plan/treatment goals reviewed;risks/benefits reviewed;current/potential barriers reviewed;patient/other agree to care plan  -MG --       Care Plan Review, Other Participant(s) sibling  -MG --          Recommendations    Therapy Frequency (Swallow) -- at least;3 days per week  -MG       Predicted Duration Therapy Intervention (Days) -- until discharge  -MG       SLP Diet  Recommendation regular textures;thin liquids  -MG soft to chew textures;chopped;thin liquids  -MG       Recommended Diagnostics -- VFSS (MBS)  with any increasing concerns  -MG       Recommended Precautions and Strategies -- upright posture during/after eating;small bites of food and sips of liquid;general aspiration precautions;fatigue precautions  at least intermittent supervision with meals  -MG       Oral Care Recommendations -- Oral Care before breakfast, after meals and PRN;Toothbrush  -MG       SLP Rec. for Method of Medication Administration -- meds whole;with puree;as tolerated  -MG       Monitor for Signs of Aspiration -- yes;notify SLP if any concerns  -MG       Anticipated Discharge Disposition (SLP) -- unknown  -MG          Swallow Goals (SLP)    Swallow LTGs Swallow Long Term Goal (free text)  -MG Swallow Long Term Goal (free text)  -MG       Swallow STGs diet tolerance goal selection (SLP)  -MG diet tolerance goal selection (SLP)  -MG       Diet Tolerance Goal Selection (SLP) Patient will tolerate trials of  -MG Patient will tolerate trials of  -MG          (LTG) Swallow    (LTG) Swallow Patient will tolerate least restrictive diet without s/s of aspiration.  -MG Patient will tolerate least restrictive diet without s/s of aspiration.  -MG       [place] (Swallow Long Term Goal) independently (over 90% accuracy)  -MG independently (over 90% accuracy)  -MG       Time Frame (Swallow Long Term Goal) by discharge  -MG by discharge  -MG       Barriers (Swallow Long Term Goal) none  -MG none  -MG       Progress/Outcomes (Swallow Long Term Goal) goal met  -MG new goal  -MG          (STG) Patient will tolerate trials of    Consistencies Trialed (Tolerate trials) soft to chew (chopped) textures;thin liquids;regular textures  -MG soft to chew (chopped) textures;thin liquids;regular textures  -MG       Desired Outcome (Tolerate trials) without signs/symptoms of aspiration;without signs of distress;with  adequate oral prep/transit/clearance  -MG without signs/symptoms of aspiration;without signs of distress;with adequate oral prep/transit/clearance  -MG       [place] (Tolerate trials) independently (over 90% accuracy)  -MG independently (over 90% accuracy)  -MG       Time Frame (Tolerate trials) by discharge  -MG by discharge  -MG       Progress/Outcomes (Tolerate trials) goal met  -MG new goal  -MG                 User Key  (r) = Recorded By, (t) = Taken By, (c) = Cosigned By      Initials Name Effective Dates    MG Keke Brooks, MS [facility]-SLP 07/11/23 -                     EDUCATION  The patient has been educated in the following areas:   Dysphagia (Swallowing Impairment) Oral Care/Hydration.         SLP GOALS       Row Name 08/11/23 0951 08/08/23 1322          (LTG) Swallow    (LTG) Swallow Patient will tolerate least restrictive diet without s/s of aspiration.  -MG Patient will tolerate least restrictive diet without s/s of aspiration.  -MG     [place] (Swallow Long Term Goal) independently (over 90% accuracy)  -MG independently (over 90% accuracy)  -MG     Time Frame (Swallow Long Term Goal) by discharge  -MG by discharge  -MG     Barriers (Swallow Long Term Goal) none  -MG none  -MG     Progress/Outcomes (Swallow Long Term Goal) goal met  -MG new goal  -MG        (STG) Patient will tolerate trials of    Consistencies Trialed (Tolerate trials) soft to chew (chopped) textures;thin liquids;regular textures  -MG soft to chew (chopped) textures;thin liquids;regular textures  -MG     Desired Outcome (Tolerate trials) without signs/symptoms of aspiration;without signs of distress;with adequate oral prep/transit/clearance  -MG without signs/symptoms of aspiration;without signs of distress;with adequate oral prep/transit/clearance  -MG     [place] (Tolerate trials) independently (over 90% accuracy)  -MG independently (over 90% accuracy)  -MG     Time Frame (Tolerate trials) by discharge  -MG by  discharge  -MG     Progress/Outcomes (Tolerate trials) goal met  -MG new goal  -MG               User Key  (r) = Recorded By, (t) = Taken By, (c) = Cosigned By      Initials Name Provider Type    Keke Dunn MS CCC-SLP Speech and Language Pathologist                         Time Calculation:    Time Calculation- SLP       Row Name 08/11/23 1008             Time Calculation- SLP    SLP Start Time 0951  -MG      SLP Stop Time 1008  -MG      SLP Time Calculation (min) 17 min  -MG      SLP Received On 08/11/23  -MG         Untimed Charges    26195-QN Treatment Swallow Minutes 17  -MG         Total Minutes    Untimed Charges Total Minutes 17  -MG       Total Minutes 17  -MG                User Key  (r) = Recorded By, (t) = Taken By, (c) = Cosigned By      Initials Name Provider Type    Keke Dunn MS CCC-SLP Speech and Language Pathologist                    Therapy Charges for Today       Code Description Service Date Service Provider Modifiers Qty    16210848062 HC ST TREATMENT SWALLOW 1 8/11/2023 Keke Brooks MS CCC-SLP GN 1                 SLP Discharge Summary  Anticipated Discharge Disposition (SLP): unknown  Reason for Discharge: all goals and outcomes met, no further needs identified  Progress Toward Achieving Short/long Term Goals: all goals met within established timelines  Discharge Destination: other (see comments) (Remains in acute care)    Keke Brooks MS CCC-DASH  8/11/2023

## 2023-08-12 LAB
AV LVOT PEAK GRADIENT: 86 MMHG
BH CV ECHO MEAS - AO MAX PG: 86 MMHG
BH CV ECHO MEAS - AO MEAN PG: 39 MMHG
BH CV ECHO MEAS - AO ROOT DIAM: 3.2 CM
BH CV ECHO MEAS - AO V2 MAX: 4.7 CM/SEC
BH CV ECHO MEAS - AO V2 VTI: 102 CM
BH CV ECHO MEAS - AVA(I,D): 5.4 CM2
BH CV ECHO MEAS - EDV(CUBED): 81.2 ML
BH CV ECHO MEAS - EDV(MOD-SP4): 127 ML
BH CV ECHO MEAS - EF(MOD-SP4): 47.9 %
BH CV ECHO MEAS - ESV(CUBED): 41.4 ML
BH CV ECHO MEAS - ESV(MOD-SP4): 66.2 ML
BH CV ECHO MEAS - FS: 20.1 %
BH CV ECHO MEAS - IVS/LVPW: 1.1 CM
BH CV ECHO MEAS - IVSD: 1.31 CM
BH CV ECHO MEAS - LA DIMENSION: 3.6 CM
BH CV ECHO MEAS - LAT PEAK E' VEL: 6.4 CM/SEC
BH CV ECHO MEAS - LV DIASTOLIC VOL/BSA (35-75): 64.9 CM2
BH CV ECHO MEAS - LV MASS(C)D: 198.1 GRAMS
BH CV ECHO MEAS - LV MAX PG: 86.5 MMHG
BH CV ECHO MEAS - LV MEAN PG: 39 MMHG
BH CV ECHO MEAS - LV SYSTOLIC VOL/BSA (12-30): 33.8 CM2
BH CV ECHO MEAS - LV V1 MAX: 465 CM/SEC
BH CV ECHO MEAS - LV V1 VTI: 102 CM
BH CV ECHO MEAS - LVIDD: 4.3 CM
BH CV ECHO MEAS - LVIDS: 3.5 CM
BH CV ECHO MEAS - LVOT AREA: 3.8 CM2
BH CV ECHO MEAS - LVOT DIAM: 2.2 CM
BH CV ECHO MEAS - LVPWD: 1.19 CM
BH CV ECHO MEAS - MED PEAK E' VEL: 5.8 CM/SEC
BH CV ECHO MEAS - MR MAX PG: 52.4 MMHG
BH CV ECHO MEAS - MR MAX VEL: 362 CM/SEC
BH CV ECHO MEAS - MR MEAN PG: 32 MMHG
BH CV ECHO MEAS - MR MEAN VEL: 262 CM/SEC
BH CV ECHO MEAS - MR VTI: 96 CM
BH CV ECHO MEAS - MV A MAX VEL: 119 CM/SEC
BH CV ECHO MEAS - MV DEC SLOPE: 1612 CM/SEC2
BH CV ECHO MEAS - MV DEC TIME: 0.19 MSEC
BH CV ECHO MEAS - MV E MAX VEL: 85.2 CM/SEC
BH CV ECHO MEAS - MV E/A: 0.72
BH CV ECHO MEAS - MV P1/2T: 22.5 MSEC
BH CV ECHO MEAS - MVA(P1/2T): 9.8 CM2
BH CV ECHO MEAS - PA V2 MAX: 254 CM/SEC
BH CV ECHO MEAS - RAP SYSTOLE: 5 MMHG
BH CV ECHO MEAS - RV MAX PG: 12.8 MMHG
BH CV ECHO MEAS - RV V1 MAX: 179 CM/SEC
BH CV ECHO MEAS - RV V1 VTI: 20.7 CM
BH CV ECHO MEAS - RVSP: 50.7 MMHG
BH CV ECHO MEAS - SI(MOD-SP4): 31 ML/M2
BH CV ECHO MEAS - SV(LVOT): 387.7 ML
BH CV ECHO MEAS - SV(MOD-SP4): 60.8 ML
BH CV ECHO MEAS - TR MAX PG: 45.7 MMHG
BH CV ECHO MEAS - TR MAX VEL: 338 CM/SEC
BH CV ECHO MEASUREMENTS AVERAGE E/E' RATIO: 13.97
BH CV XLRA - TDI S': 10 CM/SEC
LEFT ATRIUM VOLUME INDEX: 34.2 ML/M2
QT INTERVAL: 322 MS
QTC INTERVAL: 457 MS

## 2023-08-12 PROCEDURE — 25010000002 THIAMINE PER 100 MG: Performed by: NURSE PRACTITIONER

## 2023-08-12 PROCEDURE — 25010000002 ENOXAPARIN PER 10 MG: Performed by: NURSE PRACTITIONER

## 2023-08-12 RX ORDER — METOPROLOL SUCCINATE 25 MG/1
25 TABLET, EXTENDED RELEASE ORAL
Status: DISCONTINUED | OUTPATIENT
Start: 2023-08-12 | End: 2023-08-15 | Stop reason: HOSPADM

## 2023-08-12 RX ORDER — TEMAZEPAM 15 MG/1
15 CAPSULE ORAL NIGHTLY PRN
Status: DISCONTINUED | OUTPATIENT
Start: 2023-08-12 | End: 2023-08-15 | Stop reason: HOSPADM

## 2023-08-12 RX ADMIN — SUCRALFATE 1 G: 1 TABLET ORAL at 11:30

## 2023-08-12 RX ADMIN — CHLORDIAZEPOXIDE HYDROCHLORIDE 10 MG: 10 CAPSULE ORAL at 11:30

## 2023-08-12 RX ADMIN — PANTOPRAZOLE SODIUM 40 MG: 40 TABLET, DELAYED RELEASE ORAL at 06:51

## 2023-08-12 RX ADMIN — CHLORDIAZEPOXIDE HYDROCHLORIDE 10 MG: 10 CAPSULE ORAL at 06:51

## 2023-08-12 RX ADMIN — SUCRALFATE 1 G: 1 TABLET ORAL at 06:54

## 2023-08-12 RX ADMIN — CHLORDIAZEPOXIDE HYDROCHLORIDE 10 MG: 10 CAPSULE ORAL at 18:45

## 2023-08-12 RX ADMIN — PAROXETINE 20 MG: 20 TABLET, FILM COATED ORAL at 20:30

## 2023-08-12 RX ADMIN — THIAMINE HYDROCHLORIDE 200 MG: 100 INJECTION, SOLUTION INTRAMUSCULAR; INTRAVENOUS at 06:51

## 2023-08-12 RX ADMIN — Medication 10 ML: at 08:29

## 2023-08-12 RX ADMIN — ASPIRIN 81 MG: 81 TABLET, COATED ORAL at 08:29

## 2023-08-12 RX ADMIN — TEMAZEPAM 15 MG: 15 CAPSULE ORAL at 22:38

## 2023-08-12 RX ADMIN — METOPROLOL SUCCINATE 25 MG: 25 TABLET, EXTENDED RELEASE ORAL at 11:30

## 2023-08-12 RX ADMIN — SUCRALFATE 1 G: 1 TABLET ORAL at 20:30

## 2023-08-12 RX ADMIN — Medication 10 ML: at 22:38

## 2023-08-12 RX ADMIN — FOLIC ACID 1 MG: 1 TABLET ORAL at 08:29

## 2023-08-12 RX ADMIN — THIAMINE HCL TAB 100 MG 100 MG: 100 TAB at 14:15

## 2023-08-12 RX ADMIN — SUCRALFATE 1 G: 1 TABLET ORAL at 18:45

## 2023-08-12 RX ADMIN — THIAMINE HYDROCHLORIDE 200 MG: 100 INJECTION, SOLUTION INTRAMUSCULAR; INTRAVENOUS at 00:30

## 2023-08-12 RX ADMIN — PANTOPRAZOLE SODIUM 40 MG: 40 TABLET, DELAYED RELEASE ORAL at 18:45

## 2023-08-12 RX ADMIN — CHLORDIAZEPOXIDE HYDROCHLORIDE 10 MG: 10 CAPSULE ORAL at 00:30

## 2023-08-12 RX ADMIN — COLLAGENASE SANTYL 1 APPLICATION: 250 OINTMENT TOPICAL at 08:29

## 2023-08-12 RX ADMIN — RISPERIDONE 1 MG: 1 TABLET, FILM COATED ORAL at 20:30

## 2023-08-12 RX ADMIN — LORAZEPAM 1 MG: 1 TABLET ORAL at 07:07

## 2023-08-12 RX ADMIN — ENOXAPARIN SODIUM 40 MG: 100 INJECTION SUBCUTANEOUS at 18:45

## 2023-08-12 NOTE — PROGRESS NOTES
[facility] Medicine Services  INPATIENT PROGRESS NOTE    Patient Name: Dayron Lubin  Date of Admission: 8/7/2023  Today's Date: 08/12/23  Length of Stay: 5  Primary Care Physician: Herberth Nguyen Jr., MD    Subjective   Chief Complaint:   Up with help to bathroom today   Still feels weak    HPI   Patient denies pain.  Is not short of breath.   No chest pain.   No delerium.   Eating ok.       Review of Systems   All pertinent negatives and positives are as above. All other systems have been reviewed and are negative unless otherwise stated.     Objective    Temp:  [97.6 øF (36.4 øC)-98.4 øF (36.9 øC)] 97.6 øF (36.4 øC)  Heart Rate:  [] 102  Resp:  [18-20] 18  BP: ()/(53-97) 125/83  Physical Exam  Vitals and nursing note reviewed.   Constitutional:       Appearance: Normal appearance.   HENT:      Head: Normocephalic and atraumatic.      Right Ear: External ear normal.      Left Ear: External ear normal.      Nose: Nose normal.      Mouth/Throat:      Mouth: Mucous membranes are moist.   Eyes:      Extraocular Movements: Extraocular movements intact.      Conjunctiva/sclera: Conjunctivae normal.      Pupils: Pupils are equal, round, and reactive to light.   Cardiovascular:      Rate and Rhythm: Normal rate and regular rhythm.      Pulses: Normal pulses.      Heart sounds: No murmur heard.    No friction rub. No gallop.   Pulmonary:      Effort: Pulmonary effort is normal.      Breath sounds: Normal breath sounds.   Abdominal:      General: Bowel sounds are normal.      Palpations: Abdomen is soft.   Musculoskeletal:         General: Normal range of motion.      Cervical back: Normal range of motion and neck supple.   Skin:     General: Skin is warm and dry.      Capillary Refill: Capillary refill takes less than 2 seconds.   Neurological:      General: No focal deficit present.      Mental Status: He is alert and oriented to person, place, and time.      Cranial  Nerves: No cranial nerve deficit.   Psychiatric:         Mood and Affect: Mood normal.         Behavior: Behavior normal.           Results Review:  I have reviewed the labs, radiology results, and diagnostic studies.    Laboratory Data:   Results from last 7 days   Lab Units 08/11/23  0320 08/10/23  0435 08/07/23  1406   WBC 10*3/mm3 8.63 11.78* 10.68   HEMOGLOBIN g/dL 10.7* 10.8* 12.4*   HEMATOCRIT % 32.2* 33.3* 36.3*   PLATELETS 10*3/mm3 152 156 248          Results from last 7 days   Lab Units 08/11/23  0320 08/10/23  2205 08/10/23  0435 08/09/23  0352   SODIUM mmol/L 135*  --  135* 132*   POTASSIUM mmol/L 4.2 4.2 2.8* 3.3*  3.3*   CHLORIDE mmol/L 104  --  98 94*   CO2 mmol/L 21.0*  --  21.0* 24.0   BUN mg/dL 20  --  31* 45*   CREATININE mg/dL 0.99  --  0.98 2.00*   CALCIUM mg/dL 9.0  --  8.7 7.9*   BILIRUBIN mg/dL 0.4  --  0.5 0.5   ALK PHOS U/L 53  --  54 52   ALT (SGPT) U/L 14  --  14 13   AST (SGOT) U/L 25  --  26 28   GLUCOSE mg/dL 93  --  106* 96         Culture Data:   No results found for: BLOODCX, URINECX, WOUNDCX, MRSACX, RESPCX, STOOLCX    Radiology Data:   Imaging Results (Last 24 Hours)       Procedure Component Value Units Date/Time    XR Chest PA & Lateral [[id number]] Collected: 08/11/23 1910     Updated: 08/11/23 1915    Narrative:      XR CHEST PA AND LATERAL- 8/11/2023 5:53 PM CDT     HISTORY: advential lung sounds; I50.813-Acute on chronic right heart  failure; I25.10-Atherosclerotic heart disease of native coronary artery  without angina pectoris; I25.83-Coronary atherosclerosis due to lipid  rich plaque; K29.80-Duodenitis without bleeding; Z87.898-Personal  history of other specified conditions; E87.20-Acidosis, unspecified;  R13.10-Dysphagia, unspecified; Z74.09-Other reduced mobility;        COMPARISON: Chest x-ray dated 08/07/2023.     FINDINGS:   Upright frontal and lateral radiographs of the chest were obtained.     No lung consolidation. No pleural effusion or pneumothorax.  The  cardiomediastinal silhouette and pulmonary vascularity are within normal  limits. The osseous structures and surrounding soft tissues demonstrate  no acute abnormality.       Impression:      1. No radiographic evidence of acute cardiopulmonary process. Lungs are  clear and well expanded.        This report was finalized on 08/11/2023 19:12 by Dr Gregory Guillermo, .            I have reviewed the patient's current medications.     Assessment/Plan   Assessment  Active Hospital Problems    Diagnosis     **Hypokalemia     SVT (supraventricular tachycardia)     Duodenitis     Gait instability     Elevated brain natriuretic peptide (BNP) level     ETOH abuse        Treatment Plan  Resume metoprolol home dose.   Chair for all meals.  Ambulate with walker in marcial        Medical Decision Making  Number and Complexity of problems:   Hypokalemia high complexity  Duodenitis moderate complexity  Gait instability moderate complexity  ETOH abuse moderate complexity   Elevated BNP moderate complexity  Differential Diagnosis:     Conditions and Status        improving     [facility] Data  External documents reviewed: no new  Cardiac tracing (EKG, telemetry) interpretation: reviewed  Radiology interpretation: reviewed  Labs reviewed: reviewed  Any tests that were considered but not ordered: none     Decision rules/scores evaluated (example [id number], [place], etc): none     Discussed with: patient     Care Planning  Shared decision making: patient  Code status and discussions: full    Disposition  Social Determinants of Health that impact treatment or disposition: none  I expect the patient to be discharged to snf in 1-2 days.     Electronically signed by Kathleen Tovar, 08/12/23, 11:11 CDT.

## 2023-08-13 PROCEDURE — 25010000002 ENOXAPARIN PER 10 MG: Performed by: NURSE PRACTITIONER

## 2023-08-13 PROCEDURE — 97110 THERAPEUTIC EXERCISES: CPT

## 2023-08-13 PROCEDURE — 97116 GAIT TRAINING THERAPY: CPT

## 2023-08-13 RX ADMIN — METOPROLOL SUCCINATE 25 MG: 25 TABLET, EXTENDED RELEASE ORAL at 08:22

## 2023-08-13 RX ADMIN — TEMAZEPAM 15 MG: 15 CAPSULE ORAL at 21:45

## 2023-08-13 RX ADMIN — COLLAGENASE SANTYL 1 APPLICATION: 250 OINTMENT TOPICAL at 08:22

## 2023-08-13 RX ADMIN — SUCRALFATE 1 G: 1 TABLET ORAL at 21:41

## 2023-08-13 RX ADMIN — RISPERIDONE 1 MG: 1 TABLET, FILM COATED ORAL at 21:41

## 2023-08-13 RX ADMIN — THIAMINE HCL TAB 100 MG 100 MG: 100 TAB at 08:21

## 2023-08-13 RX ADMIN — PANTOPRAZOLE SODIUM 40 MG: 40 TABLET, DELAYED RELEASE ORAL at 17:56

## 2023-08-13 RX ADMIN — CHLORDIAZEPOXIDE HYDROCHLORIDE 10 MG: 10 CAPSULE ORAL at 06:03

## 2023-08-13 RX ADMIN — PAROXETINE 20 MG: 20 TABLET, FILM COATED ORAL at 21:41

## 2023-08-13 RX ADMIN — SUCRALFATE 1 G: 1 TABLET ORAL at 06:52

## 2023-08-13 RX ADMIN — ENOXAPARIN SODIUM 40 MG: 100 INJECTION SUBCUTANEOUS at 17:56

## 2023-08-13 RX ADMIN — SUCRALFATE 1 G: 1 TABLET ORAL at 12:10

## 2023-08-13 RX ADMIN — FOLIC ACID 1 MG: 1 TABLET ORAL at 08:21

## 2023-08-13 RX ADMIN — Medication 10 ML: at 08:22

## 2023-08-13 RX ADMIN — PANTOPRAZOLE SODIUM 40 MG: 40 TABLET, DELAYED RELEASE ORAL at 06:52

## 2023-08-13 RX ADMIN — CHLORDIAZEPOXIDE HYDROCHLORIDE 10 MG: 10 CAPSULE ORAL at 01:30

## 2023-08-13 RX ADMIN — Medication 10 ML: at 21:45

## 2023-08-13 RX ADMIN — CHLORDIAZEPOXIDE HYDROCHLORIDE 10 MG: 10 CAPSULE ORAL at 12:10

## 2023-08-13 RX ADMIN — CHLORDIAZEPOXIDE HYDROCHLORIDE 10 MG: 10 CAPSULE ORAL at 17:56

## 2023-08-13 RX ADMIN — ASPIRIN 81 MG: 81 TABLET, COATED ORAL at 08:22

## 2023-08-13 RX ADMIN — SUCRALFATE 1 G: 1 TABLET ORAL at 17:56

## 2023-08-13 NOTE — PLAN OF CARE
Goal Outcome Evaluation:  Patient pleasant, cooperative, Aox4, denies pain, SOB, N/V.  Assist X1 with a walker, unsteady gate.  Medication given according to MAR, patient swallows pills with applesauce.  External catheter present.  CIWA score was a 2 at beginning of shift and remained stable throughout shift.      Problem: Fall Injury Risk  Goal: Absence of Fall and Fall-Related Injury  Outcome: Ongoing, Progressing  Intervention: Promote Injury-Free Environment  Recent Flowsheet Documentation  Taken 8/12/2023 2030 by Maisha Lua RN  Safety Promotion/Fall Prevention: safety round/check completed     Problem: Adult Inpatient Plan of Care  Goal: Absence of Hospital-Acquired Illness or Injury  Outcome: Ongoing, Progressing  Intervention: Identify and Manage Fall Risk  Recent Flowsheet Documentation  Taken 8/12/2023 2030 by Maisha Lua RN  Safety Promotion/Fall Prevention: safety round/check completed  Intervention: Prevent Skin Injury  Recent Flowsheet Documentation  Taken 8/12/2023 2030 by Maisha Lua RN  Body Position: position changed independently  Intervention: Prevent Infection  Recent Flowsheet Documentation  Taken 8/12/2023 2030 by Maisha Lua RN  Infection Prevention:   rest/sleep promoted   single patient room provided  Goal: Optimal Comfort and Wellbeing  Outcome: Ongoing, Progressing  Intervention: Provide Person-Centered Care  Recent Flowsheet Documentation  Taken 8/12/2023 2030 by Maisha Lua RN  Trust Relationship/Rapport:   care explained   emotional support provided   questions answered  Goal: Readiness for Transition of Care  Outcome: Ongoing, Progressing     Problem: Adjustment to Illness (Heart Failure)  Goal: Optimal Coping  Outcome: Ongoing, Progressing     Problem: Cardiac Output Decreased (Heart Failure)  Goal: Optimal Cardiac Output  Outcome: Ongoing, Progressing     Problem: Dysrhythmia (Heart Failure)  Goal: Stable Heart Rate and Rhythm  Outcome: Ongoing, Progressing      Problem: Fluid Imbalance (Heart Failure)  Goal: Fluid Balance  Outcome: Ongoing, Progressing     Problem: Functional Ability Impaired (Heart Failure)  Goal: Optimal Functional Ability  Outcome: Ongoing, Progressing     Problem: Oral Intake Inadequate (Heart Failure)  Goal: Optimal Nutrition Intake  Outcome: Ongoing, Progressing     Problem: Respiratory Compromise (Heart Failure)  Goal: Effective Oxygenation and Ventilation  Outcome: Ongoing, Progressing  Intervention: Promote Airway Secretion Clearance  Recent Flowsheet Documentation  Taken 8/12/2023 2030 by Maisha Lua RN  Cough And Deep Breathing: done independently per patient     Problem: Sleep Disordered Breathing (Heart Failure)  Goal: Effective Breathing Pattern During Sleep  Outcome: Ongoing, Progressing     Problem: Alcohol Withdrawal  Goal: Alcohol Withdrawal Symptom Control  Outcome: Ongoing, Progressing     Problem: Acute Neurologic Deterioration (Alcohol Withdrawal)  Goal: Optimal Neurologic Function  Outcome: Ongoing, Progressing     Problem: Substance Misuse (Alcohol Withdrawal)  Goal: Readiness for Change Identified  Outcome: Ongoing, Progressing     Problem: Skin Injury Risk Increased  Goal: Skin Health and Integrity  Outcome: Ongoing, Progressing  Intervention: Optimize Skin Protection  Recent Flowsheet Documentation  Taken 8/12/2023 2030 by Maisha Lua, RN  Head of Bed (HOB) Positioning: HOB at 20-30 degrees

## 2023-08-13 NOTE — PLAN OF CARE
Goal Outcome Evaluation:  Plan of Care Reviewed With: patient        Progress: improving  Outcome Evaluation: Pt up in chair with no c/o pain . Pt was cga to stand and to walk with RW 50ft at one time. Pt's HR increased to 143 and pt had no symptoms. He was educated on bed ex's. Pt would benefit from continued PT at SNF.

## 2023-08-13 NOTE — THERAPY TREATMENT NOTE
Acute Care - Physical Therapy Treatment Note  [facility]     Patient Name: Dayron Lubin  : 1955  MRN: [id number]  Today's Date: 2023      Visit Dx:     ICD-10-CM ICD-9-CM   1. Acute on chronic right-sided congestive heart failure  I50.813 428.0   2. Coronary artery disease due to lipid rich plaque  I25.10 414.00    I25.83 414.3   3. Duodenitis  K29.80 535.60   4. History of alcohol use  Z87.898 V11.3   5. Metabolic acidosis  E87.20 276.2   6. Dysphagia, unspecified type  R13.10 787.20   7. Impaired mobility [Z74.09 (ICD-10-CM)]  Z74.09 799.89   8. Impaired mobility and ADLs [Z74.09, Z78.9 (ICD-10-CM)]  Z74.09 V49.89    Z78.9    9. Pressure injury of left buttock, unstageable  L89.320 707.05     707.25   10. Pressure injury of right buttock, stage 3  L89.313 707.05     707.23   11. Non-pressure chronic ulcer of other part of left foot with fat layer exposed  L97.522 707.15     Patient Active Problem List   Diagnosis    Prostate cancer    Hx of radiation therapy    Elevated PSA    Encounter for follow-up surveillance of prostate cancer    HOCM (hypertrophic obstructive cardiomyopathy)    Coronary artery disease involving native coronary artery of native heart without angina pectoris    Essential hypertension    Mixed hyperlipidemia    Class 1 obesity with alveolar hypoventilation, serious comorbidity, and body mass index (BMI) of 30.0 to 30.9 in adult    Displaced fracture of lateral malleolus of right fibula, initial encounter for closed fracture    Family history of colonic polyps    History of colon polyps    Elevated brain natriuretic peptide (BNP) level    MIKEY (acute kidney injury)    Anxiety associated with depression    ETOH abuse    Alcoholic hepatitis    Volume depletion    Anorexia    CHF (congestive heart failure)    Hypokalemia    Duodenitis    Gait instability    SVT (supraventricular tachycardia)     Past Medical History:   Diagnosis Date    Alcoholic hepatitis 2023    CAD (coronary  artery disease)     History of external beam radiation therapy 05/12/2016    6840 cGy to prostate bed    Hyperlipidemia     Hypertension     Prostate cancer      Past Surgical History:   Procedure Laterality Date    CARDIAC CATHETERIZATION      CORONARY ANGIOPLASTY WITH STENT PLACEMENT      PROSTATE SURGERY      TONSILLECTOMY       PT Assessment (last 12 hours)       PT Evaluation and Treatment       Row Name 08/13/23 0854          Physical Therapy Time and Intention    Subjective Information no complaints  -AE     Document Type therapy note (daily note)  -AE     Mode of Treatment physical therapy  -AE       Row Name 08/13/23 0854          General Information    Existing Precautions/Restrictions fall  -AE       Row Name 08/13/23 0854          Pain Scale: FACES Pre/Post-Treatment    Pain: FACES Scale, Pretreatment 0-->no hurt  -AE     Posttreatment Pain Rating 0-->no hurt  -AE       Row Name 08/13/23 0854          Bed Mobility    Sit-Supine Orange (Bed Mobility) standby assist  -AE     Comment, (Bed Mobility) chair  -AE       Row Name 08/13/23 0854          Sit-Stand Transfer    Sit-Stand Orange (Transfers) standby assist  -AE       Row Name 08/13/23 0854          Stand-Sit Transfer    Stand-Sit Orange (Transfers) standby assist  -AE       Row Name 08/13/23 0854          Gait/Stairs (Locomotion)    Orange Level (Gait) contact guard  -AE     Assistive Device (Gait) walker, front-wheeled  -AE     Distance in Feet (Gait) 50  x2  -AE     Deviations/Abnormal Patterns (Gait) base of support, narrow;stride length decreased  -AE       Row Name 08/13/23 0854          Knee (Therapeutic Exercise)    Knee (Therapeutic Exercise) AROM (active range of motion)  -AE     Knee AROM (Therapeutic Exercise) LAQ (long arc quad);10 repetitions  -AE       Row Name             Wound 08/07/23 1655 Left gluteal Pressure Injury    Wound - Properties Group Placement Date: 08/07/23  -AC Placement Time: 1655 -AC Present on  Hospital Admission: Y  -AC Side: Left  -AC Location: gluteal  -AC Primary Wound Type: Pressure inj  -AC    Retired Wound - Properties Group Placement Date: 08/07/23  - Placement Time: 1655  -AC Present on Hospital Admission: Y  -AC Side: Left  -AC Location: gluteal  -AC Primary Wound Type: Pressure inj  -AC    Retired Wound - Properties Group Date first assessed: 08/07/23  - Time first assessed: 1655  -AC Present on Hospital Admission: Y  -AC Side: Left  -AC Location: gluteal  -AC Primary Wound Type: Pressure inj  -AC      Row Name             Wound 08/07/23 1655 Right gluteal Pressure Injury    Wound - Properties Group Placement Date: 08/07/23  -AC Placement Time: 1655 -AC Present on Hospital Admission: Y  -AC Side: Right  -AC Location: gluteal  -AC Primary Wound Type: Pressure inj  -AC    Retired Wound - Properties Group Placement Date: 08/07/23  - Placement Time: 1655 -AC Present on Hospital Admission: Y  -AC Side: Right  -AC Location: gluteal  -AC Primary Wound Type: Pressure inj  -AC    Retired Wound - Properties Group Date first assessed: 08/07/23  - Time first assessed: 1655  -AC Present on Hospital Admission: Y  -AC Side: Right  -AC Location: gluteal  -AC Primary Wound Type: Pressure inj  -AC      Row Name             Wound 08/07/23 1655 Left posterior heel Pressure Injury    Wound - Properties Group Placement Date: 08/07/23  - Placement Time: 1655 -AC Present on Hospital Admission: Y  -AC Side: Left  -AC Orientation: posterior  -AC Location: heel  -AC Primary Wound Type: Pressure inj  -AC    Retired Wound - Properties Group Placement Date: 08/07/23  -AC Placement Time: 1655  -AC Present on Hospital Admission: Y  -AC Side: Left  -AC Orientation: posterior  -AC Location: heel  -AC Primary Wound Type: Pressure inj  -AC    Retired Wound - Properties Group Date first assessed: 08/07/23  - Time first assessed: 1655  -AC Present on Hospital Admission: Y  -AC Side: Left  -AC Location: heel  -AC  Primary Wound Type: Pressure inj  -AC      Row Name             Wound 08/07/23 1656 Left posterior foot Pressure Injury    Wound - Properties Group Placement Date: 08/07/23  -AC Placement Time: 1656  -AC Present on Hospital Admission: Y  -AC Side: Left  -AC Orientation: posterior  -AC Location: foot  -AC Primary Wound Type: Pressure inj  -AC    Retired Wound - Properties Group Placement Date: 08/07/23  -AC Placement Time: 1656  -AC Present on Hospital Admission: Y  -AC Side: Left  -AC Orientation: posterior  -AC Location: foot  -AC Primary Wound Type: Pressure inj  -AC    Retired Wound - Properties Group Date first assessed: 08/07/23  -AC Time first assessed: 1656  -AC Present on Hospital Admission: Y  -AC Side: Left  -AC Location: foot  -AC Primary Wound Type: Pressure inj  -AC      Row Name 08/13/23 0854          Vital Signs    Pretreatment Heart Rate (beats/min) 124  -AE     Posttreatment Heart Rate (beats/min) 142  -AE       Row Name 08/13/23 0854          Positioning and Restraints    Pre-Treatment Position sitting in chair/recliner  -AE     Post Treatment Position bed  -AE     In Bed fowlers;call light within reach  -AE               User Key  (r) = Recorded By, (t) = Taken By, (c) = Cosigned By      Initials Name Provider Type    AE Kayla Conteh PTA Physical Therapist Assistant    AC [place] Echevarria, RN Registered Nurse                    Physical Therapy Education       Title: PT OT SLP Therapies (In Progress)       Topic: Physical Therapy (Done)       Point: Mobility training (Done)       Learning Progress Summary             Patient Eager, E, VU by  at 8/13/2023 0914    Comment: HEP,POC    Acceptance, E,D, NR,DU,VU by  at 8/8/2023 1359    Comment: benefits of PT and POC, call for A OOB                         Point: Precautions (Done)       Learning Progress Summary             Patient Acceptance, E,D, NR,DU,VU by  at 8/8/2023 1359    Comment: benefits of PT and POC, call for A OOB                                          User Key       Initials Effective Dates Name Provider Type Discipline    AE 02/03/23 -  Kayla Conteh, PTA Physical Therapist Assistant PT     02/03/23 -  Justyn Briggs, PT Physical Therapist PT                  PT Recommendation and Plan     Plan of Care Reviewed With: patient  Progress: improving  Outcome Evaluation: Pt up in chair with no c/o pain . Pt was cga to stand and to walk with RW 50ft at one time. Pt's HR increased to 143 and pt had no symptoms. He was educated on bed ex's. Pt would benefit from continued PT at [facility].   Outcome Measures       Row Name 08/13/23 0900 08/11/23 1320 08/11/23 0910       How much help from another person do you currently need...    Turning from your back to your side while in flat bed without using bedrails? 4  -AE 4  -TB --    Moving from lying on back to sitting on the side of a flat bed without bedrails? 3  -AE 3  -TB --    Moving to and from a bed to a chair (including a wheelchair)? 3  -AE 3  -TB --    Standing up from a chair using your arms (e.g., wheelchair, bedside chair)? 3  -AE 3  -TB --    Climbing 3-5 steps with a railing? 3  -AE 2  -TB --    To walk in hospital room? 3  -AE 3  -TB --    AM-PAC 6 Clicks Score (PT) 19  -AE 18  -TB --       How much help from another is currently needed...    Putting on and taking off regular lower body clothing? -- -- 2  -AC    Bathing (including washing, rinsing, and drying) -- -- 2  -AC    Toileting (which includes using toilet bed pan or urinal) -- -- 2  -AC    Putting on and taking off regular upper body clothing -- -- 3  -AC    Taking care of personal grooming (such as brushing teeth) -- -- 3  -AC    Eating meals -- -- 4  -AC    AM-PAC 6 Clicks Score (OT) -- -- 16  -AC       Functional Assessment    Outcome Measure Options AM-PAC 6 Clicks Basic Mobility (PT)  -AE AM-PAC 6 Clicks Basic Mobility (PT)  -TB AM-PAC 6 Clicks Daily Activity (OT)  -AC              User Key  (r) = Recorded By, (t) =  Taken By, (c) = Cosigned By      Initials Name Provider Type    AC [place] N, OTR/L, CNT Occupational Therapist    AE Kayla Conteh PTA Physical Therapist Assistant    Nasir Boyer PTA Physical Therapist Assistant                     Time Calculation:    PT Charges       Row Name 08/13/23 0918             Time Calculation    Start Time 0854  -AE      Stop Time 0918  -AE      Time Calculation (min) 24 min  -AE      PT Received On 08/13/23  -AE      PT Goal Re-Cert Due Date 08/18/23  -AE         Time Calculation- PT    Total Timed Code Minutes- PT 24 minute(s)  -AE         Timed Charges    77002 - Gait Training Minutes  24  -AE         Total Minutes    Timed Charges Total Minutes 24  -AE       Total Minutes 24  -AE                User Key  (r) = Recorded By, (t) = Taken By, (c) = Cosigned By      Initials Name Provider Type    AE Kayla Conteh PTA Physical Therapist Assistant                  Therapy Charges for Today       Code Description Service Date Service Provider Modifiers Qty    36129652992 HC GAIT TRAINING EA 15 MIN 8/13/2023 Kayla Conteh PTA GP 1    52659678072 HC PT THER PROC EA 15 MIN 8/13/2023 Kayla Conteh PTA GP 1            PT G-Codes  Outcome Measure Options: AM-PAC 6 Clicks Basic Mobility (PT)  AM-PAC 6 Clicks Score (PT): 19  AM-PAC 6 Clicks Score (OT): 16    Kayla Conteh PTA  8/13/2023

## 2023-08-13 NOTE — PROGRESS NOTES
[facility] Medicine Services  INPATIENT PROGRESS NOTE    Patient Name: Dayron Lubin  Date of Admission: 8/7/2023  Today's Date: 08/13/23  Length of Stay: 6  Primary Care Physician: Herberth Nguyen Jr., MD    Subjective   Chief Complaint:   Up with therapy in [place] today.    HPI   Feels he is starting to do better.   No nausea  No chest pain  No shortness of breath  Mood fair.   Sleep still interrupted by nursing.       Review of Systems   All pertinent negatives and positives are as above. All other systems have been reviewed and are negative unless otherwise stated.     Objective    Temp:  [97.1 øF (36.2 øC)-98.8 øF (37.1 øC)] 97.7 øF (36.5 øC)  Heart Rate:  [] 95  Resp:  [16-18] 16  BP: (116-149)/(59-94) 123/89  Physical Exam  Vitals and nursing note reviewed.   Constitutional:       Appearance: Normal appearance.   HENT:      Head: Normocephalic and atraumatic.      Right Ear: External ear normal.      Left Ear: External ear normal.      Nose: Nose normal.      Mouth/Throat:      Mouth: Mucous membranes are moist.   Eyes:      Extraocular Movements: Extraocular movements intact.      Conjunctiva/sclera: Conjunctivae normal.      Pupils: Pupils are equal, round, and reactive to light.   Cardiovascular:      Rate and Rhythm: Normal rate and regular rhythm.      Pulses: Normal pulses.      Heart sounds: No murmur heard.    No friction rub. No gallop.   Pulmonary:      Effort: Pulmonary effort is normal.      Breath sounds: Normal breath sounds.   Abdominal:      General: Bowel sounds are normal.      Palpations: Abdomen is soft.   Musculoskeletal:         General: Normal range of motion.      Cervical back: Normal range of motion and neck supple.      Comments: Sitting up in chair.   Skin:     General: Skin is warm and dry.      Capillary Refill: Capillary refill takes less than 2 seconds.   Neurological:      General: No focal deficit present.      Mental Status: He is  alert and oriented to person, place, and time.      Cranial Nerves: No cranial nerve deficit.   Psychiatric:         Mood and Affect: Mood normal.         Behavior: Behavior normal.           Results Review:  I have reviewed the labs, radiology results, and diagnostic studies.    Laboratory Data:   Results from last 7 days   Lab Units 08/11/23  0320 08/10/23  0435 08/07/23  1406   WBC 10*3/mm3 8.63 11.78* 10.68   HEMOGLOBIN g/dL 10.7* 10.8* 12.4*   HEMATOCRIT % 32.2* 33.3* 36.3*   PLATELETS 10*3/mm3 152 156 248          Results from last 7 days   Lab Units 08/11/23  0320 08/10/23  2205 08/10/23  0435 08/09/23  0352   SODIUM mmol/L 135*  --  135* 132*   POTASSIUM mmol/L 4.2 4.2 2.8* 3.3*  3.3*   CHLORIDE mmol/L 104  --  98 94*   CO2 mmol/L 21.0*  --  21.0* 24.0   BUN mg/dL 20  --  31* 45*   CREATININE mg/dL 0.99  --  0.98 2.00*   CALCIUM mg/dL 9.0  --  8.7 7.9*   BILIRUBIN mg/dL 0.4  --  0.5 0.5   ALK PHOS U/L 53  --  54 52   ALT (SGPT) U/L 14  --  14 13   AST (SGOT) U/L 25  --  26 28   GLUCOSE mg/dL 93  --  106* 96         Culture Data:   No results found for: BLOODCX, URINECX, WOUNDCX, MRSACX, RESPCX, STOOLCX    Radiology Data:   Imaging Results (Last 24 Hours)       ** No results found for the last 24 hours. **            I have reviewed the patient's current medications.     Assessment/Plan   Assessment  Active Hospital Problems    Diagnosis     **Hypokalemia     SVT (supraventricular tachycardia)     Duodenitis     Gait instability     Elevated brain natriuretic peptide (BNP) level     ETOH abuse        Treatment Plan  Heart rate better controlled since resuming metoprolol  Increase activity  Ready for SNF          Medical Decision Making  Number and Complexity of problems:   Hypokalemia high complexity  Duodenitis moderate complexity  Gait instability moderate complexity  ETOH abuse moderate complexity   Elevated BNP moderate complexity  Differential Diagnosis:     Conditions and Status        improving      MDM Data  External documents reviewed: no new  Cardiac tracing (EKG, telemetry) interpretation: reviewed  Radiology interpretation: reviewed  Labs reviewed: reviewed  Any tests that were considered but not ordered: none     Decision rules/scores evaluated (example [id number], [place], etc): none     Discussed with: patient     Care Planning  Shared decision making: patient  Code status and discussions: full    Disposition  Social Determinants of Health that impact treatment or disposition: none  I expect the patient to be discharged to snf in 1-2 days.     Electronically signed by Kathleen Tovar, 08/13/23, 14:27 CDT.

## 2023-08-14 PROCEDURE — 97110 THERAPEUTIC EXERCISES: CPT

## 2023-08-14 PROCEDURE — 25010000002 ENOXAPARIN PER 10 MG: Performed by: NURSE PRACTITIONER

## 2023-08-14 PROCEDURE — 97116 GAIT TRAINING THERAPY: CPT

## 2023-08-14 RX ORDER — MULTIPLE VITAMINS W/ MINERALS TAB 9MG-400MCG
1 TAB ORAL DAILY
Status: DISCONTINUED | OUTPATIENT
Start: 2023-08-14 | End: 2023-08-15 | Stop reason: HOSPADM

## 2023-08-14 RX ADMIN — Medication 1 TABLET: at 17:16

## 2023-08-14 RX ADMIN — Medication 10 ML: at 09:17

## 2023-08-14 RX ADMIN — ACETAMINOPHEN 650 MG: 325 TABLET, FILM COATED ORAL at 15:51

## 2023-08-14 RX ADMIN — FOLIC ACID 1 MG: 1 TABLET ORAL at 09:07

## 2023-08-14 RX ADMIN — PANTOPRAZOLE SODIUM 40 MG: 40 TABLET, DELAYED RELEASE ORAL at 09:05

## 2023-08-14 RX ADMIN — SUCRALFATE 1 G: 1 TABLET ORAL at 09:07

## 2023-08-14 RX ADMIN — RISPERIDONE 1 MG: 1 TABLET, FILM COATED ORAL at 20:49

## 2023-08-14 RX ADMIN — SUCRALFATE 1 G: 1 TABLET ORAL at 12:11

## 2023-08-14 RX ADMIN — ENOXAPARIN SODIUM 40 MG: 100 INJECTION SUBCUTANEOUS at 17:16

## 2023-08-14 RX ADMIN — CHLORDIAZEPOXIDE HYDROCHLORIDE 10 MG: 10 CAPSULE ORAL at 12:12

## 2023-08-14 RX ADMIN — CHLORDIAZEPOXIDE HYDROCHLORIDE 10 MG: 10 CAPSULE ORAL at 23:08

## 2023-08-14 RX ADMIN — ASPIRIN 81 MG: 81 TABLET, COATED ORAL at 09:07

## 2023-08-14 RX ADMIN — SUCRALFATE 1 G: 1 TABLET ORAL at 17:16

## 2023-08-14 RX ADMIN — THIAMINE HCL TAB 100 MG 100 MG: 100 TAB at 09:07

## 2023-08-14 RX ADMIN — CHLORDIAZEPOXIDE HYDROCHLORIDE 10 MG: 10 CAPSULE ORAL at 01:11

## 2023-08-14 RX ADMIN — CHLORDIAZEPOXIDE HYDROCHLORIDE 10 MG: 10 CAPSULE ORAL at 06:33

## 2023-08-14 RX ADMIN — PAROXETINE 20 MG: 20 TABLET, FILM COATED ORAL at 20:48

## 2023-08-14 RX ADMIN — TEMAZEPAM 15 MG: 15 CAPSULE ORAL at 23:08

## 2023-08-14 RX ADMIN — SUCRALFATE 1 G: 1 TABLET ORAL at 20:49

## 2023-08-14 RX ADMIN — CHLORDIAZEPOXIDE HYDROCHLORIDE 10 MG: 10 CAPSULE ORAL at 17:16

## 2023-08-14 RX ADMIN — METOPROLOL SUCCINATE 25 MG: 25 TABLET, EXTENDED RELEASE ORAL at 09:07

## 2023-08-14 RX ADMIN — COLLAGENASE SANTYL 1 APPLICATION: 250 OINTMENT TOPICAL at 09:16

## 2023-08-14 RX ADMIN — Medication 10 ML: at 20:54

## 2023-08-14 RX ADMIN — PANTOPRAZOLE SODIUM 40 MG: 40 TABLET, DELAYED RELEASE ORAL at 17:16

## 2023-08-14 NOTE — DISCHARGE PLACEMENT REQUEST
Physical Therapy Notes (last 48 hours)        Kayla Conteh PTA at 23  Version 1 of 1         Goal Outcome Evaluation:  Plan of Care Reviewed With: patient        Progress: improving  Outcome Evaluation: Pt up in chair with no c/o pain . Pt was cga to stand and to walk with RW 50ft at one time. Pt's HR increased to 143 and pt had no symptoms. He was educated on bed ex's. Pt would benefit from continued PT at SNF.           Electronically signed by Kayla Conteh PTA at 23       Kayla Conteh PTA at 23  Version 1 of 1         Acute Care - Physical Therapy Treatment Note  [facility]     Patient Name: Dayron Lubin  : 1955  MRN: [id number]  Today's Date: 2023      Visit Dx:     ICD-10-CM ICD-9-CM   1. Acute on chronic right-sided congestive heart failure  I50.813 428.0   2. Coronary artery disease due to lipid rich plaque  I25.10 414.00    I25.83 414.3   3. Duodenitis  K29.80 535.60   4. History of alcohol use  Z87.898 V11.3   5. Metabolic acidosis  E87.20 276.2   6. Dysphagia, unspecified type  R13.10 787.20   7. Impaired mobility [Z74.09 (ICD-10-CM)]  Z74.09 799.89   8. Impaired mobility and ADLs [Z74.09, Z78.9 (ICD-10-CM)]  Z74.09 V49.89    Z78.9    9. Pressure injury of left buttock, unstageable  L89.320 707.05     707.25   10. Pressure injury of right buttock, stage 3  L89.313 707.05     707.23   11. Non-pressure chronic ulcer of other part of left foot with fat layer exposed  L97.522 707.15     Patient Active Problem List   Diagnosis    Prostate cancer    Hx of radiation therapy    Elevated PSA    Encounter for follow-up surveillance of prostate cancer    HOCM (hypertrophic obstructive cardiomyopathy)    Coronary artery disease involving native coronary artery of native heart without angina pectoris    Essential hypertension    Mixed hyperlipidemia    Class 1 obesity with alveolar hypoventilation, serious comorbidity, and body mass index (BMI)  of 30.0 to 30.9 in adult    Displaced fracture of lateral malleolus of right fibula, initial encounter for closed fracture    Family history of colonic polyps    History of colon polyps    Elevated brain natriuretic peptide (BNP) level    MIKEY (acute kidney injury)    Anxiety associated with depression    ETOH abuse    Alcoholic hepatitis    Volume depletion    Anorexia    CHF (congestive heart failure)    Hypokalemia    Duodenitis    Gait instability    SVT (supraventricular tachycardia)     Past Medical History:   Diagnosis Date    Alcoholic hepatitis 7/13/2023    CAD (coronary artery disease)     History of external beam radiation therapy 05/12/2016    6840 cGy to prostate bed    Hyperlipidemia     Hypertension     Prostate cancer      Past Surgical History:   Procedure Laterality Date    CARDIAC CATHETERIZATION      CORONARY ANGIOPLASTY WITH STENT PLACEMENT      PROSTATE SURGERY      TONSILLECTOMY       PT Assessment (last 12 hours)       PT Evaluation and Treatment       Row Name 08/13/23 0854          Physical Therapy Time and Intention    Subjective Information no complaints  -AE     Document Type therapy note (daily note)  -AE     Mode of Treatment physical therapy  -AE       Row Name 08/13/23 0854          General Information    Existing Precautions/Restrictions fall  -AE       Row Name 08/13/23 0854          Pain Scale: FACES Pre/Post-Treatment    Pain: FACES Scale, Pretreatment 0-->no hurt  -AE     Posttreatment Pain Rating 0-->no hurt  -AE       Row Name 08/13/23 0854          Bed Mobility    Sit-Supine Crawford (Bed Mobility) standby assist  -AE     Comment, (Bed Mobility) chair  -AE       Row Name 08/13/23 0854          Sit-Stand Transfer    Sit-Stand Crawford (Transfers) standby assist  -AE       Row Name 08/13/23 0854          Stand-Sit Transfer    Stand-Sit Crawford (Transfers) standby assist  -AE       Row Name 08/13/23 0854          Gait/Stairs (Locomotion)    Crawford Level (Gait)  contact guard  -AE     Assistive Device (Gait) walker, front-wheeled  -AE     Distance in Feet (Gait) 50  x2  -AE     Deviations/Abnormal Patterns (Gait) base of support, narrow;stride length decreased  -AE       Row Name 08/13/23 0854          Knee (Therapeutic Exercise)    Knee (Therapeutic Exercise) AROM (active range of motion)  -AE     Knee AROM (Therapeutic Exercise) LAQ (long arc quad);10 repetitions  -AE       Row Name             Wound 08/07/23 1655 Left gluteal Pressure Injury    Wound - Properties Group Placement Date: 08/07/23  -AC Placement Time: 1655  -AC Present on Hospital Admission: Y  -AC Side: Left  -AC Location: gluteal  -AC Primary Wound Type: Pressure inj  -AC    Retired Wound - Properties Group Placement Date: 08/07/23  -AC Placement Time: 1655  -AC Present on Hospital Admission: Y  -AC Side: Left  -AC Location: gluteal  -AC Primary Wound Type: Pressure inj  -AC    Retired Wound - Properties Group Date first assessed: 08/07/23  -AC Time first assessed: 1655  -AC Present on Hospital Admission: Y  -AC Side: Left  -AC Location: gluteal  -AC Primary Wound Type: Pressure inj  -AC      Row Name             Wound 08/07/23 1655 Right gluteal Pressure Injury    Wound - Properties Group Placement Date: 08/07/23  -AC Placement Time: 1655  -AC Present on Hospital Admission: Y  -AC Side: Right  -AC Location: gluteal  -AC Primary Wound Type: Pressure inj  -AC    Retired Wound - Properties Group Placement Date: 08/07/23  -AC Placement Time: 1655  -AC Present on Hospital Admission: Y  -AC Side: Right  -AC Location: gluteal  -AC Primary Wound Type: Pressure inj  -AC    Retired Wound - Properties Group Date first assessed: 08/07/23  -AC Time first assessed: 1655  -AC Present on Hospital Admission: Y  -AC Side: Right  -AC Location: gluteal  -AC Primary Wound Type: Pressure inj  -AC      Row Name             Wound 08/07/23 1655 Left posterior heel Pressure Injury    Wound - Properties Group Placement Date:  08/07/23  - Placement Time: 1655  -AC Present on Hospital Admission: Y  -AC Side: Left  -AC Orientation: posterior  -AC Location: heel  -AC Primary Wound Type: Pressure inj  -AC    Retired Wound - Properties Group Placement Date: 08/07/23  - Placement Time: 1655  -AC Present on Hospital Admission: Y  -AC Side: Left  -AC Orientation: posterior  -AC Location: heel  -AC Primary Wound Type: Pressure inj  -AC    Retired Wound - Properties Group Date first assessed: 08/07/23  - Time first assessed: 1655  -AC Present on Hospital Admission: Y  -AC Side: Left  -AC Location: heel  -AC Primary Wound Type: Pressure inj  -AC      Row Name             Wound 08/07/23 1656 Left posterior foot Pressure Injury    Wound - Properties Group Placement Date: 08/07/23  - Placement Time: 1656  -AC Present on Hospital Admission: Y  -AC Side: Left  -AC Orientation: posterior  -AC Location: foot  -AC Primary Wound Type: Pressure inj  -AC    Retired Wound - Properties Group Placement Date: 08/07/23  - Placement Time: 1656  -AC Present on Hospital Admission: Y  -AC Side: Left  -AC Orientation: posterior  -AC Location: foot  -AC Primary Wound Type: Pressure inj  -AC    Retired Wound - Properties Group Date first assessed: 08/07/23  - Time first assessed: 1656  -AC Present on Hospital Admission: Y  -AC Side: Left  -AC Location: foot  -AC Primary Wound Type: Pressure inj  -AC      Row Name 08/13/23 0854          Vital Signs    Pretreatment Heart Rate (beats/min) 124  -AE     Posttreatment Heart Rate (beats/min) 142  -AE       Row Name 08/13/23 0854          Positioning and Restraints    Pre-Treatment Position sitting in chair/recliner  -AE     Post Treatment Position bed  -AE     In Bed fowlers;call light within reach  -AE               User Key  (r) = Recorded By, (t) = Taken By, (c) = Cosigned By      Initials Name Provider Type    Kayla Hilliard PTA Physical Therapist Assistant    AC Boston Echevarria, RN Registered Nurse                     Physical Therapy Education       Title: PT OT SLP Therapies (In Progress)       Topic: Physical Therapy (Done)       Point: Mobility training (Done)       Learning Progress Summary             Patient Eager, E, VU by  at 8/13/2023 0914    Comment: HEP,POC    Acceptance, E,D, NR,DU,VU by  at 8/8/2023 1359    Comment: benefits of PT and POC, call for A OOB                         Point: Precautions (Done)       Learning Progress Summary             Patient Acceptance, E,D, NR,DU,VU by  at 8/8/2023 1359    Comment: benefits of PT and POC, call for A OOB                                         User Key       Initials Effective Dates Name Provider Type Discipline    AE 02/03/23 -  Kayla Conteh, PTA Physical Therapist Assistant PT     02/03/23 -  Justyn Briggs, PT Physical Therapist PT                  PT Recommendation and Plan     Plan of Care Reviewed With: patient  Progress: improving  Outcome Evaluation: Pt up in chair with no c/o pain . Pt was cga to stand and to walk with RW 50ft at one time. Pt's HR increased to 143 and pt had no symptoms. He was educated on bed ex's. Pt would benefit from continued PT at [facility].   Outcome Measures       Row Name 08/13/23 0900 08/11/23 1320 08/11/23 0910       How much help from another person do you currently need...    Turning from your back to your side while in flat bed without using bedrails? 4  -AE 4  -TB --    Moving from lying on back to sitting on the side of a flat bed without bedrails? 3  -AE 3  -TB --    Moving to and from a bed to a chair (including a wheelchair)? 3  -AE 3  -TB --    Standing up from a chair using your arms (e.g., wheelchair, bedside chair)? 3  -AE 3  -TB --    Climbing 3-5 steps with a railing? 3  -AE 2  -TB --    To walk in hospital room? 3  -AE 3  -TB --    AM-PAC 6 Clicks Score (PT) 19  -AE 18  -TB --       How much help from another is currently needed...    Putting on and taking off regular lower body clothing? -- -- 2   -AC    Bathing (including washing, rinsing, and drying) -- -- 2  -AC    Toileting (which includes using toilet bed pan or urinal) -- -- 2  -AC    Putting on and taking off regular upper body clothing -- -- 3  -AC    Taking care of personal grooming (such as brushing teeth) -- -- 3  -AC    Eating meals -- -- 4  -AC    AM-PAC 6 Clicks Score (OT) -- -- 16  -AC       Functional Assessment    Outcome Measure Options AM-PAC 6 Clicks Basic Mobility (PT)  -AE AM-PAC 6 Clicks Basic Mobility (PT)  -TB AM-PAC 6 Clicks Daily Activity (OT)  -AC              User Key  (r) = Recorded By, (t) = Taken By, (c) = Cosigned By      Initials Name Provider Type    AC Good Pr[place] N, OTR/L, CNT Occupational Therapist    AE Kayla Conteh, PTA Physical Therapist Assistant    TB Nasir Perez, PTA Physical Therapist Assistant                     Time Calculation:    PT Charges       Row Name 08/13/23 0918             Time Calculation    Start Time 0854  -AE      Stop Time 0918  -AE      Time Calculation (min) 24 min  -AE      PT Received On 08/13/23  -AE      PT Goal Re-Cert Due Date 08/18/23  -AE         Time Calculation- PT    Total Timed Code Minutes- PT 24 minute(s)  -AE         Timed Charges    01592 - Gait Training Minutes  24  -AE         Total Minutes    Timed Charges Total Minutes 24  -AE       Total Minutes 24  -AE                User Key  (r) = Recorded By, (t) = Taken By, (c) = Cosigned By      Initials Name Provider Type    AE Kayla Conteh PTA Physical Therapist Assistant                  Therapy Charges for Today       Code Description Service Date Service Provider Modifiers Qty    90890664763 HC GAIT TRAINING EA 15 MIN 8/13/2023 Kayla Conteh, PTA GP 1    56703394084 HC PT THER PROC EA 15 MIN 8/13/2023 Kayla Conteh, PTA GP 1            PT G-Codes  Outcome Measure Options: AM-PAC 6 Clicks Basic Mobility (PT)  AM-PAC 6 Clicks Score (PT): 19  AM-PAC 6 Clicks Score (OT): 16    Kayla Conteh  PTA  8/13/2023      Electronically signed by Kayla Conteh, PTA at 08/13/23 0919

## 2023-08-14 NOTE — PLAN OF CARE
Goal Outcome Evaluation:  Plan of Care Reviewed With: patient        Progress: improving  Outcome Evaluation: VSS. Wound care completed per orders. Turned Q2H. Patient walked well with walker. No signs of withdrawl. No ativan given. CIWA 0. tylenol given for pain.

## 2023-08-14 NOTE — PLAN OF CARE
Goal Outcome Evaluation:  Patient Aox4, calm and cooperative.  Denies pain, N/V/D and SOB.  Patient is getting better at transfer and was up to toilet x 1 assist with walker, tolerated well.  PO meds are taken with applesauce.      Problem: Adult Inpatient Plan of Care  Goal: Optimal Comfort and Wellbeing  Outcome: Ongoing, Progressing  Goal: Readiness for Transition of Care  Outcome: Ongoing, Progressing     Problem: Adjustment to Illness (Heart Failure)  Goal: Optimal Coping  Outcome: Ongoing, Progressing     Problem: Cardiac Output Decreased (Heart Failure)  Goal: Optimal Cardiac Output  Outcome: Ongoing, Progressing     Problem: Dysrhythmia (Heart Failure)  Goal: Stable Heart Rate and Rhythm  Outcome: Ongoing, Progressing     Problem: Fluid Imbalance (Heart Failure)  Goal: Fluid Balance  Outcome: Ongoing, Progressing     Problem: Functional Ability Impaired (Heart Failure)  Goal: Optimal Functional Ability  Outcome: Ongoing, Progressing  Intervention: Optimize Functional Ability  Recent Flowsheet Documentation  Taken 8/13/2023 2141 by Maisha Lua, RN  Activity Management: activity encouraged     Problem: Oral Intake Inadequate (Heart Failure)  Goal: Optimal Nutrition Intake  Outcome: Ongoing, Progressing

## 2023-08-14 NOTE — PROGRESS NOTES
[facility] Medicine Services  INPATIENT PROGRESS NOTE    Patient Name: Dayron Lubin  Date of Admission: 8/7/2023  Today's Date: 08/14/23  Length of Stay: 7  Primary Care Physician: Herberth Nguyen Jr., MD    Subjective   Chief Complaint: Generalized weakness  HPI     The patient continues to work with physical therapy.  He has been offered a bed at [facility] and [employer] insurance precertification remains pending.  The patient's family was present in the room today with him and all questions were answered to the best of my ability.  CBC shows normal white blood cell count.  Hemoglobin 10.7.  CMP is essentially unremarkable except sodium 135.  The patient has been afebrile with stable vital signs.  Plan discharge when insurance precertification is finalized.    Review of Systems   All pertinent negatives and positives are as above. All other systems have been reviewed and are negative unless otherwise stated.     Objective    Temp:  [97.7 øF (36.5 øC)-98.6 øF (37 øC)] 98.1 øF (36.7 øC)  Heart Rate:  [108-116] 108  Resp:  [20-24] 20  BP: (113-148)/(69-95) 137/89  Physical Exam  Constitutional:       Appearance: Normal appearance.  Sitting up in bed.  HENT:      Head: Normocephalic and atraumatic.      Right Ear: External ear normal.      Left Ear: External ear normal.      Nose: Nose normal.      Mouth: Mucous membranes are moist.   Eyes:      Extraocular Movements: Extraocular movements intact.      Conjunctiva/sclera: Conjunctivae normal.   Cardiovascular:      Rate and Rhythm: Normal rate and regular rhythm.      Pulses: Normal pulses.      Heart sounds: No murmur heard.   Pulmonary:      Effort: Pulmonary effort is normal.      Breath sounds: Normal breath sounds.   Abdominal:      General: Bowel sounds are normal.      Palpations: Abdomen is soft.   Musculoskeletal:         General: Normal range of motion.      Cervical back: Normal range of motion and neck supple.   Skin:      General: Skin is warm and dry.   Neurological:      General: No focal deficit present.      Mental Status: He is alert and oriented to person, place, and time.    Psychiatric:         Mood and Affect: Mood normal.         Behavior: Behavior normal.     Results Review:  I have reviewed the labs, radiology results, and diagnostic studies.    Laboratory Data:   Results from last 7 days   Lab Units 08/11/23  0320 08/10/23  0435   WBC 10*3/mm3 8.63 11.78*   HEMOGLOBIN g/dL 10.7* 10.8*   HEMATOCRIT % 32.2* 33.3*   PLATELETS 10*3/mm3 152 156        Results from last 7 days   Lab Units 08/11/23  0320 08/10/23  2205 08/10/23  0435 08/09/23  0352   SODIUM mmol/L 135*  --  135* 132*   POTASSIUM mmol/L 4.2 4.2 2.8* 3.3*  3.3*   CHLORIDE mmol/L 104  --  98 94*   CO2 mmol/L 21.0*  --  21.0* 24.0   BUN mg/dL 20  --  31* 45*   CREATININE mg/dL 0.99  --  0.98 2.00*   CALCIUM mg/dL 9.0  --  8.7 7.9*   BILIRUBIN mg/dL 0.4  --  0.5 0.5   ALK PHOS U/L 53  --  54 52   ALT (SGPT) U/L 14  --  14 13   AST (SGOT) U/L 25  --  26 28   GLUCOSE mg/dL 93  --  106* 96       Culture Data:   No results found for: BLOODCX, URINECX, WOUNDCX, MRSACX, RESPCX, STOOLCX    Radiology Data:   Imaging Results (Last 24 Hours)       ** No results found for the last 24 hours. **            I have reviewed the patient's current medications.     Assessment/Plan   Assessment  Active Hospital Problems    Diagnosis     **Hypokalemia     SVT (supraventricular tachycardia)     Duodenitis     Gait instability     Elevated brain natriuretic peptide (BNP) level     ETOH abuse        Treatment Plan  Continue metoprolol  Continue PT  SNF placement plan insurance precertification is accomplished  Multivitamin with iron 1 p.o. daily  Continue to hold statin  Will defer reinitiation of statin to primary care physician after discharge from rehab    Medical Decision Making  Number and Complexity of problems:   Hypokalemia high complexity  Duodenitis moderate complexity  Gait  instability moderate complexity  ETOH abuse moderate complexity   Elevated BNP moderate complexity  Differential Diagnosis:      Conditions and Status        improving     MDM Data  External documents reviewed: no new  Cardiac tracing (EKG, telemetry) interpretation: reviewed  Radiology interpretation: reviewed  Labs reviewed: reviewed  Any tests that were considered but not ordered: none     Decision rules/scores evaluated (example [id number], Wells, etc): none     Discussed with: patient     Care Planning  Shared decision making: patient  Code status and discussions: full     Disposition  Social Determinants of Health that impact treatment or disposition: none  I expect the patient to be discharged to snf in 1-2 days.     Electronically signed by Holger Karimi DO, 08/14/23, 14:55 CDT.

## 2023-08-14 NOTE — THERAPY TREATMENT NOTE
Acute Care - Physical Therapy Treatment Note  [facility]     Patient Name: Dayron Lubin  : 1955  MRN: [id number]  Today's Date: 2023      Visit Dx:     ICD-10-CM ICD-9-CM   1. Acute on chronic right-sided congestive heart failure  I50.813 428.0   2. Coronary artery disease due to lipid rich plaque  I25.10 414.00    I25.83 414.3   3. Duodenitis  K29.80 535.60   4. History of alcohol use  Z87.898 V11.3   5. Metabolic acidosis  E87.20 276.2   6. Dysphagia, unspecified type  R13.10 787.20   7. Impaired mobility [Z74.09 (ICD-10-CM)]  Z74.09 799.89   8. Impaired mobility and ADLs [Z74.09, Z78.9 (ICD-10-CM)]  Z74.09 V49.89    Z78.9    9. Pressure injury of left buttock, unstageable  L89.320 707.05     707.25   10. Pressure injury of right buttock, stage 3  L89.313 707.05     707.23   11. Non-pressure chronic ulcer of other part of left foot with fat layer exposed  L97.522 707.15     Patient Active Problem List   Diagnosis    Prostate cancer    Hx of radiation therapy    Elevated PSA    Encounter for follow-up surveillance of prostate cancer    HOCM (hypertrophic obstructive cardiomyopathy)    Coronary artery disease involving native coronary artery of native heart without angina pectoris    Essential hypertension    Mixed hyperlipidemia    Class 1 obesity with alveolar hypoventilation, serious comorbidity, and body mass index (BMI) of 30.0 to 30.9 in adult    Displaced fracture of lateral malleolus of right fibula, initial encounter for closed fracture    Family history of colonic polyps    History of colon polyps    Elevated brain natriuretic peptide (BNP) level    MIKEY (acute kidney injury)    Anxiety associated with depression    ETOH abuse    Alcoholic hepatitis    Volume depletion    Anorexia    CHF (congestive heart failure)    Hypokalemia    Duodenitis    Gait instability    SVT (supraventricular tachycardia)     Past Medical History:   Diagnosis Date    Alcoholic hepatitis 2023    CAD (coronary  artery disease)     History of external beam radiation therapy 05/12/2016    6840 cGy to prostate bed    Hyperlipidemia     Hypertension     Prostate cancer      Past Surgical History:   Procedure Laterality Date    CARDIAC CATHETERIZATION      CORONARY ANGIOPLASTY WITH STENT PLACEMENT      PROSTATE SURGERY      TONSILLECTOMY       PT Assessment (last 12 hours)       PT Evaluation and Treatment       Row Name 08/14/23 1000          Physical Therapy Time and Intention    Subjective Information complains of;weakness  correct time 0853  -WK     Document Type therapy note (daily note)  -WK     Mode of Treatment physical therapy  -WK     Comment monitor -150 during activity  -WK       Row Name 08/14/23 1000          General Information    Existing Precautions/Restrictions fall  -WK       Row Name 08/14/23 1000          Bed Mobility    Comment, (Bed Mobility) in chair  -WK       Row Name 08/14/23 1000          Sit-Stand Transfer    Sit-Stand [place] (Transfers) contact guard  -WK     Comment, (Sit-Stand Transfer) worked on consecutive sit to stand 3 in a row with progress from BUE to using 1 UE. Pt BLE is shaky during sit to stand and amb.  -WK       Row Name 08/14/23 1000          Stand-Sit Transfer    Stand-Sit [place] (Transfers) contact guard  -WK       Row Name 08/14/23 1000          Gait/Stairs (Locomotion)    [place] Level (Gait) contact guard  -WK     Assistive Device (Gait) walker, front-wheeled  -WK     Distance in Feet (Gait) 30'  x2  -WK     Deviations/Abnormal Patterns (Gait) base of support, narrow  -WK     Comment, (Gait/Stairs) mild unsteady, unable to amb without RW.  -WK       Row Name 08/14/23 1000          Motor Skills    Therapeutic Exercise aerobic  -WK       Row Name 08/14/23 1000          Aerobic Exercise    Comment, Aerobic Exercise (Therapeutic Exercise) AROM BLE 10 reps. fatigue quickly  -WK       Row Name             Wound 08/07/23 1655 Left gluteal Pressure Injury    Wound  - Properties Group Placement Date: 08/07/23  -AC Placement Time: 1655  -AC Present on Hospital Admission: Y  -AC Side: Left  -AC Location: gluteal  -AC Primary Wound Type: Pressure inj  -AC    Retired Wound - Properties Group Placement Date: 08/07/23  -AC Placement Time: 1655  -AC Present on Hospital Admission: Y  -AC Side: Left  -AC Location: gluteal  -AC Primary Wound Type: Pressure inj  -AC    Retired Wound - Properties Group Date first assessed: 08/07/23  - Time first assessed: 1655  -AC Present on Hospital Admission: Y  -AC Side: Left  -AC Location: gluteal  -AC Primary Wound Type: Pressure inj  -AC      Row Name             Wound 08/07/23 1655 Right gluteal Pressure Injury    Wound - Properties Group Placement Date: 08/07/23  -AC Placement Time: 1655  -AC Present on Hospital Admission: Y  -AC Side: Right  -AC Location: gluteal  -AC Primary Wound Type: Pressure inj  -AC    Retired Wound - Properties Group Placement Date: 08/07/23  -AC Placement Time: 1655  -AC Present on Hospital Admission: Y  -AC Side: Right  -AC Location: gluteal  -AC Primary Wound Type: Pressure inj  -AC    Retired Wound - Properties Group Date first assessed: 08/07/23  - Time first assessed: 1655  -AC Present on Hospital Admission: Y  -AC Side: Right  -AC Location: gluteal  -AC Primary Wound Type: Pressure inj  -AC      Row Name             Wound 08/07/23 1655 Left posterior heel Pressure Injury    Wound - Properties Group Placement Date: 08/07/23  -AC Placement Time: 1655  -AC Present on Hospital Admission: Y  -AC Side: Left  -AC Orientation: posterior  -AC Location: heel  -AC Primary Wound Type: Pressure inj  -AC    Retired Wound - Properties Group Placement Date: 08/07/23  -AC Placement Time: 1655  -AC Present on Hospital Admission: Y  -AC Side: Left  -AC Orientation: posterior  -AC Location: heel  -AC Primary Wound Type: Pressure inj  -AC    Retired Wound - Properties Group Date first assessed: 08/07/23  - Time first assessed:  1655  -AC Present on Hospital Admission: Y  -AC Side: Left  -AC Location: heel  -AC Primary Wound Type: Pressure inj  -AC      Row Name             Wound 08/07/23 1656 Left posterior foot Pressure Injury    Wound - Properties Group Placement Date: 08/07/23  -AC Placement Time: 1656 -AC Present on Hospital Admission: Y  -AC Side: Left  -AC Orientation: posterior  -AC Location: foot  -AC Primary Wound Type: Pressure inj  -AC    Retired Wound - Properties Group Placement Date: 08/07/23  -AC Placement Time: 1656 -AC Present on Hospital Admission: Y  -AC Side: Left  -AC Orientation: posterior  -AC Location: foot  -AC Primary Wound Type: Pressure inj  -AC    Retired Wound - Properties Group Date first assessed: 08/07/23  -AC Time first assessed: 1656 -AC Present on Hospital Admission: Y  -AC Side: Left  -AC Location: foot  -AC Primary Wound Type: Pressure inj  -AC      Row Name 08/14/23 1000          Plan of Care Review    Plan of Care Reviewed With patient  -WK     Outcome Evaluation sit to stand CGA. Pt performed consecutive sit to stands progress with decrease us of BLE. Pt has shaky legs and fatigues quickly. Pt amb 30' at a time and also had mild shaky BLE during gait. Unable to amb without RW.  -WK       Row Name 08/14/23 1000          Positioning and Restraints    Pre-Treatment Position sitting in chair/recliner  -WK     Post Treatment Position chair  -WK     In Chair reclined;call light within reach;encouraged to call for assist;with family/caregiver;with nsg  -WK               User Key  (r) = Recorded By, (t) = Taken By, (c) = Cosigned By      Initials Name Provider Type    AC Boston Echevarria, RN Registered Nurse    Graciela Weathers PTA Physical Therapist Assistant                    Physical Therapy Education       Title: PT OT SLP Therapies (In Progress)       Topic: Physical Therapy (Done)       Point: Mobility training (Done)       Learning Progress Summary             Patient JULIA Renee VU by AE at  8/13/2023 0914    Comment: HEP,POC    Acceptance, E,D, NR,DU,VU by  at 8/8/2023 1359    Comment: benefits of PT and POC, call for A OOB                         Point: Precautions (Done)       Learning Progress Summary             Patient Acceptance, E,D, NR,DU,VU by  at 8/8/2023 1359    Comment: benefits of PT and POC, call for A OOB                                         User Key       Initials Effective Dates Name Provider Type Discipline    AE 02/03/23 -  Kayla Conteh PTA Physical Therapist Assistant PT     02/03/23 -  Justyn Briggs, PT Physical Therapist PT                  PT Recommendation and Plan     Plan of Care Reviewed With: patient  Outcome Evaluation: sit to stand CGA. Pt performed consecutive sit to stands progress with decrease us of BLE. Pt has shaky legs and fatigues quickly. Pt amb 30' at a time and also had mild shaky BLE during gait. Unable to amb without RW.   Outcome Measures       Row Name 08/14/23 0853 08/13/23 0900 08/11/23 1320       How much help from another person do you currently need...    Turning from your back to your side while in flat bed without using bedrails? 4  -WK 4  -AE 4  -TB    Moving from lying on back to sitting on the side of a flat bed without bedrails? 3  -WK 3  -AE 3  -TB    Moving to and from a bed to a chair (including a wheelchair)? 3  -WK 3  -AE 3  -TB    Standing up from a chair using your arms (e.g., wheelchair, bedside chair)? 3  -WK 3  -AE 3  -TB    Climbing 3-5 steps with a railing? 2  -WK 3  -AE 2  -TB    To walk in hospital room? 3  -WK 3  -AE 3  -TB    AM-PAC 6 Clicks Score (PT) 18  -WK 19  -AE 18  -TB       Functional Assessment    Outcome Measure Options AM-PAC 6 Clicks Basic Mobility (PT)  -WK AM-PAC 6 Clicks Basic Mobility (PT)  -AE AM-PAC 6 Clicks Basic Mobility (PT)  -TB              User Key  (r) = Recorded By, (t) = Taken By, (c) = Cosigned By      Initials Name Provider Type    AE Kayla Conteh, DAVID Physical Therapist Assistant      Graciela Islas PTA Physical Therapist Assistant    TB Nasir Perez PTA Physical Therapist Assistant                     Time Calculation:    PT Charges       Row Name 08/14/23 1022             Time Calculation    Start Time 0853  -WK      Stop Time 0917  -WK      Time Calculation (min) 24 min  -WK      PT Received On 08/14/23  -WK         Time Calculation- PT    Total Timed Code Minutes- PT 24 minute(s)  -WK                User Key  (r) = Recorded By, (t) = Taken By, (c) = Cosigned By      Initials Name Provider Type    WK Graciela Islas PTA Physical Therapist Assistant                  Therapy Charges for Today       Code Description Service Date Service Provider Modifiers Qty    68079271135 HC GAIT TRAINING EA 15 MIN 8/14/2023 Graciela Islas PTA GP 1    43426619497 HC PT THER PROC EA 15 MIN 8/14/2023 Graciela Islas PTA GP 1            PT G-Codes  Outcome Measure Options: AM-PAC 6 Clicks Basic Mobility (PT)  AM-PAC 6 Clicks Score (PT): 18  AM-PAC 6 Clicks Score (OT): 16    Graciela Islas PTA  8/14/2023

## 2023-08-14 NOTE — PLAN OF CARE
Goal Outcome Evaluation:              Outcome Evaluation: NTN follow up. PO 75% of meals, 800 mL oral fluid/day. Wt up five pounds from admit. Pt and pt family endorse good appetite, receiving daily menu selections, and BM today. NTN following per protocol.

## 2023-08-14 NOTE — CASE MANAGEMENT/SOCIAL WORK
Continued Stay Note  SHAYLA Hernandez     Patient Name: Dayron Lubin  MRN: [id number]  Today's Date: 8/14/2023    Admit Date: 8/7/2023    Plan: [facility] - Pending Precert   Discharge Plan       Row Name 08/14/23 1413       Plan    Plan [facility] - Pending Precert    Plan Comments Precert still pending with [employer]. Will continue to follow for approval so that patient can dc to [facility].                  CHUCK Verma

## 2023-08-14 NOTE — PLAN OF CARE
Problem: Adult Inpatient Plan of Care  Goal: Plan of Care Review    Plan of Care Reviewed With: patient  Outcome Evaluation: sit to stand CGA. Pt performed consecutive sit to stands progress with decrease us of BLE. Pt has shaky legs and fatigues quickly. Pt amb 30' at a time and also had mild shaky BLE during gait. Unable to amb without RW.

## 2023-08-15 VITALS
HEIGHT: 71 IN | BODY MASS INDEX: 24.44 KG/M2 | TEMPERATURE: 97.6 F | RESPIRATION RATE: 18 BRPM | HEART RATE: 101 BPM | DIASTOLIC BLOOD PRESSURE: 73 MMHG | SYSTOLIC BLOOD PRESSURE: 113 MMHG | OXYGEN SATURATION: 98 % | WEIGHT: 174.6 LBS

## 2023-08-15 PROBLEM — R53.81 PHYSICAL DEBILITY: Status: ACTIVE | Noted: 2023-08-15

## 2023-08-15 PROCEDURE — 97110 THERAPEUTIC EXERCISES: CPT | Performed by: OCCUPATIONAL THERAPIST

## 2023-08-15 PROCEDURE — 97110 THERAPEUTIC EXERCISES: CPT

## 2023-08-15 PROCEDURE — 97116 GAIT TRAINING THERAPY: CPT

## 2023-08-15 PROCEDURE — 97535 SELF CARE MNGMENT TRAINING: CPT | Performed by: OCCUPATIONAL THERAPIST

## 2023-08-15 RX ORDER — CHLORDIAZEPOXIDE HYDROCHLORIDE 10 MG/1
10 CAPSULE, GELATIN COATED ORAL 3 TIMES DAILY PRN
Qty: 5 CAPSULE | Refills: 0 | Status: SHIPPED | OUTPATIENT
Start: 2023-08-15

## 2023-08-15 RX ORDER — RISPERIDONE 1 MG/1
1 TABLET ORAL NIGHTLY
Start: 2023-08-15 | End: 2023-08-15 | Stop reason: SDUPTHER

## 2023-08-15 RX ORDER — RISPERIDONE 1 MG/1
1 TABLET ORAL NIGHTLY
Qty: 30 TABLET | Refills: 0 | Status: SHIPPED | OUTPATIENT
Start: 2023-08-15

## 2023-08-15 RX ORDER — FOLIC ACID 1 MG/1
1 TABLET ORAL DAILY
Qty: 30 TABLET | Refills: 0 | Status: SHIPPED | OUTPATIENT
Start: 2023-08-16

## 2023-08-15 RX ORDER — PAROXETINE HYDROCHLORIDE 20 MG/1
20 TABLET, FILM COATED ORAL NIGHTLY
Start: 2023-08-15

## 2023-08-15 RX ORDER — PANTOPRAZOLE SODIUM 40 MG/1
40 TABLET, DELAYED RELEASE ORAL
Start: 2023-08-15

## 2023-08-15 RX ORDER — METOPROLOL SUCCINATE 25 MG/1
25 TABLET, EXTENDED RELEASE ORAL
Qty: 30 TABLET | Refills: 5 | Status: SHIPPED | OUTPATIENT
Start: 2023-08-16

## 2023-08-15 RX ORDER — MULTIPLE VITAMINS W/ MINERALS TAB 9MG-400MCG
1 TAB ORAL DAILY
Qty: 100 EACH | Refills: 99 | Status: SHIPPED | OUTPATIENT
Start: 2023-08-16

## 2023-08-15 RX ADMIN — SUCRALFATE 1 G: 1 TABLET ORAL at 12:09

## 2023-08-15 RX ADMIN — CHLORDIAZEPOXIDE HYDROCHLORIDE 10 MG: 10 CAPSULE ORAL at 12:09

## 2023-08-15 RX ADMIN — SUCRALFATE 1 G: 1 TABLET ORAL at 09:13

## 2023-08-15 RX ADMIN — THIAMINE HCL TAB 100 MG 100 MG: 100 TAB at 09:13

## 2023-08-15 RX ADMIN — Medication 10 ML: at 09:13

## 2023-08-15 RX ADMIN — METOPROLOL SUCCINATE 25 MG: 25 TABLET, EXTENDED RELEASE ORAL at 09:12

## 2023-08-15 RX ADMIN — PANTOPRAZOLE SODIUM 40 MG: 40 TABLET, DELAYED RELEASE ORAL at 09:13

## 2023-08-15 RX ADMIN — ACETAMINOPHEN 650 MG: 325 TABLET, FILM COATED ORAL at 12:09

## 2023-08-15 RX ADMIN — CHLORDIAZEPOXIDE HYDROCHLORIDE 10 MG: 10 CAPSULE ORAL at 16:40

## 2023-08-15 RX ADMIN — Medication 1 TABLET: at 12:09

## 2023-08-15 RX ADMIN — ASPIRIN 81 MG: 81 TABLET, COATED ORAL at 09:13

## 2023-08-15 RX ADMIN — FOLIC ACID 1 MG: 1 TABLET ORAL at 09:12

## 2023-08-15 RX ADMIN — COLLAGENASE SANTYL 1 APPLICATION: 250 OINTMENT TOPICAL at 09:14

## 2023-08-15 NOTE — THERAPY TREATMENT NOTE
Acute Care - Physical Therapy Treatment Note  [facility]     Patient Name: Dayron Lubin  : 1955  MRN: [id number]  Today's Date: 8/15/2023      Visit Dx:     ICD-10-CM ICD-9-CM   1. Acute on chronic right-sided congestive heart failure  I50.813 428.0   2. Coronary artery disease due to lipid rich plaque  I25.10 414.00    I25.83 414.3   3. Duodenitis  K29.80 535.60   4. History of alcohol use  Z87.898 V11.3   5. Metabolic acidosis  E87.20 276.2   6. Dysphagia, unspecified type  R13.10 787.20   7. Impaired mobility [Z74.09 (ICD-10-CM)]  Z74.09 799.89   8. Impaired mobility and ADLs [Z74.09, Z78.9 (ICD-10-CM)]  Z74.09 V49.89    Z78.9    9. Pressure injury of left buttock, unstageable  L89.320 707.05     707.25   10. Pressure injury of right buttock, stage 3  L89.313 707.05     707.23   11. Non-pressure chronic ulcer of other part of left foot with fat layer exposed  L97.522 707.15     Patient Active Problem List   Diagnosis    Prostate cancer    Hx of radiation therapy    Elevated PSA    Encounter for follow-up surveillance of prostate cancer    HOCM (hypertrophic obstructive cardiomyopathy)    Coronary artery disease involving native coronary artery of native heart without angina pectoris    Essential hypertension    Mixed hyperlipidemia    Class 1 obesity with alveolar hypoventilation, serious comorbidity, and body mass index (BMI) of 30.0 to 30.9 in adult    Displaced fracture of lateral malleolus of right fibula, initial encounter for closed fracture    Family history of colonic polyps    History of colon polyps    Elevated brain natriuretic peptide (BNP) level    MIKEY (acute kidney injury)    Anxiety associated with depression    ETOH abuse    Alcoholic hepatitis    Volume depletion    Anorexia    CHF (congestive heart failure)    Hypokalemia    Duodenitis    Gait instability    SVT (supraventricular tachycardia)    Physical debility     Past Medical History:   Diagnosis Date    Alcoholic hepatitis  7/13/2023    CAD (coronary artery disease)     History of external beam radiation therapy 05/12/2016    6840 cGy to prostate bed    Hyperlipidemia     Hypertension     Prostate cancer      Past Surgical History:   Procedure Laterality Date    CARDIAC CATHETERIZATION      CORONARY ANGIOPLASTY WITH STENT PLACEMENT      PROSTATE SURGERY      TONSILLECTOMY       PT Assessment (last 12 hours)       PT Evaluation and Treatment       Row Name 08/15/23 1058          Physical Therapy Time and Intention    Subjective Information complains of;weakness  -WK     Document Type therapy note (daily note)  -WK     Mode of Treatment physical therapy  -WK       Row Name 08/15/23 1058          General Information    Existing Precautions/Restrictions fall  -WK       Row Name 08/15/23 1058          Bed Mobility    Comment, (Bed Mobility) in chair  -WK       Row Name 08/15/23 1058          Sit-Stand Transfer    Sit-Stand [place] (Transfers) contact guard  performed 2 consecutive sit to stand s with use of R UE.  -WK       Row Name 08/15/23 1058          Stand-Sit Transfer    Stand-Sit [place] (Transfers) verbal cues;contact guard  -WK       [facility] Name 08/15/23 1058          Gait/Stairs (Locomotion)    [place] Level (Gait) verbal cues;contact guard;minimum assist (75% patient effort)  -WK     Assistive Device (Gait) walker, front-wheeled  -WK     Distance in Feet (Gait) 30'  x2  -WK     Deviations/Abnormal Patterns (Gait) base of support, narrow  -WK     Comment, (Gait/Stairs) mild unsteadiness, heels hit when he amb and discussed wider OSVALDO  -WK       Row Name 08/15/23 1058          Motor Skills    Therapeutic Exercise aerobic  -WK       Row Name 08/15/23 1058          Aerobic Exercise    Comment, Aerobic Exercise (Therapeutic Exercise) AROM BLE 15 reps with rest due to fatigue  -WK       Row Name             Wound 08/07/23 1655 Left gluteal Pressure Injury    Wound - Properties Group Placement Date: 08/07/23  -AC Placement  Time: 1655  -AC Present on Hospital Admission: Y  -AC Side: Left  -AC Location: gluteal  -AC Primary Wound Type: Pressure inj  -AC    Retired Wound - Properties Group Placement Date: 08/07/23  -AC Placement Time: 1655 -AC Present on Hospital Admission: Y  -AC Side: Left  -AC Location: gluteal  -AC Primary Wound Type: Pressure inj  -AC    Retired Wound - Properties Group Date first assessed: 08/07/23  - Time first assessed: 1655 -AC Present on Hospital Admission: Y  -AC Side: Left  -AC Location: gluteal  -AC Primary Wound Type: Pressure inj  -AC      Row Name             Wound 08/07/23 1655 Right gluteal Pressure Injury    Wound - Properties Group Placement Date: 08/07/23  -AC Placement Time: 1655 -AC Present on Hospital Admission: Y  -AC Side: Right  -AC Location: gluteal  -AC Primary Wound Type: Pressure inj  -AC    Retired Wound - Properties Group Placement Date: 08/07/23  - Placement Time: 1655 -AC Present on Hospital Admission: Y  -AC Side: Right  -AC Location: gluteal  -AC Primary Wound Type: Pressure inj  -AC    Retired Wound - Properties Group Date first assessed: 08/07/23  - Time first assessed: 1655 -AC Present on Hospital Admission: Y  -AC Side: Right  -AC Location: gluteal  -AC Primary Wound Type: Pressure inj  -AC      Row Name             Wound 08/07/23 1655 Left posterior heel Pressure Injury    Wound - Properties Group Placement Date: 08/07/23  -AC Placement Time: 1655 -AC Present on Hospital Admission: Y  -AC Side: Left  -AC Orientation: posterior  -AC Location: heel  -AC Primary Wound Type: Pressure inj  -AC    Retired Wound - Properties Group Placement Date: 08/07/23  -AC Placement Time: 1655 -AC Present on Hospital Admission: Y  -AC Side: Left  -AC Orientation: posterior  -AC Location: heel  -AC Primary Wound Type: Pressure inj  -AC    Retired Wound - Properties Group Date first assessed: 08/07/23  - Time first assessed: 1655 -AC Present on Hospital Admission: Y  -AC Side: Left   -AC Location: heel  -AC Primary Wound Type: Pressure inj  -AC      Row Name             Wound 08/07/23 1656 Left posterior foot Pressure Injury    Wound - Properties Group Placement Date: 08/07/23  -AC Placement Time: 1656  -AC Present on Hospital Admission: Y  -AC Side: Left  -AC Orientation: posterior  -AC Location: foot  -AC Primary Wound Type: Pressure inj  -AC    Retired Wound - Properties Group Placement Date: 08/07/23  -AC Placement Time: 1656  -AC Present on Hospital Admission: Y  -AC Side: Left  -AC Orientation: posterior  -AC Location: foot  -AC Primary Wound Type: Pressure inj  -AC    Retired Wound - Properties Group Date first assessed: 08/07/23  -AC Time first assessed: 1656  -AC Present on Hospital Admission: Y  -AC Side: Left  -AC Location: foot  -AC Primary Wound Type: Pressure inj  -AC      Row Name 08/15/23 1058          Plan of Care Review    Plan of Care Reviewed With patient  -WK     Progress improving  -WK     Outcome Evaluation Sit to stand CGA. Pt amb 30' at a times CGA-min A with mild LOB due to pt hitting his heels together. Discussed wider OSVALDO and educated on safety. Pt performed 2 consecutive sit to stand with R UE support and this was a struggle for him. Pt is weak in his BLE and uses support for BUE to assist.  -WK       Row Name 08/15/23 1058          Positioning and Restraints    Pre-Treatment Position sitting in chair/recliner  -WK     Post Treatment Position chair  -WK     In Chair reclined;call light within reach;encouraged to call for assist;with family/caregiver  -WK               User Key  (r) = Recorded By, (t) = Taken By, (c) = Cosigned By      Initials Name Provider Type    Boston Vargas, RN Registered Nurse    Graciela Weathers PTA Physical Therapist Assistant                    Physical Therapy Education       Title: PT OT SLP Therapies (In Progress)       Topic: Physical Therapy (Done)       Point: Mobility training (Done)       Learning Progress Summary              Patient Eager, E, VU by AE at 8/13/2023 0914    Comment: HEP,POC    Acceptance, E,D, NR,DU,VU by  at 8/8/2023 1359    Comment: benefits of PT and POC, call for A OOB                         Point: Precautions (Done)       Learning Progress Summary             Patient Acceptance, E,D, NR,DU,VU by  at 8/8/2023 1359    Comment: benefits of PT and POC, call for A OOB                                         User Key       Initials Effective Dates Name Provider Type Discipline    AE 02/03/23 -  Kayla Conteh, PTA Physical Therapist Assistant PT     02/03/23 -  Justyn Briggs, PT Physical Therapist PT                  PT Recommendation and Plan     Plan of Care Reviewed With: patient  Progress: improving  Outcome Evaluation: Sit to stand CGA. Pt amb 30' at a times CGA-min A with mild LOB due to pt hitting his heels together. Discussed wider OSVALDO and educated on safety. Pt performed 2 consecutive sit to stand with R UE support and this was a struggle for him. Pt is weak in his BLE and uses support for BUE to assist.   Outcome Measures       Row Name 08/15/23 1058 08/14/23 0853 08/13/23 0900       How much help from another person do you currently need...    Turning from your back to your side while in flat bed without using bedrails? 4  -WK 4  -WK 4  -AE    Moving from lying on back to sitting on the side of a flat bed without bedrails? 3  -WK 3  -WK 3  -AE    Moving to and from a bed to a chair (including a wheelchair)? 3  -WK 3  -WK 3  -AE    Standing up from a chair using your arms (e.g., wheelchair, bedside chair)? 3  -WK 3  -WK 3  -AE    Climbing 3-5 steps with a railing? 2  -WK 2  -WK 3  -AE    To walk in hospital room? 3  -WK 3  -WK 3  -AE    AM-PAC 6 Clicks Score (PT) 18  -WK 18  -WK 19  -AE       Functional Assessment    Outcome Measure Options AM-PAC 6 Clicks Basic Mobility (PT)  -WK AM-PAC 6 Clicks Basic Mobility (PT)  -WK AM-PAC 6 Clicks Basic Mobility (PT)  -AE              User Key  (r) = Recorded By,  (t) = Taken By, (c) = Cosigned By      Initials Name Provider Type    AE Kayla Conteh PTA Physical Therapist Assistant    WK Graciela Islas PTA Physical Therapist Assistant                     Time Calculation:    PT Charges       Row Name 08/15/23 1136             Time Calculation    Start Time 1058  -WK      Stop Time 1125  -WK      Time Calculation (min) 27 min  -WK      PT Received On 08/15/23  -WK         Time Calculation- PT    Total Timed Code Minutes- PT 27 minute(s)  -WK                User Key  (r) = Recorded By, (t) = Taken By, (c) = Cosigned By      Initials Name Provider Type    WK Graciela Islas PTA Physical Therapist Assistant                  Therapy Charges for Today       Code Description Service Date Service Provider Modifiers Qty    41741974907 HC GAIT TRAINING EA 15 MIN 8/14/2023 Graciela Islas, DAVID GP 1    51411304844 HC PT THER PROC EA 15 MIN 8/14/2023 Graciela Islas, DAVID GP 1    83721259296 HC GAIT TRAINING EA 15 MIN 8/15/2023 Graciela Islas, DAVID GP 1    94127528862 HC PT THER PROC EA 15 MIN 8/15/2023 Graciela Islas, DAVID GP 1            PT G-Codes  Outcome Measure Options: AM-PAC 6 Clicks Basic Mobility (PT)  AM-PAC 6 Clicks Score (PT): 18  AM-PAC 6 Clicks Score (OT): 16    Graciela Islas PTA  8/15/2023

## 2023-08-15 NOTE — PLAN OF CARE
Goal Outcome Evaluation:  Plan of Care Reviewed With: patient           Outcome Evaluation: OT tx completed. Pt presents alert and oriented, agreeable to participation in therapy session. He was able to bring self to sitting at EOB with CGA. Completed sit <> stand t/f from EOB and took small steps from bed to chair with CGA. He declined further mobility attempts d/t sore on his foot. He was agreeable for B UE strengthening exercises with 5lb wand, completed 20 reps x 1 set. Left sitting up in chair at end of session. Pt to d/c to rehab this date. OT to sign off.      Anticipated Discharge Disposition (OT): skilled nursing facility

## 2023-08-15 NOTE — DISCHARGE SUMMARY
"    [facility] Medicine Services  DISCHARGE SUMMARY       Date of Admission: 8/7/2023  Date of Discharge:  8/15/2023  Primary Care Physician: Herberth Nguyen Jr., MD    Discharge Diagnoses:  Active Hospital Problems    Diagnosis     **Physical debility     SVT (supraventricular tachycardia)     Hypokalemia     Duodenitis     Gait instability     Elevated brain natriuretic peptide (BNP) level     ETOH abuse          Presenting Problem/History of Present Illness:  CHF (congestive heart failure) [I50.9]     Chief Complaint on Day of Discharge:   Generalized weakness    History of Present Illness on Day of Discharge:   The patient has been offered a bed at [facility] [place].  Precertification has been completed and the patient is stable for transition to that facility today.    Hospital Course  Dayron Lubin is a 68-year-old male with a past medical history of CAD, prostate cancer, hyperlipidemia, hypertension, alcohol abuse.  Patient presented to emergency department with current plaints of weakness.  He states he thinks his last alcohol use was 2 days ago.  When asked how much he drinks he states \"a lot\".  He is unable to quantify.  He denies shortness of breathing, chest pain, abdominal pain.  Work-up reveals hypokalemia, elevated proBNP, mild hyponatremia.  Imaging reveals mild duodenitis.  Patient currently complaining only of weakness, he is anxious and jittery.  He states he does have DTs.  He states he is having difficulty ambulating due to weakness.  He is admitted for further evaluation and treatment.   Treatment Plan  1.  The patient will be admitted to Dr. Orozco's service here at [facility].   2.  CIWA protocol  3.  Home medications reviewed and restarted as appropriate, will not continue full dose Lovenox nor IV diuretics  4.  Replace potassium  5.  Increase Carafate dose from twice daily to 4 times a day and Protonix from daily to twice daily  6.  Continuous pulse " oximetry, incentive spirometry, oxygen therapy  7.  Cardiac monitoring, daily weights, safety for cautions, seizure precaution  8.  Consult PT and OT  9.  Consult  as patient may benefit benefit from rehab placement  10.  Labs in a.m.    ST/PT/OT evaluated the patient and all services recommended disposition to skilled nursing facility at discharge.  The patient did well throughout his hospital stay.  Electrolyte levels were managed and replaced as needed with supplemental potassium and magnesium.  Wound care was consulted with recommendations for treatment noted below.  The patient was placed on around-the-clock Librium to aid in treatment of possible alcohol withdrawal.  The patient had no active withdrawal symptoms through his hospital stay.  He continued to be weak throughout his hospital stay with physical therapy working with him on a daily basis.  The patient was placed on Risperdal 1 mg at at bedtime and Paxil was increased to 20 mg p.o. daily.  Heart rate jumped up to 140 with physical therapy.  An echocardiogram was obtained showing LVEF of 46 to 50%.  There was grade 1 diastolic dysfunction.  The patient's metoprolol was resumed on 8/12.  Patient was offered bed at [facility] on 8/14.  He has been approved today for admission to that facility.  He is appropriate for discharge today.      Consults:   Wound care:  DIAGNOSIS:   Pressure injury of left buttock, unstageable  Pressure injury of right buttock, stage 3  Open wounds of left lateral foot with fat layer exposed  Impaired mobility  Generalized weakness    Hypokalemia    Elevated brain natriuretic peptide (BNP) level    ETOH abuse    Duodenitis    Gait instability           PLAN:   Orders placed for wound care and moisture/pressure management as listed below.     Case management consult order placed for discharge planning as patient refuses placement at rehab facility.  Patient states he wants to go home and is okay with home health  assisting with wound care.  Sister is present during discussion.     Suggest patient follow-up with wound care center after discharge              Start     Ordered     08/09/23 1047   Case Management  Consult  Once        Comments: Patient refusing placement at rehab facility   Provider:  (Not yet assigned)   Question Answer Comment   Is discharge planning needed? If yes, who is requesting discharge planning? Provider     Reason for Consult Home health for wound care         08/09/23 1046     08/09/23 1046   Wound Care  Every 12 Hours        Question Answer Comment   Wound Locations Bilateral buttocks and left lateral foot     Wound Care Instructions Clean all wounds with normal saline.  Apply nickel thick layer of Santyl to wound bed.  Cover with saline moistened gauze and secured with silicone border dressing.     Cleanse Normal Saline     Intervention Santyl - Thick Layer     Intervention Fluffed Gauze     Moistened? Yes     Moisten With Normal Saline     Securement Silicone Border Dressing         08/09/23 1045     08/09/23 1045   Use Seat Cushion When Up In Chair  Continuous         08/09/23 1045     08/09/23 1045   Apply Waffle mattress overlay Until Discontinued  Until Discontinued        Question:  Supply to be applied:  Answer:  Waffle mattress overlay    08/09/23 1045     08/09/23 1044   Elevate Heels Off of Bed  Until Discontinued         08/09/23 1045     08/09/23 1044   Turn Patient  Now Then Every 2 Hours         08/09/23 1045     08/09/23 1044   Use Repositioning Wedge to Position Patient  Continuous        Comments: Use Comfort Glide repositioning sheet and wedges to position patient.    08/09/23 1045     08/09/23 0000                Result Review    Result Review:  I have personally reviewed the results from the time of this admission to 8/15/2023 15:16 CDT and agree with these findings:  []  Laboratory  []  Microbiology  []  Radiology  []  EKG/Telemetry   []  Cardiology/Vascular  "  []  Pathology  []  Old records  []  Other:    Condition on Discharge:    Stable and improving    Physical Exam on Discharge:  /73 (BP Location: Left arm, Patient Position: Sitting)   Pulse 101   Temp 97.6 øF (36.4 øC) (Oral)   Resp 18   Ht 180.3 cm (71\")   Wt 79.2 kg (174 lb 9.7 oz)   SpO2 98%   BMI 24.35 kg/mý   Physical Exam       Constitutional:       Appearance: Normal appearance.  Sitting up in bed.  HENT:      Head: Normocephalic and atraumatic.      Right Ear: External ear normal.      Left Ear: External ear normal.      Nose: Nose normal.      Mouth: Mucous membranes are moist.   Eyes:      Extraocular Movements: Extraocular movements intact.      Conjunctiva/sclera: Conjunctivae normal.   Cardiovascular:      Rate and Rhythm: Normal rate and regular rhythm.      Pulses: Normal pulses.      Heart sounds: No murmur heard.   Pulmonary:      Effort: Pulmonary effort is normal.      Breath sounds: Normal breath sounds.   Abdominal:      General: Bowel sounds are normal.      Palpations: Abdomen is soft.   Musculoskeletal:         General: Normal range of motion.      Cervical back: Normal range of motion and neck supple.   Skin:     General: Skin is warm and dry.   Neurological:      General: No focal deficit present.      Mental Status: He is alert and oriented to person, place, and time.    Psychiatric:         Mood and Affect: Mood normal.         Behavior: Behavior normal.     Discharge Disposition:  Skilled Nursing Facility (DC - External)    Discharge Medications:     Discharge Medications        New Medications        Instructions Start Date   chlordiazePOXIDE 10 MG capsule  Commonly known as: LIBRIUM   10 mg, Oral, 3 Times Daily PRN      collagenase 250 UNIT/GM ointment   1 application , Topical, Every 24 Hours Scheduled   Start Date: August 16, 2023     folic acid 1 MG tablet  Commonly known as: FOLVITE   1 mg, Oral, Daily   Start Date: August 16, 2023     multivitamin with minerals " tablet tablet   1 tablet, Oral, Daily   Start Date: August 16, 2023     risperiDONE 1 MG tablet  Commonly known as: risperDAL   1 mg, Oral, Nightly             Changes to Medications        Instructions Start Date   metoprolol succinate XL 25 MG 24 hr tablet  Commonly known as: TOPROL-XL  What changed:   medication strength  how much to take  when to take this   25 mg, Oral, Every 24 Hours Scheduled   Start Date: August 16, 2023     pantoprazole 40 MG EC tablet  Commonly known as: PROTONIX  What changed: when to take this   40 mg, Oral, 2 Times Daily Before Meals      PARoxetine 20 MG tablet  Commonly known as: PAXIL  What changed:   medication strength  how much to take   20 mg, Oral, Nightly             Continue These Medications        Instructions Start Date   aspirin 81 MG EC tablet   81 mg, Oral, Daily      sucralfate 1 g tablet  Commonly known as: CARAFATE   Take 1 tablet by mouth 4 (Four) Times a Day.      temazepam 30 MG capsule  Commonly known as: RESTORIL   Take 1 capsule by mouth At Night As Needed for Sleep.             Stop These Medications      losartan-hydrochlorothiazide 100-25 MG per tablet  Commonly known as: HYZAAR     niacin 500 MG tablet              Discharge Diet:   Diet Instructions       Diet: Regular/House Diet; Regular Texture (IDDSI 7); Thin (IDDSI 0)      Discharge Diet: Regular/House Diet    Texture: Regular Texture (IDDSI 7)    Fluid Consistency: Thin (IDDSI 0)            Discharge Care Plan / Instructions:   Discharge to SNF    Activity at Discharge:   Activity Instructions       Activity as Tolerated              Follow-up Appointments:  Follow-up with PCP after discharge from SNF    Electronically signed by Holger Karimi DO, 08/15/23, 15:16 CDT.    Time: Discharge over 30 min    Part of this note may be an electronic transcription/translation of spoken language to printed text using the Dragon Dictation system.

## 2023-08-15 NOTE — THERAPY DISCHARGE NOTE
Acute Care - Occupational Therapy Discharge  [facility]    Patient Name: Dayron Lubin  : 1955    MRN: [id number]                              Today's Date: 8/15/2023       Admit Date: 2023    Visit Dx:     ICD-10-CM ICD-9-CM   1. Acute on chronic right-sided congestive heart failure  I50.813 428.0   2. Coronary artery disease due to lipid rich plaque  I25.10 414.00    I25.83 414.3   3. Duodenitis  K29.80 535.60   4. History of alcohol use  Z87.898 V11.3   5. Metabolic acidosis  E87.20 276.2   6. Dysphagia, unspecified type  R13.10 787.20   7. Impaired mobility [Z74.09 (ICD-10-CM)]  Z74.09 799.89   8. Impaired mobility and ADLs [Z74.09, Z78.9 (ICD-10-CM)]  Z74.09 V49.89    Z78.9    9. Pressure injury of left buttock, unstageable  L89.320 707.05     707.25   10. Pressure injury of right buttock, stage 3  L89.313 707.05     707.23   11. Non-pressure chronic ulcer of other part of left foot with fat layer exposed  L97.522 707.15   12. ETOH abuse  F10.10 305.00     Patient Active Problem List   Diagnosis    Prostate cancer    Hx of radiation therapy    Elevated PSA    Encounter for follow-up surveillance of prostate cancer    HOCM (hypertrophic obstructive cardiomyopathy)    Coronary artery disease involving native coronary artery of native heart without angina pectoris    Essential hypertension    Mixed hyperlipidemia    Class 1 obesity with alveolar hypoventilation, serious comorbidity, and body mass index (BMI) of 30.0 to 30.9 in adult    Displaced fracture of lateral malleolus of right fibula, initial encounter for closed fracture    Family history of colonic polyps    History of colon polyps    Elevated brain natriuretic peptide (BNP) level    MIKEY (acute kidney injury)    Anxiety associated with depression    ETOH abuse    Alcoholic hepatitis    Volume depletion    Anorexia    CHF (congestive heart failure)    Hypokalemia    Duodenitis    Gait instability    SVT (supraventricular tachycardia)     Physical debility     Past Medical History:   Diagnosis Date    Alcoholic hepatitis 7/13/2023    CAD (coronary artery disease)     History of external beam radiation therapy 05/12/2016    6840 cGy to prostate bed    Hyperlipidemia     Hypertension     Prostate cancer      Past Surgical History:   Procedure Laterality Date    CARDIAC CATHETERIZATION      CORONARY ANGIOPLASTY WITH STENT PLACEMENT      PROSTATE SURGERY      TONSILLECTOMY        General Information       Row Name 08/15/23 1425          OT Time and Intention    Document Type therapy note (daily note)  -     Mode of Treatment occupational therapy  -       Row Name 08/15/23 1425          General Information    Patient Profile Reviewed yes  -     Existing Precautions/Restrictions fall  -     Barriers to Rehab cognitive status  -       Row Name 08/15/23 1425          Cognition    Orientation Status (Cognition) oriented to;person;disoriented to;place;situation;time  -[facility] Name 08/15/23 1425          Safety Issues, Functional Mobility    Impairments Affecting Function (Mobility) balance;cognition;endurance/activity tolerance;shortness of breath  -               User Key  (r) = Recorded By, (t) = Taken By, (c) = Cosigned By      Initials Name Provider Type     Bri Cavazos, ELLIE/L, CSRS Occupational Therapist                   Mobility/ADL's       Row Name 08/15/23 1425          Bed Mobility    Bed Mobility supine-sit  -     Sit-Supine [place] (Bed Mobility) contact guard  -     Assistive Device (Bed Mobility) bed rails;head of bed elevated  -       Row Name 08/15/23 1425          Transfers    Transfers sit-stand transfer;stand-sit transfer;bed-chair transfer  -       Row Name 08/15/23 1425          Bed-Chair Transfer    Bed-Chair [place] (Transfers) contact guard  -     Assistive Device (Bed-Chair Transfers) walker, front-wheeled  -       Row Name 08/15/23 1425          Sit-Stand Transfer    Sit-Stand  [place] (Transfers) contact guard  -JJ     Assistive Device (Sit-Stand Transfers) walker, front-wheeled  -JJ       Row Name 08/15/23 1425          Stand-Sit Transfer    Stand-Sit [place] (Transfers) verbal cues;contact guard  -JJ     Assistive Device (Stand-Sit Transfers) walker, front-wheeled  -JJ               User Key  (r) = Recorded By, (t) = Taken By, (c) = Cosigned By      Initials Name Provider Type    Bri Abdi OTR/L, SYLVIA Occupational Therapist                   Obj/Interventions       Row Name 08/15/23 1425          Strength Comprehensive (MMT)    Comment, General Manual Muscle Testing (MMT) Assessment Completed B UE strengthening exercises 20 reps x 1  set witrh 5lb wand.  -JJ       Row Name 08/15/23 1425          Balance    Balance Assessment sitting static balance;standing dynamic balance;standing static balance;sitting dynamic balance  -JJ     Static Sitting Balance independent  -JJ     Dynamic Sitting Balance independent  -JJ     Position, Sitting Balance sitting edge of bed  -JJ     Static Standing Balance contact guard  -JJ     Dynamic Standing Balance contact guard  -JJ     Position/Device Used, Standing Balance unsupported  -JJ               User Key  (r) = Recorded By, (t) = Taken By, (c) = Cosigned By      Initials Name Provider Type    Bri Abdi OTR/L, SYLVIA Occupational Therapist                   Goals/Plan       Row Name 08/15/23 1425          Dressing Goal 1 (OT)    Activity/Device (Dressing Goal 1, OT) dressing skills, all  -JJ     [place]/Cues Needed (Dressing Goal 1, OT) minimum assist (75% or more patient effort);set-up required;verbal cues required  -JJ     Time Frame (Dressing Goal 1, OT) long term goal (LTG);by discharge  -J     Progress/Outcome (Dressing Goal 1, OT) goal partially met;discharged from facility  -JJ       Row Name 08/15/23 1425          Toileting Goal 1 (OT)    Activity/Device (Toileting Goal 1, OT) toileting skills, all  -JJ      [place] Level/Cues Needed (Toileting Goal 1, OT) standby assist  -     Time Frame (Toileting Goal 1, OT) long term goal (LTG);by discharge  -     Progress/Outcome (Toileting Goal 1, OT) goal partially met;discharged from facility  -       Row Name 08/15/23 1425          Grooming Goal 1 (OT)    Activity/Device (Grooming Goal 1, OT) grooming skills, all  -J     [place] (Grooming Goal 1, OT) standby assist  -     Time Frame (Grooming Goal 1, OT) long term goal (LTG);by discharge  -     Strategies/Barriers (Grooming Goal 1, OT) standing at sink  -     Progress/Outcome (Grooming Goal 1, OT) goal partially met;discharged from facility  -               User Key  (r) = Recorded By, (t) = Taken By, (c) = Cosigned By      Initials Name Provider Type    Bri Abdi, OTR/L, CSRS Occupational Therapist                   Clinical Impression       Row Name 08/15/23 1425          Pain Assessment    Pretreatment Pain Rating 0/10 - no pain  -     Posttreatment Pain Rating 0/10 - no pain  -       Row Name 08/15/23 1422          Plan of Care Review    Plan of Care Reviewed With patient  -     Outcome Evaluation OT tx completed. Pt presents alert and oriented, agreeable to participation in therapy session. He was able to bring self to sitting at EOB with CGA. Completed sit <> stand t/f from EOB and took small steps from bed to chair with CGA. He declined further mobility attempts d/t sore on his foot. He was agreeable for B UE strengthening exercises with 5lb wand, completed 20 reps x 1 set. Left sitting up in chair at end of session. Pt to d/c to rehab this date. OT to sign off.  -       Row Name 08/15/23 142          Therapy Plan Review/Discharge Plan (OT)    Anticipated Discharge Disposition (OT) skilled nursing [facility]  -       Row Name 08/15/23 1420          Positioning and Restraints    Pre-Treatment Position in bed  -J     Post Treatment Position chair  -     In Chair notified  nsg;sitting;call light within reach;encouraged to call for assist;with family/caregiver  -               User Key  (r) = Recorded By, (t) = Taken By, (c) = Cosigned By      Initials Name Provider Type    Bri Abdi OTR/L, SYLVIA Occupational Therapist                   Outcome Measures       Row Name 08/15/23 1425          How much help from another is currently needed...    Putting on and taking off regular lower body clothing? 2  -JJ     Bathing (including washing, rinsing, and drying) 2  -JJ     Toileting (which includes using toilet bed pan or urinal) 2  -JJ     Putting on and taking off regular upper body clothing 3  -JJ     Taking care of personal grooming (such as brushing teeth) 3  -JJ     Eating meals 4  -J     AM-PAC 6 Clicks Score (OT) 16  -J       Row Name 08/15/23 1058 08/15/23 0800       How much help from another person do you currently need...    Turning from your back to your side while in flat bed without using bedrails? 4  -WK 4  -WM    Moving from lying on back to sitting on the side of a flat bed without bedrails? 3  -WK 3  -WM    Moving to and from a bed to a chair (including a wheelchair)? 3  -WK 3  -WM    Standing up from a chair using your arms (e.g., wheelchair, bedside chair)? 3  -WK 3  -WM    Climbing 3-5 steps with a railing? 2  -WK 2  -WM    To walk in hospital room? 3  -WK 3  -WM    AM-PAC 6 Clicks Score (PT) 18  -WK 18  -WM    Highest level of mobility 6 --> Walked 10 steps or more  -WK 6 --> Walked 10 steps or more  -WM      Row Name 08/15/23 1425 08/15/23 1058       Functional Assessment    Outcome Measure Options AM-PAC 6 Clicks Daily Activity (OT)  - AM-PAC 6 Clicks Basic Mobility (PT)  -WK              User Key  (r) = Recorded By, (t) = Taken By, (c) = Cosigned By      Initials Name Provider Type    Graciela Del Real, RN Registered Nurse    Graciela Weathers PTA Physical Therapist Assistant    Bri Abdi OTR/L, RAFAELS Occupational Therapist                   Occupational Therapy Education       Title: PT OT SLP Therapies (Resolved)       Topic: Occupational Therapy (Resolved)       Point: ADL training (Resolved)       Description:   Instruct learner(s) on proper safety adaptation and remediation techniques during self care or transfers.   Instruct in proper use of assistive devices.                  Learning Progress Summary             Patient Acceptance, E, VU by BRETT at 8/15/2023 1435    Acceptance, E, VU by ARLEN at 8/11/2023 0932    Comment: safe transfers    Acceptance, E, VU by BRETT at 8/10/2023 1146    Acceptance, E,TB, VU by AC at 8/9/2023 1209    Comment: safe transfers, role of OT, benefits of rehab    Acceptance, E, NR by MM at 8/8/2023 1412    Comment: OT role, benefits, POC, d/c planning, safety awareness                         Point: Home exercise program (Resolved)       Description:   Instruct learner(s) on appropriate technique for monitoring, assisting and/or progressing therapeutic exercises/activities.                  Learner Progress:  Not documented in this visit.              Point: Precautions (Resolved)       Description:   Instruct learner(s) on prescribed precautions during self-care and functional transfers.                  Learning Progress Summary             Patient Acceptance, E, VU by BRETT at 8/15/2023 1435    Acceptance, E, VU by BRETT at 8/10/2023 1146    Acceptance, E, NR by MM at 8/8/2023 1412    Comment: OT role, benefits, POC, d/c planning, safety awareness                         Point: Body mechanics (Resolved)       Description:   Instruct learner(s) on proper positioning and spine alignment during self-care, functional mobility activities and/or exercises.                  Learning Progress Summary             Patient Acceptance, E, NR by MM at 8/8/2023 1412    Comment: OT role, benefits, POC, d/c planning, safety awareness                                         User Key       Initials Effective Dates Name Provider Type  Discipline    AC 02/03/23 -  [place], OTR/L, CNT Occupational Therapist OT    MM 07/11/23 -  Andres Lambert, OTR/L Occupational Therapist OT    J 07/11/23 -  Bri Cavazos OTR/L, SYLVIA Occupational Therapist OT                  OT Recommendation and Plan     Plan of Care Review  Plan of Care Reviewed With: patient  Outcome Evaluation: OT tx completed. Pt presents alert and oriented, agreeable to participation in therapy session. He was able to bring self to sitting at EOB with CGA. Completed sit <> stand t/f from EOB and took small steps from bed to chair with CGA. He declined further mobility attempts d/t sore on his foot. He was agreeable for B UE strengthening exercises with 5lb wand, completed 20 reps x 1 set. Left sitting up in chair at end of session. Pt to d/c to rehab this date. OT to sign off.  Plan of Care Reviewed With: patient  Outcome Evaluation: OT tx completed. Pt presents alert and oriented, agreeable to participation in therapy session. He was able to bring self to sitting at EOB with CGA. Completed sit <> stand t/f from EOB and took small steps from bed to chair with CGA. He declined further mobility attempts d/t sore on his foot. He was agreeable for B UE strengthening exercises with 5lb wand, completed 20 reps x 1 set. Left sitting up in chair at end of session. Pt to d/c to rehab this date. OT to sign off.     Time Calculation:         Time Calculation- OT       Row Name 08/15/23 1425             Time Calculation- OT    OT Start Time 1425  -      OT Stop Time 1451  -      OT Time Calculation (min) 26 min  -      Total Timed Code Minutes- OT 26 minute(s)  -      OT Received On 08/15/23  -                User Key  (r) = Recorded By, (t) = Taken By, (c) = Cosigned By      Initials Name Provider Type    Bri Abdi OTR/L, SYLVIA Occupational Therapist                  Therapy Charges for Today       Code Description Service Date Service Provider Modifiers Qty     50175880587 HC OT SELF CARE/MGMT/TRAIN EA 15 MIN 8/15/2023 Bri Cavazos OTR/L, CSRS GO 1    44506352433 HC OT THER PROC EA 15 MIN 8/15/2023 Bri Cavazos OTR/L, CSRS GO 1               OT Discharge Summary  Anticipated Discharge Disposition (OT): skilled nursing facility  Reason for Discharge: Discharge from facility  Outcomes Achieved: Refer to plan of care for updates on goals achieved  Discharge Destination: SNF    JENNY Mandel CSRS  8/15/2023

## 2023-08-15 NOTE — PLAN OF CARE
Goal Outcome Evaluation:      Vital signs stable normal sinus rhythm hr 's. Systolic 120's-140's. Alert and oriented x4. CIWA remains 0. Ambulated to the bathroom x2. No complaints at this time.

## 2023-08-15 NOTE — DISCHARGE PLACEMENT REQUEST
Physical Therapy Notes (last 24 hours)        Graciela Islas PTA at 23 1021  Version 1 of 1           Problem: Adult Inpatient Plan of Care  Goal: Plan of Care Review    Plan of Care Reviewed With: patient  Outcome Evaluation: sit to stand CGA. Pt performed consecutive sit to stands progress with decrease us of BLE. Pt has shaky legs and fatigues quickly. Pt amb 30' at a time and also had mild shaky BLE during gait. Unable to amb without RW.          Electronically signed by Graciela Islas PTA at 23 1022       Graciela Islas PTA at 23 1023  Version 1 of 1         Acute Care - Physical Therapy Treatment Note   [place]     Patient Name: Dayron Lubin  : 1955  MRN: [id number]  Today's Date: 2023      Visit Dx:     ICD-10-CM ICD-9-CM   1. Acute on chronic right-sided congestive heart failure  I50.813 428.0   2. Coronary artery disease due to lipid rich plaque  I25.10 414.00    I25.83 414.3   3. Duodenitis  K29.80 535.60   4. History of alcohol use  Z87.898 V11.3   5. Metabolic acidosis  E87.20 276.2   6. Dysphagia, unspecified type  R13.10 787.20   7. Impaired mobility [Z74.09 (ICD-10-CM)]  Z74.09 799.89   8. Impaired mobility and ADLs [Z74.09, Z78.9 (ICD-10-CM)]  Z74.09 V49.89    Z78.9    9. Pressure injury of left buttock, unstageable  L89.320 707.05     707.25   10. Pressure injury of right buttock, stage 3  L89.313 707.05     707.23   11. Non-pressure chronic ulcer of other part of left foot with fat layer exposed  L97.522 707.15     Patient Active Problem List   Diagnosis    Prostate cancer    Hx of radiation therapy    Elevated PSA    Encounter for follow-up surveillance of prostate cancer    HOCM (hypertrophic obstructive cardiomyopathy)    Coronary artery disease involving native coronary artery of native heart without angina pectoris    Essential hypertension    Mixed hyperlipidemia    Class 1 obesity with alveolar hypoventilation, serious comorbidity, and  body mass index (BMI) of 30.0 to 30.9 in adult    Displaced fracture of lateral malleolus of right fibula, initial encounter for closed fracture    Family history of colonic polyps    History of colon polyps    Elevated brain natriuretic peptide (BNP) level    MIKEY (acute kidney injury)    Anxiety associated with depression    ETOH abuse    Alcoholic hepatitis    Volume depletion    Anorexia    CHF (congestive heart failure)    Hypokalemia    Duodenitis    Gait instability    SVT (supraventricular tachycardia)     Past Medical History:   Diagnosis Date    Alcoholic hepatitis 7/13/2023    CAD (coronary artery disease)     History of external beam radiation therapy 05/12/2016    6840 cGy to prostate bed    Hyperlipidemia     Hypertension     Prostate cancer      Past Surgical History:   Procedure Laterality Date    CARDIAC CATHETERIZATION      CORONARY ANGIOPLASTY WITH STENT PLACEMENT      PROSTATE SURGERY      TONSILLECTOMY       PT Assessment (last 12 hours)       PT Evaluation and Treatment       Row Name 08/14/23 1000          Physical Therapy Time and Intention    Subjective Information complains of;weakness  correct time 0853  -WK     Document Type therapy note (daily note)  -WK     Mode of Treatment physical therapy  -WK     Comment monitor -150 during activity  -WK       Row Name 08/14/23 1000          General Information    Existing Precautions/Restrictions fall  -WK       Row Name 08/14/23 1000          Bed Mobility    Comment, (Bed Mobility) in chair  -WK       Row Name 08/14/23 1000          Sit-Stand Transfer    Sit-Stand [place] (Transfers) contact guard  -WK     Comment, (Sit-Stand Transfer) worked on consecutive sit to stand 3 in a row with progress from BUE to using 1 UE. Pt BLE is shaky during sit to stand and amb.  -WK       Row Name 08/14/23 1000          Stand-Sit Transfer    Stand-Sit [place] (Transfers) contact guard  -WK       Row Name 08/14/23 1000          Gait/Stairs  (Locomotion)    [place] Level (Gait) contact guard  -WK     Assistive Device (Gait) walker, front-wheeled  -WK     Distance in Feet (Gait) 30'  x2  -WK     Deviations/Abnormal Patterns (Gait) base of support, narrow  -WK     Comment, (Gait/Stairs) mild unsteady, unable to amb without RW.  -WK       Row Name 08/14/23 1000          Motor Skills    Therapeutic Exercise aerobic  -WK       Row Name 08/14/23 1000          Aerobic Exercise    Comment, Aerobic Exercise (Therapeutic Exercise) AROM BLE 10 reps. fatigue quickly  -WK       Row Name             Wound 08/07/23 1655 Left gluteal Pressure Injury    Wound - Properties Group Placement Date: 08/07/23  -AC Placement Time: 1655  -AC Present on Hospital Admission: Y  -AC Side: Left  -AC Location: gluteal  -AC Primary Wound Type: Pressure inj  -AC    Retired Wound - Properties Group Placement Date: 08/07/23  - Placement Time: 1655  -AC Present on Hospital Admission: Y  -AC Side: Left  -AC Location: gluteal  -AC Primary Wound Type: Pressure inj  -AC    Retired Wound - Properties Group Date first assessed: 08/07/23  - Time first assessed: 1655  -AC Present on Hospital Admission: Y  -AC Side: Left  -AC Location: gluteal  -AC Primary Wound Type: Pressure inj  -AC      Row Name             Wound 08/07/23 1655 Right gluteal Pressure Injury    Wound - Properties Group Placement Date: 08/07/23  -AC Placement Time: 1655  -AC Present on Hospital Admission: Y  -AC Side: Right  -AC Location: gluteal  -AC Primary Wound Type: Pressure inj  -AC    Retired Wound - Properties Group Placement Date: 08/07/23  -AC Placement Time: 1655  -AC Present on Hospital Admission: Y  -AC Side: Right  -AC Location: gluteal  -AC Primary Wound Type: Pressure inj  -AC    Retired Wound - Properties Group Date first assessed: 08/07/23  -AC Time first assessed: 1655  -AC Present on Hospital Admission: Y  -AC Side: Right  -AC Location: gluteal  -AC Primary Wound Type: Pressure inj  -AC      Row Name              Wound 08/07/23 1655 Left posterior heel Pressure Injury    Wound - Properties Group Placement Date: 08/07/23  -AC Placement Time: 1655  -AC Present on Hospital Admission: Y  -AC Side: Left  -AC Orientation: posterior  -AC Location: heel  -AC Primary Wound Type: Pressure inj  -AC    Retired Wound - Properties Group Placement Date: 08/07/23  -AC Placement Time: 1655  -AC Present on Hospital Admission: Y  -AC Side: Left  -AC Orientation: posterior  -AC Location: heel  -AC Primary Wound Type: Pressure inj  -AC    Retired Wound - Properties Group Date first assessed: 08/07/23  -AC Time first assessed: 1655  -AC Present on Hospital Admission: Y  -AC Side: Left  -AC Location: heel  -AC Primary Wound Type: Pressure inj  -AC      Row Name             Wound 08/07/23 1656 Left posterior foot Pressure Injury    Wound - Properties Group Placement Date: 08/07/23  -AC Placement Time: 1656  -AC Present on Hospital Admission: Y  -AC Side: Left  -AC Orientation: posterior  -AC Location: foot  -AC Primary Wound Type: Pressure inj  -AC    Retired Wound - Properties Group Placement Date: 08/07/23  -AC Placement Time: 1656  -AC Present on Hospital Admission: Y  -AC Side: Left  -AC Orientation: posterior  -AC Location: foot  -AC Primary Wound Type: Pressure inj  -AC    Retired Wound - Properties Group Date first assessed: 08/07/23  -AC Time first assessed: 1656  -AC Present on Hospital Admission: Y  -AC Side: Left  -AC Location: foot  -AC Primary Wound Type: Pressure inj  -AC      Row Name 08/14/23 1000          Plan of Care Review    Plan of Care Reviewed With patient  -WK     Outcome Evaluation sit to stand CGA. Pt performed consecutive sit to stands progress with decrease us of BLE. Pt has shaky legs and fatigues quickly. Pt amb 30' at a time and also had mild shaky BLE during gait. Unable to amb without RW.  -WK       Row Name 08/14/23 1000          Positioning and Restraints    Pre-Treatment Position sitting in  chair/recliner  -WK     Post Treatment Position chair  -WK     In Chair reclined;call light within reach;encouraged to call for assist;with family/caregiver;with nsg  -WK               User Key  (r) = Recorded By, (t) = Taken By, (c) = Cosigned By      Initials Name Provider Type    AC Boston Echevarria, RN Registered Nurse    Graciela Weathers PTA Physical Therapist Assistant                    Physical Therapy Education       Title: PT OT SLP Therapies (In Progress)       Topic: Physical Therapy (Done)       Point: Mobility training (Done)       Learning Progress Summary             Patient Eager, E, VU by  at 8/13/2023 0914    Comment: HEP,POC    Acceptance, E,D, NR,DU,VU by  at 8/8/2023 1359    Comment: benefits of PT and POC, call for A OOB                         Point: Precautions (Done)       Learning Progress Summary             Patient Acceptance, E,D, NR,DU,VU by  at 8/8/2023 1359    Comment: benefits of PT and POC, call for A OOB                                         User Key       Initials Effective Dates Name Provider Type Discipline    AE 02/03/23 -  Kayla Conteh PTA Physical Therapist Assistant PT     02/03/23 -  Justyn Briggs, PT Physical Therapist PT                  PT Recommendation and Plan     Plan of Care Reviewed With: patient  Outcome Evaluation: sit to stand CGA. Pt performed consecutive sit to stands progress with decrease us of BLE. Pt has shaky legs and fatigues quickly. Pt amb 30' at a time and also had mild shaky BLE during gait. Unable to amb without RW.   Outcome Measures       Row Name 08/14/23 0853 08/13/23 0900 08/11/23 1320       How much help from another person do you currently need...    Turning from your back to your side while in flat bed without using bedrails? 4  -WK 4  -AE 4  -TB    Moving from lying on back to sitting on the side of a flat bed without bedrails? 3  -WK 3  -AE 3  -TB    Moving to and from a bed to a chair (including a wheelchair)? 3  -WK 3   -AE 3  -TB    Standing up from a chair using your arms (e.g., wheelchair, bedside chair)? 3  -WK 3  -AE 3  -TB    Climbing 3-5 steps with a railing? 2  -WK 3  -AE 2  -TB    To walk in hospital room? 3  -WK 3  -AE 3  -TB    AM-PAC 6 Clicks Score (PT) 18  -WK 19  -AE 18  -TB       Functional Assessment    Outcome Measure Options AM-PAC 6 Clicks Basic Mobility (PT)  -WK AM-PAC 6 Clicks Basic Mobility (PT)  -AE AM-PAC 6 Clicks Basic Mobility (PT)  -TB              User Key  (r) = Recorded By, (t) = Taken By, (c) = Cosigned By      Initials Name Provider Type    AE Kayla Conteh, DAVID Physical Therapist Assistant    WK Graciela Islas PTA Physical Therapist Assistant    Nasir Boyer PTA Physical Therapist Assistant                     Time Calculation:    PT Charges       Row Name 08/14/23 1022             Time Calculation    Start Time 0853  -WK      Stop Time 0917  -WK      Time Calculation (min) 24 min  -WK      PT Received On 08/14/23  -WK         Time Calculation- PT    Total Timed Code Minutes- PT 24 minute(s)  -WK                User Key  (r) = Recorded By, (t) = Taken By, (c) = Cosigned By      Initials Name Provider Type    WK Graciela Islas PTA Physical Therapist Assistant                  Therapy Charges for Today       Code Description Service Date Service Provider Modifiers Qty    57637765173 HC GAIT TRAINING EA 15 MIN 8/14/2023 Graciela Islas PTA GP 1    62750153821 HC PT THER PROC EA 15 MIN 8/14/2023 Graciela Islas PTA GP 1            PT G-Codes  Outcome Measure Options: AM-PAC 6 Clicks Basic Mobility (PT)  AM-PAC 6 Clicks Score (PT): 18  AM-PAC 6 Clicks Score (OT): 16    Graciela Islas PTA  8/14/2023      Electronically signed by Graciela Islas PTA at 08/14/23 1023

## 2023-08-15 NOTE — CASE MANAGEMENT/SOCIAL WORK
Continued Stay Note   [place]     Patient Name: Dayron Lubin  MRN: [id number]  Today's Date: 8/15/2023    Admit Date: 2023    Plan: [place]   Discharge Plan       Row Name 08/15/23 0941       Plan    Plan [place]    Plan Comments INS is requesting a P2P to be completed by 1:00pm CST on 8/15. Phone number to call is [phone] option 5, will need patient name, member ID and . Informed MD and SW of this.                   Discharge Codes    No documentation.                 Expected Discharge Date and Time       Expected Discharge Date Expected Discharge Time    Aug 15, 2023               Ellen Dutta RN

## 2023-08-15 NOTE — PLAN OF CARE
Problem: Adult Inpatient Plan of Care  Goal: Plan of Care Review    Progress: improving  Plan of Care Reviewed With: patient  Outcome Evaluation: Sit to stand CGA. Pt amb 30' at a times CGA-min A with mild LOB due to pt hitting his heels together. Discussed wider OSVALDO and educated on safety. Pt performed 2 consecutive sit to stand with R UE support and this was a struggle for him. Pt is weak in his BLE and uses support for BUE to assist. Pt is easily fatigued.

## 2023-08-16 ENCOUNTER — APPOINTMENT (OUTPATIENT)
Dept: WOUND CARE | Facility: HOSPITAL | Age: 68
DRG: 896 | End: 2023-08-16
Payer: MEDICARE

## 2023-08-16 NOTE — THERAPY DISCHARGE NOTE
Acute Care - Physical Therapy Discharge Summary  [facility]       Patient Name: Dayron Lubin  : 1955  MRN: [id number]    Today's Date: 2023                 Admit Date: 2023      PT Recommendation and Plan    Visit Dx:    ICD-10-CM ICD-9-CM   1. Acute on chronic right-sided congestive heart failure  I50.813 428.0   2. Coronary artery disease due to lipid rich plaque  I25.10 414.00    I25.83 414.3   3. Duodenitis  K29.80 535.60   4. History of alcohol use  Z87.898 V11.3   5. Metabolic acidosis  E87.20 276.2   6. Dysphagia, unspecified type  R13.10 787.20   7. Impaired mobility [Z74.09 (ICD-10-CM)]  Z74.09 799.89   8. Impaired mobility and ADLs [Z74.09, Z78.9 (ICD-10-CM)]  Z74.09 V49.89    Z78.9    9. Pressure injury of left buttock, unstageable  L89.320 707.05     707.25   10. Pressure injury of right buttock, stage 3  L89.313 707.05     707.23   11. Non-pressure chronic ulcer of other part of left foot with fat layer exposed  L97.522 707.15   12. ETOH abuse  F10.10 305.00        Outcome Measures       Row Name 08/15/23 1058 23 0853          How much help from another person do you currently need...    Turning from your back to your side while in flat bed without using bedrails? 4  -WK 4  -WK     Moving from lying on back to sitting on the side of a flat bed without bedrails? 3  -WK 3  -WK     Moving to and from a bed to a chair (including a wheelchair)? 3  -WK 3  -WK     Standing up from a chair using your arms (e.g., wheelchair, bedside chair)? 3  -WK 3  -WK     Climbing 3-5 steps with a railing? 2  -WK 2  -WK     To walk in hospital room? 3  -WK 3  -WK     AM-PAC 6 Clicks Score (PT) 18  -WK 18  -WK        Functional Assessment    Outcome Measure Options AM-PAC 6 Clicks Basic Mobility (PT)  -WK AM-PAC 6 Clicks Basic Mobility (PT)  -WK               User Key  (r) = Recorded By, (t) = Taken By, (c) = Cosigned By      Initials Name Provider Type    Graciela Weathers PTA Physical Therapist  Assistant                         PT Rehab Goals       Row Name 08/16/23 0900             Bed Mobility Goal 1 (PT)    Activity/Assistive Device (Bed Mobility Goal 1, PT) --  -AB         Gait Training Goal 1 (PT)    Activity/Assistive Device (Gait Training Goal 1, PT) gait (walking locomotion);decrease fall risk;diminish gait deviation;increase endurance/gait distance  -AB      [place] Level (Gait Training Goal 1, PT) contact guard required  -AB      Distance (Gait Training Goal 1, PT) 50ft  -AB      Time Frame (Gait Training Goal 1, PT) 10 days  -AB      Progress/Outcome (Gait Training Goal 1, PT) goal not met  -AB                User Key  (r) = Recorded By, (t) = Taken By, (c) = Cosigned By      Initials Name Provider Type Discipline    Edda Ambrose, DAVID Physical Therapist Assistant PT                        PT Discharge Summary  Anticipated Discharge Disposition (PT): skilled nursing facility  Reason for Discharge: Discharge from facility  Outcomes Achieved: Refer to plan of care for updates on goals achieved  Discharge Destination: SNF      Edda Moura PTA   8/16/2023

## 2023-08-16 NOTE — PROGRESS NOTES
"Enter Query Response Below      Query Response: Unable to determine     Electronically signed by Holger Karimi, , 23, 09:13 CDT.              If applicable, please update the problem list.     Patient: Dayron Lubin        : 1955  Account: [id number]           Admit Date:         How to Respond to this query:       a. Click New Note     b. Answer query within the yellow box.                c. Update the Problem List, if applicable.      If you have any questions about this query contact me at: [email or web address]     ,    Patient is seen by registered dietitian on .  Dietitian note  states \"family member reported 15-20 lb loss in the last 4-6 weeks due to poor PO intake.\"  Malnutrition severity assessment documents:  Malnutrition type: Starvation - Related Malnutrition  Insufficient energy intake: Moderate <50% of est. energy requirement for >or equal to 1 month  Unintentional weight loss: Moderate Weight loss greater than 5% in one month  Nutrition focused physical exam was not completed due to patient confusion at time of visit.  Dietitian note on  documents improvement in PO intake and weight up 5 pounds from admission with good appetite reported.    Please clarify diagnosis treated/monitored:    Starvation-related malnutrition, ________ (specify mild/modrate/severe)  Malnutrition unspecified  Other- specify __________  Unable to determine     By submitting this query, we are merely seeking further clarification of documentation to accurately reflect all conditions that you are monitoring, evaluating, treating or that extend the hospitalization or utilize additional resources of care. Please utilize your independent clinical judgment when addressing the question(s) above.     This query and your response, once completed, will be entered into the legal medical record.    Sincerely,  Janel MILLER, RN  Clinical Documentation Integrity Program "   [email or web address]

## 2023-08-26 LAB
QT INTERVAL: 358 MS
QTC INTERVAL: 493 MS

## 2023-09-05 NOTE — PROGRESS NOTES
Subjective    Mr. Lubin is 68 y.o. male    Chief Complaint: Prostate Cancer     History of Present Illness    Pt. presents with history of prostate cancer diagnosed in July 2015. Current disease state is high risk disease.  Pt. underwent robot assisted laparoscpic prostatectomy and external beam radiotherapy. Path showing T3b 7 adenocarcinoma of prostate with Negative surgical margins. Patient has received XRT completed in May 2016  and Lupron x 2 years management of prostate cancer in March 2019. Pt. having 1 ppd (security) incontinence. Associated voiding symptoms include frequency, urgency, nocturia. Pt. Is having erectile dysfunction that does not respond to PDE5 inhibitors. Hot flashes resolved.      Lab Results   Component Value Date    PSA <0.014 09/06/2023    PSA <0.014 01/13/2023    PSA <0.014 07/11/2022       The following portions of the patient's history were reviewed and updated as appropriate: allergies, current medications, past family history, past medical history, past social history, past surgical history and problem list.    Review of Systems      Current Outpatient Medications:     aspirin (aspirin) 81 MG EC tablet, Take 1 tablet by mouth Daily., Disp:  , Rfl:     atorvastatin (LIPITOR) 20 MG tablet, , Disp: , Rfl:     chlordiazePOXIDE (LIBRIUM) 10 MG capsule, Take 1 capsule by mouth 3 (Three) Times a Day As Needed for Anxiety for up to 9 doses., Disp: 5 capsule, Rfl: 0    collagenase 250 UNIT/GM ointment, Apply 1 application  topically to the appropriate area as directed Daily., Disp: 60 each, Rfl: 99    folic acid (FOLVITE) 1 MG tablet, Take 1 tablet by mouth Daily., Disp: 30 tablet, Rfl: 0    metoprolol succinate XL (TOPROL-XL) 25 MG 24 hr tablet, Take 1 tablet by mouth Daily., Disp: 30 tablet, Rfl: 5    multivitamin with minerals tablet tablet, Take 1 tablet by mouth Daily., Disp: 100 each, Rfl: 99    pantoprazole (PROTONIX) 40 MG EC tablet, Take 1 tablet by mouth 2 (Two) Times a Day Before  "Meals., Disp: , Rfl:     PARoxetine (PAXIL) 20 MG tablet, Take 1 tablet by mouth Every Night., Disp: , Rfl:     risperiDONE (risperDAL) 1 MG tablet, Take 1 tablet by mouth Every Night., Disp: 30 tablet, Rfl: 0    sucralfate (CARAFATE) 1 g tablet, Take 1 tablet by mouth 4 (Four) Times a Day., Disp: , Rfl:     temazepam (RESTORIL) 30 MG capsule, Take 1 capsule by mouth At Night As Needed for Sleep., Disp: , Rfl: 2    Past Medical History:   Diagnosis Date    Alcoholic hepatitis 7/13/2023    CAD (coronary artery disease)     History of external beam radiation therapy 05/12/2016    6840 cGy to prostate bed    Hyperlipidemia     Hypertension     Prostate cancer        Past Surgical History:   Procedure Laterality Date    CARDIAC CATHETERIZATION      CORONARY ANGIOPLASTY WITH STENT PLACEMENT      PROSTATE SURGERY      TONSILLECTOMY         Social History     Socioeconomic History    Marital status: Single   Tobacco Use    Smoking status: Never    Smokeless tobacco: Never   Vaping Use    Vaping Use: Never used   Substance and Sexual Activity    Alcohol use: Yes     Comment: very little     Drug use: Yes     Types: Marijuana    Sexual activity: Defer       Family History   Problem Relation Age of Onset    Coronary artery disease Mother     Stroke Mother        Objective    Temp 96.8 °F (36 °C)   Ht 180.3 cm (71\")   Wt 81.5 kg (179 lb 9.6 oz)   BMI 25.05 kg/m²     Physical Exam        Results for orders placed or performed in visit on 09/06/23   PSA DIAGNOSTIC    Specimen: Blood   Result Value Ref Range    PSA <0.014 0.000 - 4.000 ng/mL     IPSS Questionnaire (AUA-7):  Incomplete emptying  Over the past month, how often have you had a sensation of not emptying your bladder completely after you finish?: Not at all (09/13/23 0911)  Frequency  Over the past month, how often have you had to urinate again less than two hours after you finishing urinating ?: Less than 1 time in 5 (09/13/23 0911)  Intermittency  Over the past " month, how often have you found you stopped and started again several time when you urinated ?: Not at all (09/13/23 0911)  Urgency  Over the last month, how difficult  have you found it to postpone urination ?: Less than 1 time in 5 (09/13/23 0911)  Weak Stream  Over the past month, how often have you had a weak urinary stream ?: Not at all (09/13/23 0911)  Straining  Over the past month, how often have you had to push or strain to begin urination ?: Not at all (09/13/23 0911)  Nocturia  Over the past month, how many times did you most typically get up to urinate from the time you went to bed until the time you got up in the morning ?: 2 times (09/13/23 0911)  Quality of life due to urinary symptoms  If you were to spend the rest of your life with your urinary condition the way it is now, how would feel about that?: Mixed - about equally satisfied (09/13/23 0911)    Scores  Total IPSS Score: (!) 4 (09/13/23 0911)  Total Score = Symtomatic Level: Mildly symptomatic: 0-7 (09/13/23 0911)        Assessment and Plan    Diagnoses and all orders for this visit:    1. Prostate cancer (Primary)  -     POC Urinalysis Dipstick, Multipro    2. Impotence of organic origin        Patient underwent RALP on 7/28/15. His pretreatment PSA was 7.7. His pathology revealed [place] 3+4 = 7 adenocarcinoma the prostate. Pathology showed [id number] with negative surgical margins. His posttreatment PSA was 0.1 he completed XRT in May 2016 his PSA in September 2017 [place] to 0.3 and he was started on 2 years of ADT which he completed March 2019.         His PSA is undetectable today. PSA has been undetectable for five years.       He will follow-up in one year with PSA.

## 2023-09-06 ENCOUNTER — LAB (OUTPATIENT)
Dept: UROLOGY | Facility: CLINIC | Age: 68
End: 2023-09-06
Payer: MEDICARE

## 2023-09-06 DIAGNOSIS — C61 PROSTATE CANCER: ICD-10-CM

## 2023-09-07 LAB — PSA SERPL-MCNC: <0.014 NG/ML (ref 0–4)

## 2023-09-13 ENCOUNTER — OFFICE VISIT (OUTPATIENT)
Dept: UROLOGY | Facility: CLINIC | Age: 68
End: 2023-09-13
Payer: MEDICARE

## 2023-09-13 VITALS — WEIGHT: 179.6 LBS | TEMPERATURE: 96.8 F | HEIGHT: 71 IN | BODY MASS INDEX: 25.15 KG/M2

## 2023-09-13 DIAGNOSIS — N52.9 IMPOTENCE OF ORGANIC ORIGIN: ICD-10-CM

## 2023-09-13 DIAGNOSIS — C61 PROSTATE CANCER: Primary | ICD-10-CM

## 2023-09-13 LAB
BILIRUB BLD-MCNC: NEGATIVE MG/DL
CLARITY, POC: CLEAR
COLOR UR: YELLOW
GLUCOSE UR STRIP-MCNC: NEGATIVE MG/DL
KETONES UR QL: NEGATIVE
LEUKOCYTE EST, POC: NEGATIVE
NITRITE UR-MCNC: NEGATIVE MG/ML
PH UR: 6 [PH] (ref 5–8)
PROT UR STRIP-MCNC: NEGATIVE MG/DL
RBC # UR STRIP: NEGATIVE /UL
SP GR UR: 1.02 (ref 1–1.03)
UROBILINOGEN UR QL: NORMAL

## 2023-09-13 RX ORDER — ATORVASTATIN CALCIUM 20 MG/1
TABLET, FILM COATED ORAL
COMMUNITY
Start: 2023-07-22

## 2024-03-24 ENCOUNTER — HOSPITAL ENCOUNTER (EMERGENCY)
Age: 69
Discharge: HOME | End: 2024-03-24
Attending: PEDIATRICS
Payer: MEDICARE

## 2024-03-24 VITALS
DIASTOLIC BLOOD PRESSURE: 96 MMHG | WEIGHT: 180 LBS | SYSTOLIC BLOOD PRESSURE: 136 MMHG | TEMPERATURE: 97.8 F | RESPIRATION RATE: 18 BRPM | HEART RATE: 89 BPM | BODY MASS INDEX: 25.1 KG/M2 | OXYGEN SATURATION: 96 %

## 2024-03-24 DIAGNOSIS — F10.930 ALCOHOL WITHDRAWAL SYNDROME WITHOUT COMPLICATION (HCC): Primary | ICD-10-CM

## 2024-03-24 LAB
ALBUMIN SERPL-MCNC: 4.6 G/DL (ref 3.5–5.2)
ALP SERPL-CCNC: 87 U/L (ref 40–130)
ALT SERPL-CCNC: 126 U/L (ref 5–41)
AMPHET UR QL SCN: NEGATIVE
ANION GAP SERPL CALCULATED.3IONS-SCNC: 19 MMOL/L (ref 7–19)
AST SERPL-CCNC: 232 U/L (ref 5–40)
BARBITURATES UR QL SCN: NEGATIVE
BASOPHILS # BLD: 0 K/UL (ref 0–0.2)
BASOPHILS NFR BLD: 0.2 % (ref 0–1)
BENZODIAZ UR QL SCN: POSITIVE
BILIRUB SERPL-MCNC: 1 MG/DL (ref 0.2–1.2)
BUN SERPL-MCNC: 16 MG/DL (ref 8–23)
BUPRENORPHINE URINE: NEGATIVE
CALCIUM SERPL-MCNC: 9.6 MG/DL (ref 8.8–10.2)
CANNABINOIDS UR QL SCN: NEGATIVE
CHLORIDE SERPL-SCNC: 94 MMOL/L (ref 98–111)
CO2 SERPL-SCNC: 21 MMOL/L (ref 22–29)
COCAINE UR QL SCN: NEGATIVE
CREAT SERPL-MCNC: 0.9 MG/DL (ref 0.5–1.2)
DRUG SCREEN COMMENT UR-IMP: ABNORMAL
EOSINOPHIL # BLD: 0 K/UL (ref 0–0.6)
EOSINOPHIL NFR BLD: 0.2 % (ref 0–5)
ERYTHROCYTE [DISTWIDTH] IN BLOOD BY AUTOMATED COUNT: 12.5 % (ref 11.5–14.5)
ETHANOLAMINE SERPL-MCNC: <10 MG/DL (ref 0–0.08)
FENTANYL SCREEN, URINE: NEGATIVE
GLUCOSE SERPL-MCNC: 182 MG/DL (ref 74–109)
HCT VFR BLD AUTO: 42.2 % (ref 42–52)
HGB BLD-MCNC: 14.5 G/DL (ref 14–18)
IMM GRANULOCYTES # BLD: 0 K/UL
LYMPHOCYTES # BLD: 0.7 K/UL (ref 1.1–4.5)
LYMPHOCYTES NFR BLD: 12.2 % (ref 20–40)
MCH RBC QN AUTO: 33.1 PG (ref 27–31)
MCHC RBC AUTO-ENTMCNC: 34.4 G/DL (ref 33–37)
MCV RBC AUTO: 96.3 FL (ref 80–94)
METHADONE UR QL SCN: NEGATIVE
METHAMPHETAMINE, URINE: NEGATIVE
MONOCYTES # BLD: 0.4 K/UL (ref 0–0.9)
MONOCYTES NFR BLD: 6.3 % (ref 0–10)
NEUTROPHILS # BLD: 4.9 K/UL (ref 1.5–7.5)
NEUTS SEG NFR BLD: 80.6 % (ref 50–65)
OPIATES UR QL SCN: NEGATIVE
OXYCODONE UR QL SCN: NEGATIVE
PCP UR QL SCN: NEGATIVE
PLATELET # BLD AUTO: 92 K/UL (ref 130–400)
PMV BLD AUTO: 11.4 FL (ref 9.4–12.4)
POTASSIUM SERPL-SCNC: 3.5 MMOL/L (ref 3.5–5)
PROT SERPL-MCNC: 7.6 G/DL (ref 6.6–8.7)
RBC # BLD AUTO: 4.38 M/UL (ref 4.7–6.1)
SODIUM SERPL-SCNC: 134 MMOL/L (ref 136–145)
TRICYCLIC, URINE: NEGATIVE
WBC # BLD AUTO: 6.1 K/UL (ref 4.8–10.8)

## 2024-03-24 PROCEDURE — 36415 COLL VENOUS BLD VENIPUNCTURE: CPT

## 2024-03-24 PROCEDURE — 96374 THER/PROPH/DIAG INJ IV PUSH: CPT

## 2024-03-24 PROCEDURE — 80307 DRUG TEST PRSMV CHEM ANLYZR: CPT

## 2024-03-24 PROCEDURE — 6370000000 HC RX 637 (ALT 250 FOR IP): Performed by: PEDIATRICS

## 2024-03-24 PROCEDURE — 99284 EMERGENCY DEPT VISIT MOD MDM: CPT

## 2024-03-24 PROCEDURE — G0480 DRUG TEST DEF 1-7 CLASSES: HCPCS

## 2024-03-24 PROCEDURE — 82077 ASSAY SPEC XCP UR&BREATH IA: CPT

## 2024-03-24 PROCEDURE — 85025 COMPLETE CBC W/AUTO DIFF WBC: CPT

## 2024-03-24 PROCEDURE — 6360000002 HC RX W HCPCS: Performed by: PEDIATRICS

## 2024-03-24 PROCEDURE — 80053 COMPREHEN METABOLIC PANEL: CPT

## 2024-03-24 RX ORDER — PANTOPRAZOLE SODIUM 40 MG/1
40 TABLET, DELAYED RELEASE ORAL DAILY
COMMUNITY

## 2024-03-24 RX ORDER — LORAZEPAM 2 MG/1
2 TABLET ORAL ONCE
Status: COMPLETED | OUTPATIENT
Start: 2024-03-24 | End: 2024-03-24

## 2024-03-24 RX ORDER — LORAZEPAM 2 MG/ML
1 INJECTION INTRAMUSCULAR ONCE
Status: COMPLETED | OUTPATIENT
Start: 2024-03-24 | End: 2024-03-24

## 2024-03-24 RX ORDER — LOPERAMIDE HCL 1 MG/7.5ML
2 SOLUTION ORAL 4 TIMES DAILY PRN
Status: DISCONTINUED | OUTPATIENT
Start: 2024-03-24 | End: 2024-03-24 | Stop reason: HOSPADM

## 2024-03-24 RX ORDER — PAROXETINE HYDROCHLORIDE 20 MG/1
20 TABLET, FILM COATED ORAL EVERY MORNING
COMMUNITY

## 2024-03-24 RX ADMIN — LORAZEPAM 2 MG: 2 TABLET ORAL at 12:54

## 2024-03-24 RX ADMIN — LORAZEPAM 1 MG: 2 INJECTION INTRAMUSCULAR; INTRAVENOUS at 14:14

## 2024-04-01 NOTE — ED PROVIDER NOTES
- Abnormal; Notable for the following components:    Benzodiazepine Screen, Urine POSITIVE (*)     All other components within normal limits   ETHANOL       All other labs were within normal range or not returned as of this dictation.    EMERGENCY DEPARTMENT COURSE and DIFFERENTIAL DIAGNOSIS/MDM:   Vitals:    Vitals:    03/24/24 1530 03/24/24 1600 03/24/24 1630 03/24/24 1700   BP: (!) 148/103 (!) 142/100 (!) 132/101 (!) 136/96   Pulse: 88   89   Resp: 18   18   Temp:    97.8 °F (36.6 °C)   SpO2: 96% 95% 97% 96%   Weight:           MDM     Amount and/or Complexity of Data Reviewed  Clinical lab tests: reviewed    68-year-old male presents to the emergency department with alcohol withdrawal.  Patient's blood alcohol is negative on arrival.  Patient is having tremors and requests medication.  Ativan 2 mg p.o. and, later, 1 mg IV given.  Patient is offered hospitalization for withdrawal symptoms and help with placement for rehabilitation.  Patient initially considers staying in the hospital.  Patient will go to [facility] today for withdrawal symptoms and rehabilitation.  Patient's sister is taking him there upon discharge.  Patient is welcome to return at any time.    PROCEDURES:  Unless otherwise noted below, none     Procedures    FINAL IMPRESSION      1. Alcohol withdrawal syndrome without complication (HCC)          DISPOSITION/PLAN   DISPOSITION Decision To Discharge 03/24/2024 05:01:45 PM      PATIENT REFERRED TO:  No follow-up provider specified.    DISCHARGE MEDICATIONS:  Discharge Medication List as of 3/24/2024  5:11 PM             (Please note that portions of this note were completed with a voice recognition program.  Efforts were made to edit thedictations but occasionally words are mis-transcribed.)    Janny Damon MD (electronically signed)  Attending Emergency Physician          Janny Damon MD  03/31/24 [id number]

## 2024-07-06 ENCOUNTER — HOSPITAL ENCOUNTER (INPATIENT)
Age: 69
LOS: 2 days | Discharge: HOME | DRG: 897 | End: 2024-07-08
Attending: EMERGENCY MEDICINE | Admitting: STUDENT IN AN ORGANIZED HEALTH CARE EDUCATION/TRAINING PROGRAM
Payer: MEDICARE

## 2024-07-06 DIAGNOSIS — E87.29 ALCOHOLIC KETOACIDOSIS: ICD-10-CM

## 2024-07-06 DIAGNOSIS — F10.930 ALCOHOL WITHDRAWAL SYNDROME WITHOUT COMPLICATION (HCC): Primary | ICD-10-CM

## 2024-07-06 PROBLEM — E78.5 HYPERLIPIDEMIA: Status: ACTIVE | Noted: 2024-07-06

## 2024-07-06 PROBLEM — I10 HYPERTENSION: Status: ACTIVE | Noted: 2024-07-06

## 2024-07-06 LAB
ALBUMIN SERPL-MCNC: 4.7 G/DL (ref 3.5–5.2)
ALP SERPL-CCNC: 75 U/L (ref 40–130)
ALT SERPL-CCNC: 108 U/L (ref 5–41)
ANION GAP SERPL CALCULATED.3IONS-SCNC: 24 MMOL/L (ref 7–19)
AST SERPL-CCNC: 175 U/L (ref 5–40)
BASOPHILS # BLD: 0 K/UL (ref 0–0.2)
BASOPHILS NFR BLD: 0.2 % (ref 0–1)
BILIRUB SERPL-MCNC: 1.3 MG/DL (ref 0.2–1.2)
BUN SERPL-MCNC: 23 MG/DL (ref 8–23)
CALCIUM SERPL-MCNC: 9.5 MG/DL (ref 8.8–10.2)
CHLORIDE SERPL-SCNC: 89 MMOL/L (ref 98–111)
CO2 SERPL-SCNC: 21 MMOL/L (ref 22–29)
CREAT SERPL-MCNC: 1 MG/DL (ref 0.5–1.2)
EOSINOPHIL # BLD: 0 K/UL (ref 0–0.6)
EOSINOPHIL NFR BLD: 0 % (ref 0–5)
ERYTHROCYTE [DISTWIDTH] IN BLOOD BY AUTOMATED COUNT: 13.2 % (ref 11.5–14.5)
GLUCOSE SERPL-MCNC: 152 MG/DL (ref 74–109)
HCT VFR BLD AUTO: 48.3 % (ref 42–52)
HGB BLD-MCNC: 16.4 G/DL (ref 14–18)
IMM GRANULOCYTES # BLD: 0.1 K/UL
LYMPHOCYTES # BLD: 0.7 K/UL (ref 1.1–4.5)
LYMPHOCYTES NFR BLD: 8.7 % (ref 20–40)
MCH RBC QN AUTO: 31.2 PG (ref 27–31)
MCHC RBC AUTO-ENTMCNC: 34 G/DL (ref 33–37)
MCV RBC AUTO: 92 FL (ref 80–94)
MONOCYTES # BLD: 0.6 K/UL (ref 0–0.9)
MONOCYTES NFR BLD: 7.4 % (ref 0–10)
NEUTROPHILS # BLD: 6.7 K/UL (ref 1.5–7.5)
NEUTS SEG NFR BLD: 83.1 % (ref 50–65)
PLATELET # BLD AUTO: 121 K/UL (ref 130–400)
PMV BLD AUTO: 11.1 FL (ref 9.4–12.4)
POTASSIUM SERPL-SCNC: 4.1 MMOL/L (ref 3.5–5)
PROT SERPL-MCNC: 8.3 G/DL (ref 6.6–8.7)
RBC # BLD AUTO: 5.25 M/UL (ref 4.7–6.1)
SODIUM SERPL-SCNC: 134 MMOL/L (ref 136–145)
WBC # BLD AUTO: 8.1 K/UL (ref 4.8–10.8)

## 2024-07-06 PROCEDURE — 85025 COMPLETE CBC W/AUTO DIFF WBC: CPT

## 2024-07-06 PROCEDURE — HZ2ZZZZ DETOXIFICATION SERVICES FOR SUBSTANCE ABUSE TREATMENT: ICD-10-PCS | Performed by: STUDENT IN AN ORGANIZED HEALTH CARE EDUCATION/TRAINING PROGRAM

## 2024-07-06 PROCEDURE — 2580000003 HC RX 258

## 2024-07-06 PROCEDURE — 80053 COMPREHEN METABOLIC PANEL: CPT

## 2024-07-06 PROCEDURE — 6360000002 HC RX W HCPCS: Performed by: EMERGENCY MEDICINE

## 2024-07-06 PROCEDURE — 6370000000 HC RX 637 (ALT 250 FOR IP): Performed by: EMERGENCY MEDICINE

## 2024-07-06 PROCEDURE — 6360000002 HC RX W HCPCS

## 2024-07-06 PROCEDURE — 6370000000 HC RX 637 (ALT 250 FOR IP)

## 2024-07-06 PROCEDURE — 36415 COLL VENOUS BLD VENIPUNCTURE: CPT

## 2024-07-06 PROCEDURE — 2580000003 HC RX 258: Performed by: EMERGENCY MEDICINE

## 2024-07-06 PROCEDURE — 96374 THER/PROPH/DIAG INJ IV PUSH: CPT

## 2024-07-06 PROCEDURE — 99285 EMERGENCY DEPT VISIT HI MDM: CPT

## 2024-07-06 PROCEDURE — 1200000000 HC SEMI PRIVATE

## 2024-07-06 RX ORDER — LORAZEPAM 2 MG/ML
2 INJECTION INTRAMUSCULAR
Status: DISCONTINUED | OUTPATIENT
Start: 2024-07-06 | End: 2024-07-08 | Stop reason: HOSPADM

## 2024-07-06 RX ORDER — ACETAMINOPHEN 325 MG/1
650 TABLET ORAL EVERY 6 HOURS PRN
Status: DISCONTINUED | OUTPATIENT
Start: 2024-07-06 | End: 2024-07-08 | Stop reason: HOSPADM

## 2024-07-06 RX ORDER — POLYETHYLENE GLYCOL 3350 17 G/17G
17 POWDER, FOR SOLUTION ORAL DAILY PRN
Status: DISCONTINUED | OUTPATIENT
Start: 2024-07-06 | End: 2024-07-08 | Stop reason: HOSPADM

## 2024-07-06 RX ORDER — POTASSIUM CHLORIDE 7.45 MG/ML
10 INJECTION INTRAVENOUS PRN
Status: DISCONTINUED | OUTPATIENT
Start: 2024-07-06 | End: 2024-07-08 | Stop reason: HOSPADM

## 2024-07-06 RX ORDER — GAUZE BANDAGE 2" X 2"
100 BANDAGE TOPICAL DAILY
Status: DISCONTINUED | OUTPATIENT
Start: 2024-07-06 | End: 2024-07-08 | Stop reason: HOSPADM

## 2024-07-06 RX ORDER — LORAZEPAM 2 MG/ML
2 INJECTION INTRAMUSCULAR ONCE
Status: COMPLETED | OUTPATIENT
Start: 2024-07-06 | End: 2024-07-06

## 2024-07-06 RX ORDER — SODIUM CHLORIDE, SODIUM LACTATE, POTASSIUM CHLORIDE, AND CALCIUM CHLORIDE .6; .31; .03; .02 G/100ML; G/100ML; G/100ML; G/100ML
1000 INJECTION, SOLUTION INTRAVENOUS ONCE
Status: COMPLETED | OUTPATIENT
Start: 2024-07-06 | End: 2024-07-06

## 2024-07-06 RX ORDER — SODIUM CHLORIDE 9 MG/ML
INJECTION, SOLUTION INTRAVENOUS PRN
Status: DISCONTINUED | OUTPATIENT
Start: 2024-07-06 | End: 2024-07-08 | Stop reason: HOSPADM

## 2024-07-06 RX ORDER — ENOXAPARIN SODIUM 100 MG/ML
40 INJECTION SUBCUTANEOUS DAILY
Status: DISCONTINUED | OUTPATIENT
Start: 2024-07-06 | End: 2024-07-08 | Stop reason: HOSPADM

## 2024-07-06 RX ORDER — LORAZEPAM 2 MG/1
2 TABLET ORAL
Status: DISCONTINUED | OUTPATIENT
Start: 2024-07-06 | End: 2024-07-08 | Stop reason: HOSPADM

## 2024-07-06 RX ORDER — ONDANSETRON 2 MG/ML
4 INJECTION INTRAMUSCULAR; INTRAVENOUS EVERY 6 HOURS PRN
Status: DISCONTINUED | OUTPATIENT
Start: 2024-07-06 | End: 2024-07-08 | Stop reason: HOSPADM

## 2024-07-06 RX ORDER — ONDANSETRON 2 MG/ML
4 INJECTION INTRAMUSCULAR; INTRAVENOUS ONCE
Status: COMPLETED | OUTPATIENT
Start: 2024-07-06 | End: 2024-07-06

## 2024-07-06 RX ORDER — CHLORDIAZEPOXIDE HYDROCHLORIDE 25 MG/1
50 CAPSULE, GELATIN COATED ORAL ONCE
Status: COMPLETED | OUTPATIENT
Start: 2024-07-06 | End: 2024-07-06

## 2024-07-06 RX ORDER — LORAZEPAM 2 MG/ML
4 INJECTION INTRAMUSCULAR
Status: DISCONTINUED | OUTPATIENT
Start: 2024-07-06 | End: 2024-07-08 | Stop reason: HOSPADM

## 2024-07-06 RX ORDER — SODIUM CHLORIDE, SODIUM LACTATE, POTASSIUM CHLORIDE, CALCIUM CHLORIDE 600; 310; 30; 20 MG/100ML; MG/100ML; MG/100ML; MG/100ML
INJECTION, SOLUTION INTRAVENOUS CONTINUOUS
Status: DISCONTINUED | OUTPATIENT
Start: 2024-07-06 | End: 2024-07-08

## 2024-07-06 RX ORDER — LORAZEPAM 2 MG/ML
1 INJECTION INTRAMUSCULAR
Status: DISCONTINUED | OUTPATIENT
Start: 2024-07-06 | End: 2024-07-08 | Stop reason: HOSPADM

## 2024-07-06 RX ORDER — LORAZEPAM 2 MG/1
4 TABLET ORAL
Status: DISCONTINUED | OUTPATIENT
Start: 2024-07-06 | End: 2024-07-08 | Stop reason: HOSPADM

## 2024-07-06 RX ORDER — MAGNESIUM SULFATE IN WATER 40 MG/ML
2000 INJECTION, SOLUTION INTRAVENOUS PRN
Status: DISCONTINUED | OUTPATIENT
Start: 2024-07-06 | End: 2024-07-08 | Stop reason: HOSPADM

## 2024-07-06 RX ORDER — POTASSIUM CHLORIDE 20 MEQ/1
40 TABLET, EXTENDED RELEASE ORAL PRN
Status: DISCONTINUED | OUTPATIENT
Start: 2024-07-06 | End: 2024-07-08 | Stop reason: HOSPADM

## 2024-07-06 RX ORDER — TEMAZEPAM 15 MG/1
30 CAPSULE ORAL NIGHTLY PRN
Status: DISCONTINUED | OUTPATIENT
Start: 2024-07-06 | End: 2024-07-08 | Stop reason: HOSPADM

## 2024-07-06 RX ORDER — LORAZEPAM 1 MG/1
1 TABLET ORAL
Status: DISCONTINUED | OUTPATIENT
Start: 2024-07-06 | End: 2024-07-08 | Stop reason: HOSPADM

## 2024-07-06 RX ORDER — SODIUM CHLORIDE 0.9 % (FLUSH) 0.9 %
5-40 SYRINGE (ML) INJECTION EVERY 12 HOURS SCHEDULED
Status: DISCONTINUED | OUTPATIENT
Start: 2024-07-06 | End: 2024-07-08 | Stop reason: HOSPADM

## 2024-07-06 RX ORDER — LORAZEPAM 2 MG/ML
3 INJECTION INTRAMUSCULAR
Status: DISCONTINUED | OUTPATIENT
Start: 2024-07-06 | End: 2024-07-08 | Stop reason: HOSPADM

## 2024-07-06 RX ORDER — ONDANSETRON 4 MG/1
4 TABLET, ORALLY DISINTEGRATING ORAL EVERY 8 HOURS PRN
Status: DISCONTINUED | OUTPATIENT
Start: 2024-07-06 | End: 2024-07-08 | Stop reason: HOSPADM

## 2024-07-06 RX ORDER — ATORVASTATIN CALCIUM 20 MG/1
20 TABLET, FILM COATED ORAL NIGHTLY
Status: DISCONTINUED | OUTPATIENT
Start: 2024-07-06 | End: 2024-07-08 | Stop reason: HOSPADM

## 2024-07-06 RX ORDER — SODIUM CHLORIDE 0.9 % (FLUSH) 0.9 %
5-40 SYRINGE (ML) INJECTION PRN
Status: DISCONTINUED | OUTPATIENT
Start: 2024-07-06 | End: 2024-07-08 | Stop reason: HOSPADM

## 2024-07-06 RX ORDER — THIAMINE HYDROCHLORIDE 100 MG/ML
100 INJECTION, SOLUTION INTRAMUSCULAR; INTRAVENOUS ONCE
Status: COMPLETED | OUTPATIENT
Start: 2024-07-06 | End: 2024-07-06

## 2024-07-06 RX ORDER — METOPROLOL SUCCINATE 25 MG/1
25 TABLET, EXTENDED RELEASE ORAL
Status: DISCONTINUED | OUTPATIENT
Start: 2024-07-06 | End: 2024-07-08 | Stop reason: HOSPADM

## 2024-07-06 RX ORDER — PANTOPRAZOLE SODIUM 40 MG/1
40 TABLET, DELAYED RELEASE ORAL DAILY
Status: DISCONTINUED | OUTPATIENT
Start: 2024-07-06 | End: 2024-07-08 | Stop reason: HOSPADM

## 2024-07-06 RX ORDER — ACETAMINOPHEN 650 MG/1
650 SUPPOSITORY RECTAL EVERY 6 HOURS PRN
Status: DISCONTINUED | OUTPATIENT
Start: 2024-07-06 | End: 2024-07-08 | Stop reason: HOSPADM

## 2024-07-06 RX ADMIN — ENOXAPARIN SODIUM 40 MG: 100 INJECTION SUBCUTANEOUS at 11:42

## 2024-07-06 RX ADMIN — LORAZEPAM 2 MG: 2 INJECTION INTRAMUSCULAR; INTRAVENOUS at 07:32

## 2024-07-06 RX ADMIN — Medication 100 MG: at 11:42

## 2024-07-06 RX ADMIN — SODIUM CHLORIDE, POTASSIUM CHLORIDE, SODIUM LACTATE AND CALCIUM CHLORIDE 1000 ML: 600; 310; 30; 20 INJECTION, SOLUTION INTRAVENOUS at 07:31

## 2024-07-06 RX ADMIN — CHLORDIAZEPOXIDE HYDROCHLORIDE 50 MG: 25 CAPSULE ORAL at 08:10

## 2024-07-06 RX ADMIN — SODIUM CHLORIDE, SODIUM LACTATE, POTASSIUM CHLORIDE, AND CALCIUM CHLORIDE: 600; 310; 30; 20 INJECTION, SOLUTION INTRAVENOUS at 10:42

## 2024-07-06 RX ADMIN — PANTOPRAZOLE SODIUM 40 MG: 40 TABLET, DELAYED RELEASE ORAL at 11:42

## 2024-07-06 RX ADMIN — LORAZEPAM 2 MG: 2 INJECTION INTRAMUSCULAR; INTRAVENOUS at 14:01

## 2024-07-06 RX ADMIN — LORAZEPAM 2 MG: 2 INJECTION INTRAMUSCULAR; INTRAVENOUS at 19:42

## 2024-07-06 RX ADMIN — ONDANSETRON 4 MG: 2 INJECTION INTRAMUSCULAR; INTRAVENOUS at 09:05

## 2024-07-06 RX ADMIN — METOPROLOL SUCCINATE 25 MG: 25 TABLET, EXTENDED RELEASE ORAL at 17:12

## 2024-07-06 RX ADMIN — ATORVASTATIN CALCIUM 20 MG: 20 TABLET, FILM COATED ORAL at 22:52

## 2024-07-06 RX ADMIN — LORAZEPAM 1 MG: 2 INJECTION INTRAMUSCULAR; INTRAVENOUS at 11:19

## 2024-07-06 RX ADMIN — THIAMINE HYDROCHLORIDE 100 MG: 100 INJECTION, SOLUTION INTRAMUSCULAR; INTRAVENOUS at 09:03

## 2024-07-06 ASSESSMENT — LIFESTYLE VARIABLES
HOW MANY STANDARD DRINKS CONTAINING ALCOHOL DO YOU HAVE ON A TYPICAL DAY: 7 TO 9
HOW MANY STANDARD DRINKS CONTAINING ALCOHOL DO YOU HAVE ON A TYPICAL DAY: 7 TO 9
HOW OFTEN DO YOU HAVE A DRINK CONTAINING ALCOHOL: 4 OR MORE TIMES A WEEK
HOW OFTEN DO YOU HAVE A DRINK CONTAINING ALCOHOL: 4 OR MORE TIMES A WEEK

## 2024-07-06 NOTE — H&P
[facility]      Hospitalist - History & Physical      PCP: Yovani Lagos MD    Date of Admission: 7/6/2024    Date of Service: 7/6/2024    Chief Complaint:  Alcohol withdrawal    History Of Present Illness:   The patient is a 68 y.o. male who presented to [facility] ED for evaluation after having alcohol withdrawal symptoms.  Patient reported his last drink Thursday evening.  Overnight he noted feeling anxious he has began sweating.  Patient reported earlier this [facility].  Reported being sober for approximately 6 weeks and then started drinking again 6 weeks ago.  Patient reports taking 6-8 shots a day.  Patient requesting assistance with rehab options.  Patient admitted to hospital services for medical management.      Past Medical History:        Diagnosis Date    Adjustment disorder with anxiety     Adjustment insomnia     Anemia     Ankle fracture     trip/fall on rug    Asymptomatic arteriosclerosis of coronary artery     sees [facility]/dr. liang    GERD (gastroesophageal reflux disease)     Heart block 2010    Hyperlipidemia     Hypertension     Iron deficiency anemia     hx of iron/oral    Irregular heart beat     Prostate cancer (HCC)     prostate; surgery/radiation/hormone tx    Vitamin D deficiency        Past Surgical History:        Procedure Laterality Date    ANKLE FRACTURE SURGERY Right 3/10/2022    OPEN REDUCTION INTERNAL FIXATION RIGHT ANKLE LATERAL MALLEOLUS performed by Raffi Styles MD at [facility] OR    CARDIAC SURGERY  2009    stent at     COLONOSCOPY  03/2012    HPs (5 yr)    COLONOSCOPY N/A 6/29/2020    Dr YUMI Junior-Diverticular disease-AP, 3 yr recall    ENDOSCOPY, COLON, DIAGNOSTIC      [place] COLONOSCOPY FLX DX W/COLLJ SPEC WHEN PFRMD N/A 12/6/2017    Dr Pichardo-normal, 5 yr recall    PROSTATECTOMY  06/2015    da Wilmar    TONSILLECTOMY AND ADENOIDECTOMY      UPPER GASTROINTESTINAL ENDOSCOPY N/A 6/29/2020    Dr YUMI Junior-Gastritis, (+) H pylori       Home

## 2024-07-06 NOTE — PROGRESS NOTES
4 Eyes Skin Assessment     NAME:  Ken Herrera  YOB: 1955  MEDICAL RECORD NUMBER:  [id number]    The patient is being assessed for  Admission    I agree that at least one RN has performed a thorough Head to Toe Skin Assessment on the patient. ALL assessment sites listed below have been assessed.      Areas assessed by both nurses:    Head, Face, Ears, Shoulders, Back, Chest, Arms, Elbows, Hands, Sacrum. Buttock, Coccyx, Ischium, Legs. Feet and Heels, and Under Medical Devices         Does the Patient have a Wound? No noted wound(s)       Tate Prevention initiated by RN: Yes  Wound Care Orders initiated by RN: No    Pressure Injury (Stage 3,4, Unstageable, DTI, NWPT, and Complex wounds) if present, place Wound referral order by RN under : No    New Ostomies, if present place, Ostomy referral order under : No     Nurse 1 eSignature: Electronically signed by Sophia Amin RN on 7/6/24 at 11:46 AM CDT    **SHARE this note so that the co-signing nurse can place an eSignature**    Nurse 2 eSignature: {Esignature:[id number]}

## 2024-07-06 NOTE — CARE COORDINATION
Sw met with pt to discuss discharge plan and share resources in consideration of rehab. Pt [facility]ally [facility] to [facility]   [facility].  [phone]   Electronically signed by Rabia Villarreal on 7/6/2024 at 11:22 AM

## 2024-07-06 NOTE — ED NOTES
ED TO INPATIENT SBAR HANDOFF    Patient Name: Ken Herrera   : 1955  68 y.o.   Family/Caregiver Present: No  Code Status Order: No Order    C-SSRS: Risk of Suicide: No Risk  Sitter No  Restraints:         Situation  Chief Complaint:   Chief Complaint   Patient presents with    Alcohol Problem     Hx of alcoholism. Last drink Thursday night (7-8 shots). Pt shaking and sweating     Patient Diagnosis: Alcohol withdrawal syndrome, uncomplicated (HCC) [F10.930]     Brief Description of Patient's Condition: Pt came in to receive help with withdrawal symptoms. Pt said he is a daily drinker of 6-8 shots per day and he has not had any alcohol since Thursday. He states that he has experienced chills and sweating. Has been given Ativan, Zofran, and Thiamine in ED.   Mental Status: oriented  Arrived from: home    Imaging:   No orders to display     COVID-19 Results:   Internal Administration   First Dose COVID-19, MODERNA BLUE border, Primary or Immunocompromised, (age 12y+), IM, 100 mcg/0.5mL  2021   Second Dose COVID-19, MODERNA BLUE border, Primary or Immunocompromised, (age 12y+), IM, 100 mcg/0.5mL   2021       Last COVID Lab SARS-CoV-2 (no units)   Date Value   2020 Not Detected     SARS-CoV-2, PCR (no units)   Date Value   2022 Not Detected           Abnormal labs:   Abnormal Labs Reviewed   CBC WITH AUTO DIFFERENTIAL - Abnormal; Notable for the following components:       Result Value    MCH 31.2 (*)     Platelets 121 (*)     Neutrophils % 83.1 (*)     Lymphocytes % 8.7 (*)     Lymphocytes Absolute 0.7 (*)     All other components within normal limits   COMPREHENSIVE METABOLIC PANEL - Abnormal; Notable for the following components:    Sodium 134 (*)     Chloride 89 (*)     CO2 21 (*)     Anion Gap 24 (*)     Glucose 152 (*)     Total Bilirubin 1.3 (*)      (*)      (*)     All other components within normal limits     Background  Allergies: No Known Allergies  Current

## 2024-07-06 NOTE — ED PROVIDER NOTES
[facility] 3 WILMER/VAS/MED  EMERGENCY DEPARTMENT ENCOUNTER      Pt Name: Ken Herrera  MRN: [id number]  Birthdate 1955  Date of evaluation: 7/6/2024  Provider: Tra Franco Jr, MD    CHIEF COMPLAINT       Chief Complaint   Patient presents with    Alcohol Problem     Hx of alcoholism. Last drink Thursday night (7-8 shots). Pt shaking and sweating         HISTORY OF PRESENT ILLNESS   (Location/Symptom, Timing/Onset,Context/Setting, Quality, Duration, Modifying Factors, Severity)  Note limiting factors.   Ken Herrera is a 68 y.o. male who presents to the emergency department for evaluation after having alcohol withdrawal symptoms.  His last drink was Thursday evening.  Overnight was feeling anxious and has been sweating.  Was here earlier this year and [facility].  Says he was sober for about 6 weeks and then started drinking about 6 weeks ago.  Has been taking 6-8 shots a day.  He and his family member at bedside states they have looked into other rehab options but several of them required to detox first, several of them only did detox, and several of them were not approved with his insurance    HPI    NursingNotes were reviewed.    REVIEW OF SYSTEMS    (2-9 systems for level 4, 10 or more for level 5)     Review of Systems   Constitutional:  Positive for diaphoresis.   HENT: Negative.     Eyes: Negative.    Respiratory: Negative.     Cardiovascular: Negative.    Gastrointestinal:  Positive for nausea.   Genitourinary: Negative.    Musculoskeletal: Negative.    Skin: Negative.    Neurological:  Positive for tremors.   Hematological: Negative.    Psychiatric/Behavioral:  The patient is nervous/anxious.        A complete review of systems was performed and is negative except as noted above in the HPI.       PAST MEDICAL HISTORY     Past Medical History:   Diagnosis Date    Adjustment disorder with anxiety     Adjustment insomnia     Anemia     Ankle fracture     trip/fall on rug    Asymptomatic

## 2024-07-07 LAB
ALBUMIN SERPL-MCNC: 3.5 G/DL (ref 3.5–5.2)
ALP SERPL-CCNC: 51 U/L (ref 40–130)
ALT SERPL-CCNC: 65 U/L (ref 5–41)
ANION GAP SERPL CALCULATED.3IONS-SCNC: 14 MMOL/L (ref 7–19)
AST SERPL-CCNC: 106 U/L (ref 5–40)
BASOPHILS # BLD: 0 K/UL (ref 0–0.2)
BASOPHILS NFR BLD: 0.3 % (ref 0–1)
BILIRUB SERPL-MCNC: 0.9 MG/DL (ref 0.2–1.2)
BUN SERPL-MCNC: 18 MG/DL (ref 8–23)
CALCIUM SERPL-MCNC: 8.5 MG/DL (ref 8.8–10.2)
CHLORIDE SERPL-SCNC: 97 MMOL/L (ref 98–111)
CO2 SERPL-SCNC: 25 MMOL/L (ref 22–29)
CREAT SERPL-MCNC: 0.7 MG/DL (ref 0.5–1.2)
EOSINOPHIL # BLD: 0 K/UL (ref 0–0.6)
EOSINOPHIL NFR BLD: 0.8 % (ref 0–5)
ERYTHROCYTE [DISTWIDTH] IN BLOOD BY AUTOMATED COUNT: 13.1 % (ref 11.5–14.5)
GLUCOSE SERPL-MCNC: 110 MG/DL (ref 74–109)
HCT VFR BLD AUTO: 38.1 % (ref 42–52)
HGB BLD-MCNC: 12.9 G/DL (ref 14–18)
IMM GRANULOCYTES # BLD: 0 K/UL
LYMPHOCYTES # BLD: 1.2 K/UL (ref 1.1–4.5)
LYMPHOCYTES NFR BLD: 30.4 % (ref 20–40)
MCH RBC QN AUTO: 32.2 PG (ref 27–31)
MCHC RBC AUTO-ENTMCNC: 33.9 G/DL (ref 33–37)
MCV RBC AUTO: 95 FL (ref 80–94)
MONOCYTES # BLD: 0.4 K/UL (ref 0–0.9)
MONOCYTES NFR BLD: 11.2 % (ref 0–10)
NEUTROPHILS # BLD: 2.2 K/UL (ref 1.5–7.5)
NEUTS SEG NFR BLD: 56.8 % (ref 50–65)
PLATELET # BLD AUTO: 72 K/UL (ref 130–400)
PMV BLD AUTO: 11.9 FL (ref 9.4–12.4)
POTASSIUM SERPL-SCNC: 3.6 MMOL/L (ref 3.5–5)
PROT SERPL-MCNC: 6.2 G/DL (ref 6.6–8.7)
RBC # BLD AUTO: 4.01 M/UL (ref 4.7–6.1)
SODIUM SERPL-SCNC: 136 MMOL/L (ref 136–145)
WBC # BLD AUTO: 3.9 K/UL (ref 4.8–10.8)

## 2024-07-07 PROCEDURE — 1200000000 HC SEMI PRIVATE

## 2024-07-07 PROCEDURE — 6370000000 HC RX 637 (ALT 250 FOR IP)

## 2024-07-07 PROCEDURE — 2580000003 HC RX 258

## 2024-07-07 PROCEDURE — 80053 COMPREHEN METABOLIC PANEL: CPT

## 2024-07-07 PROCEDURE — 94760 N-INVAS EAR/PLS OXIMETRY 1: CPT

## 2024-07-07 PROCEDURE — 85025 COMPLETE CBC W/AUTO DIFF WBC: CPT

## 2024-07-07 PROCEDURE — 36415 COLL VENOUS BLD VENIPUNCTURE: CPT

## 2024-07-07 PROCEDURE — 6360000002 HC RX W HCPCS

## 2024-07-07 PROCEDURE — 6370000000 HC RX 637 (ALT 250 FOR IP): Performed by: STUDENT IN AN ORGANIZED HEALTH CARE EDUCATION/TRAINING PROGRAM

## 2024-07-07 RX ORDER — LOPERAMIDE HYDROCHLORIDE 2 MG/1
2 CAPSULE ORAL 4 TIMES DAILY PRN
Status: DISCONTINUED | OUTPATIENT
Start: 2024-07-07 | End: 2024-07-08 | Stop reason: HOSPADM

## 2024-07-07 RX ORDER — TEMAZEPAM 15 MG/1
30 CAPSULE ORAL NIGHTLY PRN
Status: DISCONTINUED | OUTPATIENT
Start: 2024-07-06 | End: 2024-07-08 | Stop reason: HOSPADM

## 2024-07-07 RX ADMIN — LORAZEPAM 1 MG: 2 INJECTION INTRAMUSCULAR; INTRAVENOUS at 17:56

## 2024-07-07 RX ADMIN — LORAZEPAM 1 MG: 2 INJECTION INTRAMUSCULAR; INTRAVENOUS at 12:40

## 2024-07-07 RX ADMIN — SODIUM CHLORIDE, PRESERVATIVE FREE 10 ML: 5 INJECTION INTRAVENOUS at 11:15

## 2024-07-07 RX ADMIN — SODIUM CHLORIDE, PRESERVATIVE FREE 10 ML: 5 INJECTION INTRAVENOUS at 20:03

## 2024-07-07 RX ADMIN — TEMAZEPAM 30 MG: 15 CAPSULE ORAL at 01:16

## 2024-07-07 RX ADMIN — ATORVASTATIN CALCIUM 20 MG: 20 TABLET, FILM COATED ORAL at 20:02

## 2024-07-07 RX ADMIN — TEMAZEPAM 30 MG: 15 CAPSULE ORAL at 23:00

## 2024-07-07 RX ADMIN — SODIUM CHLORIDE, PRESERVATIVE FREE 10 ML: 5 INJECTION INTRAVENOUS at 04:00

## 2024-07-07 RX ADMIN — LOPERAMIDE HYDROCHLORIDE 2 MG: 2 CAPSULE ORAL at 23:00

## 2024-07-07 RX ADMIN — Medication 100 MG: at 07:36

## 2024-07-07 RX ADMIN — SODIUM CHLORIDE, SODIUM LACTATE, POTASSIUM CHLORIDE, AND CALCIUM CHLORIDE: 600; 310; 30; 20 INJECTION, SOLUTION INTRAVENOUS at 03:59

## 2024-07-07 RX ADMIN — PANTOPRAZOLE SODIUM 40 MG: 40 TABLET, DELAYED RELEASE ORAL at 07:36

## 2024-07-07 RX ADMIN — LORAZEPAM 2 MG: 2 INJECTION INTRAMUSCULAR; INTRAVENOUS at 07:36

## 2024-07-07 RX ADMIN — ENOXAPARIN SODIUM 40 MG: 100 INJECTION SUBCUTANEOUS at 07:36

## 2024-07-07 RX ADMIN — METOPROLOL SUCCINATE 25 MG: 25 TABLET, EXTENDED RELEASE ORAL at 17:56

## 2024-07-07 NOTE — PROGRESS NOTES
[facility]      Patient:  Ken Herrera  YOB: 1955  Date of Service: 7/7/2024  MRN: [id number]   Acct: [id number]   Primary Care Physician: Yovani Lagos MD  Advance Directive: Full Code  Admit Date: 7/6/2024       Hospital Day: 1  Portions of this note have been copied forward, however, changed to reflect the most current clinical status of this patient.  CHIEF COMPLAINT   Alcohol withdrawal     SUBJECTIVE:  Up to the restroom, [facility] to evaluate.  Noted some slight tremors.     CUMULATIVE HOSPITAL COURSE:  The patient is a 68 y.o. male who presented to [facility] ED for evaluation after having alcohol withdrawal symptoms.  Patient reported his last drink Thursday evening.  Overnight he noted feeling anxious he has began sweating.  Patient reported earlier this year he went to [facility] for rehab.  Reported being sober for approximately 6 weeks and then started drinking again 6 weeks ago.  Patient reports taking 6-8 shots a day.  Patient requesting assistance with rehab options.  Patient admitted to hospital services for medical management.      Partial fall in the bathroom, hit elbow, note small 2 inch indention to right  elbow, no pain at this time, full ROM note. Will hold on x-ray at this time. Declines pain.   [facility] to evaluate.     Review of Systems:   Review of Systems   Respiratory:  Negative for shortness of breath.    Cardiovascular:  Negative for chest pain and leg swelling.   Gastrointestinal:  Negative for abdominal distention, abdominal pain, constipation, diarrhea, nausea and vomiting.   Genitourinary:  Negative for difficulty urinating.   Musculoskeletal:  Negative for arthralgias and back pain.   Neurological:  Positive for tremors. Negative for dizziness and light-headedness.   Psychiatric/Behavioral:  Negative for agitation and confusion. The patient is nervous/anxious.        Objective:   VITALS:  /75   Pulse 93   Temp 98.2 °F (36.8 °C)   Resp 20

## 2024-07-08 VITALS
TEMPERATURE: 97.5 F | HEART RATE: 65 BPM | HEIGHT: 71 IN | DIASTOLIC BLOOD PRESSURE: 80 MMHG | OXYGEN SATURATION: 97 % | WEIGHT: 185 LBS | SYSTOLIC BLOOD PRESSURE: 112 MMHG | BODY MASS INDEX: 25.9 KG/M2 | RESPIRATION RATE: 18 BRPM

## 2024-07-08 LAB
ALBUMIN SERPL-MCNC: 3.4 G/DL (ref 3.5–5.2)
ALP SERPL-CCNC: 48 U/L (ref 40–130)
ALT SERPL-CCNC: 51 U/L (ref 5–41)
ANION GAP SERPL CALCULATED.3IONS-SCNC: 12 MMOL/L (ref 7–19)
AST SERPL-CCNC: 69 U/L (ref 5–40)
BASOPHILS # BLD: 0 K/UL (ref 0–0.2)
BASOPHILS NFR BLD: 0.3 % (ref 0–1)
BILIRUB SERPL-MCNC: 0.5 MG/DL (ref 0.2–1.2)
BUN SERPL-MCNC: 13 MG/DL (ref 8–23)
CALCIUM SERPL-MCNC: 8.6 MG/DL (ref 8.8–10.2)
CHLORIDE SERPL-SCNC: 102 MMOL/L (ref 98–111)
CO2 SERPL-SCNC: 27 MMOL/L (ref 22–29)
CREAT SERPL-MCNC: 0.8 MG/DL (ref 0.5–1.2)
EOSINOPHIL # BLD: 0.1 K/UL (ref 0–0.6)
EOSINOPHIL NFR BLD: 1.9 % (ref 0–5)
ERYTHROCYTE [DISTWIDTH] IN BLOOD BY AUTOMATED COUNT: 13 % (ref 11.5–14.5)
GLUCOSE SERPL-MCNC: 94 MG/DL (ref 74–109)
HCT VFR BLD AUTO: 36.2 % (ref 42–52)
HGB BLD-MCNC: 11.8 G/DL (ref 14–18)
IMM GRANULOCYTES # BLD: 0 K/UL
LYMPHOCYTES # BLD: 1.3 K/UL (ref 1.1–4.5)
LYMPHOCYTES NFR BLD: 35.8 % (ref 20–40)
MAGNESIUM SERPL-MCNC: 2 MG/DL (ref 1.6–2.4)
MCH RBC QN AUTO: 31 PG (ref 27–31)
MCHC RBC AUTO-ENTMCNC: 32.6 G/DL (ref 33–37)
MCV RBC AUTO: 95 FL (ref 80–94)
MONOCYTES # BLD: 0.4 K/UL (ref 0–0.9)
MONOCYTES NFR BLD: 10.5 % (ref 0–10)
NEUTROPHILS # BLD: 1.9 K/UL (ref 1.5–7.5)
NEUTS SEG NFR BLD: 51.2 % (ref 50–65)
PLATELET # BLD AUTO: 66 K/UL (ref 130–400)
PMV BLD AUTO: 11.3 FL (ref 9.4–12.4)
POTASSIUM SERPL-SCNC: 3.2 MMOL/L (ref 3.5–5)
PROT SERPL-MCNC: 5.7 G/DL (ref 6.6–8.7)
RBC # BLD AUTO: 3.81 M/UL (ref 4.7–6.1)
SODIUM SERPL-SCNC: 141 MMOL/L (ref 136–145)
WBC # BLD AUTO: 3.7 K/UL (ref 4.8–10.8)

## 2024-07-08 PROCEDURE — 85025 COMPLETE CBC W/AUTO DIFF WBC: CPT

## 2024-07-08 PROCEDURE — 36415 COLL VENOUS BLD VENIPUNCTURE: CPT

## 2024-07-08 PROCEDURE — 94760 N-INVAS EAR/PLS OXIMETRY 1: CPT

## 2024-07-08 PROCEDURE — 6370000000 HC RX 637 (ALT 250 FOR IP)

## 2024-07-08 PROCEDURE — 80053 COMPREHEN METABOLIC PANEL: CPT

## 2024-07-08 PROCEDURE — 83735 ASSAY OF MAGNESIUM: CPT

## 2024-07-08 PROCEDURE — 2580000003 HC RX 258

## 2024-07-08 RX ORDER — THIAMINE MONONITRATE (VIT B1) 100 MG
100 TABLET ORAL DAILY
Qty: 30 TABLET | Refills: 0 | Status: SHIPPED | OUTPATIENT
Start: 2024-07-09

## 2024-07-08 RX ADMIN — SODIUM CHLORIDE, PRESERVATIVE FREE 10 ML: 5 INJECTION INTRAVENOUS at 08:06

## 2024-07-08 RX ADMIN — POTASSIUM CHLORIDE 40 MEQ: 1500 TABLET, EXTENDED RELEASE ORAL at 06:05

## 2024-07-08 RX ADMIN — PANTOPRAZOLE SODIUM 40 MG: 40 TABLET, DELAYED RELEASE ORAL at 05:07

## 2024-07-08 RX ADMIN — Medication 100 MG: at 08:05

## 2024-07-08 NOTE — PLAN OF CARE
Problem: Discharge Planning  Goal: Discharge to home or other facility with appropriate resources  7/7/2024 2106 by Summer Hanson RN  Outcome: Progressing  7/7/2024 1614 by Sophia Amin RN  Outcome: Progressing     Problem: Safety - Adult  Goal: Free from fall injury  7/7/2024 2106 by Summer Hanson RN  Outcome: Progressing  7/7/2024 1614 by Sophia Amin RN  Outcome: Progressing   Electronically signed by Sumemr Hanson RN on 7/7/2024 at 9:06 PM

## 2024-07-08 NOTE — CARE COORDINATION
New with [facility] will come back to see Pt today; they are working on [place] making an exception for him as he just d/c'd there recently;   Electronically signed by PETTY Luna on 7/8/2024 at 11:46 AM

## 2024-07-08 NOTE — DISCHARGE INSTRUCTIONS
Please follow-up with your PCP as scheduled on 7/10.  You will need a repeat chemistry panel to recheck your potassium level, and a repeat blood count to check your platelets.  Please return to the ER should you experience any new or worsening symptoms, or new onset of bleeding.

## 2024-07-08 NOTE — PLAN OF CARE
Problem: Discharge Planning  Goal: Discharge to home or other facility with appropriate resources  Outcome: Adequate for Discharge  Flowsheets (Taken 7/8/2024 5209)  Discharge to home or other facility with appropriate resources:   Identify barriers to discharge with patient and caregiver   Arrange for needed discharge resources and transportation as appropriate   Identify discharge learning needs (meds, wound care, etc)     Problem: Safety - Adult  Goal: Free from fall injury  Outcome: Adequate for Discharge

## 2024-07-08 NOTE — DISCHARGE SUMMARY
[facility]    Discharge Summary      Ken Herrera  :  1955  MRN:  [id number]    Admit date:  2024  Discharge date:    2024    Discharging Physician:  Dr. Singer    Advance Directive: Full Code    Consults: none    Primary Care Physician:  Yovani Lagos MD    Discharge Diagnoses:  Principal Problem:    Alcohol withdrawal syndrome, uncomplicated (HCC)  Active Problems:    Hypertension    Hyperlipidemia  Resolved Problems:    * No resolved hospital problems. *      Portions of this note have been copied forward, however, changed to reflect the most current clinical status of this patient.    Hospital Course:    This patient is a 68-year-old male who presented to [facility] ED on  for evaluation of alcohol withdrawal symptoms.  Patient was previously at [facility] rehab earlier this year.  He was sober for approximately 6 weeks, and started drinking again 6 weeks ago.  Reports taking 6-8 shots a day.  He was admitted to hospitalist for further evaluation management.  He denied SI/HI.  CIWA protocol ordered and he required several doses of Ativan.  On day of discharge, he has no current symptoms and is calm and cooperative.  Reports feeling back to his baseline.  [facility] came to speak with patient, however he ultimately wishes to consider rehab on outpatient basis at this time.  Labs and vitals are stable.  His platelet count has dropped slowly throughout admission from 121, down to 66.  Would recommend that he follow-up with his PCP within 3 days of discharge for repeat CBC.  Return precautions given.    Patient is currently in stable condition to be discharged home.    Significant Diagnostic Studies:   No results found.    Pertinent Labs:  CBC:   Recent Labs     24  0716 24  0142 24  0353   WBC 8.1 3.9* 3.7*   HGB 16.4 12.9* 11.8*   * 72* 66*     BMP:    Recent Labs     24  0716 24  0142 24  0353   * 136 141   K 4.1 3.6 3.2*   CL 89* 97* 102

## 2024-07-08 NOTE — CARE COORDINATION
1342: RADHA f/u with Pt and family member at bedside; stated that New did visit; Pt decided to d/c home with resources and stated he is interested in meetings/outpt programs that [facility] offers; RADHA notified Austen.  Electronically signed by PETTY Luna on 7/8/2024 at 4:56 PM

## 2024-07-23 ENCOUNTER — HOSPITAL ENCOUNTER (INPATIENT)
Age: 69
LOS: 1 days | Discharge: NURSING, REHAB OR LONG-TERM CARE | DRG: 897 | End: 2024-07-24
Attending: EMERGENCY MEDICINE | Admitting: HOSPITALIST
Payer: MEDICARE

## 2024-07-23 DIAGNOSIS — F10.10 ALCOHOL ABUSE: ICD-10-CM

## 2024-07-23 DIAGNOSIS — F10.920 ACUTE ALCOHOLIC INTOXICATION WITHOUT COMPLICATION (HCC): Primary | ICD-10-CM

## 2024-07-23 PROBLEM — F51.02 ADJUSTMENT INSOMNIA: Status: ACTIVE | Noted: 2024-07-23

## 2024-07-23 PROBLEM — K21.9 GERD (GASTROESOPHAGEAL REFLUX DISEASE): Status: ACTIVE | Noted: 2024-07-23

## 2024-07-23 LAB
ALBUMIN SERPL-MCNC: 4.6 G/DL (ref 3.5–5.2)
ALP SERPL-CCNC: 69 U/L (ref 40–130)
ALT SERPL-CCNC: 98 U/L (ref 5–41)
AMPHET UR QL SCN: NEGATIVE
ANION GAP SERPL CALCULATED.3IONS-SCNC: 15 MMOL/L (ref 7–19)
AST SERPL-CCNC: 115 U/L (ref 5–40)
BARBITURATES UR QL SCN: NEGATIVE
BASOPHILS # BLD: 0 K/UL (ref 0–0.2)
BASOPHILS NFR BLD: 0.7 % (ref 0–1)
BENZODIAZ UR QL SCN: POSITIVE
BILIRUB SERPL-MCNC: 0.5 MG/DL (ref 0.2–1.2)
BUN SERPL-MCNC: 13 MG/DL (ref 8–23)
BUPRENORPHINE URINE: NEGATIVE
CALCIUM SERPL-MCNC: 9.1 MG/DL (ref 8.8–10.2)
CANNABINOIDS UR QL SCN: POSITIVE
CHLORIDE SERPL-SCNC: 106 MMOL/L (ref 98–111)
CO2 SERPL-SCNC: 25 MMOL/L (ref 22–29)
COCAINE UR QL SCN: NEGATIVE
CREAT SERPL-MCNC: 0.8 MG/DL (ref 0.5–1.2)
DRUG SCREEN COMMENT UR-IMP: ABNORMAL
EOSINOPHIL # BLD: 0.1 K/UL (ref 0–0.6)
EOSINOPHIL NFR BLD: 1.5 % (ref 0–5)
ERYTHROCYTE [DISTWIDTH] IN BLOOD BY AUTOMATED COUNT: 13.2 % (ref 11.5–14.5)
ETHANOLAMINE SERPL-MCNC: 312 MG/DL (ref 0–0.08)
FENTANYL SCREEN, URINE: NEGATIVE
FOLATE SERPL-MCNC: 11.4 NG/ML (ref 4.5–32.2)
GLUCOSE SERPL-MCNC: 97 MG/DL (ref 74–109)
HCT VFR BLD AUTO: 43.7 % (ref 42–52)
HGB BLD-MCNC: 14.5 G/DL (ref 14–18)
IMM GRANULOCYTES # BLD: 0 K/UL
LYMPHOCYTES # BLD: 2.2 K/UL (ref 1.1–4.5)
LYMPHOCYTES NFR BLD: 49 % (ref 20–40)
MAGNESIUM SERPL-MCNC: 2.5 MG/DL (ref 1.6–2.4)
MCH RBC QN AUTO: 31.7 PG (ref 27–31)
MCHC RBC AUTO-ENTMCNC: 33.2 G/DL (ref 33–37)
MCV RBC AUTO: 95.6 FL (ref 80–94)
METHADONE UR QL SCN: NEGATIVE
METHAMPHETAMINE, URINE: NEGATIVE
MONOCYTES # BLD: 0.5 K/UL (ref 0–0.9)
MONOCYTES NFR BLD: 10.8 % (ref 0–10)
NEUTROPHILS # BLD: 1.7 K/UL (ref 1.5–7.5)
NEUTS SEG NFR BLD: 37.8 % (ref 50–65)
OPIATES UR QL SCN: NEGATIVE
OXYCODONE UR QL SCN: NEGATIVE
PCP UR QL SCN: NEGATIVE
PLATELET # BLD AUTO: 140 K/UL (ref 130–400)
PMV BLD AUTO: 9.7 FL (ref 9.4–12.4)
POTASSIUM SERPL-SCNC: 4.1 MMOL/L (ref 3.5–5)
PROT SERPL-MCNC: 7.4 G/DL (ref 6.6–8.7)
RBC # BLD AUTO: 4.57 M/UL (ref 4.7–6.1)
SODIUM SERPL-SCNC: 146 MMOL/L (ref 136–145)
TRICYCLIC ANTIDEPRESSANTS, UR: NEGATIVE
VIT B12 SERPL-MCNC: 575 PG/ML (ref 211–946)
WBC # BLD AUTO: 4.5 K/UL (ref 4.8–10.8)

## 2024-07-23 PROCEDURE — 80053 COMPREHEN METABOLIC PANEL: CPT

## 2024-07-23 PROCEDURE — 6360000002 HC RX W HCPCS

## 2024-07-23 PROCEDURE — 99285 EMERGENCY DEPT VISIT HI MDM: CPT

## 2024-07-23 PROCEDURE — 6370000000 HC RX 637 (ALT 250 FOR IP)

## 2024-07-23 PROCEDURE — G0480 DRUG TEST DEF 1-7 CLASSES: HCPCS

## 2024-07-23 PROCEDURE — 1200000000 HC SEMI PRIVATE

## 2024-07-23 PROCEDURE — HZ2ZZZZ DETOXIFICATION SERVICES FOR SUBSTANCE ABUSE TREATMENT: ICD-10-PCS | Performed by: HOSPITALIST

## 2024-07-23 PROCEDURE — 85025 COMPLETE CBC W/AUTO DIFF WBC: CPT

## 2024-07-23 PROCEDURE — 2580000003 HC RX 258

## 2024-07-23 PROCEDURE — 82746 ASSAY OF FOLIC ACID SERUM: CPT

## 2024-07-23 PROCEDURE — 80307 DRUG TEST PRSMV CHEM ANLYZR: CPT

## 2024-07-23 PROCEDURE — 82077 ASSAY SPEC XCP UR&BREATH IA: CPT

## 2024-07-23 PROCEDURE — 82607 VITAMIN B-12: CPT

## 2024-07-23 PROCEDURE — 83735 ASSAY OF MAGNESIUM: CPT

## 2024-07-23 PROCEDURE — 36415 COLL VENOUS BLD VENIPUNCTURE: CPT

## 2024-07-23 PROCEDURE — 84425 ASSAY OF VITAMIN B-1: CPT

## 2024-07-23 RX ORDER — ONDANSETRON 2 MG/ML
4 INJECTION INTRAMUSCULAR; INTRAVENOUS EVERY 6 HOURS PRN
Status: DISCONTINUED | OUTPATIENT
Start: 2024-07-23 | End: 2024-07-24 | Stop reason: HOSPADM

## 2024-07-23 RX ORDER — GAUZE BANDAGE 2" X 2"
100 BANDAGE TOPICAL DAILY
Status: DISCONTINUED | OUTPATIENT
Start: 2024-07-23 | End: 2024-07-24 | Stop reason: HOSPADM

## 2024-07-23 RX ORDER — SODIUM CHLORIDE 0.9 % (FLUSH) 0.9 %
5-40 SYRINGE (ML) INJECTION EVERY 12 HOURS SCHEDULED
Status: DISCONTINUED | OUTPATIENT
Start: 2024-07-23 | End: 2024-07-24 | Stop reason: HOSPADM

## 2024-07-23 RX ORDER — PANTOPRAZOLE SODIUM 40 MG/1
40 TABLET, DELAYED RELEASE ORAL DAILY
Status: DISCONTINUED | OUTPATIENT
Start: 2024-07-23 | End: 2024-07-24 | Stop reason: HOSPADM

## 2024-07-23 RX ORDER — LORAZEPAM 2 MG/1
4 TABLET ORAL
Status: DISCONTINUED | OUTPATIENT
Start: 2024-07-23 | End: 2024-07-24

## 2024-07-23 RX ORDER — METOPROLOL SUCCINATE 25 MG/1
25 TABLET, EXTENDED RELEASE ORAL
Status: DISCONTINUED | OUTPATIENT
Start: 2024-07-23 | End: 2024-07-24

## 2024-07-23 RX ORDER — ATORVASTATIN CALCIUM 20 MG/1
20 TABLET, FILM COATED ORAL NIGHTLY
Status: DISCONTINUED | OUTPATIENT
Start: 2024-07-23 | End: 2024-07-24 | Stop reason: HOSPADM

## 2024-07-23 RX ORDER — LORAZEPAM 2 MG/ML
1 INJECTION INTRAMUSCULAR
Status: DISCONTINUED | OUTPATIENT
Start: 2024-07-23 | End: 2024-07-24

## 2024-07-23 RX ORDER — LORAZEPAM 2 MG/ML
2 INJECTION INTRAMUSCULAR
Status: DISCONTINUED | OUTPATIENT
Start: 2024-07-23 | End: 2024-07-24

## 2024-07-23 RX ORDER — SODIUM CHLORIDE 9 MG/ML
INJECTION, SOLUTION INTRAVENOUS PRN
Status: DISCONTINUED | OUTPATIENT
Start: 2024-07-23 | End: 2024-07-24 | Stop reason: HOSPADM

## 2024-07-23 RX ORDER — LORAZEPAM 1 MG/1
1 TABLET ORAL
Status: DISCONTINUED | OUTPATIENT
Start: 2024-07-23 | End: 2024-07-24

## 2024-07-23 RX ORDER — TEMAZEPAM 15 MG/1
30 CAPSULE ORAL NIGHTLY PRN
Status: DISCONTINUED | OUTPATIENT
Start: 2024-07-23 | End: 2024-07-24 | Stop reason: HOSPADM

## 2024-07-23 RX ORDER — MULTIVITAMIN WITH IRON
1 TABLET ORAL DAILY
Status: DISCONTINUED | OUTPATIENT
Start: 2024-07-23 | End: 2024-07-24 | Stop reason: HOSPADM

## 2024-07-23 RX ORDER — SODIUM CHLORIDE 9 MG/ML
INJECTION, SOLUTION INTRAVENOUS CONTINUOUS
Status: DISCONTINUED | OUTPATIENT
Start: 2024-07-23 | End: 2024-07-24

## 2024-07-23 RX ORDER — ACETAMINOPHEN 650 MG/1
650 SUPPOSITORY RECTAL EVERY 6 HOURS PRN
Status: DISCONTINUED | OUTPATIENT
Start: 2024-07-23 | End: 2024-07-24 | Stop reason: HOSPADM

## 2024-07-23 RX ORDER — ENOXAPARIN SODIUM 100 MG/ML
40 INJECTION SUBCUTANEOUS EVERY 24 HOURS
Status: DISCONTINUED | OUTPATIENT
Start: 2024-07-23 | End: 2024-07-24 | Stop reason: HOSPADM

## 2024-07-23 RX ORDER — LORAZEPAM 2 MG/ML
3 INJECTION INTRAMUSCULAR
Status: DISCONTINUED | OUTPATIENT
Start: 2024-07-23 | End: 2024-07-24

## 2024-07-23 RX ORDER — POTASSIUM CHLORIDE 7.45 MG/ML
10 INJECTION INTRAVENOUS PRN
Status: DISCONTINUED | OUTPATIENT
Start: 2024-07-23 | End: 2024-07-24 | Stop reason: HOSPADM

## 2024-07-23 RX ORDER — POTASSIUM CHLORIDE 20 MEQ/1
40 TABLET, EXTENDED RELEASE ORAL PRN
Status: DISCONTINUED | OUTPATIENT
Start: 2024-07-23 | End: 2024-07-24 | Stop reason: HOSPADM

## 2024-07-23 RX ORDER — ACETAMINOPHEN 325 MG/1
650 TABLET ORAL EVERY 6 HOURS PRN
Status: DISCONTINUED | OUTPATIENT
Start: 2024-07-23 | End: 2024-07-24 | Stop reason: HOSPADM

## 2024-07-23 RX ORDER — SODIUM CHLORIDE 0.9 % (FLUSH) 0.9 %
5-40 SYRINGE (ML) INJECTION PRN
Status: DISCONTINUED | OUTPATIENT
Start: 2024-07-23 | End: 2024-07-24 | Stop reason: HOSPADM

## 2024-07-23 RX ORDER — LORAZEPAM 2 MG/ML
4 INJECTION INTRAMUSCULAR
Status: DISCONTINUED | OUTPATIENT
Start: 2024-07-23 | End: 2024-07-24

## 2024-07-23 RX ORDER — ONDANSETRON 4 MG/1
4 TABLET, ORALLY DISINTEGRATING ORAL EVERY 8 HOURS PRN
Status: DISCONTINUED | OUTPATIENT
Start: 2024-07-23 | End: 2024-07-24 | Stop reason: HOSPADM

## 2024-07-23 RX ORDER — FOLIC ACID 1 MG/1
1 TABLET ORAL DAILY
Status: DISCONTINUED | OUTPATIENT
Start: 2024-07-23 | End: 2024-07-24 | Stop reason: HOSPADM

## 2024-07-23 RX ORDER — MAGNESIUM SULFATE IN WATER 40 MG/ML
2000 INJECTION, SOLUTION INTRAVENOUS PRN
Status: DISCONTINUED | OUTPATIENT
Start: 2024-07-23 | End: 2024-07-24 | Stop reason: HOSPADM

## 2024-07-23 RX ORDER — LORAZEPAM 2 MG/1
2 TABLET ORAL
Status: DISCONTINUED | OUTPATIENT
Start: 2024-07-23 | End: 2024-07-24

## 2024-07-23 RX ORDER — POLYETHYLENE GLYCOL 3350 17 G/17G
17 POWDER, FOR SOLUTION ORAL DAILY PRN
Status: DISCONTINUED | OUTPATIENT
Start: 2024-07-23 | End: 2024-07-24 | Stop reason: HOSPADM

## 2024-07-23 RX ORDER — ASPIRIN 81 MG/1
81 TABLET ORAL DAILY
Status: DISCONTINUED | OUTPATIENT
Start: 2024-07-23 | End: 2024-07-24 | Stop reason: HOSPADM

## 2024-07-23 RX ADMIN — ATORVASTATIN CALCIUM 20 MG: 20 TABLET, FILM COATED ORAL at 21:42

## 2024-07-23 RX ADMIN — FOLIC ACID 1 MG: 1 TABLET ORAL at 21:42

## 2024-07-23 RX ADMIN — ASPIRIN 81 MG: 81 TABLET, COATED ORAL at 21:42

## 2024-07-23 RX ADMIN — SODIUM CHLORIDE: 9 INJECTION, SOLUTION INTRAVENOUS at 22:24

## 2024-07-23 RX ADMIN — LORAZEPAM 1 MG: 2 INJECTION INTRAMUSCULAR; INTRAVENOUS at 17:43

## 2024-07-23 RX ADMIN — METOPROLOL SUCCINATE 25 MG: 25 TABLET, EXTENDED RELEASE ORAL at 21:40

## 2024-07-23 RX ADMIN — Medication 100 MG: at 21:42

## 2024-07-23 RX ADMIN — TEMAZEPAM 30 MG: 15 CAPSULE ORAL at 21:42

## 2024-07-23 RX ADMIN — LORAZEPAM 2 MG: 2 INJECTION INTRAMUSCULAR; INTRAVENOUS at 16:33

## 2024-07-23 RX ADMIN — THERA TABS 1 TABLET: TAB at 21:41

## 2024-07-23 RX ADMIN — LORAZEPAM 1 MG: 1 TABLET ORAL at 21:41

## 2024-07-23 RX ADMIN — PANTOPRAZOLE SODIUM 40 MG: 40 TABLET, DELAYED RELEASE ORAL at 21:41

## 2024-07-23 RX ADMIN — ENOXAPARIN SODIUM 40 MG: 100 INJECTION SUBCUTANEOUS at 21:42

## 2024-07-23 ASSESSMENT — ENCOUNTER SYMPTOMS
COUGH: 0
RHINORRHEA: 0
BACK PAIN: 0
NAUSEA: 0
VOMITING: 0
ABDOMINAL PAIN: 0
DIARRHEA: 0
WHEEZING: 0
SHORTNESS OF BREATH: 0
SORE THROAT: 0
ABDOMINAL DISTENTION: 0
CHEST TIGHTNESS: 0

## 2024-07-23 NOTE — ED NOTES
Monocytes % 10.8 (*)     All other components within normal limits   COMPREHENSIVE METABOLIC PANEL - Abnormal; Notable for the following components:    Sodium 146 (*)     ALT 98 (*)      (*)     All other components within normal limits   DRUG SCRN, BUPRENORPHINE - Abnormal; Notable for the following components:    Benzodiazepine Screen, Urine POSITIVE (*)     Cannabinoid Scrn, Ur POSITIVE (*)     All other components within normal limits   MAGNESIUM - Abnormal; Notable for the following components:    Magnesium 2.5 (*)     All other components within normal limits     Background  Allergies: No Known Allergies  Current Medications:   Medications Administered         LORazepam (ATIVAN) injection 1 mg Admin Date  07/23/2024 Action  Given Dose  1 mg Route  IntraVENous Administered By  Jeanette Nicholson RN        LORazepam (ATIVAN) injection 2 mg Admin Date  07/23/2024 Action  Given Dose  2 mg Route  IntraVENous Administered By  Jeanette Nicholson RN            History:   Past Medical History:   Diagnosis Date    Adjustment disorder with anxiety     Adjustment insomnia     Anemia     Ankle fracture     trip/fall on rug    Asymptomatic arteriosclerosis of coronary artery     sees [facility]/dr. liang    GERD (gastroesophageal reflux disease)     Heart block 2010    Hyperlipidemia     Hypertension     Iron deficiency anemia     hx of iron/oral    Irregular heart beat     Prostate cancer (HCC)     prostate; surgery/radiation/hormone tx    Vitamin D deficiency        Assessment  Vitals: Level of Consciousness: Alert (0)   Vitals:    07/23/24 1600 07/23/24 1628 07/23/24 1737 07/23/24 1830   BP: (!) 158/97 (!) 158/97 (!) 160/110 (!) 128/90   Pulse: 68 66 91 83   Resp: 17   17   Temp:       SpO2: 97%   95%   Weight:         Predictive Model Details          23 (Normal)  Factor Value    Calculated 7/23/2024 18:33 54% Age 69 years old    Deterioration Index Model 33% Sodium abnormal (146 mmol/L)     4% Systolic 128     3%

## 2024-07-23 NOTE — H&P
[facility]      Hospitalist - History & Physical      PCP: Yovani Lagos MD    Date of Admission: 7/23/2024    Date of Service: 7/23/2024    Chief Complaint:  Alcohol problem    History Of Present Illness:   The patient is a 69 y.o. male with adjustment insomnia, GERD, HTN, hyperlipidemia comes to ED for alcohol abuse. Patient has been drinking intermittently for the past year. He states he drinks 8 vodka/orange juice drinks per day and his last drink was at 0200. He denies any headache, nausea, vomiting, or tremors. Patient does state he feels some anxiety. Patient is present with family at bedside and states that he wants to go to rehab. He has been in contact with [facility] who will take him when he is detoxed.     Past Medical History:        Diagnosis Date    Adjustment disorder with anxiety     Adjustment insomnia     Anemia     Ankle fracture     trip/fall on rug    Asymptomatic arteriosclerosis of coronary artery     sees [facility]/dr. liang    GERD (gastroesophageal reflux disease)     Heart block 2010    Hyperlipidemia     Hypertension     Iron deficiency anemia     hx of iron/oral    Irregular heart beat     Prostate cancer (HCC)     prostate; surgery/radiation/hormone tx    Vitamin D deficiency        Past Surgical History:        Procedure Laterality Date    ANKLE FRACTURE SURGERY Right 3/10/2022    OPEN REDUCTION INTERNAL FIXATION RIGHT ANKLE LATERAL MALLEOLUS performed by Raffi Styles MD at [facility] OR    CARDIAC SURGERY  2009    stent at     COLONOSCOPY  03/2012    HPs (5 yr)    COLONOSCOPY N/A 6/29/2020    Dr YUMI Junior-Diverticular disease-AP, 3 yr recall    ENDOSCOPY, COLON, DIAGNOSTIC      [place] COLONOSCOPY FLX DX W/COLLJ SPEC WHEN PFRMD N/A 12/6/2017    Dr Pichardo-normal, 5 yr recall    PROSTATECTOMY  06/2015    da Wilmar    TONSILLECTOMY AND ADENOIDECTOMY      UPPER GASTROINTESTINAL ENDOSCOPY N/A 6/29/2020    Dr YUMI Junior-Gastritis, (+) H pylori       Home Medications:  Prior to  [facility] states that pt came to an appt today with them that she tells them we scheduled for her on 10-31-23. Did we do this? Please call pt to work this out. This is for [facility] wound center     [facility] is letting us know to call pt back right away. She is still in their waiting room just an fyi.

## 2024-07-23 NOTE — ED PROVIDER NOTES
[facility] EMERGENCY DEPT  eMERGENCY dEPARTMENT eNCOUnter      Pt Name: Ken Herrera  MRN: [id number]  Birthdate 1955  Date of evaluation: 7/23/2024  Provider: RUTHY ROMO MD    CHIEF COMPLAINT       Chief Complaint   Patient presents with    Alcohol Problem     Usually drinks 8 shots a day, last drink 0200         HISTORY OF PRESENT ILLNESS   (Location/Symptom, Timing/Onset,Context/Setting, Quality, Duration, Modifying Factors, Severity)  Note limiting factors.   Ken Herrera is a 69 y.o. male who presents to the emergency department for alcohol abuse.  Patient has been intermittently drinking heavily for the past year or so.  He has been to rehab several times and [facility] was out to his home earlier and have agreed to take him once he is detoxed.  Last drink was sometime around 2 AM admits to approximately 8 vodka drinks a day.  He has no psychiatric complaints.  Denies any current withdrawal symptoms.  Has had some mild tremors and anxiety in the past no history of any seizures.  Has been drinking again for the past approximate 2 weeks.    HPI    NursingNotes were reviewed.    REVIEW OF SYSTEMS    (2-9 systems for level 4, 10 or more for level 5)     Review of Systems   Constitutional:  Negative for chills and fever.   HENT:  Negative for rhinorrhea and sore throat.    Respiratory:  Negative for cough and shortness of breath.    Cardiovascular:  Negative for chest pain and leg swelling.   Gastrointestinal:  Negative for abdominal pain, diarrhea, nausea and vomiting.   Genitourinary:  Negative for dysuria and frequency.   Musculoskeletal:  Negative for back pain and neck pain.   Neurological:  Negative for dizziness and headaches.   Psychiatric/Behavioral:  Negative for suicidal ideas.    All other systems reviewed and are negative.           PAST MEDICALHISTORY     Past Medical History:   Diagnosis Date    Adjustment disorder with anxiety     Adjustment insomnia     Anemia     Ankle fracture     trip/fall on rug

## 2024-07-24 VITALS
HEIGHT: 71 IN | BODY MASS INDEX: 25.62 KG/M2 | RESPIRATION RATE: 18 BRPM | TEMPERATURE: 99 F | HEART RATE: 95 BPM | SYSTOLIC BLOOD PRESSURE: 127 MMHG | WEIGHT: 183 LBS | DIASTOLIC BLOOD PRESSURE: 95 MMHG | OXYGEN SATURATION: 95 %

## 2024-07-24 LAB
ADV 40+41 DNA STL QL NAA+NON-PROBE: NOT DETECTED
ALBUMIN SERPL-MCNC: 4.2 G/DL (ref 3.5–5.2)
ALP SERPL-CCNC: 66 U/L (ref 40–130)
ALT SERPL-CCNC: 88 U/L (ref 5–41)
ANION GAP SERPL CALCULATED.3IONS-SCNC: 14 MMOL/L (ref 7–19)
AST SERPL-CCNC: 99 U/L (ref 5–40)
BASOPHILS # BLD: 0 K/UL (ref 0–0.2)
BASOPHILS NFR BLD: 0.6 % (ref 0–1)
BILIRUB SERPL-MCNC: 0.7 MG/DL (ref 0.2–1.2)
BUN SERPL-MCNC: 10 MG/DL (ref 8–23)
C CAYETANENSIS DNA STL QL NAA+NON-PROBE: NOT DETECTED
C COLI+JEJ+UPSA DNA STL QL NAA+NON-PROBE: NOT DETECTED
CALCIUM SERPL-MCNC: 8.3 MG/DL (ref 8.8–10.2)
CHLORIDE SERPL-SCNC: 102 MMOL/L (ref 98–111)
CO2 SERPL-SCNC: 24 MMOL/L (ref 22–29)
CREAT SERPL-MCNC: 0.6 MG/DL (ref 0.5–1.2)
CRYPTOSP DNA STL QL NAA+NON-PROBE: NOT DETECTED
E HISTOLYT DNA STL QL NAA+NON-PROBE: NOT DETECTED
EAEC PAA PLAS AGGR+AATA ST NAA+NON-PRB: NOT DETECTED
EC STX1+STX2 GENES STL QL NAA+NON-PROBE: NOT DETECTED
EOSINOPHIL # BLD: 0.1 K/UL (ref 0–0.6)
EOSINOPHIL NFR BLD: 1.2 % (ref 0–5)
EPEC EAE GENE STL QL NAA+NON-PROBE: NOT DETECTED
ERYTHROCYTE [DISTWIDTH] IN BLOOD BY AUTOMATED COUNT: 13.1 % (ref 11.5–14.5)
ETEC LTA+ST1A+ST1B TOX ST NAA+NON-PROBE: NOT DETECTED
G LAMBLIA DNA STL QL NAA+NON-PROBE: NOT DETECTED
GI PATH DNA+RNA PNL STL NAA+NON-PROBE: NOT DETECTED
GLUCOSE SERPL-MCNC: 76 MG/DL (ref 74–109)
HCT VFR BLD AUTO: 42 % (ref 42–52)
HGB BLD-MCNC: 14 G/DL (ref 14–18)
IMM GRANULOCYTES # BLD: 0 K/UL
LYMPHOCYTES # BLD: 1.9 K/UL (ref 1.1–4.5)
LYMPHOCYTES NFR BLD: 37.9 % (ref 20–40)
MAGNESIUM SERPL-MCNC: 2 MG/DL (ref 1.6–2.4)
MCH RBC QN AUTO: 32.3 PG (ref 27–31)
MCHC RBC AUTO-ENTMCNC: 33.3 G/DL (ref 33–37)
MCV RBC AUTO: 97 FL (ref 80–94)
MONOCYTES # BLD: 0.7 K/UL (ref 0–0.9)
MONOCYTES NFR BLD: 14.1 % (ref 0–10)
NEUTROPHILS # BLD: 2.3 K/UL (ref 1.5–7.5)
NEUTS SEG NFR BLD: 46 % (ref 50–65)
NOROVIRUS GI+II RNA STL QL NAA+NON-PROBE: NOT DETECTED
P SHIGELLOIDES DNA STL QL NAA+NON-PROBE: NOT DETECTED
PHOSPHATE SERPL-MCNC: 2.2 MG/DL (ref 2.5–4.5)
PLATELET # BLD AUTO: 114 K/UL (ref 130–400)
PMV BLD AUTO: 10.4 FL (ref 9.4–12.4)
POTASSIUM SERPL-SCNC: 3.3 MMOL/L (ref 3.5–5)
PROT SERPL-MCNC: 6.7 G/DL (ref 6.6–8.7)
RBC # BLD AUTO: 4.33 M/UL (ref 4.7–6.1)
RVA RNA STL QL NAA+NON-PROBE: NOT DETECTED
S ENT+BONG DNA STL QL NAA+NON-PROBE: NOT DETECTED
SAPO I+II+IV+V RNA STL QL NAA+NON-PROBE: NOT DETECTED
SHIGELLA SP+EIEC IPAH ST NAA+NON-PROBE: NOT DETECTED
SODIUM SERPL-SCNC: 140 MMOL/L (ref 136–145)
V CHOL+PARA+VUL DNA STL QL NAA+NON-PROBE: NOT DETECTED
V CHOLERAE DNA STL QL NAA+NON-PROBE: NOT DETECTED
WBC # BLD AUTO: 5.1 K/UL (ref 4.8–10.8)
Y ENTEROCOL DNA STL QL NAA+NON-PROBE: NOT DETECTED

## 2024-07-24 PROCEDURE — 85025 COMPLETE CBC W/AUTO DIFF WBC: CPT

## 2024-07-24 PROCEDURE — 2500000003 HC RX 250 WO HCPCS: Performed by: HOSPITALIST

## 2024-07-24 PROCEDURE — 6360000002 HC RX W HCPCS: Performed by: HOSPITALIST

## 2024-07-24 PROCEDURE — 84100 ASSAY OF PHOSPHORUS: CPT

## 2024-07-24 PROCEDURE — 2500000003 HC RX 250 WO HCPCS: Performed by: INTERNAL MEDICINE

## 2024-07-24 PROCEDURE — 36415 COLL VENOUS BLD VENIPUNCTURE: CPT

## 2024-07-24 PROCEDURE — 83735 ASSAY OF MAGNESIUM: CPT

## 2024-07-24 PROCEDURE — 87507 IADNA-DNA/RNA PROBE TQ 12-25: CPT

## 2024-07-24 PROCEDURE — 6370000000 HC RX 637 (ALT 250 FOR IP): Performed by: INTERNAL MEDICINE

## 2024-07-24 PROCEDURE — 2580000003 HC RX 258: Performed by: INTERNAL MEDICINE

## 2024-07-24 PROCEDURE — 80053 COMPREHEN METABOLIC PANEL: CPT

## 2024-07-24 PROCEDURE — 6370000000 HC RX 637 (ALT 250 FOR IP)

## 2024-07-24 RX ORDER — MAGNESIUM HYDROXIDE/ALUMINUM HYDROXICE/SIMETHICONE 120; 1200; 1200 MG/30ML; MG/30ML; MG/30ML
30 SUSPENSION ORAL ONCE
Status: COMPLETED | OUTPATIENT
Start: 2024-07-24 | End: 2024-07-24

## 2024-07-24 RX ORDER — METOPROLOL SUCCINATE 50 MG/1
50 TABLET, EXTENDED RELEASE ORAL
Status: DISCONTINUED | OUTPATIENT
Start: 2024-07-24 | End: 2024-07-24 | Stop reason: HOSPADM

## 2024-07-24 RX ORDER — LABETALOL HYDROCHLORIDE 5 MG/ML
10 INJECTION, SOLUTION INTRAVENOUS ONCE
Status: COMPLETED | OUTPATIENT
Start: 2024-07-24 | End: 2024-07-24

## 2024-07-24 RX ORDER — DIPHENOXYLATE HYDROCHLORIDE AND ATROPINE SULFATE 2.5; .025 MG/1; MG/1
1 TABLET ORAL ONCE
Status: COMPLETED | OUTPATIENT
Start: 2024-07-24 | End: 2024-07-24

## 2024-07-24 RX ORDER — MULTIVITAMIN WITH IRON
1 TABLET ORAL DAILY
Qty: 30 TABLET | Refills: 1 | Status: SHIPPED | OUTPATIENT
Start: 2024-07-25

## 2024-07-24 RX ADMIN — LABETALOL HYDROCHLORIDE 10 MG: 5 INJECTION, SOLUTION INTRAVENOUS at 05:13

## 2024-07-24 RX ADMIN — POTASSIUM PHOSPHATE, MONOBASIC AND POTASSIUM PHOSPHATE, DIBASIC 15 MMOL: 224; 236 INJECTION, SOLUTION, CONCENTRATE INTRAVENOUS at 10:56

## 2024-07-24 RX ADMIN — POTASSIUM CHLORIDE 40 MEQ: 1500 TABLET, EXTENDED RELEASE ORAL at 03:31

## 2024-07-24 RX ADMIN — THERA TABS 1 TABLET: TAB at 08:40

## 2024-07-24 RX ADMIN — Medication 100 MG: at 08:40

## 2024-07-24 RX ADMIN — PANTOPRAZOLE SODIUM 40 MG: 40 TABLET, DELAYED RELEASE ORAL at 08:40

## 2024-07-24 RX ADMIN — ALUMINUM HYDROXIDE, MAGNESIUM HYDROXIDE, AND SIMETHICONE 30 ML: 1200; 120; 1200 SUSPENSION ORAL at 17:19

## 2024-07-24 RX ADMIN — METOPROLOL SUCCINATE 50 MG: 50 TABLET, EXTENDED RELEASE ORAL at 10:57

## 2024-07-24 RX ADMIN — ASPIRIN 81 MG: 81 TABLET, COATED ORAL at 08:40

## 2024-07-24 RX ADMIN — MICONAZOLE NITRATE: 2 POWDER TOPICAL at 13:20

## 2024-07-24 RX ADMIN — METOPROLOL SUCCINATE 50 MG: 50 TABLET, EXTENDED RELEASE ORAL at 17:19

## 2024-07-24 RX ADMIN — DIPHENOXYLATE HYDROCHLORIDE AND ATROPINE SULFATE 1 TABLET: 2.5; .025 TABLET ORAL at 17:19

## 2024-07-24 RX ADMIN — FOLIC ACID 1 MG: 1 TABLET ORAL at 08:40

## 2024-07-24 NOTE — PROGRESS NOTES
4 Eyes Skin Assessment     NAME:  Ken Herrera  YOB: 1955  MEDICAL RECORD NUMBER:  [id number]    The patient is being assessed for  Admission    I agree that at least one RN has performed a thorough Head to Toe Skin Assessment on the patient. ALL assessment sites listed below have been assessed.      Areas assessed by both nurses:    Head, Face, Ears, Shoulders, Back, Chest, Arms, Elbows, Hands, Sacrum. Buttock, Coccyx, Ischium, and Legs. Feet and Heels        Does the Patient have a Wound? No noted wound(s)       Tate Prevention initiated by RN: No  Wound Care Orders initiated by RN: No    Pressure Injury (Stage 3,4, Unstageable, DTI, NWPT, and Complex wounds) if present, place Wound referral order by RN under : No    New Ostomies, if present place, Ostomy referral order under : No     Nurse 1 eSignature: Electronically signed by Reji العراقي RN on 7/24/24 at 1:45 AM CDT    **SHARE this note so that the co-signing nurse can place an eSignature**    Nurse 2 eSignature: {Esignature:[id number]}

## 2024-07-24 NOTE — CARE COORDINATION
RADHA confirmed with Austen at [facility] that they visited with Pt at his home yesterday and he is agreeable to go to [facility] for long-term rehab; they will accept today as long as medically stable; waiting on GI panel per Christi GARCIA notified [facility] will keep him current on status; but likely for d/c today.    Electronically signed by PETTY Luna on 7/24/2024 at 2:59 PM      **Pt's sister is aware and will provide transport @ d/c after Pt calls to notify he is ready (after labs return)    [place] [place] [address]  Electronically signed by PETTY Luna on 7/24/2024 at 4:34 PM

## 2024-07-24 NOTE — DISCHARGE SUMMARY
auscultation, bilaterally without Rales/Wheezes/Rhonchi.  Cardiovascular:  Regular rate and rhythm with normal S1/S2 without murmurs, rubs or gallops.  Abdomen: Soft, non-tender, non-distended with normal bowel sounds.  Musculoskeletal:  No clubbing, cyanosis or edema bilaterally.  Full range of motion without deformity.  Skin: Skin color, texture, turgor normal.  No rashes or lesions.  Neurologic:  Neurovascularly intact without any focal sensory/motor deficits. Cranial nerves: II-XII intact, grossly non-focal.  Psychiatric:  Alert and oriented, thought content appropriate, normal insight  Capillary Refill: Brisk,< 3 seconds   Peripheral Pulses: +2 palpable, equal bilaterally       Labs: For convenience and continuity at follow-up the following most recent labs are provided:      CBC:    Lab Results   Component Value Date/Time    WBC 5.1 07/24/2024 02:21 AM    HGB 14.0 07/24/2024 02:21 AM    HCT 42.0 07/24/2024 02:21 AM     07/24/2024 02:21 AM       Renal:    Lab Results   Component Value Date/Time     07/24/2024 02:21 AM    K 3.3 07/24/2024 02:21 AM     07/24/2024 02:21 AM    CO2 24 07/24/2024 02:21 AM    BUN 10 07/24/2024 02:21 AM    CREATININE 0.6 07/24/2024 02:21 AM    CALCIUM 8.3 07/24/2024 02:21 AM    PHOS 2.2 07/24/2024 02:21 AM         Significant Diagnostic Studies    Radiology:   No orders to display          Consults:     IP CONSULT TO SOCIAL WORK    Disposition:  Home    Condition at Discharge: Stable    Discharge Instructions/Follow-up:  PCP 1 week     Code Status:  Full Code     Activity: activity as tolerated    Diet: regular diet      Discharge Medications:     Current Discharge Medication List             Details   Multiple Vitamin (MULTIVITAMIN) TABS tablet Take 1 tablet by mouth daily  Qty: 30 tablet, Refills: 1                Details   thiamine mononitrate (THIAMINE) 100 MG tablet Take 1 tablet by mouth daily  Qty: 30 tablet, Refills: 0      pantoprazole (PROTONIX) 40 MG

## 2024-07-24 NOTE — CARE COORDINATION
07/24/24 1430   Readmission Assessment   Number of Days since last admission? 8-30 days   Previous Disposition Home Alone   Who is being Interviewed Patient;Caregiver  (RN; medical records)   What was the patient's/caregiver's perception as to why they think they needed to return back to the hospital? Other (Comment)  (alcohol intoxication; Pt refused to go to inpt addiction tx prior admission-[facility] offered.)   Did you visit your Primary Care Physician after you left the hospital, before you returned this time? No   Why weren't you able to visit your PCP? Did not have an appointment   Did you see a specialist, such as Cardiac, Pulmonary, Orthopedic Physician, etc. after you left the hospital? No   Who advised the patient to return to the hospital? Self-referral   Does the patient report anything that got in the way of taking their medications? No   In our efforts to provide the best possible care to you and others like you, can you think of anything that we could have done to help you after you left the hospital the first time, so that you might not have needed to return so soon? Other (Comment)  (likely unavoidable; Pt has been given resources on every admission and typically will refuse tx or want to do outpt meetings)     Electronically signed by PETTY Luna on 7/24/2024 at 2:51 PM

## 2024-07-24 NOTE — PROGRESS NOTES
Ken Herrera arrived to room # 306.   Presented with: ETOH withdrawal  Mental Status: Patient is oriented, alert, coherent, logical, thought processes intact, and able to concentrate and follow conversation.   Vitals:    07/24/24 0005   BP: (!) 140/86   Pulse:    Resp:    Temp:    SpO2:      Patient safety contract and falls prevention contract reviewed with patient Yes.  Oriented Patient to room.  Call light within reach. Yes.  Needs, issues or concerns expressed at this time: no.      Electronically signed by Maria Fernanda Campbell RN on 7/24/2024 at 1:38 AM

## 2024-07-29 ENCOUNTER — APPOINTMENT (OUTPATIENT)
Dept: CARDIOLOGY | Facility: HOSPITAL | Age: 69
End: 2024-07-29
Payer: MEDICARE

## 2024-07-29 ENCOUNTER — APPOINTMENT (OUTPATIENT)
Dept: GENERAL RADIOLOGY | Facility: HOSPITAL | Age: 69
End: 2024-07-29
Payer: MEDICARE

## 2024-07-29 ENCOUNTER — HOSPITAL ENCOUNTER (OUTPATIENT)
Facility: HOSPITAL | Age: 69
LOS: 1 days | Discharge: HOME | End: 2024-07-30
Attending: EMERGENCY MEDICINE | Admitting: FAMILY MEDICINE
Payer: MEDICARE

## 2024-07-29 DIAGNOSIS — I48.0 PAROXYSMAL ATRIAL FIBRILLATION: ICD-10-CM

## 2024-07-29 DIAGNOSIS — I48.91 ATRIAL FIBRILLATION WITH RAPID VENTRICULAR RESPONSE: Primary | ICD-10-CM

## 2024-07-29 DIAGNOSIS — E87.6 HYPOKALEMIA: ICD-10-CM

## 2024-07-29 DIAGNOSIS — D69.6 THROMBOCYTOPENIA: ICD-10-CM

## 2024-07-29 PROBLEM — F10.21 ALCOHOLISM IN REMISSION: Status: ACTIVE | Noted: 2024-07-29

## 2024-07-29 LAB
ALBUMIN SERPL-MCNC: 4.5 G/DL (ref 3.5–5.2)
ALBUMIN/GLOB SERPL: 1.4 G/DL
ALP SERPL-CCNC: 56 U/L (ref 39–117)
ALT SERPL W P-5'-P-CCNC: 75 U/L (ref 1–41)
AMPHET+METHAMPHET UR QL: NEGATIVE
AMPHETAMINES UR QL: NEGATIVE
ANION GAP SERPL CALCULATED.3IONS-SCNC: 15 MMOL/L (ref 5–15)
APTT PPP: 29.4 SECONDS (ref 24.5–36)
AST SERPL-CCNC: 58 U/L (ref 1–40)
BARBITURATES UR QL SCN: NEGATIVE
BASOPHILS # BLD AUTO: 0.01 10*3/MM3 (ref 0–0.2)
BASOPHILS NFR BLD AUTO: 0.2 % (ref 0–1.5)
BENZODIAZ UR QL SCN: POSITIVE
BH CV ECHO MEAS - AI P1/2T: 355.5 MSEC
BH CV ECHO MEAS - AO MAX PG: 57.2 MMHG
BH CV ECHO MEAS - AO MEAN PG: 37.9 MMHG
BH CV ECHO MEAS - AO ROOT DIAM: 2.8 CM
BH CV ECHO MEAS - AO V2 MAX: 375.5 CM/SEC
BH CV ECHO MEAS - AO V2 VTI: 76.8 CM
BH CV ECHO MEAS - AVA(I,D): 0.69 CM2
BH CV ECHO MEAS - EDV(CUBED): 100.3 ML
BH CV ECHO MEAS - EDV(MOD-SP2): 181.6 ML
BH CV ECHO MEAS - EDV(MOD-SP4): 136.7 ML
BH CV ECHO MEAS - EF(MOD-SP2): 65.5 %
BH CV ECHO MEAS - EF(MOD-SP4): 71.9 %
BH CV ECHO MEAS - ESV(CUBED): 26.7 ML
BH CV ECHO MEAS - ESV(MOD-SP2): 62.7 ML
BH CV ECHO MEAS - ESV(MOD-SP4): 38.4 ML
BH CV ECHO MEAS - FS: 35.6 %
BH CV ECHO MEAS - IVS/LVPW: 1.2 CM
BH CV ECHO MEAS - IVSD: 1.51 CM
BH CV ECHO MEAS - LA DIMENSION: 5 CM
BH CV ECHO MEAS - LAT PEAK E' VEL: 6 CM/SEC
BH CV ECHO MEAS - LV DIASTOLIC VOL/BSA (35-75): 66.3 CM2
BH CV ECHO MEAS - LV MASS(C)D: 257 GRAMS
BH CV ECHO MEAS - LV MAX PG: 3.2 MMHG
BH CV ECHO MEAS - LV MEAN PG: 1.86 MMHG
BH CV ECHO MEAS - LV SYSTOLIC VOL/BSA (12-30): 18.6 CM2
BH CV ECHO MEAS - LV V1 MAX: 88.7 CM/SEC
BH CV ECHO MEAS - LV V1 VTI: 16.3 CM
BH CV ECHO MEAS - LVIDD: 4.6 CM
BH CV ECHO MEAS - LVIDS: 3 CM
BH CV ECHO MEAS - LVOT AREA: 3.2 CM2
BH CV ECHO MEAS - LVOT DIAM: 2.03 CM
BH CV ECHO MEAS - LVPWD: 1.26 CM
BH CV ECHO MEAS - MED PEAK E' VEL: 5 CM/SEC
BH CV ECHO MEAS - MR MAX PG: 159.4 MMHG
BH CV ECHO MEAS - MR MAX VEL: 631.3 CM/SEC
BH CV ECHO MEAS - MR MEAN PG: 96.2 MMHG
BH CV ECHO MEAS - MR MEAN VEL: 448.6 CM/SEC
BH CV ECHO MEAS - MR VTI: 208.3 CM
BH CV ECHO MEAS - MV A MAX VEL: 89.9 CM/SEC
BH CV ECHO MEAS - MV DEC SLOPE: 441.1 CM/SEC2
BH CV ECHO MEAS - MV DEC TIME: 0.22 SEC
BH CV ECHO MEAS - MV E MAX VEL: 97 CM/SEC
BH CV ECHO MEAS - MV E/A: 1.08
BH CV ECHO MEAS - PI END-D VEL: 102.5 CM/SEC
BH CV ECHO MEAS - RAP SYSTOLE: 3 MMHG
BH CV ECHO MEAS - RVSP: 41.5 MMHG
BH CV ECHO MEAS - SV(LVOT): 52.9 ML
BH CV ECHO MEAS - SV(MOD-SP2): 119 ML
BH CV ECHO MEAS - SV(MOD-SP4): 98.3 ML
BH CV ECHO MEAS - SVI(LVOT): 25.6 ML/M2
BH CV ECHO MEAS - SVI(MOD-SP2): 57.7 ML/M2
BH CV ECHO MEAS - SVI(MOD-SP4): 47.6 ML/M2
BH CV ECHO MEAS - TAPSE (>1.6): 2.4 CM
BH CV ECHO MEAS - TR MAX PG: 38.5 MMHG
BH CV ECHO MEAS - TR MAX VEL: 302.8 CM/SEC
BH CV ECHO MEASUREMENTS AVERAGE E/E' RATIO: 17.64
BH CV XLRA - RV BASE: 4 CM
BH CV XLRA - RV LENGTH: 7 CM
BH CV XLRA - RV MID: 3 CM
BILIRUB SERPL-MCNC: 0.5 MG/DL (ref 0–1.2)
BUN SERPL-MCNC: 15 MG/DL (ref 8–23)
BUN/CREAT SERPL: 16.3 (ref 7–25)
BUPRENORPHINE SERPL-MCNC: NEGATIVE NG/ML
CALCIUM SPEC-SCNC: 10.1 MG/DL (ref 8.6–10.5)
CANNABINOIDS SERPL QL: POSITIVE
CHLORIDE SERPL-SCNC: 102 MMOL/L (ref 98–107)
CO2 SERPL-SCNC: 23 MMOL/L (ref 22–29)
COCAINE UR QL: NEGATIVE
CREAT SERPL-MCNC: 0.92 MG/DL (ref 0.76–1.27)
D-LACTATE SERPL-SCNC: 1.7 MMOL/L (ref 0.5–2)
DEPRECATED RDW RBC AUTO: 48.7 FL (ref 37–54)
EGFRCR SERPLBLD CKD-EPI 2021: 89.5 ML/MIN/1.73
EOSINOPHIL # BLD AUTO: 0.02 10*3/MM3 (ref 0–0.4)
EOSINOPHIL NFR BLD AUTO: 0.4 % (ref 0.3–6.2)
ERYTHROCYTE [DISTWIDTH] IN BLOOD BY AUTOMATED COUNT: 13.8 % (ref 12.3–15.4)
ETHANOL UR QL: <0.01 %
FENTANYL UR-MCNC: NEGATIVE NG/ML
GLOBULIN UR ELPH-MCNC: 3.2 GM/DL
GLUCOSE SERPL-MCNC: 136 MG/DL (ref 65–99)
HCT VFR BLD AUTO: 42.3 % (ref 37.5–51)
HGB BLD-MCNC: 14.1 G/DL (ref 13–17.7)
HOLD SPECIMEN: NORMAL
IMM GRANULOCYTES # BLD AUTO: 0.01 10*3/MM3 (ref 0–0.05)
IMM GRANULOCYTES NFR BLD AUTO: 0.2 % (ref 0–0.5)
INR PPP: 1 (ref 0.91–1.09)
LEFT ATRIUM VOLUME INDEX: 50.8 ML/M2
LEFT ATRIUM VOLUME: 105 ML
LYMPHOCYTES # BLD AUTO: 0.89 10*3/MM3 (ref 0.7–3.1)
LYMPHOCYTES NFR BLD AUTO: 15.6 % (ref 19.6–45.3)
MAGNESIUM SERPL-MCNC: 1.7 MG/DL (ref 1.6–2.4)
MCH RBC QN AUTO: 32 PG (ref 26.6–33)
MCHC RBC AUTO-ENTMCNC: 33.3 G/DL (ref 31.5–35.7)
MCV RBC AUTO: 96.1 FL (ref 79–97)
METHADONE UR QL SCN: NEGATIVE
MONOCYTES # BLD AUTO: 0.67 10*3/MM3 (ref 0.1–0.9)
MONOCYTES NFR BLD AUTO: 11.7 % (ref 5–12)
NEUTROPHILS NFR BLD AUTO: 4.11 10*3/MM3 (ref 1.7–7)
NEUTROPHILS NFR BLD AUTO: 71.9 % (ref 42.7–76)
NT-PROBNP SERPL-MCNC: 2169 PG/ML (ref 0–900)
OPIATES UR QL: NEGATIVE
OXYCODONE UR QL SCN: NEGATIVE
PCP UR QL SCN: NEGATIVE
PLATELET # BLD AUTO: 90 10*3/MM3 (ref 140–450)
PMV BLD AUTO: 11.8 FL (ref 6–12)
POTASSIUM SERPL-SCNC: 3.2 MMOL/L (ref 3.5–5.2)
PROCALCITONIN SERPL-MCNC: 0.14 NG/ML (ref 0–0.25)
PROT SERPL-MCNC: 7.7 G/DL (ref 6–8.5)
PROTHROMBIN TIME: 13.6 SECONDS (ref 11.8–14.8)
RBC # BLD AUTO: 4.4 10*6/MM3 (ref 4.14–5.8)
SODIUM SERPL-SCNC: 140 MMOL/L (ref 136–145)
TRICYCLICS UR QL SCN: NEGATIVE
VIT B1 SERPL-MCNC: <2 NMOL/L (ref 4–15)
WBC NRBC COR # BLD AUTO: 5.71 10*3/MM3 (ref 3.4–10.8)
WHOLE BLOOD HOLD COAG: NORMAL
WHOLE BLOOD HOLD SPECIMEN: NORMAL

## 2024-07-29 PROCEDURE — 25010000002 LORAZEPAM PER 2 MG: Performed by: EMERGENCY MEDICINE

## 2024-07-29 PROCEDURE — 80053 COMPREHEN METABOLIC PANEL: CPT | Performed by: EMERGENCY MEDICINE

## 2024-07-29 PROCEDURE — G0378 HOSPITAL OBSERVATION PER HR: HCPCS

## 2024-07-29 PROCEDURE — 84145 PROCALCITONIN (PCT): CPT | Performed by: EMERGENCY MEDICINE

## 2024-07-29 PROCEDURE — 87040 BLOOD CULTURE FOR BACTERIA: CPT | Performed by: EMERGENCY MEDICINE

## 2024-07-29 PROCEDURE — 71045 X-RAY EXAM CHEST 1 VIEW: CPT

## 2024-07-29 PROCEDURE — 83605 ASSAY OF LACTIC ACID: CPT | Performed by: EMERGENCY MEDICINE

## 2024-07-29 PROCEDURE — 93010 ELECTROCARDIOGRAM REPORT: CPT | Performed by: INTERNAL MEDICINE

## 2024-07-29 PROCEDURE — 96365 THER/PROPH/DIAG IV INF INIT: CPT

## 2024-07-29 PROCEDURE — 99204 OFFICE O/P NEW MOD 45 MIN: CPT | Performed by: STUDENT IN AN ORGANIZED HEALTH CARE EDUCATION/TRAINING PROGRAM

## 2024-07-29 PROCEDURE — 36415 COLL VENOUS BLD VENIPUNCTURE: CPT

## 2024-07-29 PROCEDURE — 96372 THER/PROPH/DIAG INJ SC/IM: CPT

## 2024-07-29 PROCEDURE — 96366 THER/PROPH/DIAG IV INF ADDON: CPT

## 2024-07-29 PROCEDURE — 25010000002 MAGNESIUM SULFATE IN D5W 1G/100ML (PREMIX) 1-5 GM/100ML-% SOLUTION: Performed by: STUDENT IN AN ORGANIZED HEALTH CARE EDUCATION/TRAINING PROGRAM

## 2024-07-29 PROCEDURE — 80307 DRUG TEST PRSMV CHEM ANLYZR: CPT | Performed by: EMERGENCY MEDICINE

## 2024-07-29 PROCEDURE — 25010000002 ENOXAPARIN PER 10 MG: Performed by: EMERGENCY MEDICINE

## 2024-07-29 PROCEDURE — 93306 TTE W/DOPPLER COMPLETE: CPT | Performed by: INTERNAL MEDICINE

## 2024-07-29 PROCEDURE — 85730 THROMBOPLASTIN TIME PARTIAL: CPT | Performed by: EMERGENCY MEDICINE

## 2024-07-29 PROCEDURE — 82077 ASSAY SPEC XCP UR&BREATH IA: CPT | Performed by: EMERGENCY MEDICINE

## 2024-07-29 PROCEDURE — 93306 TTE W/DOPPLER COMPLETE: CPT

## 2024-07-29 PROCEDURE — 85610 PROTHROMBIN TIME: CPT | Performed by: EMERGENCY MEDICINE

## 2024-07-29 PROCEDURE — 99285 EMERGENCY DEPT VISIT HI MDM: CPT

## 2024-07-29 PROCEDURE — 25510000001 PERFLUTREN 6.52 MG/ML SUSPENSION: Performed by: FAMILY MEDICINE

## 2024-07-29 PROCEDURE — 83880 ASSAY OF NATRIURETIC PEPTIDE: CPT | Performed by: EMERGENCY MEDICINE

## 2024-07-29 PROCEDURE — 93005 ELECTROCARDIOGRAM TRACING: CPT | Performed by: EMERGENCY MEDICINE

## 2024-07-29 PROCEDURE — 93005 ELECTROCARDIOGRAM TRACING: CPT | Performed by: STUDENT IN AN ORGANIZED HEALTH CARE EDUCATION/TRAINING PROGRAM

## 2024-07-29 PROCEDURE — 85025 COMPLETE CBC W/AUTO DIFF WBC: CPT | Performed by: EMERGENCY MEDICINE

## 2024-07-29 PROCEDURE — 83735 ASSAY OF MAGNESIUM: CPT | Performed by: STUDENT IN AN ORGANIZED HEALTH CARE EDUCATION/TRAINING PROGRAM

## 2024-07-29 PROCEDURE — 96375 TX/PRO/DX INJ NEW DRUG ADDON: CPT

## 2024-07-29 RX ORDER — BISACODYL 10 MG
10 SUPPOSITORY, RECTAL RECTAL DAILY PRN
Status: DISCONTINUED | OUTPATIENT
Start: 2024-07-29 | End: 2024-07-30 | Stop reason: HOSPADM

## 2024-07-29 RX ORDER — ACETAMINOPHEN 325 MG/1
650 TABLET ORAL EVERY 4 HOURS PRN
Status: DISCONTINUED | OUTPATIENT
Start: 2024-07-29 | End: 2024-07-30 | Stop reason: HOSPADM

## 2024-07-29 RX ORDER — POTASSIUM CHLORIDE 20 MEQ/1
40 TABLET, EXTENDED RELEASE ORAL ONCE
Status: COMPLETED | OUTPATIENT
Start: 2024-07-29 | End: 2024-07-29

## 2024-07-29 RX ORDER — BISACODYL 5 MG/1
5 TABLET, DELAYED RELEASE ORAL DAILY PRN
Status: DISCONTINUED | OUTPATIENT
Start: 2024-07-29 | End: 2024-07-30 | Stop reason: HOSPADM

## 2024-07-29 RX ORDER — ACETAMINOPHEN 325 MG/1
650 TABLET ORAL ONCE
Status: COMPLETED | OUTPATIENT
Start: 2024-07-29 | End: 2024-07-29

## 2024-07-29 RX ORDER — ACETAMINOPHEN 160 MG/5ML
650 SOLUTION ORAL EVERY 4 HOURS PRN
Status: DISCONTINUED | OUTPATIENT
Start: 2024-07-29 | End: 2024-07-30 | Stop reason: HOSPADM

## 2024-07-29 RX ORDER — LORAZEPAM 2 MG/ML
1 INJECTION INTRAMUSCULAR ONCE
Status: COMPLETED | OUTPATIENT
Start: 2024-07-29 | End: 2024-07-29

## 2024-07-29 RX ORDER — ALUMINA, MAGNESIA, AND SIMETHICONE 2400; 2400; 240 MG/30ML; MG/30ML; MG/30ML
15 SUSPENSION ORAL EVERY 6 HOURS PRN
Status: DISCONTINUED | OUTPATIENT
Start: 2024-07-29 | End: 2024-07-30 | Stop reason: HOSPADM

## 2024-07-29 RX ORDER — AMIODARONE HYDROCHLORIDE 200 MG/1
400 TABLET ORAL EVERY 12 HOURS SCHEDULED
Status: DISCONTINUED | OUTPATIENT
Start: 2024-07-29 | End: 2024-07-30 | Stop reason: HOSPADM

## 2024-07-29 RX ORDER — DILTIAZEM HCL/D5W 125 MG/125
5-15 PLASTIC BAG, INJECTION (ML) INTRAVENOUS
Status: DISCONTINUED | OUTPATIENT
Start: 2024-07-29 | End: 2024-07-29

## 2024-07-29 RX ORDER — ACETAMINOPHEN 650 MG/1
650 SUPPOSITORY RECTAL EVERY 4 HOURS PRN
Status: DISCONTINUED | OUTPATIENT
Start: 2024-07-29 | End: 2024-07-30 | Stop reason: HOSPADM

## 2024-07-29 RX ORDER — SODIUM CHLORIDE 0.9 % (FLUSH) 0.9 %
10 SYRINGE (ML) INJECTION AS NEEDED
Status: DISCONTINUED | OUTPATIENT
Start: 2024-07-29 | End: 2024-07-30 | Stop reason: HOSPADM

## 2024-07-29 RX ORDER — ASPIRIN 81 MG/1
81 TABLET ORAL DAILY
Status: DISCONTINUED | OUTPATIENT
Start: 2024-07-29 | End: 2024-07-30 | Stop reason: HOSPADM

## 2024-07-29 RX ORDER — MAGNESIUM SULFATE 1 G/100ML
2 INJECTION INTRAVENOUS ONCE
Status: COMPLETED | OUTPATIENT
Start: 2024-07-29 | End: 2024-07-29

## 2024-07-29 RX ORDER — ATORVASTATIN CALCIUM 10 MG/1
20 TABLET, FILM COATED ORAL DAILY
Status: DISCONTINUED | OUTPATIENT
Start: 2024-07-29 | End: 2024-07-30 | Stop reason: HOSPADM

## 2024-07-29 RX ORDER — DILTIAZEM HYDROCHLORIDE 60 MG/1
60 TABLET, FILM COATED ORAL EVERY 6 HOURS SCHEDULED
Status: DISCONTINUED | OUTPATIENT
Start: 2024-07-29 | End: 2024-07-30 | Stop reason: HOSPADM

## 2024-07-29 RX ORDER — FOLIC ACID 1 MG/1
1 TABLET ORAL DAILY
Status: DISCONTINUED | OUTPATIENT
Start: 2024-07-29 | End: 2024-07-30 | Stop reason: HOSPADM

## 2024-07-29 RX ORDER — TEMAZEPAM 15 MG/1
30 CAPSULE ORAL NIGHTLY PRN
Status: DISCONTINUED | OUTPATIENT
Start: 2024-07-29 | End: 2024-07-30 | Stop reason: HOSPADM

## 2024-07-29 RX ORDER — ENOXAPARIN SODIUM 100 MG/ML
1 INJECTION SUBCUTANEOUS ONCE
Status: COMPLETED | OUTPATIENT
Start: 2024-07-29 | End: 2024-07-29

## 2024-07-29 RX ORDER — AMOXICILLIN 250 MG
2 CAPSULE ORAL 2 TIMES DAILY PRN
Status: DISCONTINUED | OUTPATIENT
Start: 2024-07-29 | End: 2024-07-30 | Stop reason: HOSPADM

## 2024-07-29 RX ORDER — SODIUM CHLORIDE 9 MG/ML
40 INJECTION, SOLUTION INTRAVENOUS AS NEEDED
Status: DISCONTINUED | OUTPATIENT
Start: 2024-07-29 | End: 2024-07-30 | Stop reason: HOSPADM

## 2024-07-29 RX ORDER — ADENOSINE 3 MG/ML
INJECTION, SOLUTION INTRAVENOUS
Status: DISCONTINUED
Start: 2024-07-29 | End: 2024-07-29 | Stop reason: WASHOUT

## 2024-07-29 RX ORDER — SODIUM CHLORIDE 0.9 % (FLUSH) 0.9 %
10 SYRINGE (ML) INJECTION EVERY 12 HOURS SCHEDULED
Status: DISCONTINUED | OUTPATIENT
Start: 2024-07-29 | End: 2024-07-30 | Stop reason: HOSPADM

## 2024-07-29 RX ORDER — PANTOPRAZOLE SODIUM 40 MG/1
40 TABLET, DELAYED RELEASE ORAL DAILY
Status: ON HOLD | COMMUNITY
End: 2024-07-29 | Stop reason: SDDI

## 2024-07-29 RX ORDER — POLYETHYLENE GLYCOL 3350 17 G/17G
17 POWDER, FOR SOLUTION ORAL DAILY PRN
Status: DISCONTINUED | OUTPATIENT
Start: 2024-07-29 | End: 2024-07-30 | Stop reason: HOSPADM

## 2024-07-29 RX ORDER — ONDANSETRON 2 MG/ML
4 INJECTION INTRAMUSCULAR; INTRAVENOUS EVERY 6 HOURS PRN
Status: DISCONTINUED | OUTPATIENT
Start: 2024-07-29 | End: 2024-07-30 | Stop reason: HOSPADM

## 2024-07-29 RX ORDER — ATORVASTATIN CALCIUM 20 MG/1
20 TABLET, FILM COATED ORAL DAILY
COMMUNITY

## 2024-07-29 RX ADMIN — LORAZEPAM 1 MG: 2 INJECTION INTRAMUSCULAR; INTRAVENOUS at 08:00

## 2024-07-29 RX ADMIN — APIXABAN 5 MG: 5 TABLET, FILM COATED ORAL at 21:03

## 2024-07-29 RX ADMIN — AMIODARONE HYDROCHLORIDE 400 MG: 200 TABLET ORAL at 21:03

## 2024-07-29 RX ADMIN — TEMAZEPAM 30 MG: 15 CAPSULE ORAL at 22:37

## 2024-07-29 RX ADMIN — ENOXAPARIN SODIUM 90 MG: 100 INJECTION SUBCUTANEOUS at 08:01

## 2024-07-29 RX ADMIN — ACETAMINOPHEN 650 MG: 325 TABLET, FILM COATED ORAL at 08:57

## 2024-07-29 RX ADMIN — POTASSIUM CHLORIDE 40 MEQ: 1500 TABLET, EXTENDED RELEASE ORAL at 08:57

## 2024-07-29 RX ADMIN — Medication 10 ML: at 11:37

## 2024-07-29 RX ADMIN — DILTIAZEM HYDROCHLORIDE 60 MG: 60 TABLET, FILM COATED ORAL at 11:37

## 2024-07-29 RX ADMIN — FOLIC ACID 1 MG: 1 TABLET ORAL at 11:37

## 2024-07-29 RX ADMIN — ATORVASTATIN CALCIUM 20 MG: 10 TABLET, FILM COATED ORAL at 11:37

## 2024-07-29 RX ADMIN — MAGNESIUM SULFATE IN DEXTROSE 2 G: 10 INJECTION, SOLUTION INTRAVENOUS at 21:03

## 2024-07-29 RX ADMIN — DILTIAZEM HYDROCHLORIDE 60 MG: 60 TABLET, FILM COATED ORAL at 18:41

## 2024-07-29 RX ADMIN — ACETAMINOPHEN 650 MG: 325 TABLET, FILM COATED ORAL at 21:03

## 2024-07-29 RX ADMIN — ASPIRIN 81 MG: 81 TABLET, COATED ORAL at 11:37

## 2024-07-29 RX ADMIN — Medication 5 MG/HR: at 08:02

## 2024-07-29 RX ADMIN — PERFLUTREN 9.78 MG: 6.52 INJECTION, SUSPENSION INTRAVENOUS at 13:37

## 2024-07-29 NOTE — CONSULTS
[facility] HEART GROUP CONSULT NOTE    Referring Provider: No ref. provider found    Reason for Consultation:  afib with RVR    Chief complaint:   Chief Complaint   Patient presents with    Rapid Heart Rate       Subjective .   EP problems:   Afib   PVCs    Cardiology problems  HOCM  CAD   -2009: [facility] s/p PCI to RCA  LV dysfunction  -8/12/23: EF 46-50%  -7/29/24: Echo 51-55%  4. Moderate aortic stenosis     Medical problems:   HTN  HLP  Alcohol use disorder  Prostate cancer   Poor wound healing     History of present illness:  Dayron Lubin is a 69 y.o. male with history of CAD, known HOCM, and new onset atrial firbillation with RVR we are consulted on for Afib management. He has previously been followed by Dr. Stephens and Edda GREGORY last seen in 2022 and at that time was overall stable from angina or other cardiac symptoms. He has since had problems with alcohol use disorder with 3 evaluations this year.     He reports that he had been experiencing SOB and a cough for a few days and a new sweatiness/diaphoresis with lightheadedness and was evaluated at an urgent care. There was no chest pain suggestive of angina or anything similar to what he had prior with his PCI in 2009. He was told his heart rate was elevated and was encouraged to come to ER. EKG showed atrial fibrillation with RVR and he was subsequently placed on a diltiazem drip with heart rate at 100 currently.  He has now converted to sinus rhythm with frequent PVCs as well, which he states he is not aware of typically. He states due to prostate cancer treatments, he frequently would have hot flashes, but the diaphoresis this time was new.     His BNP was elevated at 2196 and he notable for hypokalemia at 3.2. He was recently discharged from [facility] 7-24 for help with alcohol use rehabilitation at [place] and has not had any alcohol for >1 week. Currently on exam, he has mild tremors.      History  Past Medical History:   Diagnosis  Date    Alcoholic hepatitis 07/13/2023    CAD (coronary artery disease)     History of external beam radiation therapy 05/12/2016    6840 cGy to prostate bed    Hyperlipidemia     Hypertension     Insomnia     Prostate cancer    ,   Past Surgical History:   Procedure Laterality Date    CARDIAC CATHETERIZATION      CORONARY ANGIOPLASTY WITH STENT PLACEMENT      PROSTATE SURGERY      TONSILLECTOMY     ,   Family History   Problem Relation Age of Onset    Heart disease Mother     Coronary artery disease Mother     Stroke Mother    ,   Social History     Tobacco Use    Smoking status: Never    Smokeless tobacco: Never   Vaping Use    Vaping status: Never Used   Substance Use Topics    Alcohol use: Not Currently     Alcohol/week: 50.0 standard drinks of alcohol     Types: 50 Shots of liquor per week     Comment: very little     Drug use: Yes     Types: Marijuana   ,     Medications      Current Facility-Administered Medications:     acetaminophen (TYLENOL) tablet 650 mg, 650 mg, Oral, Q4H PRN **OR** acetaminophen (TYLENOL) 160 MG/5ML oral solution 650 mg, 650 mg, Oral, Q4H PRN **OR** acetaminophen (TYLENOL) suppository 650 mg, 650 mg, Rectal, Q4H PRN, Holger Karimi DO    aluminum-magnesium hydroxide-simethicone (MAALOX MAX) 400-400-40 MG/5ML suspension 15 mL, 15 mL, Oral, Q6H PRN, Holger Karimi DO    apixaban (ELIQUIS) tablet 5 mg, 5 mg, Oral, Q12H, Holger Karimi DO    aspirin EC tablet 81 mg, 81 mg, Oral, Daily, Holger Karimi DO, 81 mg at 07/29/24 1137    atorvastatin (LIPITOR) tablet 20 mg, 20 mg, Oral, Daily, Holger Karimi DO, 20 mg at 07/29/24 1137    sennosides-docusate (PERICOLACE) 8.6-50 MG per tablet 2 tablet, 2 tablet, Oral, BID PRN **AND** polyethylene glycol (MIRALAX) packet 17 g, 17 g, Oral, Daily PRN **AND** bisacodyl (DULCOLAX) EC tablet 5 mg, 5 mg, Oral, Daily PRN **AND** bisacodyl (DULCOLAX) suppository 10 mg, 10 mg, Rectal, Daily PRN, Holger Karimi DO    Calcium  Replacement - Follow Nurse / BPA Driven Protocol, , Does not apply, PRN, Holger Karimi S, DO    dilTIAZem (CARDIZEM) 125 mg in 125 mL D5W infusion, 5-15 mg/hr, Intravenous, Titrated, Holger Karimi DO, Stopped at 07/29/24 1626    dilTIAZem (CARDIZEM) tablet 60 mg, 60 mg, Oral, Q6H, Holger Karimi, , 60 mg at 07/29/24 1137    folic acid (FOLVITE) tablet 1 mg, 1 mg, Oral, Daily, Holger Karimi, DO, 1 mg at 07/29/24 1137    Magnesium Standard Dose Replacement - Follow Nurse / BPA Driven Protocol, , Does not apply, PRN, Holger Karimi, DO    ondansetron (ZOFRAN) injection 4 mg, 4 mg, Intravenous, Q6H PRN, Holger Karimi, DO    Phosphorus Replacement - Follow Nurse / BPA Driven Protocol, , Does not apply, PRN, Holger Karimi, DO    Potassium Replacement - Follow Nurse / BPA Driven Protocol, , Does not apply, PRN, Holger Karimi, DO    [COMPLETED] Insert Peripheral IV, , , Once **AND** sodium chloride 0.9 % flush 10 mL, 10 mL, Intravenous, PRN, Holger Karimi S, DO    sodium chloride 0.9 % flush 10 mL, 10 mL, Intravenous, Q12H, Holger Karimi, , 10 mL at 07/29/24 1137    sodium chloride 0.9 % flush 10 mL, 10 mL, Intravenous, PRN, Holger Karimi S, DO    sodium chloride 0.9 % infusion 40 mL, 40 mL, Intravenous, PRN, Holger Karimi S, DO    Allergies:  Patient has no known allergies.    Review of Systems  Review of Systems   Constitutional: Positive for malaise/fatigue.   HENT: Negative.     Eyes: Negative.    Cardiovascular:  Positive for dyspnea on exertion.   Respiratory:  Positive for shortness of breath.    Endocrine: Negative.    Hematologic/Lymphatic: Negative.    Skin: Negative.    Gastrointestinal: Negative.    Genitourinary: Negative.    Neurological: Negative.    Psychiatric/Behavioral: Negative.     Allergic/Immunologic: Negative.        Objective     Physical Exam:  Visit Vitals  /77 (BP Location: Left arm, Patient Position: Lying)   Pulse 77  "  Temp 98.2 °F (36.8 °C) (Oral)   Resp 21   Ht 180.3 cm (71\")   Wt 86.5 kg (190 lb 9.6 oz)   SpO2 97%   BMI 26.58 kg/m²       Vitals reviewed.   Constitutional:       Appearance: Not in distress.   Eyes:      Extraocular Movements: Extraocular movements intact.      Conjunctiva/sclera: Conjunctivae normal.      Pupils: Pupils are equal, round, and reactive to light.   HENT:      Head: Normocephalic and atraumatic.      Nose: Nose normal.    Mouth/Throat:      Lips: Pink.      Mouth: Mucous membranes are moist.      Pharynx: Oropharynx is clear.   Neck:      Vascular: No carotid bruit or JVD. JVD normal.   Pulmonary:      Effort: Pulmonary effort is normal.      Breath sounds: Normal breath sounds.   Chest:      Chest wall: Not tender to palpatation.   Cardiovascular:      PMI at left midclavicular line. Normal rate. Irregular rhythm. Normal S1. Normal S2.       Murmurs: There is a systolic murmur.      No gallop.  No rub.   Pulses:     Radial: 2+ bilaterally.  Edema:     Peripheral edema absent.   Abdominal:      Palpations: Abdomen is soft.   Musculoskeletal: Normal range of motion.      Extremities: No clubbing present.     Cervical back: Normal range of motion. Skin:     General: Skin is warm and dry.   Neurological:      General: No focal deficit present.      Mental Status: Alert.      Motor: Tremor present.   Psychiatric:         Attention and Perception: Attention normal.         Mood and Affect: Affect normal.         Speech: Speech normal.         Behavior: Behavior normal.         Results Review:   I reviewed the patient's new clinical results.    Lab Results   Component Value Date    GLUCOSE 136 (H) 07/29/2024    CALCIUM 10.1 07/29/2024     07/29/2024    K 3.2 (L) 07/29/2024    CO2 23.0 07/29/2024     07/29/2024    BUN 15 07/29/2024    CREATININE 0.92 07/29/2024    EGFR 89.5 07/29/2024    BCR 16.3 07/29/2024    ANIONGAP 15.0 07/29/2024           No results found for: \"ECHOEFEST\"    Imaging " Results (Last 72 Hours)       Procedure Component Value Units Date/Time    XR Chest 1 View [[id number]] Collected: 07/29/24 0816     Updated: 07/29/24 0820    Narrative:      EXAMINATION: XR CHEST 1 VW-  7/29/2024 8:16 AM     Comparison: 8/11/2023     HISTORY: Shortness of air.     One-view chest: Upright frontal projection of the chest is obtained. The  lungs are clear with no evidence of acute parenchymal  consolidation. No  pleural effusion or free air is observed. The  mediastinal contours are  within normal limits.                                                                                                                         Impression:      Impression: No evidence of acute cardiopulmonary disease.     This report was signed and finalized on 7/29/2024 8:17 AM by Dr. Vicente Sam MD.                       Assessment & Plan       Atrial fibrillation with rapid ventricular response    HOCM (hypertrophic obstructive cardiomyopathy)    Alcoholism in remission    Afib with RVR: Now in SR with PVCs, converted prior to arriving to floor. [id number] score is 3 with HTN, age, CAD. -Discussed potential rhythm management with AAD vs. Ablation strategy.   -Recommend continued electrolyte/potassium replacement  -Encourage further cessation of ETOH   -Received lovenox this morning, will start Eliquis 5mg BID tonight   -Currently on Diltiazem 60mg BID     HOCM: Previous elevated gradient on 2023 echo. EF 51-55%, improved from 2023.   -Would recommend to continue beta blocker rather than CCB given borderline EF.   -Can consider Camzyos and will discuss with cardiology at follow up with Dr. Stephens/BRI Li. EF is appropriate >50%.   -Denies any symptoms of syncope     Moderate AS: Has baseline SOB, but does not affect activity level per patient. Denies any recent chest pain suggestive of angina. Denies syncope.   -Will monitor. Consider GENESIS as outpatient?     Alcohol use disorder: in remission, slight tremors on  exam. Continue folic acid, ativan,       Further orders per Dr. Haque     Thank you for asking us to follow this patient with you.       Electronically signed by KWAN Armas, 07/29/24, 12:53 PM CDT.

## 2024-07-29 NOTE — ED NOTES
Nursing report ED to floor  Dayron Lubin  69 y.o.  male    HPI:   Chief Complaint   Patient presents with    Rapid Heart Rate       Admitting doctor:   Holger Karimi DO    Consulting provider(s):  Consults       No orders found from 6/30/2024 to 7/30/2024.             Admitting diagnosis:   The primary encounter diagnosis was Atrial fibrillation with rapid ventricular response. Diagnoses of Hypokalemia and Thrombocytopenia were also pertinent to this visit.    Code status:   Current Code Status       Date Active Code Status Order ID Comments User Context       Prior            Allergies:   Patient has no known allergies.    Intake and Output  No intake or output data in the 24 hours ending 07/29/24 0931    Weight:       07/29/24 0739   Weight: 86.2 kg (190 lb)       Most recent vitals:   Vitals:    07/29/24 0858 07/29/24 0901 07/29/24 0923 07/29/24 0923   BP:  94/76 116/84    BP Location:       Patient Position:       Pulse: 100 101 96    Resp:       Temp:    98.4 °F (36.9 °C)   TempSrc:    Oral   SpO2:  96% 99%    Weight:       Height:         Oxygen Therapy: .    Active LDAs/IV Access:   Lines, Drains & Airways       Active LDAs       Name Placement date Placement time Site Days    Peripheral IV 07/29/24 0740 Right Antecubital 07/29/24 0740  Antecubital  less than 1                    Labs (abnormal labs have a star):   Labs Reviewed   BNP (IN-HOUSE) - Abnormal; Notable for the following components:       Result Value    proBNP 2,169.0 (*)     All other components within normal limits    Narrative:     This assay is used as an aid in the diagnosis of individuals suspected of having heart failure. It can be used as an aid in the diagnosis of acute decompensated heart failure (ADHF) in patients presenting with signs and symptoms of ADHF to the emergency department (ED). In addition, NT-proBNP of <300 pg/mL indicates ADHF is not likely.    Age Range Result Interpretation  NT-proBNP Concentration  "(pg/mL:      <50             Positive            >450                   Gray                 300-450                    Negative             <300    50-75           Positive            >900                  Gray                300-900                  Negative            <300      >75             Positive            >1800                  Gray                300-1800                  Negative            <300   COMPREHENSIVE METABOLIC PANEL - Abnormal; Notable for the following components:    Glucose 136 (*)     Potassium 3.2 (*)     ALT (SGPT) 75 (*)     AST (SGOT) 58 (*)     All other components within normal limits    Narrative:     GFR Normal >60  Chronic Kidney Disease <60  Kidney Failure <15     CBC WITH AUTO DIFFERENTIAL - Abnormal; Notable for the following components:    Platelets 90 (*)     Lymphocyte % 15.6 (*)     All other components within normal limits   APTT - Normal   LACTIC ACID, PLASMA - Normal   PROCALCITONIN - Normal    Narrative:     As a Marker for Sepsis (Non-Neonates):    1. <0.5 ng/mL represents a low risk of severe sepsis and/or septic shock.  2. >2 ng/mL represents a high risk of severe sepsis and/or septic shock.    As a Marker for Lower Respiratory Tract Infections that require antibiotic therapy:    PCT on Admission    Antibiotic Therapy       6-12 Hrs later    >0.5                Strongly Recommended  >0.25 - <0.5        Recommended   0.1 - 0.25          Discouraged              Remeasure/reassess PCT  <0.1                Strongly Discouraged     Remeasure/reassess PCT    As 28 day mortality risk marker: \"Change in Procalcitonin Result\" (>80% or <=80%) if Day 0 (or Day 1) and Day 4 values are available. Refer to http://www.[employer]-pct-calculator.com    Change in PCT <=80%  A decrease of PCT levels below or equal to 80% defines a positive change in PCT test result representing a higher risk for 28-day all-cause mortality of patients diagnosed with severe sepsis for septic " shock.    Change in PCT >80%  A decrease of PCT levels of more than 80% defines a negative change in PCT result representing a lower risk for 28-day all-cause mortality of patients diagnosed with severe sepsis or septic shock.      PROTIME-INR - Normal   BLOOD CULTURE   BLOOD CULTURE   RAINBOW DRAW    Narrative:     The following orders were created for panel order [place] Draw.  Procedure                               Abnormality         Status                     ---------                               -----------         ------                     Green Top (Gel)[[id number]]                                  Final result               Lavender Top[[id number]]                                     Final result               Red Top[[id number]]                                          Final result               Farfan Top[[id number]]                                         Final result               Light Blue Top[[id number]]                                   Final result                 Please view results for these tests on the individual orders.   ETHANOL    Narrative:     Not for legal purposes. Chain of Custody not followed.    URINE DRUG SCREEN   GREEN TOP   LAVENDER TOP   RED TOP   GRAY TOP   LIGHT BLUE TOP   CBC AND DIFFERENTIAL    Narrative:     The following orders were created for panel order CBC & Differential.  Procedure                               Abnormality         Status                     ---------                               -----------         ------                     CBC Auto Differential[[id number]]        Abnormal            Final result                 Please view results for these tests on the individual orders.       Meds given in ED:   Medications   sodium chloride 0.9 % flush 10 mL (has no administration in time range)   dilTIAZem (CARDIZEM) 125 mg in 125 mL D5W infusion (7.5 mg/hr Intravenous Rate/Dose Change 7/29/24 7612)   Enoxaparin Sodium (LOVENOX) syringe 90 mg (90 mg Subcutaneous  Given 7/29/24 0801)   LORazepam (ATIVAN) injection 1 mg (1 mg Intravenous Given 7/29/24 0800)   potassium chloride (KLOR-CON M20) CR tablet 40 mEq (40 mEq Oral Given 7/29/24 0857)   acetaminophen (TYLENOL) tablet 650 mg (650 mg Oral Given 7/29/24 0857)     dilTIAZem, 5-15 mg/hr, Last Rate: 7.5 mg/hr (07/29/24 0858)         NIH Stroke Scale:       Isolation/Infection(s):  No active isolations   No active infections     COVID Testing  Collected .  Resulted .    Nursing report ED to floor:  Mental status: a&ox4  Ambulatory status: .up with one assist.   Precautions: none.    ED nurse phone extentsick- [phone]

## 2024-07-29 NOTE — ED PROVIDER NOTES
Subjective   History of Present Illness  Patient is a 69-year-old male with a history of hypertension and alcohol abuse who presents to the ER with elevated heart rate.  Patient was recently admitted from July 23 through 24 at an outside hospital for alcohol withdrawal.  He was discharged and was sent to [place] for detox.  Patient states he has had no alcohol in over a week.  Patient states he did receive a couple doses of Ativan but has not had any in several days.  Patient states he has had a productive cough along with a mild headache and sweating for the last 2 days.  Patient states his symptoms were worse last night so he came to the hospital this morning.  Patient went to urgent care and was found to be tachycardic and was sent to the ER.  Patient denies any fever, chest pain, shortness of air, abdominal pain, nausea vomiting diarrhea, urinary changes, neurologic changes.      Review of Systems   Constitutional:  Positive for diaphoresis.   Eyes: Negative.    Respiratory:  Positive for cough.    Cardiovascular: Negative.    Gastrointestinal: Negative.    Endocrine: Negative.    Genitourinary: Negative.    Musculoskeletal: Negative.    Skin: Negative.    Allergic/Immunologic: Negative.    Neurological:  Positive for headaches.   Hematological: Negative.    Psychiatric/Behavioral: Negative.     All other systems reviewed and are negative.      Past Medical History:   Diagnosis Date    Alcoholic hepatitis 07/13/2023    CAD (coronary artery disease)     History of external beam radiation therapy 05/12/2016    6840 cGy to prostate bed    Hyperlipidemia     Hypertension     Insomnia     Prostate cancer        No Known Allergies    Past Surgical History:   Procedure Laterality Date    CARDIAC CATHETERIZATION      CORONARY ANGIOPLASTY WITH STENT PLACEMENT      PROSTATE SURGERY      TONSILLECTOMY         Family History   Problem Relation Age of Onset    Coronary artery disease Mother     Stroke Mother         Social History     Socioeconomic History    Marital status: Single   Tobacco Use    Smoking status: Never    Smokeless tobacco: Never   Vaping Use    Vaping status: Never Used   Substance and Sexual Activity    Alcohol use: Yes     Comment: very little     Drug use: Yes     Types: Marijuana    Sexual activity: Defer           Objective   Physical Exam  Vitals and nursing note reviewed.   Constitutional:       Appearance: He is well-developed. He is diaphoretic.   HENT:      Head: Normocephalic and atraumatic.   Eyes:      Conjunctiva/sclera: Conjunctivae normal.      Pupils: Pupils are equal, round, and reactive to light.   Cardiovascular:      Rate and Rhythm: Tachycardia present. Rhythm irregularly irregular.      Heart sounds: Normal heart sounds.   Pulmonary:      Effort: Pulmonary effort is normal.      Breath sounds: Normal breath sounds.   Abdominal:      Palpations: Abdomen is soft.      Tenderness: There is no abdominal tenderness.   Musculoskeletal:         General: No deformity. Normal range of motion.      Cervical back: Normal range of motion.   Skin:     General: Skin is warm.   Neurological:      Mental Status: He is alert and oriented to person, place, and time.      Cranial Nerves: Cranial nerves 2-12 are intact.      Sensory: Sensation is intact.      Motor: Motor function is intact.      Coordination: Coordination is intact.   Psychiatric:         Behavior: Behavior normal.         Procedures           ED Course   EKG as interpreted by me: Atrial fibrillation with RVR with a rate of 168, PVCs, no acute ischemia or infarction                               Lab Results (last 24 hours)       Procedure Component Value Units Date/Time    aPTT [[id number]]  (Normal) Collected: 07/29/24 0741    Specimen: Blood Updated: 07/29/24 0759     PTT 29.4 seconds     BNP [[id number]]  (Abnormal) Collected: 07/29/24 0741    Specimen: Blood Updated: 07/29/24 0808     proBNP 2,169.0 pg/mL     Narrative:      This  assay is used as an aid in the diagnosis of individuals suspected of having heart failure. It can be used as an aid in the diagnosis of acute decompensated heart failure (ADHF) in patients presenting with signs and symptoms of ADHF to the emergency department (ED). In addition, NT-proBNP of <300 pg/mL indicates ADHF is not likely.    Age Range Result Interpretation  NT-proBNP Concentration (pg/mL:      <50             Positive            >450                   Gray                 300-450                    Negative             <300    50-75           Positive            >900                  Gray                300-900                  Negative            <300      >75             Positive            >1800                  Gray                300-1800                  Negative            <300    CBC & Differential [[id number]]  (Abnormal) Collected: 07/29/24 0741    Specimen: Blood Updated: 07/29/24 0752    Narrative:      The following orders were created for panel order CBC & Differential.  Procedure                               Abnormality         Status                     ---------                               -----------         ------                     CBC Auto Differential[[id number]]        Abnormal            Final result                 Please view results for these tests on the individual orders.    Comprehensive Metabolic Panel [[id number]]  (Abnormal) Collected: 07/29/24 0741    Specimen: Blood Updated: 07/29/24 0811     Glucose 136 mg/dL      BUN 15 mg/dL      Creatinine 0.92 mg/dL      Sodium 140 mmol/L      Potassium 3.2 mmol/L      Comment: Slight hemolysis detected by analyzer. Result may be falsely elevated.        Chloride 102 mmol/L      CO2 23.0 mmol/L      Calcium 10.1 mg/dL      Total Protein 7.7 g/dL      Albumin 4.5 g/dL      ALT (SGPT) 75 U/L      AST (SGOT) 58 U/L      Alkaline Phosphatase 56 U/L      Total Bilirubin 0.5 mg/dL      Globulin 3.2 gm/dL      A/G Ratio 1.4 g/dL       "BUN/Creatinine Ratio 16.3     Anion Gap 15.0 mmol/L      eGFR 89.5 mL/min/1.73     Narrative:      GFR Normal >60  Chronic Kidney Disease <60  Kidney Failure <15      Ethanol [[id number]] Collected: 07/29/24 0741    Specimen: Blood Updated: 07/29/24 0807     Ethanol % <0.010 %     Narrative:      Not for legal purposes. Chain of Custody not followed.     Lactic Acid, Plasma [[id number]]  (Normal) Collected: 07/29/24 0741    Specimen: Blood Updated: 07/29/24 0808     Lactate 1.7 mmol/L     Procalcitonin [[id number]]  (Normal) Collected: 07/29/24 0741    Specimen: Blood Updated: 07/29/24 0817     Procalcitonin 0.14 ng/mL     Narrative:      As a Marker for Sepsis (Non-Neonates):    1. <0.5 ng/mL represents a low risk of severe sepsis and/or septic shock.  2. >2 ng/mL represents a high risk of severe sepsis and/or septic shock.    As a Marker for Lower Respiratory Tract Infections that require antibiotic therapy:    PCT on Admission    Antibiotic Therapy       6-12 Hrs later    >0.5                Strongly Recommended  >0.25 - <0.5        Recommended   0.1 - 0.25          Discouraged              Remeasure/reassess PCT  <0.1                Strongly Discouraged     Remeasure/reassess PCT    As 28 day mortality risk marker: \"Change in Procalcitonin Result\" (>80% or <=80%) if Day 0 (or Day 1) and Day 4 values are available. Refer to http://www.[employer]-pct-calculator.com    Change in PCT <=80%  A decrease of PCT levels below or equal to 80% defines a positive change in PCT test result representing a higher risk for 28-day all-cause mortality of patients diagnosed with severe sepsis for septic shock.    Change in PCT >80%  A decrease of PCT levels of more than 80% defines a negative change in PCT result representing a lower risk for 28-day all-cause mortality of patients diagnosed with severe sepsis or septic shock.       Protime-INR [[id number]]  (Normal) Collected: 07/29/24 0741    Specimen: Blood Updated: 07/29/24 0759     " Protime 13.6 Seconds      INR 1.00    CBC Auto Differential [[id number]]  (Abnormal) Collected: 07/29/24 0741    Specimen: Blood Updated: 07/29/24 0752     WBC 5.71 10*3/mm3      RBC 4.40 10*6/mm3      Hemoglobin 14.1 g/dL      Hematocrit 42.3 %      MCV 96.1 fL      MCH 32.0 pg      MCHC 33.3 g/dL      RDW 13.8 %      RDW-SD 48.7 fl      MPV 11.8 fL      Platelets 90 10*3/mm3      Neutrophil % 71.9 %      Lymphocyte % 15.6 %      Monocyte % 11.7 %      Eosinophil % 0.4 %      Basophil % 0.2 %      Immature Grans % 0.2 %      Neutrophils, Absolute 4.11 10*3/mm3      Lymphocytes, Absolute 0.89 10*3/mm3      Monocytes, Absolute 0.67 10*3/mm3      Eosinophils, Absolute 0.02 10*3/mm3      Basophils, Absolute 0.01 10*3/mm3      Immature Grans, Absolute 0.01 10*3/mm3     Blood Culture - Blood, Arm, Right [[id number]] Collected: 07/29/24 0754    Specimen: Blood from Arm, Right Updated: 07/29/24 0846    Blood Culture - Blood, Arm, Left [[id number]] Collected: 07/29/24 0801    Specimen: Blood from Arm, Left Updated: 07/29/24 0846           XR Chest 1 View   Final Result   Impression: No evidence of acute cardiopulmonary disease.       This report was signed and finalized on 7/29/2024 8:17 AM by Dr. Vicente Sam MD.                       Medical Decision Making  Patient is a 69-year-old male with a history of hypertension and alcohol abuse who presents to the ER with elevated heart rate.  Patient was recently admitted from July 23 through 24 at an outside hospital for alcohol withdrawal.  He was discharged and was sent to [place] for detox.  Patient states he has had no alcohol in over a week.  Patient states he did receive a couple doses of Ativan but has not had any in several days.  Patient states he has had a productive cough along with a mild headache and sweating for the last 2 days.  Patient states his symptoms were worse last night so he came to the hospital this morning.  Patient went to urgent care and was  found to be tachycardic and was sent to the ER.  Patient denies any fever, chest pain, shortness of air, abdominal pain, nausea vomiting diarrhea, urinary changes, neurologic changes.    Differential diagnosis: Cardiac arrhythmia, sepsis, electrolyte abnormality, alcohol withdrawal    Patient was started on diltiazem drip.  Patient was given Ativan.  Heart rate improving.  Labs showed mild hypokalemia.  Potassium was replaced by mouth.  Labs also showed thrombocytopenia, an elevated BNP and a mildly elevated ALT and AST.  Chest x-ray showed no acute findings.  Patient was given a dose of Lovenox.  Discussed the case with Dr. Ybarra with the hospitalist service and patient was admitted for further workup and treatment for new onset atrial fibrillation with RVR.      Problems Addressed:  Atrial fibrillation with rapid ventricular response: complicated acute illness or injury  Hypokalemia: complicated acute illness or injury  Thrombocytopenia: acute illness or injury    Amount and/or Complexity of Data Reviewed  Labs: ordered. Decision-making details documented in ED Course.  Radiology: ordered. Decision-making details documented in ED Course.  ECG/medicine tests: ordered. Decision-making details documented in ED Course.  Discussion of management or test interpretation with external provider(s): Dr. Ybarra with the hospitalist service    Risk  OTC drugs.  Prescription drug management.  Decision regarding hospitalization.        Final diagnoses:   Atrial fibrillation with rapid ventricular response   Hypokalemia   Thrombocytopenia       ED Disposition  ED Disposition       ED Disposition   Decision to Admit    Condition   --    Comment   Level of Care: Telemetry [5]   Diagnosis: Atrial fibrillation with rapid ventricular response [[id number]]   Admitting Physician: FELISA YBARRA [[id number]]   Attending Physician: FELISA YBARRA [[id number]]   Certification: I Certify That Inpatient Hospital Services Are Medically  Necessary For Greater Than 2 Midnights                 No follow-up provider specified.       Medication List      No changes were made to your prescriptions during this visit.            Zuleyma Pinto MD  07/29/24 5359

## 2024-07-29 NOTE — H&P
[facility] Medicine Services  HISTORY AND PHYSICAL    Date of Admission: 7/29/2024  Primary Care Physician: Herberth Nguyen Jr., MD    Subjective   Primary Historian: The patient    Chief Complaint: Palpitations, elevated heart rate    History of Present Illness    69-year-old male presents to the emergency department after being seen in the urgent care clinic with an elevated heart rate, palpitations, diaphoresis.  Symptoms began about 2 days ago and worsened through the night so he presented to the outpatient urgent care clinic this morning.  He was sent from there to the ER with a heart rate in the 170s.  EKG in the emergency department revealed atrial fibrillation with RVR.  He was subsequently placed on a diltiazem drip with heart rate at 100 currently.  Notably, the patient has recently been in an alcohol rehabilitation facility and has been alcohol free for over 2 weeks.  Workup in the emergency department reveals a UDS positive for benzodiazepines and THC.  BNP elevated at 2169 with PTT within normal limits.  CMP is unremarkable except potassium level 3.2.  Ethanol, lactate, procalcitonin, pro time within normal limits.  CBC is unremarkable.  EKG shows an atrial tachycardia with a heart rate of 174.  Chest x-ray showed no evidence of acute disease.    Review of Systems   Constitutional:  Positive for diaphoresis.   Eyes: Negative.    Respiratory:  Positive for cough.    Cardiovascular: Negative.    Gastrointestinal: Negative.    Endocrine: Negative.    Genitourinary: Negative.    Musculoskeletal: Negative.    Skin: Negative.    Allergic/Immunologic: Negative.    Neurological:  Positive for headaches.   Hematological: Negative.    Psychiatric/Behavioral: Negative.   Otherwise complete ROS reviewed and negative except as mentioned in the HPI.    Past Medical History:   Past Medical History:   Diagnosis Date    Alcoholic hepatitis 07/13/2023    CAD (coronary artery  disease)     History of external beam radiation therapy 05/12/2016    6840 cGy to prostate bed    Hyperlipidemia     Hypertension     Insomnia     Prostate cancer      Past Surgical History:  Past Surgical History:   Procedure Laterality Date    CARDIAC CATHETERIZATION      CORONARY ANGIOPLASTY WITH STENT PLACEMENT      PROSTATE SURGERY      TONSILLECTOMY       Social History:  reports that he has never smoked. He has never used smokeless tobacco. He reports that he does not currently use alcohol after a past usage of about 50.0 standard drinks of alcohol per week. He reports current drug use. Drug: Marijuana.    Family History: family history includes Coronary artery disease in his mother; Heart disease in his mother; Stroke in his mother.       Allergies:  No Known Allergies    Medications:  Prior to Admission medications    Medication Sig Start Date End Date Taking? Authorizing Provider   aspirin (aspirin) 81 MG EC tablet Take 1 tablet by mouth Daily. 12/23/20  Yes Edda Li APRN   atorvastatin (LIPITOR) 20 MG tablet  7/22/23  Yes ProviderSteve MD   chlordiazePOXIDE (LIBRIUM) 10 MG capsule Take 1 capsule by mouth 3 (Three) Times a Day As Needed for Anxiety for up to 9 doses. 8/15/23  Yes Holger Karimi DO   folic acid (FOLVITE) 1 MG tablet Take 1 tablet by mouth Daily. 8/16/23  Yes Holger Karimi DO   multivitamin with minerals tablet tablet Take 1 tablet by mouth Daily. 8/16/23  Yes Holger Karimi DO   pantoprazole (PROTONIX) 40 MG EC tablet Take 1 tablet by mouth 2 (Two) Times a Day Before Meals. 8/15/23  Yes Holger Karimi DO   PARoxetine (PAXIL) 20 MG tablet Take 1 tablet by mouth Every Night. 8/15/23  Yes Holger Karimi DO   risperiDONE (risperDAL) 1 MG tablet Take 1 tablet by mouth Every Night. 8/15/23  Yes Holger Karimi DO   sucralfate (CARAFATE) 1 g tablet Take 1 tablet by mouth 4 (Four) Times a Day. 12/21/22  Yes ProviderSteve MD   temazepam  "(RESTORIL) 30 MG capsule Take 1 capsule by mouth At Night As Needed for Sleep. 9/23/16  Yes Provider, MD Steve   collagenase 250 UNIT/GM ointment Apply 1 application  topically to the appropriate area as directed Daily. 8/16/23   Holger Karimi DO   metoprolol succinate XL (TOPROL-XL) 25 MG 24 hr tablet Take 1 tablet by mouth Daily. 8/16/23   Holger Karimi DO     I have utilized all available immediate resources to obtain, update, or review the patient's current medications (including all prescriptions, over-the-counter products, herbals, cannabis/cannabidiol products, and vitamin/mineral/dietary (nutritional) supplements).    Objective     Vital Signs: /78 (BP Location: Left arm, Patient Position: Lying)   Pulse 59   Temp 98 °F (36.7 °C) (Oral)   Resp 21   Ht 180.3 cm (71\")   Wt 86.5 kg (190 lb 9.6 oz)   SpO2 98%   BMI 26.58 kg/m²   Physical Exam  Constitutional:       General: He is not in acute distress.     Appearance: Normal appearance. He is normal weight.   HENT:      Head: Normocephalic and atraumatic.      Right Ear: External ear normal.      Left Ear: External ear normal.      Nose: Nose normal.      Mouth/Throat:      Mouth: Mucous membranes are moist.      Pharynx: Oropharynx is clear.   Eyes:      General: No scleral icterus.     Extraocular Movements: Extraocular movements intact.      Conjunctiva/sclera: Conjunctivae normal.      Pupils: Pupils are equal, round, and reactive to light.   Cardiovascular:      Rate and Rhythm: Tachycardia present. Rhythm irregularly irregular.      Pulses: Normal pulses.      Heart sounds: Normal heart sounds. No murmur heard.  Pulmonary:      Effort: Pulmonary effort is normal. No respiratory distress.      Breath sounds: Normal breath sounds.   Abdominal:      General: Abdomen is protuberant. Bowel sounds are normal.      Palpations: Abdomen is soft. There is no mass.      Tenderness: There is no abdominal tenderness.   Musculoskeletal:  "        General: Normal range of motion.      Right lower leg: No edema.      Left lower leg: No edema.   Skin:     General: Skin is warm and dry.      Coloration: Skin is not pale.   Neurological:      General: No focal deficit present.      Mental Status: He is alert and oriented to person, place, and time. Mental status is at baseline.      Cranial Nerves: No cranial nerve deficit.   Psychiatric:         Mood and Affect: Mood normal.         Judgment: Judgment normal.        Results Reviewed:  Lab Results (last 24 hours)       Procedure Component Value Units Date/Time    Fentanyl, Urine - Urine, Clean Catch [[id number]]  (Normal) Collected: 07/29/24 0925    Specimen: Urine, Clean Catch Updated: 07/29/24 1000     Fentanyl, Urine Negative    Narrative:      Negative Threshold:      Fentanyl 5 ng/mL     The normal value for the drug tested is negative. This report includes final unconfirmed screening results to be used for medical treatment purposes only. Unconfirmed results must not be used for non-medical purposes such as employment or legal testing. Clinical consideration should be applied to any drug of abuse test, particularly when unconfirmed results are used.           Urine Drug Screen - Urine, Clean Catch [[id number]]  (Abnormal) Collected: 07/29/24 0925    Specimen: Urine, Clean Catch Updated: 07/29/24 0954     THC, Screen, Urine Positive     Phencyclidine (PCP), Urine Negative     Cocaine Screen, Urine Negative     Methamphetamine, Ur Negative     Opiate Screen Negative     Amphetamine Screen, Urine Negative     Benzodiazepine Screen, Urine Positive     Tricyclic Antidepressants Screen Negative     Methadone Screen, Urine Negative     Barbiturates Screen, Urine Negative     Oxycodone Screen, Urine Negative     Buprenorphine, Screen, Urine Negative    Narrative:      Cutoff For Drugs Screened:    Amphetamines               500 ng/ml  Barbiturates               200 ng/ml  Benzodiazepines            150  "ng/ml  Cocaine                    150 ng/ml  Methadone                  200 ng/ml  Opiates                    100 ng/ml  Phencyclidine               25 ng/ml  THC                         50 ng/ml  Methamphetamine            500 ng/ml  Tricyclic Antidepressants  300 ng/ml  Oxycodone                  100 ng/ml  Buprenorphine               10 ng/ml    The normal value for all drugs tested is negative. This report includes unconfirmed screening results, with the cutoff values listed, to be used for medical treatment purposes only.  Unconfirmed results must not be used for non-medical purposes such as employment or legal testing.  Clinical consideration should be applied to any drug of abuse test, particularly when unconfirmed results are used.      Blood Culture - Blood, Arm, Right [[id number]] Collected: 07/29/24 0754    Specimen: Blood from Arm, Right Updated: 07/29/24 0846    Blood Culture - Blood, Arm, Left [[id number]] Collected: 07/29/24 0801    Specimen: Blood from Arm, Left Updated: 07/29/24 0846    Procalcitonin [[id number]]  (Normal) Collected: 07/29/24 0741    Specimen: Blood Updated: 07/29/24 0817     Procalcitonin 0.14 ng/mL     Narrative:      As a Marker for Sepsis (Non-Neonates):    1. <0.5 ng/mL represents a low risk of severe sepsis and/or septic shock.  2. >2 ng/mL represents a high risk of severe sepsis and/or septic shock.    As a Marker for Lower Respiratory Tract Infections that require antibiotic therapy:    PCT on Admission    Antibiotic Therapy       6-12 Hrs later    >0.5                Strongly Recommended  >0.25 - <0.5        Recommended   0.1 - 0.25          Discouraged              Remeasure/reassess PCT  <0.1                Strongly Discouraged     Remeasure/reassess PCT    As 28 day mortality risk marker: \"Change in Procalcitonin Result\" (>80% or <=80%) if Day 0 (or Day 1) and Day 4 values are available. Refer to http://www.[employer]-pct-calculator.com    Change in PCT <=80%  A decrease of PCT " levels below or equal to 80% defines a positive change in PCT test result representing a higher risk for 28-day all-cause mortality of patients diagnosed with severe sepsis for septic shock.    Change in PCT >80%  A decrease of PCT levels of more than 80% defines a negative change in PCT result representing a lower risk for 28-day all-cause mortality of patients diagnosed with severe sepsis or septic shock.       Comprehensive Metabolic Panel [[id number]]  (Abnormal) Collected: 07/29/24 0741    Specimen: Blood Updated: 07/29/24 0811     Glucose 136 mg/dL      BUN 15 mg/dL      Creatinine 0.92 mg/dL      Sodium 140 mmol/L      Potassium 3.2 mmol/L      Comment: Slight hemolysis detected by analyzer. Result may be falsely elevated.        Chloride 102 mmol/L      CO2 23.0 mmol/L      Calcium 10.1 mg/dL      Total Protein 7.7 g/dL      Albumin 4.5 g/dL      ALT (SGPT) 75 U/L      AST (SGOT) 58 U/L      Alkaline Phosphatase 56 U/L      Total Bilirubin 0.5 mg/dL      Globulin 3.2 gm/dL      A/G Ratio 1.4 g/dL      BUN/Creatinine Ratio 16.3     Anion Gap 15.0 mmol/L      eGFR 89.5 mL/min/1.73     Narrative:      GFR Normal >60  Chronic Kidney Disease <60  Kidney Failure <15      BNP [[id number]]  (Abnormal) Collected: 07/29/24 0741    Specimen: Blood Updated: 07/29/24 0808     proBNP 2,169.0 pg/mL     Narrative:      This assay is used as an aid in the diagnosis of individuals suspected of having heart failure. It can be used as an aid in the diagnosis of acute decompensated heart failure (ADHF) in patients presenting with signs and symptoms of ADHF to the emergency department (ED). In addition, NT-proBNP of <300 pg/mL indicates ADHF is not likely.    Age Range Result Interpretation  NT-proBNP Concentration (pg/mL:      <50             Positive            >450                   Gray                 300-450                    Negative             <300    50-75           Positive            >900                  Farfan                 300-900                  Negative            <300      >75             Positive            >1800                  Gray                300-1800                  Negative            <300    Lactic Acid, Plasma [[id number]]  (Normal) Collected: 07/29/24 0741    Specimen: Blood Updated: 07/29/24 0808     Lactate 1.7 mmol/L     Ethanol [[id number]] Collected: 07/29/24 0741    Specimen: Blood Updated: 07/29/24 0807     Ethanol % <0.010 %     Narrative:      Not for legal purposes. Chain of Custody not followed.     [place] Draw [[id number]] Collected: 07/29/24 0741    Specimen: Blood Updated: 07/29/24 0802    Narrative:      The following orders were created for panel order [place] Draw.  Procedure                               Abnormality         Status                     ---------                               -----------         ------                     Green Top (Gel)[[id number]]                                  Final result               Lavender Top[[id number]]                                     Final result               Red Top[[id number]]                                          Final result               Farfan Top[[id number]]                                         Final result               Light Blue Top[[id number]]                                   Final result                 Please view results for these tests on the individual orders.    Green Top (Gel) [[id number]] Collected: 07/29/24 0741    Specimen: Blood Updated: 07/29/24 0802     Extra Tube Hold for add-ons.     Comment: Auto resulted.       Lavender Top [[id number]] Collected: 07/29/24 0741    Specimen: Blood Updated: 07/29/24 0802     Extra Tube hold for add-on     Comment: Auto resulted       Red Top [[id number]] Collected: 07/29/24 0741    Specimen: Blood Updated: 07/29/24 0802     Extra Tube Hold for add-ons.     Comment: Auto resulted.       Gray Top [[id number]] Collected: 07/29/24 0741    Specimen: Blood Updated: 07/29/24 0802     Extra Tube  Hold for add-ons.     Comment: Auto resulted.       Light Blue Top [[id number]] Collected: 07/29/24 0741    Specimen: Blood Updated: 07/29/24 0802     Extra Tube Hold for add-ons.     Comment: Auto resulted       aPTT [[id number]]  (Normal) Collected: 07/29/24 0741    Specimen: Blood Updated: 07/29/24 0759     PTT 29.4 seconds     Protime-INR [[id number]]  (Normal) Collected: 07/29/24 0741    Specimen: Blood Updated: 07/29/24 0759     Protime 13.6 Seconds      INR 1.00    CBC & Differential [[id number]]  (Abnormal) Collected: 07/29/24 0741    Specimen: Blood Updated: 07/29/24 0752    Narrative:      The following orders were created for panel order CBC & Differential.  Procedure                               Abnormality         Status                     ---------                               -----------         ------                     CBC Auto Differential[[id number]]        Abnormal            Final result                 Please view results for these tests on the individual orders.    CBC Auto Differential [[id number]]  (Abnormal) Collected: 07/29/24 0741    Specimen: Blood Updated: 07/29/24 0752     WBC 5.71 10*3/mm3      RBC 4.40 10*6/mm3      Hemoglobin 14.1 g/dL      Hematocrit 42.3 %      MCV 96.1 fL      MCH 32.0 pg      MCHC 33.3 g/dL      RDW 13.8 %      RDW-SD 48.7 fl      MPV 11.8 fL      Platelets 90 10*3/mm3      Neutrophil % 71.9 %      Lymphocyte % 15.6 %      Monocyte % 11.7 %      Eosinophil % 0.4 %      Basophil % 0.2 %      Immature Grans % 0.2 %      Neutrophils, Absolute 4.11 10*3/mm3      Lymphocytes, Absolute 0.89 10*3/mm3      Monocytes, Absolute 0.67 10*3/mm3      Eosinophils, Absolute 0.02 10*3/mm3      Basophils, Absolute 0.01 10*3/mm3      Immature Grans, Absolute 0.01 10*3/mm3           Imaging Results (Last 24 Hours)       Procedure Component Value Units Date/Time    XR Chest 1 View [[id number]] Collected: 07/29/24 0816     Updated: 07/29/24 0820    Narrative:      EXAMINATION: XR CHEST  1 VW-  7/29/2024 8:16 AM     Comparison: 8/11/2023     HISTORY: Shortness of air.     One-view chest: Upright frontal projection of the chest is obtained. The  lungs are clear with no evidence of acute parenchymal  consolidation. No  pleural effusion or free air is observed. The  mediastinal contours are  within normal limits.                                                                                                                         Impression:      Impression: No evidence of acute cardiopulmonary disease.     This report was signed and finalized on 7/29/2024 8:17 AM by Dr. Vicente Sam MD.             I have personally reviewed and interpreted the radiology studies and ECG obtained at time of admission.     Assessment / Plan   Assessment:   Active Hospital Problems    Diagnosis     **Atrial fibrillation with rapid ventricular response     Alcoholism in remission     Essential hypertension        Treatment Plan  Admit to the medical surgical floor  Continue diltiazem drip  Diltiazem 60 mg p.o. every 6 hours  Eliquis anticoagulation  Cardiac electrophysiology consultation  Echocardiogram  Electrolyte replacement protocol    Medical Decision Making  Number and Complexity of problems:   1 acute, moderate complexity problem  2+ chronic, moderate complexity problems    Differential Diagnosis: None    Conditions and Status        Condition is improving.     [facility] [place] Data  External documents reviewed: Care Everywhere  Cardiac tracing (EKG, telemetry) interpretation: See HPI  Radiology interpretation: See HPI  Labs reviewed: See HPI  Any tests that were considered but not ordered: None     Decision rules/scores evaluated (example [id number], Wells, etc): [id number]     Discussed with: The patient     Care Planning  Shared decision making: The patient and cardiac EP  Code status and discussions: Full code    Disposition  Social Determinants of Health that impact treatment or disposition: None  Estimated  length of stay is 1-2 days.     I confirmed that the patient's advanced care plan is present, code status is documented, and a surrogate decision maker is listed in the patient's medical record.     The patient's surrogate decision maker is his sister, Yuliya Stone.     The patient was seen and examined by me on 7/29/2024 at 1100.    Electronically signed by Holger Karimi DO, 07/29/24, 11:25 CDT.

## 2024-07-30 ENCOUNTER — READMISSION MANAGEMENT (OUTPATIENT)
Dept: CALL CENTER | Facility: HOSPITAL | Age: 69
End: 2024-07-30
Payer: MEDICARE

## 2024-07-30 ENCOUNTER — HOSPITAL ENCOUNTER (OUTPATIENT)
Dept: CARDIOLOGY | Facility: HOSPITAL | Age: 69
Setting detail: OBSERVATION
Discharge: HOME | End: 2024-07-30
Payer: MEDICARE

## 2024-07-30 VITALS
HEIGHT: 71 IN | HEART RATE: 71 BPM | WEIGHT: 190.6 LBS | SYSTOLIC BLOOD PRESSURE: 110 MMHG | OXYGEN SATURATION: 98 % | BODY MASS INDEX: 26.68 KG/M2 | DIASTOLIC BLOOD PRESSURE: 71 MMHG | TEMPERATURE: 97.9 F | RESPIRATION RATE: 18 BRPM

## 2024-07-30 DIAGNOSIS — I48.0 PAROXYSMAL ATRIAL FIBRILLATION: ICD-10-CM

## 2024-07-30 PROBLEM — I48.91 A-FIB: Status: ACTIVE | Noted: 2024-07-30

## 2024-07-30 LAB
ANION GAP SERPL CALCULATED.3IONS-SCNC: 12 MMOL/L (ref 5–15)
BUN SERPL-MCNC: 14 MG/DL (ref 8–23)
BUN/CREAT SERPL: 19.2 (ref 7–25)
CALCIUM SPEC-SCNC: 9 MG/DL (ref 8.6–10.5)
CHLORIDE SERPL-SCNC: 103 MMOL/L (ref 98–107)
CO2 SERPL-SCNC: 25 MMOL/L (ref 22–29)
CREAT SERPL-MCNC: 0.73 MG/DL (ref 0.76–1.27)
EGFRCR SERPLBLD CKD-EPI 2021: 97.9 ML/MIN/1.73
GLUCOSE SERPL-MCNC: 90 MG/DL (ref 65–99)
MAGNESIUM SERPL-MCNC: 2.1 MG/DL (ref 1.6–2.4)
POTASSIUM SERPL-SCNC: 3.1 MMOL/L (ref 3.5–5.2)
QT INTERVAL: 308 MS
QT INTERVAL: 400 MS
QTC INTERVAL: 489 MS
QTC INTERVAL: 514 MS
SODIUM SERPL-SCNC: 140 MMOL/L (ref 136–145)

## 2024-07-30 PROCEDURE — 99214 OFFICE O/P EST MOD 30 MIN: CPT | Performed by: STUDENT IN AN ORGANIZED HEALTH CARE EDUCATION/TRAINING PROGRAM

## 2024-07-30 PROCEDURE — 93246 EXT ECG>7D<15D RECORDING: CPT

## 2024-07-30 PROCEDURE — 83735 ASSAY OF MAGNESIUM: CPT | Performed by: STUDENT IN AN ORGANIZED HEALTH CARE EDUCATION/TRAINING PROGRAM

## 2024-07-30 PROCEDURE — 80048 BASIC METABOLIC PNL TOTAL CA: CPT | Performed by: FAMILY MEDICINE

## 2024-07-30 PROCEDURE — G0378 HOSPITAL OBSERVATION PER HR: HCPCS

## 2024-07-30 RX ORDER — AMIODARONE HYDROCHLORIDE 200 MG/1
TABLET ORAL
Qty: 96 TABLET | Refills: 0 | Status: SHIPPED | OUTPATIENT
Start: 2024-07-30 | End: 2024-09-07

## 2024-07-30 RX ORDER — POTASSIUM CHLORIDE 750 MG/1
40 CAPSULE, EXTENDED RELEASE ORAL EVERY 4 HOURS
Status: DISCONTINUED | OUTPATIENT
Start: 2024-07-30 | End: 2024-07-30 | Stop reason: HOSPADM

## 2024-07-30 RX ADMIN — ASPIRIN 81 MG: 81 TABLET, COATED ORAL at 08:03

## 2024-07-30 RX ADMIN — DILTIAZEM HYDROCHLORIDE 60 MG: 60 TABLET, FILM COATED ORAL at 00:11

## 2024-07-30 RX ADMIN — POTASSIUM CHLORIDE 40 MEQ: 750 CAPSULE, EXTENDED RELEASE ORAL at 11:09

## 2024-07-30 RX ADMIN — ACETAMINOPHEN 650 MG: 325 TABLET, FILM COATED ORAL at 11:09

## 2024-07-30 RX ADMIN — DILTIAZEM HYDROCHLORIDE 60 MG: 60 TABLET, FILM COATED ORAL at 05:55

## 2024-07-30 RX ADMIN — ATORVASTATIN CALCIUM 20 MG: 10 TABLET, FILM COATED ORAL at 08:03

## 2024-07-30 RX ADMIN — AMIODARONE HYDROCHLORIDE 400 MG: 200 TABLET ORAL at 08:03

## 2024-07-30 RX ADMIN — DILTIAZEM HYDROCHLORIDE 60 MG: 60 TABLET, FILM COATED ORAL at 11:09

## 2024-07-30 RX ADMIN — APIXABAN 5 MG: 5 TABLET, FILM COATED ORAL at 08:03

## 2024-07-30 RX ADMIN — POTASSIUM CHLORIDE 40 MEQ: 750 CAPSULE, EXTENDED RELEASE ORAL at 08:03

## 2024-07-30 RX ADMIN — FOLIC ACID 1 MG: 1 TABLET ORAL at 08:03

## 2024-07-30 RX ADMIN — Medication 10 ML: at 08:03

## 2024-07-30 NOTE — PROGRESS NOTES
"EP problems:   Paroxysmal atrial fibrillation  Frequent PVCs     Cardiology problems  Hypertrophic cardiomyopathy  CAD   -2009: [facility] s/p PCI to RCA  LV dysfunction  -8/12/23: EF 46-50%  -7/29/24: Echo 51-55%  4.  Apical aneurysm  5.  Moderate aortic stenosis  6.  Hypertension  7.  Hyperlipidemia     Medical problems:   Alcohol use disorder  Prostate cancer   Poor wound healing     Patient ID:  Dayron Lubin is a 69 y.o. male with problem list as above as above who EP is following for paroxysmal atrial fibrillation, frequent PVCs.    Subjective:  In sinus rhythm this morning with frequent atrial and ventricular ectopy.  Feeling better otherwise.    Objective:  /69 (BP Location: Right arm, Patient Position: Lying) Comment: RN Notified  Pulse 80   Temp 98.2 °F (36.8 °C) (Oral)   Resp 19   Ht 180.3 cm (71\")   Wt 86.5 kg (190 lb 9.6 oz)   SpO2 97%   BMI 26.58 kg/m²     Labs today:  Lab Results   Component Value Date    GLUCOSE 90 07/30/2024    CALCIUM 9.0 07/30/2024     07/30/2024    K 3.1 (L) 07/30/2024    CO2 25.0 07/30/2024    BUN 14 07/30/2024    CREATININE 0.73 (L) 07/30/2024    EGFR 97.9 07/30/2024     Repeat magnesium ordered and pending    Telemetry today: Predominantly sinus rhythm, frequent atrial and ventricular ectopy    Assessment:  Paroxysmal atrial fibrillation  Frequent PVCs  Hypertrophic cardiomyopathy  Apical aneurysm  CAD status post PCI  Hypokalemia     Plan:  -Okay for discharge home from EP standpoint with planned outpatient EP follow-up when ready from medical standpoint  -Continue amiodarone 400 mg twice daily for 10 days (until 8/8/2024) then 200 mg daily thereafter  -Continue apixaban for anticoagulation given elevated [id number] and left ventricular apical aneurysm  -He is still considering ICD and atrial fibrillation ablation; will continue to discuss on an outpatient basis  -Daily K and mag given electrolyte disturbances  -7-day Holter monitor at discharge for PVC " quantification    Part of this note may be an electronic transcription/translation of spoken language to printed text using the Dragon Dictation System.

## 2024-07-30 NOTE — CASE MANAGEMENT/SOCIAL WORK
Discharge Planning Assessment   [place]     Patient Name: Dayron Lubin  MRN: [id number]  Today's Date: 7/30/2024    Admit Date: 7/29/2024        Discharge Needs Assessment       Row Name 07/30/24 1018       Living Environment    People in Home alone    Current Living Arrangements home    Potentially Unsafe Housing Conditions none    In the past 12 months has the electric, gas, oil, or water company threatened to shut off services in your home? No    Primary Care Provided by self    Family Caregiver if Needed sibling(s)    Quality of Family Relationships helpful;involved;supportive    Able to Return to Prior Arrangements yes       Resource/Environmental Concerns    Resource/Environmental Concerns none    Transportation Concerns none       Transportation Needs    In the past 12 months, has lack of transportation kept you from medical appointments or from getting medications? no    In the past 12 months, has lack of transportation kept you from meetings, work, or from getting things needed for daily living? No       Food Insecurity    Within the past 12 months, you worried that your food would run out before you got the money to buy more. Never true    Within the past 12 months, the food you bought just didn't last and you didn't have money to get more. Never true       Transition Planning    Patient/Family Anticipates Transition to home    Patient/Family Anticipated Services at Transition none    Transportation Anticipated car, drives self;family or friend will provide       Discharge Needs Assessment    Readmission Within the Last 30 Days no previous admission in last 30 days    Equipment Currently Used at Home none    Concerns to be Addressed no discharge needs identified    Anticipated Changes Related to Illness none    Equipment Needed After Discharge none    Provided Post Acute Provider List? N/A    Provided Post Acute Provider Quality & Resource List? N/A    Discharge Coordination/Progress Lives at home  alone. Drives self and has a brother and sister nearby who can help if nned be. Has PCP and Rx coverage. No needs identified at this time                   Discharge Plan    No documentation.                 Continued Care and Services - Admitted Since 7/29/2024    No active coordination exists for this encounter.          Demographic Summary    No documentation.                  Functional Status    No documentation.                  Psychosocial    No documentation.                  Abuse/Neglect    No documentation.                  Legal    No documentation.                  Substance Abuse    No documentation.                  Patient Forms    No documentation.                     Venu Bear RN

## 2024-07-30 NOTE — DISCHARGE SUMMARY
[facility] Medicine Services  DISCHARGE SUMMARY       Date of Admission: 7/29/2024  Date of Discharge:  7/30/2024  Primary Care Physician: Herberth Nguyen Jr., MD    Discharge Diagnoses:  Active Hospital Problems    Diagnosis     **Atrial fibrillation with rapid ventricular response     A-fib     Alcoholism in remission     HOCM (hypertrophic obstructive cardiomyopathy)          Presenting Problem/History of Present Illness:  Atrial fibrillation with rapid ventricular response [I48.91]  A-fib [I48.91]     Chief Complaint on Day of Discharge:   No complaints    History of Present Illness on Day of Discharge:   The patient remains in normal sinus rhythm.  He continues on amiodarone loading dose.  He has been seen by cardiac EP and appears to be stable for discharge home per both services.  He is to follow-up with his PCP next week and with cardiac EP in 2 weeks.  Holter monitor has been ordered at discharge.    Hospital Course  69-year-old male presents to the emergency department after being seen in the urgent care clinic with an elevated heart rate, palpitations, diaphoresis.  Symptoms began about 2 days ago and worsened through the night so he presented to the outpatient urgent care clinic this morning.  He was sent from there to the ER with a heart rate in the 170s.  EKG in the emergency department revealed atrial fibrillation with RVR.  He was subsequently placed on a diltiazem drip with heart rate at 100 currently.  Notably, the patient has recently been in an alcohol rehabilitation facility and has been alcohol free for over 2 weeks.  Workup in the emergency department reveals a UDS positive for benzodiazepines and THC.  BNP elevated at 2169 with PTT within normal limits.  CMP is unremarkable except potassium level 3.2.  Ethanol, lactate, procalcitonin, pro time within normal limits.  CBC is unremarkable.  EKG shows an atrial tachycardia with a heart rate of 174.  Chest  x-ray showed no evidence of acute disease.      Treatment Plan  Admit to the medical surgical floor  Continue diltiazem drip  Diltiazem 60 mg p.o. every 6 hours  Eliquis anticoagulation  Cardiac electrophysiology consultation  Echocardiogram  Electrolyte replacement protocol      Consults:   Cardiac electrophysiology:     Assessment & Plan    Atrial fibrillation with rapid ventricular response    HOCM (hypertrophic obstructive cardiomyopathy)    Alcoholism in remission     Afib with RVR: Now in SR with PVCs, converted prior to arriving to floor. [id number] score is 3 with HTN, age, CAD. -Discussed potential rhythm management with AAD vs. Ablation strategy.   -Recommend continued electrolyte/potassium replacement  -Encourage further cessation of ETOH   -Received lovenox this morning, will start Eliquis 5mg BID tonight   -Currently on Diltiazem 60mg BID      HOCM: Previous elevated gradient on 2023 echo. EF 51-55%, improved from 2023.   -Would recommend to continue beta blocker rather than CCB given borderline EF.   -Can consider Camzyos and will discuss with cardiology at follow up with Dr. Stephens/BRI Li. EF is appropriate >50%.   -Denies any symptoms of syncope      Moderate AS: Has baseline SOB, but does not affect activity level per patient. Denies any recent chest pain suggestive of angina. Denies syncope.   -Will monitor. Consider GENESIS as outpatient?      Alcohol use disorder: in remission, slight tremors on exam. Continue folic acid, ativan,         Further orders per Dr. Haque      Thank you for asking us to follow this patient with you.         Electronically signed by KWAN Armas    Assessment:  Paroxysmal atrial fibrillation  Frequent PVCs  Hypertrophic cardiomyopathy  Apical aneurysm  CAD status post PCI     Unfortunately, no great rhythm medication options for him given his hypertrophic cardiomyopathy and prior PCI.  His QT is too long for initiation of sotalol or dofetilide at this time.  As  "such, amiodarone seems to be his only reasonable option for the time being.  Long-term, ablation may be extremely reasonable, particularly given his cardiac comorbidities.     Otherwise, given his history of hypertrophic cardiomyopathy and echo now suggestive of apical aneurysm as well as previous reduced EF, I do think that he is a reasonable candidate for ICD implant for primary prophylaxis of sudden cardiac death.  He states that he will think about this more and try to come up with a decision.     Plan:  -Start amiodarone 400 mg twice daily for 10 days (until 8/8/2024) then 200 mg daily thereafter  -Start apixaban for anticoagulation given elevated [id number] and left ventricular apical aneurysm  -ICD offered given hypertrophic cardiomyopathy with previously reduced EF as well as apical aneurysm  -Atrial fibrillation ablation offered given symptomatic atrial fibrillation and cardiac comorbidities  -Continue aspirin for now though can consider discontinuation given remote PCI and initiation of apixaban  -7-day Holter monitor at discharge for PVC quantification  -Can observe overnight to ensure no early recurrences of atrial fibrillation    Result Review    Result Review:  I have personally reviewed the results from the time of this admission to 7/30/2024 13:47 CDT and agree with these findings:  []  Laboratory  []  Microbiology  []  Radiology  []  EKG/Telemetry   []  Cardiology/Vascular   []  Pathology  []  Old records  []  Other:    Condition on Discharge:    Stable and improved    Physical Exam on Discharge:  /71 (BP Location: Left arm, Patient Position: Lying)   Pulse 71   Temp 97.9 °F (36.6 °C) (Oral)   Resp 18   Ht 180.3 cm (71\")   Wt 86.5 kg (190 lb 9.6 oz)   SpO2 98%   BMI 26.58 kg/m²   Physical Exam       Constitutional:       General: He is not in acute distress.     Appearance: Normal appearance. He is normal weight.   HENT:      Head: Normocephalic and atraumatic.      Right Ear: " External ear normal.      Left Ear: External ear normal.      Nose: Nose normal.      Mouth/Throat:      Mouth: Mucous membranes are moist.      Pharynx: Oropharynx is clear.   Eyes:      General: No scleral icterus.     Extraocular Movements: Extraocular movements intact.      Conjunctiva/sclera: Conjunctivae normal.      Pupils: Pupils are equal, round, and reactive to light.   Cardiovascular:      Rate and Rhythm: Normal rate present. Rhythm regular.      Pulses: Normal pulses.      Heart sounds: Normal heart sounds. No murmur heard.  Pulmonary:      Effort: Pulmonary effort is normal. No respiratory distress.      Breath sounds: Normal breath sounds.   Abdominal:      General: Abdomen is protuberant. Bowel sounds are normal.      Palpations: Abdomen is soft. There is no mass.      Tenderness: There is no abdominal tenderness.   Musculoskeletal:         General: Normal range of motion.      Right lower leg: No edema.      Left lower leg: No edema.   Skin:     General: Skin is warm and dry.      Coloration: Skin is not pale.   Neurological:      General: No focal deficit present.      Mental Status: He is alert and oriented to person, place, and time. Mental status is at baseline.      Cranial Nerves: No cranial nerve deficit.   Psychiatric:         Mood and Affect: Mood normal.         Judgment: Judgment normal    Discharge Disposition:  Home or Self Care    Discharge Medications:     Discharge Medications        New Medications        Instructions Start Date   amiodarone 200 MG tablet  Commonly known as: Pacerone   2 p.o. twice daily x 9 days then once daily thereafter.      apixaban 5 MG tablet tablet  Commonly known as: ELIQUIS   5 mg, Oral, Every 12 Hours Scheduled             Continue These Medications        Instructions Start Date   aspirin 81 MG EC tablet   81 mg, Oral, Daily      atorvastatin 20 MG tablet  Commonly known as: LIPITOR   20 mg, Oral, Daily      multivitamin with minerals tablet tablet   1  tablet, Oral, Daily      temazepam 30 MG capsule  Commonly known as: RESTORIL   Take 1 capsule by mouth At Night As Needed for Sleep.             Stop These Medications      metoprolol succinate XL 25 MG 24 hr tablet  Commonly known as: TOPROL-XL              Discharge Diet:   Diet Instructions       Diet: Regular/House Diet; Regular (IDDSI 7); Thin (IDDSI 0)      Discharge Diet: Regular/House Diet    Texture: Regular (IDDSI 7)    Fluid Consistency: Thin (IDDSI 0)            Discharge Care Plan / Instructions:   Discharge home    Activity at Discharge:   Activity Instructions       Activity as Tolerated              Follow-up Appointments:  Follow-up with PCP next week  Follow-up with Dr. Haque in 2 weeks    Electronically signed by Holger Karimi DO, 07/30/24, 13:47 CDT.    Time: Discharge less than 30 min    Part of this note may be an electronic transcription/translation of spoken language to printed text using the Dragon Dictation system.

## 2024-07-31 NOTE — OUTREACH NOTE
Prep Survey      Flowsheet Row Responses   [facility] facility patient discharged from? [place]   Is LACE score < 7 ? No   Eligibility Readm Mgmt   Discharge diagnosis *Atrial fibrillation with rapid ventricular response   Does the patient have one of the following disease processes/diagnoses(primary or secondary)? Total Joint Replacement   Does the patient have Home health ordered? No   Is there a DME ordered? No   Prep survey completed? Yes            TONY HAMILTON - Registered Nurse

## 2024-08-02 ENCOUNTER — READMISSION MANAGEMENT (OUTPATIENT)
Dept: CALL CENTER | Facility: HOSPITAL | Age: 69
End: 2024-08-02
Payer: MEDICARE

## 2024-08-02 NOTE — OUTREACH NOTE
Total Joint Week 1 Survey      Flowsheet Row Responses   [facility] facility patient discharged from? [place]   Does the patient have one of the following disease processes/diagnoses(primary or secondary)? Total Joint Replacement   Week 1 attempt successful? No   Unsuccessful attempts Attempt 1   Revoke Other  [pt was not in for joint replacement]            TONY HAMILTON - Registered Nurse

## 2024-08-03 LAB
BACTERIA SPEC AEROBE CULT: NORMAL
BACTERIA SPEC AEROBE CULT: NORMAL

## 2024-08-05 ENCOUNTER — NURSE TRIAGE (OUTPATIENT)
Dept: CALL CENTER | Facility: HOSPITAL | Age: 69
End: 2024-08-05
Payer: MEDICARE

## 2024-08-05 NOTE — TELEPHONE ENCOUNTER
"Reason for Disposition   General information question, no triage required and triager able to answer question    Additional Information   Negative: [1] Caller is not with the adult (patient) AND [2] reporting urgent symptoms   Negative: Lab result questions   Negative: Medication questions   Negative: Caller can't be reached by phone   Negative: Caller has already spoken to PCP or another triager   Negative: RN needs further essential information from caller in order to complete triage   Negative: Requesting regular office appointment   Negative: [1] Caller requesting NON-URGENT health information AND [2] PCP's office is the best resource   Negative: Health Information question, no triage required and triager able to answer question    Answer Assessment - Initial Assessment Questions  1. REASON FOR CALL or QUESTION: \"What is your reason for calling today?\" or \"How can I best help you?\" or \"What question do you have that I can help answer?\"      Cardiac monitor leads came off at home following a shower the other day.  Patient has been monitoring his heart rate and blood pressure himself since they came off.  Does he need to come back to hospital for new leads?    AVS states that if leads come off, patient can affix them back to skin with medical tape.  Patient states he has medical tape at home.  Is about to get in shower and will clean/dry the area.  Will use alcohol wipes prior to taping back on.   Encouraged patient to follow back up if the tape is not holding.    Protocols used: Information Only Call - No Triage-ADULT-    "

## 2024-08-06 ENCOUNTER — OFFICE VISIT (OUTPATIENT)
Dept: OTOLARYNGOLOGY | Facility: CLINIC | Age: 69
End: 2024-08-06
Payer: MEDICARE

## 2024-08-06 VITALS
DIASTOLIC BLOOD PRESSURE: 102 MMHG | HEART RATE: 82 BPM | HEIGHT: 71 IN | WEIGHT: 189 LBS | SYSTOLIC BLOOD PRESSURE: 166 MMHG | TEMPERATURE: 98.4 F | BODY MASS INDEX: 26.46 KG/M2

## 2024-08-06 DIAGNOSIS — J33.9 NASAL POLYPOSIS: Primary | ICD-10-CM

## 2024-08-06 DIAGNOSIS — R09.81 NASAL CONGESTION: ICD-10-CM

## 2024-08-06 RX ORDER — AMOXICILLIN 500 MG/1
500 CAPSULE ORAL 3 TIMES DAILY
Qty: 30 CAPSULE | Refills: 0 | Status: SHIPPED | OUTPATIENT
Start: 2024-08-06 | End: 2024-08-06

## 2024-08-06 RX ORDER — AMOXICILLIN 500 MG/1
500 CAPSULE ORAL 3 TIMES DAILY
Qty: 30 CAPSULE | Refills: 0 | Status: SHIPPED | OUTPATIENT
Start: 2024-08-06 | End: 2024-08-16

## 2024-08-06 RX ORDER — AMOXICILLIN 500 MG/1
500 CAPSULE ORAL 3 TIMES DAILY
Qty: 30 CAPSULE | Refills: 0 | Status: SHIPPED | OUTPATIENT
Start: 2024-08-06 | End: 2024-08-06 | Stop reason: SDUPTHER

## 2024-08-06 NOTE — PROGRESS NOTES
"YOB: 1955  Location: [facility]  Location Address: [address] [place] [address], [facility], [address] [place] [address]  Location Phone: [phone]    Chief Complaint   Patient presents with    Nasal Obstruction       History of Present Illness  Dayron Lubin is a 69 y.o. male.  Dayron Lubin is here for evaluation of ENT complaints. The patient has had problems with nasal congestion and nasal obstruction left > right. Yesterday he noticed a \"lump\" in the left nostril. He presented to urgent care where they attempted to remove this and it started to bleed.    He was recently diagnosed with Afib and is on eliquis for this.      Past Medical History:   Diagnosis Date    A-fib     Alcoholic hepatitis 2023    CAD (coronary artery disease)     History of external beam radiation therapy 2016    6840 cGy to prostate bed    Hyperlipidemia     Hypertension     Insomnia     Prostate cancer        Past Surgical History:   Procedure Laterality Date    CARDIAC CATHETERIZATION      CORONARY ANGIOPLASTY WITH STENT PLACEMENT      FRACTURE SURGERY      PROSTATE SURGERY      TONSILLECTOMY         Outpatient Medications Marked as Taking for the 24 encounter (Office Visit) with Robin De La Rosa MD   Medication Sig Dispense Refill    amoxicillin (AMOXIL) 500 MG capsule Take 1 capsule by mouth 3 (Three) Times a Day for 10 days. 30 capsule 0       Patient has no known allergies.    Family History   Problem Relation Age of Onset    Heart disease Mother     Coronary artery disease Mother     Stroke Mother        Social History     Socioeconomic History    Marital status: Single   Tobacco Use    Smoking status: Never    Smokeless tobacco: Never   Vaping Use    Vaping status: Never Used   Substance and Sexual Activity    Alcohol use: Not Currently     Alcohol/week: 50.0 standard drinks of alcohol     Types: 50 Shots of liquor per week     Comment: not in 2 weeks    Drug use: Yes     Types: Marijuana    Sexual " activity: Defer       Review of Systems   Constitutional: Negative.    HENT:  Positive for congestion.        Vitals:    08/06/24 1050   BP: (!) 166/102   Pulse: 82   Temp: 98.4 °F (36.9 °C)       Body mass index is 26.36 kg/m².    Objective     Physical Exam  Vitals reviewed.   Constitutional:       Appearance: Normal appearance.   HENT:      Head: Normocephalic.      Right Ear: External ear normal.      Left Ear: External ear normal.      Nose:      Comments: Large polyp noted to the left nostril     Mouth/Throat:      Lips: Pink.      Mouth: Mucous membranes are moist.      Pharynx: Oropharynx is clear.   Musculoskeletal:      Cervical back: Full passive range of motion without pain.   Neurological:      Mental Status: He is alert.   Psychiatric:         Behavior: Behavior is cooperative.         Assessment & Plan   Diagnoses and all orders for this visit:    1. Nasal polyposis (Primary)  -     CT Sinus Without Contrast; Future    2. Nasal congestion    Other orders  -     Discontinue: amoxicillin (AMOXIL) 500 MG capsule; Take 1 capsule by mouth 3 (Three) Times a Day for 10 days.  Dispense: 30 capsule; Refill: 0  -     amoxicillin (AMOXIL) 500 MG capsule; Take 1 capsule by mouth 3 (Three) Times a Day for 10 days.  Dispense: 30 capsule; Refill: 0      * Surgery not found *  Orders Placed This Encounter   Procedures    CT Sinus Without Contrast     Standing Status:   Future     Standing Expiration Date:   8/6/2025     Scheduling Instructions:      Stealth -  image guidance     Order Specific Question:   Release to patient     Answer:   Routine Release [[id number]]     Order Specific Question:   Reason for Exam:     Answer:   nasal polyp     Oral antibiotics  Will obtain CT sinus  Return for problems    Dr. De La Rosa examined and discussed care with patient and agrees with treatment plan.     Return in about 4 weeks (around 9/3/2024) for Recheck, CT.       Patient Instructions   CONTACT INFORMATION:  The main office  phone number is [phone]. For emergencies after hours and on weekends, this number will convert over to our answering service and the on call provider will answer. Please try to keep non emergent phone calls/ questions to office hours 9am-5pm Monday through Friday.      CEYX  As an alternative, you can sign up and use the Epic MyChart system for more direct and quicker access for non emergent questions/ problems.  [facility] CEYX allows you to send messages to your doctor, view your test results, renew your prescriptions, schedule appointments, and more. To sign up, go to Askem and click on the Sign Up Now link in the New User? box. Enter your CEYX Activation Code exactly as it appears below along with the last four digits of your Social Security Number and your Date of Birth () to complete the sign-up process. If you do not sign up before the expiration date, you must request a new code.     CEYX Activation Code: Activation code not generated  Current CEYX Status: Active     If you have questions, you can email [email or web address] or call [phone] to talk to our CEYX staff. Remember, CEYX is NOT to be used for urgent needs. For medical emergencies, dial 911.     IF YOU SMOKE OR USE TOBACCO PLEASE READ THE FOLLOWING:  Why is smoking bad for me?  Smoking increases the risk of heart disease, lung disease, vascular disease, stroke, and cancer. If you smoke, STOP!        IF YOU SMOKE OR USE TOBACCO PLEASE READ THE FOLLOWING:  Why is smoking bad for me?  Smoking increases the risk of heart disease, lung disease, vascular disease, stroke, and cancer. If you smoke, STOP!     For more information:  [facility]  -QUIT-NOW  [email or web address]

## 2024-08-06 NOTE — PATIENT INSTRUCTIONS
CONTACT INFORMATION:  The main office phone number is [phone]. For emergencies after hours and on weekends, this number will convert over to our answering service and the on call provider will answer. Please try to keep non emergent phone calls/ questions to office hours 9am-5pm Monday through Friday.      APSX  As an alternative, you can sign up and use the Epic MyChart system for more direct and quicker access for non emergent questions/ problems.  [employer] allows you to send messages to your doctor, view your test results, renew your prescriptions, schedule appointments, and more. To sign up, go to [employer] and click on the Sign Up Now link in the New User? box. Enter your APSX Activation Code exactly as it appears below along with the last four digits of your Social Security Number and your Date of Birth () to complete the sign-up process. If you do not sign up before the expiration date, you must request a new code.     APSX Activation Code: Activation code not generated  Current APSX Status: Active     If you have questions, you can email [email or web address] or call [phone] to talk to our APSX staff. Remember, APSX is NOT to be used for urgent needs. For medical emergencies, dial 911.     IF YOU SMOKE OR USE TOBACCO PLEASE READ THE FOLLOWING:  Why is smoking bad for me?  Smoking increases the risk of heart disease, lung disease, vascular disease, stroke, and cancer. If you smoke, STOP!        IF YOU SMOKE OR USE TOBACCO PLEASE READ THE FOLLOWING:  Why is smoking bad for me?  Smoking increases the risk of heart disease, lung disease, vascular disease, stroke, and cancer. If you smoke, STOP!     For more information:  Quit Now [place]  -QUIT-NOW  [email or web address]

## 2024-08-12 ENCOUNTER — TELEPHONE (OUTPATIENT)
Dept: OTOLARYNGOLOGY | Facility: CLINIC | Age: 69
End: 2024-08-12
Payer: MEDICARE

## 2024-08-12 ENCOUNTER — HOSPITAL ENCOUNTER (OUTPATIENT)
Dept: CT IMAGING | Facility: HOSPITAL | Age: 69
Discharge: HOME | End: 2024-08-12
Admitting: NURSE PRACTITIONER
Payer: MEDICARE

## 2024-08-12 DIAGNOSIS — J33.9 NASAL POLYPOSIS: ICD-10-CM

## 2024-08-12 PROCEDURE — 70486 CT MAXILLOFACIAL W/O DYE: CPT

## 2024-08-12 NOTE — TELEPHONE ENCOUNTER
----- Message from Gonzalo Martinez sent at 8/12/2024  3:03 PM CDT -----  CT results.  Keep follow-up with Dr. De La Rosa we will discuss further intervention

## 2024-08-14 ENCOUNTER — TELEPHONE (OUTPATIENT)
Dept: OTOLARYNGOLOGY | Facility: CLINIC | Age: 69
End: 2024-08-14
Payer: MEDICARE

## 2024-09-05 ENCOUNTER — TELEPHONE (OUTPATIENT)
Dept: OTOLARYNGOLOGY | Facility: CLINIC | Age: 69
End: 2024-09-05
Payer: MEDICARE

## 2024-09-05 ENCOUNTER — OFFICE VISIT (OUTPATIENT)
Dept: OTOLARYNGOLOGY | Facility: CLINIC | Age: 69
End: 2024-09-05
Payer: MEDICARE

## 2024-09-05 VITALS — TEMPERATURE: 97 F | WEIGHT: 194 LBS | HEIGHT: 71 IN | BODY MASS INDEX: 27.16 KG/M2

## 2024-09-05 DIAGNOSIS — J33.9 NASAL POLYPOSIS: Primary | ICD-10-CM

## 2024-09-05 DIAGNOSIS — R79.1 ABNORMAL COAGULATION PROFILE: ICD-10-CM

## 2024-09-05 DIAGNOSIS — R09.81 NASAL CONGESTION: ICD-10-CM

## 2024-09-05 DIAGNOSIS — J34.89 CONCHA BULLOSA: ICD-10-CM

## 2024-09-05 RX ORDER — AZELASTINE 1 MG/ML
2 SPRAY, METERED NASAL 2 TIMES DAILY
Qty: 30 ML | Refills: 11 | Status: SHIPPED | OUTPATIENT
Start: 2024-09-05

## 2024-09-05 RX ORDER — FLUTICASONE PROPIONATE 50 MCG
2 SPRAY, SUSPENSION (ML) NASAL DAILY
Qty: 16 G | Refills: 11 | Status: SHIPPED | OUTPATIENT
Start: 2024-09-05

## 2024-09-05 RX ORDER — OXYMETAZOLINE HYDROCHLORIDE 0.05 G/100ML
2 SPRAY NASAL
OUTPATIENT
Start: 2024-09-05 | End: 2024-09-05

## 2024-09-05 NOTE — PATIENT INSTRUCTIONS
SEPTOPLASTY AND TURBINOPLASTY: A septoplasty and inferior turbinoplasty were recommended. The risks and benefits were explained including but not limited to pain, bleeding, infection, risks of the general anesthesia, continued septal deviation, crusting, congestion and septal perforation. Possibilities of continued preoperative symptoms and the possible need for revision surgery and or medical therapy were discussed. Alternatives were discussed. No guarantees were made or implied. Questions were asked appropriately answered.       FUNCTIONAL ENDOSCOPIC SINUS SURGERY: A functional endoscopic sinus surgery was recommended. The risks and benefits were explained including but not limited to pain, bleeding (with the possible need for nasal packing), infection, risks of the general anesthesia, orbital injury with blurred vision or visual loss, cerebrospinal fluid leak, persistent disease, scarring, synichiae and the possibility for the need of reoperation. Possibilities of additional sinus work or less sinus work depending on the status of the nose at the time of the operation was discussed. Alternatives were discussed. No guarantees were made or implied. Questions were asked appropriately answered.

## 2024-09-05 NOTE — TELEPHONE ENCOUNTER
Patient is scheduled for inspire procedure on 10/25/24. The patient is on Eliquis and Aspirin. I need clearance for patient to stop Eliquis 2 days prior and Aspirin 7 days prior to surgery. Please advise.

## 2024-09-05 NOTE — PROGRESS NOTES
YOB: 1955  Location: [facility]  Location Address: [address] [place] [address],  3, [address] [place] [address]  Location Phone: [phone]    Chief Complaint   Patient presents with    Discuss CT       History of Present Illness  Dayron Lubin is a 69 y.o. male.  Dayron Lubin is here for follow up of ENT complaints. The patient has had problems with nasal congestion and nasal obstruction left > right.  At the last office visit he was found to have a nasal polyp of the left nostril.  He has had a CT scan of the sinuses. He has tried oral steroids, Astelin, and Flonase without relief.     He was recently diagnosed with Afib and is on eliquis for this.     Reviewed:    Study Result    Narrative & Impression   CT SINUS WO CONTRAST- 2024 11:57 AM     HISTORY: nasal polyp; J33.9-Nasal polyp, unspecified     COMPARISON: None available     DOSE LENGTH PRODUCT: 392.55 mGy.cm. Automated exposure control was also  utilized to decrease patient radiation dose.     TECHNIQUE: Helical tomographic images of the sinuses were obtained  without contrast. Multiplanar reformatted images were provided for  review.     FINDINGS:     There is mucosal thickening at the bilateral frontal sinuses left  greater than right. There is mucosal thickening at the bilateral ethmoid  air cells, sphenoid sinus, and bilateral maxillary sinuses. The mastoid  air cells are clear. No air-fluid levels are identified.     Small osteoma is noted posteriorly in the right ethmoid air cells. No  bony erosive or destructive changes are seen. Visualized scalp soft  tissues are unremarkable. Bilateral orbits and globes are unremarkable.  Soft tissues of the face and visualized neck are grossly unremarkable.  There is bilateral carotid calcification which is partially imaged.     There is nasal septal deviation to the right. Mucosal thickening narrows  the ostia at the bilateral ostiomeatal complexes. There is angeles  bullosa deformity of the  left middle turbinate.     IMPRESSION:     1. Paranasal sinus mucosal thickening worse at the ethmoid air cells.  2. Nasal septal deviation to the right and mucosal thickening narrows  the ostia of the bilateral ostiomeatal complexes.  3. No air-fluid levels or erosive changes are identified.                 This report was signed and finalized on 8/12/2024 1:45 PM by Vincent Alvarez.        Past Medical History:   Diagnosis Date    A-fib     Alcoholic hepatitis 07/13/2023    CAD (coronary artery disease)     History of external beam radiation therapy 05/12/2016    6840 cGy to prostate bed    Hyperlipidemia     Hypertension     Insomnia     Prostate cancer        Past Surgical History:   Procedure Laterality Date    CARDIAC CATHETERIZATION      CORONARY ANGIOPLASTY WITH STENT PLACEMENT      FRACTURE SURGERY      PROSTATE SURGERY      TONSILLECTOMY         Outpatient Medications Marked as Taking for the 9/5/24 encounter (Office Visit) with Gonzalo Martinez APRN   Medication Sig Dispense Refill    amiodarone (Pacerone) 200 MG tablet Take 2 tablets by mouth 2 (Two) Times a Day for 9 days, THEN 2 tablets Daily for 30 days. 96 tablet 0    apixaban (ELIQUIS) 5 MG tablet tablet Take 1 tablet by mouth Every 12 (Twelve) Hours. Indications: Atrial Fibrillation 60 tablet 2    aspirin (aspirin) 81 MG EC tablet Take 1 tablet by mouth Daily.      atorvastatin (LIPITOR) 20 MG tablet Take 1 tablet by mouth Daily.      multivitamin with minerals tablet tablet Take 1 tablet by mouth Daily. 100 each 99    pantoprazole (PROTONIX) 40 MG EC tablet Take 1 tablet by mouth Daily.      temazepam (RESTORIL) 30 MG capsule Take 1 capsule by mouth At Night As Needed for Sleep.  2       Patient has no known allergies.    Family History   Problem Relation Age of Onset    Heart disease Mother     Coronary artery disease Mother     Stroke Mother        Social History     Socioeconomic History    Marital status: Single   Tobacco Use    Smoking  status: Never    Smokeless tobacco: Never   Vaping Use    Vaping status: Never Used   Substance and Sexual Activity    Alcohol use: Not Currently     Alcohol/week: 50.0 standard drinks of alcohol     Types: 50 Shots of liquor per week     Comment: not in 2 weeks    Drug use: Yes     Types: Marijuana    Sexual activity: Defer       Review of Systems   Constitutional: Negative.    HENT:  Positive for congestion.    Respiratory: Negative.         Vitals:    09/05/24 1306   Temp: 97 °F (36.1 °C)       Body mass index is 27.06 kg/m².    Objective     Physical Exam  Vitals reviewed.   Constitutional:       Appearance: Normal appearance.   HENT:      Head: Normocephalic.      Right Ear: External ear normal.      Left Ear: External ear normal.      Nose:      Comments: Large polyp noted to the left nostril    See endoscopy     Mouth/Throat:      Lips: Pink.      Mouth: Mucous membranes are moist.      Pharynx: Oropharynx is clear.   Musculoskeletal:      Cervical back: Full passive range of motion without pain.   Neurological:      Mental Status: He is alert.   Psychiatric:         Behavior: Behavior is cooperative.       Nasal endoscopy    Date/Time: 9/5/2024 1:47 PM    Performed by: Goznalo Martinez APRN  Authorized by: Gonzalo Martinez APRN    Procedure details:     Indications: sino-nasal symptoms      Medication:  None    Instrument: rigid nasal endoscopy      Scope location: bilateral nare    Nasal cavity:     left nasal cavity occluded and left polyp      Right inferior turbinates: enlarged      Left inferior turbinates: enlarged       Assessment & Plan   Diagnoses and all orders for this visit:    1. Nasal polyposis (Primary)  -     Case Request; Standing  -     Comprehensive Metabolic Panel; Future  -     CBC (No Diff); Future  -     Protime-INR; Future  -     APTT; Future  -     ECG 12 Lead; Future  -     XR Chest 2 View; Future  -     oxymetazoline (AFRIN) nasal spray 2 spray  -     dexAMETHasone (DECADRON) 8 mg  in sodium chloride 0.9 % IVPB  -     Case Request    2. Nasal congestion  -     Case Request; Standing  -     Comprehensive Metabolic Panel; Future  -     CBC (No Diff); Future  -     Protime-INR; Future  -     APTT; Future  -     ECG 12 Lead; Future  -     XR Chest 2 View; Future  -     oxymetazoline (AFRIN) nasal spray 2 spray  -     dexAMETHasone (DECADRON) 8 mg in sodium chloride 0.9 % IVPB  -     Case Request    3. Stacy bullosa  -     Case Request; Standing  -     Comprehensive Metabolic Panel; Future  -     CBC (No Diff); Future  -     Protime-INR; Future  -     APTT; Future  -     ECG 12 Lead; Future  -     XR Chest 2 View; Future  -     oxymetazoline (AFRIN) nasal spray 2 spray  -     dexAMETHasone (DECADRON) 8 mg in sodium chloride 0.9 % IVPB  -     Case Request    4. Abnormal coagulation profile  -     CBC (No Diff); Future  -     Protime-INR; Future  -     APTT; Future    Other orders  -     azelastine (ASTELIN) 0.1 % nasal spray; 2 sprays into the nostril(s) as directed by provider 2 (Two) Times a Day. Use in each nostril as directed  Dispense: 30 mL; Refill: 11  -     fluticasone (FLONASE) 50 MCG/ACT nasal spray; 2 sprays into the nostril(s) as directed by provider Daily.  Dispense: 16 g; Refill: 11  -     $ Nasal / Sinus Endoscopy  -     Follow Anesthesia Guidelines / Protocol; Future  -     Obtain Informed Consent  -     Follow Anesthesia Guidelines / Protocol; Standing  -     Verify NPO Status; Standing      ENDOSCOPIC FUNCTIONAL SINUS SURGERY W TRACKING, SINUPLASTY, SEPTOPLASTY, RESECT INFERIOR TURBINATES VIA COBLATION, LEFT STACY BULLOSA RESECTION, AND LEFT NASAL POLYPECTOMY (Bilateral)  Orders Placed This Encounter   Procedures    XR Chest 2 View     Standing Status:   Future     Standing Expiration Date:   9/5/2025     Order Specific Question:   Reason for Exam:     Answer:   ENDOSCOPIC FUNCTIONAL SINUS SURGERY W TRACKING, SINUPLASTY, SEPTOPLASTY, RESECT INFERIOR TURBINATES VIA COBLATION, LEFT  STACY BULLOSA RESECTION, AND LEFT NASAL POLYPECTOMY     Order Specific Question:   Release to patient     Answer:   Routine Release [[id number]]    Comprehensive Metabolic Panel     Standing Status:   Future     Standing Expiration Date:   9/5/2025     Order Specific Question:   Release to patient     Answer:   Routine Release [[id number]]    CBC (No Diff)     Standing Status:   Future     Standing Expiration Date:   9/5/2025     Order Specific Question:   Release to patient     Answer:   Routine Release [[id number]]    Protime-INR     Standing Status:   Future     Standing Expiration Date:   9/5/2025     Order Specific Question:   Release to patient     Answer:   Routine Release [[id number]]    APTT     Standing Status:   Future     Standing Expiration Date:   9/5/2025     Order Specific Question:   Release to patient     Answer:   Routine Release [[id number]]    Obtain Informed Consent     Order Specific Question:   Informed Consent Given For     Answer:   ENDOSCOPIC FUNCTIONAL SINUS SURGERY W TRACKING, SINUPLASTY, SEPTOPLASTY, RESECT INFERIOR TURBINATES VIA COBLATION    $ Nasal / Sinus Endoscopy     This order was created via procedure documentation     Order Specific Question:   Release to patient     Answer:   Routine Release [[id number]]    ECG 12 Lead     Standing Status:   Future     Standing Expiration Date:   9/5/2025     Order Specific Question:   Reason for Exam:     Answer:   preop     Order Specific Question:   Release to patient     Answer:   Routine Release [[id number]]     Will reach out to Dr. Haque's office to obtain clearance to hold eliquis prior to surgery.    SEPTOPLASTY AND TURBINOPLASTY: A septoplasty and inferior turbinoplasty were recommended. The risks and benefits were explained including but not limited to pain, bleeding, infection, risks of the general anesthesia, continued septal deviation, crusting, congestion and septal perforation. Possibilities of continued preoperative symptoms  and the possible need for revision surgery and or medical therapy were discussed. Alternatives were discussed. No guarantees were made or implied. Questions were asked appropriately answered.       FUNCTIONAL ENDOSCOPIC SINUS SURGERY: A functional endoscopic sinus surgery was recommended. The risks and benefits were explained including but not limited to pain, bleeding (with the possible need for nasal packing), infection, risks of the general anesthesia, orbital injury with blurred vision or visual loss, cerebrospinal fluid leak, persistent disease, scarring, synichiae and the possibility for the need of reoperation. Possibilities of additional sinus work or less sinus work depending on the status of the nose at the time of the operation was discussed. Alternatives were discussed. No guarantees were made or implied. Questions were asked appropriately answered.       Return for post op.       Patient Instructions   SEPTOPLASTY AND TURBINOPLASTY: A septoplasty and inferior turbinoplasty were recommended. The risks and benefits were explained including but not limited to pain, bleeding, infection, risks of the general anesthesia, continued septal deviation, crusting, congestion and septal perforation. Possibilities of continued preoperative symptoms and the possible need for revision surgery and or medical therapy were discussed. Alternatives were discussed. No guarantees were made or implied. Questions were asked appropriately answered.       FUNCTIONAL ENDOSCOPIC SINUS SURGERY: A functional endoscopic sinus surgery was recommended. The risks and benefits were explained including but not limited to pain, bleeding (with the possible need for nasal packing), infection, risks of the general anesthesia, orbital injury with blurred vision or visual loss, cerebrospinal fluid leak, persistent disease, scarring, synichiae and the possibility for the need of reoperation. Possibilities of additional sinus work or less sinus  work depending on the status of the nose at the time of the operation was discussed. Alternatives were discussed. No guarantees were made or implied. Questions were asked appropriately answered.

## 2024-09-06 ENCOUNTER — DOCUMENTATION (OUTPATIENT)
Dept: CARDIOLOGY | Facility: CLINIC | Age: 69
End: 2024-09-06
Payer: MEDICARE

## 2024-09-06 PROBLEM — J34.89 CONCHA BULLOSA: Status: ACTIVE | Noted: 2024-09-05

## 2024-09-06 PROBLEM — J33.9 NASAL POLYPOSIS: Status: ACTIVE | Noted: 2024-09-05

## 2024-09-06 PROBLEM — R09.81 NASAL CONGESTION: Status: ACTIVE | Noted: 2024-09-05

## 2024-09-06 NOTE — PROGRESS NOTES
I received an in basket message on this mutual patient regarding interruption of aspirin therapy.  Given the patient's history of coronary artery disease with prior PCI he should maintain aspirin 81 mg daily without interruption.    BRI Hernandez   [facility]              Peek, Ellen A, RN1 hour ago (9:56 AM)     AP  My Haque MD  You; Dayron Stephens MD; Edda Li, APRN3 hours ago (6:49 AM)         Please see my written note.  I would not personally recommend stopping the aspirin but would defer the decision to his interventional cardiology providers.    Thanks,  Ellen Barnhart, GIANLUCA routed conversation to You1 hour ago (9:56 AM)     My Haque MD  You; Ellen Chadwick, GIANLUCA; Dayron Stephens MD4 hours ago (6:49 AM)       Please see my written note.  I would not personally recommend stopping the aspirin but would defer the decision to his interventional cardiology providers.    Thanks,  Ellen Jacobsen, GIANLUCA routed conversation to My Haque MD21 hours ago (1:45 PM)     Ellen Chadwick RN21 hours ago (1:45 PM)     CYRIL  Patient is scheduled for inspire procedure on 10/25/24. The patient is on Eliquis and Aspirin. I need clearance for patient to stop Eliquis 2 days prior and Aspirin 7 days prior to surgery. Please advise.

## 2024-09-06 NOTE — PROGRESS NOTES
From an EP standpoint, the patient has had no recent ablations or recent thrombotic events that would put them at higher risk that typical for discontinuation of oral anticoagulation.  The patient is overall at moderate thrombotic risk (5-10% annual) without anticoagulation.  The risk of this should always be weighed against the risk of bleeding.      For patients taking a NOAC with relatively renal function, guidelines recommend stopping the blood thinner 48 hours prior to a procedure with high risk of bleeding complications.  The decision to hold anticoagulation beyond this duration would be at the discretion of the treating provider with understanding of the risks and benefits involved.     For patients on NOAC, bridging anticoagulation is not recommended.     Following the operation, if post-procedural bleeding risk is low, then it is reasonable to resume full anticoagulation the day after the procedure.      I would not personally recommend stopping the aspirin given history of prior PCI but would defer the decision to his interventional cardiology providers.    Sincerely,  My Haque

## 2024-09-06 NOTE — TELEPHONE ENCOUNTER
Edda Li APRN You; Dayron Stephens MD1 hour ago (11:19 AM)          Edda Li APRN You; Dayron Stephens MD1 hour ago (11:19 AM)       I received an in basket message on this mutual patient regarding interruption of aspirin therapy.  Given the patient's history of coronary artery disease with prior PCI he should maintain aspirin 81 mg daily without interruption.    BRI Hernandez  [facility] [place]

## 2024-09-06 NOTE — TELEPHONE ENCOUNTER
My Haque MD  You; Dayron Stephens MD; Edda Li, APRN3 hours ago (6:49 AM)       Please see my written note.  I would not personally recommend stopping the aspirin but would defer the decision to his interventional cardiology providers.    My Arvizu

## 2024-09-09 ENCOUNTER — LAB (OUTPATIENT)
Dept: LAB | Facility: HOSPITAL | Age: 69
End: 2024-09-09
Payer: MEDICARE

## 2024-09-09 DIAGNOSIS — C61 PROSTATE CANCER: ICD-10-CM

## 2024-09-09 PROCEDURE — 84153 ASSAY OF PSA TOTAL: CPT

## 2024-09-09 PROCEDURE — 36415 COLL VENOUS BLD VENIPUNCTURE: CPT

## 2024-09-10 ENCOUNTER — OFFICE VISIT (OUTPATIENT)
Dept: CARDIOLOGY | Facility: CLINIC | Age: 69
End: 2024-09-10
Payer: MEDICARE

## 2024-09-10 VITALS
HEART RATE: 69 BPM | OXYGEN SATURATION: 99 % | DIASTOLIC BLOOD PRESSURE: 96 MMHG | HEIGHT: 71 IN | SYSTOLIC BLOOD PRESSURE: 154 MMHG | BODY MASS INDEX: 26.18 KG/M2 | WEIGHT: 187 LBS

## 2024-09-10 DIAGNOSIS — I48.0 PAROXYSMAL ATRIAL FIBRILLATION: Primary | ICD-10-CM

## 2024-09-10 DIAGNOSIS — Z79.899 LONG-TERM USE OF HIGH-RISK MEDICATION: ICD-10-CM

## 2024-09-10 DIAGNOSIS — I42.1 HOCM (HYPERTROPHIC OBSTRUCTIVE CARDIOMYOPATHY): Chronic | ICD-10-CM

## 2024-09-10 DIAGNOSIS — I42.2 HYPERTROPHIC CARDIOMYOPATHY: ICD-10-CM

## 2024-09-10 LAB — PSA SERPL-MCNC: 0.18 NG/ML (ref 0–4)

## 2024-09-10 RX ORDER — AMIODARONE HYDROCHLORIDE 200 MG/1
200 TABLET ORAL DAILY
Qty: 90 TABLET | Refills: 1 | Status: SHIPPED | OUTPATIENT
Start: 2024-09-10

## 2024-09-10 NOTE — PROGRESS NOTES
[facility] -  CLINIC FOLLOW UP     Patient Care Team:  Herberth Nguyen Jr., MD as PCP - General (Family Medicine)  Dayron Love MD as Consulting Physician (Urology)  Eloy Lindsay III, MD as Consulting Physician (Radiation Oncology)  Maricel Rollins APRN as Nurse Practitioner (Radiation Oncology)  Damaris Barr PA-C as Physician Assistant (Radiation Oncology)  Dayron Stephens MD as Cardiologist (Cardiology)  Edda Li APRN as Nurse Practitioner (Nurse Practitioner)    Chief Complaint: follow up to HCM    Subjective   EP problems:   Paroxysmal atrial fibrillation  Frequent PVCs  -7/30/24: Holter 1.4% PVCs     Cardiology problems  Hypertrophic cardiomyopathy  CAD   -2009: [facility] s/p PCI to RCA  LV dysfunction  -8/12/23: EF 46-50%  -7/29/24: Echo 51-55%  4.  Apical aneurysm  5.  Moderate aortic stenosis  6.  Hypertension  7.  Hyperlipidemia     Medical problems:   Alcohol use disorder  Prostate cancer   Poor wound healing       HPI: Today I had the pleasure of seeing Dayron Lubin in the cardiology clinic for follow up. He is a 69 year old male with a history of CAD, known HOCM, and new onset atrial firbillation with RVR during admission in July. He converted to sinus with diltiazem gtt and was discharged on Amiodarone since his antiarrhythmic options were limited.     Post discharge, he wore a monitor which demonstrated few short runs of asymtpomatic SVT and 1.4% PVC burden. However, it was only monitored for a day or so. He denies any presyncope or syncopal episodes. No NSVT was noted on the monitor. On review of his echo, he has a large left atrium at 5cm, LV apical aneurysm, LVIDs 3cm, LVOT obstruction.     He denies any known recurrence of afib since his discharge and appears to be well compensated on exam today. He denies any recent chest pain suggestive of angina or progressive Sob. EKG today shows SR. During his hospitalization, he was  "recommended to consider an ICD for primary prevention of SCD since he has a LV apical aneurysm and previously reduced EF.     Objective     Visit Vitals  /96   Pulse 69   Ht 180.3 cm (70.98\")   Wt 84.8 kg (187 lb)   SpO2 99%   BMI 26.09 kg/m²           Vitals reviewed.   Constitutional:       Appearance: Not in distress.   Eyes:      Extraocular Movements: Extraocular movements intact.      Conjunctiva/sclera: Conjunctivae normal.      Pupils: Pupils are equal, round, and reactive to light.   HENT:      Head: Normocephalic and atraumatic.      Nose: Nose normal.    Mouth/Throat:      Lips: Pink.      Mouth: Mucous membranes are moist.      Pharynx: Oropharynx is clear.   Neck:      Vascular: No carotid bruit or JVD. JVD normal.   Pulmonary:      Effort: Pulmonary effort is normal.      Breath sounds: Normal breath sounds.   Chest:      Chest wall: Not tender to palpatation.   Cardiovascular:      PMI at left midclavicular line. Normal rate. Regular rhythm. Normal S1. Normal S2.       Murmurs: There is a grade 2/6 systolic murmur. The intensity increases with Valsalva.      No gallop.  No rub.   Pulses:     Radial: 2+ bilaterally.  Edema:     Peripheral edema absent.   Musculoskeletal: Normal range of motion.      Extremities: No clubbing present.     Cervical back: Normal range of motion. Skin:     General: Skin is warm and dry.   Neurological:      General: No focal deficit present.      Mental Status: Alert and oriented to person, place, and time.   Psychiatric:         Attention and Perception: Attention normal.         Mood and Affect: Affect normal.         Speech: Speech normal.         Behavior: Behavior normal.         Cognition and Memory: Cognition normal.             The following portions of the patient's history were reviewed and updated as appropriate: allergies, current medications, past medical history, past social history, past and problem list.     Review of Systems   Constitutional: " Negative.    HENT: Negative.     Eyes: Negative.    Respiratory: Negative.     Cardiovascular: Negative.    Gastrointestinal: Negative.    Endocrine: Negative.    Genitourinary: Negative.    Musculoskeletal: Negative.    Skin: Negative.    Allergic/Immunologic: Negative.    Neurological: Negative.    Hematological: Negative.    Psychiatric/Behavioral: Negative.                ECG 12 Lead    Date/Time: 9/10/2024 1:02 PM  Performed by: Italia Vasquez PA    Authorized by: Italia Vasquez PA  Comparison: compared with previous ECG from 7/29/2024  Rhythm: sinus rhythm  Rate: normal  Other findings: T wave abnormality    Clinical impression: abnormal EKG            Medication Review: yes    Current Outpatient Medications:     apixaban (ELIQUIS) 5 MG tablet tablet, Take 1 tablet by mouth Every 12 (Twelve) Hours. Indications: Atrial Fibrillation, Disp: 60 tablet, Rfl: 2    aspirin (aspirin) 81 MG EC tablet, Take 1 tablet by mouth Daily., Disp:  , Rfl:     atorvastatin (LIPITOR) 20 MG tablet, Take 1 tablet by mouth Daily., Disp: , Rfl:     azelastine (ASTELIN) 0.1 % nasal spray, 2 sprays into the nostril(s) as directed by provider 2 (Two) Times a Day. Use in each nostril as directed, Disp: 30 mL, Rfl: 11    fluticasone (FLONASE) 50 MCG/ACT nasal spray, 2 sprays into the nostril(s) as directed by provider Daily., Disp: 16 g, Rfl: 11    multivitamin with minerals tablet tablet, Take 1 tablet by mouth Daily., Disp: 100 each, Rfl: 99    pantoprazole (PROTONIX) 40 MG EC tablet, Take 1 tablet by mouth Daily., Disp: , Rfl:     temazepam (RESTORIL) 30 MG capsule, Take 1 capsule by mouth At Night As Needed for Sleep., Disp: , Rfl: 2    amiodarone (PACERONE) 200 MG tablet, Take 1 tablet by mouth Daily., Disp: 90 tablet, Rfl: 1   No Known Allergies    I have reviewed       CBC:  Lab Results - Last 18 Months   Lab Units 07/29/24  0741   WBC 10*3/mm3 5.71   HEMOGLOBIN g/dL 14.1   HEMATOCRIT % 42.3   PLATELETS 10*3/mm3 90*       BMP/CMP:  Lab Results - Last 18 Months   Lab Units 07/30/24  0413   SODIUM mmol/L 140   POTASSIUM mmol/L 3.1*   CHLORIDE mmol/L 103   CO2 mmol/L 25.0   GLUCOSE mg/dL 90   BUN mg/dL 14   CREATININE mg/dL 0.73*   CALCIUM mg/dL 9.0     BNP:   Lab Results - Last 18 Months   Lab Units 07/29/24  0741   PROBNP pg/mL 2,169.0*      THYROID:  Lab Results - Last 18 Months   Lab Units 08/16/23  0545   TSH uIU/mL 0.859       Results for orders placed during the hospital encounter of 07/29/24    Adult Transthoracic Echo Complete W/ Cont if Necessary Per Protocol    Interpretation Summary    Left ventricular systolic function is low normal. Left ventricular ejection fraction appears to be 51 - 55%.    Left ventricular wall thickness is consistent with moderate to severe concentric hypertrophy.    Mild to moderate mitral valve regurgitation is present.    The aortic valve is thickened and calcified and there is some degree of aortic valve stenosis, likely moderate, most of the flow acceleration appears to be in the left ventricular outflow tract as a result of hypertrophic obstructive cardiomyopathy.     Assessment:   Diagnoses and all orders for this visit:    1. Paroxysmal atrial fibrillation (Primary)  -     ECG 12 Lead; Future  -     amiodarone (PACERONE) 200 MG tablet; Take 1 tablet by mouth Daily.  Dispense: 90 tablet; Refill: 1    2. Hypertrophic cardiomyopathy  -     Cancel: MRI Cardiac Function Complete With & Without Morphology; Future  -     MRI Cardiac Function Complete With & Without Morphology    3. HOCM (hypertrophic obstructive cardiomyopathy)    4. Long-term use of high-risk medication    Other orders  -     ECG 12 Lead        High risk med use: He does not have any good antiarrhythmic options with his HCM and prior PCI. QT was too long for sotalol or tikosyn, but he is tolerating the amiodarone at this time.   -Will need surveillence labs   -Consider PVI ablation in future.     HOCM: With his previously reduced  EF and apical LV aneurysm, he is a candidate for an ICD for primary prevention. Discussed this today and he is willing to proceed. Discussed potential Cardiac MRI as well.   -EF is current 51%, NYHA Class II  -No recent PCI or intervention    PVCs: Monitor was only on for about a day and a half. PVCs seem to be suppressed with Amio currently.   -However, will discuss with Dr. Haque if a longer monitor is needed.     I spent 40 minutes caring for Dayron on this date of service. This time includes time spent by me in the following activities:preparing for the visit, reviewing tests, obtaining and/or reviewing a separately obtained history, performing a medically appropriate examination and/or evaluation , counseling and educating the patient/family/caregiver, ordering medications, tests, or procedures, referring and communicating with other health care professionals , documenting information in the medical record, and independently interpreting results and communicating that information with the patient/family/caregiver        Electronically signed by KWAN Armas

## 2024-09-16 ENCOUNTER — OFFICE VISIT (OUTPATIENT)
Dept: UROLOGY | Facility: CLINIC | Age: 69
End: 2024-09-16
Payer: MEDICARE

## 2024-09-16 VITALS — WEIGHT: 200 LBS | HEIGHT: 71 IN | BODY MASS INDEX: 28 KG/M2 | TEMPERATURE: 97.7 F

## 2024-09-16 DIAGNOSIS — C61 PROSTATE CANCER: Primary | ICD-10-CM

## 2024-09-16 PROCEDURE — 81001 URINALYSIS AUTO W/SCOPE: CPT | Performed by: PHYSICIAN ASSISTANT

## 2024-09-16 PROCEDURE — 99214 OFFICE O/P EST MOD 30 MIN: CPT | Performed by: PHYSICIAN ASSISTANT

## 2024-09-16 PROCEDURE — 1160F RVW MEDS BY RX/DR IN RCRD: CPT | Performed by: PHYSICIAN ASSISTANT

## 2024-09-16 PROCEDURE — 1159F MED LIST DOCD IN RCRD: CPT | Performed by: PHYSICIAN ASSISTANT

## 2024-10-04 ENCOUNTER — HOSPITAL ENCOUNTER (OUTPATIENT)
Dept: MRI IMAGING | Facility: HOSPITAL | Age: 69
Discharge: HOME | End: 2024-10-04
Payer: MEDICARE

## 2024-10-04 PROCEDURE — 75561 CARDIAC MRI FOR MORPH W/DYE: CPT

## 2024-10-04 PROCEDURE — 0 GADOBENATE DIMEGLUMINE 529 MG/ML SOLUTION: Performed by: PHYSICIAN ASSISTANT

## 2024-10-04 PROCEDURE — A9577 INJ MULTIHANCE: HCPCS | Performed by: PHYSICIAN ASSISTANT

## 2024-10-04 RX ADMIN — GADOBENATE DIMEGLUMINE 18 ML: 529 INJECTION, SOLUTION INTRAVENOUS at 09:29

## 2024-10-07 ENCOUNTER — TELEPHONE (OUTPATIENT)
Dept: CARDIOLOGY | Facility: CLINIC | Age: 69
End: 2024-10-07

## 2024-10-07 NOTE — TELEPHONE ENCOUNTER
Caller: Dayron Lubin    Relationship: Self    Best call back number: [phone]     What is the best time to reach you: ANYTIME OR LEAVE A VOICEMAIL     Who are you requesting to speak with (clinical staff, provider,  specific staff member): CLINICAL     Do you know the name of the person who called: TAMIKA     What was the call regarding: MISSED CALL FOR SCHEDULING SURGERY FOR DEFIBRILLATOR AND ANOTHER PROCEDURE .

## 2024-10-09 ENCOUNTER — PREP FOR SURGERY (OUTPATIENT)
Dept: OTHER | Facility: HOSPITAL | Age: 69
End: 2024-10-09
Payer: MEDICARE

## 2024-10-18 ENCOUNTER — HOSPITAL ENCOUNTER (OUTPATIENT)
Dept: GENERAL RADIOLOGY | Facility: HOSPITAL | Age: 69
Discharge: HOME | End: 2024-10-18
Payer: MEDICARE

## 2024-10-18 ENCOUNTER — PRE-ADMISSION TESTING (OUTPATIENT)
Dept: PREADMISSION TESTING | Facility: HOSPITAL | Age: 69
End: 2024-10-18
Payer: MEDICARE

## 2024-10-18 VITALS
HEART RATE: 64 BPM | RESPIRATION RATE: 16 BRPM | DIASTOLIC BLOOD PRESSURE: 92 MMHG | BODY MASS INDEX: 27.69 KG/M2 | HEIGHT: 71 IN | OXYGEN SATURATION: 97 % | WEIGHT: 197.75 LBS | SYSTOLIC BLOOD PRESSURE: 188 MMHG

## 2024-10-18 DIAGNOSIS — R09.81 NASAL CONGESTION: ICD-10-CM

## 2024-10-18 DIAGNOSIS — J34.89 CONCHA BULLOSA: ICD-10-CM

## 2024-10-18 DIAGNOSIS — R79.1 ABNORMAL COAGULATION PROFILE: ICD-10-CM

## 2024-10-18 DIAGNOSIS — J33.9 NASAL POLYPOSIS: ICD-10-CM

## 2024-10-18 LAB
ALBUMIN SERPL-MCNC: 5 G/DL (ref 3.5–5.2)
ALBUMIN/GLOB SERPL: 1.5 G/DL
ALP SERPL-CCNC: 64 U/L (ref 39–117)
ALT SERPL W P-5'-P-CCNC: 40 U/L (ref 1–41)
ANION GAP SERPL CALCULATED.3IONS-SCNC: 13 MMOL/L (ref 5–15)
APTT PPP: 33.4 SECONDS (ref 24.5–36)
AST SERPL-CCNC: 60 U/L (ref 1–40)
BILIRUB SERPL-MCNC: 0.5 MG/DL (ref 0–1.2)
BUN SERPL-MCNC: 17 MG/DL (ref 8–23)
BUN/CREAT SERPL: 21 (ref 7–25)
CALCIUM SPEC-SCNC: 9.9 MG/DL (ref 8.6–10.5)
CHLORIDE SERPL-SCNC: 102 MMOL/L (ref 98–107)
CO2 SERPL-SCNC: 23 MMOL/L (ref 22–29)
CREAT SERPL-MCNC: 0.81 MG/DL (ref 0.76–1.27)
DEPRECATED RDW RBC AUTO: 48.2 FL (ref 37–54)
EGFRCR SERPLBLD CKD-EPI 2021: 94.8 ML/MIN/1.73
ERYTHROCYTE [DISTWIDTH] IN BLOOD BY AUTOMATED COUNT: 13.7 % (ref 12.3–15.4)
GLOBULIN UR ELPH-MCNC: 3.3 GM/DL
GLUCOSE SERPL-MCNC: 116 MG/DL (ref 65–99)
HCT VFR BLD AUTO: 43.4 % (ref 37.5–51)
HGB BLD-MCNC: 14.6 G/DL (ref 13–17.7)
INR PPP: 1.18 (ref 0.91–1.09)
MCH RBC QN AUTO: 32.4 PG (ref 26.6–33)
MCHC RBC AUTO-ENTMCNC: 33.6 G/DL (ref 31.5–35.7)
MCV RBC AUTO: 96.2 FL (ref 79–97)
PLATELET # BLD AUTO: 152 10*3/MM3 (ref 140–450)
PMV BLD AUTO: 10.7 FL (ref 6–12)
POTASSIUM SERPL-SCNC: 4.1 MMOL/L (ref 3.5–5.2)
PROT SERPL-MCNC: 8.3 G/DL (ref 6–8.5)
PROTHROMBIN TIME: 15.5 SECONDS (ref 11.8–14.8)
QT INTERVAL: 474 MS
QTC INTERVAL: 489 MS
RBC # BLD AUTO: 4.51 10*6/MM3 (ref 4.14–5.8)
SODIUM SERPL-SCNC: 138 MMOL/L (ref 136–145)
WBC NRBC COR # BLD AUTO: 8.21 10*3/MM3 (ref 3.4–10.8)

## 2024-10-18 PROCEDURE — 85610 PROTHROMBIN TIME: CPT

## 2024-10-18 PROCEDURE — 85730 THROMBOPLASTIN TIME PARTIAL: CPT

## 2024-10-18 PROCEDURE — 80053 COMPREHEN METABOLIC PANEL: CPT

## 2024-10-18 PROCEDURE — 71046 X-RAY EXAM CHEST 2 VIEWS: CPT

## 2024-10-18 PROCEDURE — 93005 ELECTROCARDIOGRAM TRACING: CPT

## 2024-10-18 PROCEDURE — 36415 COLL VENOUS BLD VENIPUNCTURE: CPT

## 2024-10-18 PROCEDURE — 85027 COMPLETE CBC AUTOMATED: CPT

## 2024-10-18 RX ORDER — METOPROLOL SUCCINATE 25 MG/1
25 TABLET, EXTENDED RELEASE ORAL DAILY
COMMUNITY

## 2024-10-18 NOTE — DISCHARGE INSTRUCTIONS

## 2024-10-21 ENCOUNTER — PREP FOR SURGERY (OUTPATIENT)
Dept: OTHER | Facility: HOSPITAL | Age: 69
End: 2024-10-21
Payer: MEDICARE

## 2024-10-21 ENCOUNTER — OFFICE VISIT (OUTPATIENT)
Dept: CARDIOLOGY | Facility: CLINIC | Age: 69
End: 2024-10-21
Payer: MEDICARE

## 2024-10-21 VITALS
BODY MASS INDEX: 27.02 KG/M2 | HEART RATE: 85 BPM | WEIGHT: 193 LBS | SYSTOLIC BLOOD PRESSURE: 152 MMHG | HEIGHT: 71 IN | OXYGEN SATURATION: 98 % | DIASTOLIC BLOOD PRESSURE: 74 MMHG

## 2024-10-21 DIAGNOSIS — I48.0 PAF (PAROXYSMAL ATRIAL FIBRILLATION): Primary | ICD-10-CM

## 2024-10-21 DIAGNOSIS — I42.1 HOCM (HYPERTROPHIC OBSTRUCTIVE CARDIOMYOPATHY): Chronic | ICD-10-CM

## 2024-10-21 DIAGNOSIS — I48.0 PAROXYSMAL ATRIAL FIBRILLATION: ICD-10-CM

## 2024-10-21 PROCEDURE — 3078F DIAST BP <80 MM HG: CPT | Performed by: PHYSICIAN ASSISTANT

## 2024-10-21 PROCEDURE — 99214 OFFICE O/P EST MOD 30 MIN: CPT | Performed by: PHYSICIAN ASSISTANT

## 2024-10-21 PROCEDURE — 3077F SYST BP >= 140 MM HG: CPT | Performed by: PHYSICIAN ASSISTANT

## 2024-10-21 PROCEDURE — 93000 ELECTROCARDIOGRAM COMPLETE: CPT | Performed by: PHYSICIAN ASSISTANT

## 2024-10-21 RX ORDER — LOSARTAN POTASSIUM 25 MG/1
25 TABLET ORAL DAILY
Qty: 30 TABLET | Refills: 6 | Status: SHIPPED | OUTPATIENT
Start: 2024-10-21

## 2024-10-21 NOTE — PROGRESS NOTES
[facility] -  CLINIC FOLLOW UP     Patient Care Team:  Herberth Nguyen Jr., MD as PCP - General (Family Medicine)  Dayron Love MD as Consulting Physician (Urology)  Eloy Lindsay III, MD as Consulting Physician (Radiation Oncology)  Maricel Rollins APRN as Nurse Practitioner (Radiation Oncology)  Damaris Barr PA-C as Physician Assistant (Radiation Oncology)  Dayron Stephens MD as Cardiologist (Cardiology)  Edda Li APRN as Nurse Practitioner (Nurse Practitioner)    Chief Complaint:     Subjective   EP problems:   Paroxysmal atrial fibrillation  Frequent PVCs  -7/30/24: Holter 1.4% PVCs     Cardiology problems  Hypertrophic cardiomyopathy  CAD   -2009: [facility] s/p PCI to RCA  LV dysfunction  -8/12/23: EF 46-50%  -7/29/24: Echo 51-55%  4.  Apical aneurysm  5.  Moderate aortic stenosis  6.  Hypertension  7.  Hyperlipidemia     Medical problems:   Alcohol use disorder  Prostate cancer   Poor wound healing        HPI: Today I had the pleasure of seeing Dayron Lubin in the cardiology clinic for follow up. . He is a 69 year old male with a history of CAD, known HOCM, and new onset atrial firbillation with RVR during admission in July. He converted to sinus with diltiazem gtt and was discharged on Amiodarone since his antiarrhythmic options were limited.      Post discharge, he wore a monitor which demonstrated few short runs of asymtpomatic SVT and 1.4% PVC burden. However, it was only monitored for a day or so. He denies any presyncope or syncopal episodes. No NSVT was noted on the monitor. On review of his echo, he has a large left atrium at 5cm, LV apical aneurysm, LVIDs 3cm, LVOT obstruction.     He follows up today after his cardiac MRI showed confirmation of LV apical aneurysm. He denies any chest pain suggestive of angina. He denies any presynocpe or syncopal episodes. He denies any palpitations. Discussed ICD today and he is very hesitant to  "proceed with implant.     Summary     Evidence of hypertrophic cardiomyopathy with left ventricular outflow tract obstruction  Intraventricular septum 2.28 cm in diameter compared to 1 cm of the inferior lateral wall  Left ventricular apical aneurysm measuring 4.7 cm in diameter  No evidence of left ventricular thrombus  Late gadolinium enhancement of the RV insertion sites as can be seen in hypertrophic cardiomyopathy  Posterior directed mitral regurgitation  Moderate thickening of aortic valve leaflets noted  Quantitative flow analysis across the aortic valve shows elevated velocities  Peak velocity of 271.13 cm/s suggestive of mild to moderate aortic stenosis          Objective     Visit Vitals  BP (!) 192/108   Pulse 85   Ht 180 cm (70.87\")   Wt 87.5 kg (193 lb)   SpO2 98%   BMI 27.02 kg/m²           Vitals reviewed.   Constitutional:       Appearance: Healthy appearance. Not in distress.   Eyes:      Extraocular Movements: Extraocular movements intact.      Conjunctiva/sclera: Conjunctivae normal.      Pupils: Pupils are equal, round, and reactive to light.   HENT:      Head: Normocephalic and atraumatic.      Nose: Nose normal.    Mouth/Throat:      Lips: Pink.      Mouth: Mucous membranes are moist.      Pharynx: Oropharynx is clear.   Neck:      Vascular: No carotid bruit or JVD. JVD normal.   Pulmonary:      Effort: Pulmonary effort is normal.      Breath sounds: Normal breath sounds.   Chest:      Chest wall: Not tender to palpatation.   Cardiovascular:      PMI at left midclavicular line. Normal rate. Regular rhythm. Normal S1. Normal S2.       Murmurs: There is a grade 2/6 systolic murmur. The intensity increases with Valsalva.      No gallop.  No rub.   Pulses:     Radial: 2+ bilaterally.  Edema:     Peripheral edema absent.   Abdominal:      General: Bowel sounds are normal.      Palpations: Abdomen is soft.   Musculoskeletal: Normal range of motion.      Extremities: No clubbing present.     Cervical " back: Normal range of motion. Skin:     General: Skin is warm and dry.   Neurological:      General: No focal deficit present.      Mental Status: Alert and oriented to person, place, and time.   Psychiatric:         Attention and Perception: Attention normal.         Mood and Affect: Affect normal.         Speech: Speech normal.         Behavior: Behavior normal.         Cognition and Memory: Cognition normal.             The following portions of the patient's history were reviewed and updated as appropriate: allergies, current medications, past medical history, past social history, past and problem list.     Review of Systems   Constitutional: Negative.    HENT: Negative.     Eyes: Negative.    Respiratory: Negative.     Cardiovascular: Negative.    Gastrointestinal: Negative.    Endocrine: Negative.    Genitourinary: Negative.    Musculoskeletal: Negative.    Skin: Negative.    Allergic/Immunologic: Negative.    Neurological: Negative.    Hematological: Negative.    Psychiatric/Behavioral: Negative.                  ECG 12 Lead    Date/Time: 10/21/2024 11:29 AM  Performed by: Italia Vasquez PA    Authorized by: Italia Vasquez PA  Comparison: compared with previous ECG from 7/29/2024  Rhythm: sinus rhythm  Rate: normal  BPM: 83  QRS axis: normal  Other findings: T wave abnormality    Clinical impression: abnormal EKG          Medication Review: yes    Current Outpatient Medications:     amiodarone (PACERONE) 200 MG tablet, Take 1 tablet by mouth Daily., Disp: 90 tablet, Rfl: 1    apixaban (ELIQUIS) 5 MG tablet tablet, Take 1 tablet by mouth Every 12 (Twelve) Hours. Indications: Atrial Fibrillation, Disp: 60 tablet, Rfl: 2    aspirin (aspirin) 81 MG EC tablet, Take 1 tablet by mouth Daily., Disp:  , Rfl:     atorvastatin (LIPITOR) 20 MG tablet, Take 1 tablet by mouth Daily., Disp: , Rfl:     azelastine (ASTELIN) 0.1 % nasal spray, 2 sprays into the nostril(s) as directed by provider 2 (Two) Times a Day. Use  in each nostril as directed, Disp: 30 mL, Rfl: 11    fluticasone (FLONASE) 50 MCG/ACT nasal spray, 2 sprays into the nostril(s) as directed by provider Daily., Disp: 16 g, Rfl: 11    metoprolol succinate XL (TOPROL-XL) 25 MG 24 hr tablet, Take 1 tablet by mouth Daily., Disp: , Rfl:     multivitamin with minerals tablet tablet, Take 1 tablet by mouth Daily., Disp: 100 each, Rfl: 99    pantoprazole (PROTONIX) 40 MG EC tablet, Take 1 tablet by mouth Daily., Disp: , Rfl:     temazepam (RESTORIL) 30 MG capsule, Take 1 capsule by mouth At Night As Needed for Sleep., Disp: , Rfl: 2    losartan (Cozaar) 25 MG tablet, Take 1 tablet by mouth Daily., Disp: 30 tablet, Rfl: 6   No Known Allergies    I have reviewed       CBC:  Lab Results - Last 18 Months   Lab Units 10/18/24  1211   WBC 10*3/mm3 8.21   HEMOGLOBIN g/dL 14.6   HEMATOCRIT % 43.4   PLATELETS 10*3/mm3 152      BMP/CMP:  Lab Results - Last 18 Months   Lab Units 10/18/24  1211   SODIUM mmol/L 138   POTASSIUM mmol/L 4.1   CHLORIDE mmol/L 102   CO2 mmol/L 23.0   GLUCOSE mg/dL 116*   BUN mg/dL 17   CREATININE mg/dL 0.81   CALCIUM mg/dL 9.9     BNP:   Lab Results - Last 18 Months   Lab Units 07/29/24  0741   PROBNP pg/mL 2,169.0*      THYROID:  Lab Results - Last 18 Months   Lab Units 08/16/23  0545   TSH uIU/mL 0.859       Results for orders placed during the hospital encounter of 07/29/24    Adult Transthoracic Echo Complete W/ Cont if Necessary Per Protocol    Interpretation Summary    Left ventricular systolic function is low normal. Left ventricular ejection fraction appears to be 51 - 55%.    Left ventricular wall thickness is consistent with moderate to severe concentric hypertrophy.    Mild to moderate mitral valve regurgitation is present.    The aortic valve is thickened and calcified and there is some degree of aortic valve stenosis, likely moderate, most of the flow acceleration appears to be in the left ventricular outflow tract as a result of hypertrophic  obstructive cardiomyopathy.         Device to be implanted: Dual chamber ICD    NYHA Class: II   Most recent EF: 51-55% by TTE   QRS duration in milliseconds: 121ms    QRS morphology: interventricular conduction delay     ICD indication: Primary prevention with NICM/hypertrophic obstructive cardiomyopathy with LV apical aneurysm  Right atrial pacing indication: There is the expectation that this will improve cardiac efficiency and quality of life and avoid right ventricular pacing to allow for optimal medical management with controlling atrial arrhythmias.     None of the following is present:    NYHA class IV nonambulatory heart failure   Cardiogenic shock or symptomatic hypotension in a stable baseline rhythm   Coronary revascularization within the past 3 months   Clinical symptoms or signs that would make them a candidate for coronary revascularization   Non-cardiac disease associated with an expected survival of <1 year    Irreversible brain damage from preexisting cerebral disease     The [place] Patient Decision Aid shared decision making tool was used.       Assessment:   Diagnoses and all orders for this visit:    1. PAF (paroxysmal atrial fibrillation) (Primary)  -     ECG 12 Lead; Future  -     losartan (Cozaar) 25 MG tablet; Take 1 tablet by mouth Daily.  Dispense: 30 tablet; Refill: 6    2. Paroxysmal atrial fibrillation    3. HOCM (hypertrophic obstructive cardiomyopathy)    Other orders  -     ECG 12 Lead      HOCM/LV apical aneurysm: Discussed ICD implant for primary prevention. He is very nervous about implantation of an ICD. Will plan to at least place on schedule. Lengthy discussion regarding need for protection again SCD with his findings on Cardiac MRI.     PAF: Currently on Amiodarone, is not a good candidate for other AADs with HOCM and prolonged QT being currently long.   -Discussed ultimately we could begin work up now and plan for PVI ablation after the new year.   -Continue anticoagulation  indefinitely  -Discussed risks and benefits and process of ablation work up.      An electrophysiology study with ablation is an inherently high risk procedure with possible complications that include but are not limited to vascular access complications, internal bleeding, tamponade requiring pericardiocentesis or cardiac surgery, stroke, MI, embolism, myocardial injury, injury to the normal conduction system requiring a pacemaker, and ultimately death.  We also discussed that given the nature of the procedure, therapeutic efficacy can be variable.  Possibilities of recurrent arrhythmias and the possible need for additional procedures and/or medical therapy was discussed.         I spent 30 minutes caring for Dayron on this date of service. This time includes time spent by me in the following activities:preparing for the visit, reviewing tests, obtaining and/or reviewing a separately obtained history, performing a medically appropriate examination and/or evaluation , counseling and educating the patient/family/caregiver, ordering medications, tests, or procedures, referring and communicating with other health care professionals , documenting information in the medical record, and independently interpreting results and communicating that information with the patient/family/caregiver        Electronically signed by KWAN Armas

## 2024-10-22 ENCOUNTER — TELEPHONE (OUTPATIENT)
Dept: OTOLARYNGOLOGY | Facility: CLINIC | Age: 69
End: 2024-10-22
Payer: MEDICARE

## 2024-10-22 LAB
QT INTERVAL: 474 MS
QTC INTERVAL: 489 MS

## 2024-10-22 NOTE — TELEPHONE ENCOUNTER
----- Message from [place] IYER sent at 10/22/2024 11:36 AM CDT -----  Pt called the hub and wants to cancel his surgery for 10/25 I think the date is right

## 2024-11-16 ENCOUNTER — HOSPITAL ENCOUNTER (INPATIENT)
Facility: HOSPITAL | Age: 69
LOS: 5 days | Discharge: HOME | End: 2024-11-21
Attending: EMERGENCY MEDICINE | Admitting: FAMILY MEDICINE
Payer: MEDICARE

## 2024-11-16 ENCOUNTER — APPOINTMENT (OUTPATIENT)
Dept: GENERAL RADIOLOGY | Facility: HOSPITAL | Age: 69
End: 2024-11-16
Payer: MEDICARE

## 2024-11-16 ENCOUNTER — APPOINTMENT (OUTPATIENT)
Dept: CT IMAGING | Facility: HOSPITAL | Age: 69
End: 2024-11-16
Payer: MEDICARE

## 2024-11-16 DIAGNOSIS — K76.9 CHRONIC LIVER DISEASE: ICD-10-CM

## 2024-11-16 DIAGNOSIS — E86.0 DEHYDRATION: ICD-10-CM

## 2024-11-16 DIAGNOSIS — T79.6XXA TRAUMATIC RHABDOMYOLYSIS, INITIAL ENCOUNTER: ICD-10-CM
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
## 2024-11-16 DIAGNOSIS — Z74.09 IMPAIRED MOBILITY: ICD-10-CM

## 2024-11-16 DIAGNOSIS — R17 ELEVATED BILIRUBIN: ICD-10-CM

## 2024-11-16 DIAGNOSIS — I95.9 HYPOTENSION, UNSPECIFIED HYPOTENSION TYPE: Primary | ICD-10-CM

## 2024-11-16 DIAGNOSIS — F10.10 ALCOHOL ABUSE: ICD-10-CM

## 2024-11-16 PROBLEM — I48.0 PAROXYSMAL ATRIAL FIBRILLATION: Status: ACTIVE | Noted: 2024-07-30

## 2024-11-16 PROBLEM — R74.01 TRANSAMINITIS: Status: ACTIVE | Noted: 2024-11-16

## 2024-11-16 PROBLEM — D69.6 THROMBOCYTOPENIA: Status: ACTIVE | Noted: 2024-11-16

## 2024-11-16 PROBLEM — F10.939 ALCOHOL WITHDRAWAL: Status: ACTIVE | Noted: 2024-11-16

## 2024-11-16 PROBLEM — M62.82 NON-TRAUMATIC RHABDOMYOLYSIS: Status: ACTIVE | Noted: 2024-11-16

## 2024-11-16 PROBLEM — R29.6 MULTIPLE FALLS: Status: ACTIVE | Noted: 2024-11-16

## 2024-11-16 LAB
ABO GROUP BLD: NORMAL
ALBUMIN SERPL-MCNC: 3.6 G/DL (ref 3.5–5.2)
ALBUMIN/GLOB SERPL: 1 G/DL
ALP SERPL-CCNC: 93 U/L (ref 39–117)
ALT SERPL W P-5'-P-CCNC: 156 U/L (ref 1–41)
AMPHET+METHAMPHET UR QL: NEGATIVE
AMPHETAMINES UR QL: NEGATIVE
ANION GAP SERPL CALCULATED.3IONS-SCNC: 21 MMOL/L (ref 5–15)
ARTERIAL PATENCY WRIST A: ABNORMAL
AST SERPL-CCNC: 404 U/L (ref 1–40)
ATMOSPHERIC PRESS: 755 MMHG
BACTERIA UR QL AUTO: ABNORMAL /HPF
BARBITURATES UR QL SCN: NEGATIVE
BASE EXCESS BLDA CALC-SCNC: 3.8 MMOL/L (ref 0–2)
BASOPHILS # BLD AUTO: 0 10*3/MM3 (ref 0–0.2)
BASOPHILS NFR BLD AUTO: 0 % (ref 0–1.5)
BDY SITE: ABNORMAL
BENZODIAZ UR QL SCN: POSITIVE
BILIRUB SERPL-MCNC: 2.8 MG/DL (ref 0–1.2)
BILIRUB UR QL STRIP: ABNORMAL
BLD GP AB SCN SERPL QL: NEGATIVE
BODY TEMPERATURE: 37
BUN SERPL-MCNC: 21 MG/DL (ref 8–23)
BUN/CREAT SERPL: 11.6 (ref 7–25)
BUPRENORPHINE SERPL-MCNC: NEGATIVE NG/ML
CALCIUM SPEC-SCNC: 8.3 MG/DL (ref 8.6–10.5)
CANNABINOIDS SERPL QL: NEGATIVE
CHLORIDE SERPL-SCNC: 88 MMOL/L (ref 98–107)
CK SERPL-CCNC: 1260 U/L (ref 20–200)
CLARITY UR: CLEAR
CO2 SERPL-SCNC: 22 MMOL/L (ref 22–29)
COCAINE UR QL: NEGATIVE
COLOR UR: ABNORMAL
CREAT SERPL-MCNC: 1.81 MG/DL (ref 0.76–1.27)
D-LACTATE SERPL-SCNC: 1.9 MMOL/L (ref 0.5–2)
D-LACTATE SERPL-SCNC: 4 MMOL/L (ref 0.5–2)
DEPRECATED RDW RBC AUTO: 44.8 FL (ref 37–54)
EGFRCR SERPLBLD CKD-EPI 2021: 39.7 ML/MIN/1.73
EOSINOPHIL # BLD AUTO: 0 10*3/MM3 (ref 0–0.4)
EOSINOPHIL NFR BLD AUTO: 0 % (ref 0.3–6.2)
ERYTHROCYTE [DISTWIDTH] IN BLOOD BY AUTOMATED COUNT: 13.4 % (ref 12.3–15.4)
ETHANOL UR QL: <0.01 %
FENTANYL UR-MCNC: NEGATIVE NG/ML
GLOBULIN UR ELPH-MCNC: 3.5 GM/DL
GLUCOSE SERPL-MCNC: 109 MG/DL (ref 65–99)
GLUCOSE UR STRIP-MCNC: NEGATIVE MG/DL
HCO3 BLDA-SCNC: 25.5 MMOL/L (ref 20–26)
HCT VFR BLD AUTO: 35.1 % (ref 37.5–51)
HGB BLD-MCNC: 12.2 G/DL (ref 13–17.7)
HGB UR QL STRIP.AUTO: ABNORMAL
HYALINE CASTS UR QL AUTO: ABNORMAL /LPF
IMM GRANULOCYTES # BLD AUTO: 0.06 10*3/MM3 (ref 0–0.05)
IMM GRANULOCYTES NFR BLD AUTO: 0.9 % (ref 0–0.5)
INR PPP: 1.43 (ref 0.91–1.09)
KETONES UR QL STRIP: ABNORMAL
LEUKOCYTE ESTERASE UR QL STRIP.AUTO: ABNORMAL
LIPASE SERPL-CCNC: 113 U/L (ref 13–60)
LYMPHOCYTES # BLD AUTO: 0.41 10*3/MM3 (ref 0.7–3.1)
LYMPHOCYTES NFR BLD AUTO: 6.3 % (ref 19.6–45.3)
Lab: ABNORMAL
MAGNESIUM SERPL-MCNC: 1.9 MG/DL (ref 1.6–2.4)
MCH RBC QN AUTO: 31.7 PG (ref 26.6–33)
MCHC RBC AUTO-ENTMCNC: 34.8 G/DL (ref 31.5–35.7)
MCV RBC AUTO: 91.2 FL (ref 79–97)
METHADONE UR QL SCN: NEGATIVE
MODALITY: ABNORMAL
MONOCYTES # BLD AUTO: 0.48 10*3/MM3 (ref 0.1–0.9)
MONOCYTES NFR BLD AUTO: 7.4 % (ref 5–12)
NEUTROPHILS NFR BLD AUTO: 5.57 10*3/MM3 (ref 1.7–7)
NEUTROPHILS NFR BLD AUTO: 85.4 % (ref 42.7–76)
NITRITE UR QL STRIP: NEGATIVE
NT-PROBNP SERPL-MCNC: 6958 PG/ML (ref 0–900)
OPIATES UR QL: NEGATIVE
OXYCODONE UR QL SCN: POSITIVE
PCO2 BLDA: 28.6 MM HG (ref 35–45)
PCO2 TEMP ADJ BLD: 28.6 MM HG (ref 35–45)
PCP UR QL SCN: NEGATIVE
PH BLDA: 7.56 PH UNITS (ref 7.35–7.45)
PH UR STRIP.AUTO: 7 [PH] (ref 5–8)
PH, TEMP CORRECTED: 7.56 PH UNITS (ref 7.35–7.45)
PLATELET # BLD AUTO: 72 10*3/MM3 (ref 140–450)
PMV BLD AUTO: 11.3 FL (ref 6–12)
PO2 BLDA: 86 MM HG (ref 83–108)
PO2 TEMP ADJ BLD: 86 MM HG (ref 83–108)
POTASSIUM SERPL-SCNC: 4 MMOL/L (ref 3.5–5.2)
PROT SERPL-MCNC: 7.1 G/DL (ref 6–8.5)
PROT UR QL STRIP: ABNORMAL
PROTHROMBIN TIME: 18 SECONDS (ref 11.8–14.8)
RBC # BLD AUTO: 3.85 10*6/MM3 (ref 4.14–5.8)
RBC # UR STRIP: ABNORMAL /HPF
REF LAB TEST METHOD: ABNORMAL
RH BLD: POSITIVE
SAO2 % BLDCOA: 98.2 % (ref 94–99)
SODIUM SERPL-SCNC: 131 MMOL/L (ref 136–145)
SP GR UR STRIP: >1.03 (ref 1–1.03)
SQUAMOUS #/AREA URNS HPF: ABNORMAL /HPF
T&S EXPIRATION DATE: NORMAL
TRICYCLICS UR QL SCN: NEGATIVE
URINE MYOGLOBIN, QUALITATIVE: POSITIVE
UROBILINOGEN UR QL STRIP: ABNORMAL
VENTILATOR MODE: ABNORMAL
WBC # UR STRIP: ABNORMAL /HPF
WBC NRBC COR # BLD AUTO: 6.52 10*3/MM3 (ref 3.4–10.8)

## 2024-11-16 PROCEDURE — 86850 RBC ANTIBODY SCREEN: CPT | Performed by: EMERGENCY MEDICINE

## 2024-11-16 PROCEDURE — 99285 EMERGENCY DEPT VISIT HI MDM: CPT

## 2024-11-16 PROCEDURE — 71260 CT THORAX DX C+: CPT

## 2024-11-16 PROCEDURE — 86901 BLOOD TYPING SEROLOGIC RH(D): CPT | Performed by: EMERGENCY MEDICINE

## 2024-11-16 PROCEDURE — 25810000003 SODIUM CHLORIDE 0.9 % SOLUTION: Performed by: NURSE PRACTITIONER

## 2024-11-16 PROCEDURE — 83735 ASSAY OF MAGNESIUM: CPT | Performed by: EMERGENCY MEDICINE

## 2024-11-16 PROCEDURE — 80053 COMPREHEN METABOLIC PANEL: CPT | Performed by: EMERGENCY MEDICINE

## 2024-11-16 PROCEDURE — 25010000002 PIPERACILLIN SOD-TAZOBACTAM PER 1 G: Performed by: EMERGENCY MEDICINE

## 2024-11-16 PROCEDURE — 93010 ELECTROCARDIOGRAM REPORT: CPT | Performed by: HOSPITALIST

## 2024-11-16 PROCEDURE — 81001 URINALYSIS AUTO W/SCOPE: CPT | Performed by: EMERGENCY MEDICINE

## 2024-11-16 PROCEDURE — 82803 BLOOD GASES ANY COMBINATION: CPT

## 2024-11-16 PROCEDURE — 87154 CUL TYP ID BLD PTHGN 6+ TRGT: CPT | Performed by: EMERGENCY MEDICINE

## 2024-11-16 PROCEDURE — 25010000002 THIAMINE HCL 200 MG/2ML SOLUTION: Performed by: NURSE PRACTITIONER

## 2024-11-16 PROCEDURE — 36415 COLL VENOUS BLD VENIPUNCTURE: CPT

## 2024-11-16 PROCEDURE — 93005 ELECTROCARDIOGRAM TRACING: CPT | Performed by: EMERGENCY MEDICINE

## 2024-11-16 PROCEDURE — 87147 CULTURE TYPE IMMUNOLOGIC: CPT | Performed by: EMERGENCY MEDICINE

## 2024-11-16 PROCEDURE — 36600 WITHDRAWAL OF ARTERIAL BLOOD: CPT

## 2024-11-16 PROCEDURE — 85610 PROTHROMBIN TIME: CPT | Performed by: EMERGENCY MEDICINE

## 2024-11-16 PROCEDURE — 72125 CT NECK SPINE W/O DYE: CPT

## 2024-11-16 PROCEDURE — 74177 CT ABD & PELVIS W/CONTRAST: CPT

## 2024-11-16 PROCEDURE — 83690 ASSAY OF LIPASE: CPT | Performed by: EMERGENCY MEDICINE

## 2024-11-16 PROCEDURE — 25010000002 THIAMINE HCL 200 MG/2ML SOLUTION: Performed by: EMERGENCY MEDICINE

## 2024-11-16 PROCEDURE — 83605 ASSAY OF LACTIC ACID: CPT | Performed by: EMERGENCY MEDICINE

## 2024-11-16 PROCEDURE — 25510000001 IOPAMIDOL 61 % SOLUTION: Performed by: EMERGENCY MEDICINE

## 2024-11-16 PROCEDURE — 82077 ASSAY SPEC XCP UR&BREATH IA: CPT | Performed by: EMERGENCY MEDICINE

## 2024-11-16 PROCEDURE — 83880 ASSAY OF NATRIURETIC PEPTIDE: CPT | Performed by: EMERGENCY MEDICINE

## 2024-11-16 PROCEDURE — 83874 ASSAY OF MYOGLOBIN: CPT | Performed by: INTERNAL MEDICINE

## 2024-11-16 PROCEDURE — 80307 DRUG TEST PRSMV CHEM ANLYZR: CPT | Performed by: EMERGENCY MEDICINE

## 2024-11-16 PROCEDURE — 86900 BLOOD TYPING SEROLOGIC ABO: CPT | Performed by: EMERGENCY MEDICINE

## 2024-11-16 PROCEDURE — 71045 X-RAY EXAM CHEST 1 VIEW: CPT

## 2024-11-16 PROCEDURE — 70450 CT HEAD/BRAIN W/O DYE: CPT

## 2024-11-16 PROCEDURE — 87040 BLOOD CULTURE FOR BACTERIA: CPT | Performed by: EMERGENCY MEDICINE

## 2024-11-16 PROCEDURE — 82550 ASSAY OF CK (CPK): CPT | Performed by: EMERGENCY MEDICINE

## 2024-11-16 PROCEDURE — 25810000003 LACTATED RINGERS SOLUTION: Performed by: EMERGENCY MEDICINE

## 2024-11-16 PROCEDURE — 85025 COMPLETE CBC W/AUTO DIFF WBC: CPT | Performed by: EMERGENCY MEDICINE

## 2024-11-16 PROCEDURE — 25810000003 SODIUM CHLORIDE 0.9 % SOLUTION: Performed by: FAMILY MEDICINE

## 2024-11-16 RX ORDER — NITROGLYCERIN 0.4 MG/1
0.4 TABLET SUBLINGUAL
Status: DISCONTINUED | OUTPATIENT
Start: 2024-11-16 | End: 2024-11-21 | Stop reason: HOSPADM

## 2024-11-16 RX ORDER — ACETAMINOPHEN 160 MG/5ML
650 SOLUTION ORAL EVERY 4 HOURS PRN
Status: DISCONTINUED | OUTPATIENT
Start: 2024-11-16 | End: 2024-11-21 | Stop reason: HOSPADM

## 2024-11-16 RX ORDER — LORAZEPAM 1 MG/1
1 TABLET ORAL EVERY 6 HOURS
Status: COMPLETED | OUTPATIENT
Start: 2024-11-17 | End: 2024-11-18

## 2024-11-16 RX ORDER — POLYETHYLENE GLYCOL 3350 17 G/17G
17 POWDER, FOR SOLUTION ORAL DAILY PRN
Status: DISCONTINUED | OUTPATIENT
Start: 2024-11-16 | End: 2024-11-21 | Stop reason: HOSPADM

## 2024-11-16 RX ORDER — BISACODYL 10 MG
10 SUPPOSITORY, RECTAL RECTAL DAILY PRN
Status: DISCONTINUED | OUTPATIENT
Start: 2024-11-16 | End: 2024-11-21 | Stop reason: HOSPADM

## 2024-11-16 RX ORDER — SODIUM CHLORIDE 0.9 % (FLUSH) 0.9 %
10 SYRINGE (ML) INJECTION AS NEEDED
Status: DISCONTINUED | OUTPATIENT
Start: 2024-11-16 | End: 2024-11-17 | Stop reason: SDUPTHER

## 2024-11-16 RX ORDER — SODIUM CHLORIDE 0.9 % (FLUSH) 0.9 %
10 SYRINGE (ML) INJECTION AS NEEDED
Status: DISCONTINUED | OUTPATIENT
Start: 2024-11-16 | End: 2024-11-21 | Stop reason: HOSPADM

## 2024-11-16 RX ORDER — ONDANSETRON 2 MG/ML
4 INJECTION INTRAMUSCULAR; INTRAVENOUS EVERY 6 HOURS PRN
Status: DISCONTINUED | OUTPATIENT
Start: 2024-11-16 | End: 2024-11-21 | Stop reason: HOSPADM

## 2024-11-16 RX ORDER — AMOXICILLIN 250 MG
2 CAPSULE ORAL 2 TIMES DAILY PRN
Status: DISCONTINUED | OUTPATIENT
Start: 2024-11-16 | End: 2024-11-21 | Stop reason: HOSPADM

## 2024-11-16 RX ORDER — SODIUM CHLORIDE 9 MG/ML
100 INJECTION, SOLUTION INTRAVENOUS CONTINUOUS
Status: DISPENSED | OUTPATIENT
Start: 2024-11-16 | End: 2024-11-17

## 2024-11-16 RX ORDER — SODIUM CHLORIDE 9 MG/ML
40 INJECTION, SOLUTION INTRAVENOUS AS NEEDED
Status: DISCONTINUED | OUTPATIENT
Start: 2024-11-16 | End: 2024-11-21 | Stop reason: HOSPADM

## 2024-11-16 RX ORDER — LORAZEPAM 2 MG/ML
2 INJECTION INTRAMUSCULAR
Status: DISCONTINUED | OUTPATIENT
Start: 2024-11-16 | End: 2024-11-21 | Stop reason: HOSPADM

## 2024-11-16 RX ORDER — LORAZEPAM 1 MG/1
1 TABLET ORAL
Status: DISCONTINUED | OUTPATIENT
Start: 2024-11-16 | End: 2024-11-21 | Stop reason: HOSPADM

## 2024-11-16 RX ORDER — MULTIPLE VITAMINS W/ MINERALS TAB 9MG-400MCG
1 TAB ORAL DAILY
Status: DISCONTINUED | OUTPATIENT
Start: 2024-11-16 | End: 2024-11-21 | Stop reason: HOSPADM

## 2024-11-16 RX ORDER — LORAZEPAM 1 MG/1
2 TABLET ORAL EVERY 6 HOURS
Status: COMPLETED | OUTPATIENT
Start: 2024-11-16 | End: 2024-11-17

## 2024-11-16 RX ORDER — THIAMINE HYDROCHLORIDE 100 MG/ML
200 INJECTION, SOLUTION INTRAMUSCULAR; INTRAVENOUS ONCE
Status: COMPLETED | OUTPATIENT
Start: 2024-11-16 | End: 2024-11-16

## 2024-11-16 RX ORDER — LORAZEPAM 1 MG/1
1 TABLET ORAL EVERY 12 HOURS SCHEDULED
Status: COMPLETED | OUTPATIENT
Start: 2024-11-18 | End: 2024-11-19

## 2024-11-16 RX ORDER — BISACODYL 5 MG/1
5 TABLET, DELAYED RELEASE ORAL DAILY PRN
Status: DISCONTINUED | OUTPATIENT
Start: 2024-11-16 | End: 2024-11-21 | Stop reason: HOSPADM

## 2024-11-16 RX ORDER — THIAMINE HYDROCHLORIDE 100 MG/ML
200 INJECTION, SOLUTION INTRAMUSCULAR; INTRAVENOUS EVERY 8 HOURS SCHEDULED
Status: DISCONTINUED | OUTPATIENT
Start: 2024-11-16 | End: 2024-11-21 | Stop reason: HOSPADM

## 2024-11-16 RX ORDER — ONDANSETRON 4 MG/1
4 TABLET, ORALLY DISINTEGRATING ORAL EVERY 6 HOURS PRN
Status: DISCONTINUED | OUTPATIENT
Start: 2024-11-16 | End: 2024-11-21 | Stop reason: HOSPADM

## 2024-11-16 RX ORDER — LORAZEPAM 1 MG/1
1 TABLET ORAL DAILY
Status: COMPLETED | OUTPATIENT
Start: 2024-11-20 | End: 2024-11-20

## 2024-11-16 RX ORDER — LORAZEPAM 2 MG/ML
1 INJECTION INTRAMUSCULAR
Status: DISCONTINUED | OUTPATIENT
Start: 2024-11-16 | End: 2024-11-21 | Stop reason: HOSPADM

## 2024-11-16 RX ORDER — LORAZEPAM 1 MG/1
2 TABLET ORAL
Status: DISCONTINUED | OUTPATIENT
Start: 2024-11-16 | End: 2024-11-21 | Stop reason: HOSPADM

## 2024-11-16 RX ORDER — ACETAMINOPHEN 650 MG/1
650 SUPPOSITORY RECTAL EVERY 4 HOURS PRN
Status: DISCONTINUED | OUTPATIENT
Start: 2024-11-16 | End: 2024-11-21 | Stop reason: HOSPADM

## 2024-11-16 RX ORDER — SODIUM CHLORIDE 0.9 % (FLUSH) 0.9 %
10 SYRINGE (ML) INJECTION EVERY 12 HOURS SCHEDULED
Status: DISCONTINUED | OUTPATIENT
Start: 2024-11-16 | End: 2024-11-21 | Stop reason: HOSPADM

## 2024-11-16 RX ORDER — IOPAMIDOL 612 MG/ML
100 INJECTION, SOLUTION INTRAVASCULAR
Status: COMPLETED | OUTPATIENT
Start: 2024-11-16 | End: 2024-11-16

## 2024-11-16 RX ORDER — FOLIC ACID 1 MG/1
1 TABLET ORAL DAILY
Status: DISCONTINUED | OUTPATIENT
Start: 2024-11-16 | End: 2024-11-21 | Stop reason: HOSPADM

## 2024-11-16 RX ORDER — ACETAMINOPHEN 325 MG/1
650 TABLET ORAL EVERY 4 HOURS PRN
Status: DISCONTINUED | OUTPATIENT
Start: 2024-11-16 | End: 2024-11-21 | Stop reason: HOSPADM

## 2024-11-16 RX ADMIN — SODIUM CHLORIDE 1000 ML: 9 INJECTION, SOLUTION INTRAVENOUS at 21:14

## 2024-11-16 RX ADMIN — THIAMINE HYDROCHLORIDE 200 MG: 100 INJECTION, SOLUTION INTRAMUSCULAR; INTRAVENOUS at 22:59

## 2024-11-16 RX ADMIN — THIAMINE HYDROCHLORIDE 200 MG: 100 INJECTION, SOLUTION INTRAMUSCULAR; INTRAVENOUS at 11:25

## 2024-11-16 RX ADMIN — SODIUM CHLORIDE, POTASSIUM CHLORIDE, SODIUM LACTATE AND CALCIUM CHLORIDE 1000 ML: 600; 310; 30; 20 INJECTION, SOLUTION INTRAVENOUS at 15:08

## 2024-11-16 RX ADMIN — SODIUM CHLORIDE, POTASSIUM CHLORIDE, SODIUM LACTATE AND CALCIUM CHLORIDE 1000 ML: 600; 310; 30; 20 INJECTION, SOLUTION INTRAVENOUS at 11:24

## 2024-11-16 RX ADMIN — FOLIC ACID 1 MG: 1 TABLET ORAL at 17:22

## 2024-11-16 RX ADMIN — Medication 1 TABLET: at 17:22

## 2024-11-16 RX ADMIN — LORAZEPAM 2 MG: 1 TABLET ORAL at 22:59

## 2024-11-16 RX ADMIN — SODIUM CHLORIDE 100 ML/HR: 9 INJECTION, SOLUTION INTRAVENOUS at 23:44

## 2024-11-16 RX ADMIN — IOPAMIDOL 100 ML: 612 INJECTION, SOLUTION INTRAVENOUS at 12:56

## 2024-11-16 RX ADMIN — SODIUM CHLORIDE 100 ML/HR: 9 INJECTION, SOLUTION INTRAVENOUS at 19:15

## 2024-11-16 RX ADMIN — SODIUM CHLORIDE 500 ML: 9 INJECTION, SOLUTION INTRAVENOUS at 17:05

## 2024-11-16 RX ADMIN — LORAZEPAM 2 MG: 1 TABLET ORAL at 17:22

## 2024-11-16 RX ADMIN — PIPERACILLIN AND TAZOBACTAM 4.5 G: 4; .5 INJECTION, POWDER, FOR SOLUTION INTRAVENOUS at 15:13

## 2024-11-16 NOTE — ED PROVIDER NOTES
Subjective   History of Present Illness  Patient is 69 years old who is alcoholic when he fell he is not sure when he fell he may have hit his head came to the ED for evaluation moving all 4 extremities no L-spine T-spine C-spine step-off tendon tenderness.  Has got right flank ecchymosis.  He is hypotensive but not hypoxic appears sick    Fall  Mechanism of injury: fall    Injury location:  Torso and head/neck  Torso injury location:  R chest, R flank and abdomen  Incident location:  Home  Arrived directly from scene: no    Fall:     Fall occurred:  Walking    Height of fall:  Floor level    Impact surface:  Hard floor    Point of impact:  Head  Suspicion of alcohol use: yes    Suspicion of drug use: yes    Tetanus status:  Unknown  Prior to arrival data:     Bystander interventions:  None    Loss of consciousness: no      Amnesic to event: no      Airway condition since incident:  Stable    Breathing condition since incident:  Worsening    Circulation condition since incident:  Worsening    Mental status condition since incident:  Stable    Disability condition since incident:  Stable  Associated symptoms: headaches and nausea    Associated symptoms: no abdominal pain, no back pain, no blindness, no chest pain, no difficulty breathing, no hearing loss, no neck pain, no seizures and no vomiting    Risk factors: no anticoagulation therapy, no diabetes, no dialysis and no past MI    Alcohol Problem  Associated symptoms: headaches and nausea    Associated symptoms: no abdominal pain, no chest pain, no congestion, no cough, no fatigue, no fever and no vomiting        Review of Systems   Constitutional: Negative.  Negative for chills, fatigue and fever.   HENT:  Negative for congestion and hearing loss.    Eyes:  Negative for blindness.   Respiratory: Negative.  Negative for cough, chest tightness and stridor.    Cardiovascular: Negative.  Negative for chest pain.   Gastrointestinal:  Positive for nausea. Negative for  abdominal distention, abdominal pain and vomiting.   Endocrine: Negative.    Genitourinary: Negative.  Negative for difficulty urinating and flank pain.   Musculoskeletal: Negative.  Negative for back pain and neck pain.   Skin: Negative.  Negative for color change.   Neurological:  Positive for headaches. Negative for dizziness and seizures.   All other systems reviewed and are negative.      Past Medical History:   Diagnosis Date    A-fib     Alcoholic hepatitis 07/13/2023    CAD (coronary artery disease)     History of external beam radiation therapy 05/12/2016    6840 cGy to prostate bed    Hyperlipidemia     Hypertension     Insomnia     Prostate cancer        No Known Allergies    Past Surgical History:   Procedure Laterality Date    CARDIAC CATHETERIZATION      CORONARY ANGIOPLASTY WITH STENT PLACEMENT      FRACTURE SURGERY      PROSTATE SURGERY      TONSILLECTOMY         Family History   Problem Relation Age of Onset    Heart disease Mother     Coronary artery disease Mother     Stroke Mother        Social History     Socioeconomic History    Marital status: Single   Tobacco Use    Smoking status: Never    Smokeless tobacco: Never   Vaping Use    Vaping status: Never Used   Substance and Sexual Activity    Alcohol use: Not Currently     Alcohol/week: 50.0 standard drinks of alcohol     Types: 50 Shots of liquor per week    Drug use: Not Currently     Types: Marijuana    Sexual activity: Defer           Objective   Physical Exam  Vitals and nursing note reviewed. Exam conducted with a chaperone present.   Constitutional:       General: He is in acute distress.      Appearance: Normal appearance. He is ill-appearing. He is not toxic-appearing or diaphoretic.   HENT:      Head: Normocephalic and atraumatic. No raccoon eyes, Maher's sign, contusion, right periorbital erythema or left periorbital erythema.      Jaw: There is normal jaw occlusion.      Right Ear: Tympanic membrane normal. No hemotympanum.       Left Ear: Tympanic membrane normal. No hemotympanum.      Nose: Nose normal.      Mouth/Throat:      Mouth: Mucous membranes are moist.      Pharynx: No oropharyngeal exudate.   Eyes:      General: Lids are normal.      Extraocular Movements: Extraocular movements intact.      Right eye: No nystagmus.      Left eye: No nystagmus.      Conjunctiva/sclera: Conjunctivae normal.      Right eye: Right conjunctiva is not injected.      Left eye: Left conjunctiva is not injected.      Pupils: Pupils are equal, round, and reactive to light.   Neck:      Thyroid: No thyroid mass.      Vascular: No carotid bruit.      Trachea: Trachea and phonation normal. No tracheal tenderness or tracheal deviation.   Cardiovascular:      Rate and Rhythm: Normal rate. Tachycardia present.      Chest Wall: PMI is not displaced.      Pulses: Normal pulses.           Carotid pulses are 2+ on the right side and 2+ on the left side.       Radial pulses are 2+ on the right side and 2+ on the left side.        Femoral pulses are 2+ on the right side and 2+ on the left side.       Dorsalis pedis pulses are 2+ on the right side and 2+ on the left side.        Posterior tibial pulses are 2+ on the right side and 2+ on the left side.      Heart sounds: Normal heart sounds. No murmur heard.     No gallop.   Pulmonary:      Effort: Pulmonary effort is normal. No tachypnea, respiratory distress or retractions.      Breath sounds: Normal breath sounds. No stridor, decreased air movement or transmitted upper airway sounds.   Chest:      Chest wall: Tenderness present.      Comments: Right chest wall tenderness  Abdominal:      General: Abdomen is flat. Bowel sounds are normal. There is no distension or abdominal bruit.      Palpations: Abdomen is soft. There is no mass or pulsatile mass.      Tenderness: There is no abdominal tenderness. There is right CVA tenderness. There is no left CVA tenderness.      Hernia: No hernia is present.      Comments: Flank  ecchymosis   Musculoskeletal:         General: No swelling or tenderness. Normal range of motion.      Right shoulder: Normal.      Left shoulder: Normal.      Cervical back: Neck supple. No signs of trauma, rigidity or crepitus. No spinous process tenderness or muscular tenderness.      Thoracic back: Normal.      Lumbar back: Normal.      Right hip: Normal.      Left hip: Normal.      Right lower leg: Edema present.      Left lower leg: Edema present.      Comments: Axial skeletal examination reveals no obvious deformity of the upper or lower extremity    Neurovascular examination is unremarkable no L-spine T-spine or C-spine step-off laxity or tenderness axial skeletal examination upper lower EXTR within normal limits bruise on the right shoulder which is old.   Lymphadenopathy:      Cervical: No cervical adenopathy.   Skin:     General: Skin is warm.      Capillary Refill: Capillary refill takes less than 2 seconds.      Coloration: Skin is not ashen, cyanotic or pale.   Neurological:      General: No focal deficit present.      Mental Status: He is alert and oriented to person, place, and time. Mental status is at baseline. He is confused.      GCS: GCS eye subscore is 4. GCS verbal subscore is 5. GCS motor subscore is 6.      Cranial Nerves: Cranial nerves 2-12 are intact. No cranial nerve deficit.      Sensory: Sensation is intact. No sensory deficit.      Motor: Motor function is intact. No weakness, atrophy or abnormal muscle tone.      Deep Tendon Reflexes:      Reflex Scores:       Bicep reflexes are 2+ on the right side and 2+ on the left side.       Patellar reflexes are 2+ on the right side and 2+ on the left side.  Psychiatric:         Attention and Perception: Attention normal.         Mood and Affect: Mood normal.         Speech: Speech normal.         Behavior: Behavior normal.         Procedures           ED Course  ED Course as of 11/16/24 1501   Sat Nov 16, 2024   1104  Patient is 18 or older,  presenting with minor blunt head trauma. Head CT (including cosigned orders) was ordered  by an emergency care clinician for trauma because patient is 65 or older.        [TS]   1125 Normal sinus rhythm with T wave inversion [TS]   1225 Patient came into the ED he has been laying in the bed after a fall has got elevated CK.  Ethanol level is negative.  Was given thiamine over here ABGs are within normal limits.  BNP is elevated to 6958.  Lactate is 4.0.  Got acute kidney injury 1 L IV fluids given the patient.  Liver enzymes elevated.  Total bilirubin is elevated 2.8. [TS]   1226 Recommended 30 mL/kg bolus not received because it would be harmful or detrimental to the patient.  Reason: Heart Failure.   Ordered 1000 mL of IV Fluid as bolus.  [TS]   1227 Patient's.  The buttock area is.  Red erythematous secondary to him laying in stool and urine for couple of days.  But no obvious evidence of fascia-itis.  CTs are pending. [TS]   1445 This unfortunate gentleman lives alone a couple of times has got a history of A-fib is on anticoagulation therefore CTs were obtained which negative for any acute trauma.  He does have CK elevation BNP is elevated also but there is no clinical evidence of CHF he does have a poor EF therefore have not given him a large amount of fluids [TS]   1445 Creatinine is elevated and has got acute renal insufficiency with rhabdomyolysis has got some cellulitis of the groin area where he has been sitting in his own stool for the past couple of days.  This to be admitted to hospital further evaluation treatment was given IV thiamine IV fluids and prophylactic antibiotics. [TS]   1446 Is evaluated bilirubin but no ductal dilatation.  Severe hepatic steatosis. [TS]   1447 Patient's white cell count within normal limits lactic acid elevated at 4.0 this is probably secondary renally mediated hypotensive episode secondary to his renal insufficiency.  There is no clinical evidence of sepsis.  With no  fever.  Blood pressure is borderline soft.  Heart rate is within normal limits.  Patient was given couple liters of fluid I am not given a morphological the risk of CHF. [TS]   1500 Patient repeat blood pressure taken by me shows blood pressure 102 systolic over 68 diastolic with a heart rate of 78. [TS]      ED Course User Index  [TS] Gautam Bell MD                                                       Medical Decision Making  Patient status post fall history of alcohol issues possible injury hypotensive possibly retroperitoneal hemorrhage cannot be ruled out along with that he seems to be short of breath and is tachypneic and tachycardic complaining of chest wall pain lab workup x-rays CAT scans will be performed.  Patient has history alcohol abuse is got history of A-fib and is on anticoagulation had fallen has got some bruises but no evidence of any intra-abdominal or intrathoracic injuries noted.  He does have chronic liver disease elevated bilirubin.  He is dehydrated has chronic liver disease.  The patient was given thiamine IV fluids IV antibiotics given the patient medically.  And is going to be admitted to medicine service for further evaluation treatment of the condition.    Problems Addressed:  Alcohol abuse: complicated acute illness or injury  Chronic liver disease: complicated acute illness or injury  Dehydration: complicated acute illness or injury  Elevated bilirubin: complicated acute illness or injury  Fall, initial encounter: complicated acute illness or injury  Hypotension, unspecified hypotension type: complicated acute illness or injury  Traumatic rhabdomyolysis, initial encounter: complicated acute illness or injury    Amount and/or Complexity of Data Reviewed  Labs: ordered.     Details: Labs reviewed patient has elevated Albino and liver enzymes.  Radiology: ordered.     Details: CT is negative for acute trauma.  There is evidence CHF.  ECG/medicine tests: ordered.     Details: Normal  sinus rhythm has got a history of A-fib.  Discussion of management or test interpretation with external provider(s): Aguset the hospitalist service.    Risk  Prescription drug management.  Risk Details: Patient will be admitted to medicine service.  For further evaluation and treatment.  Well score is 0.  McMohan score is low risk.  Patient will be admitted with IV fluids IV antibiotics.        Final diagnoses:   Hypotension, unspecified hypotension type   Fall, initial encounter   Alcohol abuse   Chronic liver disease   Elevated bilirubin   Traumatic rhabdomyolysis, initial encounter   Dehydration       ED Disposition  ED Disposition       ED Disposition   Decision to Admit    Condition   --    Comment   Level of Care: Telemetry [5]   Diagnosis: Multiple falls [[id number]]   Admitting Physician: NORBERT HAYDEN [[id number]]   Attending Physician: NORBERT HAYDEN [[id number]]   Certification: I Certify That Inpatient Hospital Services Are Medically Necessary For Greater Than 2 Midnights                 No follow-up provider specified.       Medication List      No changes were made to your prescriptions during this visit.            Gautam Bell MD  11/16/24 1102       Gautam Bell MD  11/16/24 1112       Gautam Bell MD  11/16/24 1450       Gautam Bell MD  11/16/24 1501

## 2024-11-16 NOTE — H&P
"    [facility] Medicine Services  HISTORY AND PHYSICAL    Date of Admission: 11/16/2024  Primary Care Physician: Herberth Nguyen Jr., MD    Subjective   Primary Historian: patient    Chief Complaint: fall    History of Present Illness  Mr. Lubin is a 69-year-old male that presented to [facility] for recurrent falls. He has a past medical history significant for hypertrophic obstructive cardiomyopathy, paroxysmal atrial fibrillation chronically anticoagulated on Eliquis, coronary artery disease, hypertension, hyperlipidemia and prostate cancer.  He does admit to drinking approximately 6-8 shots a day but his brother Van at bedside approximates \"much more than that.\"  He does have issues going without alcohol.  His last drink was a shot this morning around 930.  Patient indicates that over the last several days he has had approximately 4 falls.  He explains a time in which last night he fell and thinks that he may have been on the ground for approximately 3 hours.  He has some bruising noted on his abdomen.  He complains of generalized weakness.  He wears depends and tells me that he wore his depends for \"a day and a half \" as he was \"too weak to change it.\"  No shortness of breath, chest pain or pressure.  No fever or chills.  No nausea, vomiting, abdominal pain. In the ED, CK 1260.  He was found to have acute kidney injury with creatinine 1.81.  Creatinine on 10/18 was 0.81.  AST elevated at 400, , total bilirubin 2.8.  Lactate 4.0 with repeat 1.9.  WBC normal.  Platelet count 08615 (previous platelet count was 152,000).  He was given 1 L fluid bolus and IV Zosyn.  His blood pressure systolic 92 during the time of my examination -will give additional 500 cc fluid bolus.  He is alert and oriented with his brother at bedside.  He has visible tremors to bilateral upper extremities noted at rest.  He is asking for medication to help with \"the shakes.\"  He will be " admitted to the hospitalist service for further evaluation and management.    Review of Systems   Otherwise complete ROS reviewed and negative except as mentioned in the HPI.    Past Medical History:   Past Medical History:   Diagnosis Date    A-fib     Alcoholic hepatitis 07/13/2023    CAD (coronary artery disease)     History of external beam radiation therapy 05/12/2016    6840 cGy to prostate bed    Hyperlipidemia     Hypertension     Insomnia     Prostate cancer      Past Surgical History:  Past Surgical History:   Procedure Laterality Date    CARDIAC CATHETERIZATION      CORONARY ANGIOPLASTY WITH STENT PLACEMENT      FRACTURE SURGERY      PROSTATE SURGERY      TONSILLECTOMY       Social History:  reports that he has never smoked. He has never used smokeless tobacco. He reports that he does not currently use alcohol after a past usage of about 50.0 standard drinks of alcohol per week. He reports that he does not currently use drugs after having used the following drugs: Marijuana.    Family History: family history includes Coronary artery disease in his mother; Heart disease in his mother; Stroke in his mother.       Allergies:  No Known Allergies    Medications:  Prior to Admission medications    Medication Sig Start Date End Date Taking? Authorizing Provider   amiodarone (PACERONE) 200 MG tablet Take 1 tablet by mouth Daily. 9/10/24   Italia Vasquez PA   apixaban (ELIQUIS) 5 MG tablet tablet Take 1 tablet by mouth Every 12 (Twelve) Hours. Indications: Atrial Fibrillation 7/30/24   Holger Karimi,    aspirin (aspirin) 81 MG EC tablet Take 1 tablet by mouth Daily. 12/23/20   Edda Li APRN   atorvastatin (LIPITOR) 20 MG tablet Take 1 tablet by mouth Daily.    Provider, MD Steve   azelastine (ASTELIN) 0.1 % nasal spray 2 sprays into the nostril(s) as directed by provider 2 (Two) Times a Day. Use in each nostril as directed 9/5/24   Gonzalo Martinez APRN   fluticasone (FLONASE) 50 MCG/ACT  "nasal spray 2 sprays into the nostril(s) as directed by provider Daily. 9/5/24   Gonzalo Martinez APRN   losartan (Cozaar) 25 MG tablet Take 1 tablet by mouth Daily. 10/21/24   Italia Vasquez PA   metoprolol succinate XL (TOPROL-XL) 25 MG 24 hr tablet Take 1 tablet by mouth Daily.    Steve Devries MD   multivitamin with minerals tablet tablet Take 1 tablet by mouth Daily. 8/16/23   Holger Karimi DO   pantoprazole (PROTONIX) 40 MG EC tablet Take 1 tablet by mouth Daily.    ProviderSteve MD   temazepam (RESTORIL) 30 MG capsule Take 1 capsule by mouth At Night As Needed for Sleep. 9/23/16   ProviderSteve MD     I have utilized all available immediate resources to obtain, update, or review the patient's current medications (including all prescriptions, over-the-counter products, herbals, cannabis/cannabidiol products, and vitamin/mineral/dietary (nutritional) supplements).    Objective     Vital Signs: BP 98/57   Pulse 85   Temp 98.1 °F (36.7 °C) (Oral)   Resp 18   Ht 180.3 cm (71\")   Wt 88.9 kg (196 lb)   SpO2 96%   BMI 27.34 kg/m²   Physical Exam  Vitals reviewed.   Constitutional:       General: He is not in acute distress.     Appearance: He is not toxic-appearing.   HENT:      Head: Normocephalic and atraumatic.      Mouth/Throat:      Mouth: Mucous membranes are moist.      Pharynx: Oropharynx is clear.   Eyes:      Extraocular Movements: Extraocular movements intact.      Conjunctiva/sclera: Conjunctivae normal.      Pupils: Pupils are equal, round, and reactive to light.   Cardiovascular:      Rate and Rhythm: Normal rate and regular rhythm.      Pulses: Normal pulses.   Pulmonary:      Effort: Pulmonary effort is normal. No respiratory distress.      Breath sounds: Normal breath sounds.   Abdominal:      General: Bowel sounds are normal. There is no distension.      Palpations: Abdomen is soft.      Tenderness: There is no abdominal tenderness.   Musculoskeletal:         " General: No swelling or tenderness. Normal range of motion.      Cervical back: Normal range of motion and neck supple. No muscular tenderness.   Skin:     General: Skin is warm and dry.      Findings: Bruising present. No erythema or rash.   Neurological:      General: No focal deficit present.      Mental Status: He is alert and oriented to person, place, and time.      Cranial Nerves: No cranial nerve deficit.      Motor: No weakness.      Comments: Visible tremors of upper extremities at rest   Psychiatric:         Mood and Affect: Mood normal.         Behavior: Behavior normal.                  Results Reviewed:  Lab Results (last 24 hours)       Procedure Component Value Units Date/Time    Blood Culture - Blood, Arm, Left [[id number]] Collected: 11/16/24 1507    Specimen: Blood from Arm, Left Updated: 11/16/24 1531    Blood Culture - Blood, Wrist, Left [[id number]] Collected: 11/16/24 1507    Specimen: Blood from Wrist, Left Updated: 11/16/24 1530    STAT Lactic Acid, Reflex [[id number]]  (Normal) Collected: 11/16/24 1507    Specimen: Blood Updated: 11/16/24 1530     Lactate 1.9 mmol/L     Lactic Acid, Plasma [[id number]]  (Abnormal) Collected: 11/16/24 1115    Specimen: Blood Updated: 11/16/24 1157     Lactate 4.0 mmol/L     Magnesium [[id number]]  (Normal) Collected: 11/16/24 1115    Specimen: Blood Updated: 11/16/24 1154     Magnesium 1.9 mg/dL     Comprehensive Metabolic Panel [[id number]]  (Abnormal) Collected: 11/16/24 1115    Specimen: Blood Updated: 11/16/24 1154     Glucose 109 mg/dL      BUN 21 mg/dL      Creatinine 1.81 mg/dL      Sodium 131 mmol/L      Potassium 4.0 mmol/L      Chloride 88 mmol/L      CO2 22.0 mmol/L      Calcium 8.3 mg/dL      Total Protein 7.1 g/dL      Albumin 3.6 g/dL      ALT (SGPT) 156 U/L      AST (SGOT) 404 U/L      Alkaline Phosphatase 93 U/L      Total Bilirubin 2.8 mg/dL      Globulin 3.5 gm/dL      A/G Ratio 1.0 g/dL      BUN/Creatinine Ratio 11.6     Anion Gap 21.0 mmol/L       eGFR 39.7 mL/min/1.73     Narrative:      GFR Normal >60  Chronic Kidney Disease <60  Kidney Failure <15      CK [[id number]]  (Abnormal) Collected: 11/16/24 1115    Specimen: Blood Updated: 11/16/24 1153     Creatine Kinase 1,260 U/L     BNP [[id number]]  (Abnormal) Collected: 11/16/24 1115    Specimen: Blood Updated: 11/16/24 1152     proBNP 6,958.0 pg/mL     Narrative:      This assay is used as an aid in the diagnosis of individuals suspected of having heart failure. It can be used as an aid in the diagnosis of acute decompensated heart failure (ADHF) in patients presenting with signs and symptoms of ADHF to the emergency department (ED). In addition, NT-proBNP of <300 pg/mL indicates ADHF is not likely.    Age Range Result Interpretation  NT-proBNP Concentration (pg/mL:      <50             Positive            >450                   Gray                 300-450                    Negative             <300    50-75           Positive            >900                  Gray                300-900                  Negative            <300      >75             Positive            >1800                  Gray                300-1800                  Negative            <300    Lipase [[id number]]  (Abnormal) Collected: 11/16/24 1115    Specimen: Blood Updated: 11/16/24 1149     Lipase 113 U/L     Ethanol [[id number]] Collected: 11/16/24 1115    Specimen: Blood Updated: 11/16/24 1149     Ethanol % <0.010 %     Narrative:      Not for legal purposes. Chain of Custody not followed.     Blood Gas, Arterial - [[id number]]  (Abnormal) Collected: 11/16/24 1140    Specimen: Arterial Blood Updated: 11/16/24 1140     Site Right Brachial     Garett's Test N/A     pH, Arterial 7.559 pH units      Comment: 83 Value above reference range        pCO2, Arterial 28.6 mm Hg      Comment: 84 Value below reference range        pO2, Arterial 86.0 mm Hg      HCO3, Arterial 25.5 mmol/L      Base Excess, Arterial 3.8 mmol/L      Comment: 83  Value above reference range        O2 Saturation, Arterial 98.2 %      Temperature 37.0     Barometric Pressure for Blood Gas 755 mmHg      Modality Room Air     Ventilator Mode NA     Collected by [id number]     Comment: Meter: [id number]     :  Teri Bruner, AARON        pCO2, Temperature Corrected 28.6 mm Hg      pH, Temp Corrected 7.559 pH Units      pO2, Temperature Corrected 86.0 mm Hg     Protime-INR [[id number]]  (Abnormal) Collected: 11/16/24 1115    Specimen: Blood Updated: 11/16/24 1137     Protime 18.0 Seconds      INR 1.43    CBC & Differential [[id number]]  (Abnormal) Collected: 11/16/24 1115    Specimen: Blood Updated: 11/16/24 1133    Narrative:      The following orders were created for panel order CBC & Differential.  Procedure                               Abnormality         Status                     ---------                               -----------         ------                     CBC Auto Differential[[id number]]        Abnormal            Final result                 Please view results for these tests on the individual orders.    CBC Auto Differential [[id number]]  (Abnormal) Collected: 11/16/24 1115    Specimen: Blood Updated: 11/16/24 1133     WBC 6.52 10*3/mm3      RBC 3.85 10*6/mm3      Hemoglobin 12.2 g/dL      Hematocrit 35.1 %      MCV 91.2 fL      MCH 31.7 pg      MCHC 34.8 g/dL      RDW 13.4 %      RDW-SD 44.8 fl      MPV 11.3 fL      Platelets 72 10*3/mm3      Neutrophil % 85.4 %      Lymphocyte % 6.3 %      Monocyte % 7.4 %      Eosinophil % 0.0 %      Basophil % 0.0 %      Immature Grans % 0.9 %      Neutrophils, Absolute 5.57 10*3/mm3      Lymphocytes, Absolute 0.41 10*3/mm3      Monocytes, Absolute 0.48 10*3/mm3      Eosinophils, Absolute 0.00 10*3/mm3      Basophils, Absolute 0.00 10*3/mm3      Immature Grans, Absolute 0.06 10*3/mm3           Imaging Results (Last 24 Hours)       Procedure Component Value Units Date/Time    CT Abdomen Pelvis With Contrast [[id number]]  Collected: 11/16/24 1314     Updated: 11/16/24 1320    Narrative:      EXAM/TECHNIQUE: CT abdomen and pelvis with IV contrast     INDICATION: Fall with right CVA tenderness and large bruise.  Possible  RPH patient is hypotensive; I95.9-Hypotension, unspecified;  W19.XXXA-Unspecified fall, initial encounter; F10.10-Alcohol abuse,  uncomplicated     COMPARISON: 7/13/2023     DLP: 2862.05 mGy.cm. Automated exposure control was also utilized to  decrease patient radiation dose.     FINDINGS:     Findings in the included lower chest are described in a separate same  day report.     Severe hepatic steatosis. No morphologic changes of cirrhosis. No  suspicious focal liver lesion. No gallstone or gallbladder wall  thickening. No biliary ductal dilatation.     Pancreas, spleen, and adrenal glands are unremarkable.     No solid renal mass. No urolithiasis or hydronephrosis. Urinary bladder  is decompressed.      Mild colonic diverticulosis without diverticulitis. No colonic wall  thickening or pericolonic fat stranding. Normal appendix. No small bowel  distention or evidence of active small bowel inflammation.     No ascites or free pelvic fluid. No pelvic mass or collection. Prior  prostatectomy.     Nonaneurysmal atherosclerotic abdominal aorta. No enlarged abdominal or  pelvic lymph nodes. Calcified lymph nodes in the periportal region like  related to an old granulomatous process     No large soft tissue hematoma. 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 No acute osseous finding.       Impression:         1.  No acute traumatic finding in the abdomen or pelvis.     2.  Severe hepatic steatosis.     3.  Mild colonic diverticulosis without evidence of diverticulitis.     This report was signed and finalized on 11/16/2024 1:17 PM by Dr. Tyrese Young MD.       CT Chest With Contrast Diagnostic [[id number]] Collected: 11/16/24 1309     Updated: 11/16/24 1317    Narrative:      EXAM/TECHNIQUE: CT chest  with IV contrast     INDICATION: Chest trauma fall short of breath right-sided chest pain;  I95.9-Hypotension, unspecified; W19.XXXA-Unspecified fall, initial  encounter; F10.10-Alcohol abuse, uncomplicated     COMPARISON: 8/7/2023     DLP: 2862.05 mGy.cm. Automated exposure control was also utilized to  decrease patient radiation dose.     FINDINGS:     The central airways are clear. No consolidation, pleural effusion,  pneumothorax, hemothorax, or pulmonary contusion. No advanced  emphysematous or fibrotic change. No suspicious pulmonary nodule.     No enlarged thoracic lymph nodes. No central pulmonary artery filling  defect. Thoracic aorta is nonaneurysmal. No evidence of acute aortic  injury. Aortic arch branch origins are widely patent. Heavy aortic valve  calcification. Heavy coronary artery calcification. No pericardial  effusion. Left ventricular apical aneurysm.     No large thyroid nodule. No acute chest soft tissue abnormality. No  large soft tissue hematoma. Findings in the included portion of the  upper abdomen are described in a separate same day report. No acute rib  fracture. No thoracic spine compression fracture or subluxation.       Impression:         1.  No acute traumatic finding in the chest.     2.  Heavy aortic valve calcification. Heavy coronary artery  atherosclerotic calcification. Chronic left ventricular apical  myocardial thinning/aneurysm.        This report was signed and finalized on 11/16/2024 1:14 PM by Dr. Tyrese Young MD.       CT Cervical Spine Without Contrast [[id number]] Collected: 11/16/24 1303     Updated: 11/16/24 1312    Narrative:      EXAM/TECHNIQUE: CT cervical spine without contrast     INDICATION: Fall; I95.9-Hypotension, unspecified; W19.XXXA-Unspecified  fall, initial encounter; F10.10-Alcohol abuse, uncomplicated     COMPARISON: None available.     DLP: 2862.05 mGy.cm. Automated exposure control was also utilized to  decrease patient radiation dose.      FINDINGS:     Craniocervical relationships are maintained. The odontoid process is  intact. Cervical spine alignment appears maintained. No subluxations.  Vertebral body heights are maintained. No acute or chronic fracture.  Mild to moderate multilevel cervical spine degenerative change with  multilevel neural foraminal narrowing. Paravertebral soft tissues are  unremarkable. Carotid artery atherosclerotic calcification.       Impression:      1. No acute fracture or subluxation.  2. Mild to moderate multilevel cervical spine degenerative change.        This report was signed and finalized on 11/16/2024 1:09 PM by Dr. Tyrese Young MD.       CT Head Without Contrast [[id number]] Collected: 11/16/24 1258     Updated: 11/16/24 1306    Narrative:      EXAM/TECHNIQUE: CT head without contrast     INDICATION: Fall; I95.9-Hypotension, unspecified; W19.XXXA-Unspecified  fall, initial encounter; F10.10-Alcohol abuse, uncomplicated     COMPARISON: None available.     DLP: 2862.05 mGy.cm. Automated exposure control was also utilized to  decrease patient radiation dose.     FINDINGS:     No evidence of intracranial hemorrhage. Gray-white differentiation is  maintained. No midline shift or mass effect. Lateral ventricles are  nondilated. Basilar cisterns are patent. No acute orbital finding.  Mastoid air cells are clear. Visualized portion of the paranasal sinuses  are clear. No acute osseous finding.       Impression:         No acute intracranial findings.        This report was signed and finalized on 11/16/2024 1:03 PM by Dr. Tyrese Young MD.       XR Chest 1 View [[id number]] Collected: 11/16/24 1128     Updated: 11/16/24 1132    Narrative:      EXAM/TECHNIQUE: XR CHEST 1 VW-     INDICATION: Fall; I95.9-Hypotension, unspecified; W19.XXXA-Unspecified  fall, initial encounter; F10.10-Alcohol abuse, uncomplicated     COMPARISON: 10/18/2024     FINDINGS:     Cardiac silhouette is within normal limits.     No pleural  effusion or pneumothorax. No focal consolidation.     No acute osseous finding.       Impression:         No acute findings.     This report was signed and finalized on 11/16/2024 11:29 AM by Dr. Tyrese Young MD.             I have personally reviewed and interpreted the radiology studies and ECG obtained at time of admission.     Assessment / Plan   Assessment:   Active Hospital Problems    Diagnosis     **Multiple falls     Transaminitis     Thrombocytopenia     Non-traumatic rhabdomyolysis     Alcohol withdrawal     Paroxysmal atrial fibrillation     Acute kidney injury     Alcohol abuse     Alcoholic hepatitis     HOCM (hypertrophic obstructive cardiomyopathy)      Treatment Plan  The patient will be admitted to Dr. Griffiths's service at [facility].     Rhabdomyolysis with CK 1260.  Initiate IV fluid.  Check urine myoglobin.  CK in AM.    Acute kidney injury with creatinine 1.81.  He was given 1 L fluid bolus.  Given additional 500 cc fluid bolus then initiate continuous fluid.    He has some soft blood pressures documented with systolic high 80s/90s.  He received 1 L fluid bolus, give 500 cc fluid bolus now.  Thereafter initiate continuous IV fluid.  Hold antihypertensives.    Alcohol abuse with concern for alcohol withdrawal.  Start CIWA protocol with scheduled p.o. Ativan with as needed Ativan.  Prophylactic vitamin replacement.    He has a history of hypertrophic obstructive cardiomyopathy, paroxysmal atrial fibrillation chronically anticoagulant Eliquis.  Hold Eliquis at this time.  Losartan, Toprol-XL on hold.  Continue amiodarone.  Results for orders placed during the hospital encounter of 07/29/24    Adult Transthoracic Echo Complete W/ Cont if Necessary Per Protocol    Interpretation Summary    Left ventricular systolic function is low normal. Left ventricular ejection fraction appears to be 51 - 55%.    Left ventricular wall thickness is consistent with moderate to severe concentric  hypertrophy.    Mild to moderate mitral valve regurgitation is present.    The aortic valve is thickened and calcified and there is some degree of aortic valve stenosis, likely moderate, most of the flow acceleration appears to be in the left ventricular outflow tract as a result of hypertrophic obstructive cardiomyopathy.    Wound care consult.    Will need PT/OT consult.  SCDs for DVT prophylaxis.  Labs in AM.    Medical Decision Making  Number and Complexity of problems: 5 acute problems  Differential Diagnosis: As above    Conditions and Status        Condition is worsening.     [facility] Data  External documents reviewed: Prior epic records  Cardiac tracing (EKG, telemetry) interpretation: Sinus rhythm  Radiology interpretation: Interpreted by radiology  Labs reviewed: As above  Any tests that were considered but not ordered: none considered at present     Decision rules/scores evaluated (example [id number], [place], etc): none considered at present     Discussed with: Patient and Dr. Griffiths     Care Planning  Shared decision making: Patient is agreeable to ongoing workup and treatment  Code status and discussions: CPR with limited support to include no intubation    Disposition  Social Determinants of Health that impact treatment or disposition: ? SNF  Estimated length of stay is 2-4 days.     I confirmed that the patient's advanced care plan is present, code status is documented, and a surrogate decision maker is listed in the patient's medical record.     The patient's surrogate decision maker is Sister Yuliya Stone and brother Van Lubin.     The patient was seen and examined by me on 11/16/2024 at 16:00.    Electronically signed by BRI Valentine, 11/16/24, 16:19 CST.

## 2024-11-17 PROBLEM — D61.818 PANCYTOPENIA: Status: ACTIVE | Noted: 2024-11-17

## 2024-11-17 LAB
ADV 40+41 DNA STL QL NAA+NON-PROBE: NOT DETECTED
ALBUMIN SERPL-MCNC: 3 G/DL (ref 3.5–5.2)
ALBUMIN/GLOB SERPL: 1.2 G/DL
ALP SERPL-CCNC: 67 U/L (ref 39–117)
ALT SERPL W P-5'-P-CCNC: 101 U/L (ref 1–41)
ANION GAP SERPL CALCULATED.3IONS-SCNC: 13 MMOL/L (ref 5–15)
AST SERPL-CCNC: 219 U/L (ref 1–40)
ASTRO TYP 1-8 RNA STL QL NAA+NON-PROBE: NOT DETECTED
BACTERIA BLD CULT: ABNORMAL
BASOPHILS # BLD AUTO: 0 10*3/MM3 (ref 0–0.2)
BASOPHILS NFR BLD AUTO: 0 % (ref 0–1.5)
BILIRUB SERPL-MCNC: 1.4 MG/DL (ref 0–1.2)
BOTTLE TYPE: ABNORMAL
BUN SERPL-MCNC: 20 MG/DL (ref 8–23)
BUN/CREAT SERPL: 16.1 (ref 7–25)
C CAYETANENSIS DNA STL QL NAA+NON-PROBE: NOT DETECTED
C COLI+JEJ+UPSA DNA STL QL NAA+NON-PROBE: NOT DETECTED
CALCIUM SPEC-SCNC: 6.9 MG/DL (ref 8.6–10.5)
CHLORIDE SERPL-SCNC: 95 MMOL/L (ref 98–107)
CK SERPL-CCNC: 802 U/L (ref 20–200)
CO2 SERPL-SCNC: 25 MMOL/L (ref 22–29)
CREAT SERPL-MCNC: 1.24 MG/DL (ref 0.76–1.27)
CRYPTOSP DNA STL QL NAA+NON-PROBE: NOT DETECTED
DEPRECATED RDW RBC AUTO: 48.8 FL (ref 37–54)
E HISTOLYT DNA STL QL NAA+NON-PROBE: NOT DETECTED
EAEC PAA PLAS AGGR+AATA ST NAA+NON-PRB: NOT DETECTED
EC STX1+STX2 GENES STL QL NAA+NON-PROBE: NOT DETECTED
EGFRCR SERPLBLD CKD-EPI 2021: 62.5 ML/MIN/1.73
EOSINOPHIL # BLD AUTO: 0.01 10*3/MM3 (ref 0–0.4)
EOSINOPHIL NFR BLD AUTO: 0.3 % (ref 0.3–6.2)
EPEC EAE GENE STL QL NAA+NON-PROBE: NOT DETECTED
ERYTHROCYTE [DISTWIDTH] IN BLOOD BY AUTOMATED COUNT: 13.9 % (ref 12.3–15.4)
ETEC LTA+ST1A+ST1B TOX ST NAA+NON-PROBE: NOT DETECTED
G LAMBLIA DNA STL QL NAA+NON-PROBE: NOT DETECTED
GLOBULIN UR ELPH-MCNC: 2.6 GM/DL
GLUCOSE BLDC GLUCOMTR-MCNC: 102 MG/DL (ref 70–130)
GLUCOSE SERPL-MCNC: 87 MG/DL (ref 65–99)
HCT VFR BLD AUTO: 28.8 % (ref 37.5–51)
HGB BLD-MCNC: 9.7 G/DL (ref 13–17.7)
IMM GRANULOCYTES # BLD AUTO: 0.02 10*3/MM3 (ref 0–0.05)
IMM GRANULOCYTES NFR BLD AUTO: 0.7 % (ref 0–0.5)
LYMPHOCYTES # BLD AUTO: 0.37 10*3/MM3 (ref 0.7–3.1)
LYMPHOCYTES NFR BLD AUTO: 12.7 % (ref 19.6–45.3)
MCH RBC QN AUTO: 32.1 PG (ref 26.6–33)
MCHC RBC AUTO-ENTMCNC: 33.7 G/DL (ref 31.5–35.7)
MCV RBC AUTO: 95.4 FL (ref 79–97)
MONOCYTES # BLD AUTO: 0.33 10*3/MM3 (ref 0.1–0.9)
MONOCYTES NFR BLD AUTO: 11.3 % (ref 5–12)
NEUTROPHILS NFR BLD AUTO: 2.18 10*3/MM3 (ref 1.7–7)
NEUTROPHILS NFR BLD AUTO: 75 % (ref 42.7–76)
NOROVIRUS GI+II RNA STL QL NAA+NON-PROBE: NOT DETECTED
P SHIGELLOIDES DNA STL QL NAA+NON-PROBE: NOT DETECTED
PLATELET # BLD AUTO: 55 10*3/MM3 (ref 140–450)
PMV BLD AUTO: 10.9 FL (ref 6–12)
POTASSIUM SERPL-SCNC: 3.4 MMOL/L (ref 3.5–5.2)
PROT SERPL-MCNC: 5.6 G/DL (ref 6–8.5)
QT INTERVAL: 420 MS
QTC INTERVAL: 513 MS
RBC # BLD AUTO: 3.02 10*6/MM3 (ref 4.14–5.8)
RVA RNA STL QL NAA+NON-PROBE: NOT DETECTED
S ENT+BONG DNA STL QL NAA+NON-PROBE: NOT DETECTED
SAPO I+II+IV+V RNA STL QL NAA+NON-PROBE: NOT DETECTED
SHIGELLA SP+EIEC IPAH ST NAA+NON-PROBE: NOT DETECTED
SODIUM SERPL-SCNC: 133 MMOL/L (ref 136–145)
V CHOL+PARA+VUL DNA STL QL NAA+NON-PROBE: NOT DETECTED
V CHOLERAE DNA STL QL NAA+NON-PROBE: NOT DETECTED
WBC NRBC COR # BLD AUTO: 2.91 10*3/MM3 (ref 3.4–10.8)
Y ENTEROCOL DNA STL QL NAA+NON-PROBE: NOT DETECTED

## 2024-11-17 PROCEDURE — 97166 OT EVAL MOD COMPLEX 45 MIN: CPT | Performed by: OCCUPATIONAL THERAPIST

## 2024-11-17 PROCEDURE — 87507 IADNA-DNA/RNA PROBE TQ 12-25: CPT | Performed by: INTERNAL MEDICINE

## 2024-11-17 PROCEDURE — 82948 REAGENT STRIP/BLOOD GLUCOSE: CPT

## 2024-11-17 PROCEDURE — 25010000002 THIAMINE HCL 200 MG/2ML SOLUTION: Performed by: NURSE PRACTITIONER

## 2024-11-17 PROCEDURE — 82550 ASSAY OF CK (CPK): CPT | Performed by: NURSE PRACTITIONER

## 2024-11-17 PROCEDURE — 80053 COMPREHEN METABOLIC PANEL: CPT | Performed by: NURSE PRACTITIONER

## 2024-11-17 PROCEDURE — 25810000003 SODIUM CHLORIDE 0.9 % SOLUTION: Performed by: NURSE PRACTITIONER

## 2024-11-17 PROCEDURE — 85025 COMPLETE CBC W/AUTO DIFF WBC: CPT | Performed by: NURSE PRACTITIONER

## 2024-11-17 RX ORDER — POTASSIUM CHLORIDE 750 MG/1
40 CAPSULE, EXTENDED RELEASE ORAL EVERY 6 HOURS
Status: COMPLETED | OUTPATIENT
Start: 2024-11-17 | End: 2024-11-17

## 2024-11-17 RX ORDER — PANTOPRAZOLE SODIUM 40 MG/1
40 TABLET, DELAYED RELEASE ORAL
Status: DISCONTINUED | OUTPATIENT
Start: 2024-11-17 | End: 2024-11-21 | Stop reason: HOSPADM

## 2024-11-17 RX ORDER — AMIODARONE HYDROCHLORIDE 200 MG/1
200 TABLET ORAL
Status: DISCONTINUED | OUTPATIENT
Start: 2024-11-17 | End: 2024-11-21 | Stop reason: HOSPADM

## 2024-11-17 RX ADMIN — POTASSIUM CHLORIDE 40 MEQ: 750 CAPSULE, EXTENDED RELEASE ORAL at 14:15

## 2024-11-17 RX ADMIN — LORAZEPAM 2 MG: 1 TABLET ORAL at 08:50

## 2024-11-17 RX ADMIN — Medication 1 TABLET: at 08:49

## 2024-11-17 RX ADMIN — LORAZEPAM 1 MG: 1 TABLET ORAL at 23:15

## 2024-11-17 RX ADMIN — LORAZEPAM 2 MG: 1 TABLET ORAL at 06:38

## 2024-11-17 RX ADMIN — THIAMINE HYDROCHLORIDE 200 MG: 100 INJECTION, SOLUTION INTRAMUSCULAR; INTRAVENOUS at 21:00

## 2024-11-17 RX ADMIN — PANTOPRAZOLE SODIUM 40 MG: 40 TABLET, DELAYED RELEASE ORAL at 08:49

## 2024-11-17 RX ADMIN — LORAZEPAM 2 MG: 1 TABLET ORAL at 14:15

## 2024-11-17 RX ADMIN — Medication 10 ML: at 20:59

## 2024-11-17 RX ADMIN — AMIODARONE HYDROCHLORIDE 200 MG: 200 TABLET ORAL at 08:49

## 2024-11-17 RX ADMIN — LORAZEPAM 1 MG: 1 TABLET ORAL at 17:13

## 2024-11-17 RX ADMIN — THIAMINE HYDROCHLORIDE 200 MG: 100 INJECTION, SOLUTION INTRAMUSCULAR; INTRAVENOUS at 06:39

## 2024-11-17 RX ADMIN — POTASSIUM CHLORIDE 40 MEQ: 750 CAPSULE, EXTENDED RELEASE ORAL at 08:49

## 2024-11-17 RX ADMIN — FOLIC ACID 1 MG: 1 TABLET ORAL at 08:50

## 2024-11-17 RX ADMIN — SODIUM CHLORIDE 100 ML/HR: 9 INJECTION, SOLUTION INTRAVENOUS at 11:54

## 2024-11-17 RX ADMIN — THIAMINE HYDROCHLORIDE 200 MG: 100 INJECTION, SOLUTION INTRAMUSCULAR; INTRAVENOUS at 14:16

## 2024-11-17 RX ADMIN — Medication 10 ML: at 08:52

## 2024-11-17 RX ADMIN — LORAZEPAM 2 MG: 1 TABLET ORAL at 11:50

## 2024-11-17 NOTE — PLAN OF CARE
Goal Outcome Evaluation:  Plan of Care Reviewed With: patient        Progress: no change  Outcome Evaluation: OT evaluation completed. Pt is A&Ox4 but tangential with conversation. Pt requires encouragement to participate d/t pt report of needing a nap. Pt reports weakness and fatigue. Pt with chronic tingling in bilateral fingers, he reports left is worse than right. Pt was supervision with verbal cues for rolling left. Pt was max A with draw sheet for scooting to HOB. Pt with decreased effort given throughout session, encouragement provided. Expected level of assist for LB dressing task is max-dependent. Pt with decreased command following. Skilled OT recommended to address adls, functional mobility and endurance. Recommended d/c SNF.    Anticipated Discharge Disposition (OT): skilled nursing facility

## 2024-11-17 NOTE — CASE MANAGEMENT/SOCIAL WORK
Discharge Planning Assessment  [facility]     Patient Name: Dayron Lubin  MRN: [id number]  Today's Date: 11/17/2024    Admit Date: 11/16/2024    Plan: Home   Discharge Needs Assessment       Row Name 11/17/24 1133       Living Environment    People in Home alone    Current Living Arrangements home    Primary Care Provided by self    Provides Primary Care For no one    Family Caregiver if Needed none    Quality of Family Relationships supportive    Able to Return to Prior Arrangements yes       Resource/Environmental Concerns    Transportation Concerns none       Transition Planning    Patient/Family Anticipates Transition to home    Patient/Family Anticipated Services at Transition none    Transportation Anticipated family or friend will provide       Discharge Needs Assessment    Readmission Within the Last 30 Days no previous admission in last 30 days    Equipment Currently Used at Home none    Concerns to be Addressed denies needs/concerns at this time;substance/tobacco abuse/use    Equipment Needed After Discharge none    Current Discharge Risk substance use/abuse;lives alone                   Discharge Plan       Row Name 11/17/24 1134       Plan    Plan Home    Patient/Family in Agreement with Plan yes    Plan Comments Spoke with pt to assess for d/c planning. Pt lives at home alone and plans same. Order for possible DME needs, he says that was his brother mentioning and he does not feel needed. He also does not feel any HH care needed. Pt has RX coverage/PCP. Did provide pt chemical dependency packet. Will follow.                  Continued Care and Services - Admitted Since 11/16/2024    No active coordination exists for this encounter.          Demographic Summary    No documentation.                  Functional Status    No documentation.                  Psychosocial    No documentation.                  Abuse/Neglect    No documentation.                  Legal    No documentation.                   Substance Abuse    No documentation.                  Patient Forms    No documentation.                     ANUM SabaW

## 2024-11-17 NOTE — THERAPY EVALUATION
Patient Name: Dayron Lubin  : 1955    MRN: [id number]                              Today's Date: 2024       Admit Date: 2024    Visit Dx:     ICD-10-CM ICD-9-CM   1. Hypotension, unspecified hypotension type  I95.9 458.9   2. Fall, initial encounter  W19.XXXA E888.9   3. Alcohol abuse  F10.10 305.00   4. Chronic liver disease  K76.9 571.9   5. Elevated bilirubin  R17 277.4   6. Traumatic rhabdomyolysis, initial encounter  T79.6XXA 958.6   7. Dehydration  E86.0 276.51     Patient Active Problem List   Diagnosis    Prostate cancer    Hx of radiation therapy    Elevated PSA    Encounter for follow-up surveillance of prostate cancer    HOCM (hypertrophic obstructive cardiomyopathy)    Coronary artery disease involving native coronary artery of native heart without angina pectoris    Essential hypertension    Mixed hyperlipidemia    Class 1 obesity with alveolar hypoventilation, serious comorbidity, and body mass index (BMI) of 30.0 to 30.9 in adult    Displaced fracture of lateral malleolus of right fibula, initial encounter for closed fracture    Family history of colonic polyps    History of colon polyps    Elevated brain natriuretic peptide (BNP) level    Acute kidney injury    Anxiety associated with depression    Alcohol abuse    Alcoholic hepatitis    Volume depletion    Anorexia    CHF (congestive heart failure)    Hypokalemia    Duodenitis    Gait instability    SVT (supraventricular tachycardia)    Physical debility    Atrial fibrillation with rapid ventricular response    Alcoholism in remission    Paroxysmal atrial fibrillation    Nasal polyposis    Nasal congestion    Estelle bullosa    Long-term use of high-risk medication    Multiple falls    Transaminitis    Thrombocytopenia    Non-traumatic rhabdomyolysis    Alcohol withdrawal     Past Medical History:   Diagnosis Date    A-fib     Alcoholic hepatitis 2023    CAD (coronary artery disease)     History of external beam radiation  therapy 05/12/2016    6840 cGy to prostate bed    Hyperlipidemia     Hypertension     Insomnia     Prostate cancer      Past Surgical History:   Procedure Laterality Date    CARDIAC CATHETERIZATION      CORONARY ANGIOPLASTY WITH STENT PLACEMENT      FRACTURE SURGERY      PROSTATE SURGERY      TONSILLECTOMY        General Information       Row Name 11/17/24 1331          OT Time and Intention    Subjective Information complains of;weakness;fatigue  chronic tingling in B fingers, left worse than right  -MM     Document Type evaluation  Pt admitted s/p fall. Dx: hypotension, alcohol abuse, chronic liver disease, elevated bilirubin, traumatic rhabdomyolysis dehydration, HOCM, alcoholic hepatitis, and MIKEY.  -MM     Mode of Treatment occupational therapy  -MM       Row Name 11/17/24 1331          General Information    Patient Profile Reviewed yes  -MM     Prior Level of Function independent:;all household mobility;community mobility;ADL's;home management;cooking;cleaning;driving  -MM     Existing Precautions/Restrictions fall  -MM     Barriers to Rehab medically complex;previous functional deficit;cognitive status  -MM       Row Name 11/17/24 1331          Living Environment    People in Home alone  tub/shower combo with grab bar.  -MM       Row Name 11/17/24 1331          Home Main Entrance    Number of Stairs, Main Entrance two  -MM     Stair Railings, Main Entrance railing on left side (ascending)  -MM       Row Name 11/17/24 1331          Stairs Within Home, Primary    Number of Stairs, Within Home, Primary none  -MM       Row Name 11/17/24 1331          Cognition    Orientation Status (Cognition) oriented x 4  -MM       Row Name 11/17/24 1331          Safety Issues/Impairments Affecting Functional Mobility    Safety Issues Affecting Function (Mobility) ability to follow commands;at risk behavior observed;awareness of need for assistance;friction/shear risk;insight into  deficits/self-awareness;judgment;problem-solving;safety precaution awareness;safety precautions follow-through/compliance;sequencing abilities  -MM     Impairments Affecting Function (Mobility) balance;cognition;coordination;endurance/activity tolerance;grasp;motor control;motor planning;postural/trunk control;range of motion (ROM);sensation/sensory awareness;shortness of breath;strength  -MM     Cognitive Impairments, Mobility Safety/Performance attention;awareness, need for assistance;insight into deficits/self-awareness;judgment;problem-solving/reasoning;safety precaution awareness;safety precaution follow-through;sequencing abilities  -MM               User Key  (r) = Recorded By, (t) = Taken By, (c) = Cosigned By      Initials Name Provider Type    MM Andres Lambert, OTR/L Occupational Therapist                     Mobility/ADL's       Row Name 11/17/24 [facility]          Bed Mobility    Bed Mobility rolling left;scooting/bridging  -MM     Rolling Left [place] (Bed Mobility) supervision;verbal cues  -MM     Scooting/Bridging [place] (Bed Mobility) maximum assist (25% patient effort);verbal cues  -MM     Assistive Device (Bed Mobility) repositioning sheet;bed rails  -MM     Comment, (Bed Mobility) pt refused further activity d/t being too weak and tired  -MM       Row Name 11/17/24 1351          Transfers    Transfers --  -MM       Row Name 11/17/24 135          Functional Mobility    Functional Mobility- Comment --  -MM       Row Name 11/17/24 135          Activities of Daily Living    BADL Assessment/Intervention lower body dressing  -MM       Row Name 11/17/24 135          Lower Body Dressing Assessment/Training    [place] Level (Lower Body Dressing) maximum assist (25% patient effort);dependent (less than 25% patient effort);verbal cues  expected level of assist  -MM     Position (Lower Body Dressing) supine  -MM               User Key  (r) = Recorded By, (t) = Taken By, (c) = Cosigned By       Initials Name Provider Type    BRANDIN Andres Lambert, OTR/L Occupational Therapist                   Obj/Interventions       [facility] Name 11/17/24 1351          Sensory Assessment (Somatosensory)    Sensory Assessment (Somatosensory) UE sensation intact  -MM       Row Name 11/17/24 1351          Vision Assessment/Intervention    Visual Impairment/Limitations WFL  -[facility] Name 11/17/24 1351          Range of Motion Comprehensive    General Range of Motion bilateral upper extremity ROM WFL  -MM       Row Name 11/17/24 1351          Strength Comprehensive (MMT)    Comment, General Manual Muscle Testing (MMT) Assessment BUE strength:  -MM       [facility] Name 11/17/24 1351          Motor Skills    Motor Skills coordination  -MM     Coordination fine motor deficit;gross motor deficit;bilateral;upper extremity;minimal impairment  -[facility] Name 11/17/24 1351          Balance    Balance Assessment --  -MM     Position, Sitting Balance --  -MM               User Key  (r) = Recorded By, (t) = Taken By, (c) = Cosigned By      Initials Name Provider Type    BRANDIN Andres Lambert, OTR/L Occupational Therapist                   Goals/Plan       Row Name 11/17/24 1331          Transfer Goal 1 (OT)    Activity/Assistive Device (Transfer Goal 1, OT) toilet;bed-to-chair/chair-to-bed;tub  -MM     [place] Level/Cues Needed (Transfer Goal 1, OT) minimum assist (75% or more patient effort);verbal cues required  -MM     Time Frame (Transfer Goal 1, OT) long term goal (LTG);by discharge  -MM     Progress/Outcome (Transfer Goal 1, OT) new goal  -MM       Row Name 11/17/24 1331          Dressing Goal 1 (OT)    Activity/Device (Dressing Goal 1, OT) dressing skills, all  -MM     [place]/Cues Needed (Dressing Goal 1, OT) minimum assist (75% or more patient effort);verbal cues required  -MM     Time Frame (Dressing Goal 1, OT) long term goal (LTG);by discharge  -MM     Progress/Outcome (Dressing Goal 1, OT) new goal  -[facility]  Name 11/17/24 1331          Toileting Goal 1 (OT)    Activity/Device (Toileting Goal 1, OT) toileting skills, all  -MM     [place] Level/Cues Needed (Toileting Goal 1, OT) minimum assist (75% or more patient effort);verbal cues required  -MM     Time Frame (Toileting Goal 1, OT) long term goal (LTG);by discharge  -MM     Progress/Outcome (Toileting Goal 1, OT) new goal  -MM       Row Name 11/17/24 1331          Therapy Assessment/Plan (OT)    Planned Therapy Interventions (OT) activity tolerance training;BADL retraining;functional balance retraining;occupation/activity based interventions;patient/caregiver education/training;ROM/therapeutic exercise;strengthening exercise;transfer/mobility retraining;adaptive equipment training;cognitive/visual perception retraining;neuromuscular control/coordination retraining  -MM               User Key  (r) = Recorded By, (t) = Taken By, (c) = Cosigned By      Initials Name Provider Type    MM Andres Lambert, OTR/L Occupational Therapist                   Clinical Impression       Row Name 11/17/24 1352          Pain Assessment    Pretreatment Pain Rating 0/10 - no pain  -MM     Posttreatment Pain Rating 0/10 - no pain  -MM       Row Name 11/17/24 1352          Plan of Care Review    Plan of Care Reviewed With patient  -MM     Progress no change  -MM     Outcome Evaluation OT evaluation completed. Pt is A&Ox4 but tangential with conversation. Pt requires encouragement to participate d/t pt report of needing a nap. Pt reports weakness and fatigue. Pt with chronic tingling in bilateral fingers, he reports left is worse than right. Pt was supervision with verbal cues for rolling left. Pt was max A with draw sheet for scooting to HOB. Pt with decreased effort given throughout session, encouragement provided. Expected level of assist for LB dressing task is max-dependent. Pt with decreased command following. Skilled OT recommended to address adls, functional mobility and  endurance. Recommended d/c SNF.  -MM       Row Name 11/17/24 1352          Therapy Assessment/Plan (OT)    Patient/Family Therapy Goal Statement (OT) to go home  -MM     Rehab Potential (OT) good  -MM     Criteria for Skilled Therapeutic Interventions Met (OT) yes;meets criteria;skilled treatment is necessary  -MM     Therapy Frequency (OT) 5 times/wk  -MM     Predicted Duration of Therapy Intervention (OT) until hospital discharge  -MM       Row Name 11/17/24 1352          Therapy Plan Review/Discharge Plan (OT)    Anticipated Discharge Disposition (OT) skilled nursing facility  -MM       Row Name 11/17/24 1352          Positioning and Restraints    Pre-Treatment Position in bed  -MM     Post Treatment Position bed  -MM     In Bed notified nsg;fowlers;call light within reach;encouraged to call for assist;exit alarm on;side rails up x2  -MM               User Key  (r) = Recorded By, (t) = Taken By, (c) = Cosigned By      Initials Name Provider Type    MM Andres Lambert, OTR/L Occupational Therapist                   Outcome Measures       Row Name 11/17/24 1331          How much help from another is currently needed...    Putting on and taking off regular lower body clothing? 1  -MM     Bathing (including washing, rinsing, and drying) 2  -MM     Toileting (which includes using toilet bed pan or urinal) 2  -MM     Putting on and taking off regular upper body clothing 2  -MM     Taking care of personal grooming (such as brushing teeth) 3  -MM     Eating meals 4  -MM     AM-PAC 6 Clicks Score (OT) 14  -MM       Row Name 11/17/24 0850          How much help from another person do you currently need...    Turning from your back to your side while in flat bed without using bedrails? 4  -AA     Moving from lying on back to sitting on the side of a flat bed without bedrails? 3  -AA     Moving to and from a bed to a chair (including a wheelchair)? 2  -AA     Standing up from a chair using your arms (e.g., wheelchair,  bedside chair)? 2  -AA     Climbing 3-5 steps with a railing? 2  -AA     To walk in hospital room? 2  -AA     AM-PAC 6 Clicks Score (PT) 15  -AA       Row Name 11/17/24 1331          Functional Assessment    Outcome Measure Options AM-PAC 6 Clicks Daily Activity (OT)  -MM               User Key  (r) = Recorded By, (t) = Taken By, (c) = Cosigned By      Initials Name Provider Type     Trisha Cardenas, RN Registered Nurse    Andres Rose, OTR/L Occupational Therapist                    Occupational Therapy Education       Title: PT OT SLP Therapies (In Progress)       Topic: Occupational Therapy (In Progress)       Point: ADL training (In Progress)       Description:   Instruct learner(s) on proper safety adaptation and remediation techniques during self care or transfers.   Instruct in proper use of assistive devices.                  Learning Progress Summary            Patient Acceptance, E, NR by MM at 11/17/2024 1414                      Point: Home exercise program (Not Started)       Description:   Instruct learner(s) on appropriate technique for monitoring, assisting and/or progressing therapeutic exercises/activities.                  Learner Progress:  Not documented in this visit.              Point: Precautions (In Progress)       Description:   Instruct learner(s) on prescribed precautions during self-care and functional transfers.                  Learning Progress Summary            Patient Acceptance, E, NR by MM at 11/17/2024 1414                      Point: Body mechanics (In Progress)       Description:   Instruct learner(s) on proper positioning and spine alignment during self-care, functional mobility activities and/or exercises.                  Learning Progress Summary            Patient Acceptance, E, NR by MM at 11/17/2024 1414                                      User Key       Initials Effective Dates Name Provider Type [facility] 07/11/23 -  Andres Lambert, OTR/L  Occupational Therapist OT                  OT Recommendation and Plan  Planned Therapy Interventions (OT): activity tolerance training, BADL retraining, functional balance retraining, occupation/activity based interventions, patient/caregiver education/training, ROM/therapeutic exercise, strengthening exercise, transfer/mobility retraining, adaptive equipment training, cognitive/visual perception retraining, neuromuscular control/coordination retraining  Therapy Frequency (OT): 5 times/wk  Plan of Care Review  Plan of Care Reviewed With: patient  Progress: no change  Outcome Evaluation: OT evaluation completed. Pt is A&Ox4 but tangential with conversation. Pt requires encouragement to participate d/t pt report of needing a nap. Pt reports weakness and fatigue. Pt with chronic tingling in bilateral fingers, he reports left is worse than right. Pt was supervision with verbal cues for rolling left. Pt was max A with draw sheet for scooting to HOB. Pt with decreased effort given throughout session, encouragement provided. Expected level of assist for LB dressing task is max-dependent. Pt with decreased command following. Skilled OT recommended to address adls, functional mobility and endurance. Recommended d/c SNF.     Time Calculation:         Time Calculation- OT       Row Name 11/17/24 1331             Time Calculation- OT    OT Start Time 1331  -MM      OT Stop Time 1402  -MM      OT Time Calculation (min) 31 min  -MM      OT Received On 11/17/24  -MM      OT Goal Re-Cert Due Date 11/27/24  -MM                User Key  (r) = Recorded By, (t) = Taken By, (c) = Cosigned By      Initials Name Provider Type    MM Andres Lambert, OTR/L Occupational Therapist                  Therapy Charges for Today       Code Description Service Date Service Provider Modifiers Qty    [id number]  OT EVAL MOD COMPLEXITY 2 11/17/2024 Andres Lambert, OTR/L GO 1                 ELLIE Carpio/ZOLTAN  11/17/2024

## 2024-11-17 NOTE — PROGRESS NOTES
[facility] Medicine Services  INPATIENT PROGRESS NOTE    Patient Name: Dayron Lubin  Date of Admission: 11/16/2024  Today's Date: 11/17/24  Length of Stay: 1  Primary Care Physician: Herberth Nguyen Jr., MD    Subjective   Chief Complaint: Loose stool  HPI   Patient's main complaint this morning is that he continued to have loose stool last night, and ultimately a fecal management system was put in place.  He does report that his loose stool and diarrhea is new.  He denies any abdominal pain or cramping.  He has been receiving Ativan per the CIWA protocol and reports that this has helped tremendously.  He denies any other pain symptoms.  Denies any shortness of breath symptoms.      Review of Systems   All pertinent negatives and positives are as above. All other systems have been reviewed and are negative unless otherwise stated.     Objective    Temp:  [98.1 °F (36.7 °C)-99.7 °F (37.6 °C)] 98.2 °F (36.8 °C)  Heart Rate:  [77-93] 84  Resp:  [15-18] 16  BP: ()/(52-69) 90/66  Physical Exam  Vitals reviewed.   Constitutional:       General: He is not in acute distress.     Appearance: He is obese. He is ill-appearing.      Comments: He looks much better as compared to my exam in ED last PM   HENT:      Head: Normocephalic.      Mouth/Throat:      Mouth: Mucous membranes are moist.   Eyes:      Pupils: Pupils are equal, round, and reactive to light.   Cardiovascular:      Rate and Rhythm: Normal rate.      Heart sounds: Murmur heard.   Pulmonary:      Effort: Pulmonary effort is normal. No respiratory distress.      Breath sounds: No wheezing or rales.      Comments: On room air  Abdominal:      Palpations: Abdomen is soft.   Genitourinary:     Comments: Condom catheter and fecal mgmt system now in place  Skin:     Findings: Rash present.   Neurological:      Mental Status: He is alert.      Motor: Weakness present.   Psychiatric:      Comments: Currently calm and  "cooperative         Results Review:  I have reviewed the labs, radiology results, and diagnostic studies.    Laboratory Data:   Results from last 7 days   Lab Units 11/16/24  1115   WBC 10*3/mm3 6.52   HEMOGLOBIN g/dL 12.2*   HEMATOCRIT % 35.1*   PLATELETS 10*3/mm3 72*        Results from last 7 days   Lab Units 11/17/24  0457 11/16/24  1115   SODIUM mmol/L 133* 131*   POTASSIUM mmol/L 3.4* 4.0   CHLORIDE mmol/L 95* 88*   CO2 mmol/L 25.0 22.0   BUN mg/dL 20 21   CREATININE mg/dL 1.24 1.81*   CALCIUM mg/dL 6.9* 8.3*   BILIRUBIN mg/dL 1.4* 2.8*   ALK PHOS U/L 67 93   ALT (SGPT) U/L 101* 156*   AST (SGOT) U/L 219* 404*   GLUCOSE mg/dL 87 109*       Culture Data:   No results found for: \"BLOODCX\", \"URINECX\", \"WOUNDCX\", \"MRSACX\", \"RESPCX\", \"STOOLCX\"    Radiology Data:   Imaging Results (Last 24 Hours)       Procedure Component Value Units Date/Time    CT Abdomen Pelvis With Contrast [[id number]] Collected: 11/16/24 1314     Updated: 11/16/24 1320    Narrative:      EXAM/TECHNIQUE: CT abdomen and pelvis with IV contrast     INDICATION: Fall with right CVA tenderness and large bruise.  Possible  RPH patient is hypotensive; I95.9-Hypotension, unspecified;  W19.XXXA-Unspecified fall, initial encounter; F10.10-Alcohol abuse,  uncomplicated     COMPARISON: 7/13/2023     DLP: 2862.05 mGy.cm. Automated exposure control was also utilized to  decrease patient radiation dose.     FINDINGS:     Findings in the included lower chest are described in a separate same  day report.     Severe hepatic steatosis. No morphologic changes of cirrhosis. No  suspicious focal liver lesion. No gallstone or gallbladder wall  thickening. No biliary ductal dilatation.     Pancreas, spleen, and adrenal glands are unremarkable.     No solid renal mass. No urolithiasis or hydronephrosis. Urinary bladder  is decompressed.      Mild colonic diverticulosis without diverticulitis. No colonic wall  thickening or pericolonic fat stranding. Normal appendix. No " small bowel  distention or evidence of active small bowel inflammation.     No ascites or free pelvic fluid. No pelvic mass or collection. Prior  prostatectomy.     Nonaneurysmal atherosclerotic abdominal aorta. No enlarged abdominal or  pelvic lymph nodes. Calcified lymph nodes in the periportal region like  related to an old granulomatous process     No large soft tissue hematoma. 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 No acute osseous finding.       Impression:         1.  No acute traumatic finding in the abdomen or pelvis.     2.  Severe hepatic steatosis.     3.  Mild colonic diverticulosis without evidence of diverticulitis.     This report was signed and finalized on 11/16/2024 1:17 PM by Dr. Tyrese Young MD.       CT Chest With Contrast Diagnostic [[id number]] Collected: 11/16/24 1309     Updated: 11/16/24 1317    Narrative:      EXAM/TECHNIQUE: CT chest with IV contrast     INDICATION: Chest trauma fall short of breath right-sided chest pain;  I95.9-Hypotension, unspecified; W19.XXXA-Unspecified fall, initial  encounter; F10.10-Alcohol abuse, uncomplicated     COMPARISON: 8/7/2023     DLP: 2862.05 mGy.cm. Automated exposure control was also utilized to  decrease patient radiation dose.     FINDINGS:     The central airways are clear. No consolidation, pleural effusion,  pneumothorax, hemothorax, or pulmonary contusion. No advanced  emphysematous or fibrotic change. No suspicious pulmonary nodule.     No enlarged thoracic lymph nodes. No central pulmonary artery filling  defect. Thoracic aorta is nonaneurysmal. No evidence of acute aortic  injury. Aortic arch branch origins are widely patent. Heavy aortic valve  calcification. Heavy coronary artery calcification. No pericardial  effusion. Left ventricular apical aneurysm.     No large thyroid nodule. No acute chest soft tissue abnormality. No  large soft tissue hematoma. Findings in the included portion of the  upper abdomen are  described in a separate same day report. No acute rib  fracture. No thoracic spine compression fracture or subluxation.       Impression:         1.  No acute traumatic finding in the chest.     2.  Heavy aortic valve calcification. Heavy coronary artery  atherosclerotic calcification. Chronic left ventricular apical  myocardial thinning/aneurysm.        This report was signed and finalized on 11/16/2024 1:14 PM by Dr. Tyrese Young MD.       CT Cervical Spine Without Contrast [[id number]] Collected: 11/16/24 1303     Updated: 11/16/24 1312    Narrative:      EXAM/TECHNIQUE: CT cervical spine without contrast     INDICATION: Fall; I95.9-Hypotension, unspecified; W19.XXXA-Unspecified  fall, initial encounter; F10.10-Alcohol abuse, uncomplicated     COMPARISON: None available.     DLP: 2862.05 mGy.cm. Automated exposure control was also utilized to  decrease patient radiation dose.     FINDINGS:     Craniocervical relationships are maintained. The odontoid process is  intact. Cervical spine alignment appears maintained. No subluxations.  Vertebral body heights are maintained. No acute or chronic fracture.  Mild to moderate multilevel cervical spine degenerative change with  multilevel neural foraminal narrowing. Paravertebral soft tissues are  unremarkable. Carotid artery atherosclerotic calcification.       Impression:      1. No acute fracture or subluxation.  2. Mild to moderate multilevel cervical spine degenerative change.        This report was signed and finalized on 11/16/2024 1:09 PM by Dr. Tyrese Young MD.       CT Head Without Contrast [[id number]] Collected: 11/16/24 1258     Updated: 11/16/24 1306    Narrative:      EXAM/TECHNIQUE: CT head without contrast     INDICATION: Fall; I95.9-Hypotension, unspecified; W19.XXXA-Unspecified  fall, initial encounter; F10.10-Alcohol abuse, uncomplicated     COMPARISON: None available.     DLP: 2862.05 mGy.cm. Automated exposure control was also utilized  to  decrease patient radiation dose.     FINDINGS:     No evidence of intracranial hemorrhage. Gray-white differentiation is  maintained. No midline shift or mass effect. Lateral ventricles are  nondilated. Basilar cisterns are patent. No acute orbital finding.  Mastoid air cells are clear. Visualized portion of the paranasal sinuses  are clear. No acute osseous finding.       Impression:         No acute intracranial findings.        This report was signed and finalized on 11/16/2024 1:03 PM by Dr. Tyrese Young MD.       XR Chest 1 View [[id number]] Collected: 11/16/24 1128     Updated: 11/16/24 1132    Narrative:      EXAM/TECHNIQUE: XR CHEST 1 VW-     INDICATION: Fall; I95.9-Hypotension, unspecified; W19.XXXA-Unspecified  fall, initial encounter; F10.10-Alcohol abuse, uncomplicated     COMPARISON: 10/18/2024     FINDINGS:     Cardiac silhouette is within normal limits.     No pleural effusion or pneumothorax. No focal consolidation.     No acute osseous finding.       Impression:         No acute findings.     This report was signed and finalized on 11/16/2024 11:29 AM by Dr. Tyrese Young MD.               I have reviewed the patient's current medications.     Assessment/Plan   Assessment  Active Hospital Problems    Diagnosis     **Multiple falls     Transaminitis     Thrombocytopenia     Non-traumatic rhabdomyolysis     Alcohol withdrawal     Paroxysmal atrial fibrillation     Acute kidney injury     Alcohol abuse     Alcoholic hepatitis     HOCM (hypertrophic obstructive cardiomyopathy)        Treatment Plan  Encouraging CPK trend.  Renal function and LFTs also improving.  Lactate has trended from 4.0 down to 1.9.  Continue IVFs with normal saline  CIWA protocol  Add GI PCR panel today (loose stool/diarrhea)  MVI, thiamine, folate  Platelets most recently 72.  Repeat CBC pending this AM - redraw required.  Currently holding Eliquis  Hold Losartan  Hold statin  Hope to resume Toprol XL soon especially  "given his history of HOCM, however BPs still too soft at this time  PT and OT assessment  Continuous telemetry  His last Echo results as follows:  Results for orders placed during the hospital encounter of 07/29/24    Adult Transthoracic Echo Complete W/ Cont if Necessary Per Protocol    Interpretation Summary    Left ventricular systolic function is low normal. Left ventricular ejection fraction appears to be 51 - 55%.    Left ventricular wall thickness is consistent with moderate to severe concentric hypertrophy.    Mild to moderate mitral valve regurgitation is present.    The aortic valve is thickened and calcified and there is some degree of aortic valve stenosis, likely moderate, most of the flow acceleration appears to be in the left ventricular outflow tract as a result of hypertrophic obstructive cardiomyopathy.    13. Patient is followed by [facility] Cardiology and reported plans for ablation and potentially AICD placement in the future - \"in the new year.\"   14.  Wound care/Magic Barrier ointment to skin folds  15.  K replacement  16.  Workup continues      Medical Decision Making  Number and Complexity of problems: 3 acute; high complexity      Conditions and Status        Condition is improving.     MDM Data  External documents reviewed: none  Cardiac tracing (EKG, telemetry) interpretation: on telemetry patient remains in sinus rhythm in the 70-80s range.  Radiology interpretation: no new radiology studies  Labs reviewed: as above  Any tests that were considered but not ordered: none     Decision rules/scores evaluated (example [id number], [place], etc): none     Discussed with: patient     Care Planning  Shared decision making: Discussed with patient with agreement to proceed with treatment plan as outlined  Code status and discussions: DO NOT INTUBATE    Disposition  Social Determinants of Health that impact treatment or disposition: Alcohol abuse/dependency  I expect the patient to be discharged to: still " to be determined (would like to see how patient does with PT and OT)    Electronically signed by New Griffiths MD, 11/17/24, 07:00 CST.

## 2024-11-17 NOTE — PLAN OF CARE
Goal Outcome Evaluation:  Plan of Care Reviewed With: patient        Progress: no change  Outcome Evaluation: Admit from ER with weakness, fall. He is on ETOH withdraw protocol. BP is soft, bolus given.  He has bruised and butt is excoriated. Cont to monitor.

## 2024-11-17 NOTE — PLAN OF CARE
Goal Outcome Evaluation:  Plan of Care Reviewed With: patient        Progress: no change  Outcome Evaluation: No co pain. Schedule and PRN Ativan given. Recal tube in place. GI panel was neg. PT/OT ordered. Cont to monitor.

## 2024-11-18 PROBLEM — F11.10 OPIATE ABUSE, EPISODIC: Status: ACTIVE | Noted: 2024-11-18

## 2024-11-18 LAB
ALBUMIN SERPL-MCNC: 3.1 G/DL (ref 3.5–5.2)
ALBUMIN/GLOB SERPL: 1.1 G/DL
ALP SERPL-CCNC: 75 U/L (ref 39–117)
ALT SERPL W P-5'-P-CCNC: 86 U/L (ref 1–41)
ANION GAP SERPL CALCULATED.3IONS-SCNC: 12 MMOL/L (ref 5–15)
AST SERPL-CCNC: 164 U/L (ref 1–40)
BACTERIA SPEC AEROBE CULT: ABNORMAL
BASOPHILS # BLD AUTO: 0.01 10*3/MM3 (ref 0–0.2)
BASOPHILS NFR BLD AUTO: 0.3 % (ref 0–1.5)
BILIRUB SERPL-MCNC: 0.9 MG/DL (ref 0–1.2)
BUN SERPL-MCNC: 14 MG/DL (ref 8–23)
BUN/CREAT SERPL: 16.9 (ref 7–25)
CALCIUM SPEC-SCNC: 7.3 MG/DL (ref 8.6–10.5)
CHLORIDE SERPL-SCNC: 98 MMOL/L (ref 98–107)
CK SERPL-CCNC: 560 U/L (ref 20–200)
CO2 SERPL-SCNC: 22 MMOL/L (ref 22–29)
CREAT SERPL-MCNC: 0.83 MG/DL (ref 0.76–1.27)
DEPRECATED RDW RBC AUTO: 47.9 FL (ref 37–54)
EGFRCR SERPLBLD CKD-EPI 2021: 94.2 ML/MIN/1.73
EOSINOPHIL # BLD AUTO: 0.03 10*3/MM3 (ref 0–0.4)
EOSINOPHIL NFR BLD AUTO: 0.9 % (ref 0.3–6.2)
ERYTHROCYTE [DISTWIDTH] IN BLOOD BY AUTOMATED COUNT: 13.6 % (ref 12.3–15.4)
GLOBULIN UR ELPH-MCNC: 2.8 GM/DL
GLUCOSE SERPL-MCNC: 90 MG/DL (ref 65–99)
GRAM STN SPEC: ABNORMAL
HCT VFR BLD AUTO: 28.6 % (ref 37.5–51)
HGB BLD-MCNC: 9.3 G/DL (ref 13–17.7)
IMM GRANULOCYTES # BLD AUTO: 0.04 10*3/MM3 (ref 0–0.05)
IMM GRANULOCYTES NFR BLD AUTO: 1.2 % (ref 0–0.5)
ISOLATED FROM: ABNORMAL
LYMPHOCYTES # BLD AUTO: 0.57 10*3/MM3 (ref 0.7–3.1)
LYMPHOCYTES NFR BLD AUTO: 17.5 % (ref 19.6–45.3)
MAGNESIUM SERPL-MCNC: 1.8 MG/DL (ref 1.6–2.4)
MCH RBC QN AUTO: 31.2 PG (ref 26.6–33)
MCHC RBC AUTO-ENTMCNC: 32.5 G/DL (ref 31.5–35.7)
MCV RBC AUTO: 96 FL (ref 79–97)
MONOCYTES # BLD AUTO: 0.36 10*3/MM3 (ref 0.1–0.9)
MONOCYTES NFR BLD AUTO: 11.1 % (ref 5–12)
NEUTROPHILS NFR BLD AUTO: 2.24 10*3/MM3 (ref 1.7–7)
NEUTROPHILS NFR BLD AUTO: 69 % (ref 42.7–76)
NRBC BLD AUTO-RTO: 0 /100 WBC (ref 0–0.2)
PHOSPHATE SERPL-MCNC: 0.9 MG/DL (ref 2.5–4.5)
PHOSPHATE SERPL-MCNC: 1.8 MG/DL (ref 2.5–4.5)
PLATELET # BLD AUTO: 59 10*3/MM3 (ref 140–450)
PMV BLD AUTO: 11.9 FL (ref 6–12)
POTASSIUM SERPL-SCNC: 3.6 MMOL/L (ref 3.5–5.2)
PROT SERPL-MCNC: 5.9 G/DL (ref 6–8.5)
RBC # BLD AUTO: 2.98 10*6/MM3 (ref 4.14–5.8)
SODIUM SERPL-SCNC: 132 MMOL/L (ref 136–145)
WBC NRBC COR # BLD AUTO: 3.25 10*3/MM3 (ref 3.4–10.8)

## 2024-11-18 PROCEDURE — 25010000002 THIAMINE HCL 200 MG/2ML SOLUTION: Performed by: NURSE PRACTITIONER

## 2024-11-18 PROCEDURE — 82550 ASSAY OF CK (CPK): CPT | Performed by: INTERNAL MEDICINE

## 2024-11-18 PROCEDURE — 25810000003 SODIUM CHLORIDE 0.9 % SOLUTION 250 ML FLEX CONT: Performed by: FAMILY MEDICINE

## 2024-11-18 PROCEDURE — 84100 ASSAY OF PHOSPHORUS: CPT | Performed by: INTERNAL MEDICINE

## 2024-11-18 PROCEDURE — 97162 PT EVAL MOD COMPLEX 30 MIN: CPT

## 2024-11-18 PROCEDURE — 85025 COMPLETE CBC W/AUTO DIFF WBC: CPT | Performed by: INTERNAL MEDICINE

## 2024-11-18 PROCEDURE — 97535 SELF CARE MNGMENT TRAINING: CPT | Performed by: OCCUPATIONAL THERAPIST

## 2024-11-18 PROCEDURE — 97168 OT RE-EVAL EST PLAN CARE: CPT | Performed by: OCCUPATIONAL THERAPIST

## 2024-11-18 PROCEDURE — 83735 ASSAY OF MAGNESIUM: CPT | Performed by: INTERNAL MEDICINE

## 2024-11-18 PROCEDURE — 84100 ASSAY OF PHOSPHORUS: CPT | Performed by: FAMILY MEDICINE

## 2024-11-18 PROCEDURE — 80053 COMPREHEN METABOLIC PANEL: CPT | Performed by: INTERNAL MEDICINE

## 2024-11-18 RX ADMIN — THIAMINE HYDROCHLORIDE 200 MG: 100 INJECTION, SOLUTION INTRAMUSCULAR; INTRAVENOUS at 21:31

## 2024-11-18 RX ADMIN — ACETAMINOPHEN 650 MG: 325 TABLET, FILM COATED ORAL at 08:59

## 2024-11-18 RX ADMIN — AMIODARONE HYDROCHLORIDE 200 MG: 200 TABLET ORAL at 08:59

## 2024-11-18 RX ADMIN — ACETAMINOPHEN 650 MG: 325 TABLET, FILM COATED ORAL at 21:30

## 2024-11-18 RX ADMIN — Medication 10 ML: at 21:36

## 2024-11-18 RX ADMIN — Medication 1 TABLET: at 08:59

## 2024-11-18 RX ADMIN — PANTOPRAZOLE SODIUM 40 MG: 40 TABLET, DELAYED RELEASE ORAL at 06:28

## 2024-11-18 RX ADMIN — LORAZEPAM 1 MG: 1 TABLET ORAL at 06:28

## 2024-11-18 RX ADMIN — LORAZEPAM 1 MG: 1 TABLET ORAL at 21:30

## 2024-11-18 RX ADMIN — POTASSIUM PHOSPHATE, MONOBASIC AND POTASSIUM PHOSPHATE, DIBASIC 15 MMOL: 224; 236 INJECTION, SOLUTION, CONCENTRATE INTRAVENOUS at 06:27

## 2024-11-18 RX ADMIN — POTASSIUM & SODIUM PHOSPHATES POWDER PACK 280-160-250 MG 2 PACKET: 280-160-250 PACK at 21:31

## 2024-11-18 RX ADMIN — THIAMINE HYDROCHLORIDE 200 MG: 100 INJECTION, SOLUTION INTRAMUSCULAR; INTRAVENOUS at 06:28

## 2024-11-18 RX ADMIN — FOLIC ACID 1 MG: 1 TABLET ORAL at 08:59

## 2024-11-18 RX ADMIN — POTASSIUM PHOSPHATE, MONOBASIC AND POTASSIUM PHOSPHATE, DIBASIC 15 MMOL: 224; 236 INJECTION, SOLUTION, CONCENTRATE INTRAVENOUS at 08:58

## 2024-11-18 RX ADMIN — Medication 10 ML: at 09:00

## 2024-11-18 RX ADMIN — POTASSIUM PHOSPHATE, MONOBASIC AND POTASSIUM PHOSPHATE, DIBASIC 15 MMOL: 224; 236 INJECTION, SOLUTION, CONCENTRATE INTRAVENOUS at 11:24

## 2024-11-18 RX ADMIN — THIAMINE HYDROCHLORIDE 200 MG: 100 INJECTION, SOLUTION INTRAMUSCULAR; INTRAVENOUS at 13:08

## 2024-11-18 RX ADMIN — ACETAMINOPHEN 650 MG: 325 TABLET, FILM COATED ORAL at 03:20

## 2024-11-18 RX ADMIN — LORAZEPAM 1 MG: 1 TABLET ORAL at 11:13

## 2024-11-18 NOTE — CONSULTS
[facility]  INPATIENT WOUND & OSTOMY CONSULTATION    Today's Date: 11/18/24    Patient Name: Dayron Lubin  MRN: [id number]  CSN: [id number]  PCP: Herberth Nguyen Jr., MD  Referring Provider:   Consulting Provider (From admission, onward)      Start Ordered     Status Ordering Provider    11/16/24 1903 11/16/24 1903  Inpatient Wound Care MD Consult  Once        Specialty:  Wound Care  Provider:  Katrin Sandoval APRN Acknowledged THOMPSON, BENJAMIN H           Attending Provider: Holger Karimi DO  Length of Stay: 2    SUBJECTIVE   Chief Complaint: Skin breakdown of buttocks and groins    HPI: Dayron Lubin, a 69 y.o.male, presents with a past medical history of prostate cancer with history of external beam radiation therapy, coronary artery disease, hypertension, hyperlipidemia, alcoholic hepatitis, A-fib.  A full past medical history is listed below.  Inpatient wound care consulted due to skin breakdown of buttocks and groins.  It is noted in H&P that patient fell the night before admission and believes he was on the ground for approximately 3 hours.  Patient has abrasions of bilateral lower extremities with stable scabs present.  Pictures were taken on admission of buttocks and groin areas centers have significant erythema and skin breakdown related to moisture.  Patient also admitted to wearing his depends for a day and a half and was too weak to change it.    On assessment patient is found to have incontinence associated dermatitis of bilateral buttocks, perineum, and groin areas.  They are much improved with use of zinc with menthol barrier cream is currently in place.  He does have an area of the right buttock that is down to subcutaneous tissue making this a pressure injury.  Patient is able to turn on the side independently.  He states he turns side-to-side throughout the night.  Patient currently has external urinary catheter in place rectal tube for moisture  management.    Visit Dx:    ICD-10-CM ICD-9-CM   1. Hypotension, unspecified hypotension type  I95.9 458.9   2. Fall, initial encounter  W19.XXXA E888.9   3. Alcohol abuse  F10.10 305.00   4. Chronic liver disease  K76.9 571.9   5. Elevated bilirubin  R17 277.4   6. Traumatic rhabdomyolysis, initial encounter  T79.6XXA 958.6   7. Dehydration  E86.0 276.51       Hospital Problem List:     Multiple falls    HOCM (hypertrophic obstructive cardiomyopathy)    Acute kidney injury    Alcohol abuse    Alcoholic hepatitis    Paroxysmal atrial fibrillation    Transaminitis    Thrombocytopenia    Non-traumatic rhabdomyolysis    Alcohol withdrawal    Pancytopenia      History:   Past Medical History:   Diagnosis Date    A-fib     Alcoholic hepatitis 07/13/2023    CAD (coronary artery disease)     History of external beam radiation therapy 05/12/2016    6840 cGy to prostate bed    Hyperlipidemia     Hypertension     Insomnia     Prostate cancer      Past Surgical History:   Procedure Laterality Date    CARDIAC CATHETERIZATION      CORONARY ANGIOPLASTY WITH STENT PLACEMENT      FRACTURE SURGERY      PROSTATE SURGERY      TONSILLECTOMY       Social History     Socioeconomic History    Marital status: Single   Tobacco Use    Smoking status: Never    Smokeless tobacco: Never   Vaping Use    Vaping status: Never Used   Substance and Sexual Activity    Alcohol use: Not Currently     Alcohol/week: 50.0 standard drinks of alcohol     Types: 50 Shots of liquor per week    Drug use: Not Currently     Types: Marijuana    Sexual activity: Defer     Family History   Problem Relation Age of Onset    Heart disease Mother     Coronary artery disease Mother     Stroke Mother        Allergies:  No Known Allergies    Medications:    Current Facility-Administered Medications:     acetaminophen (TYLENOL) tablet 650 mg, 650 mg, Oral, Q4H PRN, 650 mg at 11/18/24 0859 **OR** acetaminophen (TYLENOL) 160 MG/5ML oral solution 650 mg, 650 mg, Oral, Q4H PRN  **OR** acetaminophen (TYLENOL) suppository 650 mg, 650 mg, Rectal, Q4H PRN, Stephanie Johnson APRN    amiodarone (PACERONE) tablet 200 mg, 200 mg, Oral, Q24H, New Griffiths MD, 200 mg at 11/18/24 0859    sennosides-docusate (PERICOLACE) 8.6-50 MG per tablet 2 tablet, 2 tablet, Oral, BID PRN **AND** polyethylene glycol (MIRALAX) packet 17 g, 17 g, Oral, Daily PRN **AND** bisacodyl (DULCOLAX) EC tablet 5 mg, 5 mg, Oral, Daily PRN **AND** bisacodyl (DULCOLAX) suppository 10 mg, 10 mg, Rectal, Daily PRN, Stephanie Johnson APRN    folic acid (FOLVITE) tablet 1 mg, 1 mg, Oral, Daily, Stephanie Johnson APRN, 1 mg at 11/18/24 0859    hydrocortisone-bacitracin-zinc oxide-nystatin (MAGIC BARRIER) ointment 1 Application, 1 Application, Topical, 4x Daily PRN, New Griffiths MD    LORazepam (ATIVAN) tablet 1 mg, 1 mg, Oral, Q1H PRN **OR** LORazepam (ATIVAN) injection 1 mg, 1 mg, Intravenous, Q1H PRN **OR** LORazepam (ATIVAN) tablet 2 mg, 2 mg, Oral, Q1H PRN, 2 mg at 11/17/24 1415 **OR** LORazepam (ATIVAN) injection 2 mg, 2 mg, Intravenous, Q1H PRN **OR** LORazepam (ATIVAN) injection 2 mg, 2 mg, Intravenous, Q15 Min PRN **OR** LORazepam (ATIVAN) injection 2 mg, 2 mg, Intramuscular, Q15 Min PRN, Stephanie Johnson APRN    [COMPLETED] LORazepam (ATIVAN) tablet 2 mg, 2 mg, Oral, Q6H, 2 mg at 11/17/24 1150 **FOLLOWED BY** LORazepam (ATIVAN) tablet 1 mg, 1 mg, Oral, Q6H, 1 mg at 11/18/24 0628 **FOLLOWED BY** LORazepam (ATIVAN) tablet 1 mg, 1 mg, Oral, Q12H **FOLLOWED BY** [START ON 11/20/2024] LORazepam (ATIVAN) tablet 1 mg, 1 mg, Oral, Daily, Stephanie Johnson APRN    multivitamin with minerals 1 tablet, 1 tablet, Oral, Daily, Stephanie Johnson APRN, 1 tablet at 11/18/24 0859    nitroglycerin (NITROSTAT) SL tablet 0.4 mg, 0.4 mg, Sublingual, Q5 Min PRN, New Griffiths MD    ondansetron ODT (ZOFRAN-ODT) disintegrating tablet 4 mg, 4 mg, Oral, Q6H PRN **OR** ondansetron (ZOFRAN) injection 4 mg, 4 mg, Intravenous, Q6H PRN, Stephanie Johnson APRN     pantoprazole (PROTONIX) EC tablet 40 mg, 40 mg, Oral, Q AM, New Griffiths MD, 40 mg at 11/18/24 0628    Phosphorus Replacement - Follow Nurse / BPA Driven Protocol, , Not Applicable, PRN, Shailesh Brownlee MD    potassium phosphates 15 mmol in sodium chloride 0.9 % 250 mL infusion, 15 mmol, Intravenous, Q3H, Shailesh Brownlee MD, 15 mmol at 11/18/24 0858    sodium chloride 0.9 % flush 10 mL, 10 mL, Intravenous, Q12H, Johnson, Stephanie, APRN, 10 mL at 11/18/24 0900    sodium chloride 0.9 % flush 10 mL, 10 mL, Intravenous, PRN, Johnson, Stephanie, APRN    sodium chloride 0.9 % infusion 40 mL, 40 mL, Intravenous, PRN, Johnson, Stephanie, APRN    thiamine (B-1) injection 200 mg, 200 mg, Intravenous, Q8H, 200 mg at 11/18/24 0628 **FOLLOWED BY** [START ON 11/22/2024] thiamine (VITAMIN B-1) tablet 100 mg, 100 mg, Oral, Daily, Johnson, Stephanie, APRN    OBJECTIVE     Vitals:    11/18/24 0801   BP: 131/86   Pulse: 75   Resp: 16   Temp: 98.3 °F (36.8 °C)   SpO2: 98%       PHYSICAL EXAM:   Physical Exam  Vitals and nursing note reviewed.   Constitutional:       General: He is awake.      Appearance: He is overweight.      Comments: Body mass index is 27.46 kg/m².    HENT:      Head: Normocephalic and atraumatic.   Eyes:      General: Lids are normal. Gaze aligned appropriately.   Cardiovascular:      Rate and Rhythm: Normal rate and regular rhythm.   Pulmonary:      Effort: Pulmonary effort is normal. No respiratory distress.   Genitourinary:     Comments: External urinary catheter in place  Musculoskeletal:      Cervical back: Normal range of motion and neck supple.   Feet:      Right foot:      Skin integrity: No ulcer.      Left foot:      Skin integrity: No ulcer.      Comments: Heels are intact  Skin:     General: Skin is warm and dry.      Findings: Abrasion, bruising, erythema, rash and wound present.      Comments: Significant bruising of right hip abdomen back.  Incontinence associated dermatitis of bilateral buttocks  specifically to intergluteal cleft, perineum, groins.  Significant improvement comparatively to pictures taken on admission.  Decreased erythema of area.  There is 1 area of the left medial buttocks proximal to intergluteal cleft that is down to subcutaneous tissue with slough noted wound bed.  This is a stage 3 pressure injury.  Patient has scattered abrasions of lower extremities that are stable with scabs present.  No signs of infection.  No drainage.   Neurological:      Mental Status: He is alert and oriented to person, place, and time.   Psychiatric:         Attention and Perception: Attention normal.         Mood and Affect: Mood normal.         Speech: Speech normal.         Behavior: Behavior is cooperative.        Results Review:  Lab Results (last 48 hours)       Procedure Component Value Units Date/Time    Blood Culture - Blood, Arm, Left [[id number]]  (Abnormal) Collected: 11/16/24 1507    Specimen: Blood from Arm, Left Updated: 11/18/24 0626     Blood Culture Staphylococcus, coagulase negative     Isolated from Anaerobic Bottle     Gram Stain Anaerobic Bottle Gram positive cocci in clusters    Narrative:      Probable contaminant requires clinical correlation, susceptibility not performed unless requested by physician.      Phosphorus [[id number]]  (Abnormal) Collected: 11/18/24 0258    Specimen: Blood Updated: 11/18/24 0422     Phosphorus 0.9 mg/dL     Comprehensive Metabolic Panel [[id number]]  (Abnormal) Collected: 11/18/24 0258    Specimen: Blood Updated: 11/18/24 0409     Glucose 90 mg/dL      BUN 14 mg/dL      Creatinine 0.83 mg/dL      Sodium 132 mmol/L      Potassium 3.6 mmol/L      Chloride 98 mmol/L      CO2 22.0 mmol/L      Calcium 7.3 mg/dL      Total Protein 5.9 g/dL      Albumin 3.1 g/dL      ALT (SGPT) 86 U/L      AST (SGOT) 164 U/L      Alkaline Phosphatase 75 U/L      Total Bilirubin 0.9 mg/dL      Globulin 2.8 gm/dL      A/G Ratio 1.1 g/dL      BUN/Creatinine Ratio 16.9     Anion Gap  12.0 mmol/L      eGFR 94.2 mL/min/1.73     Narrative:      GFR Normal >60  Chronic Kidney Disease <60  Kidney Failure <15      CK [[id number]]  (Abnormal) Collected: 11/18/24 0258    Specimen: Blood Updated: 11/18/24 0409     Creatine Kinase 560 U/L     Magnesium [[id number]]  (Normal) Collected: 11/18/24 0258    Specimen: Blood Updated: 11/18/24 0409     Magnesium 1.8 mg/dL     CBC & Differential [[id number]]  (Abnormal) Collected: 11/18/24 0258    Specimen: Blood Updated: 11/18/24 0355    Narrative:      The following orders were created for panel order CBC & Differential.  Procedure                               Abnormality         Status                     ---------                               -----------         ------                     CBC Auto Differential[[id number]]        Abnormal            Final result                 Please view results for these tests on the individual orders.    CBC Auto Differential [[id number]]  (Abnormal) Collected: 11/18/24 0258    Specimen: Blood Updated: 11/18/24 0355     WBC 3.25 10*3/mm3      RBC 2.98 10*6/mm3      Hemoglobin 9.3 g/dL      Hematocrit 28.6 %      MCV 96.0 fL      MCH 31.2 pg      MCHC 32.5 g/dL      RDW 13.6 %      RDW-SD 47.9 fl      MPV 11.9 fL      Platelets 59 10*3/mm3      Neutrophil % 69.0 %      Lymphocyte % 17.5 %      Monocyte % 11.1 %      Eosinophil % 0.9 %      Basophil % 0.3 %      Immature Grans % 1.2 %      Neutrophils, Absolute 2.24 10*3/mm3      Lymphocytes, Absolute 0.57 10*3/mm3      Monocytes, Absolute 0.36 10*3/mm3      Eosinophils, Absolute 0.03 10*3/mm3      Basophils, Absolute 0.01 10*3/mm3      Immature Grans, Absolute 0.04 10*3/mm3      nRBC 0.0 /100 WBC     POC Glucose Once [[id number]]  (Normal) Collected: 11/17/24 2034    Specimen: Blood Updated: 11/17/24 2054     Glucose 102 mg/dL      Comment: : [id number] Blakemore-Young DominiqueMeter ID: [id number]       Blood Culture - Blood, Wrist, Left [[id number]]  (Normal) Collected:  11/16/24 1507    Specimen: Blood from Wrist, Left Updated: 11/17/24 1531     Blood Culture No growth at 24 hours    Blood Culture ID, PCR - Blood, Arm, Left [[id number]]  (Abnormal) Collected: 11/16/24 1507    Specimen: Blood from Arm, Left Updated: 11/17/24 1123     BCID, PCR Staph spp, not aureus or lugdunensis. Identification by BCID2 PCR.     BOTTLE TYPE Anaerobic Bottle    Gastrointestinal Panel, PCR - Stool, Per Rectum [[id number]]  (Normal) Collected: 11/17/24 0955    Specimen: Stool from Per Rectum Updated: 11/17/24 1119     Campylobacter Not Detected     Plesiomonas shigelloides Not Detected     Salmonella Not Detected     Vibrio Not Detected     Vibrio cholerae Not Detected     Yersinia enterocolitica Not Detected     Enteroaggregative E. coli (EAEC) Not Detected     Enteropathogenic E. coli (EPEC) Not Detected     Enterotoxigenic E. coli (ETEC) lt/st Not Detected     Shiga-like toxin-producing E. coli (STEC) stx1/stx2 Not Detected     Shigella/Enteroinvasive E. coli (EIEC) Not Detected     Cryptosporidium Not Detected     Cyclospora cayetanensis Not Detected     Entamoeba histolytica Not Detected     Giardia lamblia Not Detected     Adenovirus F40/41 Not Detected     Astrovirus Not Detected     Norovirus GI/GII Not Detected     Rotavirus A Not Detected     Sapovirus (I, II, IV or V) Not Detected    Narrative:      If Aeromonas, Staphylococcus aureus or Bacillus cereus are suspected, please order [id number]: Stool Culture, Aeromonas, S aureus, B Cereus.    CBC Auto Differential [[id number]]  (Abnormal) Collected: 11/17/24 0719    Specimen: Blood Updated: 11/17/24 0740     WBC 2.91 10*3/mm3      RBC 3.02 10*6/mm3      Hemoglobin 9.7 g/dL      Hematocrit 28.8 %      MCV 95.4 fL      MCH 32.1 pg      MCHC 33.7 g/dL      RDW 13.9 %      RDW-SD 48.8 fl      MPV 10.9 fL      Platelets 55 10*3/mm3      Neutrophil % 75.0 %      Lymphocyte % 12.7 %      Monocyte % 11.3 %      Eosinophil % 0.3 %      Basophil % 0.0 %       Immature Grans % 0.7 %      Neutrophils, Absolute 2.18 10*3/mm3      Lymphocytes, Absolute 0.37 10*3/mm3      Monocytes, Absolute 0.33 10*3/mm3      Eosinophils, Absolute 0.01 10*3/mm3      Basophils, Absolute 0.00 10*3/mm3      Immature Grans, Absolute 0.02 10*3/mm3     CK [[id number]]  (Abnormal) Collected: 11/17/24 0457    Specimen: Blood Updated: 11/17/24 0541     Creatine Kinase 802 U/L     Comprehensive Metabolic Panel [[id number]]  (Abnormal) Collected: 11/17/24 0457    Specimen: Blood Updated: 11/17/24 0541     Glucose 87 mg/dL      BUN 20 mg/dL      Creatinine 1.24 mg/dL      Sodium 133 mmol/L      Potassium 3.4 mmol/L      Chloride 95 mmol/L      CO2 25.0 mmol/L      Calcium 6.9 mg/dL      Total Protein 5.6 g/dL      Albumin 3.0 g/dL      ALT (SGPT) 101 U/L      AST (SGOT) 219 U/L      Alkaline Phosphatase 67 U/L      Total Bilirubin 1.4 mg/dL      Globulin 2.6 gm/dL      A/G Ratio 1.2 g/dL      BUN/Creatinine Ratio 16.1     Anion Gap 13.0 mmol/L      eGFR 62.5 mL/min/1.73     Narrative:      GFR Normal >60  Chronic Kidney Disease <60  Kidney Failure <15      Myoglobin Screen, Urine - Urine, Clean Catch [[id number]]  (Abnormal) Collected: 11/16/24 1714    Specimen: Urine, Clean Catch Updated: 11/16/24 1831     Myoglobin, Qualitative Positive    Myoglobin, Urine Qnt - Urine, Clean Catch [[id number]] Collected: 11/16/24 1714    Specimen: Urine, Clean Catch Updated: 11/16/24 1830    Fentanyl, Urine - Urine, Clean Catch [[id number]]  (Normal) Collected: 11/16/24 1714    Specimen: Urine, Clean Catch Updated: 11/16/24 1735     Fentanyl, Urine Negative    Narrative:      Negative Threshold:      Fentanyl 5 ng/mL     The normal value for the drug tested is negative. This report includes final unconfirmed screening results to be used for medical treatment purposes only. Unconfirmed results must not be used for non-medical purposes such as employment or legal testing. Clinical consideration should be applied to any drug  of abuse test, particularly when unconfirmed results are used.           Urine Drug Screen - Urine, Clean Catch [[id number]]  (Abnormal) Collected: 11/16/24 1714    Specimen: Urine, Clean Catch Updated: 11/16/24 1734     THC, Screen, Urine Negative     Phencyclidine (PCP), Urine Negative     Cocaine Screen, Urine Negative     Methamphetamine, Ur Negative     Opiate Screen Negative     Amphetamine Screen, Urine Negative     Benzodiazepine Screen, Urine Positive     Tricyclic Antidepressants Screen Negative     Methadone Screen, Urine Negative     Barbiturates Screen, Urine Negative     Oxycodone Screen, Urine Positive     Buprenorphine, Screen, Urine Negative    Narrative:      Cutoff For Drugs Screened:    Amphetamines               500 ng/ml  Barbiturates               200 ng/ml  Benzodiazepines            150 ng/ml  Cocaine                    150 ng/ml  Methadone                  200 ng/ml  Opiates                    100 ng/ml  Phencyclidine               25 ng/ml  THC                         50 ng/ml  Methamphetamine            500 ng/ml  Tricyclic Antidepressants  300 ng/ml  Oxycodone                  100 ng/ml  Buprenorphine               10 ng/ml    The normal value for all drugs tested is negative. This report includes unconfirmed screening results, with the cutoff values listed, to be used for medical treatment purposes only.  Unconfirmed results must not be used for non-medical purposes such as employment or legal testing.  Clinical consideration should be applied to any drug of abuse test, particularly when unconfirmed results are used.      Urinalysis With Culture If Indicated - Urine, Clean Catch [[id number]]  (Abnormal) Collected: 11/16/24 1714    Specimen: Urine, Clean Catch Updated: 11/16/24 1726     Color, UA Dark Yellow     Appearance, UA Clear     pH, UA 7.0     Specific Gravity, UA >1.030     Glucose, UA Negative     Ketones, UA Trace     Bilirubin, UA Small (1+)     Blood, UA Small (1+)      Protein,  mg/dL (2+)     Leuk Esterase, UA Trace     Nitrite, UA Negative     Urobilinogen, UA 2.0 E.U./dL    Narrative:      In absence of clinical symptoms, the presence of pyuria, bacteria, and/or nitrites on the urinalysis result does not correlate with infection.    Urinalysis, Microscopic Only - Urine, Clean Catch [[id number]]  (Abnormal) Collected: 11/16/24 1714    Specimen: Urine, Clean Catch Updated: 11/16/24 1726     RBC, UA 6-10 /HPF      WBC, UA 0-2 /HPF      Comment: Urine culture not indicated.        Bacteria, UA None Seen /HPF      Squamous Epithelial Cells, UA 0-2 /HPF      Hyaline Casts, UA 0-2 /LPF      Methodology Automated Microscopy    STAT Lactic Acid, Reflex [[id number]]  (Normal) Collected: 11/16/24 1507    Specimen: Blood Updated: 11/16/24 1530     Lactate 1.9 mmol/L     Lactic Acid, Plasma [[id number]]  (Abnormal) Collected: 11/16/24 1115    Specimen: Blood Updated: 11/16/24 1157     Lactate 4.0 mmol/L     Magnesium [[id number]]  (Normal) Collected: 11/16/24 1115    Specimen: Blood Updated: 11/16/24 1154     Magnesium 1.9 mg/dL     Comprehensive Metabolic Panel [[id number]]  (Abnormal) Collected: 11/16/24 1115    Specimen: Blood Updated: 11/16/24 1154     Glucose 109 mg/dL      BUN 21 mg/dL      Creatinine 1.81 mg/dL      Sodium 131 mmol/L      Potassium 4.0 mmol/L      Chloride 88 mmol/L      CO2 22.0 mmol/L      Calcium 8.3 mg/dL      Total Protein 7.1 g/dL      Albumin 3.6 g/dL      ALT (SGPT) 156 U/L      AST (SGOT) 404 U/L      Alkaline Phosphatase 93 U/L      Total Bilirubin 2.8 mg/dL      Globulin 3.5 gm/dL      A/G Ratio 1.0 g/dL      BUN/Creatinine Ratio 11.6     Anion Gap 21.0 mmol/L      eGFR 39.7 mL/min/1.73     Narrative:      GFR Normal >60  Chronic Kidney Disease <60  Kidney Failure <15      CK [[id number]]  (Abnormal) Collected: 11/16/24 1115    Specimen: Blood Updated: 11/16/24 1153     Creatine Kinase 1,260 U/L     BNP [[id number]]  (Abnormal) Collected: 11/16/24 1114     Specimen: Blood Updated: 11/16/24 1152     proBNP 6,958.0 pg/mL     Narrative:      This assay is used as an aid in the diagnosis of individuals suspected of having heart failure. It can be used as an aid in the diagnosis of acute decompensated heart failure (ADHF) in patients presenting with signs and symptoms of ADHF to the emergency department (ED). In addition, NT-proBNP of <300 pg/mL indicates ADHF is not likely.    Age Range Result Interpretation  NT-proBNP Concentration (pg/mL:      <50             Positive            >450                   Gray                 300-450                    Negative             <300    50-75           Positive            >900                  Gray                300-900                  Negative            <300      >75             Positive            >1800                  Gray                300-1800                  Negative            <300    Lipase [[id number]]  (Abnormal) Collected: 11/16/24 1115    Specimen: Blood Updated: 11/16/24 1149     Lipase 113 U/L     Ethanol [[id number]] Collected: 11/16/24 1115    Specimen: Blood Updated: 11/16/24 1149     Ethanol % <0.010 %     Narrative:      Not for legal purposes. Chain of Custody not followed.     Blood Gas, Arterial - [[id number]]  (Abnormal) Collected: 11/16/24 1140    Specimen: Arterial Blood Updated: 11/16/24 1140     Site Right Brachial     Garett's Test N/A     pH, Arterial 7.559 pH units      Comment: 83 Value above reference range        pCO2, Arterial 28.6 mm Hg      Comment: 84 Value below reference range        pO2, Arterial 86.0 mm Hg      HCO3, Arterial 25.5 mmol/L      Base Excess, Arterial 3.8 mmol/L      Comment: 83 Value above reference range        O2 Saturation, Arterial 98.2 %      Temperature 37.0     Barometric Pressure for Blood Gas 755 mmHg      Modality Room Air     Ventilator Mode NA     Collected by [id number]     Comment: Meter: [id number]     :  Teri Bruner, AARON        pCO2, Temperature  Corrected 28.6 mm Hg      pH, Temp Corrected 7.559 pH Units      pO2, Temperature Corrected 86.0 mm Hg     Protime-INR [[id number]]  (Abnormal) Collected: 11/16/24 1115    Specimen: Blood Updated: 11/16/24 1137     Protime 18.0 Seconds      INR 1.43    CBC & Differential [[id number]]  (Abnormal) Collected: 11/16/24 1115    Specimen: Blood Updated: 11/16/24 1133    Narrative:      The following orders were created for panel order CBC & Differential.  Procedure                               Abnormality         Status                     ---------                               -----------         ------                     CBC Auto Differential[[id number]]        Abnormal            Final result                 Please view results for these tests on the individual orders.    CBC Auto Differential [[id number]]  (Abnormal) Collected: 11/16/24 1115    Specimen: Blood Updated: 11/16/24 1133     WBC 6.52 10*3/mm3      RBC 3.85 10*6/mm3      Hemoglobin 12.2 g/dL      Hematocrit 35.1 %      MCV 91.2 fL      MCH 31.7 pg      MCHC 34.8 g/dL      RDW 13.4 %      RDW-SD 44.8 fl      MPV 11.3 fL      Platelets 72 10*3/mm3      Neutrophil % 85.4 %      Lymphocyte % 6.3 %      Monocyte % 7.4 %      Eosinophil % 0.0 %      Basophil % 0.0 %      Immature Grans % 0.9 %      Neutrophils, Absolute 5.57 10*3/mm3      Lymphocytes, Absolute 0.41 10*3/mm3      Monocytes, Absolute 0.48 10*3/mm3      Eosinophils, Absolute 0.00 10*3/mm3      Basophils, Absolute 0.00 10*3/mm3      Immature Grans, Absolute 0.06 10*3/mm3           Imaging Results (Last 72 Hours)       Procedure Component Value Units Date/Time    CT Abdomen Pelvis With Contrast [[id number]] Collected: 11/16/24 1314     Updated: 11/16/24 1320    Narrative:      EXAM/TECHNIQUE: CT abdomen and pelvis with IV contrast     INDICATION: Fall with right CVA tenderness and large bruise.  Possible  RPH patient is hypotensive; I95.9-Hypotension, unspecified;  W19.XXXA-Unspecified fall, initial  encounter; F10.10-Alcohol abuse,  uncomplicated     COMPARISON: 7/13/2023     DLP: 2862.05 mGy.cm. Automated exposure control was also utilized to  decrease patient radiation dose.     FINDINGS:     Findings in the included lower chest are described in a separate same  day report.     Severe hepatic steatosis. No morphologic changes of cirrhosis. No  suspicious focal liver lesion. No gallstone or gallbladder wall  thickening. No biliary ductal dilatation.     Pancreas, spleen, and adrenal glands are unremarkable.     No solid renal mass. No urolithiasis or hydronephrosis. Urinary bladder  is decompressed.      Mild colonic diverticulosis without diverticulitis. No colonic wall  thickening or pericolonic fat stranding. Normal appendix. No small bowel  distention or evidence of active small bowel inflammation.     No ascites or free pelvic fluid. No pelvic mass or collection. Prior  prostatectomy.     Nonaneurysmal atherosclerotic abdominal aorta. No enlarged abdominal or  pelvic lymph nodes. Calcified lymph nodes in the periportal region like  related to an old granulomatous process     No large soft tissue hematoma. 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 No acute osseous finding.       Impression:         1.  No acute traumatic finding in the abdomen or pelvis.     2.  Severe hepatic steatosis.     3.  Mild colonic diverticulosis without evidence of diverticulitis.     This report was signed and finalized on 11/16/2024 1:17 PM by Dr. Tyrese Young MD.       CT Chest With Contrast Diagnostic [[id number]] Collected: 11/16/24 1309     Updated: 11/16/24 1317    Narrative:      EXAM/TECHNIQUE: CT chest with IV contrast     INDICATION: Chest trauma fall short of breath right-sided chest pain;  I95.9-Hypotension, unspecified; W19.XXXA-Unspecified fall, initial  encounter; F10.10-Alcohol abuse, uncomplicated     COMPARISON: 8/7/2023     DLP: 2862.05 mGy.cm. Automated exposure control was also  utilized to  decrease patient radiation dose.     FINDINGS:     The central airways are clear. No consolidation, pleural effusion,  pneumothorax, hemothorax, or pulmonary contusion. No advanced  emphysematous or fibrotic change. No suspicious pulmonary nodule.     No enlarged thoracic lymph nodes. No central pulmonary artery filling  defect. Thoracic aorta is nonaneurysmal. No evidence of acute aortic  injury. Aortic arch branch origins are widely patent. Heavy aortic valve  calcification. Heavy coronary artery calcification. No pericardial  effusion. Left ventricular apical aneurysm.     No large thyroid nodule. No acute chest soft tissue abnormality. No  large soft tissue hematoma. Findings in the included portion of the  upper abdomen are described in a separate same day report. No acute rib  fracture. No thoracic spine compression fracture or subluxation.       Impression:         1.  No acute traumatic finding in the chest.     2.  Heavy aortic valve calcification. Heavy coronary artery  atherosclerotic calcification. Chronic left ventricular apical  myocardial thinning/aneurysm.        This report was signed and finalized on 11/16/2024 1:14 PM by Dr. Tyrese Young MD.       CT Cervical Spine Without Contrast [[id number]] Collected: 11/16/24 1303     Updated: 11/16/24 1312    Narrative:      EXAM/TECHNIQUE: CT cervical spine without contrast     INDICATION: Fall; I95.9-Hypotension, unspecified; W19.XXXA-Unspecified  fall, initial encounter; F10.10-Alcohol abuse, uncomplicated     COMPARISON: None available.     DLP: 2862.05 mGy.cm. Automated exposure control was also utilized to  decrease patient radiation dose.     FINDINGS:     Craniocervical relationships are maintained. The odontoid process is  intact. Cervical spine alignment appears maintained. No subluxations.  Vertebral body heights are maintained. No acute or chronic fracture.  Mild to moderate multilevel cervical spine degenerative change  with  multilevel neural foraminal narrowing. Paravertebral soft tissues are  unremarkable. Carotid artery atherosclerotic calcification.       Impression:      1. No acute fracture or subluxation.  2. Mild to moderate multilevel cervical spine degenerative change.        This report was signed and finalized on 11/16/2024 1:09 PM by Dr. Tyrese Young MD.       CT Head Without Contrast [[id number]] Collected: 11/16/24 1258     Updated: 11/16/24 1306    Narrative:      EXAM/TECHNIQUE: CT head without contrast     INDICATION: Fall; I95.9-Hypotension, unspecified; W19.XXXA-Unspecified  fall, initial encounter; F10.10-Alcohol abuse, uncomplicated     COMPARISON: None available.     DLP: 2862.05 mGy.cm. Automated exposure control was also utilized to  decrease patient radiation dose.     FINDINGS:     No evidence of intracranial hemorrhage. Gray-white differentiation is  maintained. No midline shift or mass effect. Lateral ventricles are  nondilated. Basilar cisterns are patent. No acute orbital finding.  Mastoid air cells are clear. Visualized portion of the paranasal sinuses  are clear. No acute osseous finding.       Impression:         No acute intracranial findings.        This report was signed and finalized on 11/16/2024 1:03 PM by Dr. Tyrese Young MD.       XR Chest 1 View [[id number]] Collected: 11/16/24 1128     Updated: 11/16/24 1132    Narrative:      EXAM/TECHNIQUE: XR CHEST 1 VW-     INDICATION: Fall; I95.9-Hypotension, unspecified; W19.XXXA-Unspecified  fall, initial encounter; F10.10-Alcohol abuse, uncomplicated     COMPARISON: 10/18/2024     FINDINGS:     Cardiac silhouette is within normal limits.     No pleural effusion or pneumothorax. No focal consolidation.     No acute osseous finding.       Impression:         No acute findings.     This report was signed and finalized on 11/16/2024 11:29 AM by Dr. Tyrese Young MD.                  ASSESSMENT/PLAN       Examination and evaluation of  wound(s) was performed.    DIAGNOSIS:   Incontinence associated dermatitis  Pressure injury of left buttock, stage 3  Bowel and bladder incontinence    HOSPITAL PROBLEM LIST:    Multiple falls    HOCM (hypertrophic obstructive cardiomyopathy)    Acute kidney injury    Alcohol abuse    Alcoholic hepatitis    Paroxysmal atrial fibrillation    Transaminitis    Thrombocytopenia    Non-traumatic rhabdomyolysis    Alcohol withdrawal    Pancytopenia        PLAN:   Orders placed for wound care and pressure/moisture management as listed below.   Due to improvement with use of zinc paste with menthol, will continue this as scheduled below.        Start     Ordered    11/18/24 2000  Wound Care  Every 12 Hours        Question:  Wound Care Instructions  Answer:  Apply Moisture Barrier After Any Incontinence and PRN. Zinc paste with Menthol    11/18/24 1028    11/18/24 1028  Turn Patient  Now Then Every 2 Hours         11/18/24 1028    11/18/24 1028  Elevate Heels Off of Bed  Until Discontinued         11/18/24 1028    11/18/24 1028  Use Seat Cushion When Up In Chair  Continuous         11/18/24 1028    11/18/24 1028  Silicone Border Dressing to Bony Prominences  Per Order Details        Comments: Apply silicone border foam dressing per protocol to bilateral heels for protection.  Nursing to change dressing every 3 days and PRN if soiled. Nursing is to peel back dressing with every assessment to assess skin underneath dressing. No barrier cream under dressing.    11/18/24 1028    11/18/24 1028  Use Repositioning Wedge to Position Patient  Continuous        Comments: Use Comfort Glide repositioning sheet and wedges to position patient.    11/18/24 1028          Discussed findings and treatment plan including risks, benefits, and treatment options with the patient in detail. Patient agreed with treatment plan.      This document has been electronically signed by BRI Jauregui on 11/18/2024 10:27 CST

## 2024-11-18 NOTE — PROGRESS NOTES
[facility] Medicine Services  INPATIENT PROGRESS NOTE    Patient Name: Dayron Lubin  Date of Admission: 11/16/2024  Today's Date: 11/18/24  Length of Stay: 2  Primary Care Physician: Herberth Nguyen Jr., MD    Subjective   Chief Complaint: Diarrhea  HPI     No new complaints other than rectal pressure from fecal management system.  Stool volume appears to have decreased significantly.  I have discussed removing the fecal management system with the patient's nurse.  He was noted to have some significant excoriation of the buttocks from persistent diarrhea.  He continues to receive Ativan per [facility] protocol with significant improvement in diarrhea.  He remains pancytopenic with white blood cell count 3.25, hemoglobin 9.3 and platelet count 59,000.  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  Transaminases continue to decline.  CK is now down to 560.  Phosphorus was low this morning and has been replaced per protocol    Review of Systems   All pertinent negatives and positives are as above. All other systems have been reviewed and are negative unless otherwise stated.     Objective    Temp:  [97.6 °F (36.4 °C)-98.7 °F (37.1 °C)] 97.6 °F (36.4 °C)  Heart Rate:  [69-87] 71  Resp:  [15-18] 18  BP: (110-131)/(72-86) 117/83  Physical Exam    Constitutional:       General: He is not in acute distress.     Appearance: He is obese. He is chronically ill-appearing.   HENT:      Head: Normocephalic.      Mouth: Mucous membranes are moist.   Eyes:      Pupils: Pupils are equal, round, and reactive to light.   Cardiovascular:      Rate and Rhythm: Normal rate.      Heart sounds: Systolic murmur heard.   Pulmonary:      Effort: Pulmonary effort is normal. No respiratory distress.      Breath sounds: No wheezing or rales.   Abdominal:      Palpations: Abdomen is soft.   Genitourinary:     Comments: Condom catheter and fecal mgmt system now in place  Skin:      Findings: Rash present.   Neurological:      Mental Status: He is alert.      Motor: Weakness present.   Psychiatric:      Calm and cooperative.    Results Review:  I have reviewed the labs, radiology results, and diagnostic studies.    Laboratory Data:   Results from last 7 days   Lab Units 11/18/24  0258 11/17/24  0719 11/16/24  1115   WBC 10*3/mm3 3.25* 2.91* 6.52   HEMOGLOBIN g/dL 9.3* 9.7* 12.2*   HEMATOCRIT % 28.6* 28.8* 35.1*   PLATELETS 10*3/mm3 59* 55* 72*        Results from last 7 days   Lab Units 11/18/24  0258 11/17/24  0457 11/16/24  1115   SODIUM mmol/L 132* 133* 131*   POTASSIUM mmol/L 3.6 3.4* 4.0   CHLORIDE mmol/L 98 95* 88*   CO2 mmol/L 22.0 25.0 22.0   BUN mg/dL 14 20 21   CREATININE mg/dL 0.83 1.24 1.81*   CALCIUM mg/dL 7.3* 6.9* 8.3*   BILIRUBIN mg/dL 0.9 1.4* 2.8*   ALK PHOS U/L 75 67 93   ALT (SGPT) U/L 86* 101* 156*   AST (SGOT) U/L 164* 219* 404*   GLUCOSE mg/dL 90 87 109*       Culture Data:   Blood Culture   Date Value Ref Range Status   11/16/2024 No growth at 2 days  Preliminary   11/16/2024 Staphylococcus, coagulase negative (C)  Final       Radiology Data:   Imaging Results (Last 24 Hours)       ** No results found for the last 24 hours. **            I have reviewed the patient's current medications.     Assessment/Plan   Assessment  Active Hospital Problems    Diagnosis     **Multiple falls     Opiate abuse, episodic     Pancytopenia     Transaminitis     Thrombocytopenia     Non-traumatic rhabdomyolysis     Alcohol withdrawal     Paroxysmal atrial fibrillation     Acute kidney injury     Alcohol abuse     Alcoholic hepatitis     HOCM (hypertrophic obstructive cardiomyopathy)        Treatment Plan  Continue [facility] protocol  Pursue SNF placement per PT/OT recommendations    Medical Decision Making  Number and Complexity of problems: 3 acute; high complexity        Conditions and Status        Condition is improving.     MDM Data  External documents reviewed: none  Cardiac tracing (EKG,  telemetry) interpretation: See HPI.  Radiology interpretation: See HPI  Labs reviewed: as above  Any tests that were considered but not ordered: none     Decision rules/scores evaluated (example [id number], Wells, etc): none     Discussed with: patient     Care Planning  Shared decision making: Discussed with patient   Code status and discussions: DO NOT INTUBATE     Disposition  Social Determinants of Health that impact treatment or disposition: Alcohol abuse/dependency  I expect the patient to be discharged to SNF in 2-3 days.         Electronically signed by Holger Karimi DO, 11/18/24, 17:45 CST.

## 2024-11-18 NOTE — NURSING NOTE
[facility]  INPATIENT WOUND & OSTOMY CARE    Today's Date: 11/18/24    Patient Name: Dayron Lubin  MRN: [id number]  CSN: [id number]  PCP: Herberth Nguyen Jr., MD  Attending Provider: Holger Karimi DO  Length of Stay: 2    Rounded with BRI Murcia for initial wound care consult. See her consult note for details.  Patient currently sitting up in bed resting. No family at bedside.     Pressure injury prevention measures ordered per protocol due to patient being at risk for skin breakdown.     Apply silicone foam border dressing per protocol to bilateral heels for protection.  Nursing to change dressing every 3 days and PRN if soiled. Nursing is to peel back dressing with every assessment to assess skin underneath dressing. No barrier cream under dressing.     Patient educated on importance of turning and repositioning at frequent intervals to prevent skin breakdown.    Inpatient wound care will continue to follow during hospital stay.  Please contact if any issues or concerns arise.     This document has been electronically signed by Daylin Ross RN on 11/18/2024 10:30 CST

## 2024-11-18 NOTE — THERAPY PROGRESS REPORT/RE-CERT
Patient Name: Dayron Lubin  : 1955    MRN: [id number]                              Today's Date: 2024       Admit Date: 2024    Visit Dx:     ICD-10-CM ICD-9-CM   1. Hypotension, unspecified hypotension type  I95.9 458.9   2. Fall, initial encounter  W19.XXXA E888.9   3. Alcohol abuse  F10.10 305.00   4. Chronic liver disease  K76.9 571.9   5. Elevated bilirubin  R17 277.4   6. Traumatic rhabdomyolysis, initial encounter  T79.6XXA 958.6   7. Dehydration  E86.0 276.51     Patient Active Problem List   Diagnosis    Prostate cancer    Hx of radiation therapy    Elevated PSA    Encounter for follow-up surveillance of prostate cancer    HOCM (hypertrophic obstructive cardiomyopathy)    Coronary artery disease involving native coronary artery of native heart without angina pectoris    Essential hypertension    Mixed hyperlipidemia    Class 1 obesity with alveolar hypoventilation, serious comorbidity, and body mass index (BMI) of 30.0 to 30.9 in adult    Displaced fracture of lateral malleolus of right fibula, initial encounter for closed fracture    Family history of colonic polyps    History of colon polyps    Elevated brain natriuretic peptide (BNP) level    Acute kidney injury    Anxiety associated with depression    Alcohol abuse    Alcoholic hepatitis    Volume depletion    Anorexia    CHF (congestive heart failure)    Hypokalemia    Duodenitis    Gait instability    SVT (supraventricular tachycardia)    Physical debility    Atrial fibrillation with rapid ventricular response    Alcoholism in remission    Paroxysmal atrial fibrillation    Nasal polyposis    Nasal congestion    Estelle bullosa    Long-term use of high-risk medication    Multiple falls    Transaminitis    Thrombocytopenia    Non-traumatic rhabdomyolysis    Alcohol withdrawal    Pancytopenia     Past Medical History:   Diagnosis Date    A-fib     Alcoholic hepatitis 2023    CAD (coronary artery disease)     History of  external beam radiation therapy 05/12/2016    6840 cGy to prostate bed    Hyperlipidemia     Hypertension     Insomnia     Prostate cancer      Past Surgical History:   Procedure Laterality Date    CARDIAC CATHETERIZATION      CORONARY ANGIOPLASTY WITH STENT PLACEMENT      FRACTURE SURGERY      PROSTATE SURGERY      TONSILLECTOMY        General Information       Row Name 11/18/24 1414          OT Time and Intention    Document Type re-evaluation  -     Mode of Treatment occupational therapy  -     Patient Effort good  -JW       Row Name 11/18/24 1414          General Information    Patient Profile Reviewed yes  -     Existing Precautions/Restrictions fall  rectal tube  -     Barriers to Rehab medically complex;previous functional deficit  -[facility] Name 11/18/24 1414          Cognition    Orientation Status (Cognition) oriented x 4  -[facility] Name 11/18/24 1414          Safety Issues/Impairments Affecting Functional Mobility    Impairments Affecting Function (Mobility) balance;endurance/activity tolerance;motor control;range of motion (ROM);sensation/sensory awareness;strength  -               User Key  (r) = Recorded By, (t) = Taken By, (c) = Cosigned By      Initials Name Provider Type     Bri Jiménez, OTR/L, CSRS Occupational Therapist                     Mobility/ADL's       Row Name 11/18/24 1414          Bed Mobility    Bed Mobility supine-sit  -     Supine-Sit [place] (Bed Mobility) contact guard  -     Assistive Device (Bed Mobility) head of bed elevated;bed rails  -JW       Row Name 11/18/24 1414          Transfers    Transfers sit-stand transfer;stand-sit transfer  -[facility] 11/18/24 1414          Sit-Stand Transfer    Sit-Stand [place] (Transfers) contact guard;minimum assist (75% patient effort)  -JW       Row Name 11/18/24 1414          Stand-Sit Transfer    Stand-Sit [place] (Transfers) contact guard  -JW       Row Name 11/18/24 1414           Functional Mobility    Functional Mobility- Ind. Level minimum assist (75% patient effort);2 person assist required  -     Functional Mobility- Safety Issues other (see comments)  -     Functional Mobility- Comment amb around bed to chair. Decreased balance with LOB noted x1. Scissoring of feet noted with turns.  -[facility] Name 11/18/24 1414          Activities of Daily Living    BADL Assessment/Intervention lower body dressing  -JW       Row Name 11/18/24 1414          Lower Body Dressing Assessment/Training    [place] Level (Lower Body Dressing) don;shoes/slippers;set up;standby assist  -     Position (Lower Body Dressing) edge of bed sitting  -               User Key  (r) = Recorded By, (t) = Taken By, (c) = Cosigned By      Initials Name Provider Type    Bri Reyes OTR/L, CSRS Occupational Therapist                   Obj/Interventions       Row Name 11/18/24 1414          Sensory Assessment (Somatosensory)    Sensory Assessment (Somatosensory) UE sensation intact  -[facility] 11/18/24 1414          Vision Assessment/Intervention    Visual Impairment/Limitations WFL  -JW       Row Name 11/18/24 1414          Range of Motion Comprehensive    General Range of Motion bilateral upper extremity ROM L  -JW       Row Name 11/18/24 1414          Strength Comprehensive (MMT)    Comment, General Manual Muscle Testing (MMT) Assessment B UE strength 4/5  -JW       Row Name 11/18/24 1414          Balance    Balance Assessment sitting static balance;sitting dynamic balance;standing static balance;standing dynamic balance  -     Static Sitting Balance standby assist  -     Dynamic Sitting Balance contact guard  -     Position, Sitting Balance unsupported;sitting edge of bed  -     Static Standing Balance contact guard  -     Dynamic Standing Balance minimal assist  -     Position/Device Used, Standing Balance supported  -               User Key  (r) = Recorded By, (t) =  Taken By, (c) = Cosigned By      Initials Name Provider Type     Bri Jiménez OTR/L, SYLVIA Occupational Therapist                   Goals/Plan       Row Name 11/18/24 1414          Transfer Goal 1 (OT)    Activity/Assistive Device (Transfer Goal 1, OT) toilet;bed-to-chair/chair-to-bed;tub  -     [place] Level/Cues Needed (Transfer Goal 1, OT) supervision required  -     Time Frame (Transfer Goal 1, OT) long term goal (LTG);by discharge  -     Progress/Outcome (Transfer Goal 1, OT) goal met;goal revised this date  -       Row Name 11/18/24 1414          Dressing Goal 1 (OT)    Activity/Device (Dressing Goal 1, OT) dressing skills, all  -     [place]/Cues Needed (Dressing Goal 1, OT) modified independence  -JW     Time Frame (Dressing Goal 1, OT) long term goal (LTG);by discharge  -     Progress/Outcome (Dressing Goal 1, OT) goal met;goal revised this date  -       Row Name 11/18/24 1414          Toileting Goal 1 (OT)    Activity/Device (Toileting Goal 1, OT) toileting skills, all  -     [place] Level/Cues Needed (Toileting Goal 1, OT) modified independence  -     Time Frame (Toileting Goal 1, OT) long term goal (LTG);by discharge  -     Progress/Outcome (Toileting Goal 1, OT) goal met;goal revised this date  -       Row Name 11/18/24 1414          Therapy Assessment/Plan (OT)    Planned Therapy Interventions (OT) activity tolerance training;adaptive equipment training;BADL retraining;cognitive/visual perception retraining;functional balance retraining;transfer/mobility retraining;strengthening exercise;occupation/activity based interventions;patient/caregiver education/training  -               User Key  (r) = Recorded By, (t) = Taken By, (c) = Cosigned By      Initials Name Provider Type    Bri Reyes OTR/L, SYLVIA Occupational Therapist                   Clinical Impression       Row Name 11/18/24 1414          Pain Assessment    Pretreatment Pain Rating  0/10 - no pain  -     Posttreatment Pain Rating 0/10 - no pain  -     Pain Side/Orientation generalized  -       Row Name 11/18/24 1414          Plan of Care Review    Plan of Care Reviewed With patient  -     Outcome Evaluation OT progress note completed. Pt presents alert and oriented x4, resting comfortably in bed. He presents much more alert and cooperative this date. Able to bring self to sitting at EOB with CGA. Donned slip on shoes with set-up and SBA. Performed sit <> stand t/fs from EOB with CGA-Min A and amb around bed to chair with Min A. He demonstrates decreased balance and had one LOB with mobility. He scissored his feet during turning causing him to lose his balance. He is unsteady and rwx provided to patient at end of session to increase safety and independence with mobility. He was left sitting up in chair at end of session with exit alarm on. Due to significant improvement from initial eval on 11/17, progress note completed and OT goals adjusted to reflect this progress. Anticipate d/c to short term rehab.  -       Row Name 11/18/24 1414          Therapy Assessment/Plan (OT)    Rehab Potential (OT) good  -     Criteria for Skilled Therapeutic Interventions Met (OT) yes;skilled treatment is necessary  -     Therapy Frequency (OT) 5 times/wk  -     Predicted Duration of Therapy Intervention (OT) 10 days  -       Row Name 11/18/24 1414          Therapy Plan Review/Discharge Plan (OT)    Anticipated Discharge Disposition (OT) sub acute care setting  -       Row Name 11/18/24 1414          Positioning and Restraints    Pre-Treatment Position in bed  -     Post Treatment Position chair  -JW     In Chair notified nsg;reclined;call light within reach;encouraged to call for assist;exit alarm on  -               User Key  (r) = Recorded By, (t) = Taken By, (c) = Cosigned By      Initials Name Provider Type    Bri Reyes, ELLIE/L, CSRS Occupational Therapist                    Outcome Measures       Row Name 11/18/24 1414          How much help from another is currently needed...    Putting on and taking off regular lower body clothing? 3  -JW     Bathing (including washing, rinsing, and drying) 3  -JW     Toileting (which includes using toilet bed pan or urinal) 3  -JW     Putting on and taking off regular upper body clothing 3  -JW     Taking care of personal grooming (such as brushing teeth) 4  -JW     Eating meals 4  -JW     AM-PAC 6 Clicks Score (OT) 20  -JW       Row Name 11/18/24 0900          How much help from another person do you currently need...    Turning from your back to your side while in flat bed without using bedrails? 4  -AW     Moving from lying on back to sitting on the side of a flat bed without bedrails? 3  -AW     Moving to and from a bed to a chair (including a wheelchair)? 2  -AW     Standing up from a chair using your arms (e.g., wheelchair, bedside chair)? 2  -AW     Climbing 3-5 steps with a railing? 2  -AW     To walk in hospital room? 2  -AW     AM-PAC 6 Clicks Score (PT) 15  -AW       Row Name 11/18/24 1414          Functional Assessment    Outcome Measure Options AM-PAC 6 Clicks Daily Activity (OT)  -               User Key  (r) = Recorded By, (t) = Taken By, (c) = Cosigned By      Initials Name Provider Type     Bri Jiménez, OTR/L, CSRS Occupational Therapist    Edda Nova, RN Registered Nurse                    Occupational Therapy Education       Title: PT OT SLP Therapies (In Progress)       Topic: Occupational Therapy (In Progress)       Point: ADL training (Done)       Description:   Instruct learner(s) on proper safety adaptation and remediation techniques during self care or transfers.   Instruct in proper use of assistive devices.                  Learning Progress Summary            Patient Acceptance, E, VU by SANDY at 11/18/2024 1503    Acceptance, E, NR by MM at 11/17/2024 1414                      Point: Home  exercise program (Not Started)       Description:   Instruct learner(s) on appropriate technique for monitoring, assisting and/or progressing therapeutic exercises/activities.                  Learner Progress:  Not documented in this visit.              Point: Precautions (Done)       Description:   Instruct learner(s) on prescribed precautions during self-care and functional transfers.                  Learning Progress Summary            Patient Acceptance, E, VU by SANDY at 11/18/2024 1503    Acceptance, E, NR by MM at 11/17/2024 1414                      Point: Body mechanics (Done)       Description:   Instruct learner(s) on proper positioning and spine alignment during self-care, functional mobility activities and/or exercises.                  Learning Progress Summary            Patient Acceptance, E, VU by SANDY at 11/18/2024 1503    Acceptance, E, NR by BRANDIN at 11/17/2024 1414                                      User Key       Initials Effective Dates Name Provider Type Discipline    BRANDIN 07/11/23 -  Andres Lambert, OTR/L Occupational Therapist OT    SANDY 11/15/24 -  Bri Jiménez OTR/L, RAFAELS Occupational Therapist OT                  OT Recommendation and Plan  Planned Therapy Interventions (OT): activity tolerance training, adaptive equipment training, BADL retraining, cognitive/visual perception retraining, functional balance retraining, transfer/mobility retraining, strengthening exercise, occupation/activity based interventions, patient/caregiver education/training  Therapy Frequency (OT): 5 times/wk  Plan of Care Review  Plan of Care Reviewed With: patient  Outcome Evaluation: OT progress note completed. Pt presents alert and oriented x4, resting comfortably in bed. He presents much more alert and cooperative this date. Able to bring self to sitting at EOB with CGA. Donned slip on shoes with set-up and SBA. Performed sit <> stand t/fs from EOB with CGA-Min A and amb around bed to chair with Min A.  He demonstrates decreased balance and had one LOB with mobility. He scissored his feet during turning causing him to lose his balance. He is unsteady and rwx provided to patient at end of session to increase safety and independence with mobility. He was left sitting up in chair at end of session with exit alarm on. Due to significant improvement from initial eval on 11/17, progress note completed and OT goals adjusted to reflect this progress. Anticipate d/c to short term rehab.     Time Calculation:         Time Calculation- OT       Row Name 11/18/24 1414             Time Calculation- OT    OT Start Time 1414  -JW      OT Stop Time 1455  -JW      OT Time Calculation (min) 41 min  -JW      Total Timed Code Minutes- OT 11 minute(s)  -JW      OT Received On 11/18/24  -      OT Goal Re-Cert Due Date 11/28/24  -         Timed Charges    42491 - OT Self Care/Mgmt Minutes 11  -         Untimed Charges    OT Eval/Re-eval Minutes 30  -JW         Total Minutes    Timed Charges Total Minutes 11  -      Untimed Charges Total Minutes 30  -JW       Total Minutes 41  -JW                User Key  (r) = Recorded By, (t) = Taken By, (c) = Cosigned By      Initials Name Provider Type    Bri Reyes OTLO/L, CSRS Occupational Therapist                  Therapy Charges for Today       Code Description Service Date Service Provider Modifiers Qty    97740391768 HC OT SELF CARE/MGMT/TRAIN EA 15 MIN 11/18/2024 Bri Jiménez OTR/L, CSRS GO 1    22316796064 HC OT RE-EVAL 2 11/18/2024 Bri Jiménez OTR/L, CSRS GO 1                 ELLIE Johnson/L, CSRS  11/18/2024

## 2024-11-18 NOTE — PLAN OF CARE
Problem: Adult Inpatient Plan of Care  Goal: Plan of Care Review  Outcome: Progressing  Flowsheets  Taken 11/18/2024 1415 by Mikel Lozano PT DPT  Plan of Care Reviewed With: patient  Taken 11/17/2024 1518 by Trisha Cardenas RN  Progress: no change  Goal: Patient-Specific Goal (Individualized)  Outcome: Progressing  Goal: Absence of Hospital-Acquired Illness or Injury  Outcome: Progressing  Intervention: Identify and Manage Fall Risk  Recent Flowsheet Documentation  Taken 11/18/2024 1300 by Edda Zuleta RN  Safety Promotion/Fall Prevention: safety round/check completed  Taken 11/18/2024 1200 by Edda Zultea RN  Safety Promotion/Fall Prevention: safety round/check completed  Taken 11/18/2024 1100 by Edda Zuleta RN  Safety Promotion/Fall Prevention: safety round/check completed  Taken 11/18/2024 1000 by Edda Zuleta RN  Safety Promotion/Fall Prevention: safety round/check completed  Taken 11/18/2024 0900 by Edda Zuleta RN  Safety Promotion/Fall Prevention: safety round/check completed  Intervention: Prevent Skin Injury  Recent Flowsheet Documentation  Taken 11/18/2024 1300 by Edda Zuleta RN  Body Position:   position changed independently   weight shifting  Taken 11/18/2024 1200 by Edda Zuleta RN  Body Position: position changed independently  Taken 11/18/2024 1100 by Edda Zuleta RN  Body Position:   position changed independently   weight shifting  Taken 11/18/2024 1000 by Edda Zuleta RN  Body Position:   position changed independently   weight shifting  Taken 11/18/2024 0900 by Edda Zuleta RN  Body Position:   position changed independently   weight shifting  Intervention: Prevent and Manage VTE (Venous Thromboembolism) Risk  Recent Flowsheet Documentation  Taken 11/18/2024 0900 by Edda Zuleta RN  VTE Prevention/Management: SCDs (sequential compression devices) off  Intervention: Prevent Infection  Recent Flowsheet Documentation  Taken  11/18/2024 0900 by Edda Zuleta RN  Infection Prevention: rest/sleep promoted  Goal: Optimal Comfort and Wellbeing  Outcome: Progressing  Intervention: Provide Person-Centered Care  Recent Flowsheet Documentation  Taken 11/18/2024 0900 by Edda Zuleta RN  Trust Relationship/Rapport:   care explained   questions encouraged   questions answered  Goal: Readiness for Transition of Care  Outcome: Progressing     Problem: Violence Risk or Actual  Goal: Anger and Impulse Control  Outcome: Progressing  Intervention: Minimize Safety Risk  Recent Flowsheet Documentation  Taken 11/18/2024 1300 by Edda Zuleta RN  Enhanced Safety Measures: bed alarm set  Taken 11/18/2024 1200 by Edda Zuleta RN  Enhanced Safety Measures: bed alarm set  Taken 11/18/2024 1100 by Edda Zuleta RN  Enhanced Safety Measures: bed alarm set  Taken 11/18/2024 1000 by Edda Zuleta RN  Enhanced Safety Measures: bed alarm set  Taken 11/18/2024 0900 by Edda Zuleta RN  Sensory Stimulation Regulation: quiet environment promoted  De-Escalation Techniques: increased round frequency  Enhanced Safety Measures: bed alarm set     Problem: Fall Injury Risk  Goal: Absence of Fall and Fall-Related Injury  Outcome: Progressing  Intervention: Identify and Manage Contributors  Recent Flowsheet Documentation  Taken 11/18/2024 0900 by Edda Zuleta RN  Medication Review/Management: medications reviewed  Intervention: Promote Injury-Free Environment  Recent Flowsheet Documentation  Taken 11/18/2024 1300 by Edda Zuleta RN  Safety Promotion/Fall Prevention: safety round/check completed  Taken 11/18/2024 1200 by Edda Zuleta RN  Safety Promotion/Fall Prevention: safety round/check completed  Taken 11/18/2024 1100 by Edda Zuleta RN  Safety Promotion/Fall Prevention: safety round/check completed  Taken 11/18/2024 1000 by Edda Zuleta RN  Safety Promotion/Fall Prevention: safety round/check completed  Taken  11/18/2024 0900 by Edda Zuleta, RN  Safety Promotion/Fall Prevention: safety round/check completed     Problem: Skin Injury Risk Increased  Goal: Skin Health and Integrity  Outcome: Progressing  Intervention: Optimize Skin Protection  Recent Flowsheet Documentation  Taken 11/18/2024 1300 by Edda Zuleta, RN  Head of Bed (HOB) Positioning: HOB elevated  Taken 11/18/2024 1200 by Edda Zuleta RN  Head of Bed (HOB) Positioning: HOB elevated  Taken 11/18/2024 1100 by Edda Zuleta RN  Head of Bed (HOB) Positioning: HOB elevated  Taken 11/18/2024 1000 by Edda Zuleta RN  Head of Bed (HOB) Positioning: HOB elevated  Taken 11/18/2024 0900 by Edda Zuleta, RN  Activity Management: activity minimized  Head of Bed (HOB) Positioning: HOB at 20-30 degrees   Goal Outcome Evaluation:

## 2024-11-18 NOTE — PLAN OF CARE
Goal Outcome Evaluation:              Outcome Evaluation: Nutrition assessment complete.  NKFA. Appetite fluctuates. FMS with brown stool. Stage 3 left buttock. Advised adequate PO and MVI to help with wound healing. Pt able to verbalize preferences, denies chewing/swallowing difficulty, and able to feed self. Based on documented PO, PO=50% x 1 meal and 120mL oral fluid total (admit to present). Will continue to follow per protocol.

## 2024-11-18 NOTE — THERAPY EVALUATION
Patient Name: Dayron Lubin  : 1955    MRN: [id number]                              Today's Date: 2024       Admit Date: 2024    Visit Dx:     ICD-10-CM ICD-9-CM   1. Hypotension, unspecified hypotension type  I95.9 458.9   2. Fall, initial encounter  W19.XXXA E888.9   3. Alcohol abuse  F10.10 305.00   4. Chronic liver disease  K76.9 571.9   5. Elevated bilirubin  R17 277.4   6. Traumatic rhabdomyolysis, initial encounter  T79.6XXA 958.6   7. Dehydration  E86.0 276.51   8. Impaired mobility [Z74.09]  Z74.09 799.89     Patient Active Problem List   Diagnosis    Prostate cancer    Hx of radiation therapy    Elevated PSA    Encounter for follow-up surveillance of prostate cancer    HOCM (hypertrophic obstructive cardiomyopathy)    Coronary artery disease involving native coronary artery of native heart without angina pectoris    Essential hypertension    Mixed hyperlipidemia    Class 1 obesity with alveolar hypoventilation, serious comorbidity, and body mass index (BMI) of 30.0 to 30.9 in adult    Displaced fracture of lateral malleolus of right fibula, initial encounter for closed fracture    Family history of colonic polyps    History of colon polyps    Elevated brain natriuretic peptide (BNP) level    Acute kidney injury    Anxiety associated with depression    Alcohol abuse    Alcoholic hepatitis    Volume depletion    Anorexia    CHF (congestive heart failure)    Hypokalemia    Duodenitis    Gait instability    SVT (supraventricular tachycardia)    Physical debility    Atrial fibrillation with rapid ventricular response    Alcoholism in remission    Paroxysmal atrial fibrillation    Nasal polyposis    Nasal congestion    Estelle bullosa    Long-term use of high-risk medication    Multiple falls    Transaminitis    Thrombocytopenia    Non-traumatic rhabdomyolysis    Alcohol withdrawal    Pancytopenia     Past Medical History:   Diagnosis Date    A-fib     Alcoholic hepatitis 2023    CAD  (coronary artery disease)     History of external beam radiation therapy 05/12/2016    6840 cGy to prostate bed    Hyperlipidemia     Hypertension     Insomnia     Prostate cancer      Past Surgical History:   Procedure Laterality Date    CARDIAC CATHETERIZATION      CORONARY ANGIOPLASTY WITH STENT PLACEMENT      FRACTURE SURGERY      PROSTATE SURGERY      TONSILLECTOMY        General Information       [facility] Name 11/18/24 1415          Physical Therapy Time and Intention    Document Type evaluation  Pt admitted s/p fall. Dx: hypotension, alcohol abuse, chronic liver disease, elevated bilirubin, traumatic rhabdomyolysis dehydration, HOCM, alcoholic hepatitis, and MIKEY.  -NONI     Mode of Treatment physical therapy  -NONI       Row Name 11/18/24 1415          General Information    Patient Profile Reviewed yes  -NONI     Prior Level of Function independent:;all household mobility;ADL's  -NONI     Existing Precautions/Restrictions fall  rectal tube  -NONI     Barriers to Rehab medically complex;previous functional deficit  -NONI       Row Name 11/18/24 1415          Living Environment    People in Home alone  -NONI       Row Name 11/18/24 1415          Home Main Entrance    Number of Stairs, Main Entrance two  -NONI     Stair Railings, Main Entrance railing on left side (ascending)  -NONI       Row Name 11/18/24 1415          Stairs Within Home, Primary    Number of Stairs, Within Home, Primary none  -NONI       Row Name 11/18/24 1415          Cognition    Orientation Status (Cognition) oriented to;person;place;situation  -NONI       Row Name 11/18/24 1415          Safety Issues/Impairments Affecting Functional Mobility    Safety Issues Affecting Function (Mobility) safety precaution awareness;safety precautions follow-through/compliance;judgment  -NONI     Impairments Affecting Function (Mobility) balance;coordination;strength;shortness of breath;endurance/activity tolerance  -NONI               User Key  (r) = Recorded By, (t) = Taken By, (c) =  Cosigned By      Initials Name Provider Type    Mikel Handley, PT DPT Physical Therapist                   Mobility       Row Name 11/18/24 1415          Bed Mobility    Bed Mobility supine-sit;sit-supine  -NONI     Supine-Sit [place] (Bed Mobility) contact guard  -NONI     Sit-Supine [place] (Bed Mobility) not tested  -NONI     Assistive Device (Bed Mobility) head of bed elevated;bed rails  -NONI       Row Name 11/18/24 1415          Sit-Stand Transfer    Sit-Stand [place] (Transfers) contact guard;minimum assist (75% patient effort)  -NONI       Row Name 11/18/24 1415          Gait/Stairs (Locomotion)    [place] Level (Gait) minimum assist (75% patient effort)  -NONI     Distance in Feet (Gait) 20  -NONI     Deviations/Abnormal Patterns (Gait) judd decreased;gait speed decreased;base of support, wide;festinating/shuffling   -NONI     Comment, (Gait/Stairs) Unsteady gait, experienced 1 LOB needing min assist to maintain dynamic balance. High fall risk with a history of falls.   -NONI               User Key  (r) = Recorded By, (t) = Taken By, (c) = Cosigned By      Initials Name Provider Type    Mikel Handley, PT DPT Physical Therapist                   Obj/Interventions       Row Name 11/18/24 1415          Range of Motion Comprehensive    General Range of Motion bilateral lower extremity ROM WFL  -NONI       Row Name 11/18/24 1415          Strength Comprehensive (MMT)    Comment, General Manual Muscle Testing (MMT) Assessment B LE grossly 4-/5  -NONI       Row Name 11/18/24 1415          Balance    Balance Assessment sitting dynamic balance;standing dynamic balance  -NONI     Dynamic Sitting Balance standby assist  -NONI     Position, Sitting Balance unsupported;sitting edge of bed  -NONI     Dynamic Standing Balance minimal assist  -NONI     Position/Device Used, Standing Balance supported  -NONI       Row Name 11/18/24 1415          Sensory Assessment (Somatosensory)    Sensory Assessment (Somatosensory) LE  sensation intact  -NONI               User Key  (r) = Recorded By, (t) = Taken By, (c) = Cosigned By      Initials Name Provider Type    Mikel Handley, PT DPT Physical Therapist                   Goals/Plan       Row Name 11/18/24 1415          Bed Mobility Goal 1 (PT)    Activity/Assistive Device (Bed Mobility Goal 1, PT) sit to supine/supine to sit  -NONI     [place] Level/Cues Needed (Bed Mobility Goal 1, PT) supervision required  -NONI     Time Frame (Bed Mobility Goal 1, PT) long term goal (LTG);10 days  -NONI     Progress/Outcomes (Bed Mobility Goal 1, PT) new goal  -NONI       Row Name 11/18/24 1415          Transfer Goal 1 (PT)    Activity/Assistive Device (Transfer Goal 1, PT) sit-to-stand/stand-to-sit;bed-to-chair/chair-to-bed  -NONI     [place] Level/Cues Needed (Transfer Goal 1, PT) supervision required  -NONI     Time Frame (Transfer Goal 1, PT) long term goal (LTG);10 days  -NONI     Progress/Outcome (Transfer Goal 1, PT) new goal  -NONI       Row Name 11/18/24 1415          Gait Training Goal 1 (PT)    Activity/Assistive Device (Gait Training Goal 1, PT) gait (walking locomotion);assistive device use;decrease fall risk;improve balance and speed;increase endurance/gait distance  -NONI     [place] Level (Gait Training Goal 1, PT) supervision required  -NONI     Distance (Gait Training Goal 1, PT) 100 ft, no LOB  -NONI     Time Frame (Gait Training Goal 1, PT) long term goal (LTG);10 days  -NONI     Progress/Outcome (Gait Training Goal 1, PT) new goal  -NONI       Row Name 11/18/24 1415          Therapy Assessment/Plan (PT)    Planned Therapy Interventions (PT) bed mobility training;transfer training;gait training;balance training;home exercise program;patient/family education;postural re-education;stair training;strengthening  -NONI               User Key  (r) = Recorded By, (t) = Taken By, (c) = Cosigned By      Initials Name Provider Type    Mikel Handley, PT DPT Physical Therapist                    Clinical Impression       Row Name 11/18/24 1415          Pain    Pain Management Interventions exercise or physical activity utilized  -NONI     Pre/Posttreatment Pain Comment rectal tube discomfort  -NONI     Additional Documentation Pain Scale: FACES Pre/Post-Treatment (Group)  -NONI       Shreya Name 11/18/24 1415          Pain Scale: FACES Pre/Post-Treatment    Pain: FACES Scale, Pretreatment 4-->hurts little more  -NONI     Posttreatment Pain Rating 2-->hurts little bit  -NONI       Shreya Name 11/18/24 1415          Plan of Care Review    Plan of Care Reviewed With patient  -NONI     Outcome Evaluation PT eval complete. He is alert and oriented to self, place and admit situation. He reports alcohol use and an unsteady gait/balance at baseline with multiple falls. Today needs min assist to ambulate within his room and to the chair. Demos a very unsteady gait needing min assist for gait. Experienced 1 loss of balance, again needing min assist to maintain dynamic balance. RW left in room for balance. PT will cont w mobility and balance training. Anticipate needing short term rehab for gait, safety awareness and balance.  -NONI       Row Name 11/18/24 1415          Therapy Assessment/Plan (PT)    Patient/Family Therapy Goals Statement (PT) go home  -NONI     Rehab Potential (PT) fair  -NONI     Criteria for Skilled Interventions Met (PT) yes;meets criteria;skilled treatment is necessary  -NONI     Therapy Frequency (PT) 2 times/day  -NONI     Predicted Duration of Therapy Intervention (PT) until d/c  -NONI       Shreya Name 11/18/24 1415          Positioning and Restraints    Pre-Treatment Position in bed  -NONI     Post Treatment Position chair  -NONI     In Chair notified nsg;reclined;call light within reach;encouraged to call for assist;exit alarm on  -NONI               User Key  (r) = Recorded By, (t) = Taken By, (c) = Cosigned By      Initials Name Provider Type    Mikel Handley, PT DPT Physical Therapist                   Outcome  Measures       Row Name 11/18/24 1415 11/18/24 0900       How much help from another person do you currently need...    Turning from your back to your side while in flat bed without using bedrails? 3  -NONI 4  -AW    Moving from lying on back to sitting on the side of a flat bed without bedrails? 3  -NONI 3  -AW    Moving to and from a bed to a chair (including a wheelchair)? 3  -NONI 2  -AW    Standing up from a chair using your arms (e.g., wheelchair, bedside chair)? 3  -NONI 2  -AW    Climbing 3-5 steps with a railing? 2  -NONI 2  -AW    To walk in hospital room? 3  -NONI 2  -AW    AM-PAC 6 Clicks Score (PT) 17  -NONI 15  -AW    Highest Level of Mobility Goal 5 --> Static standing  -NONI 4 --> Transfer to chair/commode  -AW      Row Name 11/18/24 1415 11/18/24 1414       Functional Assessment    Outcome Measure Options AM-PAC 6 Clicks Basic Mobility (PT)  -NONI AM-PAC 6 Clicks Daily Activity (OT)  -SANDY              User Key  (r) = Recorded By, (t) = Taken By, (c) = Cosigned By      Initials Name Provider Type    Mikel Handley, PT DPT Physical Therapist    Bri Reyes OTR/L, CSRS Occupational Therapist    Edda Nova, RN Registered Nurse                                 Physical Therapy Education       Title: PT OT SLP Therapies (In Progress)       Topic: Physical Therapy (In Progress)       Point: Mobility training (Done)       Learning Progress Summary            Patient Acceptance, E, VU,NR by NONI at 11/18/2024 1415    Comment: benefits of activity, progression of PT                      Point: Home exercise program (Not Started)       Learner Progress:  Not documented in this visit.              Point: Body mechanics (Not Started)       Learner Progress:  Not documented in this visit.              Point: Precautions (Not Started)       Learner Progress:  Not documented in this visit.                              User Key       Initials Effective Dates Name Provider Type Noemí CHENEY 02/03/23 -   Mikel Lozano, PT DPT Physical Therapist PT                  PT Recommendation and Plan  Planned Therapy Interventions (PT): bed mobility training, transfer training, gait training, balance training, home exercise program, patient/family education, postural re-education, stair training, strengthening  Outcome Evaluation: PT eval complete. He is alert and oriented to self, place and admit situation. He reports alcohol use and an unsteady gait/balance at baseline with multiple falls. Today needs min assist to ambulate within his room and to the chair. Demos a very unsteady gait needing min assist for gait. Experienced 1 loss of balance, again needing min assist to maintain dynamic balance. RW left in room for balance. PT will cont w mobility and balance training. Anticipate needing short term rehab for gait, safety awareness and balance.     Time Calculation:         PT Charges       Row Name 11/18/24 1507             Time Calculation    Start Time 1415  -NONI      Stop Time 1445  + 9 minutes w chart review. PT with 39 total minutes  -NONI      Time Calculation (min) 30 min  -NONI      PT Received On 11/18/24  -NONI      PT Goal Re-Cert Due Date 11/28/24  -NONI         Untimed Charges    PT Eval/Re-eval Minutes 39  -NONI         Total Minutes    Untimed Charges Total Minutes 39  -NONI       Total Minutes 39  -NONI                User Key  (r) = Recorded By, (t) = Taken By, (c) = Cosigned By      Initials Name Provider Type    Mikel Handley, PT DPT Physical Therapist                  Therapy Charges for Today       Code Description Service Date Service Provider Modifiers Qty    71417919240 HC PT EVAL MOD COMPLEXITY 3 11/18/2024 Mikel Lozano, PT DPT GP 1            PT G-Codes  Outcome Measure Options: AM-PAC 6 Clicks Basic Mobility (PT)  AM-PAC 6 Clicks Score (PT): 17  AM-PAC 6 Clicks Score (OT): 20  PT Discharge Summary  Anticipated Discharge Disposition (PT): sub acute care setting    Mikel Lozano, PT  DPT  11/18/2024

## 2024-11-18 NOTE — PLAN OF CARE
Goal Outcome Evaluation:  Plan of Care Reviewed With: patient           Outcome Evaluation: OT progress note completed. Pt presents alert and oriented x4, resting comfortably in bed. He presents much more alert and cooperative this date. Able to bring self to sitting at EOB with CGA. Donned slip on shoes with set-up and SBA. Performed sit <> stand t/fs from EOB with CGA-Min A and amb around bed to chair with Min A. He demonstrates decreased balance and had one LOB with mobility. He scissored his feet during turning causing him to lose his balance. He is unsteady and rwx provided to patient at end of session to increase safety and independence with mobility. He was left sitting up in chair at end of session with exit alarm on. Due to significant improvement from initial eval on 11/17, progress note completed and OT goals adjusted to reflect this progress. Anticipate d/c to short term rehab.    Anticipated Discharge Disposition (OT): sub acute care setting

## 2024-11-19 LAB
GLUCOSE BLDC GLUCOMTR-MCNC: 107 MG/DL (ref 70–130)
PHOSPHATE SERPL-MCNC: 1.7 MG/DL (ref 2.5–4.5)
PHOSPHATE SERPL-MCNC: 2 MG/DL (ref 2.5–4.5)

## 2024-11-19 PROCEDURE — 97110 THERAPEUTIC EXERCISES: CPT

## 2024-11-19 PROCEDURE — 25010000002 THIAMINE HCL 200 MG/2ML SOLUTION: Performed by: NURSE PRACTITIONER

## 2024-11-19 PROCEDURE — 25810000003 SODIUM CHLORIDE 0.9 % SOLUTION 250 ML FLEX CONT: Performed by: FAMILY MEDICINE

## 2024-11-19 PROCEDURE — 84100 ASSAY OF PHOSPHORUS: CPT | Performed by: FAMILY MEDICINE

## 2024-11-19 PROCEDURE — 97116 GAIT TRAINING THERAPY: CPT

## 2024-11-19 PROCEDURE — 82948 REAGENT STRIP/BLOOD GLUCOSE: CPT

## 2024-11-19 RX ORDER — DIPHENOXYLATE HYDROCHLORIDE AND ATROPINE SULFATE 2.5; .025 MG/1; MG/1
2 TABLET ORAL
Status: DISCONTINUED | OUTPATIENT
Start: 2024-11-19 | End: 2024-11-21 | Stop reason: HOSPADM

## 2024-11-19 RX ADMIN — Medication 1 TABLET: at 08:17

## 2024-11-19 RX ADMIN — DIPHENOXYLATE HYDROCHLORIDE AND ATROPINE SULFATE 2 TABLET: 2.5; .025 TABLET ORAL at 14:58

## 2024-11-19 RX ADMIN — POTASSIUM & SODIUM PHOSPHATES POWDER PACK 280-160-250 MG 2 PACKET: 280-160-250 PACK at 08:17

## 2024-11-19 RX ADMIN — THIAMINE HYDROCHLORIDE 200 MG: 100 INJECTION, SOLUTION INTRAMUSCULAR; INTRAVENOUS at 13:02

## 2024-11-19 RX ADMIN — THIAMINE HYDROCHLORIDE 200 MG: 100 INJECTION, SOLUTION INTRAMUSCULAR; INTRAVENOUS at 06:07

## 2024-11-19 RX ADMIN — POTASSIUM PHOSPHATE, MONOBASIC AND POTASSIUM PHOSPHATE, DIBASIC 15 MMOL: 224; 236 INJECTION, SOLUTION, CONCENTRATE INTRAVENOUS at 17:38

## 2024-11-19 RX ADMIN — ZINC OXIDE 1 APPLICATION: 200 OINTMENT TOPICAL at 17:38

## 2024-11-19 RX ADMIN — DIPHENOXYLATE HYDROCHLORIDE AND ATROPINE SULFATE 2 TABLET: 2.5; .025 TABLET ORAL at 17:38

## 2024-11-19 RX ADMIN — LORAZEPAM 1 MG: 1 TABLET ORAL at 08:17

## 2024-11-19 RX ADMIN — FOLIC ACID 1 MG: 1 TABLET ORAL at 08:17

## 2024-11-19 RX ADMIN — Medication 10 ML: at 22:23

## 2024-11-19 RX ADMIN — PANTOPRAZOLE SODIUM 40 MG: 40 TABLET, DELAYED RELEASE ORAL at 06:07

## 2024-11-19 RX ADMIN — AMIODARONE HYDROCHLORIDE 200 MG: 200 TABLET ORAL at 08:17

## 2024-11-19 RX ADMIN — THIAMINE HYDROCHLORIDE 200 MG: 100 INJECTION, SOLUTION INTRAMUSCULAR; INTRAVENOUS at 22:23

## 2024-11-19 RX ADMIN — Medication 10 ML: at 08:18

## 2024-11-19 NOTE — CASE MANAGEMENT/SOCIAL WORK
Continued Stay Note   Mary     Patient Name: Dayron Lubin  MRN: [id number]  Today's Date: 11/19/2024    Admit Date: 11/16/2024    Plan: TBD   Discharge Plan       Row Name 11/19/24 1133       Plan    Plan TBD    Patient/Family in Agreement with Plan yes    Plan Comments SW attempted to speak to pt about SNF placement.  He was on bedside commode.  SW will speak with him later.                   Discharge Codes    No documentation.                 Expected Discharge Date and Time       Expected Discharge Date Expected Discharge Time    Nov 20, 2024               [facility] [place]

## 2024-11-19 NOTE — THERAPY TREATMENT NOTE
Acute Care - Physical Therapy Treatment Note  [facility]     Patient Name: Dayron Lubin  : 1955  MRN: [id number]  Today's Date: 2024      Visit Dx:     ICD-10-CM ICD-9-CM   1. Hypotension, unspecified hypotension type  I95.9 458.9   2. Fall, initial encounter  W19.XXXA E888.9   3. Alcohol abuse  F10.10 305.00   4. Chronic liver disease  K76.9 571.9   5. Elevated bilirubin  R17 277.4   6. Traumatic rhabdomyolysis, initial encounter  T79.6XXA 958.6   7. Dehydration  E86.0 276.51   8. Impaired mobility [Z74.09]  Z74.09 799.89     Patient Active Problem List   Diagnosis    Prostate cancer    Hx of radiation therapy    Elevated PSA    Encounter for follow-up surveillance of prostate cancer    HOCM (hypertrophic obstructive cardiomyopathy)    Coronary artery disease involving native coronary artery of native heart without angina pectoris    Essential hypertension    Mixed hyperlipidemia    Class 1 obesity with alveolar hypoventilation, serious comorbidity, and body mass index (BMI) of 30.0 to 30.9 in adult    Displaced fracture of lateral malleolus of right fibula, initial encounter for closed fracture    Family history of colonic polyps    History of colon polyps    Elevated brain natriuretic peptide (BNP) level    Acute kidney injury    Anxiety associated with depression    Alcohol abuse    Alcoholic hepatitis    Volume depletion    Anorexia    CHF (congestive heart failure)    Hypokalemia    Duodenitis    Gait instability    SVT (supraventricular tachycardia)    Physical debility    Atrial fibrillation with rapid ventricular response    Alcoholism in remission    Paroxysmal atrial fibrillation    Nasal polyposis    Nasal congestion    Estelle bullosa    Long-term use of high-risk medication    Multiple falls    Transaminitis    Thrombocytopenia    Non-traumatic rhabdomyolysis    Alcohol withdrawal    Pancytopenia    Opiate abuse, episodic     Past Medical History:   Diagnosis Date    A-fib     Alcoholic  hepatitis 07/13/2023    CAD (coronary artery disease)     History of external beam radiation therapy 05/12/2016    6840 cGy to prostate bed    Hyperlipidemia     Hypertension     Insomnia     Prostate cancer      Past Surgical History:   Procedure Laterality Date    CARDIAC CATHETERIZATION      CORONARY ANGIOPLASTY WITH STENT PLACEMENT      FRACTURE SURGERY      PROSTATE SURGERY      TONSILLECTOMY       PT Assessment (Last 12 Hours)       PT Evaluation and Treatment       Row Name 11/19/24 1528 11/19/24 1446       Physical Therapy Time and Intention    Subjective Information --  rectal tube has now been removed  -NW --    Document Type therapy note (daily note)  -NW --    Mode of Treatment physical therapy  -NW --    Session Not Performed -- other (see comments)  -NW    Comment, Session Not Performed -- shower  -NW      Row Name 11/19/24 1528          General Information    Existing Precautions/Restrictions fall  -NW       Row Name 11/19/24 1528          Pain    Pain Side/Orientation generalized  -       Row Name 11/19/24 1528          Bed Mobility    Supine-Sit [place] (Bed Mobility) verbal cues;standby assist  -NW     Sit-Supine [place] (Bed Mobility) standby assist  -NW     Assistive Device (Bed Mobility) bed rails  -NW       Row Name 11/19/24 1528          Sit-Stand Transfer    Sit-Stand [place] (Transfers) verbal cues;contact guard  -NW       Row Name 11/19/24 1528          Stand-Sit Transfer    Stand-Sit [place] (Transfers) standby assist  -NW       Row Name 11/19/24 1528          Gait/Stairs (Locomotion)    [place] Level (Gait) verbal cues;contact guard  -     Assistive Device (Gait) walker, front-wheeled  -NW     Distance in Feet (Gait) 50  50x4  -NW     Comment, (Gait/Stairs) cues for safety, pt can be impulsive at times and gets rwx too far out in front  -NW       Row Name 11/19/24 1528          Safety Issues/Impairments Affecting Functional Mobility    Safety Issues Affecting  Function (Mobility) impulsivity;safety precaution awareness  -NW       Row Name 11/19/24 1528          Motor Skills    Therapeutic Exercise aerobic  -NW     Comments, Therapeutic Exercise AROM BLE sitting/supine x15  -NW     Additional Documentation Comments, Therapeutic Exercise (Row)  -NW       Row Name             Wound 11/16/24 1205 coccyx    Wound - Properties Group Placement Date: 11/16/24  -OS Placement Time: 1205  -OS Location: coccyx  -OS    Retired Wound - Properties Group Placement Date: 11/16/24  -OS Placement Time: 1205  -OS Location: coccyx  -OS    Retired Wound - Properties Group Placement Date: 11/16/24  -OS Placement Time: 1205  -OS Location: coccyx  -OS    Retired Wound - Properties Group Date first assessed: 11/16/24  -OS Time first assessed: 1205  -OS Location: coccyx  -OS      Row Name 11/19/24 1528          Positioning and Restraints    Pre-Treatment Position in bed  -NW     Post Treatment Position bed  -NW     In Bed fowlers;call light within reach;encouraged to call for assist  -NW               User Key  (r) = Recorded By, (t) = Taken By, (c) = Cosigned By      Initials Name Provider Type    NW Kim Gallagher PTA Physical Therapist Assistant    OS Aura Velasquez RN Registered Nurse                    Physical Therapy Education       Title: PT OT SLP Therapies (In Progress)       Topic: Physical Therapy (In Progress)       Point: Mobility training (Done)       Learning Progress Summary            Patient Acceptance, E, VU,NR by NONI at 11/18/2024 1415    Comment: benefits of activity, progression of PT                      Point: Home exercise program (Not Started)       Learner Progress:  Not documented in this visit.              Point: Body mechanics (Not Started)       Learner Progress:  Not documented in this visit.              Point: Precautions (Done)       Learning Progress Summary            Patient Acceptance, E,TB, VU by  at 11/19/2024 5480                                       User Key       Initials Effective Dates Name Provider Type Discipline    NONI 02/03/23 -  Mikel Lozano PT DPT Physical Therapist PT     10/07/24 -  Pallavi Yu, RN Registered Nurse Nurse                  PT Recommendation and Plan     Progress: improving  Outcome Evaluation: Pt c/o being worn out from just finishing up w/shower. but still agreeable to therapy. bed mobilty sba. sit-stand cga. pt able to amb short distances cga using rwx cues for safety. pt can be impulsive and tends to get rwx too far out in front. Pt tolerted AROM BLE strengthening ex's. Feel pt may benfit from short term rehab for cont strength, mobiltiy and safety.       Time Calculation:    PT Charges       Row Name 11/19/24 1551             Time Calculation    Start Time 1528  -NW      Stop Time 1551  -NW      Time Calculation (min) 23 min  -NW      PT Received On 11/19/24  -NW      PT Goal Re-Cert Due Date 11/28/24  -NW         Time Calculation- PT    Total Timed Code Minutes- PT 23 minute(s)  -NW         Timed Charges    43526 - PT Therapeutic Exercise Minutes 8  -NW      83674 - Gait Training Minutes  15  -NW         Total Minutes    Timed Charges Total Minutes 23  -NW       Total Minutes 23  -NW                User Key  (r) = Recorded By, (t) = Taken By, (c) = Cosigned By      Initials Name Provider Type    NW Kim Gallagher PTA Physical Therapist Assistant                  Therapy Charges for Today       Code Description Service Date Service Provider Modifiers Qty    29141755981 HC PT THER PROC EA 15 MIN 11/19/2024 Kim Gallagher PTA GP 1    25887531025 HC GAIT TRAINING EA 15 MIN 11/19/2024 Kim Gallagher PTA GP 1            PT G-Codes  Outcome Measure Options: AM-PAC 6 Clicks Basic Mobility (PT)  AM-PAC 6 Clicks Score (PT): 17  AM-PAC 6 Clicks Score (OT): 20    Kim Gallagher PTA  11/19/2024

## 2024-11-19 NOTE — PLAN OF CARE
Goal Outcome Evaluation:  Plan of Care Reviewed With: patient        Progress: no change  Outcome Evaluation: VSS. Denies pain this shift. NSR 66-90 on tele. Phos 1.7, orders followed per electrolyte replacement protocol. PRN Lomotil given for ongoing diarrhea. Safety maintained. Plan of care ongoing.

## 2024-11-19 NOTE — PLAN OF CARE
Problem: Adult Inpatient Plan of Care  Goal: Plan of Care Review  11/19/2024 0336 by Pallavi Yu, RN  Outcome: Progressing  Flowsheets  Taken 11/19/2024 0336 by Pallavi Yu, RN  Outcome Evaluation: Fecal mgmt system removed at shift change by dayshift RN. Pt has had urgency wheen needing to have a bowel movement overnight, incontinent, and setting off bed alarm frequently.  Phos came back at 1.8, followed protocol.  He did flag for the sepsis screening, notified nighttime hospitalist via call, no orders placed at this time.   Plan of Care Reviewed With: patient

## 2024-11-19 NOTE — PROGRESS NOTES
[facility] Medicine Services  INPATIENT PROGRESS NOTE    Patient Name: Dayron Lubin  Date of Admission: 11/16/2024  Today's Date: 11/19/24  Length of Stay: 3  Primary Care Physician: Herberth Nguyen Jr., MD    Subjective   Chief Complaint: Diarrhea  HPI     The patient continues to complain of unabated diarrhea.  He will be placed on antidiarrheal agents today.  Fecal management system has been removed and the patient has significant perianal excoriation.  Stool volume is decreased but he continues to have diarrhea.  He is receiving Ativan per CIWA protocol.  He is alert, conversant and interactive.  Physical therapy evaluation suggest the need for short-term rehab placement.  CBC and CMP will be repeated in a.m.    Review of Systems   All pertinent negatives and positives are as above. All other systems have been reviewed and are negative unless otherwise stated.     Objective    Temp:  [97.6 °F (36.4 °C)-99.1 °F (37.3 °C)] 97.8 °F (36.6 °C)  Heart Rate:  [66-84] 80  Resp:  [16-18] 16  BP: (117-154)/(76-92) 154/92  Physical Exam  Constitutional:       General: He is not in acute distress.     Appearance: He is obese. He is chronically ill-appearing.   HENT:      Head: Normocephalic.      Mouth: Mucous membranes are moist.   Eyes:      Pupils: Pupils are equal, round, and reactive to light.   Cardiovascular:      Rate and Rhythm: Normal rate.      Heart sounds: Systolic murmur heard.   Pulmonary:      Effort: Pulmonary effort is normal. No respiratory distress.      Breath sounds: No wheezing or rales.   Abdominal:      Palpations: Abdomen is soft.   Genitourinary:     Comments: Condom catheter and fecal mgmt system now in place  Skin:     Findings: Rash present.   Neurological:      Mental Status: He is alert.      Motor: Weakness present.   Psychiatric:      Calm and cooperative.    Results Review:  I have reviewed the labs, radiology results, and diagnostic  studies.    Laboratory Data:   Results from last 7 days   Lab Units 11/18/24  0258 11/17/24  0719 11/16/24  1115   WBC 10*3/mm3 3.25* 2.91* 6.52   HEMOGLOBIN g/dL 9.3* 9.7* 12.2*   HEMATOCRIT % 28.6* 28.8* 35.1*   PLATELETS 10*3/mm3 59* 55* 72*        Results from last 7 days   Lab Units 11/18/24  0258 11/17/24  0457 11/16/24  1115   SODIUM mmol/L 132* 133* 131*   POTASSIUM mmol/L 3.6 3.4* 4.0   CHLORIDE mmol/L 98 95* 88*   CO2 mmol/L 22.0 25.0 22.0   BUN mg/dL 14 20 21   CREATININE mg/dL 0.83 1.24 1.81*   CALCIUM mg/dL 7.3* 6.9* 8.3*   BILIRUBIN mg/dL 0.9 1.4* 2.8*   ALK PHOS U/L 75 67 93   ALT (SGPT) U/L 86* 101* 156*   AST (SGOT) U/L 164* 219* 404*   GLUCOSE mg/dL 90 87 109*       Culture Data:   Blood Culture   Date Value Ref Range Status   11/16/2024 Abnormal Stain (C)  Preliminary   11/16/2024 Staphylococcus, coagulase negative (C)  Final       Radiology Data:   Imaging Results (Last 24 Hours)       ** No results found for the last 24 hours. **            I have reviewed the patient's current medications.     Assessment/Plan   Assessment  Active Hospital Problems    Diagnosis     **Multiple falls     Opiate abuse, episodic     Pancytopenia     Transaminitis     Thrombocytopenia     Non-traumatic rhabdomyolysis     Alcohol withdrawal     Paroxysmal atrial fibrillation     Acute kidney injury     Alcohol abuse     Alcoholic hepatitis     HOCM (hypertrophic obstructive cardiomyopathy)        Treatment Plan  CBC and CMP in a.m.  Continue CIWA protocol  Continue therapies  Pursue SNF placement per PT/OT recommendations    Medical Decision Making  Number and Complexity of problems: 3 acute; high complexity        Conditions and Status        Condition is improving.     [facility] Data  External documents reviewed: none  Cardiac tracing (EKG, telemetry) interpretation: See HPI.  Radiology interpretation: See HPI  Labs reviewed: as above  Any tests that were considered but not ordered: none     Decision rules/scores  evaluated (example [id number], [place], etc): none     Discussed with: patient     Care Planning  Shared decision making: Discussed with patient   Code status and discussions: DO NOT INTUBATE     Disposition  Social Determinants of Health that impact treatment or disposition: Alcohol abuse/dependency  I expect the patient to be discharged to SNF in 2-3 days.     Electronically signed by Holger Karimi DO, 11/19/24, 15:33 CST.

## 2024-11-19 NOTE — PLAN OF CARE
Goal Outcome Evaluation:  Plan of Care Reviewed With: patient        Progress: improving  Outcome Evaluation: Pt c/o being worn out from just finishing up w/shower. but still agreeable to therapy. bed mobilty sba. sit-stand cga. pt able to amb short distances cga using rwx cues for safety. pt can be impulsive and tends to get rwx too far out in front. Pt tolerted AROM BLE strengthening ex's. Feel pt may benfit from short term rehab for cont strength, mobiltiy and safety.

## 2024-11-20 LAB
ALBUMIN SERPL-MCNC: 3.3 G/DL (ref 3.5–5.2)
ALBUMIN/GLOB SERPL: 1.2 G/DL
ALP SERPL-CCNC: 83 U/L (ref 39–117)
ALT SERPL W P-5'-P-CCNC: 76 U/L (ref 1–41)
ANION GAP SERPL CALCULATED.3IONS-SCNC: 8 MMOL/L (ref 5–15)
AST SERPL-CCNC: 97 U/L (ref 1–40)
BASOPHILS # BLD MANUAL: 0.04 10*3/MM3 (ref 0–0.2)
BASOPHILS NFR BLD MANUAL: 1 % (ref 0–1.5)
BILIRUB SERPL-MCNC: 0.7 MG/DL (ref 0–1.2)
BUN SERPL-MCNC: 14 MG/DL (ref 8–23)
BUN/CREAT SERPL: 13.7 (ref 7–25)
CALCIUM SPEC-SCNC: 7.7 MG/DL (ref 8.6–10.5)
CHLORIDE SERPL-SCNC: 104 MMOL/L (ref 98–107)
CO2 SERPL-SCNC: 26 MMOL/L (ref 22–29)
CREAT SERPL-MCNC: 1.02 MG/DL (ref 0.76–1.27)
DEPRECATED RDW RBC AUTO: 47.9 FL (ref 37–54)
EGFRCR SERPLBLD CKD-EPI 2021: 79.1 ML/MIN/1.73
EOSINOPHIL # BLD MANUAL: 0.23 10*3/MM3 (ref 0–0.4)
EOSINOPHIL NFR BLD MANUAL: 6 % (ref 0.3–6.2)
ERYTHROCYTE [DISTWIDTH] IN BLOOD BY AUTOMATED COUNT: 14.1 % (ref 12.3–15.4)
GIANT PLATELETS: ABNORMAL
GLOBULIN UR ELPH-MCNC: 2.7 GM/DL
GLUCOSE SERPL-MCNC: 106 MG/DL (ref 65–99)
HCT VFR BLD AUTO: 28.9 % (ref 37.5–51)
HGB BLD-MCNC: 9.6 G/DL (ref 13–17.7)
LYMPHOCYTES # BLD MANUAL: 0.86 10*3/MM3 (ref 0.7–3.1)
LYMPHOCYTES NFR BLD MANUAL: 10 % (ref 5–12)
MCH RBC QN AUTO: 31.4 PG (ref 26.6–33)
MCHC RBC AUTO-ENTMCNC: 33.2 G/DL (ref 31.5–35.7)
MCV RBC AUTO: 94.4 FL (ref 79–97)
MONOCYTES # BLD: 0.38 10*3/MM3 (ref 0.1–0.9)
MYELOCYTES NFR BLD MANUAL: 2 % (ref 0–0)
MYOGLOBIN UR-MCNC: 1062 NG/ML (ref 0–13)
NEUTROPHILS # BLD AUTO: 2.18 10*3/MM3 (ref 1.7–7)
NEUTROPHILS NFR BLD MANUAL: 57 % (ref 42.7–76)
NEUTS BAND NFR BLD MANUAL: 1 % (ref 0–5)
NRBC SPEC MANUAL: 1 /100 WBC (ref 0–0.2)
PHOSPHATE SERPL-MCNC: 2.4 MG/DL (ref 2.5–4.5)
PLATELET # BLD AUTO: 93 10*3/MM3 (ref 140–450)
PMV BLD AUTO: 11.2 FL (ref 6–12)
POLYCHROMASIA BLD QL SMEAR: ABNORMAL
POTASSIUM SERPL-SCNC: 4.3 MMOL/L (ref 3.5–5.2)
PROT SERPL-MCNC: 6 G/DL (ref 6–8.5)
RBC # BLD AUTO: 3.06 10*6/MM3 (ref 4.14–5.8)
SMALL PLATELETS BLD QL SMEAR: ABNORMAL
SODIUM SERPL-SCNC: 138 MMOL/L (ref 136–145)
VARIANT LYMPHS NFR BLD MANUAL: 15 % (ref 19.6–45.3)
VARIANT LYMPHS NFR BLD MANUAL: 8 % (ref 0–5)
WBC MORPH BLD: NORMAL
WBC NRBC COR # BLD AUTO: 3.75 10*3/MM3 (ref 3.4–10.8)

## 2024-11-20 PROCEDURE — 84100 ASSAY OF PHOSPHORUS: CPT | Performed by: FAMILY MEDICINE

## 2024-11-20 PROCEDURE — 97110 THERAPEUTIC EXERCISES: CPT

## 2024-11-20 PROCEDURE — 25010000002 THIAMINE HCL 200 MG/2ML SOLUTION: Performed by: NURSE PRACTITIONER

## 2024-11-20 PROCEDURE — 85007 BL SMEAR W/DIFF WBC COUNT: CPT | Performed by: FAMILY MEDICINE

## 2024-11-20 PROCEDURE — 97116 GAIT TRAINING THERAPY: CPT

## 2024-11-20 PROCEDURE — 80053 COMPREHEN METABOLIC PANEL: CPT | Performed by: FAMILY MEDICINE

## 2024-11-20 PROCEDURE — 85025 COMPLETE CBC W/AUTO DIFF WBC: CPT | Performed by: FAMILY MEDICINE

## 2024-11-20 PROCEDURE — 97530 THERAPEUTIC ACTIVITIES: CPT | Performed by: OCCUPATIONAL THERAPIST

## 2024-11-20 RX ORDER — CHOLESTYRAMINE 4 G/9G
1 POWDER, FOR SUSPENSION ORAL EVERY 12 HOURS
Status: DISCONTINUED | OUTPATIENT
Start: 2024-11-20 | End: 2024-11-21 | Stop reason: HOSPADM

## 2024-11-20 RX ADMIN — Medication 10 ML: at 20:04

## 2024-11-20 RX ADMIN — ACETAMINOPHEN 650 MG: 325 TABLET, FILM COATED ORAL at 20:03

## 2024-11-20 RX ADMIN — Medication 1 TABLET: at 09:58

## 2024-11-20 RX ADMIN — THIAMINE HYDROCHLORIDE 200 MG: 100 INJECTION, SOLUTION INTRAMUSCULAR; INTRAVENOUS at 15:35

## 2024-11-20 RX ADMIN — THIAMINE HYDROCHLORIDE 200 MG: 100 INJECTION, SOLUTION INTRAMUSCULAR; INTRAVENOUS at 06:29

## 2024-11-20 RX ADMIN — Medication 10 ML: at 09:58

## 2024-11-20 RX ADMIN — LORAZEPAM 1 MG: 1 TABLET ORAL at 09:58

## 2024-11-20 RX ADMIN — ACETAMINOPHEN 650 MG: 325 TABLET, FILM COATED ORAL at 06:53

## 2024-11-20 RX ADMIN — FOLIC ACID 1 MG: 1 TABLET ORAL at 09:58

## 2024-11-20 RX ADMIN — CHOLESTYRAMINE 1 PACKET: 4 POWDER, FOR SUSPENSION ORAL at 12:29

## 2024-11-20 RX ADMIN — PANTOPRAZOLE SODIUM 40 MG: 40 TABLET, DELAYED RELEASE ORAL at 06:29

## 2024-11-20 RX ADMIN — AMIODARONE HYDROCHLORIDE 200 MG: 200 TABLET ORAL at 09:58

## 2024-11-20 NOTE — CASE MANAGEMENT/SOCIAL WORK
Continued Stay Note   Mary     Patient Name: Dayron Lubin  MRN: [id number]  Today's Date: 11/20/2024    Admit Date: 11/16/2024    Plan: Home with HH   Discharge Plan       Row Name 11/20/24 1252       Plan    Plan Home with HH    Patient/Family in Agreement with Plan yes    Plan Comments SW spoke with patient regarding disposition, patient's sister was at bedside.  Patient declines SNF placement but is agreeable to  services for therapy.  Therapy has also recommended a RW at discharge.  Patient would like RW delivered to his room when ready for dc.  Patient has no preference as to DME provider.                   Discharge Codes    No documentation.                 Expected Discharge Date and Time       Expected Discharge Date Expected Discharge Time    Nov 20, 2024               GOOD Sargent

## 2024-11-20 NOTE — PLAN OF CARE
Problem: Adult Inpatient Plan of Care  Goal: Plan of Care Review  Recent Flowsheet Documentation  Taken 11/20/2024 0854 by Andres Lambert, OTR/L  Progress: no change  Outcome Evaluation: OT tx completed. Pt found up in BR. Independent for toileting task and washing hands. Pt with no complaints. pt was agreeable to further functional mobility. pt was supervision to CGA for functional mobility BR to bed to marcial to bed. Pt was SBA for sit to supine. Pt is agreeable and pleasant throughout session. Pt would benefit from RW at d/c. Pt educated on RW safety. Education also completed on shower chair and tub bench options and safety with ADL tasks. Continue OT POC.

## 2024-11-20 NOTE — THERAPY TREATMENT NOTE
Acute Care - Physical Therapy Treatment Note  [facility]     Patient Name: Dayron Lubin  : 1955  MRN: [id number]  Today's Date: 2024      Visit Dx:     ICD-10-CM ICD-9-CM   1. Hypotension, unspecified hypotension type  I95.9 458.9   2. Fall, initial encounter  W19.XXXA E888.9   3. Alcohol abuse  F10.10 305.00   4. Chronic liver disease  K76.9 571.9   5. Elevated bilirubin  R17 277.4   6. Traumatic rhabdomyolysis, initial encounter  T79.6XXA 958.6   7. Dehydration  E86.0 276.51   8. Impaired mobility [Z74.09]  Z74.09 799.89     Patient Active Problem List   Diagnosis    Prostate cancer    Hx of radiation therapy    Elevated PSA    Encounter for follow-up surveillance of prostate cancer    HOCM (hypertrophic obstructive cardiomyopathy)    Coronary artery disease involving native coronary artery of native heart without angina pectoris    Essential hypertension    Mixed hyperlipidemia    Class 1 obesity with alveolar hypoventilation, serious comorbidity, and body mass index (BMI) of 30.0 to 30.9 in adult    Displaced fracture of lateral malleolus of right fibula, initial encounter for closed fracture    Family history of colonic polyps    History of colon polyps    Elevated brain natriuretic peptide (BNP) level    Acute kidney injury    Anxiety associated with depression    Alcohol abuse    Alcoholic hepatitis    Volume depletion    Anorexia    CHF (congestive heart failure)    Hypokalemia    Duodenitis    Gait instability    SVT (supraventricular tachycardia)    Physical debility    Atrial fibrillation with rapid ventricular response    Alcoholism in remission    Paroxysmal atrial fibrillation    Nasal polyposis    Nasal congestion    Estelle bullosa    Long-term use of high-risk medication    Multiple falls    Transaminitis    Thrombocytopenia    Non-traumatic rhabdomyolysis    Alcohol withdrawal    Pancytopenia    Opiate abuse, episodic     Past Medical History:   Diagnosis Date    A-fib     Alcoholic  hepatitis 07/13/2023    CAD (coronary artery disease)     History of external beam radiation therapy 05/12/2016    6840 cGy to prostate bed    Hyperlipidemia     Hypertension     Insomnia     Prostate cancer      Past Surgical History:   Procedure Laterality Date    CARDIAC CATHETERIZATION      CORONARY ANGIOPLASTY WITH STENT PLACEMENT      FRACTURE SURGERY      PROSTATE SURGERY      TONSILLECTOMY       PT Assessment (Last 12 Hours)       PT Evaluation and Treatment       Row Name 11/20/24 1146          Physical Therapy Time and Intention    Subjective Information no complaints (P)   -LW     Document Type therapy note (daily note) (P)   -LW     Mode of Treatment physical therapy (P)   -       Row Name 11/20/24 1146          General Information    Existing Precautions/Restrictions fall (P)   -[facility] Name 11/20/24 1146          Pain    Pretreatment Pain Rating 0/10 - no pain (P)   -LW     Posttreatment Pain Rating 0/10 - no pain (P)   -[facility] Name 11/20/24 1146          Bed Mobility    Supine-Sit [place] (Bed Mobility) verbal cues;standby assist (P)   -     Assistive Device (Bed Mobility) bed rails;head of bed elevated (P)   -[facility] Name 11/20/24 1146          Sit-Stand Transfer    Sit-Stand [place] (Transfers) verbal cues;contact guard (P)   -[facility] Name 11/20/24 1146          Stand-Sit Transfer    Stand-Sit [place] (Transfers) standby assist (P)   -[facility] Name 11/20/24 1146          Gait/Stairs (Locomotion)    [place] Level (Gait) verbal cues;contact guard (P)   -     Assistive Device (Gait) walker, front-wheeled (P)   -LW     Distance in Feet (Gait) 50 (P)   50x4  -LW     Deviations/Abnormal Patterns (Gait) base of support, narrow;judd decreased;festinating/shuffling (P)   -       Row Name 11/20/24 1146          Safety Issues/Impairments Affecting Functional Mobility    Safety Issues Affecting Function (Mobility) impulsivity;safety precaution awareness  (P)   -LW       Row Name 11/20/24 1146          Motor Skills    Comments, Therapeutic Exercise BLE strengthening exercises in standing/sitting x15-20. (P)   -LW       Row Name             Wound 11/16/24 1205 coccyx    Wound - Properties Group Placement Date: 11/16/24  -OS Placement Time: 1205  -OS Location: coccyx  -OS    Retired Wound - Properties Group Placement Date: 11/16/24  -OS Placement Time: 1205  -OS Location: coccyx  -OS    Retired Wound - Properties Group Placement Date: 11/16/24  -OS Placement Time: 1205  -OS Location: coccyx  -OS    Retired Wound - Properties Group Date first assessed: 11/16/24  -OS Time first assessed: 1205  -OS Location: coccyx  -OS      Row Name 11/20/24 1146          Positioning and Restraints    Pre-Treatment Position in bed (P)   -LW     Post Treatment Position chair (P)   -LW     In Chair reclined;call light within reach;encouraged to call for assist;with family/caregiver (P)   -LW               User Key  (r) = Recorded By, (t) = Taken By, (c) = Cosigned By      Initials Name Provider Type    LW Talia Graham, PTA Student PTA Student    OS Aura Velasquez, RN Registered Nurse                    Physical Therapy Education       Title: PT OT SLP Therapies (In Progress)       Topic: Physical Therapy (In Progress)       Point: Mobility training (Done)       Learning Progress Summary            Patient Acceptance, E,TB, VU by ISRRAEL at 11/20/2024 1204    Acceptance, E, VU,NR by NONI at 11/18/2024 1415    Comment: benefits of activity, progression of PT                      Point: Home exercise program (Not Started)       Learner Progress:  Not documented in this visit.              Point: Body mechanics (Not Started)       Learner Progress:  Not documented in this visit.              Point: Precautions (Done)       Learning Progress Summary            Patient Acceptance, E,TB, VU by  at 11/19/2024 0426                                      User Key       Initials Effective Dates Name  Provider Type Discipline    NONI 02/03/23 -  Mikel Lozano, PT DPT Physical Therapist PT    LW 10/03/24 -  Talia Graham, DAVID Student PTA Student PT     10/07/24 -  Pallavi Yu, GIANLUCA Registered Nurse Nurse                  PT Recommendation and Plan     Progress: (P) improving  Outcome Evaluation: (P) Bed mobility SBA. Sit to stand SBA. Pt able to ambulate 50 ft at a time with cga and fww. Provided cues to keep walker close due to decreased safety awareness. Performed BLE AROM exercises in standing which he started to get fatigued and SOB therefore finished exercises in sitting x 15-20. Pt could benefit from continued rehab to improve strength, mobility, and safety.   Outcome Measures       Row Name 11/20/24 1200             How much help from another person do you currently need...    Turning from your back to your side while in flat bed without using bedrails? 4 (P)   -LW      Moving from lying on back to sitting on the side of a flat bed without bedrails? 4 (P)   -LW      Moving to and from a bed to a chair (including a wheelchair)? 3 (P)   -LW      Standing up from a chair using your arms (e.g., wheelchair, bedside chair)? 3 (P)   -LW      Climbing 3-5 steps with a railing? 3 (P)   -LW      To walk in hospital room? 3 (P)   -LW      AM-PAC 6 Clicks Score (PT) 20 (P)   -LW         Functional Assessment    Outcome Measure Options AM-PAC 6 Clicks Basic Mobility (PT) (P)   -LW                User Key  (r) = Recorded By, (t) = Taken By, (c) = Cosigned By      Initials Name Provider Type    Talia Castro PTA Student PTA Student                     Time Calculation:    PT Charges       Row Name 11/20/24 1146             Time Calculation    PT Received On 11/20/24  -KJ      PT Goal Re-Cert Due Date 11/28/24  -KJ                User Key  (r) = Recorded By, (t) = Taken By, (c) = Cosigned By      Initials Name Provider Type    Janel Liu PTA Physical Therapist Assistant                      PT  G-Codes  Outcome Measure Options: (P) AM-PAC 6 Clicks Basic Mobility (PT)  AM-PAC 6 Clicks Score (PT): (P) 20  AM-PAC 6 Clicks Score (OT): 20    Talia Graham PTA Student  11/20/2024

## 2024-11-20 NOTE — PLAN OF CARE
Goal Outcome Evaluation:  Plan of Care Reviewed With: (P) patient        Progress: (P) improving  Outcome Evaluation: (P) Bed mobility SBA. Sit to stand SBA. Pt able to ambulate 50 ft at a time with cga and fww. Provided cues to keep walker close due to decreased safety awareness. Performed BLE AROM exercises in standing which he started to get fatigued and SOB therefore finished exercises in sitting x 15-20. Pt could benefit from continued rehab to improve strength, mobility, and safety.

## 2024-11-20 NOTE — THERAPY TREATMENT NOTE
Patient Name: Dayron Lubin  : 1955    MRN: [id number]                              Today's Date: 2024       Admit Date: 2024    Visit Dx:     ICD-10-CM ICD-9-CM   1. Hypotension, unspecified hypotension type  I95.9 458.9   2. Fall, initial encounter  W19.XXXA E888.9   3. Alcohol abuse  F10.10 305.00   4. Chronic liver disease  K76.9 571.9   5. Elevated bilirubin  R17 277.4   6. Traumatic rhabdomyolysis, initial encounter  T79.6XXA 958.6   7. Dehydration  E86.0 276.51   8. Impaired mobility [Z74.09]  Z74.09 799.89     Patient Active Problem List   Diagnosis    Prostate cancer    Hx of radiation therapy    Elevated PSA    Encounter for follow-up surveillance of prostate cancer    HOCM (hypertrophic obstructive cardiomyopathy)    Coronary artery disease involving native coronary artery of native heart without angina pectoris    Essential hypertension    Mixed hyperlipidemia    Class 1 obesity with alveolar hypoventilation, serious comorbidity, and body mass index (BMI) of 30.0 to 30.9 in adult    Displaced fracture of lateral malleolus of right fibula, initial encounter for closed fracture    Family history of colonic polyps    History of colon polyps    Elevated brain natriuretic peptide (BNP) level    Acute kidney injury    Anxiety associated with depression    Alcohol abuse    Alcoholic hepatitis    Volume depletion    Anorexia    CHF (congestive heart failure)    Hypokalemia    Duodenitis    Gait instability    SVT (supraventricular tachycardia)    Physical debility    Atrial fibrillation with rapid ventricular response    Alcoholism in remission    Paroxysmal atrial fibrillation    Nasal polyposis    Nasal congestion    Estelle bullosa    Long-term use of high-risk medication    Multiple falls    Transaminitis    Thrombocytopenia    Non-traumatic rhabdomyolysis    Alcohol withdrawal    Pancytopenia    Opiate abuse, episodic     Past Medical History:   Diagnosis Date    A-fib     Alcoholic  hepatitis 07/13/2023    CAD (coronary artery disease)     History of external beam radiation therapy 05/12/2016    6840 cGy to prostate bed    Hyperlipidemia     Hypertension     Insomnia     Prostate cancer      Past Surgical History:   Procedure Laterality Date    CARDIAC CATHETERIZATION      CORONARY ANGIOPLASTY WITH STENT PLACEMENT      FRACTURE SURGERY      PROSTATE SURGERY      TONSILLECTOMY        General Information       Row Name 11/20/24 0854          OT Time and Intention    Document Type therapy note (daily note)  -MM     Mode of Treatment occupational therapy  -MM       Row Name 11/20/24 0854          General Information    Patient Profile Reviewed yes  -MM     Existing Precautions/Restrictions fall  -MM     Barriers to Rehab medically complex;previous functional deficit  -MM       Row Name 11/20/24 0854          Safety Issues/Impairments Affecting Functional Mobility    Safety Issues Affecting Function (Mobility) at risk behavior observed;impulsivity;insight into deficits/self-awareness;safety precaution awareness;safety precautions follow-through/compliance  -MM     Impairments Affecting Function (Mobility) balance;coordination;strength;shortness of breath;endurance/activity tolerance  -MM     Cognitive Impairments, Mobility Safety/Performance attention;awareness, need for assistance;insight into deficits/self-awareness;safety precaution awareness;safety precaution follow-through;impulsivity  -MM               User Key  (r) = Recorded By, (t) = Taken By, (c) = Cosigned By      Initials Name Provider Type    MM Andres Lambert, OTR/L Occupational Therapist                     Mobility/ADL's       Row Name 11/20/24 0854          Bed Mobility    Bed Mobility sit-supine  -MM     Sit-Supine [place] (Bed Mobility) standby assist  -MM     Assistive Device (Bed Mobility) head of bed elevated  -MM       Row Name 11/20/24 0854          Transfers    Transfers sit-stand transfer;stand-sit transfer;toilet  transfer  -MM       Row Name 11/20/24 0854          Sit-Stand Transfer    Sit-Stand [place] (Transfers) independent  -MM       Row Name 11/20/24 0854          Stand-Sit Transfer    Stand-Sit [place] (Transfers) independent  -MM       Row Name 11/20/24 08          Toilet Transfer    Type (Toilet Transfer) sit-stand;stand-sit  -MM     [place] Level (Toilet Transfer) independent  -MM       Row Name 11/20/24 08          Functional Mobility    Functional Mobility- Ind. Level supervision required;contact guard assist;verbal cues required  -MM     Functional Mobility- Device walker, front-wheeled  -MM     Functional Mobility- Comment BR to bed to marcial to bed  -MM       Row Name 11/20/24 0854          Activities of Daily Living    BADL Assessment/Intervention toileting;lower body dressing  -MM       Row Name 11/20/24 08          Lower Body Dressing Assessment/Training    [place] Level (Lower Body Dressing) doff;don;shoes/slippers;modified independence;set up  slippers  -MM     Position (Lower Body Dressing) edge of bed sitting  -MM       Row Name 11/20/24 08          Toileting Assessment/Training    [place] Level (Toileting) toileting skills;independent  -MM     Position (Toileting) unsupported sitting  -MM               User Key  (r) = Recorded By, (t) = Taken By, (c) = Cosigned By      Initials Name Provider Type    Andres Rose, OTR/L Occupational Therapist                   Obj/Interventions       Row Name 11/20/24 0854          Balance    Balance Assessment sitting static balance;sitting dynamic balance;standing static balance;standing dynamic balance  -MM     Static Sitting Balance independent  -MM     Dynamic Sitting Balance independent  -MM     Position, Sitting Balance supported;unsupported;sitting edge of bed  -MM     Static Standing Balance supervision;verbal cues  -MM     Dynamic Standing Balance supervision;contact guard;verbal cues  -MM     Position/Device Used,  Standing Balance supported;walker, front-wheeled  -MM               User Key  (r) = Recorded By, (t) = Taken By, (c) = Cosigned By      Initials Name Provider Type    Andres Rose, OTR/L Occupational Therapist                   Goals/Plan    No documentation.                  Clinical Impression       Row Name 11/20/24 0854          Pain Assessment    Pretreatment Pain Rating 0/10 - no pain  -MM     Posttreatment Pain Rating 0/10 - no pain  -MM       Row Name 11/20/24 0854          Plan of Care Review    Plan of Care Reviewed With patient  -MM     Progress no change  -MM     Outcome Evaluation OT tx completed. Pt found up in BR. Independent for toileting task and washing hands. Pt with no complaints. pt was agreeable to further functional mobility. pt was supervision to CGA for functional mobility BR to bed to marcial to bed. Pt was SBA for sit to supine. Pt is agreeable and pleasant throughout session. Pt would benefit from RW at d/c. Pt educated on RW safety. Education also completed on shower chair and tub bench options and safety with ADL tasks. Continue OT POC.  -MM       [facility] Name 11/20/24 0854          Therapy Plan Review/Discharge Plan (OT)    Equipment Needs Upon Discharge (OT) walker, rolling;shower chair  -MM     Anticipated Discharge Disposition (OT) sub acute care setting  -MM       Row Name 11/20/24 0854          Positioning and Restraints    Pre-Treatment Position in bed  -MM     Post Treatment Position bed  -MM     In Bed notified nsg;fowlers;call light within reach;encouraged to call for assist;exit alarm on;side rails up x2  -MM               User Key  (r) = Recorded By, (t) = Taken By, (c) = Cosigned By      Initials Name Provider Type    Andres Rose, OTR/L Occupational Therapist                   Outcome Measures       Row Name 11/20/24 0854          How much help from another is currently needed...    Putting on and taking off regular lower body clothing? 3  -MM     Bathing  (including washing, rinsing, and drying) 3  -MM     Toileting (which includes using toilet bed pan or urinal) 4  -MM     Putting on and taking off regular upper body clothing 3  -MM     Taking care of personal grooming (such as brushing teeth) 4  -MM     Eating meals 4  -MM     AM-PAC 6 Clicks Score (OT) 21  -MM       Row Name 11/20/24 1200          How much help from another person do you currently need...    Turning from your back to your side while in flat bed without using bedrails? 4 (P)   -LW     Moving from lying on back to sitting on the side of a flat bed without bedrails? 4 (P)   -LW     Moving to and from a bed to a chair (including a wheelchair)? 3 (P)   -LW     Standing up from a chair using your arms (e.g., wheelchair, bedside chair)? 3 (P)   -LW     Climbing 3-5 steps with a railing? 3 (P)   -LW     To walk in hospital room? 3 (P)   -LW     AM-PAC 6 Clicks Score (PT) 20 (P)   -LW     Highest Level of Mobility Goal 6 --> Walk 10 steps or more (P)   -LW       Row Name 11/20/24 1200 11/20/24 0854       Functional Assessment    Outcome Measure Options AM-PAC 6 Clicks Basic Mobility (PT) (P)   -LW AM-PAC 6 Clicks Daily Activity (OT)  -MM              User Key  (r) = Recorded By, (t) = Taken By, (c) = Cosigned By      Initials Name Provider Type    MM Andres Lambert, OTR/L Occupational Therapist    Talia Castro PTA Student PTA Student                    Occupational Therapy Education       Title: PT OT SLP Therapies (In Progress)       Topic: Occupational Therapy (In Progress)       Point: ADL training (Done)       Description:   Instruct learner(s) on proper safety adaptation and remediation techniques during self care or transfers.   Instruct in proper use of assistive devices.                  Learning Progress Summary            Patient Acceptance, E, VU,NR by BRANDIN at 11/20/2024 1526    Acceptance, E, VU by SANDY at 11/18/2024 1503    Acceptance, E, NR by BRANDIN at 11/17/2024 3509                       Point: Home exercise program (Not Started)       Description:   Instruct learner(s) on appropriate technique for monitoring, assisting and/or progressing therapeutic exercises/activities.                  Learner Progress:  Not documented in this visit.              Point: Precautions (Done)       Description:   Instruct learner(s) on prescribed precautions during self-care and functional transfers.                  Learning Progress Summary            Patient Acceptance, E, VU,NR by  at 11/20/2024 1526    Acceptance, E, VU by  at 11/18/2024 1503    Acceptance, E, NR by  at 11/17/2024 1414                      Point: Body mechanics (Done)       Description:   Instruct learner(s) on proper positioning and spine alignment during self-care, functional mobility activities and/or exercises.                  Learning Progress Summary            Patient Acceptance, E, VU,NR by  at 11/20/2024 1526    Acceptance, E,TB, VU by  at 11/19/2024 0426    Acceptance, E, VU by  at 11/18/2024 1503    Acceptance, E, NR by  at 11/17/2024 1414                                      User Key       Initials Effective Dates Name Provider Type Discipline     07/11/23 -  Andres Lambert, OTR/L Occupational Therapist OT     11/15/24 -  Bri Jiménez OTR/L, CSRS Occupational Therapist OT     10/07/24 -  Pallavi Yu RN Registered Nurse Nurse                  OT Recommendation and Plan  Planned Therapy Interventions (OT): activity tolerance training, BADL retraining, functional balance retraining, occupation/activity based interventions, patient/caregiver education/training, ROM/therapeutic exercise, strengthening exercise, transfer/mobility retraining, adaptive equipment training, cognitive/visual perception retraining, neuromuscular control/coordination retraining  Therapy Frequency (OT): 5 times/wk  Plan of Care Review  Plan of Care Reviewed With: patient  Progress: no change  Outcome Evaluation: OT tx completed.  Pt found up in BR. Independent for toileting task and washing hands. Pt with no complaints. pt was agreeable to further functional mobility. pt was supervision to CGA for functional mobility BR to bed to marcial to bed. Pt was SBA for sit to supine. Pt is agreeable and pleasant throughout session. Pt would benefit from RW at d/c. Pt educated on RW safety. Education also completed on shower chair and tub bench options and safety with ADL tasks. Continue OT POC.     Time Calculation:         Time Calculation- OT       Row Name 11/20/24 1311 11/20/24 0854          Time Calculation- OT    OT Start Time -- 0854  -MM     OT Stop Time -- 0918  -MM     OT Time Calculation (min) -- 24 min  -MM     Total Timed Code Minutes- OT -- 24 minute(s)  -MM     OT Received On -- 11/20/24  -MM        Timed Charges    10082 - Gait Training Minutes  10 (P)   -LW --     28616 - OT Therapeutic Activity Minutes -- 24  -MM        Total Minutes    Timed Charges Total Minutes 10 (P)   -LW 24  -MM      Total Minutes 10 (P)   -LW 24  -MM               User Key  (r) = Recorded By, (t) = Taken By, (c) = Cosigned By      Initials Name Provider Type    MM Andres Lambert, OTR/L Occupational Therapist    LW Talia Graham PTA Student PTA Student                  Therapy Charges for Today       Code Description Service Date Service Provider Modifiers Qty    [id number]  OT THERAPEUTIC ACT EA 15 MIN 11/20/2024 Andres Lambert OTR/L GO 2                 ELLIE Carpio/ZOLTAN  11/20/2024

## 2024-11-20 NOTE — PLAN OF CARE
Problem: Adult Inpatient Plan of Care  Goal: Plan of Care Review  Outcome: Progressing  Flowsheets (Taken 11/20/2024 9001)  Progress: improving  Outcome Evaluation: NO pain, refusing bed alarm, documented in chart.  Phos. continues to be low, following protocol.  Decreased number of loose stools overnight  Plan of Care Reviewed With: patient

## 2024-11-21 ENCOUNTER — READMISSION MANAGEMENT (OUTPATIENT)
Dept: CALL CENTER | Facility: HOSPITAL | Age: 69
End: 2024-11-21
Payer: MEDICARE

## 2024-11-21 VITALS
TEMPERATURE: 98.1 F | DIASTOLIC BLOOD PRESSURE: 83 MMHG | WEIGHT: 185.6 LBS | BODY MASS INDEX: 25.98 KG/M2 | HEIGHT: 71 IN | OXYGEN SATURATION: 98 % | SYSTOLIC BLOOD PRESSURE: 151 MMHG | HEART RATE: 77 BPM | RESPIRATION RATE: 18 BRPM

## 2024-11-21 PROCEDURE — 25010000002 THIAMINE HCL 200 MG/2ML SOLUTION: Performed by: NURSE PRACTITIONER

## 2024-11-21 PROCEDURE — 97116 GAIT TRAINING THERAPY: CPT

## 2024-11-21 PROCEDURE — 97110 THERAPEUTIC EXERCISES: CPT

## 2024-11-21 RX ORDER — CHOLESTYRAMINE 4 G/9G
1 POWDER, FOR SUSPENSION ORAL EVERY 12 HOURS PRN
Qty: 10 PACKET | Refills: 1 | Status: SHIPPED | OUTPATIENT
Start: 2024-11-21

## 2024-11-21 RX ORDER — FOLIC ACID 1 MG/1
1 TABLET ORAL DAILY
Qty: 30 TABLET | Refills: 2 | Status: SHIPPED | OUTPATIENT
Start: 2024-11-22

## 2024-11-21 RX ORDER — LANOLIN ALCOHOL/MO/W.PET/CERES
100 CREAM (GRAM) TOPICAL DAILY
Qty: 30 TABLET | Refills: 2 | Status: SHIPPED | OUTPATIENT
Start: 2024-11-22

## 2024-11-21 RX ADMIN — Medication 10 ML: at 09:29

## 2024-11-21 RX ADMIN — GLYCERIN 2 DROP: .002; .002; .01 SOLUTION/ DROPS OPHTHALMIC at 00:31

## 2024-11-21 RX ADMIN — GLYCERIN 2 DROP: .002; .002; .01 SOLUTION/ DROPS OPHTHALMIC at 06:59

## 2024-11-21 RX ADMIN — FOLIC ACID 1 MG: 1 TABLET ORAL at 09:28

## 2024-11-21 RX ADMIN — Medication 1 TABLET: at 09:28

## 2024-11-21 RX ADMIN — THIAMINE HYDROCHLORIDE 200 MG: 100 INJECTION, SOLUTION INTRAMUSCULAR; INTRAVENOUS at 00:31

## 2024-11-21 RX ADMIN — THIAMINE HYDROCHLORIDE 200 MG: 100 INJECTION, SOLUTION INTRAMUSCULAR; INTRAVENOUS at 06:59

## 2024-11-21 RX ADMIN — AMIODARONE HYDROCHLORIDE 200 MG: 200 TABLET ORAL at 09:28

## 2024-11-21 RX ADMIN — ACETAMINOPHEN 650 MG: 325 TABLET, FILM COATED ORAL at 09:28

## 2024-11-21 RX ADMIN — CHOLESTYRAMINE 1 PACKET: 4 POWDER, FOR SUSPENSION ORAL at 00:31

## 2024-11-21 RX ADMIN — PANTOPRAZOLE SODIUM 40 MG: 40 TABLET, DELAYED RELEASE ORAL at 07:00

## 2024-11-21 NOTE — CASE MANAGEMENT/SOCIAL WORK
Continued Stay Note   Mary     Patient Name: Dayron Lubin  MRN: [id number]  Today's Date: 11/21/2024    Admit Date: 11/16/2024    Plan: Home with HH and DME/RW   Discharge Plan       Row Name 11/21/24 1024       Plan    Plan Home with HH and DME/RW    Plan Comments RW ordered from [facility] for delivery to patient's room.   referral sent to [facility].  Awaiting response.    [facility] has declined admit due to staffing.  Proceeding with a referral to [facility].    [facility] has accepted patient and will be out to see patient on Monday, Nov. 25th.      Final Discharge Disposition Code 06 - home with home health care                   Discharge Codes    No documentation.                 Expected Discharge Date and Time       Expected Discharge Date Expected Discharge Time    Nov 21, 2024               GOOD Sargent

## 2024-11-21 NOTE — DISCHARGE PLACEMENT REQUEST
"To: Sophie     From: Martha MORRIS [phone]        Dayron Carr (69 y.o. Male)       Date of Birth   1955    Social Security Number       Address   [address] [address]    Home Phone   [phone]    MRN   [id number]       Christian   [facility]    Marital Status   Single                            Admission Date   11/16/24    Admission Type   Emergency    Admitting Provider   Holger Karimi DO    Attending Provider   Holger Karimi DO    Department, Room/Bed   [facility] 4B, 440/1       Discharge Date       Discharge Disposition   Home or Self Care    Discharge Destination                                 Attending Provider: Holger Karimi DO    Allergies: No Known Allergies    Isolation: None   Infection: None   Code Status: CPR    Ht: 180.3 cm (70.98\")   Wt: 84.2 kg (185 lb 9.6 oz)    Admission Cmt: None   Principal Problem: Multiple falls [R29.6]                   Active Insurance as of 11/16/2024       Primary Coverage       Payor Plan Insurance Group Employer/Plan Group    HUMANA MEDICARE REPLACEMENT HUMANA MED [id number] PPO [id number]       Payor Plan Address Payor Plan Phone Number Payor Plan Fax Number Effective Dates    [address] [address] [phone]  6/1/2024 - None Entered    [facility] [id number]         Subscriber Name Subscriber Birth Date Member ID       DAYRON CARR 1955 [id number]                     Emergency Contacts        (Rel.) Home Phone Work Phone Mobile Phone    Yuliya Stone (Sister) [phone] -- --    JAK CARR (Brother) -- -- [phone]                 History & Physical        Stephanie Johnson APRN at 11/16/24 1530       Attestation signed by New Griffiths MD at 11/16/24 1928 (Updated)    I have reviewed this documentation and agree.    I performed a substantive part of the MDM during the patient’s E/M visit. I personally evaluated and examined the patient. I personally made or approved the documented " "management plan and acknowledge its risk of complications.     (Independent Interpretation) My (EKG/X-Ray/US/CT) interpretation of multiple CT studies performed in the emergency department reveals no acute injuries; patient has history of HOCM and lung fields were clear on imaging of the chest.    (Discussion) Management/test interpretation discussed with patient this afternoon while in room #5 of the emergency department.    Patient relayed to me that he retired a couple of years ago and started drinking because he had \"too much time on my hands.\"  He drinks heavily nearly on a daily basis, typically 8 shots of Vodka daily.  He stated that his last shot of Vodka was taken prior to coming to the hospital today.  He did appears to somewhat tremulous particularly with movement of his extremities during my exam.  CIWA protocol will be initiated, however complicating matters is his soft blood pressures.  Will cautiously provide additional intravenous fluids especially in the setting of acute rhabdomyolysis (urine myoglobin has returned positive) and acute kidney injury.    Hold outpatient anti-HTN medications.  I hope to restart B-blocker as soon as possible given his history of HOCM.  He will need an adequate volume status given preload dependency.    Last Echo results as follows:  Results for orders placed during the hospital encounter of 07/29/24    Adult Transthoracic Echo Complete W/ Cont if Necessary Per Protocol    Interpretation Summary    Left ventricular systolic function is low normal. Left ventricular ejection fraction appears to be 51 - 55%.    Left ventricular wall thickness is consistent with moderate to severe concentric hypertrophy.    Mild to moderate mitral valve regurgitation is present.    The aortic valve is thickened and calcified and there is some degree of aortic valve stenosis, likely moderate, most of the flow acceleration appears to be in the left ventricular outflow tract as a result of " "hypertrophic obstructive cardiomyopathy.    Patient is followed by EP Cardiology and reported plans for ablation and potentially AICD placement in the future \"in the new year.\"    Continuous telemetry monitoring.    Hold anticoagulation with Eliquis for now.    PT and OT will be needed.    Barrier ointment containing nystatin to skin folds.    Workup ongoing.  Very guarded condition at this time.  Aggressive workup ongoing.        Electronically signed by New Griffiths MD, 11/16/2024, 19:18 CST.                       [facility] Medicine Services  HISTORY AND PHYSICAL    Date of Admission: 11/16/2024  Primary Care Physician: Herberth Nguyen Jr., MD    Subjective   Primary Historian: patient    Chief Complaint: fall    History of Present Illness  Mr. Lubin is a 69-year-old male that presented to [facility] for recurrent falls. He has a past medical history significant for hypertrophic obstructive cardiomyopathy, paroxysmal atrial fibrillation chronically anticoagulated on Eliquis, coronary artery disease, hypertension, hyperlipidemia and prostate cancer.  He does admit to drinking approximately 6-8 shots a day but his brother Van at bedside approximates \"much more than that.\"  He does have issues going without alcohol.  His last drink was a shot this morning around 930.  Patient indicates that over the last several days he has had approximately 4 falls.  He explains a time in which last night he fell and thinks that he may have been on the ground for approximately 3 hours.  He has some bruising noted on his abdomen.  He complains of generalized weakness.  He wears depends and tells me that he wore his depends for \"a day and a half \" as he was \"too weak to change it.\"  No shortness of breath, chest pain or pressure.  No fever or chills.  No nausea, vomiting, abdominal pain. In the ED, CK 1260.  He was found to have acute kidney injury with creatinine 1.81.  " "Creatinine on 10/18 was 0.81.  AST elevated at 400, , total bilirubin 2.8.  Lactate 4.0 with repeat 1.9.  WBC normal.  Platelet count 63303 (previous platelet count was 152,000).  He was given 1 L fluid bolus and IV Zosyn.  His blood pressure systolic 92 during the time of my examination -will give additional 500 cc fluid bolus.  He is alert and oriented with his brother at bedside.  He has visible tremors to bilateral upper extremities noted at rest.  He is asking for medication to help with \"the shakes.\"  He will be admitted to the hospitalist service for further evaluation and management.    Review of Systems   Otherwise complete ROS reviewed and negative except as mentioned in the HPI.    Past Medical History:   Past Medical History:   Diagnosis Date    A-fib     Alcoholic hepatitis 07/13/2023    CAD (coronary artery disease)     History of external beam radiation therapy 05/12/2016    6840 cGy to prostate bed    Hyperlipidemia     Hypertension     Insomnia     Prostate cancer      Past Surgical History:  Past Surgical History:   Procedure Laterality Date    CARDIAC CATHETERIZATION      CORONARY ANGIOPLASTY WITH STENT PLACEMENT      FRACTURE SURGERY      PROSTATE SURGERY      TONSILLECTOMY       Social History:  reports that he has never smoked. He has never used smokeless tobacco. He reports that he does not currently use alcohol after a past usage of about 50.0 standard drinks of alcohol per week. He reports that he does not currently use drugs after having used the following drugs: Marijuana.    Family History: family history includes Coronary artery disease in his mother; Heart disease in his mother; Stroke in his mother.       Allergies:  No Known Allergies    Medications:  Prior to Admission medications    Medication Sig Start Date End Date Taking? Authorizing Provider   amiodarone (PACERONE) 200 MG tablet Take 1 tablet by mouth Daily. 9/10/24   Italia Vasquez PA   apixaban (ELIQUIS) 5 MG tablet " "tablet Take 1 tablet by mouth Every 12 (Twelve) Hours. Indications: Atrial Fibrillation 7/30/24   Holger Karimi DO   aspirin (aspirin) 81 MG EC tablet Take 1 tablet by mouth Daily. 12/23/20   Edda Li APRN   atorvastatin (LIPITOR) 20 MG tablet Take 1 tablet by mouth Daily.    ProviderSteve MD   azelastine (ASTELIN) 0.1 % nasal spray 2 sprays into the nostril(s) as directed by provider 2 (Two) Times a Day. Use in each nostril as directed 9/5/24   Goznalo Martinez APRN   fluticasone (FLONASE) 50 MCG/ACT nasal spray 2 sprays into the nostril(s) as directed by provider Daily. 9/5/24   Gonzalo Martinez APRN   losartan (Cozaar) 25 MG tablet Take 1 tablet by mouth Daily. 10/21/24   Italia Vasquez PA   metoprolol succinate XL (TOPROL-XL) 25 MG 24 hr tablet Take 1 tablet by mouth Daily.    ProviderSteve MD   multivitamin with minerals tablet tablet Take 1 tablet by mouth Daily. 8/16/23   Holger Karimi DO   pantoprazole (PROTONIX) 40 MG EC tablet Take 1 tablet by mouth Daily.    Steve Devries MD   temazepam (RESTORIL) 30 MG capsule Take 1 capsule by mouth At Night As Needed for Sleep. 9/23/16   Steve Devries MD I have utilized all available immediate resources to obtain, update, or review the patient's current medications (including all prescriptions, over-the-counter products, herbals, cannabis/cannabidiol products, and vitamin/mineral/dietary (nutritional) supplements).    Objective     Vital Signs: BP 98/57   Pulse 85   Temp 98.1 °F (36.7 °C) (Oral)   Resp 18   Ht 180.3 cm (71\")   Wt 88.9 kg (196 lb)   SpO2 96%   BMI 27.34 kg/m²   Physical Exam  Vitals reviewed.   Constitutional:       General: He is not in acute distress.     Appearance: He is not toxic-appearing.   HENT:      Head: Normocephalic and atraumatic.      Mouth/Throat:      Mouth: Mucous membranes are moist.      Pharynx: Oropharynx is clear.   Eyes:      Extraocular Movements: Extraocular " movements intact.      Conjunctiva/sclera: Conjunctivae normal.      Pupils: Pupils are equal, round, and reactive to light.   Cardiovascular:      Rate and Rhythm: Normal rate and regular rhythm.      Pulses: Normal pulses.   Pulmonary:      Effort: Pulmonary effort is normal. No respiratory distress.      Breath sounds: Normal breath sounds.   Abdominal:      General: Bowel sounds are normal. There is no distension.      Palpations: Abdomen is soft.      Tenderness: There is no abdominal tenderness.   Musculoskeletal:         General: No swelling or tenderness. Normal range of motion.      Cervical back: Normal range of motion and neck supple. No muscular tenderness.   Skin:     General: Skin is warm and dry.      Findings: Bruising present. No erythema or rash.   Neurological:      General: No focal deficit present.      Mental Status: He is alert and oriented to person, place, and time.      Cranial Nerves: No cranial nerve deficit.      Motor: No weakness.      Comments: Visible tremors of upper extremities at rest   Psychiatric:         Mood and Affect: Mood normal.         Behavior: Behavior normal.                  Results Reviewed:  Lab Results (last 24 hours)       Procedure Component Value Units Date/Time    Blood Culture - Blood, Arm, Left [[id number]] Collected: 11/16/24 1507    Specimen: Blood from Arm, Left Updated: 11/16/24 1531    Blood Culture - Blood, Wrist, Left [[id number]] Collected: 11/16/24 1507    Specimen: Blood from Wrist, Left Updated: 11/16/24 1530    STAT Lactic Acid, Reflex [[id number]]  (Normal) Collected: 11/16/24 1507    Specimen: Blood Updated: 11/16/24 1530     Lactate 1.9 mmol/L     Lactic Acid, Plasma [[id number]]  (Abnormal) Collected: 11/16/24 1115    Specimen: Blood Updated: 11/16/24 1157     Lactate 4.0 mmol/L     Magnesium [[id number]]  (Normal) Collected: 11/16/24 1115    Specimen: Blood Updated: 11/16/24 1154     Magnesium 1.9 mg/dL     Comprehensive Metabolic Panel  [[id number]]  (Abnormal) Collected: 11/16/24 1115    Specimen: Blood Updated: 11/16/24 1154     Glucose 109 mg/dL      BUN 21 mg/dL      Creatinine 1.81 mg/dL      Sodium 131 mmol/L      Potassium 4.0 mmol/L      Chloride 88 mmol/L      CO2 22.0 mmol/L      Calcium 8.3 mg/dL      Total Protein 7.1 g/dL      Albumin 3.6 g/dL      ALT (SGPT) 156 U/L      AST (SGOT) 404 U/L      Alkaline Phosphatase 93 U/L      Total Bilirubin 2.8 mg/dL      Globulin 3.5 gm/dL      A/G Ratio 1.0 g/dL      BUN/Creatinine Ratio 11.6     Anion Gap 21.0 mmol/L      eGFR 39.7 mL/min/1.73     Narrative:      GFR Normal >60  Chronic Kidney Disease <60  Kidney Failure <15      CK [[id number]]  (Abnormal) Collected: 11/16/24 1115    Specimen: Blood Updated: 11/16/24 1153     Creatine Kinase 1,260 U/L     BNP [[id number]]  (Abnormal) Collected: 11/16/24 1115    Specimen: Blood Updated: 11/16/24 1152     proBNP 6,958.0 pg/mL     Narrative:      This assay is used as an aid in the diagnosis of individuals suspected of having heart failure. It can be used as an aid in the diagnosis of acute decompensated heart failure (ADHF) in patients presenting with signs and symptoms of ADHF to the emergency department (ED). In addition, NT-proBNP of <300 pg/mL indicates ADHF is not likely.    Age Range Result Interpretation  NT-proBNP Concentration (pg/mL:      <50             Positive            >450                   Gray                 300-450                    Negative             <300    50-75           Positive            >900                  Gray                300-900                  Negative            <300      >75             Positive            >1800                  Gray                300-1800                  Negative            <300    Lipase [[id number]]  (Abnormal) Collected: 11/16/24 1115    Specimen: Blood Updated: 11/16/24 1149     Lipase 113 U/L     Ethanol [[id number]] Collected: 11/16/24 1115    Specimen: Blood Updated: 11/16/24 1149      Ethanol % <0.010 %     Narrative:      Not for legal purposes. Chain of Custody not followed.     Blood Gas, Arterial - [[id number]]  (Abnormal) Collected: 11/16/24 1140    Specimen: Arterial Blood Updated: 11/16/24 1140     Site Right Brachial     Garett's Test N/A     pH, Arterial 7.559 pH units      Comment: 83 Value above reference range        pCO2, Arterial 28.6 mm Hg      Comment: 84 Value below reference range        pO2, Arterial 86.0 mm Hg      HCO3, Arterial 25.5 mmol/L      Base Excess, Arterial 3.8 mmol/L      Comment: 83 Value above reference range        O2 Saturation, Arterial 98.2 %      Temperature 37.0     Barometric Pressure for Blood Gas 755 mmHg      Modality Room Air     Ventilator Mode NA     Collected by [id number]     Comment: Meter: [id number]     :  Teri Bruner, RRT        pCO2, Temperature Corrected 28.6 mm Hg      pH, Temp Corrected 7.559 pH Units      pO2, Temperature Corrected 86.0 mm Hg     Protime-INR [[id number]]  (Abnormal) Collected: 11/16/24 1115    Specimen: Blood Updated: 11/16/24 1137     Protime 18.0 Seconds      INR 1.43    CBC & Differential [[id number]]  (Abnormal) Collected: 11/16/24 1115    Specimen: Blood Updated: 11/16/24 1133    Narrative:      The following orders were created for panel order CBC & Differential.  Procedure                               Abnormality         Status                     ---------                               -----------         ------                     CBC Auto Differential[[id number]]        Abnormal            Final result                 Please view results for these tests on the individual orders.    CBC Auto Differential [[id number]]  (Abnormal) Collected: 11/16/24 1115    Specimen: Blood Updated: 11/16/24 1133     WBC 6.52 10*3/mm3      RBC 3.85 10*6/mm3      Hemoglobin 12.2 g/dL      Hematocrit 35.1 %      MCV 91.2 fL      MCH 31.7 pg      MCHC 34.8 g/dL      RDW 13.4 %      RDW-SD 44.8 fl      MPV 11.3 fL      Platelets  72 10*3/mm3      Neutrophil % 85.4 %      Lymphocyte % 6.3 %      Monocyte % 7.4 %      Eosinophil % 0.0 %      Basophil % 0.0 %      Immature Grans % 0.9 %      Neutrophils, Absolute 5.57 10*3/mm3      Lymphocytes, Absolute 0.41 10*3/mm3      Monocytes, Absolute 0.48 10*3/mm3      Eosinophils, Absolute 0.00 10*3/mm3      Basophils, Absolute 0.00 10*3/mm3      Immature Grans, Absolute 0.06 10*3/mm3           Imaging Results (Last 24 Hours)       Procedure Component Value Units Date/Time    CT Abdomen Pelvis With Contrast [[id number]] Collected: 11/16/24 1314     Updated: 11/16/24 1320    Narrative:      EXAM/TECHNIQUE: CT abdomen and pelvis with IV contrast     INDICATION: Fall with right CVA tenderness and large bruise.  Possible  RPH patient is hypotensive; I95.9-Hypotension, unspecified;  W19.XXXA-Unspecified fall, initial encounter; F10.10-Alcohol abuse,  uncomplicated     COMPARISON: 7/13/2023     DLP: 2862.05 mGy.cm. Automated exposure control was also utilized to  decrease patient radiation dose.     FINDINGS:     Findings in the included lower chest are described in a separate same  day report.     Severe hepatic steatosis. No morphologic changes of cirrhosis. No  suspicious focal liver lesion. No gallstone or gallbladder wall  thickening. No biliary ductal dilatation.     Pancreas, spleen, and adrenal glands are unremarkable.     No solid renal mass. No urolithiasis or hydronephrosis. Urinary bladder  is decompressed.      Mild colonic diverticulosis without diverticulitis. No colonic wall  thickening or pericolonic fat stranding. Normal appendix. No small bowel  distention or evidence of active small bowel inflammation.     No ascites or free pelvic fluid. No pelvic mass or collection. Prior  prostatectomy.     Nonaneurysmal atherosclerotic abdominal aorta. No enlarged abdominal or  pelvic lymph nodes. Calcified lymph nodes in the periportal region like  related to an old granulomatous process     No large  soft tissue hematoma. 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 No acute osseous finding.       Impression:         1.  No acute traumatic finding in the abdomen or pelvis.     2.  Severe hepatic steatosis.     3.  Mild colonic diverticulosis without evidence of diverticulitis.     This report was signed and finalized on 11/16/2024 1:17 PM by Dr. Tyrese Young MD.       CT Chest With Contrast Diagnostic [[id number]] Collected: 11/16/24 1309     Updated: 11/16/24 1317    Narrative:      EXAM/TECHNIQUE: CT chest with IV contrast     INDICATION: Chest trauma fall short of breath right-sided chest pain;  I95.9-Hypotension, unspecified; W19.XXXA-Unspecified fall, initial  encounter; F10.10-Alcohol abuse, uncomplicated     COMPARISON: 8/7/2023     DLP: 2862.05 mGy.cm. Automated exposure control was also utilized to  decrease patient radiation dose.     FINDINGS:     The central airways are clear. No consolidation, pleural effusion,  pneumothorax, hemothorax, or pulmonary contusion. No advanced  emphysematous or fibrotic change. No suspicious pulmonary nodule.     No enlarged thoracic lymph nodes. No central pulmonary artery filling  defect. Thoracic aorta is nonaneurysmal. No evidence of acute aortic  injury. Aortic arch branch origins are widely patent. Heavy aortic valve  calcification. Heavy coronary artery calcification. No pericardial  effusion. Left ventricular apical aneurysm.     No large thyroid nodule. No acute chest soft tissue abnormality. No  large soft tissue hematoma. Findings in the included portion of the  upper abdomen are described in a separate same day report. No acute rib  fracture. No thoracic spine compression fracture or subluxation.       Impression:         1.  No acute traumatic finding in the chest.     2.  Heavy aortic valve calcification. Heavy coronary artery  atherosclerotic calcification. Chronic left ventricular apical  myocardial thinning/aneurysm.        This  report was signed and finalized on 11/16/2024 1:14 PM by Dr. Tyrese Young MD.       CT Cervical Spine Without Contrast [[id number]] Collected: 11/16/24 1303     Updated: 11/16/24 1312    Narrative:      EXAM/TECHNIQUE: CT cervical spine without contrast     INDICATION: Fall; I95.9-Hypotension, unspecified; W19.XXXA-Unspecified  fall, initial encounter; F10.10-Alcohol abuse, uncomplicated     COMPARISON: None available.     DLP: 2862.05 mGy.cm. Automated exposure control was also utilized to  decrease patient radiation dose.     FINDINGS:     Craniocervical relationships are maintained. The odontoid process is  intact. Cervical spine alignment appears maintained. No subluxations.  Vertebral body heights are maintained. No acute or chronic fracture.  Mild to moderate multilevel cervical spine degenerative change with  multilevel neural foraminal narrowing. Paravertebral soft tissues are  unremarkable. Carotid artery atherosclerotic calcification.       Impression:      1. No acute fracture or subluxation.  2. Mild to moderate multilevel cervical spine degenerative change.        This report was signed and finalized on 11/16/2024 1:09 PM by Dr. Tyrese Young MD.       CT Head Without Contrast [[id number]] Collected: 11/16/24 1258     Updated: 11/16/24 1306    Narrative:      EXAM/TECHNIQUE: CT head without contrast     INDICATION: Fall; I95.9-Hypotension, unspecified; W19.XXXA-Unspecified  fall, initial encounter; F10.10-Alcohol abuse, uncomplicated     COMPARISON: None available.     DLP: 2862.05 mGy.cm. Automated exposure control was also utilized to  decrease patient radiation dose.     FINDINGS:     No evidence of intracranial hemorrhage. Gray-white differentiation is  maintained. No midline shift or mass effect. Lateral ventricles are  nondilated. Basilar cisterns are patent. No acute orbital finding.  Mastoid air cells are clear. Visualized portion of the paranasal sinuses  are clear. No acute osseous  finding.       Impression:         No acute intracranial findings.        This report was signed and finalized on 11/16/2024 1:03 PM by Dr. Tyrese Young MD.       XR Chest 1 View [[id number]] Collected: 11/16/24 1128     Updated: 11/16/24 1132    Narrative:      EXAM/TECHNIQUE: XR CHEST 1 VW-     INDICATION: Fall; I95.9-Hypotension, unspecified; W19.XXXA-Unspecified  fall, initial encounter; F10.10-Alcohol abuse, uncomplicated     COMPARISON: 10/18/2024     FINDINGS:     Cardiac silhouette is within normal limits.     No pleural effusion or pneumothorax. No focal consolidation.     No acute osseous finding.       Impression:         No acute findings.     This report was signed and finalized on 11/16/2024 11:29 AM by Dr. Tyrese Young MD.             I have personally reviewed and interpreted the radiology studies and ECG obtained at time of admission.     Assessment / Plan   Assessment:   Active Hospital Problems    Diagnosis     **Multiple falls     Transaminitis     Thrombocytopenia     Non-traumatic rhabdomyolysis     Alcohol withdrawal     Paroxysmal atrial fibrillation     Acute kidney injury     Alcohol abuse     Alcoholic hepatitis     HOCM (hypertrophic obstructive cardiomyopathy)      Treatment Plan  The patient will be admitted to Dr. Griffiths's service at [facility].     Rhabdomyolysis with CK 1260.  Initiate IV fluid.  Check urine myoglobin.  CK in AM.    Acute kidney injury with creatinine 1.81.  He was given 1 L fluid bolus.  Given additional 500 cc fluid bolus then initiate continuous fluid.    He has some soft blood pressures documented with systolic high 80s/90s.  He received 1 L fluid bolus, give 500 cc fluid bolus now.  Thereafter initiate continuous IV fluid.  Hold antihypertensives.    Alcohol abuse with concern for alcohol withdrawal.  Start CIWA protocol with scheduled p.o. Ativan with as needed Ativan.  Prophylactic vitamin replacement.    He has a history of hypertrophic  obstructive cardiomyopathy, paroxysmal atrial fibrillation chronically anticoagulant Eliquis.  Hold Eliquis at this time.  Losartan, Toprol-XL on hold.  Continue amiodarone.  Results for orders placed during the hospital encounter of 07/29/24    Adult Transthoracic Echo Complete W/ Cont if Necessary Per Protocol    Interpretation Summary    Left ventricular systolic function is low normal. Left ventricular ejection fraction appears to be 51 - 55%.    Left ventricular wall thickness is consistent with moderate to severe concentric hypertrophy.    Mild to moderate mitral valve regurgitation is present.    The aortic valve is thickened and calcified and there is some degree of aortic valve stenosis, likely moderate, most of the flow acceleration appears to be in the left ventricular outflow tract as a result of hypertrophic obstructive cardiomyopathy.    Wound care consult.    Will need PT/OT consult.  SCDs for DVT prophylaxis.  Labs in AM.    Medical Decision Making  Number and Complexity of problems: 5 acute problems  Differential Diagnosis: As above    Conditions and Status        Condition is worsening.     [facility] Data  External documents reviewed: Prior epic records  Cardiac tracing (EKG, telemetry) interpretation: Sinus rhythm  Radiology interpretation: Interpreted by radiology  Labs reviewed: As above  Any tests that were considered but not ordered: none considered at present     Decision rules/scores evaluated (example [id number], Wells, etc): none considered at present     Discussed with: Patient and Dr. Griffiths     Care Planning  Shared decision making: Patient is agreeable to ongoing workup and treatment  Code status and discussions: CPR with limited support to include no intubation    Disposition  Social Determinants of Health that impact treatment or disposition: ? SNF  Estimated length of stay is 2-4 days.     I confirmed that the patient's advanced care plan is present, code status is documented, and a  surrogate decision maker is listed in the patient's medical record.     The patient's surrogate decision maker is Sister Yuliay Stone and brother Van Lubin.     The patient was seen and examined by me on 11/16/2024 at 16:00.    Electronically signed by BRI Valentine, 11/16/24, 16:19 CST.                Electronically signed by New Griffiths MD at 11/16/24 1928       Discharge Summary    No notes of this type exist for this encounter.         Ambulatory Referral to Home Health [[id number]] (Order [id number])  Order  Date: 11/21/2024 Department: [address] Ordering/Authorizing: Holger Karimi DO     Order History  Outpatient  Date/Time Action Taken User Additional Information   11/21/24 1015 Sign Holger Karimi DO      Order Details    Frequency Duration Priority Order Class   None None Routine Internal Referral     Start Date/Time    Start Date   11/21/24     Order Information    Order Date Service Start Date Start Time   11/21/24 Medicine 11/21/24      Reference Links    Associated Reports External References   View Encounter Current Health Referral Information     Order Questions    Question Answer   Face to Face Visit Date: 11/21/2024   Follow-up provider for Plan of Care? I treated the patient in an acute care facility and will not continue treatment after discharge.   Follow-up provider: MAGALIS CHI JR.   Reason/Clinical Findings Ambulatory difficulty, weakness   Describe mobility limitations that make leaving home difficult: ambulatory difficulty, weakness   Nursing/Therapeutic Services Requested Skilled Nursing    Physical Therapy   Skilled nursing orders: Other   PT orders: Gait Training   Weight Bearing Status As Tolerated   Frequency: 1 Week 1            Source Order Set / Preference List    Order Set   [facility] GEN EXPRESS DISCHARGE [[id number]]           Clinical Indications     ICD-10-CM ICD-9-CM   Impaired mobility [Z74.09]    Z74.09 799.89                              Reprint Order Requisition    Ambulatory Referral to Home Health (Order #[id number]) on 11/21/24         Encounter    View Encounter                Order Provider Info        Office phone Pager E-mail   Ordering User  Holger Karimi DO  [phone] -- --   Authorizing Provider  Holger Karimi DO  [phone] -- --   Attending Provider  Holger Karimi DO  [phone] -- --     Tracking Reports    Cosign Tracking Order Transmittal Tracking     Authorized by:  Holger Karimi DO  (NPI: [id number])                Lab Component SmartPhrase Guide    Ambulatory Referral to Home Health (Order #[id number]) on 11/21/24

## 2024-11-21 NOTE — THERAPY DISCHARGE NOTE
Acute Care - Occupational Therapy Discharge Summary  [facility]     Patient Name: Dayron Lubin  : 1955  MRN: [id number]    Today's Date: 2024                 Admit Date: 2024        OT Recommendation and Plan    Visit Dx:    ICD-10-CM ICD-9-CM   1. Hypotension, unspecified hypotension type  I95.9 458.9   2. Fall, initial encounter  W19.XXXA E888.9   3. Alcohol abuse  F10.10 305.00   4. Chronic liver disease  K76.9 571.9   5. Elevated bilirubin  R17 277.4   6. Traumatic rhabdomyolysis, initial encounter  T79.6XXA 958.6   7. Dehydration  E86.0 276.51   8. Impaired mobility [Z74.09]  Z74.09 799.89                OT Rehab Goals       Row Name 24 1500             Transfer Goal 1 (OT)    Activity/Assistive Device (Transfer Goal 1, OT) toilet;bed-to-chair/chair-to-bed;tub  -      [place] Level/Cues Needed (Transfer Goal 1, OT) supervision required  -      Time Frame (Transfer Goal 1, OT) long term goal (LTG);by discharge  -      Progress/Outcome (Transfer Goal 1, OT) goal not met;discharged from facility  -         Dressing Goal 1 (OT)    Activity/Device (Dressing Goal 1, OT) dressing skills, all  -      [place]/Cues Needed (Dressing Goal 1, OT) modified independence  -      Time Frame (Dressing Goal 1, OT) long term goal (LTG);by discharge  -      Progress/Outcome (Dressing Goal 1, OT) goal not met;discharged from facility  -         Toileting Goal 1 (OT)    Activity/Device (Toileting Goal 1, OT) toileting skills, all  -      [place] Level/Cues Needed (Toileting Goal 1, OT) modified independence  -      Time Frame (Toileting Goal 1, OT) long term goal (LTG);by discharge  -      Progress/Outcome (Toileting Goal 1, OT) goal not met;discharged from facility  -                User Key  (r) = Recorded By, (t) = Taken By, (c) = Cosigned By      Initials Name Provider Type Discipline    Bri Reyes, OTR/L, CSRS Occupational Therapist OT                      Outcome Measures       Row Name 11/21/24 0839 11/20/24 1200          How much help from another person do you currently need...    Turning from your back to your side while in flat bed without using bedrails? 4  -SB (r) LW (t) SB (c) 4  -SB (r) LW (t) SB (c)     Moving from lying on back to sitting on the side of a flat bed without bedrails? 4  -SB (r) LW (t) SB (c) 4  -SB (r) LW (t) SB (c)     Moving to and from a bed to a chair (including a wheelchair)? 3  -SB (r) LW (t) SB (c) 3  -SB (r) LW (t) SB (c)     Standing up from a chair using your arms (e.g., wheelchair, bedside chair)? 4  -SB (r) LW (t) SB (c) 3  -SB (r) LW (t) SB (c)     Climbing 3-5 steps with a railing? 3  -SB (r) LW (t) SB (c) 3  -SB (r) LW (t) SB (c)     To walk in hospital room? 3  -SB (r) LW (t) SB (c) 3  -SB (r) LW (t) SB (c)     AM-PAC 6 Clicks Score (PT) 21  -SB (r) LW (t) 20  -SB (r) LW (t)        Functional Assessment    Outcome Measure Options AM-PAC 6 Clicks Basic Mobility (PT)  -SB (r) LW (t) SB (c) AM-PAC 6 Clicks Basic Mobility (PT)  -SB (r) LW (t) SB (c)               User Key  (r) = Recorded By, (t) = Taken By, (c) = Cosigned By      Initials Name Provider Type    Kristen Jones, PT DPT Physical Therapist    Talia Castro, PTA Student PTA Student                    Timed Therapy Charges  Total Units: 2      Suggested Charges  Total Units: 2      Procedure Name Documented Minutes Units Code    HC OT THERAPEUTIC ACT EA 15 MIN 24 2   98052 (CPT®)                 Documented Minutes  Total Minutes: 24      Therapy Provided Minutes    11789 - OT Therapeutic Activity Minutes 24                        OT Discharge Summary  Anticipated Discharge Disposition (OT): sub acute care setting  Reason for Discharge: Discharge from facility  Outcomes Achieved: Refer to plan of care for updates on goals achieved  Discharge Destination: Home with assist, Home with home health      Bri Jiménez, LEELAR/L, CSRS  11/21/2024

## 2024-11-21 NOTE — DISCHARGE SUMMARY
"    [facility] Medicine Services  DISCHARGE SUMMARY       Date of Admission: 11/16/2024  Date of Discharge:  11/21/2024  Primary Care Physician: Herberth Nguyen Jr., MD    Discharge Diagnoses:  Active Hospital Problems    Diagnosis     **Multiple falls     Opiate abuse, episodic     Pancytopenia     Transaminitis     Thrombocytopenia     Non-traumatic rhabdomyolysis     Alcohol withdrawal     Paroxysmal atrial fibrillation     Acute kidney injury     Alcohol abuse     Alcoholic hepatitis     HOCM (hypertrophic obstructive cardiomyopathy)          Presenting Problem/History of Present Illness:  Multiple falls [R29.6]     Chief Complaint on Day of Discharge:   No complaint    History of Present Illness on Day of Discharge:   The patient is doing well today and is ambulating with the assistance of a walker.  He wishes to return home and is appropriate for discharge home today.  There is no further evidence of alcohol withdrawal.  Diarrhea has significantly decreased.    Hospital Course  Mr. Lubin is a 69-year-old male that presented to [facility] for recurrent falls. He has a past medical history significant for hypertrophic obstructive cardiomyopathy, paroxysmal atrial fibrillation chronically anticoagulated on Eliquis, coronary artery disease, hypertension, hyperlipidemia and prostate cancer.  He does admit to drinking approximately 6-8 shots a day but his brother Van at bedside approximates \"much more than that.\"  He does have issues going without alcohol.  His last drink was a shot this morning around 930.  Patient indicates that over the last several days he has had approximately 4 falls.  He explains a time in which last night he fell and thinks that he may have been on the ground for approximately 3 hours.  He has some bruising noted on his abdomen.  He complains of generalized weakness.  He wears depends and tells me that he wore his depends for \"a day and a " "half \" as he was \"too weak to change it.\"  No shortness of breath, chest pain or pressure.  No fever or chills.  No nausea, vomiting, abdominal pain. In the ED, CK 1260.  He was found to have acute kidney injury with creatinine 1.81.  Creatinine on 10/18 was 0.81.  AST elevated at 400, , total bilirubin 2.8.  Lactate 4.0 with repeat 1.9.  WBC normal.  Platelet count 96520 (previous platelet count was 152,000).  He was given 1 L fluid bolus and IV Zosyn.  His blood pressure systolic 92 during the time of my examination -will give additional 500 cc fluid bolus.  He is alert and oriented with his brother at bedside.  He has visible tremors to bilateral upper extremities noted at rest.  He is asking for medication to help with \"the shakes.\"  He will be admitted to the hospitalist service for further evaluation and management.   Treatment Plan  The patient will be admitted to Dr. Griffiths's service at [facility].      Rhabdomyolysis with CK 1260.  Initiate IV fluid.  Check urine myoglobin.  CK in AM.     Acute kidney injury with creatinine 1.81.  He was given 1 L fluid bolus.  Given additional 500 cc fluid bolus then initiate continuous fluid.     He has some soft blood pressures documented with systolic high 80s/90s.  He received 1 L fluid bolus, give 500 cc fluid bolus now.  Thereafter initiate continuous IV fluid.  Hold antihypertensives.     Alcohol abuse with concern for alcohol withdrawal.  Start CIWA protocol with scheduled p.o. Ativan with as needed Ativan.  Prophylactic vitamin replacement.     He has a history of hypertrophic obstructive cardiomyopathy, paroxysmal atrial fibrillation chronically anticoagulant Eliquis.  Hold Eliquis at this time.  Losartan, Toprol-XL on hold.  Continue amiodarone.  Results for orders placed during the hospital encounter of 07/29/24     Adult Transthoracic Echo Complete W/ Cont if Necessary Per Protocol     Interpretation Summary    Left ventricular systolic " function is low normal. Left ventricular ejection fraction appears to be 51 - 55%.    Left ventricular wall thickness is consistent with moderate to severe concentric hypertrophy.    Mild to moderate mitral valve regurgitation is present.    The aortic valve is thickened and calcified and there is some degree of aortic valve stenosis, likely moderate, most of the flow acceleration appears to be in the left ventricular outflow tract as a result of hypertrophic obstructive cardiomyopathy.     Wound care consult.     Will need PT/OT consult.  SCDs for DVT prophylaxis.  Labs in AM.    The patient had loose stool for several days likely related to alcohol withdrawal.  Initially, the fecal management system was initiated however the patient requested that it be discontinued.  He was placed on antidiarrheals and eventually transition to Questran 4 g p.o. twice daily.  He will be given Questran to be used on a as needed basis after discharge.  The patient was noted to be pancytopenic at the time of admission which was likely related to hypersplenism secondary to alcoholism and alcoholic liver disease.  Transaminases declined throughout his hospital stay.  CIWA protocol was initiated at admission and withdrawal gradually abated.  Both PT and OT evaluated the patient and initially felt that SNF placement would be beneficial however the patient improved relatively quickly and is appropriate to return home with a walker and with home health PT.  Cute kidney injury resolved nicely with IV fluids.  There is no evidence of discomfort from rhabdomyolysis.  Patient is stable for discharge home today.      Consults:   Wound care:    DIAGNOSIS:   Incontinence associated dermatitis  Pressure injury of left buttock, stage 3  Bowel and bladder incontinence     HOSPITAL PROBLEM LIST:    Multiple falls    HOCM (hypertrophic obstructive cardiomyopathy)    Acute kidney injury    Alcohol abuse    Alcoholic hepatitis    Paroxysmal atrial  fibrillation    Transaminitis    Thrombocytopenia    Non-traumatic rhabdomyolysis    Alcohol withdrawal    Pancytopenia           PLAN:   Orders placed for wound care and pressure/moisture management as listed below.   Due to improvement with use of zinc paste with menthol, will continue this as scheduled below.            Start     Ordered     11/18/24 2000   Wound Care  Every 12 Hours        Question:  Wound Care Instructions  Answer:  Apply Moisture Barrier After Any Incontinence and PRN. Zinc paste with Menthol    11/18/24 1028     11/18/24 1028   Turn Patient  Now Then Every 2 Hours         11/18/24 1028     11/18/24 1028   Elevate Heels Off of Bed  Until Discontinued         11/18/24 1028     11/18/24 1028   Use Seat Cushion When Up In Chair  Continuous         11/18/24 1028     11/18/24 1028   Silicone Border Dressing to Bony Prominences  Per Order Details        Comments: Apply silicone border foam dressing per protocol to bilateral heels for protection.  Nursing to change dressing every 3 days and PRN if soiled. Nursing is to peel back dressing with every assessment to assess skin underneath dressing. No barrier cream under dressing.    11/18/24 1028     11/18/24 1028   Use Repositioning Wedge to Position Patient  Continuous        Comments: Use Comfort Glide repositioning sheet and wedges to position patient.    11/18/24 1028            Discussed findings and treatment plan including risks, benefits, and treatment options with the patient in detail. Patient agreed with treatment plan.        This document has been electronically signed by BRI Jauregui      Result Review    Result Review:  I have personally reviewed the results from the time of this admission to 11/21/2024 13:42 CST and agree with these findings:  []  Laboratory  []  Microbiology  []  Radiology  []  EKG/Telemetry   []  Cardiology/Vascular   []  Pathology  []  Old records  []  Other:    Condition on Discharge:    Stable and at  "baseline    Physical Exam on Discharge:  /83 (BP Location: Left arm, Patient Position: Lying)   Pulse 77   Temp 98.1 °F (36.7 °C) (Oral)   Resp 18   Ht 180.3 cm (70.98\")   Wt 84.2 kg (185 lb 9.6 oz)   SpO2 98%   BMI 25.90 kg/m²   Physical Exam       Constitutional:       General: He is not in acute distress.     Appearance: He is obese. He is chronically ill-appearing.   HENT:      Head: Normocephalic.      Mouth: Mucous membranes are moist.   Eyes:      Pupils: Pupils are equal, round, and reactive to light.   Cardiovascular:      Rate and Rhythm: Normal rate.      Heart sounds: Systolic murmur heard.   Pulmonary:      Effort: Pulmonary effort is normal. No respiratory distress.      Breath sounds: No wheezing or rales.   Abdominal:      Palpations: Abdomen is soft.   Skin:     Findings: Rash present.   Neurological:      Mental Status: He is alert.      Motor: Weakness present.   Psychiatric:      Calm and cooperative.    Discharge Disposition:  Home or Self Care    Discharge Medications:     Discharge Medications        New Medications        Instructions Start Date   cholestyramine 4 g packet  Commonly known as: QUESTRAN   1 packet, Oral, Every 12 Hours PRN      folic acid 1 MG tablet  Commonly known as: FOLVITE   1 mg, Oral, Daily   Start Date: November 22, 2024     thiamine 100 MG tablet  Commonly known as: VITAMIN B1   100 mg, Oral, Daily   Start Date: November 22, 2024            Continue These Medications        Instructions Start Date   amiodarone 200 MG tablet  Commonly known as: PACERONE   200 mg, Oral, Daily      apixaban 5 MG tablet tablet  Commonly known as: ELIQUIS   5 mg, Oral, Every 12 Hours Scheduled      aspirin 81 MG EC tablet   81 mg, Oral, Daily      atorvastatin 20 MG tablet  Commonly known as: LIPITOR   20 mg, Daily      azelastine 0.1 % nasal spray  Commonly known as: ASTELIN   2 sprays, Nasal, 2 Times Daily, Use in each nostril as directed      fluticasone 50 MCG/ACT nasal " spray  Commonly known as: FLONASE   2 sprays, Nasal, Daily      losartan 25 MG tablet  Commonly known as: Cozaar   25 mg, Oral, Daily      metoprolol succinate XL 25 MG 24 hr tablet  Commonly known as: TOPROL-XL   25 mg, Daily      pantoprazole 40 MG EC tablet  Commonly known as: PROTONIX   1 tablet, Daily             Stop These Medications      multivitamin with minerals tablet tablet     temazepam 30 MG capsule  Commonly known as: RESTORIL              Discharge Diet:   Diet Instructions       Diet: Cardiac Diets; No Salt Packet; Thin (IDDSI 0)      Discharge Diet: Cardiac Diets    Cardiac Diet: No Salt Packet    Fluid Consistency: Thin (IDDSI 0)            Discharge Care Plan / Instructions:   Discharge home with home health    Activity at Discharge:   Activity Instructions       Activity as Tolerated              Follow-up Appointments:  Follow-up with PCP next week    Electronically signed by Holger Karimi DO, 11/21/24, 13:42 CST.    Time: Discharge over 30 min    Part of this note may be an electronic transcription/translation of spoken language to printed text using the Dragon Dictation system.

## 2024-11-21 NOTE — DISCHARGE PLACEMENT REQUEST
"To: [facility]    From: Martha DUMONTEva [phone]        Dayron Carr (69 y.o. Male)       Date of Birth   1955    Social Security Number       Address   [address] [address]    Home Phone   [phone]    MRN   [id number]       Zoroastrianism   [facility]    Marital Status   Single                            Admission Date   11/16/24    Admission Type   Emergency    Admitting Provider   Holger Karimi DO    Attending Provider   Holger Karimi DO    Department, Room/Bed   [facility] 4B, 440/1       Discharge Date       Discharge Disposition   Home or Self Care    Discharge Destination                                 Attending Provider: Holger Karimi DO    Allergies: No Known Allergies    Isolation: None   Infection: None   Code Status: CPR    Ht: 180.3 cm (70.98\")   Wt: 84.2 kg (185 lb 9.6 oz)    Admission Cmt: None   Principal Problem: Multiple falls [R29.6]                   Active Insurance as of 11/16/2024       Primary Coverage       Payor Plan Insurance Group Employer/Plan Group    HUMANA MEDICARE REPLACEMENT HUMANA MED [id number] PPO [id number]       Payor Plan Address Payor Plan Phone Number Payor Plan Fax Number Effective Dates    [address] [address] [phone]  6/1/2024 - None Entered    [facility] [id number]         Subscriber Name Subscriber Birth Date Member ID       DAYRON CARR 1955 [id number]                     Emergency Contacts        (Rel.) Home Phone Work Phone Mobile Phone    Yuliya Stone (Sister) [phone] -- --    JAK CARR (Brother) -- -- [phone]                 History & Physical        Stephanie Johnson APRN at 11/16/24 1530       Attestation signed by New Griffiths MD at 11/16/24 1928 (Updated)    I have reviewed this documentation and agree.    I performed a substantive part of the MDM during the patient’s E/M visit. I personally evaluated and examined the patient. I personally made or approved the " "documented management plan and acknowledge its risk of complications.     (Independent Interpretation) My (EKG/X-Ray/US/CT) interpretation of multiple CT studies performed in the emergency department reveals no acute injuries; patient has history of HOCM and lung fields were clear on imaging of the chest.    (Discussion) Management/test interpretation discussed with patient this afternoon while in room #5 of the emergency department.    Patient relayed to me that he retired a couple of years ago and started drinking because he had \"too much time on my hands.\"  He drinks heavily nearly on a daily basis, typically 8 shots of Vodka daily.  He stated that his last shot of Vodka was taken prior to coming to the hospital today.  He did appears to somewhat tremulous particularly with movement of his extremities during my exam.  CIWA protocol will be initiated, however complicating matters is his soft blood pressures.  Will cautiously provide additional intravenous fluids especially in the setting of acute rhabdomyolysis (urine myoglobin has returned positive) and acute kidney injury.    Hold outpatient anti-HTN medications.  I hope to restart B-blocker as soon as possible given his history of HOCM.  He will need an adequate volume status given preload dependency.    Last Echo results as follows:  Results for orders placed during the hospital encounter of 07/29/24    Adult Transthoracic Echo Complete W/ Cont if Necessary Per Protocol    Interpretation Summary    Left ventricular systolic function is low normal. Left ventricular ejection fraction appears to be 51 - 55%.    Left ventricular wall thickness is consistent with moderate to severe concentric hypertrophy.    Mild to moderate mitral valve regurgitation is present.    The aortic valve is thickened and calcified and there is some degree of aortic valve stenosis, likely moderate, most of the flow acceleration appears to be in the left ventricular outflow tract as a " "result of hypertrophic obstructive cardiomyopathy.    Patient is followed by EP Cardiology and reported plans for ablation and potentially AICD placement in the future \"in the new year.\"    Continuous telemetry monitoring.    Hold anticoagulation with Eliquis for now.    PT and OT will be needed.    Barrier ointment containing nystatin to skin folds.    Workup ongoing.  Very guarded condition at this time.  Aggressive workup ongoing.        Electronically signed by New Griffiths MD, 11/16/2024, 19:18 CST.                       [facility] Medicine Services  HISTORY AND PHYSICAL    Date of Admission: 11/16/2024  Primary Care Physician: Herberth Nguyen Jr., MD    Subjective   Primary Historian: patient    Chief Complaint: fall    History of Present Illness  Mr. Lubin is a 69-year-old male that presented to [facility] for recurrent falls. He has a past medical history significant for hypertrophic obstructive cardiomyopathy, paroxysmal atrial fibrillation chronically anticoagulated on Eliquis, coronary artery disease, hypertension, hyperlipidemia and prostate cancer.  He does admit to drinking approximately 6-8 shots a day but his brother Van at bedside approximates \"much more than that.\"  He does have issues going without alcohol.  His last drink was a shot this morning around 930.  Patient indicates that over the last several days he has had approximately 4 falls.  He explains a time in which last night he fell and thinks that he may have been on the ground for approximately 3 hours.  He has some bruising noted on his abdomen.  He complains of generalized weakness.  He wears depends and tells me that he wore his depends for \"a day and a half \" as he was \"too weak to change it.\"  No shortness of breath, chest pain or pressure.  No fever or chills.  No nausea, vomiting, abdominal pain. In the ED, CK 1260.  He was found to have acute kidney injury with creatinine " "1.81.  Creatinine on 10/18 was 0.81.  AST elevated at 400, , total bilirubin 2.8.  Lactate 4.0 with repeat 1.9.  WBC normal.  Platelet count 58914 (previous platelet count was 152,000).  He was given 1 L fluid bolus and IV Zosyn.  His blood pressure systolic 92 during the time of my examination -will give additional 500 cc fluid bolus.  He is alert and oriented with his brother at bedside.  He has visible tremors to bilateral upper extremities noted at rest.  He is asking for medication to help with \"the shakes.\"  He will be admitted to the hospitalist service for further evaluation and management.    Review of Systems   Otherwise complete ROS reviewed and negative except as mentioned in the HPI.    Past Medical History:   Past Medical History:   Diagnosis Date    A-fib     Alcoholic hepatitis 07/13/2023    CAD (coronary artery disease)     History of external beam radiation therapy 05/12/2016    6840 cGy to prostate bed    Hyperlipidemia     Hypertension     Insomnia     Prostate cancer      Past Surgical History:  Past Surgical History:   Procedure Laterality Date    CARDIAC CATHETERIZATION      CORONARY ANGIOPLASTY WITH STENT PLACEMENT      FRACTURE SURGERY      PROSTATE SURGERY      TONSILLECTOMY       Social History:  reports that he has never smoked. He has never used smokeless tobacco. He reports that he does not currently use alcohol after a past usage of about 50.0 standard drinks of alcohol per week. He reports that he does not currently use drugs after having used the following drugs: Marijuana.    Family History: family history includes Coronary artery disease in his mother; Heart disease in his mother; Stroke in his mother.       Allergies:  No Known Allergies    Medications:  Prior to Admission medications    Medication Sig Start Date End Date Taking? Authorizing Provider   amiodarone (PACERONE) 200 MG tablet Take 1 tablet by mouth Daily. 9/10/24   Italia Vasquez PA   apixaban (ELIQUIS) 5 MG " "tablet tablet Take 1 tablet by mouth Every 12 (Twelve) Hours. Indications: Atrial Fibrillation 7/30/24   Holger Karimi DO   aspirin (aspirin) 81 MG EC tablet Take 1 tablet by mouth Daily. 12/23/20   Edda Li APRN   atorvastatin (LIPITOR) 20 MG tablet Take 1 tablet by mouth Daily.    ProviderSteve MD   azelastine (ASTELIN) 0.1 % nasal spray 2 sprays into the nostril(s) as directed by provider 2 (Two) Times a Day. Use in each nostril as directed 9/5/24   Gonzalo Martinez APRN   fluticasone (FLONASE) 50 MCG/ACT nasal spray 2 sprays into the nostril(s) as directed by provider Daily. 9/5/24   Gonzalo Martinez APRN   losartan (Cozaar) 25 MG tablet Take 1 tablet by mouth Daily. 10/21/24   Italia Vasquez PA   metoprolol succinate XL (TOPROL-XL) 25 MG 24 hr tablet Take 1 tablet by mouth Daily.    ProviderSteve MD   multivitamin with minerals tablet tablet Take 1 tablet by mouth Daily. 8/16/23   Holger Karimi DO   pantoprazole (PROTONIX) 40 MG EC tablet Take 1 tablet by mouth Daily.    Steve Devries MD   temazepam (RESTORIL) 30 MG capsule Take 1 capsule by mouth At Night As Needed for Sleep. 9/23/16   Steve Devries MD     I have utilized all available immediate resources to obtain, update, or review the patient's current medications (including all prescriptions, over-the-counter products, herbals, cannabis/cannabidiol products, and vitamin/mineral/dietary (nutritional) supplements).    Objective     Vital Signs: BP 98/57   Pulse 85   Temp 98.1 °F (36.7 °C) (Oral)   Resp 18   Ht 180.3 cm (71\")   Wt 88.9 kg (196 lb)   SpO2 96%   BMI 27.34 kg/m²   Physical Exam  Vitals reviewed.   Constitutional:       General: He is not in acute distress.     Appearance: He is not toxic-appearing.   HENT:      Head: Normocephalic and atraumatic.      Mouth/Throat:      Mouth: Mucous membranes are moist.      Pharynx: Oropharynx is clear.   Eyes:      Extraocular Movements: " Extraocular movements intact.      Conjunctiva/sclera: Conjunctivae normal.      Pupils: Pupils are equal, round, and reactive to light.   Cardiovascular:      Rate and Rhythm: Normal rate and regular rhythm.      Pulses: Normal pulses.   Pulmonary:      Effort: Pulmonary effort is normal. No respiratory distress.      Breath sounds: Normal breath sounds.   Abdominal:      General: Bowel sounds are normal. There is no distension.      Palpations: Abdomen is soft.      Tenderness: There is no abdominal tenderness.   Musculoskeletal:         General: No swelling or tenderness. Normal range of motion.      Cervical back: Normal range of motion and neck supple. No muscular tenderness.   Skin:     General: Skin is warm and dry.      Findings: Bruising present. No erythema or rash.   Neurological:      General: No focal deficit present.      Mental Status: He is alert and oriented to person, place, and time.      Cranial Nerves: No cranial nerve deficit.      Motor: No weakness.      Comments: Visible tremors of upper extremities at rest   Psychiatric:         Mood and Affect: Mood normal.         Behavior: Behavior normal.                  Results Reviewed:  Lab Results (last 24 hours)       Procedure Component Value Units Date/Time    Blood Culture - Blood, Arm, Left [[id number]] Collected: 11/16/24 1507    Specimen: Blood from Arm, Left Updated: 11/16/24 1531    Blood Culture - Blood, Wrist, Left [[id number]] Collected: 11/16/24 1507    Specimen: Blood from Wrist, Left Updated: 11/16/24 1530    STAT Lactic Acid, Reflex [[id number]]  (Normal) Collected: 11/16/24 1507    Specimen: Blood Updated: 11/16/24 1530     Lactate 1.9 mmol/L     Lactic Acid, Plasma [[id number]]  (Abnormal) Collected: 11/16/24 1115    Specimen: Blood Updated: 11/16/24 1157     Lactate 4.0 mmol/L     Magnesium [[id number]]  (Normal) Collected: 11/16/24 1115    Specimen: Blood Updated: 11/16/24 1154     Magnesium 1.9 mg/dL     Comprehensive Metabolic  Panel [[id number]]  (Abnormal) Collected: 11/16/24 1115    Specimen: Blood Updated: 11/16/24 1154     Glucose 109 mg/dL      BUN 21 mg/dL      Creatinine 1.81 mg/dL      Sodium 131 mmol/L      Potassium 4.0 mmol/L      Chloride 88 mmol/L      CO2 22.0 mmol/L      Calcium 8.3 mg/dL      Total Protein 7.1 g/dL      Albumin 3.6 g/dL      ALT (SGPT) 156 U/L      AST (SGOT) 404 U/L      Alkaline Phosphatase 93 U/L      Total Bilirubin 2.8 mg/dL      Globulin 3.5 gm/dL      A/G Ratio 1.0 g/dL      BUN/Creatinine Ratio 11.6     Anion Gap 21.0 mmol/L      eGFR 39.7 mL/min/1.73     Narrative:      GFR Normal >60  Chronic Kidney Disease <60  Kidney Failure <15      CK [[id number]]  (Abnormal) Collected: 11/16/24 1115    Specimen: Blood Updated: 11/16/24 1153     Creatine Kinase 1,260 U/L     BNP [[id number]]  (Abnormal) Collected: 11/16/24 1115    Specimen: Blood Updated: 11/16/24 1152     proBNP 6,958.0 pg/mL     Narrative:      This assay is used as an aid in the diagnosis of individuals suspected of having heart failure. It can be used as an aid in the diagnosis of acute decompensated heart failure (ADHF) in patients presenting with signs and symptoms of ADHF to the emergency department (ED). In addition, NT-proBNP of <300 pg/mL indicates ADHF is not likely.    Age Range Result Interpretation  NT-proBNP Concentration (pg/mL:      <50             Positive            >450                   Gray                 300-450                    Negative             <300    50-75           Positive            >900                  Gray                300-900                  Negative            <300      >75             Positive            >1800                  Gray                300-1800                  Negative            <300    Lipase [[id number]]  (Abnormal) Collected: 11/16/24 1115    Specimen: Blood Updated: 11/16/24 1149     Lipase 113 U/L     Ethanol [[id number]] Collected: 11/16/24 1115    Specimen: Blood Updated: 11/16/24  1149     Ethanol % <0.010 %     Narrative:      Not for legal purposes. Chain of Custody not followed.     Blood Gas, Arterial - [[id number]]  (Abnormal) Collected: 11/16/24 1140    Specimen: Arterial Blood Updated: 11/16/24 1140     Site Right Brachial     Garett's Test N/A     pH, Arterial 7.559 pH units      Comment: 83 Value above reference range        pCO2, Arterial 28.6 mm Hg      Comment: 84 Value below reference range        pO2, Arterial 86.0 mm Hg      HCO3, Arterial 25.5 mmol/L      Base Excess, Arterial 3.8 mmol/L      Comment: 83 Value above reference range        O2 Saturation, Arterial 98.2 %      Temperature 37.0     Barometric Pressure for Blood Gas 755 mmHg      Modality Room Air     Ventilator Mode NA     Collected by [id number]     Comment: Meter: [id number]     :  Teri Bruner, RRT        pCO2, Temperature Corrected 28.6 mm Hg      pH, Temp Corrected 7.559 pH Units      pO2, Temperature Corrected 86.0 mm Hg     Protime-INR [[id number]]  (Abnormal) Collected: 11/16/24 1115    Specimen: Blood Updated: 11/16/24 1137     Protime 18.0 Seconds      INR 1.43    CBC & Differential [[id number]]  (Abnormal) Collected: 11/16/24 1115    Specimen: Blood Updated: 11/16/24 1133    Narrative:      The following orders were created for panel order CBC & Differential.  Procedure                               Abnormality         Status                     ---------                               -----------         ------                     CBC Auto Differential[[id number]]        Abnormal            Final result                 Please view results for these tests on the individual orders.    CBC Auto Differential [[id number]]  (Abnormal) Collected: 11/16/24 1115    Specimen: Blood Updated: 11/16/24 1133     WBC 6.52 10*3/mm3      RBC 3.85 10*6/mm3      Hemoglobin 12.2 g/dL      Hematocrit 35.1 %      MCV 91.2 fL      MCH 31.7 pg      MCHC 34.8 g/dL      RDW 13.4 %      RDW-SD 44.8 fl      MPV 11.3 fL       Platelets 72 10*3/mm3      Neutrophil % 85.4 %      Lymphocyte % 6.3 %      Monocyte % 7.4 %      Eosinophil % 0.0 %      Basophil % 0.0 %      Immature Grans % 0.9 %      Neutrophils, Absolute 5.57 10*3/mm3      Lymphocytes, Absolute 0.41 10*3/mm3      Monocytes, Absolute 0.48 10*3/mm3      Eosinophils, Absolute 0.00 10*3/mm3      Basophils, Absolute 0.00 10*3/mm3      Immature Grans, Absolute 0.06 10*3/mm3           Imaging Results (Last 24 Hours)       Procedure Component Value Units Date/Time    CT Abdomen Pelvis With Contrast [[id number]] Collected: 11/16/24 1314     Updated: 11/16/24 1320    Narrative:      EXAM/TECHNIQUE: CT abdomen and pelvis with IV contrast     INDICATION: Fall with right CVA tenderness and large bruise.  Possible  RPH patient is hypotensive; I95.9-Hypotension, unspecified;  W19.XXXA-Unspecified fall, initial encounter; F10.10-Alcohol abuse,  uncomplicated     COMPARISON: 7/13/2023     DLP: 2862.05 mGy.cm. Automated exposure control was also utilized to  decrease patient radiation dose.     FINDINGS:     Findings in the included lower chest are described in a separate same  day report.     Severe hepatic steatosis. No morphologic changes of cirrhosis. No  suspicious focal liver lesion. No gallstone or gallbladder wall  thickening. No biliary ductal dilatation.     Pancreas, spleen, and adrenal glands are unremarkable.     No solid renal mass. No urolithiasis or hydronephrosis. Urinary bladder  is decompressed.      Mild colonic diverticulosis without diverticulitis. No colonic wall  thickening or pericolonic fat stranding. Normal appendix. No small bowel  distention or evidence of active small bowel inflammation.     No ascites or free pelvic fluid. No pelvic mass or collection. Prior  prostatectomy.     Nonaneurysmal atherosclerotic abdominal aorta. No enlarged abdominal or  pelvic lymph nodes. Calcified lymph nodes in the periportal region like  related to an old granulomatous process      No large soft tissue hematoma. 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 No acute osseous finding.       Impression:         1.  No acute traumatic finding in the abdomen or pelvis.     2.  Severe hepatic steatosis.     3.  Mild colonic diverticulosis without evidence of diverticulitis.     This report was signed and finalized on 11/16/2024 1:17 PM by Dr. Tyrese Young MD.       CT Chest With Contrast Diagnostic [[id number]] Collected: 11/16/24 1309     Updated: 11/16/24 1317    Narrative:      EXAM/TECHNIQUE: CT chest with IV contrast     INDICATION: Chest trauma fall short of breath right-sided chest pain;  I95.9-Hypotension, unspecified; W19.XXXA-Unspecified fall, initial  encounter; F10.10-Alcohol abuse, uncomplicated     COMPARISON: 8/7/2023     DLP: 2862.05 mGy.cm. Automated exposure control was also utilized to  decrease patient radiation dose.     FINDINGS:     The central airways are clear. No consolidation, pleural effusion,  pneumothorax, hemothorax, or pulmonary contusion. No advanced  emphysematous or fibrotic change. No suspicious pulmonary nodule.     No enlarged thoracic lymph nodes. No central pulmonary artery filling  defect. Thoracic aorta is nonaneurysmal. No evidence of acute aortic  injury. Aortic arch branch origins are widely patent. Heavy aortic valve  calcification. Heavy coronary artery calcification. No pericardial  effusion. Left ventricular apical aneurysm.     No large thyroid nodule. No acute chest soft tissue abnormality. No  large soft tissue hematoma. Findings in the included portion of the  upper abdomen are described in a separate same day report. No acute rib  fracture. No thoracic spine compression fracture or subluxation.       Impression:         1.  No acute traumatic finding in the chest.     2.  Heavy aortic valve calcification. Heavy coronary artery  atherosclerotic calcification. Chronic left ventricular apical  myocardial thinning/aneurysm.         This report was signed and finalized on 11/16/2024 1:14 PM by Dr. Tyrese Young MD.       CT Cervical Spine Without Contrast [[id number]] Collected: 11/16/24 1303     Updated: 11/16/24 1312    Narrative:      EXAM/TECHNIQUE: CT cervical spine without contrast     INDICATION: Fall; I95.9-Hypotension, unspecified; W19.XXXA-Unspecified  fall, initial encounter; F10.10-Alcohol abuse, uncomplicated     COMPARISON: None available.     DLP: 2862.05 mGy.cm. Automated exposure control was also utilized to  decrease patient radiation dose.     FINDINGS:     Craniocervical relationships are maintained. The odontoid process is  intact. Cervical spine alignment appears maintained. No subluxations.  Vertebral body heights are maintained. No acute or chronic fracture.  Mild to moderate multilevel cervical spine degenerative change with  multilevel neural foraminal narrowing. Paravertebral soft tissues are  unremarkable. Carotid artery atherosclerotic calcification.       Impression:      1. No acute fracture or subluxation.  2. Mild to moderate multilevel cervical spine degenerative change.        This report was signed and finalized on 11/16/2024 1:09 PM by Dr. Tyrese Young MD.       CT Head Without Contrast [[id number]] Collected: 11/16/24 1258     Updated: 11/16/24 1306    Narrative:      EXAM/TECHNIQUE: CT head without contrast     INDICATION: Fall; I95.9-Hypotension, unspecified; W19.XXXA-Unspecified  fall, initial encounter; F10.10-Alcohol abuse, uncomplicated     COMPARISON: None available.     DLP: 2862.05 mGy.cm. Automated exposure control was also utilized to  decrease patient radiation dose.     FINDINGS:     No evidence of intracranial hemorrhage. Gray-white differentiation is  maintained. No midline shift or mass effect. Lateral ventricles are  nondilated. Basilar cisterns are patent. No acute orbital finding.  Mastoid air cells are clear. Visualized portion of the paranasal sinuses  are clear. No acute  osseous finding.       Impression:         No acute intracranial findings.        This report was signed and finalized on 11/16/2024 1:03 PM by Dr. Tyrese Young MD.       XR Chest 1 View [[id number]] Collected: 11/16/24 1128     Updated: 11/16/24 1132    Narrative:      EXAM/TECHNIQUE: XR CHEST 1 VW-     INDICATION: Fall; I95.9-Hypotension, unspecified; W19.XXXA-Unspecified  fall, initial encounter; F10.10-Alcohol abuse, uncomplicated     COMPARISON: 10/18/2024     FINDINGS:     Cardiac silhouette is within normal limits.     No pleural effusion or pneumothorax. No focal consolidation.     No acute osseous finding.       Impression:         No acute findings.     This report was signed and finalized on 11/16/2024 11:29 AM by Dr. Tyrese Young MD.             I have personally reviewed and interpreted the radiology studies and ECG obtained at time of admission.     Assessment / Plan   Assessment:   Active Hospital Problems    Diagnosis     **Multiple falls     Transaminitis     Thrombocytopenia     Non-traumatic rhabdomyolysis     Alcohol withdrawal     Paroxysmal atrial fibrillation     Acute kidney injury     Alcohol abuse     Alcoholic hepatitis     HOCM (hypertrophic obstructive cardiomyopathy)      Treatment Plan  The patient will be admitted to Dr. Griffiths's service at [facility].     Rhabdomyolysis with CK 1260.  Initiate IV fluid.  Check urine myoglobin.  CK in AM.    Acute kidney injury with creatinine 1.81.  He was given 1 L fluid bolus.  Given additional 500 cc fluid bolus then initiate continuous fluid.    He has some soft blood pressures documented with systolic high 80s/90s.  He received 1 L fluid bolus, give 500 cc fluid bolus now.  Thereafter initiate continuous IV fluid.  Hold antihypertensives.    Alcohol abuse with concern for alcohol withdrawal.  Start CIWA protocol with scheduled p.o. Ativan with as needed Ativan.  Prophylactic vitamin replacement.    He has a history of  hypertrophic obstructive cardiomyopathy, paroxysmal atrial fibrillation chronically anticoagulant Eliquis.  Hold Eliquis at this time.  Losartan, Toprol-XL on hold.  Continue amiodarone.  Results for orders placed during the hospital encounter of 07/29/24    Adult Transthoracic Echo Complete W/ Cont if Necessary Per Protocol    Interpretation Summary    Left ventricular systolic function is low normal. Left ventricular ejection fraction appears to be 51 - 55%.    Left ventricular wall thickness is consistent with moderate to severe concentric hypertrophy.    Mild to moderate mitral valve regurgitation is present.    The aortic valve is thickened and calcified and there is some degree of aortic valve stenosis, likely moderate, most of the flow acceleration appears to be in the left ventricular outflow tract as a result of hypertrophic obstructive cardiomyopathy.    Wound care consult.    Will need PT/OT consult.  SCDs for DVT prophylaxis.  Labs in AM.    Medical Decision Making  Number and Complexity of problems: 5 acute problems  Differential Diagnosis: As above    Conditions and Status        Condition is worsening.     [facility] Data  External documents reviewed: Prior epic records  Cardiac tracing (EKG, telemetry) interpretation: Sinus rhythm  Radiology interpretation: Interpreted by radiology  Labs reviewed: As above  Any tests that were considered but not ordered: none considered at present     Decision rules/scores evaluated (example [id number], Wells, etc): none considered at present     Discussed with: Patient and Dr. Griffiths     Care Planning  Shared decision making: Patient is agreeable to ongoing workup and treatment  Code status and discussions: CPR with limited support to include no intubation    Disposition  Social Determinants of Health that impact treatment or disposition: ? SNF  Estimated length of stay is 2-4 days.     I confirmed that the patient's advanced care plan is present, code status is  documented, and a surrogate decision maker is listed in the patient's medical record.     The patient's surrogate decision maker is Sister Yuliya Stone and brother Van Lubin.     The patient was seen and examined by me on 11/16/2024 at 16:00.    Electronically signed by BRI Valentine, 11/16/24, 16:19 CST.                Electronically signed by New Griffiths MD at 11/16/24 1928       Discharge Summary    No notes of this type exist for this encounter.         Walker  Walker Folding with Wheels [] (Order [id number])  Order  Date: 11/21/2024 Department: [address] Ordering/Authorizing: Holger Karimi DO     Order History  Outpatient  Date/Time Action Taken User Additional Information   11/21/24 1015 Sign Holger Karimi DO      Order Details    Frequency Duration Priority Order Class   None None Routine External     Start Date/Time    Start Date   11/21/24     Order Information    Order Date Service Start Date Start Time   11/21/24 Medicine 11/21/24      Reference Links    Associated Reports   View Encounter     Order Questions    Question Answer   Mobility Limitation Prevents Accomplishing MRADLs    Heightened Risk of Morbidity / Mortality Secondary to Attempts to Perform MRADLs   Safety Able to Safely Use Equipment   Mobility Deficit Mobility Deficit Can Be Sufficiently Resolved By Use of Equipment   Equipment  Walker Folding with Wheels   Length of Need 99 Months = Lifetime            Source Order Set / Preference List    Order Set   [facility] GEN EXPRESS DISCHARGE [[id number]]           Clinical Indications     ICD-10-CM ICD-9-CM   Impaired mobility [Z74.09]    Z74.09 799.89                             Reprint Order Requisition    Walker  Walker Folding with Wheels (Order #[id number]) on 11/21/24         Encounter    View Encounter                Order Provider Info        Office phone Pager E-mail   Ordering User  Holger Karimi DO  [phone] --  --   Authorizing Provider  Holger Karimi DO  [phone] -- --   Attending Provider  Holger Karimi DO  [phone] -- --     Tracking Reports    Cosign Tracking Order Transmittal Tracking     Authorized by:  Holger Karimi DO  (NPI: [id number])                Lab Component SmartPhrase Guide    Walker  Walker Folding with Wheels (Order #[id number]) on 11/21/24      written material

## 2024-11-21 NOTE — DISCHARGE PLACEMENT REQUEST
"To: Sophie     From: Martha MORRIS [phone]        Dayron Carr (69 y.o. Male)       Date of Birth   1955    Social Security Number       Address   [address] [place] [address]    Home Phone   [phone]    MRN   [id number]       Judaism   [facility]    Marital Status   Single                            Admission Date   11/16/24    Admission Type   Emergency    Admitting Provider   Holger Karimi DO    Attending Provider   Holger Karimi DO    Department, Room/Bed   [address], 440/1       Discharge Date       Discharge Disposition   Home or Self Care    Discharge Destination                                 Attending Provider: Holger Karimi DO    Allergies: No Known Allergies    Isolation: None   Infection: None   Code Status: CPR    Ht: 180.3 cm (70.98\")   Wt: 84.2 kg (185 lb 9.6 oz)    Admission Cmt: None   Principal Problem: Multiple falls [R29.6]                   Active Insurance as of 11/16/2024       Primary Coverage       Payor Plan Insurance Group Employer/Plan Group    HUMANA MEDICARE REPLACEMENT HUMANA MED ADV PPO [id number]       Payor Plan Address Payor Plan Phone Number Payor Plan Fax Number Effective Dates    [address] [address] [phone]  6/1/2024 - None Entered    [facility] [address]         Subscriber Name Subscriber Birth Date Member ID       DAYRON CARR 1955 [id number]                     Emergency Contacts        (Rel.) Home Phone Work Phone Mobile Phone    Yuliya Stone (Sister) [phone] -- --    JAK CARR (Brother) -- -- [phone]              Discharge Summary    No notes of this type exist for this encounter.       Case Management  Consult [[id number]] (Order [id number])  Order  Date: 11/21/2024 Department: [address] Ordering/Authorizing: Holger Karimi DO     Standing Order Information    Remaining Occurrences Interval Last Released     0/1 Once 11/21/2024           "    Order History  Inpatient  Date/Time Action Taken User Additional Information   11/21/24 1142 Sign Venu Bear RN Ordering Mode: Telephone with readback   11/21/24 1142 Release Instance Venu Bear RN (auto-released) Released Order:[id number]     Order Details    Frequency Duration Priority Order Class   Once 1  occurrence Routine Hospital Performed       Comments    Okay for Home Health to start on Monday 11/25/24              Order Questions    Question Answer   Is discharge planning needed? If yes, who is requesting discharge planning? Provider   Reason for consult? Currently has Home Health              Source Order Set / Preference List    Preference List    PAD  FACILITY CONSULTS [[id number]]             Verbal Order Info    Action Created on Order Mode Entered by Responsible Provider Signed by Signed on   Ordering 11/21/24 1142 Telephone with readback Venu Bear RN Robinson, Maurice S, DO           Consult Order Info    ID Description Priority Start Date Start Time   [id number] Case Management  Consult Routine 11/21/2024 11:41 AM   Provider Specialty Referred to   ______________________________________ _____________________________________                                   Reprint Order Requisition    Case Management  Consult (Order #[id number]) on 11/21/24       Encounter    View Encounter                  Order Provider Info        Office phone Pager E-mail   Ordering User  Venu Bear RN  -- -- --   Authorizing Provider  Holger Karimi DO  [phone] -- --   Attending Provider  Holger Karimi DO  [phone] -- --   Entered By  Venu Bear RN  -- -- --   Ordering Provider  Holger Karimi DO  [phone] -- --     Tracking Reports    Cosign Tracking Order Transmittal Tracking

## 2024-11-21 NOTE — THERAPY DISCHARGE NOTE
Acute Care - Physical Therapy Discharge Summary  [facility]       Patient Name: Dayron Lubin  : 1955  MRN: [id number]    Today's Date: 2024                 Admit Date: 2024      PT Recommendation and Plan    Visit Dx:    ICD-10-CM ICD-9-CM   1. Hypotension, unspecified hypotension type  I95.9 458.9   2. Fall, initial encounter  W19.XXXA E888.9   3. Alcohol abuse  F10.10 305.00   4. Chronic liver disease  K76.9 571.9   5. Elevated bilirubin  R17 277.4   6. Traumatic rhabdomyolysis, initial encounter  T79.6XXA 958.6   7. Dehydration  E86.0 276.51   8. Impaired mobility [Z74.09]  Z74.09 799.89        Outcome Measures       Row Name 24 0839 24 1200          How much help from another person do you currently need...    Turning from your back to your side while in flat bed without using bedrails? 4  -SB (r) LW (t) SB (c) 4  -SB (r) LW (t) SB (c)     Moving from lying on back to sitting on the side of a flat bed without bedrails? 4  -SB (r) LW (t) SB (c) 4  -SB (r) LW (t) SB (c)     Moving to and from a bed to a chair (including a wheelchair)? 3  -SB (r) LW (t) SB (c) 3  -SB (r) LW (t) SB (c)     Standing up from a chair using your arms (e.g., wheelchair, bedside chair)? 4  -SB (r) LW (t) SB (c) 3  -SB (r) LW (t) SB (c)     Climbing 3-5 steps with a railing? 3  -SB (r) LW (t) SB (c) 3  -SB (r) LW (t) SB (c)     To walk in hospital room? 3  -SB (r) LW (t) SB (c) 3  -SB (r) LW (t) SB (c)     AM-PAC 6 Clicks Score (PT) 21  -SB (r) LW (t) 20  -SB (r) LW (t)        Functional Assessment    Outcome Measure Options AM-PAC 6 Clicks Basic Mobility (PT)  -SB (r) LW (t) SB (c) AM-PAC 6 Clicks Basic Mobility (PT)  -SB (r) LW (t) SB (c)               User Key  (r) = Recorded By, (t) = Taken By, (c) = Cosigned By      Initials Name Provider Type    Kristen Jones, PT DPT Physical Therapist    Talia Castro, PTA Student PTA Student                     PT Charges       Row Name 24 3206              Time Calculation    Start Time 0839  -KJ      Stop Time 0903  -KJ      Time Calculation (min) 24 min  -KJ      PT Received On 11/21/24  -KJ      PT Goal Re-Cert Due Date 11/28/24  -KJ         Time Calculation- PT    Total Timed Code Minutes- PT 24 minute(s)  -KJ                User Key  (r) = Recorded By, (t) = Taken By, (c) = Cosigned By      Initials Name Provider Type    Janel Liu, PTA Physical Therapist Assistant                     PT Rehab Goals       Row Name 11/21/24 1200             Bed Mobility Goal 1 (PT)    Activity/Assistive Device (Bed Mobility Goal 1, PT) sit to supine/supine to sit  -AB      [place] Level/Cues Needed (Bed Mobility Goal 1, PT) supervision required  -AB      Time Frame (Bed Mobility Goal 1, PT) long term goal (LTG);10 days  -AB      Progress/Outcomes (Bed Mobility Goal 1, PT) goal not met  -AB         Transfer Goal 1 (PT)    Activity/Assistive Device (Transfer Goal 1, PT) sit-to-stand/stand-to-sit;bed-to-chair/chair-to-bed  -AB      [place] Level/Cues Needed (Transfer Goal 1, PT) supervision required  -AB      Time Frame (Transfer Goal 1, PT) long term goal (LTG);10 days  -AB      Progress/Outcome (Transfer Goal 1, PT) goal not met  -AB         Gait Training Goal 1 (PT)    Activity/Assistive Device (Gait Training Goal 1, PT) gait (walking locomotion);assistive device use;decrease fall risk;improve balance and speed;increase endurance/gait distance  -AB      [place] Level (Gait Training Goal 1, PT) supervision required  -AB      Distance (Gait Training Goal 1, PT) 100 ft, no LOB  -AB      Time Frame (Gait Training Goal 1, PT) long term goal (LTG);10 days  -AB      Progress/Outcome (Gait Training Goal 1, PT) goal not met  -AB                User Key  (r) = Recorded By, (t) = Taken By, (c) = Cosigned By      Initials Name Provider Type Discipline    Edda Ambrose PTA Physical Therapist Assistant PT                        PT Discharge  Summary  Anticipated Discharge Disposition (PT): sub acute care setting  Reason for Discharge: Discharge from facility  Outcomes Achieved: Refer to plan of care for updates on goals achieved  Discharge Destination: Home with assist      Edda Moura, PTA   11/21/2024

## 2024-11-21 NOTE — PLAN OF CARE
Goal Outcome Evaluation:      No c/o pain on my shift. Pt has rested most of the day. Safety maintained, call light within reach.      Problem: Adult Inpatient Plan of Care  Goal: Plan of Care Review  Outcome: Progressing  Goal: Patient-Specific Goal (Individualized)  Outcome: Progressing  Goal: Absence of Hospital-Acquired Illness or Injury  Outcome: Progressing  Intervention: Identify and Manage Fall Risk  Recent Flowsheet Documentation  Taken 11/20/2024 1800 by Rosita Ellsworth RN  Safety Promotion/Fall Prevention: safety round/check completed  Taken 11/20/2024 1700 by Rosita Ellsworth RN  Safety Promotion/Fall Prevention: safety round/check completed  Taken 11/20/2024 1600 by Rosita Ellsworth RN  Safety Promotion/Fall Prevention: safety round/check completed  Taken 11/20/2024 1500 by Rosita Ellsworth RN  Safety Promotion/Fall Prevention: safety round/check completed  Taken 11/20/2024 1400 by Rosita Ellsworth RN  Safety Promotion/Fall Prevention: safety round/check completed  Taken 11/20/2024 1300 by Rosita Ellsworth RN  Safety Promotion/Fall Prevention: safety round/check completed  Taken 11/20/2024 1200 by Rosita Ellsworth RN  Safety Promotion/Fall Prevention: safety round/check completed  Taken 11/20/2024 1100 by Rosita Ellsworth RN  Safety Promotion/Fall Prevention: safety round/check completed  Taken 11/20/2024 1000 by Rosita Ellsworth RN  Safety Promotion/Fall Prevention: safety round/check completed  Taken 11/20/2024 0900 by Rosita Ellsworth RN  Safety Promotion/Fall Prevention: safety round/check completed  Taken 11/20/2024 0800 by Rosita Ellsworth RN  Safety Promotion/Fall Prevention: safety round/check completed  Taken 11/20/2024 0700 by Rosita Ellsworth RN  Safety Promotion/Fall Prevention: safety round/check completed  Intervention: Prevent Skin Injury  Recent Flowsheet Documentation  Taken 11/20/2024 0900 by Rosita Ellsworth RN  Body Position: position changed independently  Intervention: Prevent and Manage VTE (Venous Thromboembolism)  Risk  Recent Flowsheet Documentation  Taken 11/20/2024 0900 by Rosita Ellsworth RN  VTE Prevention/Management:   SCDs (sequential compression devices) off   patient refused intervention  Intervention: Prevent Infection  Recent Flowsheet Documentation  Taken 11/20/2024 0900 by Rosita Ellsworth RN  Infection Prevention:   rest/sleep promoted   single patient room provided  Goal: Optimal Comfort and Wellbeing  Outcome: Progressing  Intervention: Provide Person-Centered Care  Recent Flowsheet Documentation  Taken 11/20/2024 0900 by Rosita Ellsworth RN  Trust Relationship/Rapport: care explained  Goal: Readiness for Transition of Care  Outcome: Progressing     Problem: Violence Risk or Actual  Goal: Anger and Impulse Control  Outcome: Progressing     Problem: Fall Injury Risk  Goal: Absence of Fall and Fall-Related Injury  Outcome: Progressing  Intervention: Identify and Manage Contributors  Recent Flowsheet Documentation  Taken 11/20/2024 0900 by Rosita Ellsworth RN  Medication Review/Management: medications reviewed  Intervention: Promote Injury-Free Environment  Recent Flowsheet Documentation  Taken 11/20/2024 1800 by Rosita Ellsworth RN  Safety Promotion/Fall Prevention: safety round/check completed  Taken 11/20/2024 1700 by Rosita Ellsworth RN  Safety Promotion/Fall Prevention: safety round/check completed  Taken 11/20/2024 1600 by Rosita Ellsworth RN  Safety Promotion/Fall Prevention: safety round/check completed  Taken 11/20/2024 1500 by Rosita Ellsworth RN  Safety Promotion/Fall Prevention: safety round/check completed  Taken 11/20/2024 1400 by Rosita Ellsworth RN  Safety Promotion/Fall Prevention: safety round/check completed  Taken 11/20/2024 1300 by Rosita Ellsworth RN  Safety Promotion/Fall Prevention: safety round/check completed  Taken 11/20/2024 1200 by Rosita Ellsworth RN  Safety Promotion/Fall Prevention: safety round/check completed  Taken 11/20/2024 1100 by Rosita Ellsworth RN  Safety Promotion/Fall Prevention: safety round/check  completed  Taken 11/20/2024 1000 by Rosita Ellsworth RN  Safety Promotion/Fall Prevention: safety round/check completed  Taken 11/20/2024 0900 by Rosita Ellsworth RN  Safety Promotion/Fall Prevention: safety round/check completed  Taken 11/20/2024 0800 by Rosita Ellsworth RN  Safety Promotion/Fall Prevention: safety round/check completed  Taken 11/20/2024 0700 by Rosita Ellsworth RN  Safety Promotion/Fall Prevention: safety round/check completed  Goal: Absence of Fall and Fall-Related Injury  Outcome: Progressing  Intervention: Identify and Manage Contributors  Recent Flowsheet Documentation  Taken 11/20/2024 0900 by Rosita Ellsworth RN  Medication Review/Management: medications reviewed  Intervention: Promote Injury-Free Environment  Recent Flowsheet Documentation  Taken 11/20/2024 1800 by Rosita Ellsworth RN  Safety Promotion/Fall Prevention: safety round/check completed  Taken 11/20/2024 1700 by Rosita Ellsworth RN  Safety Promotion/Fall Prevention: safety round/check completed  Taken 11/20/2024 1600 by Rosita Ellsworth RN  Safety Promotion/Fall Prevention: safety round/check completed  Taken 11/20/2024 1500 by Rosita Ellsworth RN  Safety Promotion/Fall Prevention: safety round/check completed  Taken 11/20/2024 1400 by Rosita Ellsworth RN  Safety Promotion/Fall Prevention: safety round/check completed  Taken 11/20/2024 1300 by Rosita Ellsworth RN  Safety Promotion/Fall Prevention: safety round/check completed  Taken 11/20/2024 1200 by Rosita Ellsworth RN  Safety Promotion/Fall Prevention: safety round/check completed  Taken 11/20/2024 1100 by Rosita Ellsworth RN  Safety Promotion/Fall Prevention: safety round/check completed  Taken 11/20/2024 1000 by Rosita Ellsworth RN  Safety Promotion/Fall Prevention: safety round/check completed  Taken 11/20/2024 0900 by Rosita Ellsworth RN  Safety Promotion/Fall Prevention: safety round/check completed  Taken 11/20/2024 0800 by Rosita Ellsworth RN  Safety Promotion/Fall Prevention: safety round/check completed  Taken  11/20/2024 0700 by Rosita Ellsworth, GIANLUCA  Safety Promotion/Fall Prevention: safety round/check completed     Problem: Skin Injury Risk Increased  Goal: Skin Health and Integrity  Outcome: Progressing  Intervention: Optimize Skin Protection  Recent Flowsheet Documentation  Taken 11/20/2024 0900 by Rosita Ellsworth RN  Activity Management: activity encouraged  Pressure Reduction Techniques:   frequent weight shift encouraged   weight shift assistance provided  Head of Bed (HOB) Positioning: HOB at 30-45 degrees     Problem: Sepsis/Septic Shock  Goal: Optimal Coping  Outcome: Progressing  Goal: Absence of Bleeding  Outcome: Progressing  Goal: Blood Glucose Level Within Target Range  Outcome: Progressing  Goal: Absence of Infection Signs and Symptoms  Outcome: Progressing  Intervention: Initiate Sepsis Management  Recent Flowsheet Documentation  Taken 11/20/2024 0900 by Rosita Ellsworth RN  Infection Prevention:   rest/sleep promoted   single patient room provided  Intervention: Promote Recovery  Recent Flowsheet Documentation  Taken 11/20/2024 0900 by Rosita Ellsworth, RN  Activity Management: activity encouraged  Goal: Optimal Nutrition Delivery  Outcome: Progressing     Problem: Alcohol Withdrawal  Goal: Alcohol Withdrawal Symptom Control  Outcome: Progressing  Goal: Optimal Neurologic Function  Outcome: Progressing  Goal: Readiness for Change Identified  Outcome: Progressing

## 2024-11-21 NOTE — PLAN OF CARE
Goal Outcome Evaluation:  Plan of Care Reviewed With: (P) patient        Progress: (P) improving  Outcome Evaluation: (P) bed mobility SBA. sit to stand SBA. Pt ambulated 60 ft at a time with cga and fww. Cues for safety and higher foot clearance due to fatigue. performed BLE AROM exercises sitting EOB x 20 each with cues for correct form. pt would benefit from  services to improve strength, mobility, and safety.

## 2024-11-21 NOTE — THERAPY TREATMENT NOTE
Acute Care - Physical Therapy Treatment Note  [facility]     Patient Name: Dayron Lubin  : 1955  MRN: [id number]  Today's Date: 2024      Visit Dx:     ICD-10-CM ICD-9-CM   1. Hypotension, unspecified hypotension type  I95.9 458.9   2. Fall, initial encounter  W19.XXXA E888.9   3. Alcohol abuse  F10.10 305.00   4. Chronic liver disease  K76.9 571.9   5. Elevated bilirubin  R17 277.4   6. Traumatic rhabdomyolysis, initial encounter  T79.6XXA 958.6   7. Dehydration  E86.0 276.51   8. Impaired mobility [Z74.09]  Z74.09 799.89     Patient Active Problem List   Diagnosis    Prostate cancer    Hx of radiation therapy    Elevated PSA    Encounter for follow-up surveillance of prostate cancer    HOCM (hypertrophic obstructive cardiomyopathy)    Coronary artery disease involving native coronary artery of native heart without angina pectoris    Essential hypertension    Mixed hyperlipidemia    Class 1 obesity with alveolar hypoventilation, serious comorbidity, and body mass index (BMI) of 30.0 to 30.9 in adult    Displaced fracture of lateral malleolus of right fibula, initial encounter for closed fracture    Family history of colonic polyps    History of colon polyps    Elevated brain natriuretic peptide (BNP) level    Acute kidney injury    Anxiety associated with depression    Alcohol abuse    Alcoholic hepatitis    Volume depletion    Anorexia    CHF (congestive heart failure)    Hypokalemia    Duodenitis    Gait instability    SVT (supraventricular tachycardia)    Physical debility    Atrial fibrillation with rapid ventricular response    Alcoholism in remission    Paroxysmal atrial fibrillation    Nasal polyposis    Nasal congestion    Estelle bullosa    Long-term use of high-risk medication    Multiple falls    Transaminitis    Thrombocytopenia    Non-traumatic rhabdomyolysis    Alcohol withdrawal    Pancytopenia    Opiate abuse, episodic     Past Medical History:   Diagnosis Date    A-fib     Alcoholic  hepatitis 07/13/2023    CAD (coronary artery disease)     History of external beam radiation therapy 05/12/2016    6840 cGy to prostate bed    Hyperlipidemia     Hypertension     Insomnia     Prostate cancer      Past Surgical History:   Procedure Laterality Date    CARDIAC CATHETERIZATION      CORONARY ANGIOPLASTY WITH STENT PLACEMENT      FRACTURE SURGERY      PROSTATE SURGERY      TONSILLECTOMY       PT Assessment (Last 12 Hours)       PT Evaluation and Treatment       Row Name 11/21/24 0839          Physical Therapy Time and Intention    Subjective Information complains of;fatigue (P)   -LW     Document Type therapy note (daily note) (P)   -LW     Mode of Treatment physical therapy (P)   -       Row Name 11/21/24 0839          General Information    Existing Precautions/Restrictions fall (P)   -       Row Name 11/21/24 0839          Pain    Pretreatment Pain Rating 0/10 - no pain (P)   -LW     Posttreatment Pain Rating 0/10 - no pain (P)   -       Row Name 11/21/24 0839          Bed Mobility    Supine-Sit [place] (Bed Mobility) verbal cues;standby assist (P)   -     Sit-Supine [place] (Bed Mobility) standby assist (P)   -     Assistive Device (Bed Mobility) bed rails;head of bed elevated (P)   -       Row Name 11/21/24 0839          Sit-Stand Transfer    Sit-Stand [place] (Transfers) verbal cues;standby assist (P)   -       Row Name 11/21/24 0839          Stand-Sit Transfer    Stand-Sit [place] (Transfers) standby assist (P)   -       Row Name 11/21/24 0839          Gait/Stairs (Locomotion)    [place] Level (Gait) verbal cues;contact guard (P)   -     Assistive Device (Gait) walker, front-wheeled (P)   -LW     Distance in Feet (Gait) 60 (P)   60x4  -LW     Deviations/Abnormal Patterns (Gait) base of support, narrow;judd decreased;festinating/shuffling (P)   -       Row Name 11/21/24 0839          Safety Issues/Impairments Affecting Functional Mobility    Safety  Issues Affecting Function (Mobility) impulsivity;safety precaution awareness (P)   -LW       Row Name 11/21/24 0839          Motor Skills    Comments, Therapeutic Exercise BLE AROM exercises sitting EOB x 20. (P)   -LW       Row Name             Wound 11/16/24 1205 coccyx    Wound - Properties Group Placement Date: 11/16/24  -OS Placement Time: 1205  -OS Location: coccyx  -OS    Retired Wound - Properties Group Placement Date: 11/16/24  -OS Placement Time: 1205  -OS Location: coccyx  -OS    Retired Wound - Properties Group Placement Date: 11/16/24  -OS Placement Time: 1205  -OS Location: coccyx  -OS    Retired Wound - Properties Group Date first assessed: 11/16/24  -OS Time first assessed: 1205  -OS Location: coccyx  -OS      Row Name 11/21/24 0839          Positioning and Restraints    Pre-Treatment Position in bed (P)   -LW     Post Treatment Position bed (P)   -LW     In Bed fowlers;call light within reach;encouraged to call for assist (P)   -LW               User Key  (r) = Recorded By, (t) = Taken By, (c) = Cosigned By      Initials Name Provider Type    LW Talia Graham PTA Student PTA Student    OS Aura Velasquez, RN Registered Nurse                    Physical Therapy Education       Title: PT OT SLP Therapies (Resolved)       Topic: Physical Therapy (Resolved)       Point: Mobility training (Resolved)       Learning Progress Summary            Patient Acceptance, E,TB, VU by ISRRAEL at 11/20/2024 1204    Acceptance, E, VU,NR by NONI at 11/18/2024 1415    Comment: benefits of activity, progression of PT                      Point: Home exercise program (Resolved)       Learner Progress:  Not documented in this visit.              Point: Body mechanics (Resolved)       Learner Progress:  Not documented in this visit.              Point: Precautions (Resolved)       Learning Progress Summary            Patient Acceptance, E,TB, VU by LW at 11/21/2024 0856    Acceptance, E,TB, VU by  at 11/19/2024 0426                                       User Key       Initials Effective Dates Name Provider Type Discipline    NONI 02/03/23 -  Mikel Lozano, PT DPT Physical Therapist PT    LW 10/03/24 -  Talia Graham, PTA Student PTA Student PT     10/07/24 -  Pallavi Yu, RN Registered Nurse Nurse                  PT Recommendation and Plan     Progress: (P) improving  Outcome Evaluation: (P) bed mobility SBA. sit to stand SBA. Pt ambulated 60 ft at a time with cga and fww. Cues for safety and higher foot clearance due to fatigue. performed BLE AROM exercises sitting EOB x 20 each with cues for correct form. pt would benefit from  services to improve strength, mobility, and safety.   Outcome Measures       Row Name 11/21/24 0839 11/20/24 1200          How much help from another person do you currently need...    Turning from your back to your side while in flat bed without using bedrails? 4 (P)   -LW 4  -SB (r) LW (t) SB (c)     Moving from lying on back to sitting on the side of a flat bed without bedrails? 4 (P)   -LW 4  -SB (r) LW (t) SB (c)     Moving to and from a bed to a chair (including a wheelchair)? 3 (P)   -LW 3  -SB (r) LW (t) SB (c)     Standing up from a chair using your arms (e.g., wheelchair, bedside chair)? 4 (P)   -LW 3  -SB (r) LW (t) SB (c)     Climbing 3-5 steps with a railing? 3 (P)   -LW 3  -SB (r) LW (t) SB (c)     To walk in hospital room? 3 (P)   -LW 3  -SB (r) LW (t) SB (c)     AM-PAC 6 Clicks Score (PT) 21 (P)   -LW 20  -SB (r) LW (t)        Functional Assessment    Outcome Measure Options AM-PAC 6 Clicks Basic Mobility (PT) (P)   -LW AM-PAC 6 Clicks Basic Mobility (PT)  -SB (r) LW (t) SB (c)               User Key  (r) = Recorded By, (t) = Taken By, (c) = Cosigned By      Initials Name Provider Type    Kristen Jones, PT DPT Physical Therapist    LW Talia Graham, PTA Student PTA Student                     Time Calculation:    PT Charges       Row Name 11/21/24 1039             Time  Calculation    Start Time 0839  -KJ      Stop Time 0903  -KJ      Time Calculation (min) 24 min  -KJ      PT Received On 11/21/24  -KJ      PT Goal Re-Cert Due Date 11/28/24  -KJ         Time Calculation- PT    Total Timed Code Minutes- PT 24 minute(s)  -KJ                User Key  (r) = Recorded By, (t) = Taken By, (c) = Cosigned By      Initials Name Provider Type    Janel Liu, PTA Physical Therapist Assistant                      PT G-Codes  Outcome Measure Options: (P) AM-PAC 6 Clicks Basic Mobility (PT)  AM-PAC 6 Clicks Score (PT): (P) 21  AM-PAC 6 Clicks Score (OT): 21    Talia Graham PTA Student  11/21/2024

## 2024-11-21 NOTE — PROGRESS NOTES
[facility] Medicine Services  INPATIENT PROGRESS NOTE    Patient Name: Dayron Lubin  Date of Admission: 11/16/2024  Today's Date: 11/20/24  Length of Stay: 4  Primary Care Physician: Herberth Nguyen Jr., MD    Subjective   Chief Complaint: Diarrhea  HPI     Diarrhea has improved with antidiarrheal agents.  He appears to be able to ambulate without significant difficulty as long as he uses a walker.  He does not wish to go to a SNF at discharge and would like to return home.  I believe he would be appropriate for DC home tomorrow.  CBC shows normal white blood cell count with hemoglobin stable at 9.6 and platelet count improved to 93,000.  CMP is remarkable only for transaminases that are consistently declining.    Review of Systems   All pertinent negatives and positives are as above. All other systems have been reviewed and are negative unless otherwise stated.     Objective    Temp:  [97.6 °F (36.4 °C)-98.7 °F (37.1 °C)] 98.3 °F (36.8 °C)  Heart Rate:  [58-80] 77  Resp:  [15-16] 16  BP: (103-155)/(73-86) 130/83  Physical Exam  Constitutional:       General: He is not in acute distress.     Appearance: He is obese. He is chronically ill-appearing.   HENT:      Head: Normocephalic.      Mouth: Mucous membranes are moist.   Eyes:      Pupils: Pupils are equal, round, and reactive to light.   Cardiovascular:      Rate and Rhythm: Normal rate.      Heart sounds: Systolic murmur heard.   Pulmonary:      Effort: Pulmonary effort is normal. No respiratory distress.      Breath sounds: No wheezing or rales.   Abdominal:      Palpations: Abdomen is soft.   Skin:     Findings: Rash present.   Neurological:      Mental Status: He is alert.      Motor: Weakness present.   Psychiatric:      Calm and cooperative.    Results Review:  I have reviewed the labs, radiology results, and diagnostic studies.    Laboratory Data:   Results from last 7 days   Lab Units 11/20/24  0342 11/18/24  025  11/17/24  0719   WBC 10*3/mm3 3.75 3.25* 2.91*   HEMOGLOBIN g/dL 9.6* 9.3* 9.7*   HEMATOCRIT % 28.9* 28.6* 28.8*   PLATELETS 10*3/mm3 93* 59* 55*        Results from last 7 days   Lab Units 11/20/24  0342 11/18/24  0258 11/17/24  0457   SODIUM mmol/L 138 132* 133*   POTASSIUM mmol/L 4.3 3.6 3.4*   CHLORIDE mmol/L 104 98 95*   CO2 mmol/L 26.0 22.0 25.0   BUN mg/dL 14 14 20   CREATININE mg/dL 1.02 0.83 1.24   CALCIUM mg/dL 7.7* 7.3* 6.9*   BILIRUBIN mg/dL 0.7 0.9 1.4*   ALK PHOS U/L 83 75 67   ALT (SGPT) U/L 76* 86* 101*   AST (SGOT) U/L 97* 164* 219*   GLUCOSE mg/dL 106* 90 87       Culture Data:   Blood Culture   Date Value Ref Range Status   11/16/2024 Abnormal Stain (C)  Preliminary   11/16/2024 Staphylococcus, coagulase negative (C)  Final       Radiology Data:   Imaging Results (Last 24 Hours)       ** No results found for the last 24 hours. **            I have reviewed the patient's current medications.     Assessment/Plan   Assessment  Active Hospital Problems    Diagnosis     **Multiple falls     Opiate abuse, episodic     Pancytopenia     Transaminitis     Thrombocytopenia     Non-traumatic rhabdomyolysis     Alcohol withdrawal     Paroxysmal atrial fibrillation     Acute kidney injury     Alcohol abuse     Alcoholic hepatitis     HOCM (hypertrophic obstructive cardiomyopathy)        Treatment Plan    Probable discharge in a.m.    Medical Decision Making  Number and Complexity of problems: 3 acute; high complexity        Conditions and Status        Condition is improving.     [facility] Data  External documents reviewed: none  Cardiac tracing (EKG, telemetry) interpretation: See HPI.  Radiology interpretation: See HPI  Labs reviewed: as above  Any tests that were considered but not ordered: none     Decision rules/scores evaluated (example [id number], [place], etc): none     Discussed with: patient     Care Planning  Shared decision making: Discussed with patient   Code status and discussions: DO NOT INTUBATE      Disposition  Social Determinants of Health that impact treatment or disposition: Alcohol abuse/dependency  I expect the patient to be discharged to home in 1 days.         Electronically signed by Holger Karimi DO, 11/20/24, 18:33 CST.

## 2024-11-21 NOTE — PLAN OF CARE
Goal Outcome Evaluation:         Pt. A&Ox4. VSS. RA. He has had some c/o itchy eyes, eye drops ordered and can have 1hr prn as needed. Had mild c/o headache at beginning of shift but no pain currently. Tele is d/c. Safety maintained. Care ongoing.

## 2024-11-22 NOTE — OUTREACH NOTE
Prep Survey      Flowsheet Row Responses   [facility] facility patient discharged from? [facility]   Is LACE score < 7 ? No   Eligibility Readm Mgmt   Discharge diagnosis Multiple falls   Does the patient have one of the following disease processes/diagnoses(primary or secondary)? Other   Does the patient have Home health ordered? Yes   What is the Home health agency?  [facility]   Is there a DME ordered? Yes   What DME was ordered? RW ordered from [employer]   Prep survey completed? Yes            ANGEL SUTHERLAND - Registered Nurse

## 2024-11-24 LAB
BACTERIA SPEC AEROBE CULT: ABNORMAL
GRAM STN SPEC: ABNORMAL

## 2024-11-26 ENCOUNTER — READMISSION MANAGEMENT (OUTPATIENT)
Dept: CALL CENTER | Facility: HOSPITAL | Age: 69
End: 2024-11-26
Payer: MEDICARE

## 2024-11-26 NOTE — OUTREACH NOTE
Medical Week 1 Survey      Flowsheet Row Responses   [facility] patient discharged from? [place]   Does the patient have one of the following disease processes/diagnoses(primary or secondary)? Other   Week 1 attempt successful? Yes   Call start time 1457   Call end time 1458   Discharge diagnosis Multiple falls   Person spoke with today (if not patient) and relationship Patient   Meds reviewed with patient/caregiver? Yes   Does the patient have all medications ordered at discharge? Yes   Prescription comments No concerns or questions noted.   Is the patient taking all medications as directed (includes completed medication regime)? Yes   Does the patient have a primary care provider?  Yes   What is the Home health agency?  [facility]   Has home health visited the patient within 72 hours of discharge? Yes   Home health comments Nurse came by yesterday, Therapy and OT today   What DME was ordered? RW ordered from [employer]   Has all DME been delivered? Yes   Psychosocial issues? No   Did the patient receive a copy of their discharge instructions? Yes   Nursing interventions Reviewed instructions with patient   What is the patient's perception of their health status since discharge? Improving   Is the patient/caregiver able to teach back signs and symptoms related to disease process for when to call PCP? Yes   Is the patient/caregiver able to teach back signs and symptoms related to disease process for when to call 911? Yes   Is the patient/caregiver able to teach back the hierarchy of who to call/visit for symptoms/problems? PCP, Specialist, Home health nurse, Urgent Care, ED, 911 Yes   Week 1 call completed? Yes   Graduated Yes   Wrap up additional comments Patient reports doing well. No concerns or questions noted.   Call end time 1458            Janel TSE - Registered Nurse

## 2024-12-04 ENCOUNTER — HOSPITAL ENCOUNTER (INPATIENT)
Facility: HOSPITAL | Age: 69
LOS: 5 days | Discharge: HOME | DRG: 896 | End: 2024-12-09
Attending: EMERGENCY MEDICINE | Admitting: INTERNAL MEDICINE
Payer: MEDICARE

## 2024-12-04 ENCOUNTER — APPOINTMENT (OUTPATIENT)
Dept: GENERAL RADIOLOGY | Facility: HOSPITAL | Age: 69
DRG: 896 | End: 2024-12-04
Payer: MEDICARE

## 2024-12-04 ENCOUNTER — APPOINTMENT (OUTPATIENT)
Dept: CT IMAGING | Facility: HOSPITAL | Age: 69
DRG: 896 | End: 2024-12-04
Payer: MEDICARE

## 2024-12-04 DIAGNOSIS — T79.6XXA TRAUMATIC RHABDOMYOLYSIS, INITIAL ENCOUNTER: ICD-10-CM

## 2024-12-04 DIAGNOSIS — F10.930 ALCOHOL WITHDRAWAL SYNDROME WITHOUT COMPLICATION: Primary | ICD-10-CM

## 2024-12-04 DIAGNOSIS — L89.151 PRESSURE INJURY OF SACRAL REGION, STAGE 1: ICD-10-CM

## 2024-12-04 DIAGNOSIS — Z74.09 IMPAIRED MOBILITY: ICD-10-CM

## 2024-12-04 PROBLEM — F10.929 ALCOHOL INTOXICATION: Status: ACTIVE | Noted: 2024-12-04

## 2024-12-04 LAB
ALBUMIN SERPL-MCNC: 4.3 G/DL (ref 3.5–5.2)
ALBUMIN/GLOB SERPL: 1.3 G/DL
ALP SERPL-CCNC: 102 U/L (ref 39–117)
ALT SERPL W P-5'-P-CCNC: 114 U/L (ref 1–41)
ANION GAP SERPL CALCULATED.3IONS-SCNC: 21 MMOL/L (ref 5–15)
APTT PPP: 34.5 SECONDS (ref 24.5–36)
AST SERPL-CCNC: 133 U/L (ref 1–40)
BASOPHILS # BLD AUTO: 0.01 10*3/MM3 (ref 0–0.2)
BASOPHILS NFR BLD AUTO: 0.1 % (ref 0–1.5)
BILIRUB SERPL-MCNC: 1.3 MG/DL (ref 0–1.2)
BUN SERPL-MCNC: 17 MG/DL (ref 8–23)
BUN/CREAT SERPL: 11.7 (ref 7–25)
CALCIUM SPEC-SCNC: 9 MG/DL (ref 8.6–10.5)
CHLORIDE SERPL-SCNC: 91 MMOL/L (ref 98–107)
CK SERPL-CCNC: 702 U/L (ref 20–200)
CO2 SERPL-SCNC: 24 MMOL/L (ref 22–29)
CREAT SERPL-MCNC: 1.45 MG/DL (ref 0.76–1.27)
DEPRECATED RDW RBC AUTO: 50.7 FL (ref 37–54)
EGFRCR SERPLBLD CKD-EPI 2021: 51.8 ML/MIN/1.73
EOSINOPHIL # BLD AUTO: 0.01 10*3/MM3 (ref 0–0.4)
EOSINOPHIL NFR BLD AUTO: 0.1 % (ref 0.3–6.2)
ERYTHROCYTE [DISTWIDTH] IN BLOOD BY AUTOMATED COUNT: 14.5 % (ref 12.3–15.4)
ETHANOL UR QL: 0.07 %
GLOBULIN UR ELPH-MCNC: 3.4 GM/DL
GLUCOSE SERPL-MCNC: 133 MG/DL (ref 65–99)
HCT VFR BLD AUTO: 33.6 % (ref 37.5–51)
HGB BLD-MCNC: 11.2 G/DL (ref 13–17.7)
HOLD SPECIMEN: NORMAL
HOLD SPECIMEN: NORMAL
IMM GRANULOCYTES # BLD AUTO: 0.05 10*3/MM3 (ref 0–0.05)
IMM GRANULOCYTES NFR BLD AUTO: 0.7 % (ref 0–0.5)
INR PPP: 1.35 (ref 0.91–1.09)
LYMPHOCYTES # BLD AUTO: 0.3 10*3/MM3 (ref 0.7–3.1)
LYMPHOCYTES NFR BLD AUTO: 4.4 % (ref 19.6–45.3)
MAGNESIUM SERPL-MCNC: 2.1 MG/DL (ref 1.6–2.4)
MCH RBC QN AUTO: 32.1 PG (ref 26.6–33)
MCHC RBC AUTO-ENTMCNC: 33.3 G/DL (ref 31.5–35.7)
MCV RBC AUTO: 96.3 FL (ref 79–97)
MONOCYTES # BLD AUTO: 0.57 10*3/MM3 (ref 0.1–0.9)
MONOCYTES NFR BLD AUTO: 8.3 % (ref 5–12)
NEUTROPHILS NFR BLD AUTO: 5.89 10*3/MM3 (ref 1.7–7)
NEUTROPHILS NFR BLD AUTO: 86.4 % (ref 42.7–76)
NRBC BLD AUTO-RTO: 0.6 /100 WBC (ref 0–0.2)
PLATELET # BLD AUTO: 141 10*3/MM3 (ref 140–450)
PMV BLD AUTO: 10.5 FL (ref 6–12)
POTASSIUM SERPL-SCNC: 4.8 MMOL/L (ref 3.5–5.2)
PROT SERPL-MCNC: 7.7 G/DL (ref 6–8.5)
PROTHROMBIN TIME: 17.2 SECONDS (ref 11.8–14.8)
RBC # BLD AUTO: 3.49 10*6/MM3 (ref 4.14–5.8)
SODIUM SERPL-SCNC: 136 MMOL/L (ref 136–145)
WBC NRBC COR # BLD AUTO: 6.83 10*3/MM3 (ref 3.4–10.8)
WHOLE BLOOD HOLD COAG: NORMAL
WHOLE BLOOD HOLD SPECIMEN: NORMAL

## 2024-12-04 PROCEDURE — 25010000002 LORAZEPAM PER 2 MG: Performed by: EMERGENCY MEDICINE

## 2024-12-04 PROCEDURE — 70450 CT HEAD/BRAIN W/O DYE: CPT

## 2024-12-04 PROCEDURE — 25010000002 THIAMINE HCL 200 MG/2ML SOLUTION: Performed by: NURSE PRACTITIONER

## 2024-12-04 PROCEDURE — 85730 THROMBOPLASTIN TIME PARTIAL: CPT | Performed by: EMERGENCY MEDICINE

## 2024-12-04 PROCEDURE — 93010 ELECTROCARDIOGRAM REPORT: CPT | Performed by: INTERNAL MEDICINE

## 2024-12-04 PROCEDURE — 99285 EMERGENCY DEPT VISIT HI MDM: CPT

## 2024-12-04 PROCEDURE — 93005 ELECTROCARDIOGRAM TRACING: CPT | Performed by: EMERGENCY MEDICINE

## 2024-12-04 PROCEDURE — 80053 COMPREHEN METABOLIC PANEL: CPT | Performed by: EMERGENCY MEDICINE

## 2024-12-04 PROCEDURE — 25810000003 SODIUM CHLORIDE 0.9 % SOLUTION: Performed by: EMERGENCY MEDICINE

## 2024-12-04 PROCEDURE — 25010000002 LORAZEPAM PER 2 MG: Performed by: NURSE PRACTITIONER

## 2024-12-04 PROCEDURE — 90715 TDAP VACCINE 7 YRS/> IM: CPT | Performed by: EMERGENCY MEDICINE

## 2024-12-04 PROCEDURE — 90471 IMMUNIZATION ADMIN: CPT | Performed by: EMERGENCY MEDICINE

## 2024-12-04 PROCEDURE — 25810000003 SODIUM CHLORIDE 0.9 % SOLUTION 1,000 ML FLEX CONT: Performed by: NURSE PRACTITIONER

## 2024-12-04 PROCEDURE — 82550 ASSAY OF CK (CPK): CPT | Performed by: EMERGENCY MEDICINE

## 2024-12-04 PROCEDURE — 71045 X-RAY EXAM CHEST 1 VIEW: CPT

## 2024-12-04 PROCEDURE — 85610 PROTHROMBIN TIME: CPT | Performed by: EMERGENCY MEDICINE

## 2024-12-04 PROCEDURE — 72125 CT NECK SPINE W/O DYE: CPT

## 2024-12-04 PROCEDURE — 25010000002 THIAMINE HCL 200 MG/2ML SOLUTION: Performed by: EMERGENCY MEDICINE

## 2024-12-04 PROCEDURE — 25010000002 TETANUS-DIPHTH-ACELL PERTUSSIS 5-2.5-18.5 LF-MCG/0.5 SUSPENSION PREFILLED SYRINGE: Performed by: EMERGENCY MEDICINE

## 2024-12-04 PROCEDURE — 25010000002 THIAMINE HCL 200 MG/2ML SOLUTION 2 ML VIAL: Performed by: NURSE PRACTITIONER

## 2024-12-04 PROCEDURE — 83735 ASSAY OF MAGNESIUM: CPT | Performed by: EMERGENCY MEDICINE

## 2024-12-04 PROCEDURE — 85025 COMPLETE CBC W/AUTO DIFF WBC: CPT | Performed by: EMERGENCY MEDICINE

## 2024-12-04 PROCEDURE — 82077 ASSAY SPEC XCP UR&BREATH IA: CPT | Performed by: EMERGENCY MEDICINE

## 2024-12-04 PROCEDURE — 25010000002 POTASSIUM CHLORIDE PER 2 MEQ OF POTASSIUM: Performed by: NURSE PRACTITIONER

## 2024-12-04 PROCEDURE — 25810000003 SODIUM CHLORIDE 0.9 % SOLUTION 250 ML FLEX CONT: Performed by: NURSE PRACTITIONER

## 2024-12-04 RX ORDER — THIAMINE HYDROCHLORIDE 100 MG/ML
200 INJECTION, SOLUTION INTRAMUSCULAR; INTRAVENOUS ONCE
Status: COMPLETED | OUTPATIENT
Start: 2024-12-04 | End: 2024-12-04

## 2024-12-04 RX ORDER — FOLIC ACID 5 MG/ML
1 INJECTION, SOLUTION INTRAMUSCULAR; INTRAVENOUS; SUBCUTANEOUS ONCE
Status: COMPLETED | OUTPATIENT
Start: 2024-12-04 | End: 2024-12-04

## 2024-12-04 RX ORDER — SODIUM CHLORIDE 0.9 % (FLUSH) 0.9 %
10 SYRINGE (ML) INJECTION AS NEEDED
Status: DISCONTINUED | OUTPATIENT
Start: 2024-12-04 | End: 2024-12-09 | Stop reason: HOSPADM

## 2024-12-04 RX ORDER — SODIUM CHLORIDE 9 MG/ML
1000 INJECTION, SOLUTION INTRAVENOUS ONCE
Status: COMPLETED | OUTPATIENT
Start: 2024-12-04 | End: 2024-12-04

## 2024-12-04 RX ORDER — FOLIC ACID 1 MG/1
1 TABLET ORAL DAILY
Status: DISCONTINUED | OUTPATIENT
Start: 2024-12-04 | End: 2024-12-04

## 2024-12-04 RX ORDER — LORAZEPAM 2 MG/ML
2 INJECTION INTRAMUSCULAR
Status: DISCONTINUED | OUTPATIENT
Start: 2024-12-04 | End: 2024-12-09 | Stop reason: HOSPADM

## 2024-12-04 RX ORDER — AMIODARONE HYDROCHLORIDE 200 MG/1
200 TABLET ORAL DAILY
Status: DISCONTINUED | OUTPATIENT
Start: 2024-12-04 | End: 2024-12-09 | Stop reason: HOSPADM

## 2024-12-04 RX ORDER — METOPROLOL SUCCINATE 25 MG/1
25 TABLET, EXTENDED RELEASE ORAL DAILY
Status: DISCONTINUED | OUTPATIENT
Start: 2024-12-04 | End: 2024-12-09 | Stop reason: HOSPADM

## 2024-12-04 RX ORDER — SODIUM CHLORIDE 0.9 % (FLUSH) 0.9 %
10 SYRINGE (ML) INJECTION EVERY 12 HOURS SCHEDULED
Status: DISCONTINUED | OUTPATIENT
Start: 2024-12-04 | End: 2024-12-09 | Stop reason: HOSPADM

## 2024-12-04 RX ORDER — BISACODYL 5 MG/1
5 TABLET, DELAYED RELEASE ORAL DAILY PRN
Status: DISCONTINUED | OUTPATIENT
Start: 2024-12-04 | End: 2024-12-09 | Stop reason: HOSPADM

## 2024-12-04 RX ORDER — AMOXICILLIN 250 MG
2 CAPSULE ORAL 2 TIMES DAILY PRN
Status: DISCONTINUED | OUTPATIENT
Start: 2024-12-04 | End: 2024-12-09 | Stop reason: HOSPADM

## 2024-12-04 RX ORDER — SODIUM CHLORIDE 9 MG/ML
40 INJECTION, SOLUTION INTRAVENOUS AS NEEDED
Status: DISCONTINUED | OUTPATIENT
Start: 2024-12-04 | End: 2024-12-09 | Stop reason: HOSPADM

## 2024-12-04 RX ORDER — ATORVASTATIN CALCIUM 10 MG/1
20 TABLET, FILM COATED ORAL DAILY
Status: DISCONTINUED | OUTPATIENT
Start: 2024-12-04 | End: 2024-12-09 | Stop reason: HOSPADM

## 2024-12-04 RX ORDER — LORAZEPAM 1 MG/1
1 TABLET ORAL
Status: DISCONTINUED | OUTPATIENT
Start: 2024-12-04 | End: 2024-12-09 | Stop reason: HOSPADM

## 2024-12-04 RX ORDER — AZELASTINE 1 MG/ML
2 SPRAY, METERED NASAL 2 TIMES DAILY
Status: DISCONTINUED | OUTPATIENT
Start: 2024-12-04 | End: 2024-12-09 | Stop reason: HOSPADM

## 2024-12-04 RX ORDER — FOLIC ACID 1 MG/1
1 TABLET ORAL DAILY
Status: DISCONTINUED | OUTPATIENT
Start: 2024-12-04 | End: 2024-12-09 | Stop reason: HOSPADM

## 2024-12-04 RX ORDER — CHOLESTYRAMINE 4 G/9G
1 POWDER, FOR SUSPENSION ORAL EVERY 12 HOURS PRN
Status: DISCONTINUED | OUTPATIENT
Start: 2024-12-04 | End: 2024-12-09 | Stop reason: HOSPADM

## 2024-12-04 RX ORDER — THIAMINE HYDROCHLORIDE 100 MG/ML
200 INJECTION, SOLUTION INTRAMUSCULAR; INTRAVENOUS EVERY 8 HOURS SCHEDULED
Status: DISCONTINUED | OUTPATIENT
Start: 2024-12-04 | End: 2024-12-09 | Stop reason: HOSPADM

## 2024-12-04 RX ORDER — SODIUM CHLORIDE 0.9 % (FLUSH) 0.9 %
10 SYRINGE (ML) INJECTION AS NEEDED
Status: DISCONTINUED | OUTPATIENT
Start: 2024-12-04 | End: 2024-12-06

## 2024-12-04 RX ORDER — LORAZEPAM 2 MG/ML
1 INJECTION INTRAMUSCULAR
Status: DISCONTINUED | OUTPATIENT
Start: 2024-12-04 | End: 2024-12-09 | Stop reason: HOSPADM

## 2024-12-04 RX ORDER — LORAZEPAM 2 MG/ML
1 INJECTION INTRAMUSCULAR ONCE
Status: COMPLETED | OUTPATIENT
Start: 2024-12-04 | End: 2024-12-04

## 2024-12-04 RX ORDER — PANTOPRAZOLE SODIUM 40 MG/1
40 TABLET, DELAYED RELEASE ORAL DAILY
Status: DISCONTINUED | OUTPATIENT
Start: 2024-12-04 | End: 2024-12-09 | Stop reason: HOSPADM

## 2024-12-04 RX ORDER — ASPIRIN 81 MG/1
81 TABLET ORAL DAILY
Status: DISCONTINUED | OUTPATIENT
Start: 2024-12-04 | End: 2024-12-09 | Stop reason: HOSPADM

## 2024-12-04 RX ORDER — LOSARTAN POTASSIUM 25 MG/1
25 TABLET ORAL DAILY
Status: DISCONTINUED | OUTPATIENT
Start: 2024-12-04 | End: 2024-12-05

## 2024-12-04 RX ORDER — LORAZEPAM 1 MG/1
2 TABLET ORAL
Status: DISCONTINUED | OUTPATIENT
Start: 2024-12-04 | End: 2024-12-09 | Stop reason: HOSPADM

## 2024-12-04 RX ORDER — BISACODYL 10 MG
10 SUPPOSITORY, RECTAL RECTAL DAILY PRN
Status: DISCONTINUED | OUTPATIENT
Start: 2024-12-04 | End: 2024-12-09 | Stop reason: HOSPADM

## 2024-12-04 RX ORDER — POLYETHYLENE GLYCOL 3350 17 G/17G
17 POWDER, FOR SOLUTION ORAL DAILY PRN
Status: DISCONTINUED | OUTPATIENT
Start: 2024-12-04 | End: 2024-12-09 | Stop reason: HOSPADM

## 2024-12-04 RX ORDER — ONDANSETRON 2 MG/ML
4 INJECTION INTRAMUSCULAR; INTRAVENOUS EVERY 6 HOURS PRN
Status: DISCONTINUED | OUTPATIENT
Start: 2024-12-04 | End: 2024-12-09 | Stop reason: HOSPADM

## 2024-12-04 RX ADMIN — LORAZEPAM 1 MG: 2 INJECTION INTRAMUSCULAR; INTRAVENOUS at 14:53

## 2024-12-04 RX ADMIN — ATORVASTATIN CALCIUM 20 MG: 10 TABLET, FILM COATED ORAL at 16:43

## 2024-12-04 RX ADMIN — FOLIC ACID 1 MG: 1 TABLET ORAL at 16:42

## 2024-12-04 RX ADMIN — THIAMINE HCL TAB 100 MG 100 MG: 100 TAB at 16:42

## 2024-12-04 RX ADMIN — AMIODARONE HYDROCHLORIDE 200 MG: 200 TABLET ORAL at 16:42

## 2024-12-04 RX ADMIN — SODIUM CHLORIDE 1000 ML: 9 INJECTION, SOLUTION INTRAVENOUS at 13:10

## 2024-12-04 RX ADMIN — THIAMINE HYDROCHLORIDE 75 ML/HR: 100 INJECTION, SOLUTION INTRAMUSCULAR; INTRAVENOUS at 16:51

## 2024-12-04 RX ADMIN — Medication 10 ML: at 21:03

## 2024-12-04 RX ADMIN — THIAMINE HYDROCHLORIDE 200 MG: 100 INJECTION, SOLUTION INTRAMUSCULAR; INTRAVENOUS at 21:03

## 2024-12-04 RX ADMIN — FOLIC ACID 1 MG: 5 INJECTION, SOLUTION INTRAMUSCULAR; INTRAVENOUS; SUBCUTANEOUS at 14:26

## 2024-12-04 RX ADMIN — APIXABAN 5 MG: 5 TABLET, FILM COATED ORAL at 21:03

## 2024-12-04 RX ADMIN — POTASSIUM PHOSPHATE, MONOBASIC AND POTASSIUM PHOSPHATE, DIBASIC 15 MMOL: 224; 236 INJECTION, SOLUTION, CONCENTRATE INTRAVENOUS at 17:52

## 2024-12-04 RX ADMIN — ASPIRIN 81 MG: 81 TABLET, COATED ORAL at 16:42

## 2024-12-04 RX ADMIN — LORAZEPAM 2 MG: 2 INJECTION INTRAMUSCULAR; INTRAVENOUS at 16:42

## 2024-12-04 RX ADMIN — METOPROLOL SUCCINATE 25 MG: 25 TABLET, EXTENDED RELEASE ORAL at 16:42

## 2024-12-04 RX ADMIN — PANTOPRAZOLE SODIUM 40 MG: 40 TABLET, DELAYED RELEASE ORAL at 16:42

## 2024-12-04 RX ADMIN — TETANUS TOXOID, REDUCED DIPHTHERIA TOXOID AND ACELLULAR PERTUSSIS VACCINE, ADSORBED 0.5 ML: 5; 2.5; 8; 8; 2.5 SUSPENSION INTRAMUSCULAR at 13:04

## 2024-12-04 RX ADMIN — THIAMINE HYDROCHLORIDE 200 MG: 100 INJECTION, SOLUTION INTRAMUSCULAR; INTRAVENOUS at 13:04

## 2024-12-04 RX ADMIN — LORAZEPAM 1 MG: 2 INJECTION INTRAMUSCULAR; INTRAVENOUS at 22:00

## 2024-12-04 RX ADMIN — AZELASTINE HYDROCHLORIDE 2 SPRAY: 137 SPRAY, METERED NASAL at 21:03

## 2024-12-04 NOTE — ED PROVIDER NOTES
Subjective   History of Present Illness  Patient is a 69-year-old male with a history of alcohol abuse and atrial fibrillation who presents to the ER with multiple complaints.  Patient states he may be withdrawing from alcohol.  Patient states he has been drinking daily for the last year and a half.  Patient states he did drink some alcohol last night and again this morning.  Patient was admitted here from November 16 to 21 for alcohol withdrawal.  Patient states he did fall last night/early morning around 3 AM and did hit his head.  He denies any loss of consciousness but is on Eliquis.  Patient states he laid in the floor until this morning when someone found him.  He denies any fever, chest pain, shortness of air, abdominal pain, nausea vomiting diarrhea, urinary changes, numbness, weakness, neck or back pain, extremity pain.      Review of Systems   Constitutional: Negative.    HENT: Negative.     Eyes: Negative.    Respiratory: Negative.     Cardiovascular: Negative.    Gastrointestinal: Negative.    Endocrine: Negative.    Genitourinary: Negative.    Musculoskeletal: Negative.    Skin: Negative.    Allergic/Immunologic: Negative.    Neurological: Negative.    Hematological: Negative.    Psychiatric/Behavioral: Negative.     All other systems reviewed and are negative.      Past Medical History:   Diagnosis Date    A-fib     Alcoholic hepatitis 07/13/2023    CAD (coronary artery disease)     History of external beam radiation therapy 05/12/2016    6840 cGy to prostate bed    Hyperlipidemia     Hypertension     Insomnia     Prostate cancer        No Known Allergies    Past Surgical History:   Procedure Laterality Date    CARDIAC CATHETERIZATION      CORONARY ANGIOPLASTY WITH STENT PLACEMENT      FRACTURE SURGERY      PROSTATE SURGERY      TONSILLECTOMY         Family History   Problem Relation Age of Onset    Heart disease Mother     Coronary artery disease Mother     Stroke Mother        Social History      Socioeconomic History    Marital status: Single   Tobacco Use    Smoking status: Never    Smokeless tobacco: Never   Vaping Use    Vaping status: Never Used   Substance and Sexual Activity    Alcohol use: Not Currently     Alcohol/week: 50.0 standard drinks of alcohol     Types: 50 Shots of liquor per week    Drug use: Not Currently     Types: Marijuana    Sexual activity: Defer           Objective   Physical Exam  Vitals and nursing note reviewed.   Constitutional:       Appearance: He is well-developed.   HENT:      Head: Normocephalic.      Comments: Healing ecchymosis to the right periorbital area  Eyes:      Conjunctiva/sclera: Conjunctivae normal.      Pupils: Pupils are equal, round, and reactive to light.   Cardiovascular:      Rate and Rhythm: Normal rate and regular rhythm.      Heart sounds: Murmur heard.   Pulmonary:      Effort: Pulmonary effort is normal.      Breath sounds: Normal breath sounds.   Abdominal:      Palpations: Abdomen is soft.      Tenderness: There is no abdominal tenderness.   Musculoskeletal:         General: No deformity. Normal range of motion.      Cervical back: Normal range of motion.   Skin:     General: Skin is warm.      Comments: Multiple areas of ecchymosis visible over the bilateral upper and lower extremities and right anterior chest wall, skin breakdown with erythema to the sacral region with mild active bleeding, petechiae noted to the bilateral lower extremities   Neurological:      Mental Status: He is alert and oriented to person, place, and time.      Sensory: Sensation is intact.      Motor: Motor function is intact.   Psychiatric:         Behavior: Behavior normal.         Procedures           ED Course        EKG as interpreted by me: Normal sinus rhythm with a rate of 96, Q waves in the inferior leads, prolonged QT                                     Lab Results (last 24 hours)       Procedure Component Value Units Date/Time    CK [[id number]]  (Abnormal)  Collected: 12/04/24 1236    Specimen: Blood Updated: 12/04/24 1303     Creatine Kinase 702 U/L     aPTT [[id number]]  (Normal) Collected: 12/04/24 1236    Specimen: Blood Updated: 12/04/24 1314     PTT 34.5 seconds     CBC & Differential [[id number]]  (Abnormal) Collected: 12/04/24 1236    Specimen: Blood Updated: 12/04/24 1307    Narrative:      The following orders were created for panel order CBC & Differential.  Procedure                               Abnormality         Status                     ---------                               -----------         ------                     CBC Auto Differential[[id number]]        Abnormal            Final result                 Please view results for these tests on the individual orders.    Comprehensive Metabolic Panel [[id number]]  (Abnormal) Collected: 12/04/24 1236    Specimen: Blood Updated: 12/04/24 1337     Glucose 133 mg/dL      BUN 17 mg/dL      Creatinine 1.45 mg/dL      Sodium 136 mmol/L      Potassium 4.8 mmol/L      Chloride 91 mmol/L      CO2 24.0 mmol/L      Calcium 9.0 mg/dL      Total Protein 7.7 g/dL      Albumin 4.3 g/dL      ALT (SGPT) 114 U/L      AST (SGOT) 133 U/L      Alkaline Phosphatase 102 U/L      Total Bilirubin 1.3 mg/dL      Globulin 3.4 gm/dL      A/G Ratio 1.3 g/dL      BUN/Creatinine Ratio 11.7     Anion Gap 21.0 mmol/L      eGFR 51.8 mL/min/1.73     Narrative:      GFR Normal >60  Chronic Kidney Disease <60  Kidney Failure <15      Ethanol [[id number]] Collected: 12/04/24 1236    Specimen: Blood Updated: 12/04/24 1332     Ethanol % 0.067 %     Narrative:      Not for legal purposes. Chain of Custody not followed.     Magnesium [[id number]]  (Normal) Collected: 12/04/24 1236    Specimen: Blood Updated: 12/04/24 1333     Magnesium 2.1 mg/dL     Protime-INR [[id number]]  (Abnormal) Collected: 12/04/24 1236    Specimen: Blood Updated: 12/04/24 1314     Protime 17.2 Seconds      INR 1.35    CBC Auto Differential [[id number]]  (Abnormal)  Collected: 12/04/24 1236    Specimen: Blood Updated: 12/04/24 1307     WBC 6.83 10*3/mm3      RBC 3.49 10*6/mm3      Hemoglobin 11.2 g/dL      Hematocrit 33.6 %      MCV 96.3 fL      MCH 32.1 pg      MCHC 33.3 g/dL      RDW 14.5 %      RDW-SD 50.7 fl      MPV 10.5 fL      Platelets 141 10*3/mm3      Neutrophil % 86.4 %      Lymphocyte % 4.4 %      Monocyte % 8.3 %      Eosinophil % 0.1 %      Basophil % 0.1 %      Immature Grans % 0.7 %      Neutrophils, Absolute 5.89 10*3/mm3      Lymphocytes, Absolute 0.30 10*3/mm3      Monocytes, Absolute 0.57 10*3/mm3      Eosinophils, Absolute 0.01 10*3/mm3      Basophils, Absolute 0.01 10*3/mm3      Immature Grans, Absolute 0.05 10*3/mm3      nRBC 0.6 /100 WBC            CT Head Without Contrast   Final Result   1. No acute intracranial abnormality is seen.               This report was signed and finalized on 12/4/2024 2:20 PM by Dr. Landon Langford MD.          CT Cervical Spine Without Contrast   Final Result   Similar moderate degenerative change of the cervical spine with no acute   cervical vertebral fracture or traumatic malalignment.       This report was signed and finalized on 12/4/2024 2:36 PM by Dr. Bri Duong MD.          XR Chest 1 View   Final Result   No active disease is seen.               This report was signed and finalized on 12/4/2024 1:49 PM by Dr. Nick Barr MD.                         Medical Decision Making  Patient is a 69-year-old male with a history of alcohol abuse and atrial fibrillation who presents to the ER with multiple complaints.  Patient states he may be withdrawing from alcohol.  Patient states he has been drinking daily for the last year and a half.  Patient states he did drink some alcohol last night and again this morning.  Patient was admitted here from November 16 to 21 for alcohol withdrawal.  Patient states he did fall last night/early morning around 3 AM and did hit his head.  He denies any loss of consciousness but  is on Eliquis.  Patient states he laid in the floor until this morning when someone found him.  He denies any fever, chest pain, shortness of air, abdominal pain, nausea vomiting diarrhea, urinary changes, numbness, weakness, neck or back pain, extremity pain.    Differential diagnosis: Alcohol withdrawal, electrolyte abnormality, dehydration, rhabdomyolysis    Patient was given a Tdap and a rally pack with IV fluids.  Labs showed an elevated CK and an elevated creatinine concerning for rhabdomyolysis.  Labs also showed elevated LFTs including ALT, AST and bilirubin.  Patient also had hypochloremia, an elevated anion gap and anemia that was improved from previous check.  Ethanol level was minimally elevated at 0.06.  Chest x-ray was negative.  CT scan of the head was negative.  CT scan of the C-spine showed degenerative changes but no acute fracture.  Patient became tachycardic with tremors while here in the ER concerning for alcohol withdrawal.  He was given a dose of Ativan.  I discussed the case with Zeny Agustin with the hospitalist service and patient was admitted to Dr. Adam's team for further treatment.      Problems Addressed:  Alcohol withdrawal syndrome without complication: complicated acute illness or injury  Pressure injury of sacral region, stage 1: complicated acute illness or injury  Traumatic rhabdomyolysis, initial encounter: complicated acute illness or injury    Amount and/or Complexity of Data Reviewed  Labs: ordered. Decision-making details documented in ED Course.  Radiology: ordered. Decision-making details documented in ED Course.  ECG/medicine tests: ordered. Decision-making details documented in ED Course.  Discussion of management or test interpretation with external provider(s): Zeny Agustin with the hospitalist team    Risk  Prescription drug management.  Decision regarding hospitalization.        Final diagnoses:   Alcohol withdrawal syndrome without complication   Pressure  injury of sacral region, stage 1   Traumatic rhabdomyolysis, initial encounter       ED Disposition  ED Disposition       ED Disposition   Decision to Admit    Condition   --    Comment   Level of Care: Telemetry [5]   Diagnosis: Alcohol withdrawal [291.81.ICD-9-CM]   Admitting Physician: KYLAH GARRETT [[id number]]   Attending Physician: KYLAH GARRETT [[id number]]   Certification: I Certify That Inpatient Hospital Services Are Medically Necessary For Greater Than 2 Midnights                 No follow-up provider specified.       Medication List      No changes were made to your prescriptions during this visit.            Zuleyma Pinto MD  12/04/24 1525

## 2024-12-04 NOTE — H&P
"    [facility] Medicine Services  HISTORY AND PHYSICAL    Date of Admission: 12/4/2024  Primary Care Physician: Herberth Nguyen Jr., MD    Subjective   Primary Historian: Patient deemed reliable historian    Chief Complaint: Fall    History of Present Illness  Dayron Lubin is a 69-year-old male with a past medical history significant for hypertrophic obstructive cardiomyopathy, paroxysmal atrial fibrillation on chronic anticoagulation with Eliquis, hypertension, hyperlipidemia, prostate cancer, coronary artery disease, and ongoing alcohol use, deconditioned.  Patient had recent admission 11/16 - 11/21/2024 admitted with falls and daily alcohol use.  Patient admitted to drinking 6-8 shots of vodka daily but brother indicated patient drinks \"much more than that\".  He has issues with ongoing alcohol use and falls.  He was treated for acute kidney injury, concern for alcohol withdrawal and required scheduled Ativan and prophylactic vitamin replacement.  He presented to our facility on 12/4 reporting that he has been drinking alcohol for the last year and a half.  Patient sdmitted to drinking alcohol last night and again this morning.  He reported a fall around 3 AM and did hit his head without loss of consciousness.  Patient reported he laid on the floor until this morning when he was found.  He told me his last drink was last night then his last alcohol drink was at 11 AM today.  He denied fever, chills, chest pain, shortness of breath, abdominal pain, nausea, vomiting, diarrhea, change in bowel or bladder habits.  CK7 102, creatinine 1.45 with baseline creatinine 1, phosphorus 2.4, , , total bilirubin 1.3, INR 1.35, WBC 6.83.  Alcohol level 0.067.  CT scan of the head no acute intracranial abnormality.  CT cervical spine similar moderate degenerative changes of the cervical spine with no acute cervical vertebral fracture or traumatic malalignment.  Chest x-ray no acute " findings.  Ativan 1 mg, saline, thiamine, folic acid, tetanus given in ER.        Review of Systems   Constitutional:  Positive for activity change and fatigue. Negative for chills and fever.   HENT:  Negative for congestion and trouble swallowing.    Eyes:  Negative for photophobia and visual disturbance.   Respiratory:  Negative for cough, shortness of breath and wheezing.    Cardiovascular:  Negative for chest pain, palpitations and leg swelling.   Gastrointestinal:  Positive for diarrhea. Negative for constipation, nausea and vomiting.   Endocrine: Negative for cold intolerance, heat intolerance and polyuria.   Genitourinary:  Negative for dysuria, frequency and urgency.   Musculoskeletal:  Positive for gait problem.   Skin:  Positive for rash (Buttocks excoriated).   Allergic/Immunologic: Negative for immunocompromised state.   Neurological:  Positive for tremors and weakness. Negative for light-headedness.   Hematological:  Negative for adenopathy. Does not bruise/bleed easily.   Psychiatric/Behavioral:  Negative for agitation, behavioral problems and confusion.       Otherwise complete ROS reviewed and negative except as mentioned in the HPI.    Past Medical History:   Past Medical History:   Diagnosis Date    A-fib     Alcoholic hepatitis 07/13/2023    CAD (coronary artery disease)     History of external beam radiation therapy 05/12/2016    6840 cGy to prostate bed    Hyperlipidemia     Hypertension     Insomnia     Prostate cancer      Past Surgical History:  Past Surgical History:   Procedure Laterality Date    CARDIAC CATHETERIZATION      CORONARY ANGIOPLASTY WITH STENT PLACEMENT      FRACTURE SURGERY      PROSTATE SURGERY      TONSILLECTOMY       Social History:  reports that he has never smoked. He has never used smokeless tobacco. He reports that he does not currently use alcohol after a past usage of about 50.0 standard drinks of alcohol per week. He reports that he does not currently use drugs after  having used the following drugs: Marijuana.    Family History: family history includes Coronary artery disease in his mother; Heart disease in his mother; Stroke in his mother.       Allergies:  No Known Allergies    Medications:  Prior to Admission medications    Medication Sig Start Date End Date Taking? Authorizing Provider   amiodarone (PACERONE) 200 MG tablet Take 1 tablet by mouth Daily. 9/10/24   Italia Vasquez PA   apixaban (ELIQUIS) 5 MG tablet tablet Take 1 tablet by mouth Every 12 (Twelve) Hours. Indications: Atrial Fibrillation 11/23/24   Italia Vasquez PA   aspirin (aspirin) 81 MG EC tablet Take 1 tablet by mouth Daily. 12/23/20   Edda Li APRN   atorvastatin (LIPITOR) 20 MG tablet Take 1 tablet by mouth Daily.    ProviderSteve MD   azelastine (ASTELIN) 0.1 % nasal spray 2 sprays into the nostril(s) as directed by provider 2 (Two) Times a Day. Use in each nostril as directed 9/5/24   Gonzalo Martinez APRN   cholestyramine (QUESTRAN) 4 g packet Take 1 packet by mouth Every 12 (Twelve) Hours As Needed (diarrhea). 11/21/24   Holger Karimi DO   fluticasone (FLONASE) 50 MCG/ACT nasal spray 2 sprays into the nostril(s) as directed by provider Daily. 9/5/24   Gonzalo Martinez APRN   folic acid (FOLVITE) 1 MG tablet Take 1 tablet by mouth Daily. 11/22/24   Holger Karimi DO   losartan (Cozaar) 25 MG tablet Take 1 tablet by mouth Daily. 10/21/24   Italia Vasquez PA   metoprolol succinate XL (TOPROL-XL) 25 MG 24 hr tablet Take 1 tablet by mouth Daily.    ProviderSteve MD   pantoprazole (PROTONIX) 40 MG EC tablet Take 1 tablet by mouth Daily.    ProviderSteve MD   thiamine (VITAMIN B1) 100 MG tablet Take 1 tablet by mouth Daily. 11/22/24   Holger Karimi DO     I have utilized all available immediate resources to obtain, update, or review the patient's current medications (including all prescriptions, over-the-counter products, herbals,  "cannabis/cannabidiol products, and vitamin/mineral/dietary (nutritional) supplements).    Objective     Vital Signs: /82 (BP Location: Left arm, Patient Position: Lying)   Pulse 98   Temp 98.1 °F (36.7 °C) (Oral)   Resp 18   Ht 180.3 cm (71\")   Wt 81.6 kg (180 lb)   SpO2 99%   BMI 25.10 kg/m²   Physical Exam  Vitals and nursing note reviewed.   Constitutional:       Comments: Lung on stretcher.  No oxygen in use.  No visitors in room.   HENT:      Head: Normocephalic and atraumatic.      Nose: No congestion.      Mouth/Throat:      Pharynx: No oropharyngeal exudate or posterior oropharyngeal erythema.   Eyes:      Extraocular Movements: Extraocular movements intact.      Pupils: Pupils are equal, round, and reactive to light.   Cardiovascular:      Rate and Rhythm: Normal rate and regular rhythm.      Heart sounds: No murmur heard.  Pulmonary:      Breath sounds: No wheezing, rhonchi or rales.      Comments: No oxygen in use.  Abdominal:      Palpations: Abdomen is soft.      Tenderness: There is no abdominal tenderness.   Genitourinary:     Comments: Voiding.  Musculoskeletal:         General: No tenderness.      Cervical back: Normal range of motion and neck supple.   Skin:     General: Skin is warm and dry.      Comments: Scabs lower extremity.  Buttocks excoriated.  Jacqueline's Butt cream applied.   Neurological:      General: No focal deficit present.      Mental Status: He is alert and oriented to person, place, and time.      Comments: Mild tremor bilaterally   Psychiatric:         Mood and Affect: Mood normal.         Behavior: Behavior normal.         Thought Content: Thought content normal.         Judgment: Judgment normal.            Results Reviewed:  Lab Results (last 24 hours)       Procedure Component Value Units Date/Time    Comprehensive Metabolic Panel [[id number]]  (Abnormal) Collected: 12/04/24 1236    Specimen: Blood Updated: 12/04/24 1337     Glucose 133 mg/dL      BUN 17 mg/dL      " Creatinine 1.45 mg/dL      Sodium 136 mmol/L      Potassium 4.8 mmol/L      Chloride 91 mmol/L      CO2 24.0 mmol/L      Calcium 9.0 mg/dL      Total Protein 7.7 g/dL      Albumin 4.3 g/dL      ALT (SGPT) 114 U/L      AST (SGOT) 133 U/L      Alkaline Phosphatase 102 U/L      Total Bilirubin 1.3 mg/dL      Globulin 3.4 gm/dL      A/G Ratio 1.3 g/dL      BUN/Creatinine Ratio 11.7     Anion Gap 21.0 mmol/L      eGFR 51.8 mL/min/1.73     Narrative:      GFR Normal >60  Chronic Kidney Disease <60  Kidney Failure <15      Magnesium [[id number]]  (Normal) Collected: 12/04/24 1236    Specimen: Blood Updated: 12/04/24 1333     Magnesium 2.1 mg/dL     Ethanol [[id number]] Collected: 12/04/24 1236    Specimen: Blood Updated: 12/04/24 1332     Ethanol % 0.067 %     Narrative:      Not for legal purposes. Chain of Custody not followed.     aPTT [[id number]]  (Normal) Collected: 12/04/24 1236    Specimen: Blood Updated: 12/04/24 1314     PTT 34.5 seconds     Protime-INR [[id number]]  (Abnormal) Collected: 12/04/24 1236    Specimen: Blood Updated: 12/04/24 1314     Protime 17.2 Seconds      INR 1.35    CBC & Differential [[id number]]  (Abnormal) Collected: 12/04/24 1236    Specimen: Blood Updated: 12/04/24 1307    Narrative:      The following orders were created for panel order CBC & Differential.  Procedure                               Abnormality         Status                     ---------                               -----------         ------                     CBC Auto Differential[[id number]]        Abnormal            Final result                 Please view results for these tests on the individual orders.    CBC Auto Differential [[id number]]  (Abnormal) Collected: 12/04/24 1236    Specimen: Blood Updated: 12/04/24 1307     WBC 6.83 10*3/mm3      RBC 3.49 10*6/mm3      Hemoglobin 11.2 g/dL      Hematocrit 33.6 %      MCV 96.3 fL      MCH 32.1 pg      MCHC 33.3 g/dL      RDW 14.5 %      RDW-SD 50.7 fl      MPV 10.5 fL       Platelets 141 10*3/mm3      Neutrophil % 86.4 %      Lymphocyte % 4.4 %      Monocyte % 8.3 %      Eosinophil % 0.1 %      Basophil % 0.1 %      Immature Grans % 0.7 %      Neutrophils, Absolute 5.89 10*3/mm3      Lymphocytes, Absolute 0.30 10*3/mm3      Monocytes, Absolute 0.57 10*3/mm3      Eosinophils, Absolute 0.01 10*3/mm3      Basophils, Absolute 0.01 10*3/mm3      Immature Grans, Absolute 0.05 10*3/mm3      nRBC 0.6 /100 WBC     CK [[id number]]  (Abnormal) Collected: 12/04/24 1236    Specimen: Blood Updated: 12/04/24 1303     Creatine Kinase 702 U/L     [place] Draw [[id number]] Collected: 12/04/24 1236    Specimen: Blood Updated: 12/04/24 1246    Narrative:      The following orders were created for panel order [place] Draw.  Procedure                               Abnormality         Status                     ---------                               -----------         ------                     Green Top (Gel)[[id number]]                                  Final result               Lavender Top[[id number]]                                     Final result               Red Top[[id number]]                                          Final result               Light Blue Top[[id number]]                                   Final result                 Please view results for these tests on the individual orders.    Green Top (Gel) [[id number]] Collected: 12/04/24 1236    Specimen: Blood Updated: 12/04/24 1246     Extra Tube Hold for add-ons.     Comment: Auto resulted.       Lavender Top [[id number]] Collected: 12/04/24 1236    Specimen: Blood Updated: 12/04/24 1246     Extra Tube hold for add-on     Comment: Auto resulted       Red Top [[id number]] Collected: 12/04/24 1236    Specimen: Blood Updated: 12/04/24 1246     Extra Tube Hold for add-ons.     Comment: Auto resulted.       Light Blue Top [[id number]] Collected: 12/04/24 1236    Specimen: Blood Updated: 12/04/24 1246     Extra Tube Hold for add-ons.     Comment: Auto  resulted             Imaging Results (Last 24 Hours)       Procedure Component Value Units Date/Time    CT Cervical Spine Without Contrast [[id number]] Collected: 12/04/24 1419     Updated: 12/04/24 1439    Narrative:      CT CERVICAL SPINE WO CONTRAST- 12/4/2024 1:07 PM     HISTORY: fall      COMPARISON: 11/16/2024     TOTAL DOSE LENGTH PRODUCT: 1163.67 mGy.cm. Automated exposure control  was also utilized to decrease patient radiation dose.     TECHNIQUE: Axial images of cervical spine obtained with sagittal and  coronal reconstructions     FINDINGS: Similar alignment of the cervical spine with moderate  degenerative disc change and left greater than right facet arthropathy.  Uncinate process hypertrophy at the C5-C7 level. No acute cervical  vertebral fracture or traumatic malalignment identified.     Degenerative changes result in mild central cervical canal stenosis with  moderate left C4/5, mild to moderate bilateral C5/6 foraminal narrowing  with mild narrowing above and below these levels.     Calcifications of the carotid bulbs.       Impression:      Similar moderate degenerative change of the cervical spine with no acute  cervical vertebral fracture or traumatic malalignment.     This report was signed and finalized on 12/4/2024 2:36 PM by Dr. Bri Duong MD.       CT Head Without Contrast [[id number]] Collected: 12/04/24 1418     Updated: 12/04/24 1423    Narrative:      EXAMINATION: CT HEAD WO CONTRAST-      12/4/2024 1:07 PM     HISTORY: Fall injury. Head and neck trauma.     In order to have a CT radiation dose as low as reasonably achievable  Automated Exposure Control was utilized for adjustment of the mA and/or  KV according to patient size.     CT Dose DLP = 1163.67 mGy.cm.  (If there are multiple studies performed at the same time this  represents the total dose).     Comparison:  11/16/2024.     Axial, sagittal, and coronal noncontrast CT imaging of the head.     The visualized paranasal  sinuses are clear.     The brain and ventricles have an age appropriate appearance.      There is no hemorrhage or mass-effect.   No acute infarction is seen.     No calvarial abnormality.       Impression:      1. No acute intracranial abnormality is seen.           This report was signed and finalized on 12/4/2024 2:20 PM by Dr. Landon Langford MD.       XR Chest 1 View [[id number]] Collected: 12/04/24 1346     Updated: 12/04/24 1352    Narrative:      EXAMINATION:  XR CHEST 1 VW-  12/4/2024 11:53 AM     HISTORY: The patient fell. Hypertension. Coronary artery disease.     COMPARISON: 11/16/2024.     TECHNIQUE: Single view AP image.     FINDINGS:  The lungs are expanded bilaterally and clear. There is no  evidence of lung contusion or pneumothorax. There is no mediastinal  widening. The pleural spaces are clear. Heart size is normal. There are  degenerative changes of the visualized spine. No acute bony abnormality  is seen.          Impression:      No active disease is seen.           This report was signed and finalized on 12/4/2024 1:49 PM by Dr. Nick Barr MD.             I have personally reviewed and interpreted the radiology studies and ECG obtained at time of admission.     Assessment / Plan   Assessment:   Active Hospital Problems    Diagnosis     **Alcohol intoxication     Transaminitis     Non-traumatic rhabdomyolysis     Multiple falls     Paroxysmal atrial fibrillation     Physical debility     Alcohol abuse     Acute kidney injury     HOCM (hypertrophic obstructive cardiomyopathy)     Essential hypertension        Treatment Plan  The patient will be admitted to Dr. Adam's service at [facility].     1.  Alcohol intoxication with chronic alcohol use.  Patient drinks 6-8 shots of vodka daily.  Last drink last night or early this morning.  Blood alcohol 0.067.  CIWA protocol.  Ativan as needed.  Banana bag for IV fluids.  Prophylactic MVI and folic acid.  Protonix.    2.  Multiple  falls.  Will consult physical therapy.  Patient may need skilled nursing facility at discharge.    3.  Acute kidney injury.  Creatinine 1.45 on admission with baseline creatinine 1.  Will hydrate with IV fluids repeat CMP in AM.    4.  Primary hypertension.  Blood pressure 127/84, 140/91.  Continue metoprolol XL.  Hold losartan due to elevated creatinine    5.  Nontraumatic rhabdomyolysis due to recurrent falls.  .  Repeat CK in AM.    6.  Paroxysmal atrial fibrillation on chronic anticoagulation with Eliquis.  Continue amiodarone and Eliquis.    7.  Transaminitis due to alcohol use.   previously 97 at discharge.  , previously 76 at discharge.  Bilirubin 1.3 previously 0.7 at discharge on 11/20/2024.    8.  SCDs for deep vein thrombosis prophylaxis.    Medical Decision Making  Number and Complexity of problems: 7  Alcohol intoxication with chronic alcohol use.  Concern for alcohol withdrawal: Acute, high complexity poses threat to life and bodily function, not at baseline  Multiple recurrent falls: Acute on chronic, high complexity, not at baseline  Acute kidney injury: Acute, high complexity poses threat to life and bodily function, not at baseline  Primary hypertension: Chronic, moderate complexity, stable  Nontraumatic rhabdomyolysis due to recurrent falls: Acute on chronic, high complexity poses threat to life and bodily function, not at baseline  Paroxysmal atrial fibrillation on chronic anticoagulation: Chronic, moderate complexity, stable  Transaminitis due to ongoing alcohol use: Acute, high complexity poses threat to life bodily function, not at baseline    Differential Diagnosis: None    Conditions and Status        Condition is unchanged.     [facility] Data  External documents reviewed: Reviewed epic reviewed previous hospital stay 11/16 - 11/21/2024.   Cardiac tracing (EKG, telemetry) interpretation: Personally reviewed EKG 12//2024.  Normal sinus rhythm 96 bpm.  Radiology interpretation:  Reviewed radiology interpretation CT head, CT cervical spine, chest x-ray,  Labs reviewed:   CMP 12//24.  Repeat CMP in AM.  CBC 12//2024.  Repeat CBC in AM.  .  Repeat CPK in AM.  Phosphorus.  Repeat phosphorus in AM.  PT/INR    Any tests that were considered but not ordered: None     Decision rules/scores evaluated (example [id number], [place], etc): [id number] score     Discussed with: Discussed with Dr. Adam and patient.     Care Planning  Shared decision making: Discussed with Dr. Adam and patient.  Patient agrees to admission, IV fluids, prophylactic thiamine and MVI, Ativan as needed for alcohol withdrawal, CIWA protocol, follow-up lab work, consult physical therapy,  for discharge planning.  Code status and discussions: Full code  Patient surrogate decision maker is his Sister Yuliya Stone    Disposition  Social Determinants of Health that impact treatment or disposition: Patient may need skilled facility  Estimated length of stay is 3-5 days.     I confirmed that the patient's advanced care plan is present, code status is documented, and a surrogate decision maker is listed in the patient's medical record.     The patient's surrogate decision maker is his Sister Yuliya Stone.     The patient was seen and examined by me on 12/04/2024 at 1536.    Electronically signed by BRI Schaeffer, 12/04/24, 15:36 CST.    The above documentation resulted from a face-to-face encounter by me Zeny GREGORY, [facility].

## 2024-12-04 NOTE — ED NOTES
"Nursing report ED to floor  Dayron Lubin  69 y.o.  male    HPI:   Chief Complaint   Patient presents with    Alcohol Intoxication       Admitting doctor:   Darryl Adam MD    Consulting provider(s):  Consults       Date and Time Order Name Status Description    11/16/2024  7:03 PM Inpatient Wound Care MD Consult Completed              Admitting diagnosis:   The primary encounter diagnosis was Alcohol withdrawal syndrome without complication. Diagnoses of Pressure injury of sacral region, stage 1 and Traumatic rhabdomyolysis, initial encounter were also pertinent to this visit.    Code status:   Current Code Status       Date Active Code Status Order ID Comments User Context       Prior            Allergies:   Patient has no known allergies.    Intake and Output  No intake or output data in the 24 hours ending 12/04/24 1554    Weight:       12/04/24  1225   Weight: 81.6 kg (180 lb)       Most recent vitals:   Vitals:    12/04/24 1225 12/04/24 1346 12/04/24 1432 12/04/24 1531   BP: 126/87 137/86 140/91 127/82   BP Location: Left arm Left arm Left arm Left arm   Patient Position: Lying Sitting Sitting Lying   Pulse: 100 96 93 98   Resp: 20 18 22 18   Temp: 98.1 °F (36.7 °C)      TempSrc: Oral      SpO2: 98% 98% 97% 99%   Weight: 81.6 kg (180 lb)      Height: 180.3 cm (71\")        Oxygen Therapy: .    Active LDAs/IV Access:   Lines, Drains & Airways       Active LDAs       Name Placement date Placement time Site Days    Peripheral IV 12/04/24 1237 Anterior;Right Forearm 12/04/24  1237  Forearm  less than 1    Peripheral IV 12/04/24 1200 Distal;Left;Posterior Forearm 12/04/24  1200  Forearm  less than 1    External Urinary Catheter 12/04/24  1300  --  less than 1                    Labs (abnormal labs have a star):   Labs Reviewed   CK - Abnormal; Notable for the following components:       Result Value    Creatine Kinase 702 (*)     All other components within normal limits   COMPREHENSIVE METABOLIC PANEL - " Abnormal; Notable for the following components:    Glucose 133 (*)     Creatinine 1.45 (*)     Chloride 91 (*)     ALT (SGPT) 114 (*)     AST (SGOT) 133 (*)     Total Bilirubin 1.3 (*)     Anion Gap 21.0 (*)     eGFR 51.8 (*)     All other components within normal limits    Narrative:     GFR Normal >60  Chronic Kidney Disease <60  Kidney Failure <15     PROTIME-INR - Abnormal; Notable for the following components:    Protime 17.2 (*)     INR 1.35 (*)     All other components within normal limits   CBC WITH AUTO DIFFERENTIAL - Abnormal; Notable for the following components:    RBC 3.49 (*)     Hemoglobin 11.2 (*)     Hematocrit 33.6 (*)     Neutrophil % 86.4 (*)     Lymphocyte % 4.4 (*)     Eosinophil % 0.1 (*)     Immature Grans % 0.7 (*)     Lymphocytes, Absolute 0.30 (*)     nRBC 0.6 (*)     All other components within normal limits   APTT - Normal   MAGNESIUM - Normal   RAINBOW DRAW    Narrative:     The following orders were created for panel order [place] Draw.  Procedure                               Abnormality         Status                     ---------                               -----------         ------                     Green Top (Gel)[[id number]]                                  Final result               Lavender Top[[id number]]                                     Final result               Red Top[[id number]]                                          Final result               Light Blue Top[[id number]]                                   Final result                 Please view results for these tests on the individual orders.   ETHANOL    Narrative:     Not for legal purposes. Chain of Custody not followed.    GREEN TOP   LAVENDER TOP   RED TOP   LIGHT BLUE TOP   CBC AND DIFFERENTIAL    Narrative:     The following orders were created for panel order CBC & Differential.  Procedure                               Abnormality         Status                     ---------                                -----------         ------                     CBC Auto Differential[[id number]]        Abnormal            Final result                 Please view results for these tests on the individual orders.       Meds given in ED:   Medications   sodium chloride 0.9 % flush 10 mL (has no administration in time range)   sodium chloride 0.9 % flush 10 mL (has no administration in time range)   sodium chloride 0.9 % infusion 1,000 mL (0 mL Intravenous Stopped 12/4/24 1430)   thiamine (B-1) injection 200 mg (200 mg Intravenous Given 12/4/24 1304)   Tetanus-Diphth-Acell Pertussis (BOOSTRIX) injection 0.5 mL (0.5 mL Intramuscular Given 12/4/24 1304)   folic acid injection 1 mg (1 mg Intravenous Given 12/4/24 1426)   LORazepam (ATIVAN) injection 1 mg (1 mg Intravenous Given 12/4/24 1453)           NIH Stroke Scale:       Isolation/Infection(s):  No active isolations   No active infections     COVID Testing  Collected .  Resulted .    Nursing report ED to floor:  Mental status: A & O x3  Ambulatory status: Max assist  Precautions: fall, CIWA    ED nurse phone extentsion- ..

## 2024-12-05 LAB
ALBUMIN SERPL-MCNC: 3.4 G/DL (ref 3.5–5.2)
ALBUMIN/GLOB SERPL: 1.3 G/DL
ALP SERPL-CCNC: 76 U/L (ref 39–117)
ALT SERPL W P-5'-P-CCNC: 76 U/L (ref 1–41)
ANION GAP SERPL CALCULATED.3IONS-SCNC: 11 MMOL/L (ref 5–15)
AST SERPL-CCNC: 80 U/L (ref 1–40)
BASOPHILS # BLD AUTO: 0.01 10*3/MM3 (ref 0–0.2)
BASOPHILS NFR BLD AUTO: 0.3 % (ref 0–1.5)
BILIRUB SERPL-MCNC: 1.3 MG/DL (ref 0–1.2)
BUN SERPL-MCNC: 24 MG/DL (ref 8–23)
BUN/CREAT SERPL: 18.9 (ref 7–25)
CALCIUM SPEC-SCNC: 7.6 MG/DL (ref 8.6–10.5)
CHLORIDE SERPL-SCNC: 99 MMOL/L (ref 98–107)
CK SERPL-CCNC: 376 U/L (ref 20–200)
CO2 SERPL-SCNC: 26 MMOL/L (ref 22–29)
CREAT SERPL-MCNC: 1.27 MG/DL (ref 0.76–1.27)
DEPRECATED RDW RBC AUTO: 50.4 FL (ref 37–54)
EGFRCR SERPLBLD CKD-EPI 2021: 60.8 ML/MIN/1.73
EOSINOPHIL # BLD AUTO: 0 10*3/MM3 (ref 0–0.4)
EOSINOPHIL NFR BLD AUTO: 0 % (ref 0.3–6.2)
ERYTHROCYTE [DISTWIDTH] IN BLOOD BY AUTOMATED COUNT: 14.4 % (ref 12.3–15.4)
GLOBULIN UR ELPH-MCNC: 2.7 GM/DL
GLUCOSE SERPL-MCNC: 95 MG/DL (ref 65–99)
HCT VFR BLD AUTO: 27.4 % (ref 37.5–51)
HGB BLD-MCNC: 9.1 G/DL (ref 13–17.7)
IMM GRANULOCYTES # BLD AUTO: 0.02 10*3/MM3 (ref 0–0.05)
IMM GRANULOCYTES NFR BLD AUTO: 0.6 % (ref 0–0.5)
LYMPHOCYTES # BLD AUTO: 0.61 10*3/MM3 (ref 0.7–3.1)
LYMPHOCYTES NFR BLD AUTO: 18.7 % (ref 19.6–45.3)
MCH RBC QN AUTO: 31.9 PG (ref 26.6–33)
MCHC RBC AUTO-ENTMCNC: 33.2 G/DL (ref 31.5–35.7)
MCV RBC AUTO: 96.1 FL (ref 79–97)
MONOCYTES # BLD AUTO: 0.38 10*3/MM3 (ref 0.1–0.9)
MONOCYTES NFR BLD AUTO: 11.6 % (ref 5–12)
NEUTROPHILS NFR BLD AUTO: 2.25 10*3/MM3 (ref 1.7–7)
NEUTROPHILS NFR BLD AUTO: 68.8 % (ref 42.7–76)
PHOSPHATE SERPL-MCNC: 3.3 MG/DL (ref 2.5–4.5)
PLATELET # BLD AUTO: 87 10*3/MM3 (ref 140–450)
PMV BLD AUTO: 10.7 FL (ref 6–12)
POTASSIUM SERPL-SCNC: 4.2 MMOL/L (ref 3.5–5.2)
PROT SERPL-MCNC: 6.1 G/DL (ref 6–8.5)
QT INTERVAL: 406 MS
QTC INTERVAL: 512 MS
RBC # BLD AUTO: 2.85 10*6/MM3 (ref 4.14–5.8)
SODIUM SERPL-SCNC: 136 MMOL/L (ref 136–145)
WBC NRBC COR # BLD AUTO: 3.27 10*3/MM3 (ref 3.4–10.8)

## 2024-12-05 PROCEDURE — 85025 COMPLETE CBC W/AUTO DIFF WBC: CPT | Performed by: NURSE PRACTITIONER

## 2024-12-05 PROCEDURE — 84100 ASSAY OF PHOSPHORUS: CPT | Performed by: NURSE PRACTITIONER

## 2024-12-05 PROCEDURE — 99222 1ST HOSP IP/OBS MODERATE 55: CPT | Performed by: NURSE PRACTITIONER

## 2024-12-05 PROCEDURE — 25010000002 THIAMINE HCL 200 MG/2ML SOLUTION: Performed by: NURSE PRACTITIONER

## 2024-12-05 PROCEDURE — 97165 OT EVAL LOW COMPLEX 30 MIN: CPT | Performed by: OCCUPATIONAL THERAPIST

## 2024-12-05 PROCEDURE — 80053 COMPREHEN METABOLIC PANEL: CPT | Performed by: NURSE PRACTITIONER

## 2024-12-05 PROCEDURE — 82550 ASSAY OF CK (CPK): CPT | Performed by: NURSE PRACTITIONER

## 2024-12-05 PROCEDURE — 25010000002 LORAZEPAM PER 2 MG: Performed by: NURSE PRACTITIONER

## 2024-12-05 PROCEDURE — 97161 PT EVAL LOW COMPLEX 20 MIN: CPT | Performed by: PHYSICAL THERAPIST

## 2024-12-05 RX ORDER — LOPERAMIDE HYDROCHLORIDE 2 MG/1
2 CAPSULE ORAL 4 TIMES DAILY PRN
Status: DISCONTINUED | OUTPATIENT
Start: 2024-12-05 | End: 2024-12-09 | Stop reason: HOSPADM

## 2024-12-05 RX ORDER — TEMAZEPAM 30 MG/1
30 CAPSULE ORAL NIGHTLY PRN
Status: ON HOLD | COMMUNITY

## 2024-12-05 RX ORDER — LOPERAMIDE HYDROCHLORIDE 2 MG/1
4 CAPSULE ORAL ONCE
Status: COMPLETED | OUTPATIENT
Start: 2024-12-05 | End: 2024-12-05

## 2024-12-05 RX ADMIN — THIAMINE HYDROCHLORIDE 200 MG: 100 INJECTION, SOLUTION INTRAMUSCULAR; INTRAVENOUS at 15:25

## 2024-12-05 RX ADMIN — THIAMINE HYDROCHLORIDE 200 MG: 100 INJECTION, SOLUTION INTRAMUSCULAR; INTRAVENOUS at 22:46

## 2024-12-05 RX ADMIN — METOPROLOL SUCCINATE 25 MG: 25 TABLET, EXTENDED RELEASE ORAL at 08:33

## 2024-12-05 RX ADMIN — Medication 10 ML: at 20:17

## 2024-12-05 RX ADMIN — ZINC OXIDE 1 APPLICATION: 200 OINTMENT TOPICAL at 17:04

## 2024-12-05 RX ADMIN — Medication 10 ML: at 08:33

## 2024-12-05 RX ADMIN — PANTOPRAZOLE SODIUM 40 MG: 40 TABLET, DELAYED RELEASE ORAL at 08:33

## 2024-12-05 RX ADMIN — APIXABAN 5 MG: 5 TABLET, FILM COATED ORAL at 20:16

## 2024-12-05 RX ADMIN — APIXABAN 5 MG: 5 TABLET, FILM COATED ORAL at 08:33

## 2024-12-05 RX ADMIN — THIAMINE HYDROCHLORIDE 200 MG: 100 INJECTION, SOLUTION INTRAMUSCULAR; INTRAVENOUS at 05:33

## 2024-12-05 RX ADMIN — ZINC OXIDE 1 APPLICATION: 200 OINTMENT TOPICAL at 20:17

## 2024-12-05 RX ADMIN — LORAZEPAM 2 MG: 2 INJECTION INTRAMUSCULAR; INTRAVENOUS at 20:16

## 2024-12-05 RX ADMIN — LORAZEPAM 1 MG: 2 INJECTION INTRAMUSCULAR; INTRAVENOUS at 05:33

## 2024-12-05 RX ADMIN — AMIODARONE HYDROCHLORIDE 200 MG: 200 TABLET ORAL at 08:33

## 2024-12-05 RX ADMIN — THIAMINE HCL TAB 100 MG 100 MG: 100 TAB at 08:33

## 2024-12-05 RX ADMIN — AZELASTINE HYDROCHLORIDE 2 SPRAY: 137 SPRAY, METERED NASAL at 08:33

## 2024-12-05 RX ADMIN — ATORVASTATIN CALCIUM 20 MG: 10 TABLET, FILM COATED ORAL at 08:33

## 2024-12-05 RX ADMIN — LORAZEPAM 1 MG: 2 INJECTION INTRAMUSCULAR; INTRAVENOUS at 08:36

## 2024-12-05 RX ADMIN — LOPERAMIDE HYDROCHLORIDE 4 MG: 2 CAPSULE ORAL at 17:04

## 2024-12-05 RX ADMIN — LORAZEPAM 1 MG: 2 INJECTION INTRAMUSCULAR; INTRAVENOUS at 15:25

## 2024-12-05 RX ADMIN — FOLIC ACID 1 MG: 1 TABLET ORAL at 08:33

## 2024-12-05 RX ADMIN — ASPIRIN 81 MG: 81 TABLET, COATED ORAL at 08:33

## 2024-12-05 NOTE — PLAN OF CARE
Goal Outcome Evaluation:  Plan of Care Reviewed With: patient        Progress: improving     Pt rested off and on overnight. CIWA 10; see MAR. Pt ran sinus 78-96; per tele. Safety maintained during shift. Bed alarm set and audible. Call light within reach.

## 2024-12-05 NOTE — PLAN OF CARE
Goal Outcome Evaluation:  Plan of Care Reviewed With: patient        Progress: no change  Outcome Evaluation: VSS. Denies pain this shift. NSR 74-96 on ten. Mateusz protocol in place. PT/OT worked with pt today. Pt refuses to go to SNF or rehab for alcoholism. Pt did receive x2 doses of IV Ativan r/t withdrawal symptoms. Wound care ordered today. FMS remains in place with copious amounts of diarrhea. Imodium given. Safety maintained. Plan of care ongoing.

## 2024-12-05 NOTE — THERAPY EVALUATION
Patient Name: Dayron Lubin  : 1955    MRN: [id number]                              Today's Date: 2024       Admit Date: 2024    Visit Dx:     ICD-10-CM ICD-9-CM   1. Alcohol withdrawal syndrome without complication  F10.930 291.81   2. Pressure injury of sacral region, stage 1  L89.151 707.03     707.21   3. Traumatic rhabdomyolysis, initial encounter  T79.6XXA 958.6   4. Impaired mobility [Z74.09]  Z74.09 799.89     Patient Active Problem List   Diagnosis    Prostate cancer    Hx of radiation therapy    Elevated PSA    Encounter for follow-up surveillance of prostate cancer    HOCM (hypertrophic obstructive cardiomyopathy)    Coronary artery disease involving native coronary artery of native heart without angina pectoris    Essential hypertension    Mixed hyperlipidemia    Class 1 obesity with alveolar hypoventilation, serious comorbidity, and body mass index (BMI) of 30.0 to 30.9 in adult    Displaced fracture of lateral malleolus of right fibula, initial encounter for closed fracture    Family history of colonic polyps    History of colon polyps    Elevated brain natriuretic peptide (BNP) level    Acute kidney injury    Anxiety associated with depression    Alcohol abuse    Alcoholic hepatitis    Volume depletion    Anorexia    CHF (congestive heart failure)    Hypokalemia    Duodenitis    Gait instability    SVT (supraventricular tachycardia)    Physical debility    Atrial fibrillation with rapid ventricular response    Alcoholism in remission    Paroxysmal atrial fibrillation    Nasal polyposis    Nasal congestion    Estelle bullosa    Long-term use of high-risk medication    Multiple falls    Transaminitis    Thrombocytopenia    Non-traumatic rhabdomyolysis    Alcohol withdrawal    Pancytopenia    Opiate abuse, episodic    Alcohol intoxication     Past Medical History:   Diagnosis Date    A-fib     Alcoholic hepatitis 2023    CAD (coronary artery disease)     History of external beam  radiation therapy 05/12/2016    6840 cGy to prostate bed    Hyperlipidemia     Hypertension     Insomnia     Prostate cancer      Past Surgical History:   Procedure Laterality Date    CARDIAC CATHETERIZATION      CORONARY ANGIOPLASTY WITH STENT PLACEMENT      FRACTURE SURGERY      PROSTATE SURGERY      TONSILLECTOMY        General Information       Row Name 12/05/24 0988          Physical Therapy Time and Intention    Document Type evaluation  Dx: Alcohol intoxication. Admitted to ED with multiple compaints. Stated he is withdrawling from alcohol  -SB (r) AT (t) SB (c)     Mode of Treatment physical therapy  past medical history significant for hypertrophic obstructive cardiomyopathy, paroxysmal atrial fibrillation on chronic anticoagulation with Eliquis, hypertension, hyperlipidemia, prostate cancer, coronary artery disease, and ongoing alcohol use  -SB (r) AT (t) SB (c)       Row Name 12/05/24 0994          General Information    Patient Profile Reviewed yes  -SB (r) AT (t) SB (c)     Prior Level of Function independent:;all household mobility;ADL's  -SB (r) AT (t) SB (c)     Existing Precautions/Restrictions fall;other (see comments)  excoriation at bottom, rectal tube  -SB (r) AT (t) SB (c)     Barriers to Rehab medically complex  -SB (r) AT (t) SB (c)       Row Name 12/05/24 0975          Living Environment    People in Home alone  -SB (r) AT (t) SB (c)       Row Name 12/05/24 0950          Home Main Entrance    Number of Stairs, Main Entrance two  -SB (r) AT (t) SB (c)     Stair Railings, Main Entrance railing on left side (ascending)  -SB (r) AT (t) SB (c)       Row Name 12/05/24 0950          Stairs Within Home, Primary    Number of Stairs, Within Home, Primary none  -SB (r) AT (t) SB (c)       Row Name 12/05/24 0971          Cognition    Orientation Status (Cognition) oriented x 4  -SB (r) AT (t) SB (c)       Row Name 12/05/24 0907          Safety Issues/Impairments Affecting Functional Mobility    Safety  Issues Affecting Function (Mobility) friction/shear risk  -SB (r) AT (t) SB (c)     Impairments Affecting Function (Mobility) balance;endurance/activity tolerance;strength  -SB (r) AT (t) SB (c)               User Key  (r) = Recorded By, (t) = Taken By, (c) = Cosigned By      Initials Name Provider Type    Kristen Jones, PT DPT Physical Therapist    AT [facility], PT Student PT Student                   Mobility       Row Name 12/05/24 0950          Bed Mobility    Bed Mobility supine-sit;sit-supine;rolling right  -SB (r) AT (t) SB (c)     Rolling Right [place] (Bed Mobility) supervision  -SB (r) AT (t) SB (c)     Supine-Sit [place] (Bed Mobility) standby assist  -SB (r) AT (t) SB (c)     Sit-Supine [place] (Bed Mobility) standby assist  -SB (r) AT (t) SB (c)     Assistive Device (Bed Mobility) bed rails  -SB (r) AT (t) SB (c)       Row Name 12/05/24 0950          Sit-Stand Transfer    Sit-Stand [place] (Transfers) standby assist  -SB (r) AT (t) SB (c)     Assistive Device (Sit-Stand Transfers) walker, front-wheeled  -SB (r) AT (t) SB (c)       Row Name 12/05/24 0950          Gait/Stairs (Locomotion)    [place] Level (Gait) standby assist  -SB (r) AT (t) SB (c)     Assistive Device (Gait) walker, front-wheeled  -SB (r) AT (t) SB (c)     Patient was able to Ambulate yes  -SB (r) AT (t) SB (c)     Distance in Feet (Gait) 20  -SB (r) AT (t) SB (c)     Deviations/Abnormal Patterns (Gait) bilateral deviations;base of support, narrow;judd decreased;festinating/shuffling;gait speed decreased  -SB (r) AT (t) SB (c)     Bilateral Gait Deviations forward flexed posture  -SB (r) AT (t) SB (c)               User Key  (r) = Recorded By, (t) = Taken By, (c) = Cosigned By      Initials Name Provider Type    Kristen Jones, PT DPT Physical Therapist    AT [facility], PT Student PT Student                   Obj/Interventions       Row Name 12/05/24 1955          Range of Motion  Comprehensive    General Range of Motion no range of motion deficits identified  -SB (r) AT (t) SB (c)       Row Name 12/05/24 0950          Strength Comprehensive (MMT)    General Manual Muscle Testing (MMT) Assessment lower extremity strength deficits identified  -SB (r) AT (t) SB (c)     Comment, General Manual Muscle Testing (MMT) Assessment Jovani LE grossly 4-/5  -SB (r) AT (t) SB (c)       Row Name 12/05/24 0950          Balance    Balance Assessment sitting static balance;sitting dynamic balance;standing static balance;standing dynamic balance  -SB (r) AT (t) SB (c)     Static Sitting Balance independent  -SB (r) AT (t) SB (c)     Dynamic Sitting Balance independent  -SB (r) AT (t) SB (c)     Position, Sitting Balance unsupported;sitting edge of bed  -SB (r) AT (t) SB (c)     Static Standing Balance standby assist  -SB (r) AT (t) SB (c)     Dynamic Standing Balance standby assist  -SB (r) AT (t) SB (c)     Position/Device Used, Standing Balance supported;walker, front-wheeled  -SB (r) AT (t) SB (c)       Row Name 12/05/24 0950          Sensory Assessment (Somatosensory)    Sensory Assessment (Somatosensory) LE sensation intact  -SB (r) AT (t) SB (c)               User Key  (r) = Recorded By, (t) = Taken By, (c) = Cosigned By      Initials Name Provider Type    SB Kristen Lainez, PT DPT Physical Therapist    [facility] PT Student                   Goals/Plan       Row Name 12/05/24 0950          Bed Mobility Goal 1 (PT)    Activity/Assistive Device (Bed Mobility Goal 1, PT) bed mobility activities, all  -SB (r) AT (t) SB (c)     [place] Level/Cues Needed (Bed Mobility Goal 1, PT) modified independence  -SB (r) AT (t) SB (c)     Time Frame (Bed Mobility Goal 1, PT) long term goal (LTG)  -SB (r) AT (t) SB (c)     Progress/Outcomes (Bed Mobility Goal 1, PT) new goal  -SB (r) AT (t) SB (c)       Row Name 12/05/24 0985          Transfer Goal 1 (PT)    Activity/Assistive Device (Transfer Goal  1, PT) sit-to-stand/stand-to-sit;bed-to-chair/chair-to-bed  -SB (r) AT (t) SB (c)     [place] Level/Cues Needed (Transfer Goal 1, PT) modified independence  -SB (r) AT (t) SB (c)     Time Frame (Transfer Goal 1, PT) long term goal (LTG)  -SB (r) AT (t) SB (c)     Progress/Outcome (Transfer Goal 1, PT) new goal  -SB (r) AT (t) SB (c)       Row Name 12/05/24 0950          Gait Training Goal 1 (PT)    Activity/Assistive Device (Gait Training Goal 1, PT) gait (walking locomotion);assistive device use;backward stepping;decrease asymmetrical patterns;decrease fall risk;diminish gait deviation;forward stepping;improve balance and speed;increase endurance/gait distance;increase energy conservation;normalize weight shifts;sidestepping;turning, left;turning, right;walker, rolling  -SB (r) AT (t) SB (c)     [place] Level (Gait Training Goal 1, PT) modified independence  -SB (r) AT (t) SB (c)     Distance (Gait Training Goal 1, PT) 200  -SB (r) AT (t) SB (c)     Time Frame (Gait Training Goal 1, PT) long term goal (LTG)  -SB (r) AT (t) SB (c)     Progress/Outcome (Gait Training Goal 1, PT) new goal  -SB (r) AT (t) SB (c)       Row Name 12/05/24 0950          Stairs Goal 1 (PT)    Activity/Assistive Device (Stairs Goal 1, PT) stairs, all skills  -SB (r) AT (t) SB (c)     [place] Level/Cues Needed (Stairs Goal 1, PT) modified independence  -SB (r) AT (t) SB (c)     Number of Stairs (Stairs Goal 1, PT) 2  -SB (r) AT (t) SB (c)     Time Frame (Stairs Goal 1, PT) long term goal (LTG)  -SB (r) AT (t) SB (c)     Progress/Outcome (Stairs Goal 1, PT) new goal  -SB (r) AT (t) SB (c)       Row Name 12/05/24 0950          Problem Specific Goal 1 (PT)    Problem Specific Goal 1 (PT) Patient will report <8/10 pain in the buttock with increased activity  -SB (r) AT (t) SB (c)     Time Frame (Problem Specific Goal 1, PT) long-term goal (LTG)  -SB (r) AT (t) SB (c)     Progress/Outcome (Problem Specific Goal 1, PT) new goal  -SB  (r) AT (t) SB (c)       Row Name 12/05/24 0973          Therapy Assessment/Plan (PT)    Planned Therapy Interventions (PT) balance training;bed mobility training;gait training;home exercise program;patient/family education;stair training;strengthening;transfer training  -SB (r) AT (t) SB (c)               User Key  (r) = Recorded By, (t) = Taken By, (c) = Cosigned By      Initials Name Provider Type    SB Kristen Lainez, PT DPT Physical Therapist    AT Astrid Toussaint, PT Student PT Student                   Clinical Impression       Row Name 12/05/24 0955          Pain    Pretreatment Pain Rating 8/10  -SB (r) AT (t) SB (c)     Posttreatment Pain Rating 8/10  -SB (r) AT (t) SB (c)     Pain Location buttock  -SB (r) AT (t) SB (c)       Row Name 12/05/24 0958          Plan of Care Review    Plan of Care Reviewed With patient  -SB (r) AT (t) SB (c)     Progress no change  -SB (r) AT (t) SB (c)     Outcome Evaluation PT eval completed. Pt was OxAx4. Pt reported that he had 8/10 pain in the buttock. Pt stated that prior to bring in the hospital he was living alone and is independent with all ADLs and mobility. Today, pt was able to go supine <>sitting EOB with SBA. Pt was able to go sit to stand with SBA and then was able to ambulate 20 ft in the room with SBA using a fww. Pt presented with decreased gait speed and decreased endurance. Pt would benefit from skilled physical therapy to improve tyrell LE strength, endurance, gait mechanics, and stair nagivation. Recommended discharge home with assist.  -SB (r) AT (t) SB (c)       Row Name 12/05/24 0933          Therapy Assessment/Plan (PT)    Patient/Family Therapy Goals Statement (PT) none stated  -SB (r) AT (t) SB (c)     Rehab Potential (PT) fair  -SB (r) AT (t) SB (c)     Criteria for Skilled Interventions Met (PT) yes;meets criteria;skilled treatment is necessary  -SB (r) AT (t) SB (c)     Therapy Frequency (PT) 2 times/day  -SB (r) AT (t) SB (c)     Predicted Duration  of Therapy Intervention (PT) until discharge or until all goals are met  -SB (r) AT (t) SB (c)       Row Name 12/05/24 0950          Vital Signs    Pre Patient Position Supine  -SB (r) AT (t) SB (c)     Post Patient Position Side Lying  -SB (r) AT (t) SB (c)       Row Name 12/05/24 0934          Positioning and Restraints    Pre-Treatment Position in bed  -SB (r) AT (t) SB (c)     Post Treatment Position bed  -SB (r) AT (t) SB (c)     In Bed side lying right;notified nsg;call light within reach;encouraged to call for assist;exit alarm on;pillow between legs;side rails up x2  -SB (r) AT (t) SB (c)               User Key  (r) = Recorded By, (t) = Taken By, (c) = Cosigned By      Initials Name Provider Type    SB Kristen Lainez, PT DPT Physical Therapist    [facility] Student PT Student                   Outcome Measures       Row Name 12/05/24 0950 12/05/24 08       How much help from another person do you currently need...    Turning from your back to your side while in flat bed without using bedrails? 4  -SB (r) AT (t) SB (c) 3  -AC    Moving from lying on back to sitting on the side of a flat bed without bedrails? 4  -SB (r) AT (t) SB (c) 2  -AC    Moving to and from a bed to a chair (including a wheelchair)? 4  -SB (r) AT (t) SB (c) 2  -AC    Standing up from a chair using your arms (e.g., wheelchair, bedside chair)? 4  -SB (r) AT (t) SB (c) 2  -AC    Climbing 3-5 steps with a railing? 3  -SB (r) AT (t) SB (c) 1  -AC    To walk in hospital room? 4  -SB (r) AT (t) SB (c) 2  -AC    AM-PAC 6 Clicks Score (PT) 23  -SB (r) AT (t) 12  -AC    Highest Level of Mobility Goal 7 --> Walk 25 feet or more  -SB (r) AT (t) 4 --> Transfer to chair/commode  -AC      Row Name 12/05/24 0580          How much help from another person do you currently need...    Turning from your back to your side while in flat bed without using bedrails? 3  -SM     Moving from lying on back to sitting on the side of a flat bed without  bedrails? 3  -SM     Moving to and from a bed to a chair (including a wheelchair)? 2  -SM     Standing up from a chair using your arms (e.g., wheelchair, bedside chair)? 2  -SM     Climbing 3-5 steps with a railing? 2  -SM     To walk in hospital room? 2  -SM     AM-PAC 6 Clicks Score (PT) 14  -SM     Highest Level of Mobility Goal 4 --> Transfer to chair/commode  -SM       Row Name 12/05/24 0950 12/05/24 0949       Functional Assessment    Outcome Measure Options AM-PAC 6 Clicks Basic Mobility (PT)  -SB (r) AT (t) SB (c) AM-PAC 6 Clicks Daily Activity (OT)  -CH              User Key  (r) = Recorded By, (t) = Taken By, (c) = Cosigned By      Initials Name Provider Type    CH Jalyn Ortiz, OTR/L Occupational Therapist    [place] Vargas, RN Registered Nurse    Kristen Jones, PT DPT Physical Therapist    Ayde Sexton, RN Registered Nurse    AT Astrid Toussaint, PT Student PT Student                                 Physical Therapy Education       Title: PT OT SLP Therapies (In Progress)       Topic: Physical Therapy (In Progress)       Point: Mobility training (Done)       Learning Progress Summary            Patient Acceptance, E, VU by AT at 12/5/2024 1035    Comment: Patient was eduacted on the benefits of physical activity, POC, and recommended discharge.                      Point: Home exercise program (Not Started)       Learner Progress:  Not documented in this visit.              Point: Body mechanics (Done)       Learning Progress Summary            Patient Acceptance, E, VU by AT at 12/5/2024 1035    Comment: Patient was eduacted on the benefits of physical activity, POC, and recommended discharge.                      Point: Precautions (Not Started)       Learner Progress:  Not documented in this visit.                              User Key       Initials Effective Dates Name Provider Type Discipline    AT 08/22/24 -  Astrid Toussaint, PT Student PT Student PT                  PT  Recommendation and Plan  Planned Therapy Interventions (PT): balance training, bed mobility training, gait training, home exercise program, patient/family education, stair training, strengthening, transfer training  Progress: no change  Outcome Evaluation: PT eval completed. Pt was OxAx4. Pt reported that he had 8/10 pain in the buttock. Pt stated that prior to bring in the hospital he was living alone and is independent with all ADLs and mobility. Today, pt was able to go supine <>sitting EOB with SBA. Pt was able to go sit to stand with SBA and then was able to ambulate 20 ft in the room with SBA using a fww. Pt presented with decreased gait speed and decreased endurance. Pt would benefit from skilled physical therapy to improve tyrell LE strength, endurance, gait mechanics, and stair nagivation. Recommended discharge home with assist.     Time Calculation:         PT Charges       Row Name 12/05/24 0950             Time Calculation    Start Time 0950  10 min CR  -SB (r) AT (t) SB (c)      Stop Time 1022  -SB (r) AT (t) SB (c)      Time Calculation (min) 32 min  -SB (r) AT (t)      PT Received On 12/05/24  -SB (r) AT (t) SB (c)      PT Goal Re-Cert Due Date 12/15/24  -SB (r) AT (t) SB (c)         Untimed Charges    PT Eval/Re-eval Minutes 42  -SB (r) AT (t) SB (c)         Total Minutes    Untimed Charges Total Minutes 42  -SB (r) AT (t)       Total Minutes 42  -SB (r) AT (t)                User Key  (r) = Recorded By, (t) = Taken By, (c) = Cosigned By      Initials Name Provider Type    Kristen Jones, PT DPT Physical Therapist    AT [facility], PT Student PT Student                      PT G-Codes  Outcome Measure Options: AM-PAC 6 Clicks Basic Mobility (PT)  AM-PAC 6 Clicks Score (PT): 23  AM-PAC 6 Clicks Score (OT): 21  PT Discharge Summary  Anticipated Discharge Disposition (PT): home, home with assist    Astrid Toussaint PT Student  12/5/2024

## 2024-12-05 NOTE — THERAPY EVALUATION
Patient Name: Dayron Lubin  : 1955    MRN: [id number]                              Today's Date: 2024       Admit Date: 2024    Visit Dx:     ICD-10-CM ICD-9-CM   1. Alcohol withdrawal syndrome without complication  F10.930 291.81   2. Pressure injury of sacral region, stage 1  L89.151 707.03     707.21   3. Traumatic rhabdomyolysis, initial encounter  T79.6XXA 958.6     Patient Active Problem List   Diagnosis    Prostate cancer    Hx of radiation therapy    Elevated PSA    Encounter for follow-up surveillance of prostate cancer    HOCM (hypertrophic obstructive cardiomyopathy)    Coronary artery disease involving native coronary artery of native heart without angina pectoris    Essential hypertension    Mixed hyperlipidemia    Class 1 obesity with alveolar hypoventilation, serious comorbidity, and body mass index (BMI) of 30.0 to 30.9 in adult    Displaced fracture of lateral malleolus of right fibula, initial encounter for closed fracture    Family history of colonic polyps    History of colon polyps    Elevated brain natriuretic peptide (BNP) level    Acute kidney injury    Anxiety associated with depression    Alcohol abuse    Alcoholic hepatitis    Volume depletion    Anorexia    CHF (congestive heart failure)    Hypokalemia    Duodenitis    Gait instability    SVT (supraventricular tachycardia)    Physical debility    Atrial fibrillation with rapid ventricular response    Alcoholism in remission    Paroxysmal atrial fibrillation    Nasal polyposis    Nasal congestion    Estelle bullosa    Long-term use of high-risk medication    Multiple falls    Transaminitis    Thrombocytopenia    Non-traumatic rhabdomyolysis    Alcohol withdrawal    Pancytopenia    Opiate abuse, episodic    Alcohol intoxication     Past Medical History:   Diagnosis Date    A-fib     Alcoholic hepatitis 2023    CAD (coronary artery disease)     History of external beam radiation therapy 2016    6840 cGy to  prostate bed    Hyperlipidemia     Hypertension     Insomnia     Prostate cancer      Past Surgical History:   Procedure Laterality Date    CARDIAC CATHETERIZATION      CORONARY ANGIOPLASTY WITH STENT PLACEMENT      FRACTURE SURGERY      PROSTATE SURGERY      TONSILLECTOMY        General Information       Row Name 12/05/24 0949          OT Time and Intention    Subjective Information complains of;pain  -     Document Type evaluation  -     Mode of Treatment occupational therapy  -     Patient Effort excellent  -       Row Name 12/05/24 0949          General Information    Patient Profile Reviewed yes  -     Prior Level of Function independent:;all household mobility;ADL's  -     Existing Precautions/Restrictions fall  excoriation at bottom, rectal tube  -     Barriers to Rehab medically complex  -       Row Name 12/05/24 0949          Occupational Profile    Reason for Services/Referral (Occupational Profile) ETOH, falls  -     Environmental Supports and Barriers (Occupational Profile) tub shower with shower chair & LH shower head  -       Row Name 12/05/24 0949          Living Environment    People in Home alone  -       Row Name 12/05/24 0949          Home Main Entrance    Number of Stairs, Main Entrance two  -     Stair Railings, Main Entrance railing on left side (ascending)  -       Row Name 12/05/24 0949          Stairs Within Home, Primary    Number of Stairs, Within Home, Primary none  -     Stair Railings, Within Home, Primary none  -       Row Name 12/05/24 0949          Cognition    Orientation Status (Cognition) person;place;situation;time;oriented x 4  -       Row Name 12/05/24 0949          Safety Issues/Impairments Affecting Functional Mobility    Safety Issues Affecting Function (Mobility) friction/shear risk  -     Impairments Affecting Function (Mobility) balance;endurance/activity tolerance  -               User Key  (r) = Recorded By, (t) = Taken By, (c) =  Cosigned By      Initials Name Provider Type     Jalyn Ortiz, OTR/L Occupational Therapist                     Mobility/ADL's       Row Name 12/05/24 0949          Bed Mobility    Bed Mobility rolling right;supine-sit  -     Rolling Right [place] (Bed Mobility) supervision  -     Supine-Sit [place] (Bed Mobility) standby assist  -     Sit-Supine [place] (Bed Mobility) standby assist  -     Assistive Device (Bed Mobility) bed rails  -       Row Name 12/05/24 0949          Transfers    Transfers sit-stand transfer;stand-sit transfer  -       Row Name 12/05/24 0949          Sit-Stand Transfer    Sit-Stand [place] (Transfers) standby assist  -     Assistive Device (Sit-Stand Transfers) walker, front-wheeled  -       Row Name 12/05/24 0949          Stand-Sit Transfer    Stand-Sit [place] (Transfers) standby assist  [facility]     Assistive Device (Stand-Sit Transfers) walker, front-wheeled  -       Row Name 12/05/24 0949          Functional Mobility    Functional Mobility- Ind. Level standby assist  -     Functional Mobility- Device walker, front-wheeled  -     Functional Mobility- Comment in room  -       Row Name 12/05/24 0949          Activities of Daily Living    BADL Assessment/Intervention lower body dressing  -       Row Name 12/05/24 0949          Lower Body Dressing Assessment/Training    [place] Level (Lower Body Dressing) set up;don;shoes/slippers  -     Position (Lower Body Dressing) sitting up in bed  -               User Key  (r) = Recorded By, (t) = Taken By, (c) = Cosigned By      Initials Name Provider Type     Jalyn Ortiz, OTR/L Occupational Therapist                   Obj/Interventions       Row Name 12/05/24 0949          Vision Assessment/Intervention    Visual Impairment/Limitations WFL  -       Row Name 12/05/24 0949          Range of Motion Comprehensive    General Range of Motion no range of motion deficits identified  -       Row  Name 12/05/24 0949          Strength Comprehensive (MMT)    General Manual Muscle Testing (MMT) Assessment no strength deficits identified  -       Row Name 12/05/24 0949          Balance    Balance Assessment sitting static balance;sitting dynamic balance;sit to stand dynamic balance;standing static balance;standing dynamic balance  -     Static Sitting Balance independent  -CH     Dynamic Sitting Balance standby assist  -CH     Position, Sitting Balance unsupported;sitting edge of bed  -     Sit to Stand Dynamic Balance standby assist  -CH     Static Standing Balance standby assist  -CH     Dynamic Standing Balance standby assist  -CH     Position/Device Used, Standing Balance walker, rolling  -               User Key  (r) = Recorded By, (t) = Taken By, (c) = Cosigned By      Initials Name Provider Type     Jalyn Ortiz, OTR/L Occupational Therapist                   Goals/Plan       Row Name 12/05/24 0949          Transfer Goal 1 (OT)    Activity/Assistive Device (Transfer Goal 1, OT) sit-to-stand/stand-to-sit;bed-to-chair/chair-to-bed;toilet;shower chair  -     [place] Level/Cues Needed (Transfer Goal 1, OT) modified independence  -     Time Frame (Transfer Goal 1, OT) long term goal (LTG);by discharge  -     Progress/Outcome (Transfer Goal 1, OT) new goal  -       Row Name 12/05/24 0949          Bathing Goal 1 (OT)    Activity/Device (Bathing Goal 1, OT) bathing skills, all;upper body bathing;lower body bathing  -     [place] Level/Cues Needed (Bathing Goal 1, OT) supervision required  -     Time Frame (Bathing Goal 1, OT) long term goal (LTG);by discharge  -     Progress/Outcomes (Bathing Goal 1, OT) new goal  -       Row Name 12/05/24 0949          Dressing Goal 1 (OT)    Activity/Device (Dressing Goal 1, OT) lower body dressing  -     [place]/Cues Needed (Dressing Goal 1, OT) set-up required;modified independence  -     Time Frame (Dressing Goal 1, OT) long term  goal (LTG);10 days;by discharge  -     Progress/Outcome (Dressing Goal 1, OT) new goal  -       Row Name 12/05/24 0949          Toileting Goal 1 (OT)    Activity/Device (Toileting Goal 1, OT) toileting skills, all;adjust/manage clothing;perform perineal hygiene  -     Time Frame (Toileting Goal 1, OT) long term goal (LTG);by discharge  -     Progress/Outcome (Toileting Goal 1, OT) new goal  -       Row Name 12/05/24 0949          Therapy Assessment/Plan (OT)    Planned Therapy Interventions (OT) activity tolerance training;adaptive equipment training;BADL retraining;edema control/reduction;functional balance retraining;IADL retraining;occupation/activity based interventions;patient/caregiver education/training;ROM/therapeutic exercise;transfer/mobility retraining  -               User Key  (r) = Recorded By, (t) = Taken By, (c) = Cosigned By      Initials Name Provider Type     Jalyn Ortiz, OTR/L Occupational Therapist                   Clinical Impression       Row Name 12/05/24 0904          Pain Assessment    Pretreatment Pain Rating 8/10  -CH     Posttreatment Pain Rating 8/10  -     Pain Location buttock  -     Response to Pain Interventions activity participation with tolerable pain  -       Row Name 12/05/24 0979          Plan of Care Review    Plan of Care Reviewed With patient  -     Progress no change  -     Outcome Evaluation OT eval complete.  Pt. is AxO x 4, pleasant, & participatory.  Mr. Lubin is utilizing a fecal mgmt sys & primafit likely d/t siginficant skin breakdown/excoriation at buttok/kieran area.  Buttock is causing significant pain but he is able to perform fxl mob & LBD at [facility] in spite of his pain.  OTR and SPT offered pt rwx and pt mobilized in room at [facility] with no LOB but obs decreased act phillip. The pt reports previously living in a duplex alone I'ly.  He would benefit from cont'd OT tx to cont to encourage OOB acitvity, reduce his fall risk, & provide safety edu.   Pt. would benefit from ETOH abuse program.  -       Row Name 12/05/24 0949          Therapy Assessment/Plan (OT)    Patient/Family Therapy Goal Statement (OT) reduce pain at buttock  -     Rehab Potential (OT) good  -     Criteria for Skilled Therapeutic Interventions Met (OT) yes;skilled treatment is necessary  -     Therapy Frequency (OT) 3 times/wk  -     Predicted Duration of Therapy Intervention (OT) 10 days  -       Row Name 12/05/24 0949          Therapy Plan Review/Discharge Plan (OT)    Anticipated Discharge Disposition (OT) home with 24/7 care;home with assist;home with home health  -       Row Name 12/05/24 0949          Positioning and Restraints    Pre-Treatment Position in bed  -     Post Treatment Position bed  -     In Bed side lying right;fowlers;call light within reach;encouraged to call for assist;side rails up x2;notified [place]  -               User Key  (r) = Recorded By, (t) = Taken By, (c) = Cosigned By      Initials Name Provider Type     Jalyn Ortiz, OTR/L Occupational Therapist                   Outcome Measures       Row Name 12/05/24 0949          How much help from another is currently needed...    Putting on and taking off regular lower body clothing? 3  -CH     Bathing (including washing, rinsing, and drying) 3  -CH     Toileting (which includes using toilet bed pan or urinal) 3  -CH     Putting on and taking off regular upper body clothing 4  -CH     Taking care of personal grooming (such as brushing teeth) 4  -CH     Eating meals 4  -CH     AM-PAC 6 Clicks Score (OT) 21  -       Row Name 12/05/24 0950 12/05/24 0833       How much help from another person do you currently need...    Turning from your back to your side while in flat bed without using bedrails? 4 (P)   -AT 3  -AC    Moving from lying on back to sitting on the side of a flat bed without bedrails? 4 (P)   -AT 2  -AC    Moving to and from a bed to a chair (including a wheelchair)? 4 (P)   -AT 2   -AC    Standing up from a chair using your arms (e.g., wheelchair, bedside chair)? 4 (P)   -AT 2  -AC    Climbing 3-5 steps with a railing? 3 (P)   -AT 1  -AC    To walk in hospital room? 4 (P)   -AT 2  -AC    AM-PAC 6 Clicks Score (PT) 23 (P)   -AT 12  -AC    Highest Level of Mobility Goal 7 --> Walk 25 feet or more (P)   -AT 4 --> Transfer to chair/commode  -AC      Row Name 12/05/24 0530          How much help from another person do you currently need...    Turning from your back to your side while in flat bed without using bedrails? 3  -SM     Moving from lying on back to sitting on the side of a flat bed without bedrails? 3  -SM     Moving to and from a bed to a chair (including a wheelchair)? 2  -SM     Standing up from a chair using your arms (e.g., wheelchair, bedside chair)? 2  -SM     Climbing 3-5 steps with a railing? 2  -SM     To walk in hospital room? 2  -SM     AM-PAC 6 Clicks Score (PT) 14  -SM     Highest Level of Mobility Goal 4 --> Transfer to chair/commode  -       Row Name 12/05/24 0950 12/05/24 0949       Functional Assessment    Outcome Measure Options AM-PAC 6 Clicks Basic Mobility (PT) (P)   -AT AM-PAC 6 Clicks Daily Activity (OT)  -              User Key  (r) = Recorded By, (t) = Taken By, (c) = Cosigned By      Initials Name Provider Type     Jalyn Ortiz, OTR/L Occupational Therapist     [place] Echevarria, RN Registered Nurse     Ayde Winston, RN Registered Nurse    [facility], PT Student PT Student                    Occupational Therapy Education       Title: PT OT SLP Therapies (In Progress)       Topic: Occupational Therapy (In Progress)       Point: ADL training (Done)       Description:   Instruct learner(s) on proper safety adaptation and remediation techniques during self care or transfers.   Instruct in proper use of assistive devices.                  Learning Progress Summary            Patient Acceptance, E, VU by  at 12/5/2024 1037                       Point: Home exercise program (Not Started)       Description:   Instruct learner(s) on appropriate technique for monitoring, assisting and/or progressing therapeutic exercises/activities.                  Learner Progress:  Not documented in this visit.              Point: Precautions (Done)       Description:   Instruct learner(s) on prescribed precautions during self-care and functional transfers.                  Learning Progress Summary            Patient Acceptance, E, VU by  at 12/5/2024 1037                      Point: Body mechanics (Done)       Description:   Instruct learner(s) on proper positioning and spine alignment during self-care, functional mobility activities and/or exercises.                  Learning Progress Summary            Patient Acceptance, E, VU by  at 12/5/2024 1037                                      User Key       Initials Effective Dates Name Provider Type Discipline     07/11/23 -  Jalyn Ortiz, OTR/L Occupational Therapist OT                  OT Recommendation and Plan  Planned Therapy Interventions (OT): activity tolerance training, adaptive equipment training, BADL retraining, edema control/reduction, functional balance retraining, IADL retraining, occupation/activity based interventions, patient/caregiver education/training, ROM/therapeutic exercise, transfer/mobility retraining  Therapy Frequency (OT): 3 times/wk  Plan of Care Review  Plan of Care Reviewed With: patient  Progress: no change  Outcome Evaluation: OT eval complete.  Pt. is AxO x 4, pleasant, & participatory.  Mr. Lubin is utilizing a fecal mgmt sys & primafit likely d/t siginficant skin breakdown/excoriation at buttok/kieran area.  Buttock is causing significant pain but he is able to perform fxl mob & LBD at [facility] in spite of his pain.  OTR and SPT offered pt rwx and pt mobilized in room at [facility] with no LOB but obs decreased act phillip. The pt reports previously living in a duplex alone I'ly.  He would  benefit from cont'd OT tx to cont to encourage OOB acitvity, reduce his fall risk, & provide safety edu.  Pt. would benefit from ETOH abuse program.     Time Calculation:         Time Calculation- OT       Row Name 12/05/24 0949             Time Calculation- OT    OT Start Time 0949  -      OT Stop Time 1030  -      OT Time Calculation (min) 41 min  -CH      OT Received On 12/05/24  -      OT Goal Re-Cert Due Date 12/15/24  -         Untimed Charges    OT Eval/Re-eval Minutes 41  -CH         Total Minutes    Untimed Charges Total Minutes 41  -CH       Total Minutes 41  -CH                User Key  (r) = Recorded By, (t) = Taken By, (c) = Cosigned By      Initials Name Provider Type     Jalyn Ortiz OTR/L Occupational Therapist                           ELLIE Toth/ZOLTAN  12/5/2024

## 2024-12-05 NOTE — PLAN OF CARE
Goal Outcome Evaluation:  Plan of Care Reviewed With: patient        Progress: no change  Outcome Evaluation: PT eval completed. Pt was OxAx4. Pt reported that he had 8/10 pain in the buttock. Pt stated that prior to bring in the hospital he was living alone and is independent with all ADLs and mobility. Today, pt was able to go supine <>sitting EOB with SBA. Pt was able to go sit to stand with SBA and then was able to ambulate 20 ft in the room with SBA using a fww. Pt presented with decreased gait speed and decreased endurance. Pt would benefit from skilled physical therapy to improve tyrell LE strength, endurance, gait mechanics, and stair nagivation. Recommended discharge home with assist.    Anticipated Discharge Disposition (PT): home, home with assist

## 2024-12-05 NOTE — PLAN OF CARE
Goal Outcome Evaluation:  Plan of Care Reviewed With: patient        Progress: no change  Outcome Evaluation: Admitted from ER with alcohol intoxication. Ethanol level 0.067. Pt kieran area severely excoriated/macerated r/t laying in feces and urine. FMS and Primafit placed. Mateusz protocol in place. CIWA score on admit 19, medicated per MAR. Banana bag infusing. Safety maintained. Plan of care ongoing.

## 2024-12-05 NOTE — CONSULTS
[facility]  INPATIENT WOUND & OSTOMY CONSULTATION    Today's Date: 12/05/24    Patient Name: Dayron Lubin  MRN: [id number]  CSN: [id number]  PCP: Herberth Nguyen Jr., MD  Referring Provider:   Consulting Provider (From admission, onward)      Start Ordered     Status Ordering Provider    12/05/24 0846 12/05/24 0845  Inpatient Wound Care MD Consult  Once        Specialty:  Wound Care  Provider:  Katrin Sandoval APRN Acknowledged EBENIBO, SOTONTE E           Attending Provider: Darryl Adam MD  Length of Stay: 1    SUBJECTIVE   Chief Complaint: Skin breakdown of buttocks    HPI: Dayron Lubin, a 69 y.o.male, presents with a past medical history of CAD, hypertension, hyperlipidemia, prostate cancer, alcoholic hepatitis.  A full past medical history is listed below.  Inpatient wound care consulted due to skin breakdown of buttocks.  Patient is incontinent of bowel bladder.  He currently has an FMS in place.  Improvement noted to buttocks from previous admission.  Patient does have a stage 3 pressure injury of the left buttock. Patient has significant incontinence associated dermatitis of bilateral buttocks and scrotum.        Visit Dx:    ICD-10-CM ICD-9-CM   1. Alcohol withdrawal syndrome without complication  F10.930 291.81   2. Pressure injury of sacral region, stage 1  L89.151 707.03     707.21   3. Traumatic rhabdomyolysis, initial encounter  T79.6XXA 958.6   4. Impaired mobility [Z74.09]  Z74.09 799.89       Hospital Problem List:     Alcohol intoxication    HOCM (hypertrophic obstructive cardiomyopathy)    Essential hypertension    Acute kidney injury    Alcohol abuse    Physical debility    Paroxysmal atrial fibrillation    Multiple falls    Transaminitis    Non-traumatic rhabdomyolysis      History:   Past Medical History:   Diagnosis Date    A-fib     Alcoholic hepatitis 07/13/2023    CAD (coronary artery disease)     History of external beam radiation therapy 05/12/2016     6840 cGy to prostate bed    Hyperlipidemia     Hypertension     Insomnia     Prostate cancer      Past Surgical History:   Procedure Laterality Date    CARDIAC CATHETERIZATION      CORONARY ANGIOPLASTY WITH STENT PLACEMENT      FRACTURE SURGERY      PROSTATE SURGERY      TONSILLECTOMY       Social History     Socioeconomic History    Marital status: Single   Tobacco Use    Smoking status: Never    Smokeless tobacco: Never   Vaping Use    Vaping status: Never Used   Substance and Sexual Activity    Alcohol use: Not Currently     Alcohol/week: 50.0 standard drinks of alcohol     Types: 50 Shots of liquor per week    Drug use: Not Currently     Types: Marijuana    Sexual activity: Defer     Family History   Problem Relation Age of Onset    Heart disease Mother     Coronary artery disease Mother     Stroke Mother        Allergies:  No Known Allergies    Medications:    Current Facility-Administered Medications:     amiodarone (PACERONE) tablet 200 mg, 200 mg, Oral, Daily, Zeny Agustin APRN, 200 mg at 12/05/24 [facility]    apixaban (ELIQUIS) tablet 5 mg, 5 mg, Oral, Q12H, Zeny Agustin APRN, 5 mg at 12/05/24 [facility]    aspirin EC tablet 81 mg, 81 mg, Oral, Daily, Zeny Agustin APRN, 81 mg at 12/05/24 [facility]    atorvastatin (LIPITOR) tablet 20 mg, 20 mg, Oral, Daily, Zeny Agustin APRN, 20 mg at 12/05/24 [facility]    azelastine (ASTELIN) nasal spray 2 spray, 2 spray, Each Nare, BID, Zeny Agustin APRN, 2 spray at 12/05/24 [facility]    sennosides-docusate (PERICOLACE) 8.6-50 MG per tablet 2 tablet, 2 tablet, Oral, BID PRN **AND** polyethylene glycol (MIRALAX) packet 17 g, 17 g, Oral, Daily PRN **AND** bisacodyl (DULCOLAX) EC tablet 5 mg, 5 mg, Oral, Daily PRN **AND** bisacodyl (DULCOLAX) suppository 10 mg, 10 mg, Rectal, Daily PRN, Zeny Agustin APRN    cholestyramine (QUESTRAN) packet 1 packet, 1 packet, Oral, Q12H PRN, Zeny Agustin APRN    folic acid (FOLVITE) tablet 1 mg, 1 mg, Oral, Daily, Vamsi  BRI Schmidt, 1 mg at 12/05/24 0833    LORazepam (ATIVAN) tablet 1 mg, 1 mg, Oral, Q1H PRN **OR** LORazepam (ATIVAN) injection 1 mg, 1 mg, Intravenous, Q1H PRN, 1 mg at 12/05/24 0836 **OR** LORazepam (ATIVAN) tablet 2 mg, 2 mg, Oral, Q1H PRN **OR** LORazepam (ATIVAN) injection 2 mg, 2 mg, Intravenous, Q1H PRN **OR** LORazepam (ATIVAN) injection 2 mg, 2 mg, Intravenous, Q15 Min PRN, 2 mg at 12/04/24 1642 **OR** LORazepam (ATIVAN) injection 2 mg, 2 mg, Intramuscular, Q15 Min PRN, Zeny Agustin APRN    [Held by provider] losartan (COZAAR) tablet 25 mg, 25 mg, Oral, Daily, Zeny Agustin APRN    Magnesium Standard Dose Replacement - Follow Nurse / BPA Driven Protocol, , Not Applicable, PRN, Zeny Agustin APRN    metoprolol succinate XL (TOPROL-XL) 24 hr tablet 25 mg, 25 mg, Oral, Daily, Zeny Agustin APRN, 25 mg at 12/05/24 0833    ondansetron (ZOFRAN) injection 4 mg, 4 mg, Intravenous, Q6H PRN, Zeny Agustin APRN    pantoprazole (PROTONIX) EC tablet 40 mg, 40 mg, Oral, Daily, Zeny Agustin APRN, 40 mg at 12/05/24 0833    sodium chloride 0.9 % flush 10 mL, 10 mL, Intravenous, PRN, Zeny Agustin APRN    [COMPLETED] Insert Peripheral IV, , , Once **AND** sodium chloride 0.9 % flush 10 mL, 10 mL, Intravenous, PRN, Zeny Agustin APRN    sodium chloride 0.9 % flush 10 mL, 10 mL, Intravenous, Q12H, Zeny Agustin APRN, 10 mL at 12/05/24 0833    sodium chloride 0.9 % flush 10 mL, 10 mL, Intravenous, PRN, Zeny Agustin APRN    sodium chloride 0.9 % infusion 40 mL, 40 mL, Intravenous, PRN, Zeny Agustin APRN    thiamine (B-1) injection 200 mg, 200 mg, Intravenous, Q8H, 200 mg at 12/05/24 0533 **FOLLOWED BY** [START ON 12/10/2024] thiamine (VITAMIN B-1) tablet 100 mg, 100 mg, Oral, Daily, Zeny Agustin APRN    thiamine (VITAMIN B-1) tablet 100 mg, 100 mg, Oral, Daily, Zeny Agustin APRN, 100 mg at 12/05/24 0833    OBJECTIVE     Vitals:    12/05/24 1222   BP: 114/68    Pulse: 91   Resp: 16   Temp: 98.2 °F (36.8 °C)   SpO2: 98%       PHYSICAL EXAM:   Physical Exam  Vitals and nursing note reviewed.   Constitutional:       General: He is awake.      Appearance: He is not ill-appearing.   HENT:      Head: Normocephalic and atraumatic.   Eyes:      General: Lids are normal. Gaze aligned appropriately.   Cardiovascular:      Rate and Rhythm: Normal rate and regular rhythm.   Pulmonary:      Effort: Pulmonary effort is normal. No respiratory distress.   Abdominal:      General: There is no distension.      Palpations: Abdomen is soft.   Musculoskeletal:      Cervical back: Normal range of motion and neck supple.   Skin:     General: Skin is warm and dry.      Findings: Wound present.      Comments: Incontinence associated dermatitis of bilateral buttocks scrotum and perineal area.  Fecal management system in place.  Stage 3 pressure injury of the left buttock.  Ulceration is to subcutaneous tissue with small amount of slough present.  Even open edges touched wound bed.  No undermining or tunneling noted.  Scant serous drainage on wound bed.   Neurological:      Mental Status: He is alert and oriented to person, place, and time.      Motor: Weakness present.   Psychiatric:         Attention and Perception: Attention normal.         Mood and Affect: Mood normal.         Speech: Speech normal.         Behavior: Behavior is cooperative.        Results Review:  Lab Results (last 48 hours)       Procedure Component Value Units Date/Time    CBC Auto Differential [[id number]]  (Abnormal) Collected: 12/05/24 0529    Specimen: Blood Updated: 12/05/24 0609     WBC 3.27 10*3/mm3      RBC 2.85 10*6/mm3      Hemoglobin 9.1 g/dL      Hematocrit 27.4 %      MCV 96.1 fL      MCH 31.9 pg      MCHC 33.2 g/dL      RDW 14.4 %      RDW-SD 50.4 fl      MPV 10.7 fL      Platelets 87 10*3/mm3      Neutrophil % 68.8 %      Lymphocyte % 18.7 %      Monocyte % 11.6 %      Eosinophil % 0.0 %      Basophil % 0.3 %       Immature Grans % 0.6 %      Neutrophils, Absolute 2.25 10*3/mm3      Lymphocytes, Absolute 0.61 10*3/mm3      Monocytes, Absolute 0.38 10*3/mm3      Eosinophils, Absolute 0.00 10*3/mm3      Basophils, Absolute 0.01 10*3/mm3      Immature Grans, Absolute 0.02 10*3/mm3     Comprehensive Metabolic Panel [[id number]]  (Abnormal) Collected: 12/05/24 0426    Specimen: Blood Updated: 12/05/24 0557     Glucose 95 mg/dL      BUN 24 mg/dL      Creatinine 1.27 mg/dL      Sodium 136 mmol/L      Potassium 4.2 mmol/L      Comment: Slight hemolysis detected by analyzer. Result may be falsely elevated.        Chloride 99 mmol/L      CO2 26.0 mmol/L      Calcium 7.6 mg/dL      Total Protein 6.1 g/dL      Albumin 3.4 g/dL      ALT (SGPT) 76 U/L      AST (SGOT) 80 U/L      Comment: Slight hemolysis detected by analyzer. Result may be falsely elevated.        Alkaline Phosphatase 76 U/L      Total Bilirubin 1.3 mg/dL      Globulin 2.7 gm/dL      A/G Ratio 1.3 g/dL      BUN/Creatinine Ratio 18.9     Anion Gap 11.0 mmol/L      eGFR 60.8 mL/min/1.73     Narrative:      GFR Normal >60  Chronic Kidney Disease <60  Kidney Failure <15      CK [[id number]]  (Abnormal) Collected: 12/05/24 0426    Specimen: Blood Updated: 12/05/24 0526     Creatine Kinase 376 U/L     Phosphorus [[id number]]  (Normal) Collected: 12/05/24 0426    Specimen: Blood Updated: 12/05/24 0526     Phosphorus 3.3 mg/dL     Comprehensive Metabolic Panel [[id number]]  (Abnormal) Collected: 12/04/24 1236    Specimen: Blood Updated: 12/04/24 1337     Glucose 133 mg/dL      BUN 17 mg/dL      Creatinine 1.45 mg/dL      Sodium 136 mmol/L      Potassium 4.8 mmol/L      Chloride 91 mmol/L      CO2 24.0 mmol/L      Calcium 9.0 mg/dL      Total Protein 7.7 g/dL      Albumin 4.3 g/dL      ALT (SGPT) 114 U/L      AST (SGOT) 133 U/L      Alkaline Phosphatase 102 U/L      Total Bilirubin 1.3 mg/dL      Globulin 3.4 gm/dL      A/G Ratio 1.3 g/dL      BUN/Creatinine Ratio 11.7     Anion Gap  21.0 mmol/L      eGFR 51.8 mL/min/1.73     Narrative:      GFR Normal >60  Chronic Kidney Disease <60  Kidney Failure <15      Magnesium [[id number]]  (Normal) Collected: 12/04/24 1236    Specimen: Blood Updated: 12/04/24 1333     Magnesium 2.1 mg/dL     Ethanol [[id number]] Collected: 12/04/24 1236    Specimen: Blood Updated: 12/04/24 1332     Ethanol % 0.067 %     Narrative:      Not for legal purposes. Chain of Custody not followed.     aPTT [[id number]]  (Normal) Collected: 12/04/24 1236    Specimen: Blood Updated: 12/04/24 1314     PTT 34.5 seconds     Protime-INR [[id number]]  (Abnormal) Collected: 12/04/24 1236    Specimen: Blood Updated: 12/04/24 1314     Protime 17.2 Seconds      INR 1.35    CBC & Differential [[id number]]  (Abnormal) Collected: 12/04/24 1236    Specimen: Blood Updated: 12/04/24 1307    Narrative:      The following orders were created for panel order CBC & Differential.  Procedure                               Abnormality         Status                     ---------                               -----------         ------                     CBC Auto Differential[[id number]]        Abnormal            Final result                 Please view results for these tests on the individual orders.    CBC Auto Differential [[id number]]  (Abnormal) Collected: 12/04/24 1236    Specimen: Blood Updated: 12/04/24 1307     WBC 6.83 10*3/mm3      RBC 3.49 10*6/mm3      Hemoglobin 11.2 g/dL      Hematocrit 33.6 %      MCV 96.3 fL      MCH 32.1 pg      MCHC 33.3 g/dL      RDW 14.5 %      RDW-SD 50.7 fl      MPV 10.5 fL      Platelets 141 10*3/mm3      Neutrophil % 86.4 %      Lymphocyte % 4.4 %      Monocyte % 8.3 %      Eosinophil % 0.1 %      Basophil % 0.1 %      Immature Grans % 0.7 %      Neutrophils, Absolute 5.89 10*3/mm3      Lymphocytes, Absolute 0.30 10*3/mm3      Monocytes, Absolute 0.57 10*3/mm3      Eosinophils, Absolute 0.01 10*3/mm3      Basophils, Absolute 0.01 10*3/mm3      Immature Grans,  Absolute 0.05 10*3/mm3      nRBC 0.6 /100 WBC     CK [[id number]]  (Abnormal) Collected: 12/04/24 1236    Specimen: Blood Updated: 12/04/24 1303     Creatine Kinase 702 U/L     [place] Draw [[id number]] Collected: 12/04/24 1236    Specimen: Blood Updated: 12/04/24 1246    Narrative:      The following orders were created for panel order [place] Draw.  Procedure                               Abnormality         Status                     ---------                               -----------         ------                     Green Top (Gel)[[id number]]                                  Final result               Lavender Top[[id number]]                                     Final result               Red Top[[id number]]                                          Final result               Light Blue Top[[id number]]                                   Final result                 Please view results for these tests on the individual orders.    Green Top (Gel) [[id number]] Collected: 12/04/24 1236    Specimen: Blood Updated: 12/04/24 1246     Extra Tube Hold for add-ons.     Comment: Auto resulted.       Lavender Top [[id number]] Collected: 12/04/24 1236    Specimen: Blood Updated: 12/04/24 1246     Extra Tube hold for add-on     Comment: Auto resulted       Red Top [[id number]] Collected: 12/04/24 1236    Specimen: Blood Updated: 12/04/24 1246     Extra Tube Hold for add-ons.     Comment: Auto resulted.       Light Blue Top [[id number]] Collected: 12/04/24 1236    Specimen: Blood Updated: 12/04/24 1246     Extra Tube Hold for add-ons.     Comment: Auto resulted             Imaging Results (Last 72 Hours)       Procedure Component Value Units Date/Time    CT Cervical Spine Without Contrast [[id number]] Collected: 12/04/24 1419     Updated: 12/04/24 1439    Narrative:      CT CERVICAL SPINE WO CONTRAST- 12/4/2024 1:07 PM     HISTORY: fall      COMPARISON: 11/16/2024     TOTAL DOSE LENGTH PRODUCT: 1163.67 mGy.cm. Automated exposure  control  was also utilized to decrease patient radiation dose.     TECHNIQUE: Axial images of cervical spine obtained with sagittal and  coronal reconstructions     FINDINGS: Similar alignment of the cervical spine with moderate  degenerative disc change and left greater than right facet arthropathy.  Uncinate process hypertrophy at the C5-C7 level. No acute cervical  vertebral fracture or traumatic malalignment identified.     Degenerative changes result in mild central cervical canal stenosis with  moderate left C4/5, mild to moderate bilateral C5/6 foraminal narrowing  with mild narrowing above and below these levels.     Calcifications of the carotid bulbs.       Impression:      Similar moderate degenerative change of the cervical spine with no acute  cervical vertebral fracture or traumatic malalignment.     This report was signed and finalized on 12/4/2024 2:36 PM by Dr. Bri Duong MD.       CT Head Without Contrast [[id number]] Collected: 12/04/24 1418     Updated: 12/04/24 1423    Narrative:      EXAMINATION: CT HEAD WO CONTRAST-      12/4/2024 1:07 PM     HISTORY: Fall injury. Head and neck trauma.     In order to have a CT radiation dose as low as reasonably achievable  Automated Exposure Control was utilized for adjustment of the mA and/or  KV according to patient size.     CT Dose DLP = 1163.67 mGy.cm.  (If there are multiple studies performed at the same time this  represents the total dose).     Comparison:  11/16/2024.     Axial, sagittal, and coronal noncontrast CT imaging of the head.     The visualized paranasal sinuses are clear.     The brain and ventricles have an age appropriate appearance.      There is no hemorrhage or mass-effect.   No acute infarction is seen.     No calvarial abnormality.       Impression:      1. No acute intracranial abnormality is seen.           This report was signed and finalized on 12/4/2024 2:20 PM by Dr. Landon Langford MD.       XR Chest 1 View [[id number]]  Collected: 12/04/24 1346     Updated: 12/04/24 1352    Narrative:      EXAMINATION:  XR CHEST 1 VW-  12/4/2024 11:53 AM     HISTORY: The patient fell. Hypertension. Coronary artery disease.     COMPARISON: 11/16/2024.     TECHNIQUE: Single view AP image.     FINDINGS:  The lungs are expanded bilaterally and clear. There is no  evidence of lung contusion or pneumothorax. There is no mediastinal  widening. The pleural spaces are clear. Heart size is normal. There are  degenerative changes of the visualized spine. No acute bony abnormality  is seen.          Impression:      No active disease is seen.           This report was signed and finalized on 12/4/2024 1:49 PM by Dr. Nick Barr MD.                  ASSESSMENT/PLAN       Examination and evaluation of wound(s) was performed.    DIAGNOSIS:   Incontinence associated dermatitis  Pressure injury of left buttock, stage 3  Impaired mobility and ADLs  Bowel and bladder incontinence    PLAN:   Orders placed for wound care and pressure/moisture management as listed below.       Start     Ordered    12/05/24 1800  Skin Care  2 Times Daily      Question Answer Comment   Wound Locations Buttocks    Wound Care Instructions Clean with no rinse cleansing foam. Pat dry and apply Magic barrier to area.        12/05/24 1449    12/05/24 1449  Turn Patient  Now Then Every 2 Hours         12/05/24 1449    12/05/24 1449  Head of Bed 30 Degrees or Less (Unless Contraindicated)  Until Discontinued         12/05/24 1449    12/05/24 1449  Elevate Heels Off of Bed  Until Discontinued         12/05/24 1449    12/05/24 1449  Use Seat Cushion When Up In Chair  Continuous         12/05/24 1449    12/05/24 1449  Silicone Border Dressing to Bony Prominences  Every Shift      Comments: Apply silicone border foam dressing per protocol to bilateral heels for protection.  Nursing to change dressing every 3 days and PRN if soiled. Nursing is to peel back dressing with every assessment to assess  skin underneath dressing. No barrier cream under dressing.    12/05/24 1449 12/05/24 1449  Do NOT Rub or Massage Any Bony Prominence  Continuous         12/05/24 1449    Unscheduled  Wound Care  As Needed      Comments: Magic barrier cream   Question:  Wound Care Instructions  Answer:  Apply Moisture Barrier After Any Incontinence    12/05/24 1449             Discussed findings and treatment plan including risks, benefits, and treatment options with patient in detail. Patient agreed with treatment plan.      This document has been electronically signed by BRI Jauregui on 12/5/2024 14:47 CST

## 2024-12-05 NOTE — CASE MANAGEMENT/SOCIAL WORK
Discharge Planning Assessment  [facility]     Patient Name: Dayron Lubin  MRN: [id number]  Today's Date: 12/5/2024    Admit Date: 12/4/2024        Discharge Needs Assessment       Row Name 12/05/24 1000       Living Environment    People in Home alone    Current Living Arrangements home    Potentially Unsafe Housing Conditions none    Primary Care Provided by self    Provides Primary Care For no one    Family Caregiver if Needed sibling(s)    Quality of Family Relationships helpful;involved;supportive    Able to Return to Prior Arrangements yes       Resource/Environmental Concerns    Resource/Environmental Concerns none       Transition Planning    Patient/Family Anticipates Transition to home    Patient/Family Anticipated Services at Transition none    Transportation Anticipated family or friend will provide       Discharge Needs Assessment    Readmission Within the Last 30 Days current reason for admission unrelated to previous admission    Equipment Currently Used at Home walker, rolling    Concerns to be Addressed substance/tobacco abuse/use    Anticipated Changes Related to Illness none    Equipment Needed After Discharge none    Current Discharge Risk lives alone;substance use/abuse    Discharge Coordination/Progress Pt resides alone.  Pt has PCP, RX coverage and can afford medications.  Pt has a rolling walker from [employer].  Pt is a readmit and was setup with [employer]; however, he stated he cancelled these services because he did not feel they were necessary.  SW spoke to pt about ETOH usage and gave him information on rehab.  He states he has been to [facility] and [facility] in the past.  At this time he does not want to go to rehab for chemical dependency.  Pt has been having frequent falls at home.  Pt states he tripped over a rug and then fell once going outside.  Pt states he still drives and takes care of himself.  He denies a need for SNF placement.  Pt plans home with no needs.                    Discharge Plan    No documentation.                 Continued Care and Services - Admitted Since 12/4/2024    No active coordination exists for this encounter.       Selected Continued Care - Prior Encounters Includes continued care and service providers with selected services from prior encounters from 9/5/2024 to 12/5/2024      Discharged on 11/21/2024 Admission date: 11/16/2024 - Discharge disposition: Home-Health Care Svc      Durable Medical Equipment       Service Provider Services Address Phone Fax Patient Preferred    [facility] Durable Medical Equipment [address], [place] [address] [phone] --              Home Medical Care       Service Provider Services Address Phone Fax Patient Preferred    [facility], [facility] [address] [place] [address] [address], [facility] [address] [phone] --                             Demographic Summary    No documentation.                  Functional Status    No documentation.                  Psychosocial    No documentation.                  Abuse/Neglect    No documentation.                  Legal    No documentation.                  Substance Abuse    No documentation.                  Patient Forms    No documentation.                     ANUM StantonW

## 2024-12-05 NOTE — PLAN OF CARE
Goal Outcome Evaluation:  Plan of Care Reviewed With: patient        Progress: no change  Outcome Evaluation: OT eval complete.  Pt. is AxO x 4, pleasant, & participatory.  Mr. Lubin is utilizing a fecal mgmt sys & primafit likely d/t siginficant skin breakdown/excoriation at buttok/kieran area.  Buttock is causing significant pain but he is able to perform fxl mob & LBD at [facility] in spite of his pain.  OTR and SPT offered pt rwx and pt mobilized in room at [facility] with no LOB but obs decreased act phillip. The pt reports previously living in a duplex alone I'ly.  He would benefit from cont'd OT tx to cont to encourage OOB acitvity, reduce his fall risk, & provide safety edu.  Pt. would benefit from ETOH abuse program.    Anticipated Discharge Disposition (OT): home with 24/7 care, home with assist, home with home health

## 2024-12-05 NOTE — PROGRESS NOTES
[facility] Medicine Services  INPATIENT PROGRESS NOTE    Patient Name: Dayron Lubin  Date of Admission: 12/4/2024  Today's Date: 12/05/24  Length of Stay: 1  Primary Care Physician: Herberth Nguyen Jr., MD    Subjective   Chief Complaint: Weakness, diarrhea  HPI   Patient seen.  He complains of diarrhea that has been going on for over 8 days and pain in his bottom from gluteal decubitus ulcers as well as rectal tube which is in place for his diarrhea.    Review of Systems   All pertinent negatives and positives are as above. All other systems have been reviewed and are negative unless otherwise stated.     Objective    Temp:  [98.1 °F (36.7 °C)-98.9 °F (37.2 °C)] 98.2 °F (36.8 °C)  Heart Rate:  [] 91  Resp:  [14-18] 16  BP: (100-136)/(61-83) 114/68  Physical Exam  Constitutional:       General: He is not in acute distress.     Appearance: He is well-developed. He is ill-appearing. He is not diaphoretic.      Comments: Chronically ill-appearing elderly man.   HENT:      Head: Normocephalic.   Eyes:      General: No scleral icterus.     Conjunctiva/sclera: Conjunctivae normal.      Pupils: Pupils are equal, round, and reactive to light.   Neck:      Thyroid: No thyromegaly.      Vascular: No JVD.      Trachea: No tracheal deviation.   Cardiovascular:      Rate and Rhythm: Normal rate and regular rhythm.      Heart sounds: Normal heart sounds. No murmur heard.     No friction rub. No gallop.   Pulmonary:      Effort: Pulmonary effort is normal. No respiratory distress.      Breath sounds: Normal breath sounds. No stridor. No wheezing or rales.   Chest:      Chest wall: No tenderness.   Abdominal:      General: Bowel sounds are normal. There is no distension.      Palpations: Abdomen is soft. There is no mass.      Tenderness: There is no abdominal tenderness. There is no guarding or rebound.      Hernia: No hernia is present.   Musculoskeletal:         General: Signs of  "injury present. No tenderness or deformity.      Cervical back: Normal range of motion and neck supple.      Right lower leg: No edema.      Left lower leg: No edema.      Comments: Straight 3 bilateral gluteal decubitus ulcers present on admission.  Rectal tube in situ with liquid brown stool flowing.   Lymphadenopathy:      Cervical: No cervical adenopathy.   Skin:     General: Skin is warm and dry.      Coloration: Skin is not pale.      Findings: No erythema or rash.   Neurological:      General: No focal deficit present.      Mental Status: He is alert and oriented to person, place, and time.      Cranial Nerves: No cranial nerve deficit.      Sensory: No sensory deficit.      Motor: No abnormal muscle tone.      Coordination: Coordination normal.   Psychiatric:         Mood and Affect: Mood normal.         Behavior: Behavior normal.         Thought Content: Thought content normal.       Results Review:  I have reviewed the labs, radiology results, and diagnostic studies.    Laboratory Data:   Results from last 7 days   Lab Units 12/05/24  0529 12/04/24  1236   WBC 10*3/mm3 3.27* 6.83   HEMOGLOBIN g/dL 9.1* 11.2*   HEMATOCRIT % 27.4* 33.6*   PLATELETS 10*3/mm3 87* 141        Results from last 7 days   Lab Units 12/05/24  0426 12/04/24  1236   SODIUM mmol/L 136 136   POTASSIUM mmol/L 4.2 4.8   CHLORIDE mmol/L 99 91*   CO2 mmol/L 26.0 24.0   BUN mg/dL 24* 17   CREATININE mg/dL 1.27 1.45*   CALCIUM mg/dL 7.6* 9.0   BILIRUBIN mg/dL 1.3* 1.3*   ALK PHOS U/L 76 102   ALT (SGPT) U/L 76* 114*   AST (SGOT) U/L 80* 133*   GLUCOSE mg/dL 95 133*     Culture Data:   No results found for: \"BLOODCX\", \"URINECX\", \"WOUNDCX\", \"MRSACX\", \"RESPCX\", \"STOOLCX\"    Radiology Data:   Imaging Results (Last 24 Hours)       ** No results found for the last 24 hours. **          I have reviewed the patient's current medications.     Assessment/Plan   Assessment  Active Hospital Problems    Diagnosis     **Alcohol intoxication     " Transaminitis     Non-traumatic rhabdomyolysis     Multiple falls     Paroxysmal atrial fibrillation     Physical debility     Alcohol abuse     Acute kidney injury     HOCM (hypertrophic obstructive cardiomyopathy)     Essential hypertension    Alcohol abuse and impending delirium tremens  Thiamine administration for prophylactic purposes  Acute diarrhea  Gluteal decubitus ulcer present on admission    Treatment Plan  Continue Ativan as per [facility] protocol thiamine for prophylactic purposes.  Continue multivitamins and folic acid.  Patient at high risk of alcohol withdrawal in the next 24 to 48 hours due to alcohol abuse and 8 shots of vodka daily.    Renal function and CK are improving.  Will continue IV fluids for now.  Hold losartan due to borderline blood pressures.  Continue p.o. metoprolol for hypertension and atrial fibrillation.  Continue amiodarone and anticoagulation with Eliquis.    Monitor LFTs due to transaminitis from alcohol abuse    Will start Imodium for diarrhea.  Continue wound care for gluteal decubitus ulcer present on admission.    PT/OT consult for disposition planning.  Patient will benefit from skilled nursing facility placement if he is willing.    DVT prophylaxis with Eliquis    CODE STATUS is full code    Medical Decision Making  Number and Complexity of problems: 7  Differential Diagnosis: None    Conditions and Status        Condition is unchanged.     [facility] Data  External documents reviewed: None  Cardiac tracing (EKG, telemetry) interpretation: None  Radiology interpretation: None  Labs reviewed: CBC, BMP reviewed by me  Any tests that were considered but not ordered: None     Decision rules/scores evaluated: [id number] score 4     Discussed with: Patient     Care Planning  Shared decision making: Patient and his sister, Winsome Stone  Code status and discussions: CODE STATUS is full code    Disposition  Social Determinants of Health that impact treatment or disposition: None  I  expect the patient to be discharged to skilled nursing facility in 3 to 4 days.      Electronically signed by Darryl Adam MD, 12/05/24, 15:33 CST.

## 2024-12-06 LAB
ALBUMIN SERPL-MCNC: 3.3 G/DL (ref 3.5–5.2)
ALBUMIN/GLOB SERPL: 1.3 G/DL
ALP SERPL-CCNC: 71 U/L (ref 39–117)
ALT SERPL W P-5'-P-CCNC: 53 U/L (ref 1–41)
ANION GAP SERPL CALCULATED.3IONS-SCNC: 12 MMOL/L (ref 5–15)
AST SERPL-CCNC: 53 U/L (ref 1–40)
BASOPHILS # BLD AUTO: 0 10*3/MM3 (ref 0–0.2)
BASOPHILS NFR BLD AUTO: 0 % (ref 0–1.5)
BILIRUB SERPL-MCNC: 0.9 MG/DL (ref 0–1.2)
BUN SERPL-MCNC: 22 MG/DL (ref 8–23)
BUN/CREAT SERPL: 21.4 (ref 7–25)
CALCIUM SPEC-SCNC: 7.6 MG/DL (ref 8.6–10.5)
CHLORIDE SERPL-SCNC: 98 MMOL/L (ref 98–107)
CO2 SERPL-SCNC: 23 MMOL/L (ref 22–29)
CREAT SERPL-MCNC: 1.03 MG/DL (ref 0.76–1.27)
DEPRECATED RDW RBC AUTO: 47.8 FL (ref 37–54)
EGFRCR SERPLBLD CKD-EPI 2021: 78.1 ML/MIN/1.73
EOSINOPHIL # BLD AUTO: 0.05 10*3/MM3 (ref 0–0.4)
EOSINOPHIL NFR BLD AUTO: 1.3 % (ref 0.3–6.2)
ERYTHROCYTE [DISTWIDTH] IN BLOOD BY AUTOMATED COUNT: 13.9 % (ref 12.3–15.4)
GLOBULIN UR ELPH-MCNC: 2.6 GM/DL
GLUCOSE SERPL-MCNC: 91 MG/DL (ref 65–99)
HCT VFR BLD AUTO: 25.8 % (ref 37.5–51)
HGB BLD-MCNC: 8.7 G/DL (ref 13–17.7)
IMM GRANULOCYTES # BLD AUTO: 0.04 10*3/MM3 (ref 0–0.05)
IMM GRANULOCYTES NFR BLD AUTO: 1 % (ref 0–0.5)
LYMPHOCYTES # BLD AUTO: 0.92 10*3/MM3 (ref 0.7–3.1)
LYMPHOCYTES NFR BLD AUTO: 24.1 % (ref 19.6–45.3)
MCH RBC QN AUTO: 32.3 PG (ref 26.6–33)
MCHC RBC AUTO-ENTMCNC: 33.7 G/DL (ref 31.5–35.7)
MCV RBC AUTO: 95.9 FL (ref 79–97)
MONOCYTES # BLD AUTO: 0.39 10*3/MM3 (ref 0.1–0.9)
MONOCYTES NFR BLD AUTO: 10.2 % (ref 5–12)
NEUTROPHILS NFR BLD AUTO: 2.41 10*3/MM3 (ref 1.7–7)
NEUTROPHILS NFR BLD AUTO: 63.4 % (ref 42.7–76)
PLATELET # BLD AUTO: 80 10*3/MM3 (ref 140–450)
PMV BLD AUTO: 10.6 FL (ref 6–12)
POTASSIUM SERPL-SCNC: 3.3 MMOL/L (ref 3.5–5.2)
PROT SERPL-MCNC: 5.9 G/DL (ref 6–8.5)
RBC # BLD AUTO: 2.69 10*6/MM3 (ref 4.14–5.8)
SODIUM SERPL-SCNC: 133 MMOL/L (ref 136–145)
WBC NRBC COR # BLD AUTO: 3.81 10*3/MM3 (ref 3.4–10.8)

## 2024-12-06 PROCEDURE — 85025 COMPLETE CBC W/AUTO DIFF WBC: CPT | Performed by: NURSE PRACTITIONER

## 2024-12-06 PROCEDURE — 97116 GAIT TRAINING THERAPY: CPT

## 2024-12-06 PROCEDURE — 25010000002 LORAZEPAM PER 2 MG: Performed by: NURSE PRACTITIONER

## 2024-12-06 PROCEDURE — 80053 COMPREHEN METABOLIC PANEL: CPT | Performed by: NURSE PRACTITIONER

## 2024-12-06 PROCEDURE — 25010000002 THIAMINE HCL 200 MG/2ML SOLUTION: Performed by: NURSE PRACTITIONER

## 2024-12-06 RX ORDER — DIPHENOXYLATE HYDROCHLORIDE AND ATROPINE SULFATE 2.5; .025 MG/1; MG/1
1 TABLET ORAL 3 TIMES DAILY
Status: DISCONTINUED | OUTPATIENT
Start: 2024-12-06 | End: 2024-12-09 | Stop reason: HOSPADM

## 2024-12-06 RX ORDER — POTASSIUM CHLORIDE 750 MG/1
40 CAPSULE, EXTENDED RELEASE ORAL 2 TIMES DAILY WITH MEALS
Status: DISCONTINUED | OUTPATIENT
Start: 2024-12-06 | End: 2024-12-07

## 2024-12-06 RX ADMIN — ATORVASTATIN CALCIUM 20 MG: 10 TABLET, FILM COATED ORAL at 10:08

## 2024-12-06 RX ADMIN — THIAMINE HCL TAB 100 MG 100 MG: 100 TAB at 10:09

## 2024-12-06 RX ADMIN — ZINC OXIDE 1 APPLICATION: 200 OINTMENT TOPICAL at 13:00

## 2024-12-06 RX ADMIN — APIXABAN 5 MG: 5 TABLET, FILM COATED ORAL at 20:31

## 2024-12-06 RX ADMIN — AMIODARONE HYDROCHLORIDE 200 MG: 200 TABLET ORAL at 10:09

## 2024-12-06 RX ADMIN — METOPROLOL SUCCINATE 25 MG: 25 TABLET, EXTENDED RELEASE ORAL at 10:09

## 2024-12-06 RX ADMIN — DIPHENOXYLATE HYDROCHLORIDE AND ATROPINE SULFATE 1 TABLET: 2.5; .025 TABLET ORAL at 20:42

## 2024-12-06 RX ADMIN — ZINC OXIDE 1 APPLICATION: 200 OINTMENT TOPICAL at 10:10

## 2024-12-06 RX ADMIN — ZINC OXIDE 1 APPLICATION: 200 OINTMENT TOPICAL at 17:36

## 2024-12-06 RX ADMIN — LORAZEPAM 1 MG: 1 TABLET ORAL at 22:04

## 2024-12-06 RX ADMIN — LORAZEPAM 2 MG: 2 INJECTION INTRAMUSCULAR; INTRAVENOUS at 00:05

## 2024-12-06 RX ADMIN — ASPIRIN 81 MG: 81 TABLET, COATED ORAL at 10:09

## 2024-12-06 RX ADMIN — THIAMINE HYDROCHLORIDE 200 MG: 100 INJECTION, SOLUTION INTRAMUSCULAR; INTRAVENOUS at 21:16

## 2024-12-06 RX ADMIN — ZINC OXIDE 1 APPLICATION: 200 OINTMENT TOPICAL at 20:31

## 2024-12-06 RX ADMIN — FOLIC ACID 1 MG: 1 TABLET ORAL at 10:09

## 2024-12-06 RX ADMIN — DIPHENOXYLATE HYDROCHLORIDE AND ATROPINE SULFATE 1 TABLET: 2.5; .025 TABLET ORAL at 15:17

## 2024-12-06 RX ADMIN — Medication 10 ML: at 10:09

## 2024-12-06 RX ADMIN — THIAMINE HYDROCHLORIDE 200 MG: 100 INJECTION, SOLUTION INTRAMUSCULAR; INTRAVENOUS at 14:45

## 2024-12-06 RX ADMIN — THIAMINE HYDROCHLORIDE 200 MG: 100 INJECTION, SOLUTION INTRAMUSCULAR; INTRAVENOUS at 05:19

## 2024-12-06 RX ADMIN — LORAZEPAM 2 MG: 1 TABLET ORAL at 20:30

## 2024-12-06 RX ADMIN — LORAZEPAM 2 MG: 2 INJECTION INTRAMUSCULAR; INTRAVENOUS at 04:46

## 2024-12-06 RX ADMIN — POTASSIUM CHLORIDE 40 MEQ: 750 CAPSULE, EXTENDED RELEASE ORAL at 17:36

## 2024-12-06 RX ADMIN — POTASSIUM CHLORIDE 40 MEQ: 750 CAPSULE, EXTENDED RELEASE ORAL at 10:09

## 2024-12-06 RX ADMIN — PANTOPRAZOLE SODIUM 40 MG: 40 TABLET, DELAYED RELEASE ORAL at 10:08

## 2024-12-06 RX ADMIN — Medication 10 ML: at 20:32

## 2024-12-06 RX ADMIN — APIXABAN 5 MG: 5 TABLET, FILM COATED ORAL at 10:08

## 2024-12-06 RX ADMIN — AZELASTINE HYDROCHLORIDE 2 SPRAY: 137 SPRAY, METERED NASAL at 10:10

## 2024-12-06 RX ADMIN — AZELASTINE HYDROCHLORIDE 2 SPRAY: 137 SPRAY, METERED NASAL at 20:31

## 2024-12-06 NOTE — PROGRESS NOTES
[facility] Medicine Services  INPATIENT PROGRESS NOTE    Patient Name: Dayron Lubin  Date of Admission: 12/4/2024  Today's Date: 12/06/24  Length of Stay: 2  Primary Care Physician: Herberth Nguyen Jr., MD    Subjective   Chief Complaint: Weakness, diarrhea  Alcohol Intoxication     Patient seen. He states that his diarrhea is improving on Imodium. He denies abdominal pain.  He states that his rectal pain from rectal tube is improving.    Review of Systems   All pertinent negatives and positives are as above. All other systems have been reviewed and are negative unless otherwise stated.     Objective    Temp:  [97.7 °F (36.5 °C)-98.4 °F (36.9 °C)] 98 °F (36.7 °C)  Heart Rate:  [75-95] 75  Resp:  [16-18] 16  BP: (112-153)/(69-86) 122/86  Physical Exam  Constitutional:       General: He is not in acute distress.     Appearance: He is well-developed. He is ill-appearing. He is not diaphoretic.      Comments: Chronically ill-appearing elderly man.   HENT:      Head: Normocephalic.   Eyes:      General: No scleral icterus.     Conjunctiva/sclera: Conjunctivae normal.      Pupils: Pupils are equal, round, and reactive to light.   Neck:      Thyroid: No thyromegaly.      Vascular: No JVD.      Trachea: No tracheal deviation.   Cardiovascular:      Rate and Rhythm: Normal rate and regular rhythm.      Heart sounds: Normal heart sounds. No murmur heard.     No friction rub. No gallop.   Pulmonary:      Effort: Pulmonary effort is normal. No respiratory distress.      Breath sounds: Normal breath sounds. No stridor. No wheezing or rales.   Chest:      Chest wall: No tenderness.   Abdominal:      General: Bowel sounds are normal. There is no distension.      Palpations: Abdomen is soft. There is no mass.      Tenderness: There is no abdominal tenderness. There is no guarding or rebound.      Hernia: No hernia is present.   Musculoskeletal:         General: Signs of injury present. No  "tenderness or deformity.      Cervical back: Normal range of motion and neck supple.      Right lower leg: No edema.      Left lower leg: No edema.      Comments: Stage 3 bilateral gluteal decubitus ulcers present on admission.  Rectal tube in situ with liquid brown stool flowing.   Lymphadenopathy:      Cervical: No cervical adenopathy.   Skin:     General: Skin is warm and dry.      Coloration: Skin is not pale.      Findings: No erythema or rash.   Neurological:      General: No focal deficit present.      Mental Status: He is alert and oriented to person, place, and time.      Cranial Nerves: No cranial nerve deficit.      Sensory: No sensory deficit.      Motor: No abnormal muscle tone.      Coordination: Coordination normal.   Psychiatric:         Mood and Affect: Mood normal.         Behavior: Behavior normal.         Thought Content: Thought content normal.       Results Review:  I have reviewed the labs, radiology results, and diagnostic studies.    Laboratory Data:   Results from last 7 days   Lab Units 12/06/24  0356 12/05/24  0529 12/04/24  1236   WBC 10*3/mm3 3.81 3.27* 6.83   HEMOGLOBIN g/dL 8.7* 9.1* 11.2*   HEMATOCRIT % 25.8* 27.4* 33.6*   PLATELETS 10*3/mm3 80* 87* 141        Results from last 7 days   Lab Units 12/06/24  0356 12/05/24  0426 12/04/24  1236   SODIUM mmol/L 133* 136 136   POTASSIUM mmol/L 3.3* 4.2 4.8   CHLORIDE mmol/L 98 99 91*   CO2 mmol/L 23.0 26.0 24.0   BUN mg/dL 22 24* 17   CREATININE mg/dL 1.03 1.27 1.45*   CALCIUM mg/dL 7.6* 7.6* 9.0   BILIRUBIN mg/dL 0.9 1.3* 1.3*   ALK PHOS U/L 71 76 102   ALT (SGPT) U/L 53* 76* 114*   AST (SGOT) U/L 53* 80* 133*   GLUCOSE mg/dL 91 95 133*     Culture Data:   No results found for: \"BLOODCX\", \"URINECX\", \"WOUNDCX\", \"MRSACX\", \"RESPCX\", \"STOOLCX\"    Radiology Data:   Imaging Results (Last 24 Hours)       ** No results found for the last 24 hours. **          I have reviewed the patient's current medications.     Assessment/Plan "   Assessment  Active Hospital Problems    Diagnosis     **Alcohol intoxication     Transaminitis     Non-traumatic rhabdomyolysis     Multiple falls     Paroxysmal atrial fibrillation     Physical debility     Alcohol abuse     Acute kidney injury     HOCM (hypertrophic obstructive cardiomyopathy)     Essential hypertension    Alcohol abuse and impending delirium tremens  Thiamine administration for prophylactic purposes  Acute diarrhea  Gluteal decubitus ulcer present on admission    Treatment Plan  Continue Ativan as per [facility] protocol thiamine for prophylactic purposes. Continue multivitamins and folic acid.  Patient at high risk of alcohol withdrawal in the next 24 hours due to alcohol abuse and 8 shots of vodka daily.    Renal function and CK are improving. Discontinue IV fluids. Hold losartan due to borderline blood pressures. Continue p.o. metoprolol for hypertension and atrial fibrillation. Continue amiodarone and anticoagulation with Eliquis.    Monitor LFTs due to transaminitis from alcohol abuse    Continue Imodium for diarrhea.  Will add scheduled Lomotil    Continue wound care for gluteal decubitus ulcer present on admission.    PT/OT consult for disposition planning.  Patient is planned for discharge home with home health.    DVT prophylaxis with Eliquis    CODE STATUS is full code    Medical Decision Making  Number and Complexity of problems: 7  Differential Diagnosis: None    Conditions and Status        Condition is unchanged.     [facility] Data  External documents reviewed: None  Cardiac tracing (EKG, telemetry) interpretation: None  Radiology interpretation: None  Labs reviewed: CBC, BMP reviewed by me  Any tests that were considered but not ordered: None     Decision rules/scores evaluated: [id number] score 4     Discussed with: Patient     Care Planning  Shared decision making: Patient and his sister, Winsome Stone  Code status and discussions: CODE STATUS is full code    Disposition  Social  Determinants of Health that impact treatment or disposition: None  I expect the patient to be discharged home in the next 2 to 3 days      Electronically signed by Darryl Adam MD, 12/06/24, 15:02 CST.

## 2024-12-06 NOTE — PLAN OF CARE
Goal Outcome Evaluation:  Plan of Care Reviewed With: patient           Outcome Evaluation: Pt is alone in the room and presents with a stage III PI on the buttocks. Admitted for alcohol intoxication. Intake since admission: 50% of breakfast. PO fluids: 240ml. Pt denies complications such as chewing/swallowing issues, N/V, or food allergies but notes diarrhea and poor appetite. Per provider notes, pt has had diarrhea for the last 8 days.Imodium started. Secure chat with nurse and noted that diarrhea looks like is slowing down. Per medical records, pt was 197lb on standing scale on 10/18/2024 (7% weight loss over the past three months). Pt declined ONS for now but will add Ricki BID for additional nutrition for wound healing. Dietitian explained calorie and protein needs, advised on available snacks, and encouraged pt to eat well while inpatient. Will monitor.

## 2024-12-06 NOTE — THERAPY TREATMENT NOTE
Acute Care - Physical Therapy Treatment Note  [facility]     Patient Name: Dayron Lubin  : 1955  MRN: [id number]  Today's Date: 2024      Visit Dx:     ICD-10-CM ICD-9-CM   1. Alcohol withdrawal syndrome without complication  F10.930 291.81   2. Pressure injury of sacral region, stage 1  L89.151 707.03     707.21   3. Traumatic rhabdomyolysis, initial encounter  T79.6XXA 958.6   4. Impaired mobility [Z74.09]  Z74.09 799.89     Patient Active Problem List   Diagnosis    Prostate cancer    Hx of radiation therapy    Elevated PSA    Encounter for follow-up surveillance of prostate cancer    HOCM (hypertrophic obstructive cardiomyopathy)    Coronary artery disease involving native coronary artery of native heart without angina pectoris    Essential hypertension    Mixed hyperlipidemia    Class 1 obesity with alveolar hypoventilation, serious comorbidity, and body mass index (BMI) of 30.0 to 30.9 in adult    Displaced fracture of lateral malleolus of right fibula, initial encounter for closed fracture    Family history of colonic polyps    History of colon polyps    Elevated brain natriuretic peptide (BNP) level    Acute kidney injury    Anxiety associated with depression    Alcohol abuse    Alcoholic hepatitis    Volume depletion    Anorexia    CHF (congestive heart failure)    Hypokalemia    Duodenitis    Gait instability    SVT (supraventricular tachycardia)    Physical debility    Atrial fibrillation with rapid ventricular response    Alcoholism in remission    Paroxysmal atrial fibrillation    Nasal polyposis    Nasal congestion    Estelle bullosa    Long-term use of high-risk medication    Multiple falls    Transaminitis    Thrombocytopenia    Non-traumatic rhabdomyolysis    Alcohol withdrawal    Pancytopenia    Opiate abuse, episodic    Alcohol intoxication     Past Medical History:   Diagnosis Date    A-fib     Alcoholic hepatitis 2023    CAD (coronary artery disease)     History of external  beam radiation therapy 05/12/2016    6840 cGy to prostate bed    Hyperlipidemia     Hypertension     Insomnia     Prostate cancer      Past Surgical History:   Procedure Laterality Date    CARDIAC CATHETERIZATION      CORONARY ANGIOPLASTY WITH STENT PLACEMENT      FRACTURE SURGERY      PROSTATE SURGERY      TONSILLECTOMY       PT Assessment (Last 12 Hours)       PT Evaluation and Treatment       Row Name 12/06/24 1435          Physical Therapy Time and Intention    Subjective Information complains of;pain;weakness  -LY     Document Type therapy note (daily note)  -LY     Mode of Treatment physical therapy  -LY       Row Name 12/06/24 1435          General Information    Existing Precautions/Restrictions fall;other (see comments)  excoriation at bottom, rectal tube  -LY       Row Name 12/06/24 1435          Pain    Pretreatment Pain Rating 6/10  -LY     Posttreatment Pain Rating 6/10  -LY     Pain Location buttock  -LY     Pain Side/Orientation generalized  -LY     Pain Management Interventions exercise or physical activity utilized  -LY     Response to Pain Interventions activity participation with tolerable pain  -LY       Row Name 12/06/24 1435          Bed Mobility    Supine-Sit [place] (Bed Mobility) standby assist  -LY     Sit-Supine [place] (Bed Mobility) standby assist  -LY     Assistive Device (Bed Mobility) bed rails  -LY     Comment, (Bed Mobility) assist with management of lines  -LY       Row Name 12/06/24 1435          Sit-Stand Transfer    Sit-Stand [place] (Transfers) standby assist  -LY     Assistive Device (Sit-Stand Transfers) walker, front-wheeled  -LY       Row Name 12/06/24 1435          Stand-Sit Transfer    Stand-Sit [place] (Transfers) standby assist  -LY     Assistive Device (Stand-Sit Transfers) walker, front-wheeled  -LY       Row Name 12/06/24 1435          Gait/Stairs (Locomotion)    [place] Level (Gait) standby assist  -LY     Assistive Device (Gait) walker,  front-wheeled  -LY     Distance in Feet (Gait) 100  -LY     Pattern (Gait) step-through  -LY     Deviations/Abnormal Patterns (Gait) bilateral deviations;base of support, narrow;judd decreased;festinating/shuffling;gait speed decreased  -LY     Bilateral Gait Deviations forward flexed posture  -LY     Comment, (Gait/Stairs) enocuragement for pt to walk, pt reluctant d/t extra lines  -LY       Row Name             Wound 12/04/24 1248 coccyx    Wound - Properties Group Placement Date: 12/04/24  - Placement Time: 1248 - Location: coccyx  -AH    Retired Wound - Properties Group Placement Date: 12/04/24  - Placement Time: 1248  - Location: coccyx  -AH    Retired Wound - Properties Group Placement Date: 12/04/24  - Placement Time: 1248 - Location: coccyx  -AH    Retired Wound - Properties Group Date first assessed: 12/04/24  - Time first assessed: 1248  - Location: coccyx  -AH      Row Name             Wound 12/04/24 0000 Bilateral scrotum    Wound - Properties Group Placement Date: 12/04/24  - Placement Time: 0000  -SJ Side: Bilateral  -SJ Location: scrotum  -SJ Primary Wound Type: MASD  -SJ Present on Original Admission: Y  -SJ    Retired Wound - Properties Group Placement Date: 12/04/24  - Placement Time: 0000  -SJ Present on Original Admission: Y  -SJ Side: Bilateral  -SJ Location: scrotum  -SJ Primary Wound Type: MASD  -SJ    Retired Wound - Properties Group Placement Date: 12/04/24  - Placement Time: 0000  -SJ Present on Original Admission: Y  -SJ Side: Bilateral  -SJ Location: scrotum  -SJ Primary Wound Type: MASD  -SJ    Retired Wound - Properties Group Date first assessed: 12/04/24  - Time first assessed: 0000  -SJ Present on Original Admission: Y  -SJ Side: Bilateral  -SJ Location: scrotum  -SJ Primary Wound Type: MASD  -SJ      Row Name 12/06/24 1435          Plan of Care Review    Plan of Care Reviewed With patient  -LY     Progress improving  -LY       Row Name 12/06/24 1438           Positioning and Restraints    Pre-Treatment Position in bed  -LY     Post Treatment Position bed  -LY     In Bed fowlers;call light within reach;encouraged to call for assist;with family/caregiver  -LY               User Key  (r) = Recorded By, (t) = Taken By, (c) = Cosigned By      Initials Name Provider Type    LY Michell Ratliff, PTA Physical Therapist Assistant    Saray Pope, RN Registered Nurse    Claudia Howard RN Registered Nurse                    Physical Therapy Education       Title: PT OT SLP Therapies (In Progress)       Topic: Physical Therapy (In Progress)       Point: Mobility training (Done)       Learning Progress Summary            Patient Acceptance, E, VU by AT at 12/5/2024 1035    Comment: Patient was eduacted on the benefits of physical activity, POC, and recommended discharge.                      Point: Home exercise program (Not Started)       Learner Progress:  Not documented in this visit.              Point: Body mechanics (Done)       Learning Progress Summary            Patient Acceptance, E, VU by AT at 12/5/2024 1035    Comment: Patient was eduacted on the benefits of physical activity, POC, and recommended discharge.                      Point: Precautions (Not Started)       Learner Progress:  Not documented in this visit.                              User Key       Initials Effective Dates Name Provider Type Discipline    AT 08/22/24 -  Astrid Toussaint, PT Student PT Student PT                  PT Recommendation and Plan  Anticipated Discharge Disposition (PT): home, home with assist  Plan of Care Reviewed With: patient  Progress: improving       Time Calculation:    PT Charges       Row Name 12/06/24 1540             Time Calculation    Start Time 1435  -LY      Stop Time 1459  -LY      Time Calculation (min) 24 min  -LY      PT Received On 12/06/24  -LY         Time Calculation- PT    Total Timed Code Minutes- PT 24 minute(s)  -LY         Timed Charges    55798 -  Gait Training Minutes  24  -LY         Total Minutes    Timed Charges Total Minutes 24  -LY       Total Minutes 24  -LY                User Key  (r) = Recorded By, (t) = Taken By, (c) = Cosigned By      Initials Name Provider Type    Michell Joyner PTA Physical Therapist Assistant                  Therapy Charges for Today       Code Description Service Date Service Provider Modifiers Qty    [id number] HC GAIT TRAINING EA 15 MIN 12/6/2024 Michell Ratliff PTA GP 2            PT G-Codes  Outcome Measure Options: AM-PAC 6 Clicks Basic Mobility (PT)  AM-PAC 6 Clicks Score (PT): 23  AM-PAC 6 Clicks Score (OT): 21    Michell Ratliff PTA  12/6/2024

## 2024-12-07 LAB
ALBUMIN SERPL-MCNC: 3.5 G/DL (ref 3.5–5.2)
ALBUMIN/GLOB SERPL: 1.4 G/DL
ALP SERPL-CCNC: 68 U/L (ref 39–117)
ALT SERPL W P-5'-P-CCNC: 48 U/L (ref 1–41)
ANION GAP SERPL CALCULATED.3IONS-SCNC: 11 MMOL/L (ref 5–15)
AST SERPL-CCNC: 52 U/L (ref 1–40)
BASOPHILS # BLD AUTO: 0.01 10*3/MM3 (ref 0–0.2)
BASOPHILS NFR BLD AUTO: 0.2 % (ref 0–1.5)
BILIRUB SERPL-MCNC: 0.7 MG/DL (ref 0–1.2)
BUN SERPL-MCNC: 17 MG/DL (ref 8–23)
BUN/CREAT SERPL: 20.7 (ref 7–25)
CALCIUM SPEC-SCNC: 7.7 MG/DL (ref 8.6–10.5)
CHLORIDE SERPL-SCNC: 102 MMOL/L (ref 98–107)
CO2 SERPL-SCNC: 23 MMOL/L (ref 22–29)
CREAT SERPL-MCNC: 0.82 MG/DL (ref 0.76–1.27)
DEPRECATED RDW RBC AUTO: 49.7 FL (ref 37–54)
EGFRCR SERPLBLD CKD-EPI 2021: 94.5 ML/MIN/1.73
EOSINOPHIL # BLD AUTO: 0.15 10*3/MM3 (ref 0–0.4)
EOSINOPHIL NFR BLD AUTO: 3.2 % (ref 0.3–6.2)
ERYTHROCYTE [DISTWIDTH] IN BLOOD BY AUTOMATED COUNT: 14 % (ref 12.3–15.4)
GLOBULIN UR ELPH-MCNC: 2.5 GM/DL
GLUCOSE SERPL-MCNC: 88 MG/DL (ref 65–99)
HCT VFR BLD AUTO: 27.3 % (ref 37.5–51)
HGB BLD-MCNC: 8.7 G/DL (ref 13–17.7)
IMM GRANULOCYTES # BLD AUTO: 0.08 10*3/MM3 (ref 0–0.05)
IMM GRANULOCYTES NFR BLD AUTO: 1.7 % (ref 0–0.5)
LYMPHOCYTES # BLD AUTO: 1.13 10*3/MM3 (ref 0.7–3.1)
LYMPHOCYTES NFR BLD AUTO: 24.5 % (ref 19.6–45.3)
MCH RBC QN AUTO: 31.6 PG (ref 26.6–33)
MCHC RBC AUTO-ENTMCNC: 31.9 G/DL (ref 31.5–35.7)
MCV RBC AUTO: 99.3 FL (ref 79–97)
MONOCYTES # BLD AUTO: 0.5 10*3/MM3 (ref 0.1–0.9)
MONOCYTES NFR BLD AUTO: 10.8 % (ref 5–12)
NEUTROPHILS NFR BLD AUTO: 2.75 10*3/MM3 (ref 1.7–7)
NEUTROPHILS NFR BLD AUTO: 59.6 % (ref 42.7–76)
PLATELET # BLD AUTO: 91 10*3/MM3 (ref 140–450)
PMV BLD AUTO: 10.5 FL (ref 6–12)
POTASSIUM SERPL-SCNC: 4.1 MMOL/L (ref 3.5–5.2)
PROT SERPL-MCNC: 6 G/DL (ref 6–8.5)
RBC # BLD AUTO: 2.75 10*6/MM3 (ref 4.14–5.8)
SODIUM SERPL-SCNC: 136 MMOL/L (ref 136–145)
WBC NRBC COR # BLD AUTO: 4.62 10*3/MM3 (ref 3.4–10.8)

## 2024-12-07 PROCEDURE — 80053 COMPREHEN METABOLIC PANEL: CPT | Performed by: NURSE PRACTITIONER

## 2024-12-07 PROCEDURE — 25010000002 THIAMINE HCL 200 MG/2ML SOLUTION: Performed by: NURSE PRACTITIONER

## 2024-12-07 PROCEDURE — 85025 COMPLETE CBC W/AUTO DIFF WBC: CPT | Performed by: NURSE PRACTITIONER

## 2024-12-07 RX ADMIN — THIAMINE HYDROCHLORIDE 200 MG: 100 INJECTION, SOLUTION INTRAMUSCULAR; INTRAVENOUS at 21:15

## 2024-12-07 RX ADMIN — Medication 10 ML: at 21:16

## 2024-12-07 RX ADMIN — ASPIRIN 81 MG: 81 TABLET, COATED ORAL at 08:49

## 2024-12-07 RX ADMIN — AZELASTINE HYDROCHLORIDE 2 SPRAY: 137 SPRAY, METERED NASAL at 08:50

## 2024-12-07 RX ADMIN — LORAZEPAM 1 MG: 1 TABLET ORAL at 21:20

## 2024-12-07 RX ADMIN — LORAZEPAM 1 MG: 1 TABLET ORAL at 03:06

## 2024-12-07 RX ADMIN — DIPHENOXYLATE HYDROCHLORIDE AND ATROPINE SULFATE 1 TABLET: 2.5; .025 TABLET ORAL at 08:50

## 2024-12-07 RX ADMIN — FOLIC ACID 1 MG: 1 TABLET ORAL at 08:49

## 2024-12-07 RX ADMIN — APIXABAN 5 MG: 5 TABLET, FILM COATED ORAL at 21:14

## 2024-12-07 RX ADMIN — ZINC OXIDE 1 APPLICATION: 200 OINTMENT TOPICAL at 21:15

## 2024-12-07 RX ADMIN — ATORVASTATIN CALCIUM 20 MG: 10 TABLET, FILM COATED ORAL at 08:49

## 2024-12-07 RX ADMIN — LORAZEPAM 1 MG: 1 TABLET ORAL at 05:05

## 2024-12-07 RX ADMIN — AMIODARONE HYDROCHLORIDE 200 MG: 200 TABLET ORAL at 08:49

## 2024-12-07 RX ADMIN — LORAZEPAM 2 MG: 1 TABLET ORAL at 01:55

## 2024-12-07 RX ADMIN — DIPHENOXYLATE HYDROCHLORIDE AND ATROPINE SULFATE 1 TABLET: 2.5; .025 TABLET ORAL at 14:44

## 2024-12-07 RX ADMIN — METOPROLOL SUCCINATE 25 MG: 25 TABLET, EXTENDED RELEASE ORAL at 08:50

## 2024-12-07 RX ADMIN — PANTOPRAZOLE SODIUM 40 MG: 40 TABLET, DELAYED RELEASE ORAL at 08:50

## 2024-12-07 RX ADMIN — THIAMINE HYDROCHLORIDE 200 MG: 100 INJECTION, SOLUTION INTRAMUSCULAR; INTRAVENOUS at 14:44

## 2024-12-07 RX ADMIN — ZINC OXIDE 1 APPLICATION: 200 OINTMENT TOPICAL at 12:02

## 2024-12-07 RX ADMIN — DIPHENOXYLATE HYDROCHLORIDE AND ATROPINE SULFATE 1 TABLET: 2.5; .025 TABLET ORAL at 21:15

## 2024-12-07 RX ADMIN — POTASSIUM CHLORIDE 40 MEQ: 750 CAPSULE, EXTENDED RELEASE ORAL at 08:50

## 2024-12-07 RX ADMIN — THIAMINE HYDROCHLORIDE 200 MG: 100 INJECTION, SOLUTION INTRAMUSCULAR; INTRAVENOUS at 05:05

## 2024-12-07 RX ADMIN — AZELASTINE HYDROCHLORIDE 2 SPRAY: 137 SPRAY, METERED NASAL at 21:15

## 2024-12-07 RX ADMIN — Medication 10 ML: at 08:50

## 2024-12-07 RX ADMIN — ZINC OXIDE 1 APPLICATION: 200 OINTMENT TOPICAL at 08:49

## 2024-12-07 RX ADMIN — APIXABAN 5 MG: 5 TABLET, FILM COATED ORAL at 08:49

## 2024-12-07 NOTE — PLAN OF CARE
Goal Outcome Evaluation:   Diarrhea slowly improving-wounds healing-minimal bleeding noted-ambulated in marcial with PT-very mild withdrawal symptoms

## 2024-12-07 NOTE — PROGRESS NOTES
[facility] Medicine Services  INPATIENT PROGRESS NOTE    Patient Name: Dayron Lubin  Date of Admission: 12/4/2024  Today's Date: 12/07/24  Length of Stay: 3  Primary Care Physician: Herberth Nguyen Jr., MD    Subjective   Chief Complaint: Weakness, diarrhea  Alcohol Intoxication     Patient seen. He states that his diarrhea is much improved.  Rectal tube has been removed.  He was requiring significant amounts of Ativan overnight.    Review of Systems   All pertinent negatives and positives are as above. All other systems have been reviewed and are negative unless otherwise stated.     Objective    Temp:  [97.4 °F (36.3 °C)-98.2 °F (36.8 °C)] 97.5 °F (36.4 °C)  Heart Rate:  [74-98] 74  Resp:  [15-22] 18  BP: (113-140)/(72-86) 125/74  Physical Exam  Constitutional:       General: He is not in acute distress.     Appearance: He is well-developed. He is ill-appearing. He is not diaphoretic.      Comments: Chronically ill-appearing elderly man.   HENT:      Head: Normocephalic.   Eyes:      General: No scleral icterus.     Conjunctiva/sclera: Conjunctivae normal.      Pupils: Pupils are equal, round, and reactive to light.   Neck:      Thyroid: No thyromegaly.      Vascular: No JVD.      Trachea: No tracheal deviation.   Cardiovascular:      Rate and Rhythm: Normal rate and regular rhythm.      Heart sounds: Normal heart sounds. No murmur heard.     No friction rub. No gallop.   Pulmonary:      Effort: Pulmonary effort is normal. No respiratory distress.      Breath sounds: Normal breath sounds. No stridor. No wheezing or rales.   Chest:      Chest wall: No tenderness.   Abdominal:      General: Bowel sounds are normal. There is no distension.      Palpations: Abdomen is soft. There is no mass.      Tenderness: There is no abdominal tenderness. There is no guarding or rebound.      Hernia: No hernia is present.   Musculoskeletal:         General: Signs of injury present. No  "tenderness or deformity.      Cervical back: Normal range of motion and neck supple.      Right lower leg: No edema.      Left lower leg: No edema.      Comments: Stage 3 bilateral gluteal decubitus ulcers present on admission.   Lymphadenopathy:      Cervical: No cervical adenopathy.   Skin:     General: Skin is warm and dry.      Coloration: Skin is not pale.      Findings: No erythema or rash.   Neurological:      General: No focal deficit present.      Mental Status: He is alert and oriented to person, place, and time.      Cranial Nerves: No cranial nerve deficit.      Sensory: No sensory deficit.      Motor: No abnormal muscle tone.      Coordination: Coordination normal.   Psychiatric:         Mood and Affect: Mood normal.         Behavior: Behavior normal.         Thought Content: Thought content normal.       Results Review:  I have reviewed the labs, radiology results, and diagnostic studies.    Laboratory Data:   Results from last 7 days   Lab Units 12/07/24  0420 12/06/24  0356 12/05/24  0529   WBC 10*3/mm3 4.62 3.81 3.27*   HEMOGLOBIN g/dL 8.7* 8.7* 9.1*   HEMATOCRIT % 27.3* 25.8* 27.4*   PLATELETS 10*3/mm3 91* 80* 87*        Results from last 7 days   Lab Units 12/07/24  0420 12/06/24  0356 12/05/24  0426   SODIUM mmol/L 136 133* 136   POTASSIUM mmol/L 4.1 3.3* 4.2   CHLORIDE mmol/L 102 98 99   CO2 mmol/L 23.0 23.0 26.0   BUN mg/dL 17 22 24*   CREATININE mg/dL 0.82 1.03 1.27   CALCIUM mg/dL 7.7* 7.6* 7.6*   BILIRUBIN mg/dL 0.7 0.9 1.3*   ALK PHOS U/L 68 71 76   ALT (SGPT) U/L 48* 53* 76*   AST (SGOT) U/L 52* 53* 80*   GLUCOSE mg/dL 88 91 95     Culture Data:   No results found for: \"BLOODCX\", \"URINECX\", \"WOUNDCX\", \"MRSACX\", \"RESPCX\", \"STOOLCX\"    Radiology Data:   Imaging Results (Last 24 Hours)       ** No results found for the last 24 hours. **          I have reviewed the patient's current medications.     Assessment/Plan   Assessment  Active Hospital Problems    Diagnosis     **Alcohol intoxication "     Transaminitis     Non-traumatic rhabdomyolysis     Multiple falls     Paroxysmal atrial fibrillation     Physical debility     Alcohol abuse     Acute kidney injury     HOCM (hypertrophic obstructive cardiomyopathy)     Essential hypertension    Alcohol abuse and impending delirium tremens  Thiamine administration for prophylactic purposes  Acute diarrhea  Gluteal decubitus ulcer present on admission    Treatment Plan  Continue Ativan as per [facility] protocol thiamine for prophylactic purposes. Continue multivitamins and folic acid.  Patient at high risk of alcohol withdrawal.    Renal function and CK are much improved. Hold losartan due to borderline blood pressures. Continue p.o. metoprolol for hypertension and atrial fibrillation. Continue amiodarone and anticoagulation with Eliquis.    Monitor LFTs due to transaminitis from alcohol abuse.  LFTs have improved.    Continue Imodium for diarrhea. Continue scheduled Lomotil    Continue wound care for gluteal decubitus ulcer present on admission.    PT/OT consult for disposition planning.  Patient is planned for discharge home with home health.    DVT prophylaxis with Eliquis    CODE STATUS is full code    Possible discharge in the next 2 to 3 days    Medical Decision Making  Number and Complexity of problems: 7  Differential Diagnosis: None    Conditions and Status        Condition is unchanged.     [facility] Data  External documents reviewed: None  Cardiac tracing (EKG, telemetry) interpretation: None  Radiology interpretation: None  Labs reviewed: CBC, BMP reviewed by me  Any tests that were considered but not ordered: None     Decision rules/scores evaluated: [id number] score 4     Discussed with: Patient     Care Planning  Shared decision making: Patient and his sister, Winsome Stone  Code status and discussions: CODE STATUS is full code    Disposition  Social Determinants of Health that impact treatment or disposition: None  I expect the patient to be  discharged home in the next 2 to 3 days      Electronically signed by Darryl Adam MD, 12/07/24, 12:46 CST.

## 2024-12-07 NOTE — PLAN OF CARE
Goal Outcome Evaluation:  Plan of Care Reviewed With: patient        Progress: improving  Outcome Evaluation: Alert and oriented x 4. Expresses regret over drinking so much over the past few months. Going through withdraws. Anxious, headache, and tremors. CIWA as high as 14. 7 mg ativan total administered during night. VS stable. Sat in chair some during the night. Slept on and off. Diarrhea has slowed down significantly with scheduled imodium (100 ml out during night). Using call button and waiting for assistance before getting OOB.

## 2024-12-08 LAB
ALBUMIN SERPL-MCNC: 3.2 G/DL (ref 3.5–5.2)
ALBUMIN/GLOB SERPL: 1.3 G/DL
ALP SERPL-CCNC: 63 U/L (ref 39–117)
ALT SERPL W P-5'-P-CCNC: 38 U/L (ref 1–41)
ANION GAP SERPL CALCULATED.3IONS-SCNC: 7 MMOL/L (ref 5–15)
AST SERPL-CCNC: 36 U/L (ref 1–40)
BASOPHILS # BLD AUTO: 0.02 10*3/MM3 (ref 0–0.2)
BASOPHILS NFR BLD AUTO: 0.5 % (ref 0–1.5)
BILIRUB SERPL-MCNC: 0.6 MG/DL (ref 0–1.2)
BUN SERPL-MCNC: 21 MG/DL (ref 8–23)
BUN/CREAT SERPL: 21.9 (ref 7–25)
CALCIUM SPEC-SCNC: 8 MG/DL (ref 8.6–10.5)
CHLORIDE SERPL-SCNC: 107 MMOL/L (ref 98–107)
CO2 SERPL-SCNC: 24 MMOL/L (ref 22–29)
CREAT SERPL-MCNC: 0.96 MG/DL (ref 0.76–1.27)
DEPRECATED RDW RBC AUTO: 50.9 FL (ref 37–54)
EGFRCR SERPLBLD CKD-EPI 2021: 85 ML/MIN/1.73
EOSINOPHIL # BLD AUTO: 0.23 10*3/MM3 (ref 0–0.4)
EOSINOPHIL NFR BLD AUTO: 5.5 % (ref 0.3–6.2)
ERYTHROCYTE [DISTWIDTH] IN BLOOD BY AUTOMATED COUNT: 14.2 % (ref 12.3–15.4)
GLOBULIN UR ELPH-MCNC: 2.5 GM/DL
GLUCOSE SERPL-MCNC: 89 MG/DL (ref 65–99)
HCT VFR BLD AUTO: 26.6 % (ref 37.5–51)
HGB BLD-MCNC: 8.4 G/DL (ref 13–17.7)
IMM GRANULOCYTES # BLD AUTO: 0.11 10*3/MM3 (ref 0–0.05)
IMM GRANULOCYTES NFR BLD AUTO: 2.6 % (ref 0–0.5)
LYMPHOCYTES # BLD AUTO: 1.08 10*3/MM3 (ref 0.7–3.1)
LYMPHOCYTES NFR BLD AUTO: 25.8 % (ref 19.6–45.3)
MCH RBC QN AUTO: 31.6 PG (ref 26.6–33)
MCHC RBC AUTO-ENTMCNC: 31.6 G/DL (ref 31.5–35.7)
MCV RBC AUTO: 100 FL (ref 79–97)
MONOCYTES # BLD AUTO: 0.54 10*3/MM3 (ref 0.1–0.9)
MONOCYTES NFR BLD AUTO: 12.9 % (ref 5–12)
NEUTROPHILS NFR BLD AUTO: 2.21 10*3/MM3 (ref 1.7–7)
NEUTROPHILS NFR BLD AUTO: 52.7 % (ref 42.7–76)
NRBC BLD AUTO-RTO: 1.2 /100 WBC (ref 0–0.2)
PLATELET # BLD AUTO: 89 10*3/MM3 (ref 140–450)
PMV BLD AUTO: 10.7 FL (ref 6–12)
POTASSIUM SERPL-SCNC: 4.5 MMOL/L (ref 3.5–5.2)
PROT SERPL-MCNC: 5.7 G/DL (ref 6–8.5)
RBC # BLD AUTO: 2.66 10*6/MM3 (ref 4.14–5.8)
SODIUM SERPL-SCNC: 138 MMOL/L (ref 136–145)
WBC NRBC COR # BLD AUTO: 4.19 10*3/MM3 (ref 3.4–10.8)

## 2024-12-08 PROCEDURE — 80053 COMPREHEN METABOLIC PANEL: CPT | Performed by: NURSE PRACTITIONER

## 2024-12-08 PROCEDURE — 85025 COMPLETE CBC W/AUTO DIFF WBC: CPT | Performed by: NURSE PRACTITIONER

## 2024-12-08 PROCEDURE — 25010000002 THIAMINE PER 100 MG: Performed by: NURSE PRACTITIONER

## 2024-12-08 PROCEDURE — 97116 GAIT TRAINING THERAPY: CPT

## 2024-12-08 PROCEDURE — 25010000002 THIAMINE HCL 200 MG/2ML SOLUTION: Performed by: NURSE PRACTITIONER

## 2024-12-08 PROCEDURE — 97110 THERAPEUTIC EXERCISES: CPT

## 2024-12-08 RX ADMIN — THIAMINE HYDROCHLORIDE 200 MG: 100 INJECTION, SOLUTION INTRAMUSCULAR; INTRAVENOUS at 21:12

## 2024-12-08 RX ADMIN — DIPHENOXYLATE HYDROCHLORIDE AND ATROPINE SULFATE 1 TABLET: 2.5; .025 TABLET ORAL at 09:00

## 2024-12-08 RX ADMIN — ZINC OXIDE 1 APPLICATION: 200 OINTMENT TOPICAL at 18:18

## 2024-12-08 RX ADMIN — Medication 10 ML: at 21:12

## 2024-12-08 RX ADMIN — Medication 10 ML: at 09:00

## 2024-12-08 RX ADMIN — METOPROLOL SUCCINATE 25 MG: 25 TABLET, EXTENDED RELEASE ORAL at 09:00

## 2024-12-08 RX ADMIN — PANTOPRAZOLE SODIUM 40 MG: 40 TABLET, DELAYED RELEASE ORAL at 09:00

## 2024-12-08 RX ADMIN — AZELASTINE HYDROCHLORIDE 2 SPRAY: 137 SPRAY, METERED NASAL at 21:12

## 2024-12-08 RX ADMIN — FOLIC ACID 1 MG: 1 TABLET ORAL at 09:00

## 2024-12-08 RX ADMIN — DIPHENOXYLATE HYDROCHLORIDE AND ATROPINE SULFATE 1 TABLET: 2.5; .025 TABLET ORAL at 21:12

## 2024-12-08 RX ADMIN — APIXABAN 5 MG: 5 TABLET, FILM COATED ORAL at 09:00

## 2024-12-08 RX ADMIN — ZINC OXIDE 1 APPLICATION: 200 OINTMENT TOPICAL at 13:01

## 2024-12-08 RX ADMIN — ASPIRIN 81 MG: 81 TABLET, COATED ORAL at 09:00

## 2024-12-08 RX ADMIN — THIAMINE HYDROCHLORIDE 200 MG: 100 INJECTION, SOLUTION INTRAMUSCULAR; INTRAVENOUS at 05:20

## 2024-12-08 RX ADMIN — LORAZEPAM 1 MG: 1 TABLET ORAL at 21:20

## 2024-12-08 RX ADMIN — AZELASTINE HYDROCHLORIDE 2 SPRAY: 137 SPRAY, METERED NASAL at 09:01

## 2024-12-08 RX ADMIN — AMIODARONE HYDROCHLORIDE 200 MG: 200 TABLET ORAL at 09:00

## 2024-12-08 RX ADMIN — APIXABAN 5 MG: 5 TABLET, FILM COATED ORAL at 21:12

## 2024-12-08 RX ADMIN — ATORVASTATIN CALCIUM 20 MG: 10 TABLET, FILM COATED ORAL at 09:00

## 2024-12-08 RX ADMIN — LORAZEPAM 1 MG: 1 TABLET ORAL at 05:20

## 2024-12-08 RX ADMIN — THIAMINE HYDROCHLORIDE 200 MG: 100 INJECTION, SOLUTION INTRAMUSCULAR; INTRAVENOUS at 13:01

## 2024-12-08 RX ADMIN — DIPHENOXYLATE HYDROCHLORIDE AND ATROPINE SULFATE 1 TABLET: 2.5; .025 TABLET ORAL at 18:18

## 2024-12-08 RX ADMIN — ZINC OXIDE 1 APPLICATION: 200 OINTMENT TOPICAL at 21:12

## 2024-12-08 NOTE — PROGRESS NOTES
[facility] Medicine Services  INPATIENT PROGRESS NOTE    Patient Name: Dayron Lubin  Date of Admission: 12/4/2024  Today's Date: 12/08/24  Length of Stay: 4  Primary Care Physician: Herberth Nguyen Jr., MD    Subjective   Chief Complaint: Weakness, diarrhea  Alcohol Intoxication     Patient seen. He states that his diarrhea is much improved.  He has required minimal Ativan overnight.  He is interested in getting information about local resources for alcohol rehab programs.    Review of Systems   All pertinent negatives and positives are as above. All other systems have been reviewed and are negative unless otherwise stated.     Objective    Temp:  [97.2 °F (36.2 °C)-97.8 °F (36.6 °C)] 97.2 °F (36.2 °C)  Heart Rate:  [68-82] 71  Resp:  [16-18] 18  BP: (105-142)/(61-82) 105/61  Physical Exam  Constitutional:       General: He is not in acute distress.     Appearance: He is well-developed. He is ill-appearing. He is not diaphoretic.      Comments: Chronically ill-appearing elderly man.   HENT:      Head: Normocephalic.   Eyes:      General: No scleral icterus.     Conjunctiva/sclera: Conjunctivae normal.      Pupils: Pupils are equal, round, and reactive to light.   Neck:      Thyroid: No thyromegaly.      Vascular: No JVD.      Trachea: No tracheal deviation.   Cardiovascular:      Rate and Rhythm: Normal rate and regular rhythm.      Heart sounds: Normal heart sounds. No murmur heard.     No friction rub. No gallop.   Pulmonary:      Effort: Pulmonary effort is normal. No respiratory distress.      Breath sounds: Normal breath sounds. No stridor. No wheezing or rales.   Chest:      Chest wall: No tenderness.   Abdominal:      General: Bowel sounds are normal. There is no distension.      Palpations: Abdomen is soft. There is no mass.      Tenderness: There is no abdominal tenderness. There is no guarding or rebound.      Hernia: No hernia is present.   Musculoskeletal:         " General: Signs of injury present. No tenderness or deformity.      Cervical back: Normal range of motion and neck supple.      Right lower leg: No edema.      Left lower leg: No edema.      Comments: Stage 3 bilateral gluteal decubitus ulcers present on admission.   Lymphadenopathy:      Cervical: No cervical adenopathy.   Skin:     General: Skin is warm and dry.      Coloration: Skin is not pale.      Findings: No erythema or rash.   Neurological:      General: No focal deficit present.      Mental Status: He is alert and oriented to person, place, and time.      Cranial Nerves: No cranial nerve deficit.      Sensory: No sensory deficit.      Motor: No abnormal muscle tone.      Coordination: Coordination normal.   Psychiatric:         Mood and Affect: Mood normal.         Behavior: Behavior normal.         Thought Content: Thought content normal.       Results Review:  I have reviewed the labs, radiology results, and diagnostic studies.    Laboratory Data:   Results from last 7 days   Lab Units 12/08/24  0344 12/07/24  0420 12/06/24  0356   WBC 10*3/mm3 4.19 4.62 3.81   HEMOGLOBIN g/dL 8.4* 8.7* 8.7*   HEMATOCRIT % 26.6* 27.3* 25.8*   PLATELETS 10*3/mm3 89* 91* 80*        Results from last 7 days   Lab Units 12/08/24  0344 12/07/24  0420 12/06/24  0356   SODIUM mmol/L 138 136 133*   POTASSIUM mmol/L 4.5 4.1 3.3*   CHLORIDE mmol/L 107 102 98   CO2 mmol/L 24.0 23.0 23.0   BUN mg/dL 21 17 22   CREATININE mg/dL 0.96 0.82 1.03   CALCIUM mg/dL 8.0* 7.7* 7.6*   BILIRUBIN mg/dL 0.6 0.7 0.9   ALK PHOS U/L 63 68 71   ALT (SGPT) U/L 38 48* 53*   AST (SGOT) U/L 36 52* 53*   GLUCOSE mg/dL 89 88 91     Culture Data:   No results found for: \"BLOODCX\", \"URINECX\", \"WOUNDCX\", \"MRSACX\", \"RESPCX\", \"STOOLCX\"    Radiology Data:   Imaging Results (Last 24 Hours)       ** No results found for the last 24 hours. **          I have reviewed the patient's current medications.     Assessment/Plan   Assessment  Active Hospital Problems    " Diagnosis     **Alcohol intoxication     Transaminitis     Non-traumatic rhabdomyolysis     Multiple falls     Paroxysmal atrial fibrillation     Physical debility     Alcohol abuse     Acute kidney injury     HOCM (hypertrophic obstructive cardiomyopathy)     Essential hypertension    Alcohol abuse and impending delirium tremens  Thiamine administration for prophylactic purposes  Acute diarrhea  Gluteal decubitus ulcer present on admission    Treatment Plan  Continue Ativan as per [facility] protocol thiamine for prophylactic purposes. Continue multivitamins and folic acid.  Patient required minimal p.o. Ativan last night.  Can probably discharge on a quick Ativan taper tomorrow.  Patient is interested in obtaining information about potential alcohol rehab programs around [place].  Will inform case management.    Renal function and CK are much improved. Hold losartan due to borderline blood pressures. Continue p.o. metoprolol for hypertension and atrial fibrillation. Continue amiodarone and anticoagulation with Eliquis.    Monitor LFTs due to transaminitis from alcohol abuse.  LFTs have improved.    Continue Imodium for diarrhea. Continue scheduled Questran    Continue wound care for gluteal decubitus ulcer present on admission.    PT/OT consult for disposition planning.  Patient is planned for discharge home with home health.    DVT prophylaxis with Eliquis    CODE STATUS is full code    Possible discharge in the next 24 hours    Medical Decision Making  Number and Complexity of problems: 7  Differential Diagnosis: None    Conditions and Status        Condition is unchanged.     [facility] Data  External documents reviewed: None  Cardiac tracing (EKG, telemetry) interpretation: None  Radiology interpretation: None  Labs reviewed: CBC, BMP reviewed by me  Any tests that were considered but not ordered: None     Decision rules/scores evaluated: [id number] score 4     Discussed with: Patient     Care Planning  Shared decision  making: Patient and his sister, Winsome Stone  Code status and discussions: CODE STATUS is full code    Disposition  Social Determinants of Health that impact treatment or disposition: None  I expect the patient to be discharged home in the next 24 hours      Electronically signed by Darryl Adam MD, 12/08/24, 14:20 CST.

## 2024-12-08 NOTE — THERAPY TREATMENT NOTE
Acute Care - Physical Therapy Treatment Note  [facility]     Patient Name: Dayron Lubin  : 1955  MRN: [id number]  Today's Date: 2024      Visit Dx:     ICD-10-CM ICD-9-CM   1. Alcohol withdrawal syndrome without complication  F10.930 291.81   2. Pressure injury of sacral region, stage 1  L89.151 707.03     707.21   3. Traumatic rhabdomyolysis, initial encounter  T79.6XXA 958.6   4. Impaired mobility [Z74.09]  Z74.09 799.89     Patient Active Problem List   Diagnosis    Prostate cancer    Hx of radiation therapy    Elevated PSA    Encounter for follow-up surveillance of prostate cancer    HOCM (hypertrophic obstructive cardiomyopathy)    Coronary artery disease involving native coronary artery of native heart without angina pectoris    Essential hypertension    Mixed hyperlipidemia    Class 1 obesity with alveolar hypoventilation, serious comorbidity, and body mass index (BMI) of 30.0 to 30.9 in adult    Displaced fracture of lateral malleolus of right fibula, initial encounter for closed fracture    Family history of colonic polyps    History of colon polyps    Elevated brain natriuretic peptide (BNP) level    Acute kidney injury    Anxiety associated with depression    Alcohol abuse    Alcoholic hepatitis    Volume depletion    Anorexia    CHF (congestive heart failure)    Hypokalemia    Duodenitis    Gait instability    SVT (supraventricular tachycardia)    Physical debility    Atrial fibrillation with rapid ventricular response    Alcoholism in remission    Paroxysmal atrial fibrillation    Nasal polyposis    Nasal congestion    Estelle bullosa    Long-term use of high-risk medication    Multiple falls    Transaminitis    Thrombocytopenia    Non-traumatic rhabdomyolysis    Alcohol withdrawal    Pancytopenia    Opiate abuse, episodic    Alcohol intoxication     Past Medical History:   Diagnosis Date    A-fib     Alcoholic hepatitis 2023    CAD (coronary artery disease)     History of external  beam radiation therapy 05/12/2016    6840 cGy to prostate bed    Hyperlipidemia     Hypertension     Insomnia     Prostate cancer      Past Surgical History:   Procedure Laterality Date    CARDIAC CATHETERIZATION      CORONARY ANGIOPLASTY WITH STENT PLACEMENT      FRACTURE SURGERY      PROSTATE SURGERY      TONSILLECTOMY       PT Assessment (Last 12 Hours)       PT Evaluation and Treatment       Row Name 12/08/24 1132 12/08/24 1100       Physical Therapy Time and Intention    Subjective Information no complaints  -AB --    Document Type therapy note (daily note)  -AB --  -AB    Mode of Treatment physical therapy  -AB --  -AB      Row Name 12/08/24 1132          General Information    Existing Precautions/Restrictions fall  -AB       Row Name 12/08/24 1132          Bed Mobility    Supine-Sit Cylinder (Bed Mobility) modified independence  -AB     Sit-Supine Cylinder (Bed Mobility) modified independence  -AB       Row Name 12/08/24 1132          Sit-Stand Transfer    Sit-Stand Cylinder (Transfers) supervision  -AB       Row Name 12/08/24 1132          Stand-Sit Transfer    Stand-Sit Cylinder (Transfers) supervision  -AB       Row Name 12/08/24 1132          Gait/Stairs (Locomotion)    Cylinder Level (Gait) standby assist  -AB     Assistive Device (Gait) walker, front-wheeled  -AB     Distance in Feet (Gait) 350  with Rwx and then 350' without AD  -AB       Row Name 12/08/24 1132          Motor Skills    Comments, Therapeutic Exercise 20 reps AROM standing  -AB     Additional Documentation Comments, Therapeutic Exercise (Row)  -AB       Row Name             Wound 12/04/24 1248 coccyx    Wound - Properties Group Placement Date: 12/04/24  - Placement Time: 1248  -AH Location: coccyx  -AH    Retired Wound - Properties Group Placement Date: 12/04/24  - Placement Time: 1248  - Location: coccyx  -AH    Retired Wound - Properties Group Placement Date: 12/04/24  - Placement Time: 1248  -AH Location:  coccyx  -AH    Retired Wound - Properties Group Date first assessed: 12/04/24  -AH Time first assessed: 1248  -AH Location: coccyx  -AH      Row Name             Wound 12/04/24 0000 Bilateral scrotum    Wound - Properties Group Placement Date: 12/04/24  -SJ Placement Time: 0000  -SJ Side: Bilateral  -SJ Location: scrotum  -SJ Primary Wound Type: MASD  -SJ Present on Original Admission: Y  -SJ    Retired Wound - Properties Group Placement Date: 12/04/24  -SJ Placement Time: 0000  -SJ Present on Original Admission: Y  -SJ Side: Bilateral  -SJ Location: scrotum  -SJ Primary Wound Type: MASD  -SJ    Retired Wound - Properties Group Placement Date: 12/04/24  -SJ Placement Time: 0000  -SJ Present on Original Admission: Y  -SJ Side: Bilateral  -SJ Location: scrotum  -SJ Primary Wound Type: MASD  -SJ    Retired Wound - Properties Group Date first assessed: 12/04/24  - Time first assessed: 0000  -SJ Present on Original Admission: Y  -SJ Side: Bilateral  -SJ Location: scrotum  -SJ Primary Wound Type: MASD  -SJ      Row Name             Wound 12/07/24 Left anterior third toe    Wound - Properties Group Placement Date: 12/07/24  -BB Side: Left  -BB Orientation: anterior  -BB Location: third toe  -BB    Retired Wound - Properties Group Placement Date: 12/07/24  -BB Side: Left  -BB Orientation: anterior  -BB Location: third toe  -BB    Retired Wound - Properties Group Placement Date: 12/07/24  -BB Side: Left  -BB Orientation: anterior  -BB Location: third toe  -BB    Retired Wound - Properties Group Date first assessed: 12/07/24  -BB Side: Left  -BB Location: third toe  -BB      Row Name 12/08/24 1132          Positioning and Restraints    Pre-Treatment Position in bed  -AB     Post Treatment Position bed  -AB     In Bed fowlers;call light within reach  -AB               User Key  (r) = Recorded By, (t) = Taken By, (c) = Cosigned By      Initials Name Provider Type    AB Edda Moura, PTA Physical Therapist Assistant      Saray Cavazos, RN Registered Nurse    Claudia Howard, RN Registered Nurse    Consuelo Cotton, RN Registered Nurse                    Physical Therapy Education       Title: PT OT SLP Therapies (In Progress)       Topic: Physical Therapy (In Progress)       Point: Mobility training (Done)       Learning Progress Summary            Patient Acceptance, E, VU by AT at 12/5/2024 1035    Comment: Patient was eduacted on the benefits of physical activity, POC, and recommended discharge.                      Point: Home exercise program (Not Started)       Learner Progress:  Not documented in this visit.              Point: Body mechanics (Done)       Learning Progress Summary            Patient Acceptance, E, VU by AT at 12/5/2024 1035    Comment: Patient was eduacted on the benefits of physical activity, POC, and recommended discharge.                      Point: Precautions (Not Started)       Learner Progress:  Not documented in this visit.                              User Key       Initials Effective Dates Name Provider Type Discipline    AT 08/22/24 -  Astrid Toussaint, PT Student PT Student PT                  PT Recommendation and Plan             Time Calculation:    PT Charges       Row Name 12/08/24 1132             Time Calculation    Start Time 1132  -AB      Stop Time 1155  -AB      Time Calculation (min) 23 min  -AB      PT Received On 12/08/24  -AB         Time Calculation- PT    Total Timed Code Minutes- PT 23 minute(s)  -AB                User Key  (r) = Recorded By, (t) = Taken By, (c) = Cosigned By      Initials Name Provider Type    AB Edda Moura PTA Physical Therapist Assistant                      PT G-Codes  Outcome Measure Options: AM-PAC 6 Clicks Basic Mobility (PT)  AM-PAC 6 Clicks Score (PT): 19  AM-PAC 6 Clicks Score (OT): 21    Edda Moura PTA  12/8/2024

## 2024-12-08 NOTE — PLAN OF CARE
Goal Outcome Evaluation:         Pt gaining strength-rectal tube removed-has been ambulating to bathroom and back with walker and standby assist-took a shower and states he feels much better

## 2024-12-09 ENCOUNTER — READMISSION MANAGEMENT (OUTPATIENT)
Dept: CALL CENTER | Facility: HOSPITAL | Age: 69
End: 2024-12-09
Payer: MEDICARE

## 2024-12-09 VITALS
SYSTOLIC BLOOD PRESSURE: 141 MMHG | RESPIRATION RATE: 18 BRPM | DIASTOLIC BLOOD PRESSURE: 79 MMHG | HEART RATE: 80 BPM | HEIGHT: 71 IN | OXYGEN SATURATION: 98 % | TEMPERATURE: 98 F | WEIGHT: 180 LBS | BODY MASS INDEX: 25.2 KG/M2

## 2024-12-09 PROCEDURE — 25010000002 THIAMINE HCL 200 MG/2ML SOLUTION: Performed by: NURSE PRACTITIONER

## 2024-12-09 PROCEDURE — 97116 GAIT TRAINING THERAPY: CPT

## 2024-12-09 PROCEDURE — 97110 THERAPEUTIC EXERCISES: CPT

## 2024-12-09 RX ORDER — LOPERAMIDE HYDROCHLORIDE 2 MG/1
2 CAPSULE ORAL 4 TIMES DAILY PRN
Qty: 20 CAPSULE | Refills: 1 | Status: ON HOLD | OUTPATIENT
Start: 2024-12-09

## 2024-12-09 RX ORDER — CHLORDIAZEPOXIDE HYDROCHLORIDE 10 MG/1
CAPSULE, GELATIN COATED ORAL
Qty: 12 CAPSULE | Refills: 0 | Status: SHIPPED | OUTPATIENT
Start: 2024-12-09 | End: 2024-12-15

## 2024-12-09 RX ORDER — BUSPIRONE HYDROCHLORIDE 5 MG/1
7.5 TABLET ORAL 2 TIMES DAILY
Qty: 90 TABLET | Refills: 2 | Status: ON HOLD | OUTPATIENT
Start: 2024-12-09

## 2024-12-09 RX ADMIN — AZELASTINE HYDROCHLORIDE 2 SPRAY: 137 SPRAY, METERED NASAL at 09:02

## 2024-12-09 RX ADMIN — ASPIRIN 81 MG: 81 TABLET, COATED ORAL at 09:01

## 2024-12-09 RX ADMIN — APIXABAN 5 MG: 5 TABLET, FILM COATED ORAL at 09:01

## 2024-12-09 RX ADMIN — THIAMINE HYDROCHLORIDE 200 MG: 100 INJECTION, SOLUTION INTRAMUSCULAR; INTRAVENOUS at 05:07

## 2024-12-09 RX ADMIN — METOPROLOL SUCCINATE 25 MG: 25 TABLET, EXTENDED RELEASE ORAL at 09:00

## 2024-12-09 RX ADMIN — LORAZEPAM 1 MG: 1 TABLET ORAL at 05:24

## 2024-12-09 RX ADMIN — AMIODARONE HYDROCHLORIDE 200 MG: 200 TABLET ORAL at 09:01

## 2024-12-09 RX ADMIN — FOLIC ACID 1 MG: 1 TABLET ORAL at 09:01

## 2024-12-09 RX ADMIN — ATORVASTATIN CALCIUM 20 MG: 10 TABLET, FILM COATED ORAL at 09:01

## 2024-12-09 RX ADMIN — PANTOPRAZOLE SODIUM 40 MG: 40 TABLET, DELAYED RELEASE ORAL at 09:01

## 2024-12-09 RX ADMIN — ZINC OXIDE 1 APPLICATION: 200 OINTMENT TOPICAL at 09:03

## 2024-12-09 RX ADMIN — DIPHENOXYLATE HYDROCHLORIDE AND ATROPINE SULFATE 1 TABLET: 2.5; .025 TABLET ORAL at 09:01

## 2024-12-09 NOTE — CASE MANAGEMENT/SOCIAL WORK
Continued Stay Note   Mary     Patient Name: Dayron Lubin  MRN: [id number]  Today's Date: 12/9/2024    Admit Date: 12/4/2024    Plan: Home   Discharge Plan       Row Name 12/09/24 0849       Plan    Plan Home    Patient/Family in Agreement with Plan yes    Plan Comments SW has given pt a chemical dependency packet.  Pt plans home.  SW will continue to follow.                   Discharge Codes    No documentation.                 Expected Discharge Date and Time       Expected Discharge Date Expected Discharge Time    Dec 7, 2024               [facility] [place]

## 2024-12-09 NOTE — THERAPY TREATMENT NOTE
Acute Care - Physical Therapy Treatment Note  [facility]     Patient Name: Dayron Lubin  : 1955  MRN: [id number]  Today's Date: 2024      Visit Dx:     ICD-10-CM ICD-9-CM   1. Alcohol withdrawal syndrome without complication  F10.930 291.81   2. Pressure injury of sacral region, stage 1  L89.151 707.03     707.21   3. Traumatic rhabdomyolysis, initial encounter  T79.6XXA 958.6   4. Impaired mobility [Z74.09]  Z74.09 799.89     Patient Active Problem List   Diagnosis    Prostate cancer    Hx of radiation therapy    Elevated PSA    Encounter for follow-up surveillance of prostate cancer    HOCM (hypertrophic obstructive cardiomyopathy)    Coronary artery disease involving native coronary artery of native heart without angina pectoris    Essential hypertension    Mixed hyperlipidemia    Class 1 obesity with alveolar hypoventilation, serious comorbidity, and body mass index (BMI) of 30.0 to 30.9 in adult    Displaced fracture of lateral malleolus of right fibula, initial encounter for closed fracture    Family history of colonic polyps    History of colon polyps    Elevated brain natriuretic peptide (BNP) level    Acute kidney injury    Anxiety associated with depression    Alcohol abuse    Alcoholic hepatitis    Volume depletion    Anorexia    CHF (congestive heart failure)    Hypokalemia    Duodenitis    Gait instability    SVT (supraventricular tachycardia)    Physical debility    Atrial fibrillation with rapid ventricular response    Alcoholism in remission    Paroxysmal atrial fibrillation    Nasal polyposis    Nasal congestion    Estelle bullosa    Long-term use of high-risk medication    Multiple falls    Transaminitis    Thrombocytopenia    Non-traumatic rhabdomyolysis    Alcohol withdrawal    Pancytopenia    Opiate abuse, episodic    Alcohol intoxication     Past Medical History:   Diagnosis Date    A-fib     Alcoholic hepatitis 2023    CAD (coronary artery disease)     History of external  beam radiation therapy 05/12/2016    6840 cGy to prostate bed    Hyperlipidemia     Hypertension     Insomnia     Prostate cancer      Past Surgical History:   Procedure Laterality Date    CARDIAC CATHETERIZATION      CORONARY ANGIOPLASTY WITH STENT PLACEMENT      FRACTURE SURGERY      PROSTATE SURGERY      TONSILLECTOMY       PT Assessment (Last 12 Hours)       PT Evaluation and Treatment       Row Name 12/09/24 1115          Physical Therapy Time and Intention    Subjective Information no complaints (P)   -LW     Document Type therapy note (daily note) (P)   -LW     Mode of Treatment physical therapy (P)   -       Row Name 12/09/24 1115          General Information    Existing Precautions/Restrictions fall (P)   -[facility] Name 12/09/24 1115          Pain    Pretreatment Pain Rating 0/10 - no pain (P)   -LW     Posttreatment Pain Rating 0/10 - no pain (P)   -[facility] Name 12/09/24 1115          Bed Mobility    Supine-Sit [place] (Bed Mobility) modified independence (P)   -LW     Sit-Supine [place] (Bed Mobility) modified independence (P)   -LW     Assistive Device (Bed Mobility) bed rails;head of bed elevated (P)   -       Row Name 12/09/24 1115          Sit-Stand Transfer    Sit-Stand [place] (Transfers) supervision (P)   -[facility] Name 12/09/24 1115          Stand-Sit Transfer    Stand-Sit [place] (Transfers) supervision (P)   -[facility] Name 12/09/24 1115          Gait/Stairs (Locomotion)    [place] Level (Gait) contact guard;standby assist (P)   -LW     Assistive Device (Gait) walker, front-wheeled (P)   -LW     Distance in Feet (Gait) 350 (P)   350ft with fww and 350ft without fww  -LW     Pattern (Gait) step-through (P)   -LW     Deviations/Abnormal Patterns (Gait) base of support, narrow;judd decreased;gait speed decreased (P)   -LW     Bilateral Gait Deviations decreased arm swing (P)   -       Row Name 12/09/24 1115          Safety Issues/Impairments  Affecting Functional Mobility    Impairments Affecting Function (Mobility) balance;endurance/activity tolerance;strength (P)   -LW       Row Name 12/09/24 1115          Motor Skills    Comments, Therapeutic Exercise BLE AROM exercises in standing x 20 (P)   -LW       Row Name             Wound 12/04/24 1248 coccyx    Wound - Properties Group Placement Date: 12/04/24  - Placement Time: 1248  -AH Location: coccyx  -AH    Retired Wound - Properties Group Placement Date: 12/04/24  - Placement Time: 1248  - Location: coccyx  -AH    Retired Wound - Properties Group Placement Date: 12/04/24  - Placement Time: 1248 - Location: coccyx  -AH    Retired Wound - Properties Group Date first assessed: 12/04/24  - Time first assessed: 1248 - Location: coccyx  -AH      Row Name             Wound 12/04/24 0000 Bilateral scrotum    Wound - Properties Group Placement Date: 12/04/24  - Placement Time: 0000  -SJ Side: Bilateral  -SJ Location: scrotum  -SJ Primary Wound Type: MASD  -SJ Present on Original Admission: Y  -SJ    Retired Wound - Properties Group Placement Date: 12/04/24  - Placement Time: 0000  -SJ Present on Original Admission: Y  -SJ Side: Bilateral  -SJ Location: scrotum  -SJ Primary Wound Type: MASD  -SJ    Retired Wound - Properties Group Placement Date: 12/04/24  - Placement Time: 0000  -SJ Present on Original Admission: Y  -SJ Side: Bilateral  -SJ Location: scrotum  -SJ Primary Wound Type: MASD  -SJ    Retired Wound - Properties Group Date first assessed: 12/04/24  - Time first assessed: 0000  -SJ Present on Original Admission: Y  -SJ Side: Bilateral  -SJ Location: scrotum  -SJ Primary Wound Type: MASD  -SJ      Row Name             Wound 12/07/24 Left anterior third toe    Wound - Properties Group Placement Date: 12/07/24  -BB Side: Left  -BB Orientation: anterior  -BB Location: third toe  -BB    Retired Wound - Properties Group Placement Date: 12/07/24  -BB Side: Left  -BB Orientation:  anterior  -BB Location: third toe  -BB    Retired Wound - Properties Group Placement Date: 12/07/24  -BB Side: Left  -BB Orientation: anterior  -BB Location: third toe  -BB    Retired Wound - Properties Group Date first assessed: 12/07/24  -BB Side: Left  -BB Location: third toe  -BB      Row Name 12/09/24 1115          Positioning and Restraints    Pre-Treatment Position in bed (P)   -LW     Post Treatment Position bed (P)   -LW     In Bed fowlers;call light within reach;encouraged to call for assist (P)   -LW               User Key  (r) = Recorded By, (t) = Taken By, (c) = Cosigned By      Initials Name Provider Type    SJ Saray Cavazos, RN Registered Nurse    LW Talia Graham, DAVID Student PTA Student    Claudia Howard, RN Registered Nurse    BB Consuelo Chaudhry, RN Registered Nurse                    Physical Therapy Education       Title: PT OT SLP Therapies (In Progress)       Topic: Physical Therapy (In Progress)       Point: Mobility training (Done)       Learning Progress Summary            Patient Acceptance, E,TB, VU by LW at 12/9/2024 1137    Acceptance, E, VU by AT at 12/5/2024 1035    Comment: Patient was eduacted on the benefits of physical activity, POC, and recommended discharge.                      Point: Home exercise program (Not Started)       Learner Progress:  Not documented in this visit.              Point: Body mechanics (Done)       Learning Progress Summary            Patient Acceptance, E, VU by AT at 12/5/2024 1035    Comment: Patient was eduacted on the benefits of physical activity, POC, and recommended discharge.                      Point: Precautions (Not Started)       Learner Progress:  Not documented in this visit.                              User Key       Initials Effective Dates Name Provider Type Discipline    LW 10/03/24 -  Talia Graham PTA Student PTA Student PT    AT 08/22/24 -  Astird Toussaint PT Student PT Student PT                  PT Recommendation and  Plan     Progress: (P) improving  Outcome Evaluation: (P) bed mobility modified independent. sit to stand supervision. pt was able to ambulate 350ft with fww and cga/sba and then 350ft without the fww in which he still showed a steady gait with no LOB, he did show decreased arm swing. performed BLE strengthening exercises in standing x 20 which pt began to fatigue and needed a sitting rest break. pt plan to d/c home.   Outcome Measures       Row Name 12/09/24 1115             How much help from another person do you currently need...    Turning from your back to your side while in flat bed without using bedrails? 4 (P)   -LW      Moving from lying on back to sitting on the side of a flat bed without bedrails? 4 (P)   -LW      Moving to and from a bed to a chair (including a wheelchair)? 4 (P)   -LW      Standing up from a chair using your arms (e.g., wheelchair, bedside chair)? 4 (P)   -LW      Climbing 3-5 steps with a railing? 3 (P)   -LW      To walk in hospital room? 4 (P)   -LW      AM-PAC 6 Clicks Score (PT) 23 (P)   -LW         Functional Assessment    Outcome Measure Options AM-PAC 6 Clicks Basic Mobility (PT) (P)   -LW                User Key  (r) = Recorded By, (t) = Taken By, (c) = Cosigned By      Initials Name Provider Type    Talia Castro PTA Student PTA Student                     Time Calculation:         PT G-Codes  Outcome Measure Options: (P) AM-PAC 6 Clicks Basic Mobility (PT)  AM-PAC 6 Clicks Score (PT): (P) 23  AM-PAC 6 Clicks Score (OT): 21    Talia Graham PTA Student  12/9/2024

## 2024-12-09 NOTE — PLAN OF CARE
Goal Outcome Evaluation:pt a/ox4-ambulated up and down with pt medication at bedside

## 2024-12-09 NOTE — THERAPY DISCHARGE NOTE
Acute Care - Occupational Therapy Discharge Summary  [facility]     Patient Name: Dayron Lubin  : 1955  MRN: [id number]    Today's Date: 2024                 Admit Date: 2024        OT Recommendation and Plan    Visit Dx:    ICD-10-CM ICD-9-CM   1. Alcohol withdrawal syndrome without complication  F10.930 291.81   2. Pressure injury of sacral region, stage 1  L89.151 707.03     707.21   3. Traumatic rhabdomyolysis, initial encounter  T79.6XXA 958.6   4. Impaired mobility [Z74.09]  Z74.09 799.89                OT Rehab Goals       Row Name 24 1100             Transfer Goal 1 (OT)    Activity/Assistive Device (Transfer Goal 1, OT) sit-to-stand/stand-to-sit;bed-to-chair/chair-to-bed;toilet;shower chair  -      [place] Level/Cues Needed (Transfer Goal 1, OT) modified independence  -      Time Frame (Transfer Goal 1, OT) long term goal (LTG);by discharge  -      Progress/Outcome (Transfer Goal 1, OT) goal not met;discharged from facility  -         Bathing Goal 1 (OT)    Activity/Device (Bathing Goal 1, OT) bathing skills, all;upper body bathing;lower body bathing  -      [place] Level/Cues Needed (Bathing Goal 1, OT) supervision required  -      Time Frame (Bathing Goal 1, OT) long term goal (LTG);by discharge  -      Progress/Outcomes (Bathing Goal 1, OT) goal not met;discharged from facility  -         Dressing Goal 1 (OT)    Activity/Device (Dressing Goal 1, OT) lower body dressing  -      [place]/Cues Needed (Dressing Goal 1, OT) set-up required;modified independence  -      Time Frame (Dressing Goal 1, OT) long term goal (LTG);10 days;by discharge  -      Progress/Outcome (Dressing Goal 1, OT) goal not met;discharged from facility  -         Toileting Goal 1 (OT)    Activity/Device (Toileting Goal 1, OT) toileting skills, all;adjust/manage clothing;perform perineal hygiene  -      Time Frame (Toileting Goal 1, OT) long term goal (LTG);by discharge   -SANDY      Progress/Outcome (Toileting Goal 1, OT) goal not met;discharged from facility  -SANDY                User Key  (r) = Recorded By, (t) = Taken By, (c) = Cosigned By      Initials Name Provider Type Discipline    Bri Reyes OTR/L, CSRS Occupational Therapist OT                     Outcome Measures       Row Name 12/09/24 1111             How much help from another person do you currently need...    Turning from your back to your side while in flat bed without using bedrails? 4 (P)   -LW      Moving from lying on back to sitting on the side of a flat bed without bedrails? 4 (P)   -LW      Moving to and from a bed to a chair (including a wheelchair)? 4 (P)   -LW      Standing up from a chair using your arms (e.g., wheelchair, bedside chair)? 4 (P)   -LW      Climbing 3-5 steps with a railing? 3 (P)   -LW      To walk in hospital room? 4 (P)   -LW      AM-PAC 6 Clicks Score (PT) 23 (P)   -LW         Functional Assessment    Outcome Measure Options AM-PAC 6 Clicks Basic Mobility (PT) (P)   -LW                User Key  (r) = Recorded By, (t) = Taken By, (c) = Cosigned By      Initials Name Provider Type    Talia Castro PTA Student PTA Student                            OT Discharge Summary  Anticipated Discharge Disposition (OT): home with 24/7 care, home with assist, home with home health  Reason for Discharge: Discharge from facility  Outcomes Achieved: Refer to plan of care for updates on goals achieved  Discharge Destination: Home with assist, Home with home health      ELLIE Johnson/L, CSRS  12/9/2024

## 2024-12-09 NOTE — PLAN OF CARE
Goal Outcome Evaluation:  Plan of Care Reviewed With: (P) patient        Progress: (P) improving  Outcome Evaluation: (P) bed mobility modified independent. sit to stand supervision. pt was able to ambulate 350ft with fww and cga/sba and then 350ft without the fww in which he still showed a steady gait with no LOB, he did show decreased arm swing. performed BLE strengthening exercises in standing x 20 which pt began to fatigue and needed a sitting rest break. pt plan to d/c home.

## 2024-12-09 NOTE — NURSING NOTE
Pt a/ox4-calm and cooperative-resp unlabored and even-pt's new medication at bedside-written and verbal d/c and rx instructions given with successful teach back-pt waiting for sister to arrive to take him home-denies any further needs, questions or concerns

## 2024-12-11 NOTE — THERAPY DISCHARGE NOTE
Acute Care - Physical Therapy Discharge Summary  [facility]       Patient Name: Dayron Lubin  : 1955  MRN: [id number]    Today's Date: 2024                 Admit Date: 2024      PT Recommendation and Plan    Visit Dx:    ICD-10-CM ICD-9-CM   1. Alcohol withdrawal syndrome without complication  F10.930 291.81   2. Pressure injury of sacral region, stage 1  L89.151 707.03     707.21   3. Traumatic rhabdomyolysis, initial encounter  T79.6XXA 958.6   4. Impaired mobility [Z74.09]  Z74.09 799.89        Outcome Measures       Row Name 24 1115             How much help from another person do you currently need...    Turning from your back to your side while in flat bed without using bedrails? 4  -MS (r) LW (t) MS (c)      Moving from lying on back to sitting on the side of a flat bed without bedrails? 4  -MS (r) LW (t) MS (c)      Moving to and from a bed to a chair (including a wheelchair)? 4  -MS (r) LW (t) MS (c)      Standing up from a chair using your arms (e.g., wheelchair, bedside chair)? 4  -MS (r) LW (t) MS (c)      Climbing 3-5 steps with a railing? 3  -MS (r) LW (t) MS (c)      To walk in hospital room? 4  -MS (r) LW (t) MS (c)      AM-PAC 6 Clicks Score (PT) 23  -MS (r) LW (t)         Functional Assessment    Outcome Measure Options AM-PAC 6 Clicks Basic Mobility (PT)  -MS (r) LW (t) MS (c)                User Key  (r) = Recorded By, (t) = Taken By, (c) = Cosigned By      Initials Name Provider Type    Keke Marie R, PT, DPT, NCS Physical Therapist    Talia Castro, PTA Student PTA Student                         PT Rehab Goals       Row Name 24 1000             Bed Mobility Goal 1 (PT)    Activity/Assistive Device (Bed Mobility Goal 1, PT) bed mobility activities, all  -AB      [place] Level/Cues Needed (Bed Mobility Goal 1, PT) modified independence  -AB      Time Frame (Bed Mobility Goal 1, PT) long term goal (LTG)  -AB      Progress/Outcomes (Bed Mobility  Goal 1, PT) goal met  -AB         Transfer Goal 1 (PT)    Activity/Assistive Device (Transfer Goal 1, PT) sit-to-stand/stand-to-sit;bed-to-chair/chair-to-bed  -AB      [place] Level/Cues Needed (Transfer Goal 1, PT) modified independence  -AB      Time Frame (Transfer Goal 1, PT) long term goal (LTG)  -AB      Progress/Outcome (Transfer Goal 1, PT) goal not met  -AB         Gait Training Goal 1 (PT)    Activity/Assistive Device (Gait Training Goal 1, PT) gait (walking locomotion);assistive device use;backward stepping;decrease asymmetrical patterns;decrease fall risk;diminish gait deviation;forward stepping;improve balance and speed;increase endurance/gait distance;increase energy conservation;normalize weight shifts;sidestepping;turning, left;turning, right;walker, rolling  -AB      [place] Level (Gait Training Goal 1, PT) modified independence  -AB      Distance (Gait Training Goal 1, PT) 200  -AB      Time Frame (Gait Training Goal 1, PT) long term goal (LTG)  -AB      Progress/Outcome (Gait Training Goal 1, PT) goal not met  -AB         Stairs Goal 1 (PT)    Activity/Assistive Device (Stairs Goal 1, PT) stairs, all skills  -AB      [place] Level/Cues Needed (Stairs Goal 1, PT) modified independence  -AB      Number of Stairs (Stairs Goal 1, PT) 2  -AB      Time Frame (Stairs Goal 1, PT) long term goal (LTG)  -AB      Progress/Outcome (Stairs Goal 1, PT) goal not met  -AB         Problem Specific Goal 1 (PT)    Problem Specific Goal 1 (PT) Patient will report <8/10 pain in the buttock with increased activity  -AB      Time Frame (Problem Specific Goal 1, PT) long-term goal (LTG)  -AB      Progress/Outcome (Problem Specific Goal 1, PT) goal not met  -AB                User Key  (r) = Recorded By, (t) = Taken By, (c) = Cosigned By      Initials Name Provider Type Discipline    Edda Ambrose, PTA Physical Therapist Assistant PT                        PT Discharge Summary  Anticipated Discharge  Disposition (PT): home, home with assist  Reason for Discharge: Discharge from facility  Outcomes Achieved: Refer to plan of care for updates on goals achieved  Discharge Destination: Home with assist      Edda Moura, PTA   12/11/2024

## 2024-12-20 ENCOUNTER — HOSPITAL ENCOUNTER (INPATIENT)
Facility: HOSPITAL | Age: 69
LOS: 5 days | Discharge: NURSING, REHAB OR LONG-TERM CARE | End: 2024-12-27
Attending: HOSPITALIST | Admitting: FAMILY MEDICINE
Payer: MEDICARE

## 2024-12-20 ENCOUNTER — APPOINTMENT (OUTPATIENT)
Dept: GENERAL RADIOLOGY | Facility: HOSPITAL | Age: 69
End: 2024-12-20
Payer: MEDICARE

## 2024-12-20 ENCOUNTER — APPOINTMENT (OUTPATIENT)
Dept: CT IMAGING | Facility: HOSPITAL | Age: 69
End: 2024-12-20
Payer: MEDICARE

## 2024-12-20 DIAGNOSIS — T14.8XXA HEMATOMA: ICD-10-CM

## 2024-12-20 DIAGNOSIS — N17.9 AKI (ACUTE KIDNEY INJURY): Primary | ICD-10-CM

## 2024-12-20 DIAGNOSIS — F10.982 ALCOHOL-INDUCED INSOMNIA: ICD-10-CM

## 2024-12-20 DIAGNOSIS — R29.6 FREQUENT FALLS: ICD-10-CM

## 2024-12-20 DIAGNOSIS — Z74.09 IMPAIRED MOBILITY: ICD-10-CM

## 2024-12-20 DIAGNOSIS — F10.10 ALCOHOL ABUSE: ICD-10-CM

## 2024-12-20 PROBLEM — Z79.01 CHRONIC ANTICOAGULATION: Status: ACTIVE | Noted: 2024-12-20

## 2024-12-20 LAB
ALBUMIN SERPL-MCNC: 4 G/DL (ref 3.5–5.2)
ALBUMIN/GLOB SERPL: 1.4 G/DL
ALP SERPL-CCNC: 91 U/L (ref 39–117)
ALT SERPL W P-5'-P-CCNC: 57 U/L (ref 1–41)
AMPHET+METHAMPHET UR QL: NEGATIVE
AMPHETAMINES UR QL: NEGATIVE
ANION GAP SERPL CALCULATED.3IONS-SCNC: 24 MMOL/L (ref 5–15)
AST SERPL-CCNC: 68 U/L (ref 1–40)
BACTERIA UR QL AUTO: ABNORMAL /HPF
BARBITURATES UR QL SCN: NEGATIVE
BASOPHILS # BLD AUTO: 0.01 10*3/MM3 (ref 0–0.2)
BASOPHILS NFR BLD AUTO: 0.1 % (ref 0–1.5)
BENZODIAZ UR QL SCN: POSITIVE
BILIRUB SERPL-MCNC: 0.7 MG/DL (ref 0–1.2)
BILIRUB UR QL STRIP: NEGATIVE
BUN SERPL-MCNC: 20 MG/DL (ref 8–23)
BUN/CREAT SERPL: 12 (ref 7–25)
BUPRENORPHINE SERPL-MCNC: NEGATIVE NG/ML
CALCIUM SPEC-SCNC: 8.7 MG/DL (ref 8.6–10.5)
CANNABINOIDS SERPL QL: NEGATIVE
CHLORIDE SERPL-SCNC: 93 MMOL/L (ref 98–107)
CK SERPL-CCNC: 167 U/L (ref 20–200)
CLARITY UR: ABNORMAL
CO2 SERPL-SCNC: 18 MMOL/L (ref 22–29)
COCAINE UR QL: NEGATIVE
COLOR UR: ABNORMAL
CREAT SERPL-MCNC: 1.66 MG/DL (ref 0.76–1.27)
DEPRECATED RDW RBC AUTO: 51.6 FL (ref 37–54)
EGFRCR SERPLBLD CKD-EPI 2021: 44.1 ML/MIN/1.73
EOSINOPHIL # BLD AUTO: 0 10*3/MM3 (ref 0–0.4)
EOSINOPHIL NFR BLD AUTO: 0 % (ref 0.3–6.2)
ERYTHROCYTE [DISTWIDTH] IN BLOOD BY AUTOMATED COUNT: 14.9 % (ref 12.3–15.4)
ETHANOL UR QL: 0.13 %
FENTANYL UR-MCNC: NEGATIVE NG/ML
GLOBULIN UR ELPH-MCNC: 2.9 GM/DL
GLUCOSE SERPL-MCNC: 117 MG/DL (ref 65–99)
GLUCOSE UR STRIP-MCNC: NEGATIVE MG/DL
HCT VFR BLD AUTO: 24.2 % (ref 37.5–51)
HGB BLD-MCNC: 8 G/DL (ref 13–17.7)
HGB UR QL STRIP.AUTO: NEGATIVE
HYALINE CASTS UR QL AUTO: ABNORMAL /LPF
IMM GRANULOCYTES # BLD AUTO: 0.02 10*3/MM3 (ref 0–0.05)
IMM GRANULOCYTES NFR BLD AUTO: 0.3 % (ref 0–0.5)
KETONES UR QL STRIP: ABNORMAL
LEUKOCYTE ESTERASE UR QL STRIP.AUTO: NEGATIVE
LYMPHOCYTES # BLD AUTO: 0.73 10*3/MM3 (ref 0.7–3.1)
LYMPHOCYTES NFR BLD AUTO: 10.1 % (ref 19.6–45.3)
MAGNESIUM SERPL-MCNC: 2.2 MG/DL (ref 1.6–2.4)
MCH RBC QN AUTO: 31.7 PG (ref 26.6–33)
MCHC RBC AUTO-ENTMCNC: 33.1 G/DL (ref 31.5–35.7)
MCV RBC AUTO: 96 FL (ref 79–97)
METHADONE UR QL SCN: NEGATIVE
MONOCYTES # BLD AUTO: 0.48 10*3/MM3 (ref 0.1–0.9)
MONOCYTES NFR BLD AUTO: 6.6 % (ref 5–12)
NEUTROPHILS NFR BLD AUTO: 5.99 10*3/MM3 (ref 1.7–7)
NEUTROPHILS NFR BLD AUTO: 82.9 % (ref 42.7–76)
NITRITE UR QL STRIP: NEGATIVE
NRBC BLD AUTO-RTO: 0.4 /100 WBC (ref 0–0.2)
OPIATES UR QL: NEGATIVE
OXYCODONE UR QL SCN: POSITIVE
PCP UR QL SCN: NEGATIVE
PH UR STRIP.AUTO: <=5 [PH] (ref 5–8)
PLATELET # BLD AUTO: 173 10*3/MM3 (ref 140–450)
PMV BLD AUTO: 10.1 FL (ref 6–12)
POTASSIUM SERPL-SCNC: 4.9 MMOL/L (ref 3.5–5.2)
PROT SERPL-MCNC: 6.9 G/DL (ref 6–8.5)
PROT UR QL STRIP: ABNORMAL
QT INTERVAL: 388 MS
QTC INTERVAL: 503 MS
RBC # BLD AUTO: 2.52 10*6/MM3 (ref 4.14–5.8)
RBC # UR STRIP: ABNORMAL /HPF
REF LAB TEST METHOD: ABNORMAL
SODIUM SERPL-SCNC: 135 MMOL/L (ref 136–145)
SP GR UR STRIP: 1.02 (ref 1–1.03)
SQUAMOUS #/AREA URNS HPF: ABNORMAL /HPF
TRICYCLICS UR QL SCN: NEGATIVE
UROBILINOGEN UR QL STRIP: ABNORMAL
WBC # UR STRIP: ABNORMAL /HPF
WBC NRBC COR # BLD AUTO: 7.23 10*3/MM3 (ref 3.4–10.8)

## 2024-12-20 PROCEDURE — 25010000002 THIAMINE HCL 200 MG/2ML SOLUTION: Performed by: NURSE PRACTITIONER

## 2024-12-20 PROCEDURE — 81001 URINALYSIS AUTO W/SCOPE: CPT | Performed by: NURSE PRACTITIONER

## 2024-12-20 PROCEDURE — 93005 ELECTROCARDIOGRAM TRACING: CPT

## 2024-12-20 PROCEDURE — 73502 X-RAY EXAM HIP UNI 2-3 VIEWS: CPT

## 2024-12-20 PROCEDURE — G0378 HOSPITAL OBSERVATION PER HR: HCPCS

## 2024-12-20 PROCEDURE — 99285 EMERGENCY DEPT VISIT HI MDM: CPT

## 2024-12-20 PROCEDURE — 36415 COLL VENOUS BLD VENIPUNCTURE: CPT

## 2024-12-20 PROCEDURE — 25010000002 ONDANSETRON PER 1 MG: Performed by: NURSE PRACTITIONER

## 2024-12-20 PROCEDURE — 80053 COMPREHEN METABOLIC PANEL: CPT | Performed by: NURSE PRACTITIONER

## 2024-12-20 PROCEDURE — P9612 CATHETERIZE FOR URINE SPEC: HCPCS

## 2024-12-20 PROCEDURE — 25010000002 LORAZEPAM PER 2 MG: Performed by: HOSPITALIST

## 2024-12-20 PROCEDURE — 73700 CT LOWER EXTREMITY W/O DYE: CPT

## 2024-12-20 PROCEDURE — 72192 CT PELVIS W/O DYE: CPT

## 2024-12-20 PROCEDURE — 82077 ASSAY SPEC XCP UR&BREATH IA: CPT | Performed by: NURSE PRACTITIONER

## 2024-12-20 PROCEDURE — 82550 ASSAY OF CK (CPK): CPT | Performed by: NURSE PRACTITIONER

## 2024-12-20 PROCEDURE — 80307 DRUG TEST PRSMV CHEM ANLYZR: CPT | Performed by: NURSE PRACTITIONER

## 2024-12-20 PROCEDURE — 25010000002 MORPHINE PER 10 MG: Performed by: NURSE PRACTITIONER

## 2024-12-20 PROCEDURE — 25810000003 SODIUM CHLORIDE 0.9 % SOLUTION: Performed by: NURSE PRACTITIONER

## 2024-12-20 PROCEDURE — 85025 COMPLETE CBC W/AUTO DIFF WBC: CPT | Performed by: NURSE PRACTITIONER

## 2024-12-20 PROCEDURE — 25010000002 THIAMINE HCL 200 MG/2ML SOLUTION: Performed by: HOSPITALIST

## 2024-12-20 PROCEDURE — 83735 ASSAY OF MAGNESIUM: CPT | Performed by: NURSE PRACTITIONER

## 2024-12-20 RX ORDER — FLUTICASONE PROPIONATE 50 MCG
2 SPRAY, SUSPENSION (ML) NASAL DAILY
Status: DISCONTINUED | OUTPATIENT
Start: 2024-12-21 | End: 2024-12-27 | Stop reason: HOSPADM

## 2024-12-20 RX ORDER — BUSPIRONE HYDROCHLORIDE 5 MG/1
7.5 TABLET ORAL 2 TIMES DAILY
Status: DISCONTINUED | OUTPATIENT
Start: 2024-12-20 | End: 2024-12-27 | Stop reason: HOSPADM

## 2024-12-20 RX ORDER — MORPHINE SULFATE 2 MG/ML
1 INJECTION, SOLUTION INTRAMUSCULAR; INTRAVENOUS ONCE
Status: COMPLETED | OUTPATIENT
Start: 2024-12-20 | End: 2024-12-20

## 2024-12-20 RX ORDER — LORAZEPAM 1 MG/1
1 TABLET ORAL
Status: DISCONTINUED | OUTPATIENT
Start: 2024-12-20 | End: 2024-12-22

## 2024-12-20 RX ORDER — ACETAMINOPHEN 500 MG
1000 TABLET ORAL ONCE
Status: COMPLETED | OUTPATIENT
Start: 2024-12-20 | End: 2024-12-20

## 2024-12-20 RX ORDER — LORAZEPAM 1 MG/1
2 TABLET ORAL
Status: DISCONTINUED | OUTPATIENT
Start: 2024-12-20 | End: 2024-12-22

## 2024-12-20 RX ORDER — POLYETHYLENE GLYCOL 3350 17 G/17G
17 POWDER, FOR SOLUTION ORAL DAILY PRN
Status: DISCONTINUED | OUTPATIENT
Start: 2024-12-20 | End: 2024-12-27 | Stop reason: HOSPADM

## 2024-12-20 RX ORDER — AMOXICILLIN 250 MG
2 CAPSULE ORAL 2 TIMES DAILY PRN
Status: DISCONTINUED | OUTPATIENT
Start: 2024-12-20 | End: 2024-12-27 | Stop reason: HOSPADM

## 2024-12-20 RX ORDER — LOSARTAN POTASSIUM 25 MG/1
25 TABLET ORAL DAILY
Status: DISCONTINUED | OUTPATIENT
Start: 2024-12-21 | End: 2024-12-24

## 2024-12-20 RX ORDER — AMIODARONE HYDROCHLORIDE 200 MG/1
200 TABLET ORAL DAILY
Status: DISCONTINUED | OUTPATIENT
Start: 2024-12-20 | End: 2024-12-27 | Stop reason: HOSPADM

## 2024-12-20 RX ORDER — ACETAMINOPHEN 325 MG/1
650 TABLET ORAL EVERY 4 HOURS PRN
Status: DISCONTINUED | OUTPATIENT
Start: 2024-12-20 | End: 2024-12-27 | Stop reason: HOSPADM

## 2024-12-20 RX ORDER — ATORVASTATIN CALCIUM 10 MG/1
20 TABLET, FILM COATED ORAL DAILY
Status: DISCONTINUED | OUTPATIENT
Start: 2024-12-21 | End: 2024-12-27 | Stop reason: HOSPADM

## 2024-12-20 RX ORDER — CHLORDIAZEPOXIDE HYDROCHLORIDE 25 MG/1
50 CAPSULE, GELATIN COATED ORAL EVERY 12 HOURS
Status: COMPLETED | OUTPATIENT
Start: 2024-12-22 | End: 2024-12-23

## 2024-12-20 RX ORDER — BISACODYL 10 MG
10 SUPPOSITORY, RECTAL RECTAL DAILY PRN
Status: DISCONTINUED | OUTPATIENT
Start: 2024-12-20 | End: 2024-12-27 | Stop reason: HOSPADM

## 2024-12-20 RX ORDER — METOPROLOL SUCCINATE 25 MG/1
25 TABLET, EXTENDED RELEASE ORAL DAILY
Status: DISCONTINUED | OUTPATIENT
Start: 2024-12-21 | End: 2024-12-24

## 2024-12-20 RX ORDER — CALCIUM CARBONATE/VITAMIN D3 600 MG-10
1 TABLET ORAL DAILY
Status: ON HOLD | COMMUNITY
Start: 2024-11-21 | End: 2024-12-21 | Stop reason: SDUPTHER

## 2024-12-20 RX ORDER — SODIUM CHLORIDE 0.9 % (FLUSH) 0.9 %
10 SYRINGE (ML) INJECTION AS NEEDED
Status: DISCONTINUED | OUTPATIENT
Start: 2024-12-20 | End: 2024-12-27 | Stop reason: HOSPADM

## 2024-12-20 RX ORDER — ACETAMINOPHEN 160 MG/5ML
650 SOLUTION ORAL EVERY 4 HOURS PRN
Status: DISCONTINUED | OUTPATIENT
Start: 2024-12-20 | End: 2024-12-27 | Stop reason: HOSPADM

## 2024-12-20 RX ORDER — LORAZEPAM 2 MG/ML
2 INJECTION INTRAMUSCULAR
Status: DISCONTINUED | OUTPATIENT
Start: 2024-12-20 | End: 2024-12-22

## 2024-12-20 RX ORDER — CHLORDIAZEPOXIDE HYDROCHLORIDE 25 MG/1
50 CAPSULE, GELATIN COATED ORAL EVERY 8 HOURS SCHEDULED
Status: COMPLETED | OUTPATIENT
Start: 2024-12-21 | End: 2024-12-22

## 2024-12-20 RX ORDER — SODIUM CHLORIDE 9 MG/ML
75 INJECTION, SOLUTION INTRAVENOUS CONTINUOUS
Status: DISPENSED | OUTPATIENT
Start: 2024-12-20 | End: 2024-12-20

## 2024-12-20 RX ORDER — ONDANSETRON 4 MG/1
4 TABLET, ORALLY DISINTEGRATING ORAL EVERY 6 HOURS PRN
Status: DISCONTINUED | OUTPATIENT
Start: 2024-12-20 | End: 2024-12-27 | Stop reason: HOSPADM

## 2024-12-20 RX ORDER — ASPIRIN 81 MG/1
81 TABLET ORAL DAILY
Status: DISCONTINUED | OUTPATIENT
Start: 2024-12-21 | End: 2024-12-21

## 2024-12-20 RX ORDER — ONDANSETRON 2 MG/ML
4 INJECTION INTRAMUSCULAR; INTRAVENOUS EVERY 6 HOURS PRN
Status: DISCONTINUED | OUTPATIENT
Start: 2024-12-20 | End: 2024-12-27 | Stop reason: HOSPADM

## 2024-12-20 RX ORDER — SODIUM CHLORIDE 9 MG/ML
40 INJECTION, SOLUTION INTRAVENOUS AS NEEDED
Status: DISCONTINUED | OUTPATIENT
Start: 2024-12-20 | End: 2024-12-27 | Stop reason: HOSPADM

## 2024-12-20 RX ORDER — LORAZEPAM 2 MG/ML
2 INJECTION INTRAMUSCULAR
Status: DISPENSED | OUTPATIENT
Start: 2024-12-20 | End: 2024-12-25

## 2024-12-20 RX ORDER — ACETAMINOPHEN 650 MG/1
650 SUPPOSITORY RECTAL EVERY 4 HOURS PRN
Status: DISCONTINUED | OUTPATIENT
Start: 2024-12-20 | End: 2024-12-27 | Stop reason: HOSPADM

## 2024-12-20 RX ORDER — SODIUM CHLORIDE 0.9 % (FLUSH) 0.9 %
10 SYRINGE (ML) INJECTION EVERY 12 HOURS SCHEDULED
Status: DISCONTINUED | OUTPATIENT
Start: 2024-12-20 | End: 2024-12-27 | Stop reason: HOSPADM

## 2024-12-20 RX ORDER — SODIUM CHLORIDE 9 MG/ML
1000 INJECTION, SOLUTION INTRAVENOUS ONCE
Status: COMPLETED | OUTPATIENT
Start: 2024-12-20 | End: 2024-12-20

## 2024-12-20 RX ORDER — PANTOPRAZOLE SODIUM 40 MG/1
40 TABLET, DELAYED RELEASE ORAL
Status: DISCONTINUED | OUTPATIENT
Start: 2024-12-21 | End: 2024-12-27 | Stop reason: HOSPADM

## 2024-12-20 RX ORDER — THIAMINE HYDROCHLORIDE 100 MG/ML
200 INJECTION, SOLUTION INTRAMUSCULAR; INTRAVENOUS ONCE
Status: COMPLETED | OUTPATIENT
Start: 2024-12-20 | End: 2024-12-20

## 2024-12-20 RX ORDER — THIAMINE HYDROCHLORIDE 100 MG/ML
200 INJECTION, SOLUTION INTRAMUSCULAR; INTRAVENOUS EVERY 8 HOURS SCHEDULED
Status: COMPLETED | OUTPATIENT
Start: 2024-12-20 | End: 2024-12-25

## 2024-12-20 RX ORDER — CHLORDIAZEPOXIDE HYDROCHLORIDE 25 MG/1
50 CAPSULE, GELATIN COATED ORAL NIGHTLY
Status: COMPLETED | OUTPATIENT
Start: 2024-12-23 | End: 2024-12-23

## 2024-12-20 RX ORDER — CHLORDIAZEPOXIDE HYDROCHLORIDE 25 MG/1
50 CAPSULE, GELATIN COATED ORAL EVERY 6 HOURS SCHEDULED
Status: COMPLETED | OUTPATIENT
Start: 2024-12-20 | End: 2024-12-21

## 2024-12-20 RX ORDER — ONDANSETRON 2 MG/ML
4 INJECTION INTRAMUSCULAR; INTRAVENOUS ONCE
Status: COMPLETED | OUTPATIENT
Start: 2024-12-20 | End: 2024-12-20

## 2024-12-20 RX ORDER — BISACODYL 5 MG/1
5 TABLET, DELAYED RELEASE ORAL DAILY PRN
Status: DISCONTINUED | OUTPATIENT
Start: 2024-12-20 | End: 2024-12-27 | Stop reason: HOSPADM

## 2024-12-20 RX ORDER — FOLIC ACID 1 MG/1
1 TABLET ORAL DAILY
Status: DISCONTINUED | OUTPATIENT
Start: 2024-12-20 | End: 2024-12-27 | Stop reason: HOSPADM

## 2024-12-20 RX ORDER — LORAZEPAM 2 MG/ML
1 INJECTION INTRAMUSCULAR
Status: DISCONTINUED | OUTPATIENT
Start: 2024-12-20 | End: 2024-12-22

## 2024-12-20 RX ADMIN — LORAZEPAM 1 MG: 2 INJECTION INTRAMUSCULAR; INTRAVENOUS at 20:09

## 2024-12-20 RX ADMIN — BUSPIRONE HYDROCHLORIDE 7.5 MG: 5 TABLET ORAL at 20:09

## 2024-12-20 RX ADMIN — APIXABAN 5 MG: 5 TABLET, FILM COATED ORAL at 20:09

## 2024-12-20 RX ADMIN — ACETAMINOPHEN TAB 500 MG 1000 MG: 500 TAB at 16:44

## 2024-12-20 RX ADMIN — THIAMINE HYDROCHLORIDE 200 MG: 100 INJECTION, SOLUTION INTRAMUSCULAR; INTRAVENOUS at 23:33

## 2024-12-20 RX ADMIN — ACETAMINOPHEN 650 MG: 325 TABLET ORAL at 23:33

## 2024-12-20 RX ADMIN — ONDANSETRON 4 MG: 2 INJECTION INTRAMUSCULAR; INTRAVENOUS at 18:25

## 2024-12-20 RX ADMIN — LORAZEPAM 2 MG: 1 TABLET ORAL at 18:51

## 2024-12-20 RX ADMIN — AMIODARONE HYDROCHLORIDE 200 MG: 200 TABLET ORAL at 18:51

## 2024-12-20 RX ADMIN — Medication 10 ML: at 20:09

## 2024-12-20 RX ADMIN — SODIUM CHLORIDE 1000 ML: 9 INJECTION, SOLUTION INTRAVENOUS at 10:47

## 2024-12-20 RX ADMIN — THIAMINE HYDROCHLORIDE 200 MG: 100 INJECTION, SOLUTION INTRAMUSCULAR; INTRAVENOUS at 10:48

## 2024-12-20 RX ADMIN — CHLORDIAZEPOXIDE HYDROCHLORIDE 50 MG: 25 CAPSULE ORAL at 18:24

## 2024-12-20 RX ADMIN — CHLORDIAZEPOXIDE HYDROCHLORIDE 50 MG: 25 CAPSULE ORAL at 23:33

## 2024-12-20 RX ADMIN — FOLIC ACID 1 MG: 1 TABLET ORAL at 20:09

## 2024-12-20 RX ADMIN — MORPHINE SULFATE 1 MG: 2 INJECTION, SOLUTION INTRAMUSCULAR; INTRAVENOUS at 18:25

## 2024-12-20 NOTE — ED PROVIDER NOTES
Subjective   History of Present Illness  69 yom atrial fibrillation, alcoholic hepatitis, CAD, HLD and HTN presents via EMS after a fall.  He reports he fell during the night injuring his left hip.  He is alcoholic and reports he drinks 8-10 shots of vodka a day.  He is unsure if he fell from being intoxicated or if he tripped. He states he was in the floor for an undetermined amount of time. He states he has been unable to stand or ambulate since the fall.  He denies head injury.  He is prescribed eliquis BID for a fib. No neck or back pain. No CP or SOB.      Per medical record, he was recently admitted here 12/4/24 - 12/9/24 with alcoholic intoxication and falls.      He was also admitted 11/16/24 - 11/21/24 for falls, opiate abuse           Review of Systems   Constitutional:  Negative for activity change, appetite change, fatigue and fever.   HENT:  Negative for congestion, ear pain, facial swelling and sore throat.    Eyes:  Negative for discharge and visual disturbance.   Respiratory:  Negative for apnea, chest tightness, shortness of breath, wheezing and stridor.    Cardiovascular:  Negative for chest pain and palpitations.   Gastrointestinal:  Negative for abdominal distention, abdominal pain, diarrhea, nausea and vomiting.   Genitourinary:  Negative for difficulty urinating and dysuria.   Musculoskeletal:  Negative for arthralgias and myalgias.        Hip/pelvis pain   Skin:  Negative for rash and wound.   Neurological:  Negative for dizziness and seizures.   Psychiatric/Behavioral:  Negative for agitation and confusion.        Past Medical History:   Diagnosis Date    A-fib     Alcoholic hepatitis 07/13/2023    CAD (coronary artery disease)     History of external beam radiation therapy 05/12/2016    6840 cGy to prostate bed    Hyperlipidemia     Hypertension     Insomnia     Prostate cancer        No Known Allergies    Past Surgical History:   Procedure Laterality Date    CARDIAC CATHETERIZATION       CORONARY ANGIOPLASTY WITH STENT PLACEMENT      FRACTURE SURGERY      PROSTATE SURGERY      TONSILLECTOMY         Family History   Problem Relation Age of Onset    Heart disease Mother     Coronary artery disease Mother     Stroke Mother        Social History     Socioeconomic History    Marital status: Single   Tobacco Use    Smoking status: Never    Smokeless tobacco: Never   Vaping Use    Vaping status: Never Used   Substance and Sexual Activity    Alcohol use: Yes     Alcohol/week: 50.0 standard drinks of alcohol     Types: 50 Shots of liquor per week    Drug use: Not Currently     Types: Marijuana    Sexual activity: Defer           Objective   Physical Exam  Vitals and nursing note reviewed.   Constitutional:       Appearance: He is well-developed.   HENT:      Head: Normocephalic.   Eyes:      Pupils: Pupils are equal, round, and reactive to light.   Cardiovascular:      Rate and Rhythm: Normal rate and regular rhythm.      Heart sounds: No murmur heard.  Pulmonary:      Effort: Pulmonary effort is normal.      Breath sounds: Normal breath sounds.   Abdominal:      General: Bowel sounds are normal.      Palpations: Abdomen is soft.   Musculoskeletal:      Cervical back: Normal range of motion and neck supple.      Left hip: No deformity or bony tenderness. Decreased range of motion. Normal strength.      Left upper leg: No swelling, deformity or tenderness.        Legs:       Comments: LLE pink, warm and dry with +PMS.  No tenderness or deformity to LLE. Femoral and pedal pulses present per doppler   Skin:     General: Skin is warm and dry.   Neurological:      Mental Status: He is alert and oriented to person, place, and time.         Procedures           ED Course  ED Course as of 12/20/24 2037   Fri Dec 20, 2024   1157 L hip/pelvis - IMPRESSION: 1. No acute fracture. 2. Arthritic changes of both hips (left greater than right).  Lab work reviewed.  Creatinine 1.66 compared to 0.95 twelve days ago. Hgb 8.0  which is similar to 8.4 twelve days ago.  ETOH 0.127.  We will attempt to ambulate him and monitor tolerance. [KS]   1454 The patient refuses to attempt to ambulate.  I will go ahead and order a CT scan as the xrays are neg. [KS]   1640 I had a lengthy discussion with the patient and his sister as well as with case management.  He had been admitted several times the last few weeks for similar issues.  The patient's sister states they do not feel he can safely go home. Per case management, he doesn't require a qualifying stay but will need to be admitted to observation.  His CT scan is pending.  We will get a plan of care when it is available.  [KS]   1653 CT pelvis - IMPRESSION: 1. Large gluteal mixed density mass, favored to represent hematoma in the setting of trauma. Lack of intravenous contrast limits evaluation for active extravasation. 2. No acute fracture in the field-of-view.  I reviewed the patient's case with Dr Bell.  We, again, attempted to ambulate him.  He can sit on the side of the bed but states he cannot stand and bear weight.  Call placed to hospitalist service.   [KS]   1710 Spoke with A , BRI regarding the patient's case.  She will speak to Dr England before agreeing to admit the patient. [KS]   1715 Call received from A  BRI.  Dr England requests arterial US be ordered before he will admit the patient as vascular is not available. Dr Bell reviewed the case.  He then spoke with Dr England regarding his request as the hematoma is present in the gluteus.   The hospitalist service will admit the patient. The patient and his sister at  voiced understanding of admission.  He is requesting something for pain which I will order. [KS]      ED Course User Index  [KS] Dionna, BRI Caraballo            CHADS2 Score: 2                                          Medical Decision Making  69 yom atrial fibrillation, alcoholic hepatitis, CAD, HLD and HTN presents via EMS after a fall.  He  reports he fell during the night injuring his left hip.  He is alcoholic and reports he drinks 8-10 shots of vodka a day.  He is unsure if he fell from being intoxicated or if he tripped. He states he was in the floor for an undetermined amount of time. He states he has been unable to stand or ambulate since the fall.  He denies head injury.  He is prescribed eliquis BID for a fib. No neck or back pain. No CP or SOB.      Per medical record, he was recently admitted here 12/4/24 - 12/9/24 with alcoholic intoxication and falls.      He was also admitted 11/16/24 - 11/21/24 for falls, opiate abuse    L hip/pelvis - IMPRESSION: 1. No acute fracture. 2. Arthritic changes of both hips (left greater than right).  Lab work reviewed.  Creatinine 1.66 compared to 0.95 twelve days ago. Hgb 8.0 which is similar to 8.4 twelve days ago.  ETOH 0.127.  We will attempt to ambulate him and monitor tolerance.    The patient refuses to attempt to ambulate.  I will go ahead and order a CT scan as the xrays are neg.    I had a lengthy discussion with the patient and his sister as well as with case management.  He had been admitted several times the last few weeks for similar issues.  The patient's sister states they do not feel he can safely go home. Per case management, he doesn't require a qualifying stay but will need to be admitted to observation.  His CT scan is pending.  We will get a plan of care when it is available.     CT pelvis - IMPRESSION: 1. Large gluteal mixed density mass, favored to represent hematoma in the setting of trauma. Lack of intravenous contrast limits evaluation for active extravasation. 2. No acute fracture in the field-of-view.  I reviewed the patient's case with Dr Bell.  We, again, attempted to ambulate him.  He can sit on the side of the bed but states he cannot stand and bear weight.  Call placed to hospitalist service    Spoke with A , BRI regarding the patient's case.  She will speak to   Samanta before agreeing to admit the patient.    Call received from A  BRI.  Dr Blanco requests arterial US be ordered before he will admit the patient as vascular is not available. Dr Bell reviewed the case.  He then spoke with Dr Blanco regarding his request as the hematoma is present in the gluteus.   The hospitalist service will admit the patient. The patient and his sister at BS voiced understanding of admission.  He is requesting something for pain which I will order.        Problems Addressed:  MIKEY (acute kidney injury): complicated acute illness or injury  Alcohol abuse: complicated acute illness or injury  Frequent falls: complicated acute illness or injury  Hematoma: complicated acute illness or injury    Amount and/or Complexity of Data Reviewed  Labs: ordered.  Radiology: ordered.    Risk  OTC drugs.  Prescription drug management.  Decision regarding hospitalization.        Final diagnoses:   MIKEY (acute kidney injury)   Frequent falls   Hematoma   Alcohol abuse       ED Disposition  ED Disposition       ED Disposition   Decision to Admit    Condition   --    Comment   Level of Care: Med/Surg [1]   Diagnosis: MIKEY (acute kidney injury) [[id number]]   Admitting Physician: GUSTAVO BLANCO [[id number]]   Attending Physician: GUSTAVO BLANCO [[id number]]                 No follow-up provider specified.       Medication List      No changes were made to your prescriptions during this visit.            Isma Villareal, BRI  12/20/24 2038

## 2024-12-20 NOTE — H&P
[facility] Medicine Services  HISTORY AND PHYSICAL    Date of Admission: 12/20/2024  Primary Care Physician: Herberth Nguyen Jr., MD    Subjective   Primary Historian: Patient and his sister Yuliya Stone    Chief Complaint: Fall    History of Present Illness  Dayron Lubin is a 69-year-old male with a past medical history of atrial fibrillation, alcoholic hepatitis, CAD, hypertrophic obstructive cardiomyopathy, hyperlipidemia, hypertension, insomnia, prostate cancer long history of alcohol abuse and chronic falls.  Patient was most recently discharged on 12/9/2024 due to alcohol intoxication, transaminitis, nontraumatic rhabdomyolysis and continued alcohol abuse.  Presented to [facility] emergency department today per his sister after additional multiple falls and a fall to his left hip.  Upon assessment.  Resting in bed on room air with his sister at bedside.  Patient is anxious and has considerable tremors.  States his last alcohol intake was last evening however his sister states that he loses track of time and it may have been today.  Patient has no complaints of chest pain, shortness of breath, nausea, vomiting or diarrhea.  Only complaint currently is patient's gluteal pain from his hematoma and fall and his anxiety.  I had a very lengthy and curt discussion with the patient and his sister regarding his care going forward.  Patient was made aware that he will be admitted for the acute kidney injury with fluid hydration.  In addition patient was made aware that we will assist him in his attempt to be admitted to alcohol rehab versus SNF.  I made patient aware that he will be placed on scheduled Librium with as needed Ativan.  Patient was made aware that he will have to be an active participant in this acute detox period to include taking all prescribed medication, and participating in all physical and Occupational Therapy treatments.  Patient and sister verbalized  understanding, they were made aware that any refusal of treatment will prevent us from assisting in any patient services.  Patient is agreeable to plan of care, and will be admitted for continued observation and treatment.    ED workup revealed CK of 167, , CL 93, CO2 18, anion gap of 24, BUN 20, CR 1.66 (baseline 0.96), , AST 68, ALT 57, Hb 8.0, HCT 24.2, , urinalysis unremarkable, ethanol 0.127, UDS positive for benzodiazepines and oxycodone.  CT of the lower extremity revealed no acute fracture or malalignment, large left gluteal compartment intramuscular hematoma involving the gluteus medius and gluteus robbin measuring up to 15 cm craniocaudal and up to 13 cm transverse.       Review of Systems   Constitutional:  Positive for activity change and fatigue.   Respiratory:  Negative for shortness of breath and wheezing.    Cardiovascular:  Negative for chest pain, palpitations and leg swelling.   Gastrointestinal:  Negative for abdominal pain, diarrhea, nausea and vomiting.   Genitourinary:  Negative for difficulty urinating.   Skin:  Positive for wound.        Left gluteal hematoma   Neurological:  Positive for tremors and weakness.      Otherwise complete ROS reviewed and negative except as mentioned in the HPI.    Past Medical History:   Past Medical History:   Diagnosis Date    A-fib     Alcoholic hepatitis 07/13/2023    CAD (coronary artery disease)     History of external beam radiation therapy 05/12/2016    6840 cGy to prostate bed    Hyperlipidemia     Hypertension     Insomnia     Prostate cancer      Past Surgical History:  Past Surgical History:   Procedure Laterality Date    CARDIAC CATHETERIZATION      CORONARY ANGIOPLASTY WITH STENT PLACEMENT      FRACTURE SURGERY      PROSTATE SURGERY      TONSILLECTOMY       Social History:  reports that he has never smoked. He has never used smokeless tobacco. He reports current alcohol use of about 50.0 standard drinks of alcohol per week. He  reports that he does not currently use drugs after having used the following drugs: Marijuana.    Family History: family history includes Coronary artery disease in his mother; Heart disease in his mother; Stroke in his mother.       Allergies:  No Known Allergies    Medications:  Prior to Admission medications    Medication Sig Start Date End Date Taking? Authorizing Provider   Thiamine Mononitrate (B1) 100 MG tablet Take 1 tablet by mouth Daily. 11/21/24  Yes Provider, MD Steve   amiodarone (PACERONE) 200 MG tablet Take 1 tablet by mouth Daily. 9/10/24   Italia Vasquez PA   apixaban (ELIQUIS) 5 MG tablet tablet Take 1 tablet by mouth Every 12 (Twelve) Hours. Indications: Atrial Fibrillation 11/23/24   Italia Vasquez PA   aspirin (aspirin) 81 MG EC tablet Take 1 tablet by mouth Daily. 12/23/20   Edda Li APRN   atorvastatin (LIPITOR) 20 MG tablet Take 1 tablet by mouth Daily.    Provider, MD Steve   azelastine (ASTELIN) 0.1 % nasal spray 2 sprays into the nostril(s) as directed by provider 2 (Two) Times a Day. Use in each nostril as directed 9/5/24   Gonzalo Martinez APRN   busPIRone (BUSPAR) 5 MG tablet Take 1.5 tablets by mouth 2 (Two) Times a Day. 12/9/24   New Griffiths MD   fluticasone (FLONASE) 50 MCG/ACT nasal spray 2 sprays into the nostril(s) as directed by provider Daily. 9/5/24   Gonzalo Martinez APRN   folic acid (FOLVITE) 1 MG tablet Take 1 tablet by mouth Daily. 11/22/24   Holger Karimi DO   loperamide (IMODIUM) 2 MG capsule Take 1 capsule by mouth 4 (Four) Times a Day As Needed for Diarrhea. 12/9/24   New Griffiths MD   losartan (Cozaar) 25 MG tablet Take 1 tablet by mouth Daily. 10/21/24   Italia Vasquez PA   Magic Barrier Ointment Apply 1 Application topically to the appropriate area as directed 4 (Four) Times a Day As Needed (areas of skin breakdown). 12/9/24   New Griffiths MD   metoprolol succinate XL (TOPROL-XL) 25 MG 24 hr tablet Take  "1 tablet by mouth Daily.    Provider, MD Steve   pantoprazole (PROTONIX) 40 MG EC tablet Take 1 tablet by mouth Daily.    ProviderSteve MD   temazepam (RESTORIL) 30 MG capsule Take 1 capsule by mouth At Night As Needed for Sleep.    ProviderSteve MD   thiamine (VITAMIN B1) 100 MG tablet Take 1 tablet by mouth Daily. 11/22/24   Holger Karimi,      I have utilized all available immediate resources to obtain, update, or review the patient's current medications (including all prescriptions, over-the-counter products, herbals, cannabis/cannabidiol products, and vitamin/mineral/dietary (nutritional) supplements).    Objective     Vital Signs: /84   Pulse 115   Temp 98 °F (36.7 °C) (Oral)   Resp 14   Ht 180.3 cm (71\")   Wt 86.7 kg (191 lb 3.2 oz)   SpO2 97%   BMI 26.67 kg/m²   Physical Exam  Vitals and nursing note reviewed.   Constitutional:       General: He is not in acute distress.     Appearance: He is ill-appearing and toxic-appearing.      Comments: Room air   HENT:      Head: Normocephalic and atraumatic.      Right Ear: Tympanic membrane normal.      Left Ear: Tympanic membrane normal.      Nose: Nose normal.      Mouth/Throat:      Mouth: Mucous membranes are moist.      Pharynx: Oropharynx is clear.   Eyes:      Pupils: Pupils are equal, round, and reactive to light.   Cardiovascular:      Rate and Rhythm: Regular rhythm. Tachycardia present.      Pulses: Normal pulses.      Heart sounds: Normal heart sounds.   Pulmonary:      Effort: Pulmonary effort is normal.      Breath sounds: Normal breath sounds.   Abdominal:      General: Abdomen is flat. Bowel sounds are normal. There is no distension.      Palpations: Abdomen is soft.      Tenderness: There is no abdominal tenderness.   Genitourinary:     Comments: Voiding per urinal  Musculoskeletal:      Cervical back: Normal range of motion. No rigidity or tenderness.      Left hip: Tenderness present. Decreased range of " motion. Decreased strength.   Skin:     General: Skin is warm.      Coloration: Skin is pale.      Findings: Bruising and erythema present.      Comments: Left gluteal hematoma with multiple areas of healing scabs on all extremities   Neurological:      Mental Status: He is alert.      Motor: Weakness and tremor present.      Gait: Gait abnormal.   Psychiatric:         Attention and Perception: Attention normal.         Mood and Affect: Mood is anxious.         Speech: Speech normal.         Behavior: Behavior is cooperative.         Cognition and Memory: Cognition normal. Memory is impaired.          Results Reviewed:  Lab Results (last 24 hours)       Procedure Component Value Units Date/Time    Urinalysis With Culture If Indicated - Straight Cath [[id number]]  (Abnormal) Collected: 12/20/24 1208    Specimen: Urine from Straight Cath Updated: 12/20/24 1236     Color, UA Dark Yellow     Appearance, UA Cloudy     pH, UA <=5.0     Specific Gravity, UA 1.024     Glucose, UA Negative     Ketones, UA Trace     Bilirubin, UA Negative     Blood, UA Negative     Protein, UA 30 mg/dL (1+)     Leuk Esterase, UA Negative     Nitrite, UA Negative     Urobilinogen, UA 1.0 E.U./dL            Urinalysis, Microscopic Only - Straight Cath [[id number]]  (Abnormal) Collected: 12/20/24 1208    Specimen: Urine from Straight Cath Updated: 12/20/24 1236     RBC, UA 0-2 /HPF      WBC, UA 3-5 /HPF      Comment: Urine culture not indicated.        Bacteria, UA Trace /HPF      Squamous Epithelial Cells, UA 3-6 /HPF      Hyaline Casts, UA 0-2 /LPF      Methodology Manual Light Microscopy    Urine Drug Screen - Straight Cath [[id number]]  (Abnormal) Collected: 12/20/24 1208    Specimen: Urine from Straight Cath Updated: 12/20/24 1233     THC, Screen, Urine Negative     Phencyclidine (PCP), Urine Negative     Cocaine Screen, Urine Negative     Methamphetamine, Ur Negative     Opiate Screen Negative     Amphetamine Screen, Urine Negative      Benzodiazepine Screen, Urine Positive     Tricyclic Antidepressants Screen Negative     Methadone Screen, Urine Negative     Barbiturates Screen, Urine Negative     Oxycodone Screen, Urine Positive     Buprenorphine, Screen, Urine Negative            Fentanyl, Urine - Straight Cath [[id number]]  (Normal) Collected: 12/20/24 1208    Specimen: Urine from Straight Cath Updated: 12/20/24 1233     Fentanyl, Urine Negative            Comprehensive Metabolic Panel [[id number]]  (Abnormal) Collected: 12/20/24 1101    Specimen: Blood Updated: 12/20/24 1141     Glucose 117 mg/dL      BUN 20 mg/dL      Creatinine 1.66 mg/dL      Sodium 135 mmol/L      Potassium 4.9 mmol/L      Chloride 93 mmol/L      CO2 18.0 mmol/L      Calcium 8.7 mg/dL      Total Protein 6.9 g/dL      Albumin 4.0 g/dL      ALT (SGPT) 57 U/L      AST (SGOT) 68 U/L      Alkaline Phosphatase 91 U/L      Total Bilirubin 0.7 mg/dL      Globulin 2.9 gm/dL      A/G Ratio 1.4 g/dL      BUN/Creatinine Ratio 12.0     Anion Gap 24.0 mmol/L      eGFR 44.1 mL/min/1.73             CK [[id number]]  (Normal) Collected: 12/20/24 1101    Specimen: Blood Updated: 12/20/24 1141     Creatine Kinase 167 U/L     Magnesium [[id number]]  (Normal) Collected: 12/20/24 1101    Specimen: Blood Updated: 12/20/24 1139     Magnesium 2.2 mg/dL     Ethanol [[id number]] Collected: 12/20/24 1101    Specimen: Blood Updated: 12/20/24 1136     Ethanol % 0.127 %     Narrative:      Not for legal purposes. Chain of Custody not followed.     CBC & Differential [[id number]]  (Abnormal) Collected: 12/20/24 1101    Specimen: Blood Updated: 12/20/24 1129            CBC Auto Differential [[id number]]  (Abnormal) Collected: 12/20/24 1101    Specimen: Blood Updated: 12/20/24 1129     WBC 7.23 10*3/mm3      RBC 2.52 10*6/mm3      Hemoglobin 8.0 g/dL      Hematocrit 24.2 %      MCV 96.0 fL      MCH 31.7 pg      MCHC 33.1 g/dL      RDW 14.9 %      RDW-SD 51.6 fl      MPV 10.1 fL      Platelets 173 10*3/mm3       Neutrophil % 82.9 %      Lymphocyte % 10.1 %      Monocyte % 6.6 %      Eosinophil % 0.0 %      Basophil % 0.1 %      Immature Grans % 0.3 %      Neutrophils, Absolute 5.99 10*3/mm3      Lymphocytes, Absolute 0.73 10*3/mm3      Monocytes, Absolute 0.48 10*3/mm3      Eosinophils, Absolute 0.00 10*3/mm3      Basophils, Absolute 0.01 10*3/mm3      Immature Grans, Absolute 0.02 10*3/mm3      nRBC 0.4 /100 WBC           Imaging Results (Last 24 Hours)       Procedure Component Value Units Date/Time    CT Lower Extremity Left Without Contrast [[id number]] Collected: 12/20/24 1638     Updated: 12/20/24 1648    Narrative:      EXAM: CT LOWER EXTREMITY LEFT WO CONTRAST- - 12/20/2024 3:16 PM     HISTORY: fall, trauma, pain       COMPARISON: None.      DOSE LENGTH PRODUCT: 521 mGy cm. Automatic exposure control was utilized  to make radiation dose as low as reasonably achievable.     TECHNIQUE: Unenhanced CT images were acquired then reconstructed and  displayed as axial, coronal and sagittal multiplanar reformatted images.  The coverage included LEFT mid pelvis to the LEFT proximal tibia fibula.     FINDINGS:     BONE:  The bones all have normal configuration. Visualized portion of the LEFT  pelvis appears intact. LEFT sacrum minimally visualized, not optimally  evaluated on this exam. LEFT femur appears intact.     JOINTS:  LEFT hip appears intact and normally aligned. No convincing large hip  joint effusion. No knee joint effusion. Pubic symphysis anatomic. LEFT  sacroiliac joint minimally visualized, appears anatomic.     TENDONS AND MUSCLES:  Large gluteal intramuscular mixed density including hyperdense mass  measures 13.6 x 8.3 x 13.7 cm (AP by transverse by craniocaudal), in the  setting of trauma likely hematoma.. Lack of intravenous contrast limits  evaluation for active extravasation.     SUBCUTANEOUS AND DEEP SOFT TISSUES:  Vascular calcifications. Minimally visualized deep pelvis, not optimally  evaluated on this  exam. Subcutaneous stranding posterior gluteal and  upper thigh regions, likely contusion.          Impression:      1. Large gluteal mixed density mass, favored to represent hematoma in  the setting of trauma. Lack of intravenous contrast limits evaluation  for active extravasation.     2. No acute fracture in the field-of-view.           This report was signed and finalized on 12/20/2024 4:45 PM by Dr Zuleyma Schmidt MD.       CT Pelvis Without Contrast [[id number]] Collected: 12/20/24 1639     Updated: 12/20/24 1646    Narrative:      CT PELVIS WO CONTRAST- 12/20/2024 3:16 PM     HISTORY: fall, trauma, pain       COMPARISON: CT scan dated 12/20/2024.     DLP: 521.44 mGy.cm     TECHNIQUE: Serial helical tomographic images of the bony pelvis were  obtained without the use of intravenous contrast. Multiplanar  reformatted images were provided for review. Automated exposure control  was utilized to reduce patient radiation dose.     FINDINGS: :     No visualized acute fracture. No fracture or malalignment at the hip  joints. Pelvic ring is intact. Pubic symphysis and SI joints are  unremarkable. No acute intrapelvic process. Colonic diverticulosis.  There is a large large left gluteal compartment intramuscular hematomas  in the gluteus medius and robbin measuring up to 15 cm craniocaudal.       Impression:      1. No acute fracture or malalignment.  2. Large left gluteal compartment intramuscular hematomas involving the  gluteus medius and gluteus robbin measuring up to 15 cm craniocaudal  and up to 13 cm transverse.           This report was signed and finalized on 12/20/2024 4:43 PM by Dr Gregory Guillermo.       XR Hip With or Without Pelvis 2 - 3 View Left [[id number]] Collected: 12/20/24 1047     Updated: 12/20/24 1051    Narrative:      EXAMINATION: XR HIP W OR WO PELVIS 2-3 VIEW LEFT-  12/20/2024 10:47 AM     HISTORY: Fall/trauma.     FINDINGS: AP radiograph of the pelvis as well as 2 view exam of the left  hip  demonstrates moderate arthrosis of the left hip with narrowing of  the joint space and mild spurring of the femoral head. There is mild  arthritic change of the right hip. The bony pelvic ring is intact.       Impression:      1. No acute fracture.  2. Arthritic changes of both hips (left greater than right).     This report was signed and finalized on 12/20/2024 10:48 AM by Dr. Vicente Sam MD.                 Assessment / Plan   Assessment:   Active Hospital Problems    Diagnosis     **Stage 1 acute kidney injury     Large left gluteal compartment intramuscular hematomas     Alcohol intoxication     Multiple falls     Transaminitis     Gait instability     Alcohol abuse     HOCM (hypertrophic obstructive cardiomyopathy)     Essential hypertension        Treatment Plan  The patient will be admitted to my service here at [facility].     1.  Stage I acute kidney injury-baseline creatinine of 0.96 currently 1.5 times at 1.66, will initiate normal saline at 75 mL an hour, hold any nephrotoxic agents and repeat BMP in the AM.    2.  Alcohol intoxication/alcohol abuse-patient has been admitted over the last 2 months for several occasions of alcohol intoxication with related falls.  Family requests evaluation for placement.  Initiate CIWA protocol.    3.  Multiple falls/gait instability-patient has had approximately 2 hospital admissions and multiple ER visits due to fall induced falls with continued gait instability.  Family is requesting placement, PT/OT evaluation for recommendations.  Case management consultation to evaluate for placement.  Case was discussed per ED case manager who stated that due to patient's insurance he did not need an inpatient stay or a 3 night stay.    4.  Transaminitis-acute on chronic, currently AST of 68, ALT of 57, MELD score.......... continue to follow with daily CMP.    5.  Essential hypertension-initiate every 4 vital signs continue home metoprolol and amiodarone,  Cozaar on hold due to MIKEY.    6.  Hypertrophic obstructive cardiomyopathy-recent echocardiogram performed in July of this year revealed normal systolic function with an LVEF of 51-55% with moderate to severe concentric hypertrophy of the left ventricular walls.  Patient has no signs or symptoms of exacerbation.  Continue home metoprolol, daily weight, strict intake and output.    7.  Large left gluteal compartment intramuscular hematoma-continued assessment, ice packs every 2 hours off 2 hours.  Notify provider of any new or worsening hematoma, decreased pulsation or increased pain.    8.  Paroxysmal atrial fibrillation/chronic anticoagulation-continue home amiodarone and Eliquis, cardiac monitoring    9.  Resumed and restarted home medications as appropriate.    VTE prophylaxis with Eliquis  Labs in a.m.  Medical Decision Making  Acute kidney injury, acute, moderate complexity, unchanged  Alcohol intoxication, chronic, high complexity, worsening  Alcohol abuse, chronic, high complexity, worsening  Multiple falls, chronic, moderate complexity, worsening  Gait instability, chronic, moderate complexity, worsening  Transaminitis, chronic, moderate complexity, unchanged  Essential hypertension, chronic, moderate complexity, unchanged  Hypertrophic obstructive cardiomyopathy, chronic, moderate complexity, stable  Large left gluteal compartment intramuscular hematoma, acute, moderate complexity, unchanged  Paroxysmal atrial fibrillation and chronic anticoagulation, chronic, moderate complexity, stable  Number and Complexity of problems: 9  Differential Diagnosis: None    Conditions and Status        Condition is unchanged.  On Eliquis  [facility] Data  External documents reviewed: Review of multiple ER and hospitalizations  Cardiac tracing (EKG, telemetry) interpretation: 7/2024 echocardiogram per cardiology reviewed  Radiology interpretation: 12/20/2024 hip x-ray, CT of the pelvis and lower extremity per radiology  reviewed  Labs reviewed: 5/20/2024 CBC, CMP, ethanol, magnesium, CK, urinalysis reviewed, repeat labs in a.m.  Any tests that were considered but not ordered: None     Decision rules/scores evaluated (example [id number], Wells, etc): [id number] score of 2     Discussed with: Dr. England, patient and his sister Yuliya  Care Planning  Shared decision making: Dr. England, patient and his sister Yuliya  Code status and discussions: Full code per patient and sister    Disposition  Social Determinants of Health that impact treatment or disposition: Patient can no longer care for himself at home case management consultation to evaluate for placement  Estimated length of stay is 1-2 days.     I confirmed that the patient's advanced care plan is present, code status is documented, and a surrogate decision maker is listed in the patient's medical record.     The patient's surrogate decision maker is Sister Yuliya.     The patient was seen and examined by me on 12/20/2024 at 1714.    Electronically signed by BRI Song, 12/20/24, 17:24 CST.

## 2024-12-20 NOTE — ED NOTES
The following fall interventions were initiated:    [x] Patient and/or family given education   [x] Call light within reach and educated on how to use   [x] Bed rails up per protocol    [x] Bed locked and in the lowest position   [x] Bed alarm set and on loudest setting   [x] Fall wrist band applied   [] Non skid footwear applied   [x] Room free of clutter   [x] Patient items within reach   [x] Adequate lighting provided  [x] Falls sign present    [] Patient moved closer to nursing station   [] Restraints applied

## 2024-12-21 LAB
ABO GROUP BLD: NORMAL
ADV 40+41 DNA STL QL NAA+NON-PROBE: NOT DETECTED
ANION GAP SERPL CALCULATED.3IONS-SCNC: 13 MMOL/L (ref 5–15)
ASTRO TYP 1-8 RNA STL QL NAA+NON-PROBE: NOT DETECTED
BLD GP AB SCN SERPL QL: NEGATIVE
BUN SERPL-MCNC: 28 MG/DL (ref 8–23)
BUN/CREAT SERPL: 18.4 (ref 7–25)
C CAYETANENSIS DNA STL QL NAA+NON-PROBE: NOT DETECTED
C COLI+JEJ+UPSA DNA STL QL NAA+NON-PROBE: NOT DETECTED
CALCIUM SPEC-SCNC: 8.1 MG/DL (ref 8.6–10.5)
CHLORIDE SERPL-SCNC: 95 MMOL/L (ref 98–107)
CO2 SERPL-SCNC: 26 MMOL/L (ref 22–29)
CREAT SERPL-MCNC: 1.52 MG/DL (ref 0.76–1.27)
CRYPTOSP DNA STL QL NAA+NON-PROBE: NOT DETECTED
DEPRECATED RDW RBC AUTO: 52.7 FL (ref 37–54)
E HISTOLYT DNA STL QL NAA+NON-PROBE: NOT DETECTED
EAEC PAA PLAS AGGR+AATA ST NAA+NON-PRB: NOT DETECTED
EC STX1+STX2 GENES STL QL NAA+NON-PROBE: NOT DETECTED
EGFRCR SERPLBLD CKD-EPI 2021: 49 ML/MIN/1.73
EPEC EAE GENE STL QL NAA+NON-PROBE: NOT DETECTED
ERYTHROCYTE [DISTWIDTH] IN BLOOD BY AUTOMATED COUNT: 15.1 % (ref 12.3–15.4)
ETEC LTA+ST1A+ST1B TOX ST NAA+NON-PROBE: NOT DETECTED
G LAMBLIA DNA STL QL NAA+NON-PROBE: NOT DETECTED
GLUCOSE SERPL-MCNC: 101 MG/DL (ref 65–99)
HBA1C MFR BLD: 4.7 % (ref 4.8–5.6)
HCT VFR BLD AUTO: 20.2 % (ref 37.5–51)
HCT VFR BLD AUTO: 24.1 % (ref 37.5–51)
HGB BLD-MCNC: 6.7 G/DL (ref 13–17.7)
HGB BLD-MCNC: 8.1 G/DL (ref 13–17.7)
LIPASE SERPL-CCNC: 64 U/L (ref 13–60)
MAGNESIUM SERPL-MCNC: 2.2 MG/DL (ref 1.6–2.4)
MCH RBC QN AUTO: 32.4 PG (ref 26.6–33)
MCHC RBC AUTO-ENTMCNC: 33.2 G/DL (ref 31.5–35.7)
MCV RBC AUTO: 97.6 FL (ref 79–97)
NOROVIRUS GI+II RNA STL QL NAA+NON-PROBE: NOT DETECTED
P SHIGELLOIDES DNA STL QL NAA+NON-PROBE: NOT DETECTED
PLATELET # BLD AUTO: 142 10*3/MM3 (ref 140–450)
PMV BLD AUTO: 10.4 FL (ref 6–12)
POTASSIUM SERPL-SCNC: 4.4 MMOL/L (ref 3.5–5.2)
RBC # BLD AUTO: 2.07 10*6/MM3 (ref 4.14–5.8)
RH BLD: POSITIVE
RVA RNA STL QL NAA+NON-PROBE: NOT DETECTED
S ENT+BONG DNA STL QL NAA+NON-PROBE: NOT DETECTED
SAPO I+II+IV+V RNA STL QL NAA+NON-PROBE: NOT DETECTED
SHIGELLA SP+EIEC IPAH ST NAA+NON-PROBE: NOT DETECTED
SODIUM SERPL-SCNC: 134 MMOL/L (ref 136–145)
T&S EXPIRATION DATE: NORMAL
TSH SERPL DL<=0.05 MIU/L-ACNC: 1.75 UIU/ML (ref 0.27–4.2)
V CHOL+PARA+VUL DNA STL QL NAA+NON-PROBE: NOT DETECTED
V CHOLERAE DNA STL QL NAA+NON-PROBE: NOT DETECTED
WBC NRBC COR # BLD AUTO: 5.02 10*3/MM3 (ref 3.4–10.8)
Y ENTEROCOL DNA STL QL NAA+NON-PROBE: NOT DETECTED

## 2024-12-21 PROCEDURE — 84443 ASSAY THYROID STIM HORMONE: CPT

## 2024-12-21 PROCEDURE — 85014 HEMATOCRIT: CPT | Performed by: HOSPITALIST

## 2024-12-21 PROCEDURE — 25010000002 LORAZEPAM PER 2 MG: Performed by: HOSPITALIST

## 2024-12-21 PROCEDURE — 87449 NOS EACH ORGANISM AG IA: CPT | Performed by: HOSPITALIST

## 2024-12-21 PROCEDURE — 85027 COMPLETE CBC AUTOMATED: CPT

## 2024-12-21 PROCEDURE — P9016 RBC LEUKOCYTES REDUCED: HCPCS

## 2024-12-21 PROCEDURE — 86901 BLOOD TYPING SEROLOGIC RH(D): CPT | Performed by: FAMILY MEDICINE

## 2024-12-21 PROCEDURE — 86923 COMPATIBILITY TEST ELECTRIC: CPT

## 2024-12-21 PROCEDURE — 86900 BLOOD TYPING SEROLOGIC ABO: CPT | Performed by: FAMILY MEDICINE

## 2024-12-21 PROCEDURE — 86900 BLOOD TYPING SEROLOGIC ABO: CPT

## 2024-12-21 PROCEDURE — 36430 TRANSFUSION BLD/BLD COMPNT: CPT

## 2024-12-21 PROCEDURE — 86901 BLOOD TYPING SEROLOGIC RH(D): CPT

## 2024-12-21 PROCEDURE — 80048 BASIC METABOLIC PNL TOTAL CA: CPT

## 2024-12-21 PROCEDURE — 87493 C DIFF AMPLIFIED PROBE: CPT | Performed by: HOSPITALIST

## 2024-12-21 PROCEDURE — 86850 RBC ANTIBODY SCREEN: CPT | Performed by: FAMILY MEDICINE

## 2024-12-21 PROCEDURE — 83690 ASSAY OF LIPASE: CPT

## 2024-12-21 PROCEDURE — G0378 HOSPITAL OBSERVATION PER HR: HCPCS

## 2024-12-21 PROCEDURE — 83036 HEMOGLOBIN GLYCOSYLATED A1C: CPT

## 2024-12-21 PROCEDURE — 87507 IADNA-DNA/RNA PROBE TQ 12-25: CPT | Performed by: HOSPITALIST

## 2024-12-21 PROCEDURE — 85018 HEMOGLOBIN: CPT | Performed by: HOSPITALIST

## 2024-12-21 PROCEDURE — 25010000002 THIAMINE HCL 200 MG/2ML SOLUTION: Performed by: HOSPITALIST

## 2024-12-21 PROCEDURE — 83735 ASSAY OF MAGNESIUM: CPT

## 2024-12-21 RX ORDER — LOPERAMIDE HYDROCHLORIDE 2 MG/1
2 CAPSULE ORAL 4 TIMES DAILY PRN
Status: DISCONTINUED | OUTPATIENT
Start: 2024-12-21 | End: 2024-12-27 | Stop reason: HOSPADM

## 2024-12-21 RX ORDER — TEMAZEPAM 30 MG/1
30 CAPSULE ORAL NIGHTLY PRN
Status: DISCONTINUED | OUTPATIENT
Start: 2024-12-21 | End: 2024-12-27 | Stop reason: HOSPADM

## 2024-12-21 RX ORDER — ESCITALOPRAM OXALATE 10 MG/1
10 TABLET ORAL DAILY
Status: DISCONTINUED | OUTPATIENT
Start: 2024-12-21 | End: 2024-12-27 | Stop reason: HOSPADM

## 2024-12-21 RX ADMIN — CHLORDIAZEPOXIDE HYDROCHLORIDE 50 MG: 25 CAPSULE ORAL at 17:37

## 2024-12-21 RX ADMIN — LORAZEPAM 1 MG: 2 INJECTION INTRAMUSCULAR; INTRAVENOUS at 03:36

## 2024-12-21 RX ADMIN — ACETAMINOPHEN 650 MG: 325 TABLET ORAL at 05:30

## 2024-12-21 RX ADMIN — BUSPIRONE HYDROCHLORIDE 7.5 MG: 5 TABLET ORAL at 20:00

## 2024-12-21 RX ADMIN — LORAZEPAM 2 MG: 2 INJECTION INTRAMUSCULAR; INTRAVENOUS at 23:14

## 2024-12-21 RX ADMIN — BUSPIRONE HYDROCHLORIDE 7.5 MG: 5 TABLET ORAL at 08:49

## 2024-12-21 RX ADMIN — CHLORDIAZEPOXIDE HYDROCHLORIDE 50 MG: 25 CAPSULE ORAL at 05:30

## 2024-12-21 RX ADMIN — LORAZEPAM 1 MG: 1 TABLET ORAL at 14:26

## 2024-12-21 RX ADMIN — METOPROLOL SUCCINATE 25 MG: 25 TABLET, EXTENDED RELEASE ORAL at 08:48

## 2024-12-21 RX ADMIN — LORAZEPAM 2 MG: 1 TABLET ORAL at 21:41

## 2024-12-21 RX ADMIN — FOLIC ACID 1 MG: 1 TABLET ORAL at 09:55

## 2024-12-21 RX ADMIN — ASPIRIN 81 MG: 81 TABLET, COATED ORAL at 08:49

## 2024-12-21 RX ADMIN — THIAMINE HYDROCHLORIDE 200 MG: 100 INJECTION, SOLUTION INTRAMUSCULAR; INTRAVENOUS at 14:15

## 2024-12-21 RX ADMIN — APIXABAN 5 MG: 5 TABLET, FILM COATED ORAL at 08:49

## 2024-12-21 RX ADMIN — ATORVASTATIN CALCIUM 20 MG: 10 TABLET, FILM COATED ORAL at 08:49

## 2024-12-21 RX ADMIN — AMIODARONE HYDROCHLORIDE 200 MG: 200 TABLET ORAL at 08:49

## 2024-12-21 RX ADMIN — LORAZEPAM 1 MG: 1 TABLET ORAL at 08:53

## 2024-12-21 RX ADMIN — LOPERAMIDE HYDROCHLORIDE 2 MG: 2 CAPSULE ORAL at 16:44

## 2024-12-21 RX ADMIN — THIAMINE HYDROCHLORIDE 200 MG: 100 INJECTION, SOLUTION INTRAMUSCULAR; INTRAVENOUS at 05:30

## 2024-12-21 RX ADMIN — PANTOPRAZOLE SODIUM 40 MG: 40 TABLET, DELAYED RELEASE ORAL at 05:30

## 2024-12-21 RX ADMIN — LORAZEPAM 2 MG: 2 INJECTION INTRAMUSCULAR; INTRAVENOUS at 23:36

## 2024-12-21 RX ADMIN — THIAMINE HYDROCHLORIDE 200 MG: 100 INJECTION, SOLUTION INTRAMUSCULAR; INTRAVENOUS at 20:00

## 2024-12-21 RX ADMIN — Medication 10 ML: at 08:51

## 2024-12-21 RX ADMIN — TEMAZEPAM 30 MG: 30 CAPSULE ORAL at 21:40

## 2024-12-21 RX ADMIN — ESCITALOPRAM OXALATE 10 MG: 10 TABLET ORAL at 14:15

## 2024-12-21 RX ADMIN — ACETAMINOPHEN 650 MG: 325 TABLET ORAL at 11:41

## 2024-12-21 RX ADMIN — CHLORDIAZEPOXIDE HYDROCHLORIDE 50 MG: 25 CAPSULE ORAL at 11:39

## 2024-12-21 RX ADMIN — LORAZEPAM 2 MG: 1 TABLET ORAL at 20:00

## 2024-12-21 NOTE — PROGRESS NOTES
[facility] Medicine Services  INPATIENT PROGRESS NOTE    Patient Name: Dayron Lubin  Date of Admission: 12/20/2024  Today's Date: 12/21/24  Length of Stay: 0  Primary Care Physician: Herberth Nguyen Jr., MD    Subjective   Chief Complaint: Alcohol intoxication  HPI   Dayron Lubin is a 69-year-old male with a past medical history of atrial fibrillation, alcoholic hepatitis, CAD, hypertrophic obstructive cardiomyopathy, hyperlipidemia, hypertension, insomnia, prostate cancer long history of alcohol abuse and chronic falls.  Patient was most recently discharged on 12/9/2024 due to alcohol intoxication, transaminitis, nontraumatic rhabdomyolysis and continued alcohol abuse.  Presented to [facility] emergency department today per his sister after additional multiple falls and a fall to his left hip.  Upon assessment.  Resting in bed on room air with his sister at bedside.  Patient is anxious and has considerable tremors.  States his last alcohol intake was last evening however his sister states that he loses track of time and it may have been today.  Patient has no complaints of chest pain, shortness of breath, nausea, vomiting or diarrhea.  Only complaint currently is patient's gluteal pain from his hematoma and fall and his anxiety.  I had a very lengthy and curt discussion with the patient and his sister regarding his care going forward.  Patient was made aware that he will be admitted for the acute kidney injury with fluid hydration.  In addition patient was made aware that we will assist him in his attempt to be admitted to alcohol rehab versus SNF.  I made patient aware that he will be placed on scheduled Librium with as needed Ativan.  Patient was made aware that he will have to be an active participant in this acute detox period to include taking all prescribed medication, and participating in all physical and Occupational Therapy treatments.  Patient and sister verbalized  understanding, they were made aware that any refusal of treatment will prevent us from assisting in any patient services.  Patient is agreeable to plan of care, and will be admitted for continued observation and treatment.     ED workup revealed CK of 167, , CL 93, CO2 18, anion gap of 24, BUN 20, CR 1.66 (baseline 0.96), , AST 68, ALT 57, Hb 8.0, HCT 24.2, , urinalysis unremarkable, ethanol 0.127, UDS positive for benzodiazepines and oxycodone.  CT of the lower extremity revealed no acute fracture or malalignment, large left gluteal compartment intramuscular hematoma involving the gluteus medius and gluteus robbin measuring up to 15 cm craniocaudal and up to 13 cm transverse.  12/21  Before hemoglobin dropped to 6.7 will hold his aspirin and hold his Eliquis and will transfuse 1 unit of blood blood pressure has been low we will hold blood pressure medications  Admits depression anxiety I will go ahead and start him on Lexapro and continue his BuSpar as directed about abstinence from drinking  Review of Systems   All pertinent negatives and positives are as above. All other systems have been reviewed and are negative unless otherwise stated.     Objective    Temp:  [97.4 °F (36.3 °C)-99.5 °F (37.5 °C)] 99 °F (37.2 °C)  Heart Rate:  [] 81  Resp:  [16-18] 16  BP: ()/(57-90) 86/66  Physical Exam  HENT:      Head: Normocephalic.      Nose: Nose normal.   Eyes:      Pupils: Pupils are equal, round, and reactive to light.   Cardiovascular:      Rate and Rhythm: Rhythm irregular.   Pulmonary:      Effort: Pulmonary effort is normal.   Abdominal:      General: Abdomen is flat.   Musculoskeletal:         General: Normal range of motion.      Cervical back: Normal range of motion.   Neurological:      Mental Status: He is alert.             Results Review:  I have reviewed the labs, radiology results, and diagnostic studies.    Laboratory Data:   Results from last 7 days   Lab Units  "12/21/24  0154 12/20/24  1101   WBC 10*3/mm3 5.02 7.23   HEMOGLOBIN g/dL 6.7* 8.0*   HEMATOCRIT % 20.2* 24.2*   PLATELETS 10*3/mm3 142 173        Results from last 7 days   Lab Units 12/21/24  0154 12/20/24  1101   SODIUM mmol/L 134* 135*   POTASSIUM mmol/L 4.4 4.9   CHLORIDE mmol/L 95* 93*   CO2 mmol/L 26.0 18.0*   BUN mg/dL 28* 20   CREATININE mg/dL 1.52* 1.66*   CALCIUM mg/dL 8.1* 8.7   BILIRUBIN mg/dL  --  0.7   ALK PHOS U/L  --  91   ALT (SGPT) U/L  --  57*   AST (SGOT) U/L  --  68*   GLUCOSE mg/dL 101* 117*       Culture Data:   No results found for: \"BLOODCX\", \"URINECX\", \"WOUNDCX\", \"MRSACX\", \"RESPCX\", \"STOOLCX\"    Radiology Data:   Imaging Results (Last 24 Hours)       Procedure Component Value Units Date/Time    CT Lower Extremity Left Without Contrast [[id number]] Collected: 12/20/24 1638     Updated: 12/20/24 1648    Narrative:      EXAM: CT LOWER EXTREMITY LEFT WO CONTRAST- - 12/20/2024 3:16 PM     HISTORY: fall, trauma, pain       COMPARISON: None.      DOSE LENGTH PRODUCT: 521 mGy cm. Automatic exposure control was utilized  to make radiation dose as low as reasonably achievable.     TECHNIQUE: Unenhanced CT images were acquired then reconstructed and  displayed as axial, coronal and sagittal multiplanar reformatted images.  The coverage included LEFT mid pelvis to the LEFT proximal tibia fibula.     FINDINGS:     BONE:  The bones all have normal configuration. Visualized portion of the LEFT  pelvis appears intact. LEFT sacrum minimally visualized, not optimally  evaluated on this exam. LEFT femur appears intact.     JOINTS:  LEFT hip appears intact and normally aligned. No convincing large hip  joint effusion. No knee joint effusion. Pubic symphysis anatomic. LEFT  sacroiliac joint minimally visualized, appears anatomic.     TENDONS AND MUSCLES:  Large gluteal intramuscular mixed density including hyperdense mass  measures 13.6 x 8.3 x 13.7 cm (AP by transverse by craniocaudal), in the  setting of " trauma likely hematoma.. Lack of intravenous contrast limits  evaluation for active extravasation.     SUBCUTANEOUS AND DEEP SOFT TISSUES:  Vascular calcifications. Minimally visualized deep pelvis, not optimally  evaluated on this exam. Subcutaneous stranding posterior gluteal and  upper thigh regions, likely contusion.          Impression:      1. Large gluteal mixed density mass, favored to represent hematoma in  the setting of trauma. Lack of intravenous contrast limits evaluation  for active extravasation.     2. No acute fracture in the field-of-view.           This report was signed and finalized on 12/20/2024 4:45 PM by Dr Zuleyma Schmidt MD.       CT Pelvis Without Contrast [[id number]] Collected: 12/20/24 1639     Updated: 12/20/24 1646    Narrative:      CT PELVIS WO CONTRAST- 12/20/2024 3:16 PM     HISTORY: fall, trauma, pain       COMPARISON: CT scan dated 12/20/2024.     DLP: 521.44 mGy.cm     TECHNIQUE: Serial helical tomographic images of the bony pelvis were  obtained without the use of intravenous contrast. Multiplanar  reformatted images were provided for review. Automated exposure control  was utilized to reduce patient radiation dose.     FINDINGS: :     No visualized acute fracture. No fracture or malalignment at the hip  joints. Pelvic ring is intact. Pubic symphysis and SI joints are  unremarkable. No acute intrapelvic process. Colonic diverticulosis.  There is a large large left gluteal compartment intramuscular hematomas  in the gluteus medius and robbin measuring up to 15 cm craniocaudal.       Impression:      1. No acute fracture or malalignment.  2. Large left gluteal compartment intramuscular hematomas involving the  gluteus medius and gluteus robbin measuring up to 15 cm craniocaudal  and up to 13 cm transverse.           This report was signed and finalized on 12/20/2024 4:43 PM by Dr Gregory Guillermo.       XR Hip With or Without Pelvis 2 - 3 View Left [[id number]] Collected:  12/20/24 1047     Updated: 12/20/24 1051    Narrative:      EXAMINATION: XR HIP W OR WO PELVIS 2-3 VIEW LEFT-  12/20/2024 10:47 AM     HISTORY: Fall/trauma.     FINDINGS: AP radiograph of the pelvis as well as 2 view exam of the left  hip demonstrates moderate arthrosis of the left hip with narrowing of  the joint space and mild spurring of the femoral head. There is mild  arthritic change of the right hip. The bony pelvic ring is intact.       Impression:      1. No acute fracture.  2. Arthritic changes of both hips (left greater than right).     This report was signed and finalized on 12/20/2024 10:48 AM by Dr. Vicente Sam MD.               I have reviewed the patient's current medications.     Assessment/Plan   Assessment  Active Hospital Problems    Diagnosis     **Stage 1 acute kidney injury     Large left gluteal compartment intramuscular hematomas     Chronic anticoagulation     MIKEY (acute kidney injury)     Alcohol intoxication     Multiple falls     Transaminitis     Paroxysmal atrial fibrillation     Gait instability     Alcohol abuse     HOCM (hypertrophic obstructive cardiomyopathy)     Essential hypertension        Treatment Plan  1.  Stage I acute kidney injury-baseline creatinine of 0.96 currently 1.5 times at 1.66, will initiate normal saline at 75 mL an hour, hold any nephrotoxic agents and repeat BMP in the AM.     2.  Alcohol intoxication/alcohol abuse-patient has been admitted over the last 2 months for several occasions of alcohol intoxication with related falls.  Family requests evaluation for placement.  Initiate CIWA protocol.     3.  Multiple falls/gait instability-patient has had approximately 2 hospital admissions and multiple ER visits due to fall induced falls with continued gait instability.  Family is requesting placement, PT/OT evaluation for recommendations.  Case management consultation to evaluate for placement.  Case was discussed per ED case manager who stated that due to  patient's insurance he did not need an inpatient stay or a 3 night stay.     4.  Transaminitis-acute on chronic, currently AST of 68, ALT of 57, MELD score.......... continue to follow with daily CMP.     5.  Essential hypertension-initiate every 4 vital signs continue home metoprolol and amiodarone, Cozaar on hold due to MIKEY.     6.  Hypertrophic obstructive cardiomyopathy-recent echocardiogram performed in July of this year revealed normal systolic function with an LVEF of 51-55% with moderate to severe concentric hypertrophy of the left ventricular walls.  Patient has no signs or symptoms of exacerbation.  Continue home metoprolol, daily weight, strict intake and output.     7.  Large left gluteal compartment intramuscular hematoma-continued assessment, ice packs every 2 hours off 2 hours.  Notify provider of any new or worsening hematoma, decreased pulsation or increased pain.     8.  Paroxysmal atrial fibrillation/chronic anticoagulation-continue home amiodarone and Eliquis, cardiac monitoring     9.  Resumed and restarted home medications as appropriate.     VTE prophylaxis with Eliquis  Labs in a.m.  Medical Decision Making  Acute kidney injury, acute, moderate complexity, unchanged  Alcohol intoxication, chronic, high complexity, worsening  Alcohol abuse, chronic, high complexity, worsening  Multiple falls, chronic, moderate complexity, worsening  Gait instability, chronic, moderate complexity, worsening  Transaminitis, chronic, moderate complexity, unchanged  Essential hypertension, chronic, moderate complexity, unchanged  Hypertrophic obstructive cardiomyopathy, chronic, moderate complexity, stable  Large left gluteal compartment intramuscular hematoma, acute, moderate complexity, unchanged  Paroxysmal atrial fibrillation and chronic anticoagulation, chronic, moderate complexity, stable  Number and Complexity of problems: 9  Differential Diagnosis: None     Conditions and Status        Condition is  unchanged.  On Eliquis  MDM Data  External documents reviewed: Review of multiple ER and hospitalizations  Cardiac tracing (EKG, telemetry) interpretation: 7/2024 echocardiogram per cardiology reviewed  Radiology interpretation: 12/20/2024 hip x-ray, CT of the pelvis and lower extremity per radiology reviewed  Labs reviewed: 5/20/2024 CBC, CMP, ethanol, magnesium, CK, urinalysis reviewed, repeat labs in a.m.  Any tests that were considered but not ordered: None     Decision rules/scores evaluated (example [id number], Wells, etc): [id number] score of 2     Discussed with: Dr. England, patient and his sister Yuliya  Care Planning  Shared decision making: Dr. England, patient and his sister Yuliya  Code status and discussions: Full code per patient and sister     Disposition  Social Determinants of Health that impact treatment or disposition: Patient can no longer care for himself at home case management consultation to evaluate for placement  Estimated length of stay is 1-2 days.      I confirmed that the patient's advanced care plan is present, code status is documented, and a surrogate decision maker is listed in the patient's medical record.      The patient's surrogate decision maker is Sister Yulyia.         Electronically signed by Amelia England MD, 12/21/24, 10:31 CST.

## 2024-12-21 NOTE — ED NOTES
Nursing report ED to floor  Dayron Lubin  69 y.o.  male    HPI:   Chief Complaint   Patient presents with    Fall       Admitting doctor:   Amelia England MD    Consulting provider(s):  Consults       Date and Time Order Name Status Description    12/5/2024  8:45 AM Inpatient Wound Care MD Consult Completed     11/16/2024  7:03 PM Inpatient Wound Care MD Consult Completed              Admitting diagnosis:   The primary encounter diagnosis was MIKEY (acute kidney injury). Diagnoses of Frequent falls, Hematoma, and Alcohol abuse were also pertinent to this visit.    Code status:   Current Code Status       Date Active Code Status Order ID Comments User Context       12/20/2024 1744 CPR (Attempt to Resuscitate) [id number]  Tiffanie APRN ED        Question Answer    Code Status (Patient has no pulse and is not breathing) CPR (Attempt to Resuscitate)    Medical Interventions (Patient has pulse or is breathing) Full Support    Level Of Support Discussed With Patient     Next of Kin (If No Surrogate)                    Allergies:   Patient has no known allergies.    Intake and Output    Intake/Output Summary (Last 24 hours) at 12/20/2024 1821  Last data filed at 12/20/2024 1402  Gross per 24 hour   Intake 1000 ml   Output --   Net 1000 ml       Weight:       12/20/24  0942   Weight: 86.7 kg (191 lb 3.2 oz)       Most recent vitals:   Vitals:    12/20/24 1530 12/20/24 1540 12/20/24 1656 12/20/24 1711   BP:   135/85 126/84   BP Location:       Patient Position:       Pulse: 103 106 102 115   Resp:       Temp:       TempSrc:       SpO2: 98% 100% 96% 97%   Weight:       Height:         Oxygen Therapy: .    Active LDAs/IV Access:   Lines, Drains & Airways       Active LDAs       Name Placement date Placement time Site Days    Peripheral IV 12/20/24 1038 Anterior;Left Forearm 12/20/24  1038  Forearm  less than 1                    Labs (abnormal labs have a star):   Labs Reviewed   COMPREHENSIVE METABOLIC PANEL -  Abnormal; Notable for the following components:       Result Value    Glucose 117 (*)     Creatinine 1.66 (*)     Sodium 135 (*)     Chloride 93 (*)     CO2 18.0 (*)     ALT (SGPT) 57 (*)     AST (SGOT) 68 (*)     Anion Gap 24.0 (*)     eGFR 44.1 (*)     All other components within normal limits    Narrative:     GFR Categories in Chronic Kidney Disease (CKD)      GFR Category          GFR (mL/min/1.73)    Interpretation  G1                     90 or greater         Normal or high (1)  G2                      60-89                Mild decrease (1)  G3a                   45-59                Mild to moderate decrease  G3b                   30-44                Moderate to severe decrease  G4                    15-29                Severe decrease  G5                    14 or less           Kidney failure          (1)In the absence of evidence of kidney disease, neither GFR category G1 or G2 fulfill the criteria for CKD.    eGFR calculation 2021 CKD-EPI creatinine equation, which does not include race as a factor   URINALYSIS W/ CULTURE IF INDICATED - Abnormal; Notable for the following components:    Color, UA Dark Yellow (*)     Appearance, UA Cloudy (*)     Ketones, UA Trace (*)     Protein, UA 30 mg/dL (1+) (*)     All other components within normal limits    Narrative:     In absence of clinical symptoms, the presence of pyuria, bacteria, and/or nitrites on the urinalysis result does not correlate with infection.   URINE DRUG SCREEN - Abnormal; Notable for the following components:    Benzodiazepine Screen, Urine Positive (*)     Oxycodone Screen, Urine Positive (*)     All other components within normal limits    Narrative:     Cutoff For Drugs Screened:    Amphetamines               500 ng/ml  Barbiturates               200 ng/ml  Benzodiazepines            150 ng/ml  Cocaine                    150 ng/ml  Methadone                  200 ng/ml  Opiates                    100 ng/ml  Phencyclidine               25  ng/ml  THC                         50 ng/ml  Methamphetamine            500 ng/ml  Tricyclic Antidepressants  300 ng/ml  Oxycodone                  100 ng/ml  Buprenorphine               10 ng/ml    The normal value for all drugs tested is negative. This report includes unconfirmed screening results, with the cutoff values listed, to be used for medical treatment purposes only.  Unconfirmed results must not be used for non-medical purposes such as employment or legal testing.  Clinical consideration should be applied to any drug of abuse test, particularly when unconfirmed results are used.     CBC WITH AUTO DIFFERENTIAL - Abnormal; Notable for the following components:    RBC 2.52 (*)     Hemoglobin 8.0 (*)     Hematocrit 24.2 (*)     Neutrophil % 82.9 (*)     Lymphocyte % 10.1 (*)     Eosinophil % 0.0 (*)     nRBC 0.4 (*)     All other components within normal limits   URINALYSIS, MICROSCOPIC ONLY - Abnormal; Notable for the following components:    WBC, UA 3-5 (*)     Bacteria, UA Trace (*)     Squamous Epithelial Cells, UA 3-6 (*)     All other components within normal limits   MAGNESIUM - Normal   CK - Normal   FENTANYL, URINE - Normal    Narrative:     Negative Threshold:      Fentanyl 5 ng/mL     The normal value for the drug tested is negative. This report includes final unconfirmed screening results to be used for medical treatment purposes only. Unconfirmed results must not be used for non-medical purposes such as employment or legal testing. Clinical consideration should be applied to any drug of abuse test, particularly when unconfirmed results are used.          ETHANOL    Narrative:     Not for legal purposes. Chain of Custody not followed.    CBC AND DIFFERENTIAL    Narrative:     The following orders were created for panel order CBC & Differential.  Procedure                               Abnormality         Status                     ---------                               -----------         ------                      CBC Auto Differential[[id number]]        Abnormal            Final result                 Please view results for these tests on the individual orders.       Meds given in ED:   Medications   Magnesium Standard Dose Replacement - Follow Nurse / BPA Driven Protocol (has no administration in time range)   thiamine (B-1) injection 200 mg (has no administration in time range)     Followed by   thiamine (VITAMIN B-1) tablet 100 mg (has no administration in time range)   folic acid (FOLVITE) tablet 1 mg (has no administration in time range)   chlordiazePOXIDE (LIBRIUM) capsule 50 mg (has no administration in time range)     Followed by   chlordiazePOXIDE (LIBRIUM) capsule 50 mg (has no administration in time range)     Followed by   chlordiazePOXIDE (LIBRIUM) capsule 50 mg (has no administration in time range)     Followed by   chlordiazePOXIDE (LIBRIUM) capsule 50 mg (has no administration in time range)   LORazepam (ATIVAN) tablet 1 mg (has no administration in time range)     Or   LORazepam (ATIVAN) injection 1 mg (has no administration in time range)     Or   LORazepam (ATIVAN) tablet 2 mg (has no administration in time range)     Or   LORazepam (ATIVAN) injection 2 mg (has no administration in time range)     Or   LORazepam (ATIVAN) injection 2 mg (has no administration in time range)     Or   LORazepam (ATIVAN) injection 2 mg (has no administration in time range)   sodium chloride 0.9 % infusion (has no administration in time range)   ondansetron (ZOFRAN) injection 4 mg (has no administration in time range)   Morphine sulfate (PF) injection 1 mg (has no administration in time range)   sodium chloride 0.9 % infusion 1,000 mL (0 mL Intravenous Stopped 12/20/24 1402)   thiamine (B-1) injection 200 mg (200 mg Intravenous Given 12/20/24 1048)   acetaminophen (TYLENOL) tablet 1,000 mg (1,000 mg Oral Given 12/20/24 1644)     sodium chloride, 75 mL/hr         NIH Stroke Scale:        Isolation/Infection(s):  No active isolations   No active infections     COVID Testing  Collected .  Resulted .    Nursing report ED to floor:  Mental status: .A&Ox4  Ambulatory status: .x2-3 assit  Precautions: .fall     ED nurse phone extentsion- ..

## 2024-12-22 LAB
BH BB BLOOD EXPIRATION DATE: NORMAL
BH BB BLOOD TYPE BARCODE: 6200
BH BB DISPENSE STATUS: NORMAL
BH BB PRODUCT CODE: NORMAL
BH BB UNIT NUMBER: NORMAL
C DIFF GDH + TOXINS A+B STL QL IA.RAPID: NEGATIVE
C DIFF TOX GENS STL QL NAA+PROBE: POSITIVE
CROSSMATCH INTERPRETATION: NORMAL
UNIT  ABO: NORMAL
UNIT  RH: NORMAL

## 2024-12-22 PROCEDURE — 25010000002 THIAMINE HCL 200 MG/2ML SOLUTION: Performed by: HOSPITALIST

## 2024-12-22 PROCEDURE — 25010000002 LORAZEPAM PER 2 MG: Performed by: HOSPITALIST

## 2024-12-22 PROCEDURE — 25010000002 LORAZEPAM PER 2 MG: Performed by: NURSE PRACTITIONER

## 2024-12-22 PROCEDURE — 25010000002 PHENOBARBITAL PER 120 MG: Performed by: NURSE PRACTITIONER

## 2024-12-22 RX ORDER — PHENOBARBITAL 32.4 MG/1
32.4 TABLET ORAL ONCE
Status: COMPLETED | OUTPATIENT
Start: 2024-12-24 | End: 2024-12-24

## 2024-12-22 RX ORDER — CHLORHEXIDINE GLUCONATE 500 MG/1
1 CLOTH TOPICAL ONCE
Status: COMPLETED | OUTPATIENT
Start: 2024-12-22 | End: 2024-12-22

## 2024-12-22 RX ORDER — PHENOBARBITAL SODIUM 65 MG/ML
65 INJECTION, SOLUTION INTRAMUSCULAR; INTRAVENOUS ONCE
Status: COMPLETED | OUTPATIENT
Start: 2024-12-23 | End: 2024-12-23

## 2024-12-22 RX ORDER — PHENOBARBITAL 32.4 MG/1
32.4 TABLET ORAL ONCE
Status: COMPLETED | OUTPATIENT
Start: 2024-12-25 | End: 2024-12-25

## 2024-12-22 RX ORDER — CHLORHEXIDINE GLUCONATE 500 MG/1
1 CLOTH TOPICAL EVERY 24 HOURS
Status: DISCONTINUED | OUTPATIENT
Start: 2024-12-23 | End: 2024-12-23

## 2024-12-22 RX ORDER — LORAZEPAM 2 MG/ML
2 INJECTION INTRAMUSCULAR EVERY 6 HOURS
Status: DISPENSED | OUTPATIENT
Start: 2024-12-22 | End: 2024-12-23

## 2024-12-22 RX ORDER — LORAZEPAM 2 MG/ML
1 INJECTION INTRAMUSCULAR EVERY 6 HOURS
Status: COMPLETED | OUTPATIENT
Start: 2024-12-23 | End: 2024-12-25

## 2024-12-22 RX ADMIN — LORAZEPAM 2 MG: 2 INJECTION INTRAMUSCULAR; INTRAVENOUS at 10:48

## 2024-12-22 RX ADMIN — Medication 10 ML: at 08:07

## 2024-12-22 RX ADMIN — THIAMINE HYDROCHLORIDE 200 MG: 100 INJECTION, SOLUTION INTRAMUSCULAR; INTRAVENOUS at 03:36

## 2024-12-22 RX ADMIN — LORAZEPAM 1 MG: 2 INJECTION INTRAMUSCULAR; INTRAVENOUS at 09:46

## 2024-12-22 RX ADMIN — CHLORDIAZEPOXIDE HYDROCHLORIDE 50 MG: 25 CAPSULE ORAL at 15:09

## 2024-12-22 RX ADMIN — LORAZEPAM 2 MG: 2 INJECTION INTRAMUSCULAR; INTRAVENOUS at 17:27

## 2024-12-22 RX ADMIN — FOLIC ACID 1 MG: 1 TABLET ORAL at 08:04

## 2024-12-22 RX ADMIN — PHENOBARBITAL SODIUM 146.25 MG: 65 INJECTION INTRAMUSCULAR; INTRAVENOUS at 12:07

## 2024-12-22 RX ADMIN — BUSPIRONE HYDROCHLORIDE 7.5 MG: 5 TABLET ORAL at 08:04

## 2024-12-22 RX ADMIN — LORAZEPAM 2 MG: 2 INJECTION INTRAMUSCULAR; INTRAVENOUS at 03:36

## 2024-12-22 RX ADMIN — Medication 1 APPLICATION: at 21:40

## 2024-12-22 RX ADMIN — CHLORDIAZEPOXIDE HYDROCHLORIDE 50 MG: 25 CAPSULE ORAL at 04:47

## 2024-12-22 RX ADMIN — METOPROLOL SUCCINATE 25 MG: 25 TABLET, EXTENDED RELEASE ORAL at 08:05

## 2024-12-22 RX ADMIN — Medication 1 APPLICATION: at 15:32

## 2024-12-22 RX ADMIN — ATORVASTATIN CALCIUM 20 MG: 10 TABLET, FILM COATED ORAL at 08:05

## 2024-12-22 RX ADMIN — LORAZEPAM 2 MG: 2 INJECTION INTRAMUSCULAR; INTRAVENOUS at 04:46

## 2024-12-22 RX ADMIN — BUSPIRONE HYDROCHLORIDE 7.5 MG: 5 TABLET ORAL at 21:37

## 2024-12-22 RX ADMIN — ESCITALOPRAM OXALATE 10 MG: 10 TABLET ORAL at 08:05

## 2024-12-22 RX ADMIN — PHENOBARBITAL SODIUM 146.25 MG: 65 INJECTION INTRAMUSCULAR; INTRAVENOUS at 18:26

## 2024-12-22 RX ADMIN — PANTOPRAZOLE SODIUM 40 MG: 40 TABLET, DELAYED RELEASE ORAL at 03:37

## 2024-12-22 RX ADMIN — AMIODARONE HYDROCHLORIDE 200 MG: 200 TABLET ORAL at 08:05

## 2024-12-22 RX ADMIN — LORAZEPAM 2 MG: 2 INJECTION INTRAMUSCULAR; INTRAVENOUS at 00:34

## 2024-12-22 RX ADMIN — LORAZEPAM 2 MG: 2 INJECTION INTRAMUSCULAR; INTRAVENOUS at 07:33

## 2024-12-22 RX ADMIN — CHLORDIAZEPOXIDE HYDROCHLORIDE 50 MG: 25 CAPSULE ORAL at 21:37

## 2024-12-22 RX ADMIN — LORAZEPAM 2 MG: 2 INJECTION INTRAMUSCULAR; INTRAVENOUS at 12:00

## 2024-12-22 RX ADMIN — THIAMINE HYDROCHLORIDE 200 MG: 100 INJECTION, SOLUTION INTRAMUSCULAR; INTRAVENOUS at 21:36

## 2024-12-22 RX ADMIN — CHLORHEXIDINE GLUCONATE 1 APPLICATION: 500 CLOTH TOPICAL at 15:33

## 2024-12-22 RX ADMIN — LORAZEPAM 2 MG: 2 INJECTION INTRAMUSCULAR; INTRAVENOUS at 01:25

## 2024-12-22 RX ADMIN — Medication 10 ML: at 21:38

## 2024-12-22 RX ADMIN — ACETAMINOPHEN 650 MG: 325 TABLET ORAL at 21:38

## 2024-12-22 RX ADMIN — THIAMINE HYDROCHLORIDE 200 MG: 100 INJECTION, SOLUTION INTRAMUSCULAR; INTRAVENOUS at 15:06

## 2024-12-22 RX ADMIN — PHENOBARBITAL SODIUM 146.25 MG: 65 INJECTION INTRAMUSCULAR; INTRAVENOUS at 15:29

## 2024-12-22 RX ADMIN — LORAZEPAM 2 MG: 2 INJECTION INTRAMUSCULAR; INTRAVENOUS at 01:00

## 2024-12-22 NOTE — CASE MANAGEMENT/SOCIAL WORK
Continued Stay Note   [place]     Patient Name: Dayron Lubin  MRN: [id number]  Today's Date: 12/22/2024    Admit Date: 12/20/2024    Plan: TBD   Discharge Plan       Row Name 12/22/24 1011       Plan    Plan TBD    Plan Comments Order that pt/family request placement. Pt has been confused here, came in intoxicated.  Pt was just transferred to CCU this AM.  Tried to contact Yuliya Stone Sister [phone] and had to leave a .  Pt has [employer] , therapy ordered and will get their recommendations and see if he will qualify for placement/[employer] covering.  May be difficult finding placement with pt's continued alcohol comsumption.  Will follow.    7480- Sister called back re: this patient.  She is saying he had HH ordered before but he would not go to door and let them in, can not be home long without drinking again.  She stated he has been in several alcohol tx programs without success.  She is hoping he can get to a SNF then get alcohol tx after he is physically better. Will follow.                    Discharge Codes    No documentation.                       Mili Azar, ANUMW

## 2024-12-22 NOTE — PLAN OF CARE
Problem: Adult Inpatient Plan of Care  Goal: Plan of Care Review  Outcome: Progressing  Flowsheets (Taken 12/22/2024 5864)  Progress: no change  Outcome Evaluation: Pt denies pain. Various levels of confusion tonight, pt started having some hallucinations. Ativan given per CIWA protocol. Voiding. Can be incontinent. Bed check on. Safety maintained.  Plan of Care Reviewed With: patient

## 2024-12-22 NOTE — PLAN OF CARE
Goal Outcome Evaluation:  Plan of Care Reviewed With: patient        Progress: no change  Outcome Evaluation: Pt given PRN tylenol x1 for c/o hip pain; PRN ativan given x2; scheduled librium; IID; voiding, incontinent at times; liquid stools x 4-5 times this shift; up with standby assist; safety maintained.

## 2024-12-22 NOTE — CONSULTS
[facility] Intensivist Services  INPATIENT PROGRESS NOTE    Patient Name: Dayron Lubin  Date of Admission: 12/20/2024  Today's Date: 12/22/24  Length of Stay: 0  Primary Care Physician: Herberth Nguyen Jr., MD    Subjective   Fall       69-year-old male with history of A-fib, alcoholic hepatitis, CAD, hypertrophic obstructive cardiomyopathy, hypertension, hyperlipidemia and alcohol abuse with chronic falls, presented to the hospital on 12/20/2024 after sustaining multiple falls and having signs of alcohol intoxication.  CT lower extremity on arrival to ED found no fractures, but revealed a large left gluteal hematoma.  Lab work revealed a mild MIKEY and mildly elevated transaminases.  Anemic with H&H of 8/24.2.  Patient initially admitted to the medical floor.  Overnight on 12/21, patient became more confused and restless and required multiple doses of lorazepam.  Concern for worsening withdrawal Hospital medicine requested transfer to ICU for closer monitoring and sedation.  Upon arrival ICU, patient appeared alert, oriented, mildly tremulous.  Over the course of 1 to 2 hours, patient became increasingly agitated, anxious.  He is already on a Librium dose with taper.  Vital signs are currently stable.    Review of Systems   All pertinent negatives and positives are as above. All other systems have been reviewed and are negative unless otherwise stated.     Objective    Temp:  [97.8 °F (36.6 °C)-98.4 °F (36.9 °C)] 98 °F (36.7 °C)  Heart Rate:  [79-93] 79  Resp:  [16-18] 16  BP: (106-162)/(60-92) 108/75  Physical Exam  Constitutional:       General: He is not in acute distress.     Comments: Tremulous, restless   Eyes:      Pupils: Pupils are equal, round, and reactive to light.   Cardiovascular:      Rate and Rhythm: Normal rate and regular rhythm.      Pulses: Normal pulses.      Heart sounds: Normal heart sounds.   Pulmonary:      Effort: Pulmonary effort is normal.       Breath sounds: Normal breath sounds.   Abdominal:      Palpations: Abdomen is soft.      Tenderness: There is no abdominal tenderness.   Musculoskeletal:      Comments: Generalized weakness, left hip/gluteal hematoma   Skin:     General: Skin is warm.      Findings: Bruising present.   Neurological:      General: No focal deficit present.      Mental Status: He is alert.      Comments: Disoriented to time   Psychiatric:         Mood and Affect: Mood is anxious.         Results Review:  Lab Results (last 24 hours)       Procedure Component Value Units Date/Time    Clostridioides difficile toxin Ag, Reflex - Stool, Per Rectum [[id number]]  (Normal) Collected: 12/21/24 2051    Specimen: Stool from Per Rectum Updated: 12/22/24 0836     C.diff Toxin Ag Negative    Narrative:      DNA from a toxigenic strain of C.difficile was detected, although the free toxin itself was not detected. These findings are consistent with C.difficile colonization and may not reflect actual C.difficile infection. Clinical correlation needed.    Clostridioides difficile Toxin - Stool, Per Rectum [[id number]]  (Abnormal) Collected: 12/21/24 2051    Specimen: Stool from Per Rectum Updated: 12/22/24 0836    Narrative:      The following orders were created for panel order Clostridioides difficile Toxin - Stool, Per Rectum.  Procedure                               Abnormality         Status                     ---------                               -----------         ------                     Clostridioides difficile...[[id number]]  Abnormal            Final result                 Please view results for these tests on the individual orders.    Clostridioides difficile Toxin, PCR - Stool, Per Rectum [[id number]]  (Abnormal) Collected: 12/21/24 2051    Specimen: Stool from Per Rectum Updated: 12/22/24 0836     Toxigenic C. difficile by PCR Positive    Narrative:      DNA from a toxigenic strain of C.difficile has been detected. Antigen testing for  the presence of free C.difficile toxin is currently in progress, to help determine the clinical significance of this PCR result.     Gastrointestinal Panel, PCR - Stool, Per Rectum [[id number]]  (Normal) Collected: 12/21/24 2051    Specimen: Stool from Per Rectum Updated: 12/21/24 2218     Campylobacter Not Detected     Plesiomonas shigelloides Not Detected     Salmonella Not Detected     Vibrio Not Detected     Vibrio cholerae Not Detected     Yersinia enterocolitica Not Detected     Enteroaggregative E. coli (EAEC) Not Detected     Enteropathogenic E. coli (EPEC) Not Detected     Enterotoxigenic E. coli (ETEC) lt/st Not Detected     Shiga-like toxin-producing E. coli (STEC) stx1/stx2 Not Detected     Shigella/Enteroinvasive E. coli (EIEC) Not Detected     Cryptosporidium Not Detected     Cyclospora cayetanensis Not Detected     Entamoeba histolytica Not Detected     Giardia lamblia Not Detected     Adenovirus F40/41 Not Detected     Astrovirus Not Detected     Norovirus GI/GII Not Detected     Rotavirus A Not Detected     Sapovirus (I, II, IV or V) Not Detected    Narrative:      If Aeromonas, Staphylococcus aureus or Bacillus cereus are suspected, please order [id number]: Stool Culture, Aeromonas, S aureus, B Cereus.             CT Lower Extremity Left Without Contrast    Result Date: 12/20/2024  1. Large gluteal mixed density mass, favored to represent hematoma in the setting of trauma. Lack of intravenous contrast limits evaluation for active extravasation.  2. No acute fracture in the field-of-view.    This report was signed and finalized on 12/20/2024 4:45 PM by Dr Zuleyma Schmidt MD.      CT Pelvis Without Contrast    Result Date: 12/20/2024  1. No acute fracture or malalignment. 2. Large left gluteal compartment intramuscular hematomas involving the gluteus medius and gluteus robbin measuring up to 15 cm craniocaudal and up to 13 cm transverse.    This report was signed and finalized on 12/20/2024 4:43 PM by  Dr Gregory Guillermo.       Result Review:  I have personally reviewed the results from the time of this admission to 12/22/2024 11:35 CST and agree with these findings:  [x]  Laboratory list / accordion  [x]  Microbiology  [x]  Radiology  [x]  EKG/Telemetry   []  Cardiology/Vascular   []  Pathology  []  Old records  []  Other:    CBC          12/8/2024    03:44 12/20/2024    11:01 12/21/2024    01:54 12/21/2024    11:07   CBC   WBC 4.19  7.23  5.02     RBC 2.66  2.52  2.07     Hemoglobin 8.4  8.0  6.7  8.1    Hematocrit 26.6  24.2  20.2  24.1    .0  96.0  97.6     MCH 31.6  31.7  32.4     MCHC 31.6  33.1  33.2     RDW 14.2  14.9  15.1     Platelets 89  173  142       CMP          12/8/2024    03:44 12/20/2024    11:01 12/21/2024    01:54   CMP   Glucose 89  117  101    BUN 21  20  28    Creatinine 0.96  1.66  1.52    EGFR 85.0  44.1  49.0    Sodium 138  135  134    Potassium 4.5  4.9  4.4    Chloride 107  93  95    Calcium 8.0  8.7  8.1    Total Protein 5.7  6.9     Albumin 3.2  4.0     Globulin 2.5  2.9     Total Bilirubin 0.6  0.7     Alkaline Phosphatase 63  91     AST (SGOT) 36  68     ALT (SGPT) 38  57     Albumin/Globulin Ratio 1.3  1.4     BUN/Creatinine Ratio 21.9  12.0  18.4    Anion Gap 7.0  24.0  13.0        I have reviewed the patient's current medications.     Assessment/Plan   Assessment  Active Hospital Problems    Diagnosis     **Stage 1 acute kidney injury     Large left gluteal compartment intramuscular hematomas     Chronic anticoagulation     MIKEY (acute kidney injury)     Alcohol intoxication     Multiple falls     Transaminitis     Paroxysmal atrial fibrillation     Gait instability     Alcohol abuse     HOCM (hypertrophic obstructive cardiomyopathy)     Essential hypertension    69-year-old male with alcohol abuse, now exhibiting progressing stages of alcohol withdrawal, admitted to ICU for critical care management.  Patient has been on Librium dose and taper with increasing requirements of  benzodiazepine.  We will add phenobarbital taper.  Schedule Ativan every 6hr. patient noted to be anemic on admission has received 1 unit packed red blood cells.  His Eliquis has been discontinued at this time.  He is currently in normal sinus rhythm.    Alcohol abuse, alcohol withdrawal  -Start phenobarbital with taper  -Continue Librium  -Schedule Ativan with as needed doses  -If resistant to this, will need Precedex infusion    MIKEY  -Resolved, follow with daily metabolic panel    Left gluteal compartment muscular hematoma  -Currently off Eliquis  -Monitor for any signs of increase or vascular compromise  -Local ice for analgesia    Transaminitis  -Chronically elevated with alcoholic hepatitis, follow daily CMP    Paroxysmal atrial fibrillation  -Currently normal sinus rhythm, continue amiodarone and metoprolol  -Eliquis currently on hold given anemia and hematoma  -Patient with chronic falls, will need evaluation for appropriateness of long-term anticoagulation with his cardiologist and/or PCP    Total critical care time: Approximately 40 minutes     Due to a high probability of clinically significant, life threatening deterioration, the patient required my highest level of preparedness to intervene emergently and I personally spent this critical care time directly and personally managing the patient.      This critical care time included obtaining a history; examining the patient; pulse oximetry; ordering and review of studies; arranging urgent treatment with development of a management plan; evaluation of patient's response to treatment; frequent reassessment; and, discussions with other providers.     This critical care time was performed to assess and manage the high probability of imminent, life-threatening deterioration that could result in multi-organ failure. It was exclusive of separately billable procedures and treating other patients and teaching time.     Please see MDM section and the rest of the  note for further information on patient assessment and treatment.     Part of this note may be an electronic transcription/translation of spoken language to printed text using the Dragon dictation system     Electronically signed by BRI Machuca on 12/22/2024 at 11:56 CST

## 2024-12-23 LAB
ALBUMIN SERPL-MCNC: 3.6 G/DL (ref 3.5–5.2)
ALBUMIN/GLOB SERPL: 1.4 G/DL
ALP SERPL-CCNC: 70 U/L (ref 39–117)
ALT SERPL W P-5'-P-CCNC: 25 U/L (ref 1–41)
ANION GAP SERPL CALCULATED.3IONS-SCNC: 11 MMOL/L (ref 5–15)
AST SERPL-CCNC: 32 U/L (ref 1–40)
BASOPHILS # BLD AUTO: 0.02 10*3/MM3 (ref 0–0.2)
BASOPHILS NFR BLD AUTO: 0.6 % (ref 0–1.5)
BILIRUB SERPL-MCNC: 0.6 MG/DL (ref 0–1.2)
BUN SERPL-MCNC: 20 MG/DL (ref 8–23)
BUN/CREAT SERPL: 21.1 (ref 7–25)
CALCIUM SPEC-SCNC: 8.4 MG/DL (ref 8.6–10.5)
CHLORIDE SERPL-SCNC: 100 MMOL/L (ref 98–107)
CO2 SERPL-SCNC: 24 MMOL/L (ref 22–29)
CREAT SERPL-MCNC: 0.95 MG/DL (ref 0.76–1.27)
DEPRECATED RDW RBC AUTO: 54.3 FL (ref 37–54)
EGFRCR SERPLBLD CKD-EPI 2021: 86.1 ML/MIN/1.73
EOSINOPHIL # BLD AUTO: 0.06 10*3/MM3 (ref 0–0.4)
EOSINOPHIL NFR BLD AUTO: 1.7 % (ref 0.3–6.2)
ERYTHROCYTE [DISTWIDTH] IN BLOOD BY AUTOMATED COUNT: 15.3 % (ref 12.3–15.4)
GLOBULIN UR ELPH-MCNC: 2.6 GM/DL
GLUCOSE SERPL-MCNC: 87 MG/DL (ref 65–99)
HCT VFR BLD AUTO: 23.2 % (ref 37.5–51)
HGB BLD-MCNC: 7.5 G/DL (ref 13–17.7)
IMM GRANULOCYTES # BLD AUTO: 0.05 10*3/MM3 (ref 0–0.05)
IMM GRANULOCYTES NFR BLD AUTO: 1.4 % (ref 0–0.5)
LYMPHOCYTES # BLD AUTO: 0.87 10*3/MM3 (ref 0.7–3.1)
LYMPHOCYTES NFR BLD AUTO: 24.4 % (ref 19.6–45.3)
MCH RBC QN AUTO: 32.6 PG (ref 26.6–33)
MCHC RBC AUTO-ENTMCNC: 32.3 G/DL (ref 31.5–35.7)
MCV RBC AUTO: 100.9 FL (ref 79–97)
MONOCYTES # BLD AUTO: 0.3 10*3/MM3 (ref 0.1–0.9)
MONOCYTES NFR BLD AUTO: 8.4 % (ref 5–12)
NEUTROPHILS NFR BLD AUTO: 2.27 10*3/MM3 (ref 1.7–7)
NEUTROPHILS NFR BLD AUTO: 63.5 % (ref 42.7–76)
PLATELET # BLD AUTO: 93 10*3/MM3 (ref 140–450)
PMV BLD AUTO: 11 FL (ref 6–12)
POTASSIUM SERPL-SCNC: 3.5 MMOL/L (ref 3.5–5.2)
PROT SERPL-MCNC: 6.2 G/DL (ref 6–8.5)
RBC # BLD AUTO: 2.3 10*6/MM3 (ref 4.14–5.8)
SODIUM SERPL-SCNC: 135 MMOL/L (ref 136–145)
WBC NRBC COR # BLD AUTO: 3.57 10*3/MM3 (ref 3.4–10.8)

## 2024-12-23 PROCEDURE — 25010000002 PHENOBARBITAL PER 120 MG: Performed by: NURSE PRACTITIONER

## 2024-12-23 PROCEDURE — 97161 PT EVAL LOW COMPLEX 20 MIN: CPT

## 2024-12-23 PROCEDURE — 80053 COMPREHEN METABOLIC PANEL: CPT | Performed by: NURSE PRACTITIONER

## 2024-12-23 PROCEDURE — 97165 OT EVAL LOW COMPLEX 30 MIN: CPT | Performed by: OCCUPATIONAL THERAPIST

## 2024-12-23 PROCEDURE — 85025 COMPLETE CBC W/AUTO DIFF WBC: CPT | Performed by: NURSE PRACTITIONER

## 2024-12-23 PROCEDURE — 25010000002 THIAMINE HCL 200 MG/2ML SOLUTION: Performed by: HOSPITALIST

## 2024-12-23 PROCEDURE — 25010000002 LORAZEPAM PER 2 MG: Performed by: NURSE PRACTITIONER

## 2024-12-23 RX ADMIN — LORAZEPAM 1 MG: 2 INJECTION INTRAMUSCULAR; INTRAVENOUS at 13:00

## 2024-12-23 RX ADMIN — Medication 10 ML: at 08:45

## 2024-12-23 RX ADMIN — BUSPIRONE HYDROCHLORIDE 7.5 MG: 5 TABLET ORAL at 20:32

## 2024-12-23 RX ADMIN — ESCITALOPRAM OXALATE 10 MG: 10 TABLET ORAL at 08:43

## 2024-12-23 RX ADMIN — BUSPIRONE HYDROCHLORIDE 7.5 MG: 5 TABLET ORAL at 08:42

## 2024-12-23 RX ADMIN — ACETAMINOPHEN 650 MG: 325 TABLET ORAL at 08:42

## 2024-12-23 RX ADMIN — LORAZEPAM 2 MG: 2 INJECTION INTRAMUSCULAR; INTRAVENOUS at 05:39

## 2024-12-23 RX ADMIN — CHLORHEXIDINE GLUCONATE 1 APPLICATION: 500 CLOTH TOPICAL at 04:00

## 2024-12-23 RX ADMIN — Medication 1 APPLICATION: at 08:43

## 2024-12-23 RX ADMIN — FLUTICASONE PROPIONATE 2 SPRAY: 50 SPRAY, METERED NASAL at 08:45

## 2024-12-23 RX ADMIN — PHENOBARBITAL SODIUM 65 MG: 65 INJECTION INTRAMUSCULAR; INTRAVENOUS at 05:38

## 2024-12-23 RX ADMIN — PANTOPRAZOLE SODIUM 40 MG: 40 TABLET, DELAYED RELEASE ORAL at 05:39

## 2024-12-23 RX ADMIN — FOLIC ACID 1 MG: 1 TABLET ORAL at 08:43

## 2024-12-23 RX ADMIN — AMIODARONE HYDROCHLORIDE 200 MG: 200 TABLET ORAL at 09:59

## 2024-12-23 RX ADMIN — THIAMINE HYDROCHLORIDE 200 MG: 100 INJECTION, SOLUTION INTRAMUSCULAR; INTRAVENOUS at 05:39

## 2024-12-23 RX ADMIN — PHENOBARBITAL SODIUM 65 MG: 65 INJECTION INTRAMUSCULAR at 18:26

## 2024-12-23 RX ADMIN — CHLORDIAZEPOXIDE HYDROCHLORIDE 50 MG: 25 CAPSULE ORAL at 11:31

## 2024-12-23 RX ADMIN — THIAMINE HYDROCHLORIDE 200 MG: 100 INJECTION, SOLUTION INTRAMUSCULAR; INTRAVENOUS at 14:29

## 2024-12-23 RX ADMIN — ATORVASTATIN CALCIUM 20 MG: 10 TABLET, FILM COATED ORAL at 08:42

## 2024-12-23 RX ADMIN — LORAZEPAM 1 MG: 2 INJECTION INTRAMUSCULAR; INTRAVENOUS at 18:26

## 2024-12-23 RX ADMIN — CHLORDIAZEPOXIDE HYDROCHLORIDE 50 MG: 25 CAPSULE ORAL at 22:19

## 2024-12-23 RX ADMIN — THIAMINE HYDROCHLORIDE 200 MG: 100 INJECTION, SOLUTION INTRAMUSCULAR; INTRAVENOUS at 22:20

## 2024-12-23 RX ADMIN — Medication 10 ML: at 20:32

## 2024-12-23 NOTE — PLAN OF CARE
Goal Outcome Evaluation:  Plan of Care Reviewed With: patient        Progress: no change  Outcome Evaluation: OT eval complete.  Pt. sleeping but easily awakened.  Mr. Lubin is AxO x 3, able to provide Hx, & reports falls are dt/t LOB and ETOH.  He reports pain at hip from falls. He was able to come to EOB but required Mod A for LBD.  CGA with HHAx 2 for fxl mob with posterior lean.  Pt would benefit from rwx for balance and reduce pain at hip.  OT to cont to improve pt fxl capacity, safety, & fxl mob.  Mr. Lubin is agreeable to rehab at [facility] - agree with plan.  Defer to [facility] & [facility] on placement.  Pt. is not able to care for himself alone and will require cont'd therapies at [facility].    Anticipated Discharge Disposition (OT): other (see comments)

## 2024-12-23 NOTE — THERAPY EVALUATION
Patient Name: Dayron Lubin  : 1955    MRN: [id number]                              Today's Date: 2024       Admit Date: 2024    Visit Dx:     ICD-10-CM ICD-9-CM   1. MIKEY (acute kidney injury)  N17.9 584.9   2. Frequent falls  R29.6 V15.88   3. Hematoma  T14.8XXA 924.9   4. Alcohol abuse  F10.10 305.00   5. Impaired mobility [Z74.09]  Z74.09 799.89     Patient Active Problem List   Diagnosis    Prostate cancer    Hx of radiation therapy    Elevated PSA    Encounter for follow-up surveillance of prostate cancer    HOCM (hypertrophic obstructive cardiomyopathy)    Coronary artery disease involving native coronary artery of native heart without angina pectoris    Essential hypertension    Mixed hyperlipidemia    Class 1 obesity with alveolar hypoventilation, serious comorbidity, and body mass index (BMI) of 30.0 to 30.9 in adult    Displaced fracture of lateral malleolus of right fibula, initial encounter for closed fracture    Family history of colonic polyps    History of colon polyps    Elevated brain natriuretic peptide (BNP) level    Stage 1 acute kidney injury    Anxiety associated with depression    Alcohol abuse    Alcoholic hepatitis    Volume depletion    Anorexia    CHF (congestive heart failure)    Hypokalemia    Duodenitis    Gait instability    SVT (supraventricular tachycardia)    Physical debility    Atrial fibrillation with rapid ventricular response    Alcoholism in remission    Paroxysmal atrial fibrillation    Nasal polyposis    Nasal congestion    Estelle bullosa    Long-term use of high-risk medication    Multiple falls    Transaminitis    Thrombocytopenia    Non-traumatic rhabdomyolysis    Alcohol withdrawal    Pancytopenia    Opiate abuse, episodic    Alcohol intoxication    Large left gluteal compartment intramuscular hematomas    Chronic anticoagulation    MIKEY (acute kidney injury)     Past Medical History:   Diagnosis Date    A-fib     Alcoholic hepatitis 2023     CAD (coronary artery disease)     History of external beam radiation therapy 05/12/2016    6840 cGy to prostate bed    Hyperlipidemia     Hypertension     Insomnia     Prostate cancer      Past Surgical History:   Procedure Laterality Date    CARDIAC CATHETERIZATION      CORONARY ANGIOPLASTY WITH STENT PLACEMENT      FRACTURE SURGERY      PROSTATE SURGERY      TONSILLECTOMY        General Information       Row Name 12/23/24 0932          Physical Therapy Time and Intention    Document Type evaluation  Falls. Dx: MIKEY, alcohol abuse/withdrawal, L gluteal hematoma, transaminitis.  -NONI     Mode of Treatment physical therapy  -NONI       Row Name 12/23/24 0932          General Information    Patient Profile Reviewed yes  -NONI     Prior Level of Function independent:;bed mobility;transfer;gait  -NONI     Existing Precautions/Restrictions fall  -NONI     Barriers to Rehab medically complex;previous functional deficit  -NONI       Row Name 12/23/24 0932          Living Environment    People in Home alone  -NONI       Row Name 12/23/24 0932          Home Main Entrance    Number of Stairs, Main Entrance two  -NONI     Stair Railings, Main Entrance railing on left side (ascending)  -NONI       Row Name 12/23/24 0932          Stairs Within Home, Primary    Number of Stairs, Within Home, Primary none  -NONI       Row Name 12/23/24 0932          Cognition    Orientation Status (Cognition) oriented to;person;place;situation  -NONI       Row Name 12/23/24 0932          Safety Issues/Impairments Affecting Functional Mobility    Safety Issues Affecting Function (Mobility) friction/shear risk  -NONI     Impairments Affecting Function (Mobility) balance;endurance/activity tolerance;pain;strength  -NONI               User Key  (r) = Recorded By, (t) = Taken By, (c) = Cosigned By      Initials Name Provider Type    NONI Mikel Lozano, PT DPT Physical Therapist                   Mobility       Row Name 12/23/24 0932          Bed Mobility    Bed Mobility  supine-sit  -NONI     Supine-Sit [place] (Bed Mobility) contact guard  -NONI     Assistive Device (Bed Mobility) head of bed elevated;bed rails  -NONI       Row Name 12/23/24 0932          Sit-Stand Transfer    Sit-Stand [place] (Transfers) contact guard  -NONI       Row Name 12/23/24 0932          Gait/Stairs (Locomotion)    [place] Level (Gait) contact guard  -NONI     Distance in Feet (Gait) 50  -NONI     Deviations/Abnormal Patterns (Gait) judd decreased;gait speed decreased  -NONI               User Key  (r) = Recorded By, (t) = Taken By, (c) = Cosigned By      Initials Name Provider Type    Mikel Handley PT DPT Physical Therapist                   Obj/Interventions       Row Name 12/23/24 0932          Range of Motion Comprehensive    General Range of Motion bilateral lower extremity ROM WFL  -NONI       Row Name 12/23/24 0932          Strength Comprehensive (MMT)    Comment, General Manual Muscle Testing (MMT) Assessment B LE grossly 4-/5  -NONI       Row Name 12/23/24 0932          Balance    Balance Assessment sitting dynamic balance;standing dynamic balance  -NONI     Dynamic Sitting Balance standby assist  -NONI     Position, Sitting Balance unsupported;sitting edge of bed  -NONI     Dynamic Standing Balance contact guard;minimal assist  -NOIN     Position/Device Used, Standing Balance supported  -NONI               User Key  (r) = Recorded By, (t) = Taken By, (c) = Cosigned By      Initials Name Provider Type    Mikel Handley PT DPT Physical Therapist                   Goals/Plan       Row Name 12/23/24 0932          Bed Mobility Goal 1 (PT)    Activity/Assistive Device (Bed Mobility Goal 1, PT) sit to supine/supine to sit  -NONI     [place] Level/Cues Needed (Bed Mobility Goal 1, PT) supervision required  -NONI     Time Frame (Bed Mobility Goal 1, PT) long term goal (LTG);10 days  -NONI     Progress/Outcomes (Bed Mobility Goal 1, PT) new goal  -NONI       Row Name 12/23/24 0932          Transfer Goal  1 (PT)    Activity/Assistive Device (Transfer Goal 1, PT) sit-to-stand/stand-to-sit;bed-to-chair/chair-to-bed  -NONI     [place] Level/Cues Needed (Transfer Goal 1, PT) supervision required  -NONI     Time Frame (Transfer Goal 1, PT) long term goal (LTG);10 days  -NONI     Progress/Outcome (Transfer Goal 1, PT) new goal  -NONI       Row Name 12/23/24 0932          Gait Training Goal 1 (PT)    Activity/Assistive Device (Gait Training Goal 1, PT) gait (walking locomotion);assistive device use;decrease fall risk;improve balance and speed;increase endurance/gait distance  -NONI     [place] Level (Gait Training Goal 1, PT) supervision required  -NONI     Distance (Gait Training Goal 1, PT) 100 ft, no sway or LOB  -NONI     Time Frame (Gait Training Goal 1, PT) long term goal (LTG);10 days  -NONI     Progress/Outcome (Gait Training Goal 1, PT) new goal  -NONI       Row Name 12/23/24 0932          Therapy Assessment/Plan (PT)    Planned Therapy Interventions (PT) bed mobility training;transfer training;gait training;balance training;home exercise program;patient/family education;postural re-education;stair training;strengthening;motor coordination training  -NONI               User Key  (r) = Recorded By, (t) = Taken By, (c) = Cosigned By      Initials Name Provider Type    Mikel Handley, PT DPT Physical Therapist                   Clinical Impression       Row Name 12/23/24 0932          Pain    Pain Location hip  -NONI     Pain Side/Orientation left  -NONI     Pain Management Interventions exercise or physical activity utilized  -NONI     Additional Documentation Pain Scale: FACES Pre/Post-Treatment (Group)  -NONI       Row Name 12/23/24 0932          Pain Scale: FACES Pre/Post-Treatment    Pain: FACES Scale, Pretreatment 2-->hurts little bit  -NONI       Row Name 12/23/24 0932          Plan of Care Review    Plan of Care Reviewed With patient  -NONI     Outcome Evaluation PT mark campos. Mr. Lubin is alert and oriented x 3. He  reports living at home alone. he reports falls at home due to poor balance and alcohol use. Today needs CGA to stand and to ambulate ~ 50 ft total. Demos fair balance this session with support and generalized weakness. Complains of L hip pain from falls at home. Trial a RW for pain and balance safety with gait. PT will cont with mobility training, balance training and strengthening. Mr. Lubin is hopeful for some form of placement for rehab effforts.  -NONI       Row Name 12/23/24 0932          Therapy Assessment/Plan (PT)    Patient/Family Therapy Goals Statement (PT) go to rehab  -NONI     Rehab Potential (PT) good  -NONI     Criteria for Skilled Interventions Met (PT) yes;meets criteria;skilled treatment is necessary  -NONI     Therapy Frequency (PT) 2 times/day  -NONI     Predicted Duration of Therapy Intervention (PT) until d/c  -NONI       Row Name 12/23/24 0932          Positioning and Restraints    Pre-Treatment Position in bed  -NONI     Post Treatment Position chair  -NONI     In Chair notified nsg;reclined;call light within reach;encouraged to call for assist;patient within staff view  -NONI               User Key  (r) = Recorded By, (t) = Taken By, (c) = Cosigned By      Initials Name Provider Type    Mikel Handley, PT DPT Physical Therapist                   Outcome Measures       Row Name 12/23/24 1320 12/23/24 0932       How much help from another person do you currently need...    Turning from your back to your side while in flat bed without using bedrails? 3  -SE 3  -NONI    Moving from lying on back to sitting on the side of a flat bed without bedrails? 3  -SE 3  -NONI    Moving to and from a bed to a chair (including a wheelchair)? 3  -SE 3  -NONI    Standing up from a chair using your arms (e.g., wheelchair, bedside chair)? 3  -SE 3  -NONI    Climbing 3-5 steps with a railing? 3  -SE 3  -NONI    To walk in hospital room? 3  -SE 3  -NONI    AM-PAC 6 Clicks Score (PT) 18  -SE 18  -NONI    Highest Level of Mobility Goal 6  --> Walk 10 steps or more  - 6 --> Walk 10 steps or more  -NONI      Row Name 12/23/24 0748          How much help from another person do you currently need...    Turning from your back to your side while in flat bed without using bedrails? 3  -SK     Moving from lying on back to sitting on the side of a flat bed without bedrails? 3  -SK     Moving to and from a bed to a chair (including a wheelchair)? 3  -SK     Standing up from a chair using your arms (e.g., wheelchair, bedside chair)? 3  -SK     Climbing 3-5 steps with a railing? 3  -SK     To walk in hospital room? 3  -SK     AM-PAC 6 Clicks Score (PT) 18  -SK     Highest Level of Mobility Goal 6 --> Walk 10 steps or more  -SK       Row Name 12/23/24 0935 12/23/24 0932       Functional Assessment    Outcome Measure Options AM-PAC 6 Clicks Daily Activity (OT)  - AM-PAC 6 Clicks Basic Mobility (PT)  -NONI              User Key  (r) = Recorded By, (t) = Taken By, (c) = Cosigned By      Initials Name Provider Type    Jalyn Taylor, OTR/L Occupational Therapist    Mikel Handley, PT DPT Physical Therapist    Laquita Atkins, LPN Licensed Nurse    Yuliya Askew, RN Registered Nurse                                 Physical Therapy Education       Title: PT OT SLP Therapies (In Progress)       Topic: Physical Therapy (In Progress)       Point: Mobility training (Done)       Learning Progress Summary            Patient Acceptance, E, VU,NR by NONI at 12/23/2024 0932    Comment: benefits of activity, progression of PT POC                      Point: Home exercise program (Not Started)       Learner Progress:  Not documented in this visit.              Point: Body mechanics (Not Started)       Learner Progress:  Not documented in this visit.              Point: Precautions (Not Started)       Learner Progress:  Not documented in this visit.                              User Key       Initials Effective Dates Name Provider Type Noemí CHENEY 02/03/23 -   Mikel Lozano, PT DPT Physical Therapist PT                  PT Recommendation and Plan  Planned Therapy Interventions (PT): bed mobility training, transfer training, gait training, balance training, home exercise program, patient/family education, postural re-education, stair training, strengthening, motor coordination training  Outcome Evaluation: PT eval complete. Mr. Lubin is alert and oriented x 3. He reports living at home alone. he reports falls at home due to poor balance and alcohol use. Today needs CGA to stand and to ambulate ~ 50 ft total. Demos fair balance this session with support and generalized weakness. Complains of L hip pain from falls at home. Trial a RW for pain and balance safety with gait. PT will cont with mobility training, balance training and strengthening. Mr. Lubin is hopeful for some form of placement for rehab effforts.     Time Calculation:         PT Charges       Row Name 12/23/24 1540             Time Calculation    Start Time 0932  -NONI      Stop Time 1000  + 10 minutes w chart review, 38 total minutes.  -NONI      Time Calculation (min) 28 min  -NONI      PT Received On 12/23/24  -NONI      PT Goal Re-Cert Due Date 01/02/25  -NONI         Untimed Charges    PT Eval/Re-eval Minutes 38  -NONI         Total Minutes    Untimed Charges Total Minutes 38  -NONI       Total Minutes 38  -NONI                User Key  (r) = Recorded By, (t) = Taken By, (c) = Cosigned By      Initials Name Provider Type    Mikel Handley, PT DPT Physical Therapist                  Therapy Charges for Today       Code Description Service Date Service Provider Modifiers Qty    64314841621 HC PT EVAL LOW COMPLEXITY 3 12/23/2024 Mikel Lozano, PT DPT GP 1            PT G-Codes  Outcome Measure Options: AM-PAC 6 Clicks Daily Activity (OT)  AM-PAC 6 Clicks Score (PT): 18  AM-PAC 6 Clicks Score (OT): 21  PT Discharge Summary  Anticipated Discharge Disposition (PT): sub acute care setting, skilled nursing  facility    Mikel Lozano, PT DPT  12/23/2024

## 2024-12-23 NOTE — PLAN OF CARE
Goal Outcome Evaluation:  Plan of Care Reviewed With: patient           Outcome Evaluation: PT eval complete. Mr. Lubin is alert and oriented x 3. He reports living at home alone. he reports falls at home due to poor balance and alcohol use. Today needs CGA to stand and to ambulate ~ 50 ft total. Demos fair balance this session with support and generalized weakness. Complains of L hip pain from falls at home. Trial a RW for pain and balance safety with gait. PT will cont with mobility training, balance training and strengthening. Mr. Lubin is hopeful for some form of placement for rehab effforts.    Anticipated Discharge Disposition (PT): sub acute care setting, skilled nursing facility

## 2024-12-23 NOTE — PLAN OF CARE
Goal Outcome Evaluation:  Plan of Care Reviewed With: patient        Progress: improving     Inpatient Nutrition Services screening for wound(s). Per the assessment and workflow policy, pressure injuries stage >/=2 and/or deep tissue injuries (DTI) warrant nutrition assessment. Patient EHR reviewed. Current skin condition does not warrant MNT at this time. Pt noted to have excoriation to coccyx and scab to L 3rd toe. He is on a healthy heart diet and consumed 100% of breakfast this morning. No sig weight changes noted. Nutrition services will continue to monitor per LOS protocol/prn.

## 2024-12-23 NOTE — NURSING NOTE
Receivied from icu per wc. Alert and oriented. C/o minimal discomfort left hip and back post fall. Bruising noted upper back midline and left hip area. Left toe with tiny dry scab, right foot with tiny dry scab and bottom of right foot with dry callus noted. Protective drsg to coccyx. Oriented to room and call light. Cont to monitor.

## 2024-12-23 NOTE — THERAPY EVALUATION
Patient Name: Dayron Lubin  : 1955    MRN: [id number]                              Today's Date: 2024       Admit Date: 2024    Visit Dx:     ICD-10-CM ICD-9-CM   1. MIKEY (acute kidney injury)  N17.9 584.9   2. Frequent falls  R29.6 V15.88   3. Hematoma  T14.8XXA 924.9   4. Alcohol abuse  F10.10 305.00     Patient Active Problem List   Diagnosis    Prostate cancer    Hx of radiation therapy    Elevated PSA    Encounter for follow-up surveillance of prostate cancer    HOCM (hypertrophic obstructive cardiomyopathy)    Coronary artery disease involving native coronary artery of native heart without angina pectoris    Essential hypertension    Mixed hyperlipidemia    Class 1 obesity with alveolar hypoventilation, serious comorbidity, and body mass index (BMI) of 30.0 to 30.9 in adult    Displaced fracture of lateral malleolus of right fibula, initial encounter for closed fracture    Family history of colonic polyps    History of colon polyps    Elevated brain natriuretic peptide (BNP) level    Stage 1 acute kidney injury    Anxiety associated with depression    Alcohol abuse    Alcoholic hepatitis    Volume depletion    Anorexia    CHF (congestive heart failure)    Hypokalemia    Duodenitis    Gait instability    SVT (supraventricular tachycardia)    Physical debility    Atrial fibrillation with rapid ventricular response    Alcoholism in remission    Paroxysmal atrial fibrillation    Nasal polyposis    Nasal congestion    Estelle bullosa    Long-term use of high-risk medication    Multiple falls    Transaminitis    Thrombocytopenia    Non-traumatic rhabdomyolysis    Alcohol withdrawal    Pancytopenia    Opiate abuse, episodic    Alcohol intoxication    Large left gluteal compartment intramuscular hematomas    Chronic anticoagulation    MIKEY (acute kidney injury)     Past Medical History:   Diagnosis Date    A-fib     Alcoholic hepatitis 2023    CAD (coronary artery disease)     History of  external beam radiation therapy 05/12/2016    6840 cGy to prostate bed    Hyperlipidemia     Hypertension     Insomnia     Prostate cancer      Past Surgical History:   Procedure Laterality Date    CARDIAC CATHETERIZATION      CORONARY ANGIOPLASTY WITH STENT PLACEMENT      FRACTURE SURGERY      PROSTATE SURGERY      TONSILLECTOMY        General Information       Row Name 12/23/24 0913          OT Time and Intention    Document Type evaluation  -     Mode of Treatment occupational therapy  -       Row Name 12/23/24 0913          General Information    Patient Profile Reviewed yes  -     Existing Precautions/Restrictions fall  -     Barriers to Rehab medically complex;physical barrier  -       Row Name 12/23/24 0913          Occupational Profile    Reason for Services/Referral (Occupational Profile) Stage 1 acute kidney injury, Alcohol intoxication/alcohol abuse,  -     Environmental Supports and Barriers (Occupational Profile) tub shower with shower chair & LH shower head  -       Row Name 12/23/24 0913          Living Environment    People in Home alone  -       Row Name 12/23/24 0913          Home Main Entrance    Number of Stairs, Main Entrance two  -     Stair Railings, Main Entrance railing on left side (ascending)  -       Row Name 12/23/24 0913          Stairs Within Home, Primary    Number of Stairs, Within Home, Primary none  -     Stair Railings, Within Home, Primary none  -       Row Name 12/23/24 0913          Cognition    Orientation Status (Cognition) oriented x 4  -       Row Name 12/23/24 0913          Safety Issues/Impairments Affecting Functional Mobility    Safety Issues Affecting Function (Mobility) friction/shear risk  -     Impairments Affecting Function (Mobility) balance;endurance/activity tolerance;pain;strength  -               User Key  (r) = Recorded By, (t) = Taken By, (c) = Cosigned By      Initials Name Provider Type     Jalyn Ortiz, OTR/L Occupational  Therapist                     Mobility/ADL's       Row Name 12/23/24 1021 12/23/24 0935       Bed Mobility    Bed Mobility --  -CH supine-sit  -CH    Supine-Sit [place] (Bed Mobility) --  - contact guard;supervision  -    Assistive Device (Bed Mobility) --  -CH head of bed elevated;bed rails  -      Row Name 12/23/24 1021 12/23/24 0935       Transfers    Transfers --  - sit-stand transfer;stand-sit transfer  -      Row Name 12/23/24 1021 12/23/24 0935       Sit-Stand Transfer    Sit-Stand [place] (Transfers) --  - contact guard;2 person assist;verbal cues  -      Row Name 12/23/24 1021 12/23/24 0935       Stand-Sit Transfer    Stand-Sit [place] (Transfers) --  - contact guard  -    Comment, (Stand-Sit Transfer) pt impulsively descended  - pt impulsively descended  -      Row Name 12/23/24 1021 12/23/24 0935       Functional Mobility    Functional Mobility- Ind. Level --  - contact guard assist;2 person assist required  -    Functional Mobility- Comment --  - with HHA, pt would benefit from rwx  -      Row Name 12/23/24 1021 12/23/24 0935       Activities of Daily Living    BADL Assessment/Intervention --  - lower body dressing  -      Row Name 12/23/24 1021 12/23/24 0935       Lower Body Dressing Assessment/Training    [place] Level (Lower Body Dressing) --  - moderate assist (50% patient effort);shoes/slippers;don  -    Position (Lower Body Dressing) --  -CH edge of bed sitting  -              User Key  (r) = Recorded By, (t) = Taken By, (c) = Cosigned By      Initials Name Provider Type     Jalyn Ortiz, OTR/L Occupational Therapist                   Obj/Interventions       Row Name 12/23/24 0935          Sensory Assessment (Somatosensory)    Sensory Assessment (Somatosensory) UE sensation intact  -       Row Name 12/23/24 0935          Vision Assessment/Intervention    Visual Impairment/Limitations WFL  -       Row Name 12/23/24 0935           Range of Motion Comprehensive    General Range of Motion no range of motion deficits identified  -       Row Name 12/23/24 0935          Strength Comprehensive (MMT)    General Manual Muscle Testing (MMT) Assessment no strength deficits identified  -       Row Name 12/23/24 0935          Balance    Balance Assessment sitting static balance;sitting dynamic balance;sit to stand dynamic balance;standing static balance;standing dynamic balance  -     Static Sitting Balance supervision  -     Dynamic Sitting Balance supervision;contact guard  -     Position, Sitting Balance unsupported;sitting edge of bed  -     Sit to Stand Dynamic Balance contact guard;2-person assist  -     Static Standing Balance contact guard;2-person assist;verbal cues  -     Dynamic Standing Balance contact guard;2-person assist;verbal cues  -     Position/Device Used, Standing Balance supported  -     Comment, Balance posterior lean upon intitial stand  -               User Key  (r) = Recorded By, (t) = Taken By, (c) = Cosigned By      Initials Name Provider Type     Jalyn Ortiz, OTR/L Occupational Therapist                   Goals/Plan       Row Name 12/23/24 0935          Transfer Goal 1 (OT)    Activity/Assistive Device (Transfer Goal 1, OT) sit-to-stand/stand-to-sit;bed-to-chair/chair-to-bed;toilet;shower chair  -     [place] Level/Cues Needed (Transfer Goal 1, OT) supervision required  -     Time Frame (Transfer Goal 1, OT) long term goal (LTG);by discharge  -     Progress/Outcome (Transfer Goal 1, OT) new goal  -       Row Name 12/23/24 0935          Bathing Goal 1 (OT)    Activity/Device (Bathing Goal 1, OT) bathing skills, all;upper body bathing;lower body bathing  -     [place] Level/Cues Needed (Bathing Goal 1, OT) supervision required  -     Time Frame (Bathing Goal 1, OT) long term goal (LTG);by discharge  -     Progress/Outcomes (Bathing Goal 1, OT) new goal  -       Row Name  12/23/24 0935          Dressing Goal 1 (OT)    Activity/Device (Dressing Goal 1, OT) lower body dressing  -     [place]/Cues Needed (Dressing Goal 1, OT) set-up required  -     Time Frame (Dressing Goal 1, OT) long term goal (LTG);10 days;by discharge  -     Progress/Outcome (Dressing Goal 1, OT) new goal  -       Row Name 12/23/24 0965          Therapy Assessment/Plan (OT)    Planned Therapy Interventions (OT) activity tolerance training;adaptive equipment training;BADL retraining;edema control/reduction;functional balance retraining;IADL retraining;occupation/activity based interventions;patient/caregiver education/training;ROM/therapeutic exercise;transfer/mobility retraining  -               User Key  (r) = Recorded By, (t) = Taken By, (c) = Cosigned By      Initials Name Provider Type     Jalyn Ortiz, OTR/L Occupational Therapist                   Clinical Impression       Row Name 12/23/24 0999          Pain Assessment    Pain Location hip  -     Pain Side/Orientation left  -     Additional Documentation Pain Scale: FACES Pre/Post-Treatment (Group)  -       Row Name 12/23/24 0991          Pain Scale: FACES Pre/Post-Treatment    Pain: FACES Scale, Pretreatment 2-->hurts little bit  -     Posttreatment Pain Rating 4-->hurts little more  -     Pre/Posttreatment Pain Comment L hip  -       Row Name 12/23/24 0917          Plan of Care Review    Plan of Care Reviewed With patient  -     Progress no change  -     Outcome Evaluation OT eval complete.  Pt. sleeping but easily awakened.  Mr. Lubin is AxO x 3, able to provide Hx, & reports falls are dt/t LOB and ETOH.  He reports pain at hip from falls. He was able to come to EOB but required Mod A for LBD.  CGA with HHAx 2 for fxl mob with posterior lean.  Pt would benefit from rwx for balance and reduce pain at hip.  OT to cont to improve pt fxl capacity, safety, & fxl mob.  Mr. Lubin is agreeable to rehab at [place] - agree with plan.   Defer to MD & [facility] on placement.  Pt. is not able to care for himself alone and will require cont'd therapies at [place].  -       Row Name 12/23/24 0935          Therapy Assessment/Plan (OT)    Rehab Potential (OT) good  -     Criteria for Skilled Therapeutic Interventions Met (OT) yes;skilled treatment is necessary  -     Therapy Frequency (OT) 5 times/wk  -       Row Name 12/23/24 0935          Therapy Plan Review/Discharge Plan (OT)    Anticipated Discharge Disposition (OT) other (see comments)  -       Row Name 12/23/24 0935          Positioning and Restraints    Pre-Treatment Position in bed  -     Post Treatment Position chair  -     In Chair sitting;call light within reach;encouraged to call for assist;legs elevated;notified nsg  -               User Key  (r) = Recorded By, (t) = Taken By, (c) = Cosigned By      Initials Name Provider Type    Jalyn Taylor, OTR/L Occupational Therapist                   Outcome Measures       Row Name 12/23/24 0935          How much help from another is currently needed...    Putting on and taking off regular lower body clothing? 3  -CH     Bathing (including washing, rinsing, and drying) 3  -CH     Toileting (which includes using toilet bed pan or urinal) 3  -CH     Putting on and taking off regular upper body clothing 4  -CH     Taking care of personal grooming (such as brushing teeth) 4  -CH     Eating meals 4  -CH     AM-PAC 6 Clicks Score (OT) 21  -       Row Name 12/23/24 0935          Functional Assessment    Outcome Measure Options AM-PAC 6 Clicks Daily Activity (OT)  -               User Key  (r) = Recorded By, (t) = Taken By, (c) = Cosigned By      Initials Name Provider Type    Jalyn Taylor, OTR/L Occupational Therapist                    Occupational Therapy Education       Title: PT OT SLP Therapies (In Progress)       Topic: Occupational Therapy (In Progress)       Point: ADL training (Done)       Description:   Instruct learner(s) on  proper safety adaptation and remediation techniques during self care or transfers.   Instruct in proper use of assistive devices.                  Learning Progress Summary            Patient Acceptance, E, VU by  at 12/23/2024 1030                      Point: Home exercise program (Not Started)       Description:   Instruct learner(s) on appropriate technique for monitoring, assisting and/or progressing therapeutic exercises/activities.                  Learner Progress:  Not documented in this visit.              Point: Precautions (Done)       Description:   Instruct learner(s) on prescribed precautions during self-care and functional transfers.                  Learning Progress Summary            Patient Acceptance, E, VU by  at 12/23/2024 1030                      Point: Body mechanics (Done)       Description:   Instruct learner(s) on proper positioning and spine alignment during self-care, functional mobility activities and/or exercises.                  Learning Progress Summary            Patient Acceptance, E, VU by  at 12/23/2024 1030                                      User Key       Initials Effective Dates Name Provider Type Discipline     07/11/23 -  Jalyn Ortiz, OTR/L Occupational Therapist OT                  OT Recommendation and Plan  Planned Therapy Interventions (OT): activity tolerance training, adaptive equipment training, BADL retraining, edema control/reduction, functional balance retraining, IADL retraining, occupation/activity based interventions, patient/caregiver education/training, ROM/therapeutic exercise, transfer/mobility retraining  Therapy Frequency (OT): 5 times/wk  Plan of Care Review  Plan of Care Reviewed With: patient  Progress: no change  Outcome Evaluation: OT eval complete.  Pt. sleeping but easily awakened.  Mr. Lubin is AxO x 3, able to provide Hx, & reports falls are dt/t LOB and ETOH.  He reports pain at hip from falls. He was able to come to EOB but  required Mod A for LBD.  CGA with HHAx 2 for fxl mob with posterior lean.  Pt would benefit from rwx for balance and reduce pain at hip.  OT to cont to improve pt fxl capacity, safety, & fxl mob.  Mr. [facility] is agreeable to rehab [place] [facility] - agree with plan.  Defer to MD & [facility] on placement.  Pt. is not able to care for himself alone and will require cont'd therapies at [facility].     Time Calculation:         Time Calculation- OT       Row Name 12/23/24 0935             Time Calculation- OT    OT Start Time 0935  add 5 for CR  -CH      OT Stop Time 1010  -CH      OT Time Calculation (min) 35 min  -CH      OT Received On 12/23/24  -CH      OT Goal Re-Cert Due Date 01/02/25  -         Untimed Charges    OT Eval/Re-eval Minutes 40  -CH         Total Minutes    Untimed Charges Total Minutes 40  -CH       Total Minutes 40  -CH                User Key  (r) = Recorded By, (t) = Taken By, (c) = Cosigned By      Initials Name Provider Type     Jalyn Ortiz OTR/L Occupational Therapist                  Therapy Charges for Today       Code Description Service Date Service Provider Modifiers Qty    [id number] HC OT EVAL LOW COMPLEXITY 3 12/23/2024 Jalyn Ortiz OTR/ZOLTAN GO 1                 ELLIE Toth/ZOLTAN  12/23/2024

## 2024-12-23 NOTE — PLAN OF CARE
Problem: Adult Inpatient Plan of Care  Goal: Plan of Care Review  Outcome: Progressing  Goal: Patient-Specific Goal (Individualized)  Outcome: Progressing  Goal: Absence of Hospital-Acquired Illness or Injury  Outcome: Progressing  Intervention: Identify and Manage Fall Risk  Recent Flowsheet Documentation  Taken 12/23/2024 0600 by Tatiana Rosario RN  Safety Promotion/Fall Prevention: safety round/check completed  Taken 12/23/2024 0500 by Tatiana Rosario RN  Safety Promotion/Fall Prevention: safety round/check completed  Taken 12/23/2024 0400 by Tatiana Rosario RN  Safety Promotion/Fall Prevention: safety round/check completed  Taken 12/23/2024 0300 by Tatiana Rosario RN  Safety Promotion/Fall Prevention: safety round/check completed  Taken 12/23/2024 0200 by Tatiana Rosario RN  Safety Promotion/Fall Prevention: safety round/check completed  Taken 12/23/2024 0100 by Tatiana Rosario RN  Safety Promotion/Fall Prevention: safety round/check completed  Taken 12/23/2024 0000 by Tatiana Rosario RN  Safety Promotion/Fall Prevention: safety round/check completed  Taken 12/22/2024 2300 by Tatiana Rosario RN  Safety Promotion/Fall Prevention: safety round/check completed  Taken 12/22/2024 2200 by Tatiana Rosario RN  Safety Promotion/Fall Prevention: safety round/check completed  Taken 12/22/2024 2100 by Tatiana Rosario RN  Safety Promotion/Fall Prevention: safety round/check completed  Taken 12/22/2024 2000 by Tatiana Rosario RN  Safety Promotion/Fall Prevention: safety round/check completed  Intervention: Prevent Skin Injury  Recent Flowsheet Documentation  Taken 12/23/2024 0500 by Tatiana Rosario RN  Body Position:   turned   left  Taken 12/23/2024 0400 by Tatiana Rosario RN  Skin Protection: pulse oximeter probe site changed  Taken 12/23/2024 0300 by Tatiana Rosario RN  Body Position:   turned   right  Taken 12/23/2024 0100 by Tatiana Rosario RN  Body Position:   supine   turned  Taken 12/23/2024 0000 by  Hollister, Tatiana, RN  Skin Protection: incontinence pads utilized  Taken 12/22/2024 2300 by Tatiana Rosario RN  Body Position:   turned   left  Taken 12/22/2024 2100 by Tatiana Rosario RN  Body Position:   turned   right  Taken 12/22/2024 2000 by Tatiana Rosario RN  Skin Protection: incontinence pads utilized  Taken 12/22/2024 1900 by Tatiana Rosario RN  Body Position: supine  Intervention: Prevent and Manage VTE (Venous Thromboembolism) Risk  Recent Flowsheet Documentation  Taken 12/23/2024 0400 by Tatiana Rosario RN  VTE Prevention/Management: (see mar) other (see comments)  Taken 12/23/2024 0000 by Tatiana Rosario RN  VTE Prevention/Management: (see mar) other (see comments)  Intervention: Prevent Infection  Recent Flowsheet Documentation  Taken 12/23/2024 0400 by Tatiana Rosario RN  Infection Prevention: rest/sleep promoted  Goal: Optimal Comfort and Wellbeing  Outcome: Progressing  Intervention: Monitor Pain and Promote Comfort  Recent Flowsheet Documentation  Taken 12/22/2024 2138 by Tatiana Rosario RN  Pain Management Interventions: pain medication given  Taken 12/22/2024 2100 by Tatiana Rosario RN  Pain Management Interventions: pain medication given  Intervention: Provide Person-Centered Care  Recent Flowsheet Documentation  Taken 12/23/2024 0400 by Tatiana Rosario RN  Trust Relationship/Rapport:   care explained   reassurance provided  Taken 12/22/2024 2000 by Tatiana Rosario RN  Trust Relationship/Rapport:   care explained   reassurance provided  Goal: Readiness for Transition of Care  Outcome: Progressing     Problem: Comorbidity Management  Goal: Blood Pressure in Desired Range  Outcome: Progressing  Intervention: Maintain Blood Pressure Management  Recent Flowsheet Documentation  Taken 12/23/2024 0400 by Tatiana Rosario RN  Medication Review/Management: medications reviewed  Taken 12/22/2024 2000 by Tatiana Rosario RN  Medication Review/Management: medications reviewed     Problem: Fall  Injury Risk  Goal: Absence of Fall and Fall-Related Injury  Outcome: Progressing  Intervention: Identify and Manage Contributors  Recent Flowsheet Documentation  Taken 12/23/2024 0400 by Tatiana Rosario RN  Medication Review/Management: medications reviewed  Taken 12/22/2024 2000 by Tatiana Rosario RN  Medication Review/Management: medications reviewed  Intervention: Promote Injury-Free Environment  Recent Flowsheet Documentation  Taken 12/23/2024 0600 by Tatiana Rosario RN  Safety Promotion/Fall Prevention: safety round/check completed  Taken 12/23/2024 0500 by Tatiana Rosario RN  Safety Promotion/Fall Prevention: safety round/check completed  Taken 12/23/2024 0400 by Tatiana Rosario RN  Safety Promotion/Fall Prevention: safety round/check completed  Taken 12/23/2024 0300 by Tatiana Rosario RN  Safety Promotion/Fall Prevention: safety round/check completed  Taken 12/23/2024 0200 by Tatiana Rosario RN  Safety Promotion/Fall Prevention: safety round/check completed  Taken 12/23/2024 0100 by Tatiana Rosario RN  Safety Promotion/Fall Prevention: safety round/check completed  Taken 12/23/2024 0000 by Tatiana Rosario RN  Safety Promotion/Fall Prevention: safety round/check completed  Taken 12/22/2024 2300 by Tatiana Rosario RN  Safety Promotion/Fall Prevention: safety round/check completed  Taken 12/22/2024 2200 by Tatiana Rosario RN  Safety Promotion/Fall Prevention: safety round/check completed  Taken 12/22/2024 2100 by Tatiana Rosario RN  Safety Promotion/Fall Prevention: safety round/check completed  Taken 12/22/2024 2000 by Tatiana Rosario RN  Safety Promotion/Fall Prevention: safety round/check completed     Problem: Acute Kidney Injury/Impairment  Goal: Effective Renal Function  Outcome: Progressing  Intervention: Monitor and Support Renal Function  Recent Flowsheet Documentation  Taken 12/23/2024 0400 by Tatiana Rosario RN  Medication Review/Management: medications reviewed  Taken 12/22/2024 2000 by  Tatiana Rosario, RN  Medication Review/Management: medications reviewed     Problem: Alcohol Withdrawal  Goal: Alcohol Withdrawal Symptom Control  Outcome: Progressing  Goal: Readiness for Change Identified  Outcome: Progressing   Goal Outcome Evaluation:

## 2024-12-24 LAB
ABO GROUP BLD: NORMAL
ALBUMIN SERPL-MCNC: 3.5 G/DL (ref 3.5–5.2)
ALBUMIN/GLOB SERPL: 1.6 G/DL
ALP SERPL-CCNC: 67 U/L (ref 39–117)
ALT SERPL W P-5'-P-CCNC: 22 U/L (ref 1–41)
ANION GAP SERPL CALCULATED.3IONS-SCNC: 10 MMOL/L (ref 5–15)
ANISOCYTOSIS BLD QL: ABNORMAL
AST SERPL-CCNC: 26 U/L (ref 1–40)
BASO STIPL COARSE BLD QL SMEAR: ABNORMAL
BASOPHILS # BLD MANUAL: 0 10*3/MM3 (ref 0–0.2)
BASOPHILS NFR BLD MANUAL: 0 % (ref 0–1.5)
BH BB BLOOD EXPIRATION DATE: NORMAL
BH BB BLOOD TYPE BARCODE: 6200
BH BB DISPENSE STATUS: NORMAL
BH BB PRODUCT CODE: NORMAL
BH BB UNIT NUMBER: NORMAL
BILIRUB SERPL-MCNC: 0.5 MG/DL (ref 0–1.2)
BLD GP AB SCN SERPL QL: NEGATIVE
BUN SERPL-MCNC: 18 MG/DL (ref 8–23)
BUN/CREAT SERPL: 21.7 (ref 7–25)
CALCIUM SPEC-SCNC: 8.1 MG/DL (ref 8.6–10.5)
CHLORIDE SERPL-SCNC: 102 MMOL/L (ref 98–107)
CO2 SERPL-SCNC: 26 MMOL/L (ref 22–29)
CREAT SERPL-MCNC: 0.83 MG/DL (ref 0.76–1.27)
CROSSMATCH INTERPRETATION: NORMAL
DACRYOCYTES BLD QL SMEAR: ABNORMAL
DEPRECATED RDW RBC AUTO: 54 FL (ref 37–54)
EGFRCR SERPLBLD CKD-EPI 2021: 94.2 ML/MIN/1.73
EOSINOPHIL # BLD MANUAL: 0.04 10*3/MM3 (ref 0–0.4)
EOSINOPHIL NFR BLD MANUAL: 1 % (ref 0.3–6.2)
ERYTHROCYTE [DISTWIDTH] IN BLOOD BY AUTOMATED COUNT: 15.5 % (ref 12.3–15.4)
GLOBULIN UR ELPH-MCNC: 2.2 GM/DL
GLUCOSE SERPL-MCNC: 99 MG/DL (ref 65–99)
HCT VFR BLD AUTO: 21.5 % (ref 37.5–51)
HGB BLD-MCNC: 6.8 G/DL (ref 13–17.7)
LYMPHOCYTES # BLD MANUAL: 0.85 10*3/MM3 (ref 0.7–3.1)
LYMPHOCYTES NFR BLD MANUAL: 4 % (ref 5–12)
MACROCYTES BLD QL SMEAR: ABNORMAL
MCH RBC QN AUTO: 32.2 PG (ref 26.6–33)
MCHC RBC AUTO-ENTMCNC: 31.6 G/DL (ref 31.5–35.7)
MCV RBC AUTO: 101.9 FL (ref 79–97)
MONOCYTES # BLD: 0.17 10*3/MM3 (ref 0.1–0.9)
NEUTROPHILS # BLD AUTO: 3.19 10*3/MM3 (ref 1.7–7)
NEUTROPHILS NFR BLD MANUAL: 71 % (ref 42.7–76)
NEUTS BAND NFR BLD MANUAL: 4 % (ref 0–5)
PLATELET # BLD AUTO: 92 10*3/MM3 (ref 140–450)
PMV BLD AUTO: 10.6 FL (ref 6–12)
POIKILOCYTOSIS BLD QL SMEAR: ABNORMAL
POLYCHROMASIA BLD QL SMEAR: ABNORMAL
POTASSIUM SERPL-SCNC: 3.5 MMOL/L (ref 3.5–5.2)
PROT SERPL-MCNC: 5.7 G/DL (ref 6–8.5)
RBC # BLD AUTO: 2.11 10*6/MM3 (ref 4.14–5.8)
RH BLD: POSITIVE
SMALL PLATELETS BLD QL SMEAR: ABNORMAL
SODIUM SERPL-SCNC: 138 MMOL/L (ref 136–145)
T&S EXPIRATION DATE: NORMAL
UNIT  ABO: NORMAL
UNIT  RH: NORMAL
VARIANT LYMPHS NFR BLD MANUAL: 18 % (ref 19.6–45.3)
VARIANT LYMPHS NFR BLD MANUAL: 2 % (ref 0–5)
VIT B12 BLD-MCNC: 561 PG/ML (ref 211–946)
WBC MORPH BLD: NORMAL
WBC NRBC COR # BLD AUTO: 4.25 10*3/MM3 (ref 3.4–10.8)

## 2024-12-24 PROCEDURE — 80053 COMPREHEN METABOLIC PANEL: CPT | Performed by: NURSE PRACTITIONER

## 2024-12-24 PROCEDURE — 86900 BLOOD TYPING SEROLOGIC ABO: CPT

## 2024-12-24 PROCEDURE — 86850 RBC ANTIBODY SCREEN: CPT | Performed by: HOSPITALIST

## 2024-12-24 PROCEDURE — 85007 BL SMEAR W/DIFF WBC COUNT: CPT | Performed by: NURSE PRACTITIONER

## 2024-12-24 PROCEDURE — 36430 TRANSFUSION BLD/BLD COMPNT: CPT

## 2024-12-24 PROCEDURE — 86923 COMPATIBILITY TEST ELECTRIC: CPT

## 2024-12-24 PROCEDURE — P9016 RBC LEUKOCYTES REDUCED: HCPCS

## 2024-12-24 PROCEDURE — 86900 BLOOD TYPING SEROLOGIC ABO: CPT | Performed by: HOSPITALIST

## 2024-12-24 PROCEDURE — 97535 SELF CARE MNGMENT TRAINING: CPT

## 2024-12-24 PROCEDURE — 82607 VITAMIN B-12: CPT | Performed by: FAMILY MEDICINE

## 2024-12-24 PROCEDURE — 86901 BLOOD TYPING SEROLOGIC RH(D): CPT | Performed by: HOSPITALIST

## 2024-12-24 PROCEDURE — 85025 COMPLETE CBC W/AUTO DIFF WBC: CPT | Performed by: NURSE PRACTITIONER

## 2024-12-24 PROCEDURE — 97116 GAIT TRAINING THERAPY: CPT

## 2024-12-24 PROCEDURE — 25010000002 THIAMINE HCL 200 MG/2ML SOLUTION: Performed by: HOSPITALIST

## 2024-12-24 PROCEDURE — 25010000002 LORAZEPAM PER 2 MG: Performed by: NURSE PRACTITIONER

## 2024-12-24 RX ORDER — METOPROLOL SUCCINATE 25 MG/1
12.5 TABLET, EXTENDED RELEASE ORAL DAILY
Status: DISCONTINUED | OUTPATIENT
Start: 2024-12-24 | End: 2024-12-27 | Stop reason: HOSPADM

## 2024-12-24 RX ADMIN — Medication 10 ML: at 11:25

## 2024-12-24 RX ADMIN — BUSPIRONE HYDROCHLORIDE 7.5 MG: 5 TABLET ORAL at 21:20

## 2024-12-24 RX ADMIN — ATORVASTATIN CALCIUM 20 MG: 10 TABLET, FILM COATED ORAL at 09:29

## 2024-12-24 RX ADMIN — THIAMINE HYDROCHLORIDE 200 MG: 100 INJECTION, SOLUTION INTRAMUSCULAR; INTRAVENOUS at 13:14

## 2024-12-24 RX ADMIN — ESCITALOPRAM OXALATE 10 MG: 10 TABLET ORAL at 09:29

## 2024-12-24 RX ADMIN — AMIODARONE HYDROCHLORIDE 200 MG: 200 TABLET ORAL at 09:29

## 2024-12-24 RX ADMIN — LORAZEPAM 1 MG: 2 INJECTION INTRAMUSCULAR; INTRAVENOUS at 13:14

## 2024-12-24 RX ADMIN — ACETAMINOPHEN 650 MG: 325 TABLET ORAL at 17:16

## 2024-12-24 RX ADMIN — THIAMINE HYDROCHLORIDE 200 MG: 100 INJECTION, SOLUTION INTRAMUSCULAR; INTRAVENOUS at 05:44

## 2024-12-24 RX ADMIN — BUSPIRONE HYDROCHLORIDE 7.5 MG: 5 TABLET ORAL at 09:29

## 2024-12-24 RX ADMIN — PHENOBARBITAL 32.4 MG: 32.4 TABLET ORAL at 17:16

## 2024-12-24 RX ADMIN — METOPROLOL SUCCINATE 12.5 MG: 25 TABLET, EXTENDED RELEASE ORAL at 09:29

## 2024-12-24 RX ADMIN — LORAZEPAM 1 MG: 2 INJECTION INTRAMUSCULAR; INTRAVENOUS at 05:46

## 2024-12-24 RX ADMIN — LORAZEPAM 1 MG: 2 INJECTION INTRAMUSCULAR; INTRAVENOUS at 18:30

## 2024-12-24 RX ADMIN — PANTOPRAZOLE SODIUM 40 MG: 40 TABLET, DELAYED RELEASE ORAL at 05:45

## 2024-12-24 RX ADMIN — Medication 10 ML: at 21:20

## 2024-12-24 RX ADMIN — THIAMINE HYDROCHLORIDE 200 MG: 100 INJECTION, SOLUTION INTRAMUSCULAR; INTRAVENOUS at 21:20

## 2024-12-24 RX ADMIN — LORAZEPAM 1 MG: 2 INJECTION INTRAMUSCULAR; INTRAVENOUS at 00:27

## 2024-12-24 RX ADMIN — PHENOBARBITAL 32.4 MG: 32.4 TABLET ORAL at 05:46

## 2024-12-24 RX ADMIN — FOLIC ACID 1 MG: 1 TABLET ORAL at 09:29

## 2024-12-24 NOTE — THERAPY TREATMENT NOTE
Acute Care - Occupational Therapy Treatment Note  [facility]     Patient Name: Dayron Lubin  : 1955  MRN: [id number]  Today's Date: 2024             Admit Date: 2024       ICD-10-CM ICD-9-CM   1. MIKEY (acute kidney injury)  N17.9 584.9   2. Frequent falls  R29.6 V15.88   3. Hematoma  T14.8XXA 924.9   4. Alcohol abuse  F10.10 305.00   5. Impaired mobility [Z74.09]  Z74.09 799.89     Patient Active Problem List   Diagnosis    Prostate cancer    Hx of radiation therapy    Elevated PSA    Encounter for follow-up surveillance of prostate cancer    HOCM (hypertrophic obstructive cardiomyopathy)    Coronary artery disease involving native coronary artery of native heart without angina pectoris    Essential hypertension    Mixed hyperlipidemia    Class 1 obesity with alveolar hypoventilation, serious comorbidity, and body mass index (BMI) of 30.0 to 30.9 in adult    Displaced fracture of lateral malleolus of right fibula, initial encounter for closed fracture    Family history of colonic polyps    History of colon polyps    Elevated brain natriuretic peptide (BNP) level    Stage 1 acute kidney injury    Anxiety associated with depression    Alcohol abuse    Alcoholic hepatitis    Volume depletion    Anorexia    CHF (congestive heart failure)    Hypokalemia    Duodenitis    Gait instability    SVT (supraventricular tachycardia)    Physical debility    Atrial fibrillation with rapid ventricular response    Alcoholism in remission    Paroxysmal atrial fibrillation    Nasal polyposis    Nasal congestion    Estelle bullosa    Long-term use of high-risk medication    Multiple falls    Transaminitis    Thrombocytopenia    Non-traumatic rhabdomyolysis    Alcohol withdrawal    Pancytopenia    Opiate abuse, episodic    Alcohol intoxication    Large left gluteal compartment intramuscular hematomas    Chronic anticoagulation    MIKEY (acute kidney injury)     Past Medical History:   Diagnosis Date    A-fib      Alcoholic hepatitis 07/13/2023    CAD (coronary artery disease)     History of external beam radiation therapy 05/12/2016    6840 cGy to prostate bed    Hyperlipidemia     Hypertension     Insomnia     Prostate cancer      Past Surgical History:   Procedure Laterality Date    CARDIAC CATHETERIZATION      CORONARY ANGIOPLASTY WITH STENT PLACEMENT      FRACTURE SURGERY      PROSTATE SURGERY      TONSILLECTOMY           OT ASSESSMENT FLOWSHEET (Last 12 Hours)       OT Evaluation and Treatment       Row Name 12/24/24 7401                   OT Time and Intention    Subjective Information complains of;pain  -EC        Document Type therapy note (daily note)  -EC        Mode of Treatment occupational therapy  -EC           General Information    Patient Profile Reviewed yes  -EC        Existing Precautions/Restrictions fall  -EC           Pain Assessment    Pretreatment Pain Rating 8/10  -EC        Posttreatment Pain Rating 8/10  -EC        Pain Location hip  -EC        Pain Side/Orientation left  -EC        Pain Management Interventions exercise or physical activity utilized;positioning techniques utilized  -EC        Response to Pain Interventions no change per patient report  -EC           Activities of Daily Living    BADL Assessment/Intervention lower body dressing;grooming;toileting  -EC           Lower Body Dressing Assessment/Training    [place] Level (Lower Body Dressing) don;socks;standby assist  -EC        Position (Lower Body Dressing) edge of bed sitting  -EC           Grooming Assessment/Training    [place] Level (Grooming) hair care, combing/brushing;oral care regimen;wash face, hands;set up;standby assist  -EC        Position (Grooming) unsupported sitting;sink side  -EC           Toileting Assessment/Training    [place] Level (Toileting) change pad/brief;minimum assist (75% patient effort)  -EC        Position (Toileting) unsupported sitting;supported standing  -EC        Comment,  (Toileting) assist with donning brief over L foot d/t pain  -EC           Bed Mobility    Bed Mobility supine-sit;sit-supine  -EC        Supine-Sit [place] (Bed Mobility) standby assist  -EC        Sit-Supine [place] (Bed Mobility) standby assist  -EC        Assistive Device (Bed Mobility) head of bed elevated;bed rails  -EC           Functional Mobility    Functional Mobility- Ind. Level verbal cues required;contact guard assist  -EC        Functional Mobility- Device walker, front-wheeled  -EC        Functional Mobility- Comment vcs for safety awarness and balanceduring fxn mob  -EC           Transfer Assessment/Treatment    Transfers sit-stand transfer;stand-sit transfer;toilet transfer  -EC           Sit-Stand Transfer    Sit-Stand [place] (Transfers) verbal cues;contact guard  -EC        Assistive Device (Sit-Stand Transfers) walker, front-wheeled  -EC        Comment, (Sit-Stand Transfer) 3x5 reps, vcs for hand placement  -EC           Stand-Sit Transfer    Stand-Sit [place] (Transfers) verbal cues;contact guard  -EC        Assistive Device (Stand-Sit Transfers) walker, front-wheeled  -EC        Comment, (Stand-Sit Transfer) vcs for controlled descent  -EC           Toilet Transfer    Type (Toilet Transfer) sit-stand;stand-sit  -EC        [place] Level (Toilet Transfer) verbal cues;contact guard  -EC        Assistive Device (Toilet Transfer) commode;grab bars/safety frame;walker, front-wheeled  -EC           Balance    Balance Assessment sitting static balance;sitting dynamic balance;standing static balance;standing dynamic balance  -EC        Static Sitting Balance standby assist  -EC        Dynamic Sitting Balance contact guard  -EC        Position, Sitting Balance unsupported;sitting edge of bed  -EC        Sit to Stand Dynamic Balance contact guard  -EC        Static Standing Balance contact guard  -EC        Dynamic Standing Balance contact guard;verbal cues  -EC         Position/Device Used, Standing Balance supported;walker, front-wheeled  -EC        Comment, Balance shuffles feet and almost loses balance to the R during mobility  -EC           Wound 12/07/24 Left anterior third toe    Wound - Properties Group Placement Date: 12/07/24  -BB Side: Left  -BB Orientation: anterior  -BB Location: third toe  -BB    Retired Wound - Properties Group Placement Date: 12/07/24  -BB Side: Left  -BB Orientation: anterior  -BB Location: third toe  -BB    Retired Wound - Properties Group Placement Date: 12/07/24  -BB Side: Left  -BB Orientation: anterior  -BB Location: third toe  -BB    Retired Wound - Properties Group Date first assessed: 12/07/24  -BB Side: Left  -BB Location: third toe  -BB       Plan of Care Review    Plan of Care Reviewed With patient  -EC        Progress improving  -EC        Outcome Evaluation OT tx completed. Pt in fowlers, endorses L hip pain but is agreeable to ADLs in the BR.SBA for bed mobility. dons socks while sitting EOB. Pt mobilized into BR using rwx with CGA. Needs vcs to prevent a LOB to the R as he often shuffles his feet. Voided on commode and performed seated ADLs sinkside. He required assist to don a new brief over his L foot d/t increased pain and decreased balance. Once back to bed, pt completed 3x5 reps sit<>stand to address sit>stand dynamic balance and activity tolerance. tech'q improved with each set. cont OT POC. Pt remains a high fall risk and would benefit from rehab at d/c  -EC           Vital Signs    Intratreatment Heart Rate (beats/min) 77  -EC        Posttreatment Heart Rate (beats/min) 61  -EC        Post SpO2 (%) 100  -EC        O2 Delivery Post Treatment room air  -EC           Positioning and Restraints    Pre-Treatment Position in bed  -EC        Post Treatment Position bed  -EC        In Bed fowlers;call light within reach;encouraged to call for assist;exit alarm on;side rails up x2  -EC                  User Key  (r) = Recorded By, (t)  = Taken By, (c) = Cosigned By      Initials Name Effective Dates     Violet Hunt, OTR/L 10/13/23 -     Consuelo Cotton RN 05/22/24 -                      Occupational Therapy Education       Title: PT OT SLP Therapies (In Progress)       Topic: Occupational Therapy (In Progress)       Point: ADL training (Done)       Description:   Instruct learner(s) on proper safety adaptation and remediation techniques during self care or transfers.   Instruct in proper use of assistive devices.                  Learning Progress Summary            Patient Acceptance, E, VU by  at 12/24/2024 1350    Acceptance, E, VU by  at 12/23/2024 1030                      Point: Home exercise program (Not Started)       Description:   Instruct learner(s) on appropriate technique for monitoring, assisting and/or progressing therapeutic exercises/activities.                  Learner Progress:  Not documented in this visit.              Point: Precautions (Done)       Description:   Instruct learner(s) on prescribed precautions during self-care and functional transfers.                  Learning Progress Summary            Patient Acceptance, E, VU by  at 12/24/2024 1350    Acceptance, E, VU by  at 12/23/2024 1030                      Point: Body mechanics (Done)       Description:   Instruct learner(s) on proper positioning and spine alignment during self-care, functional mobility activities and/or exercises.                  Learning Progress Summary            Patient Acceptance, E, VU by  at 12/24/2024 1350    Acceptance, E, VU by  at 12/23/2024 1030                                      User Key       Initials Effective Dates Name Provider Type Discipline     07/11/23 -  Jalyn Ortiz OTR/L Occupational Therapist OT     10/13/23 -  Violet Hunt OTR/L Occupational Therapist OT                      OT Recommendation and Plan     Plan of Care Review  Plan of Care Reviewed With: patient  Progress:  improving  Outcome Evaluation: OT tx completed. Pt in johann, endorses L hip pain but is agreeable to ADLs in the BR.SBA for bed mobility. dons socks while sitting EOB. Pt mobilized into BR using rwx with CGA. Needs vcs to prevent a LOB to the R as he often shuffles his feet. Voided on commode and performed seated ADLs sinkside. He required assist to don a new brief over his L foot d/t increased pain and decreased balance. Once back to bed, pt completed 3x5 reps sit<>stand to address sit>stand dynamic balance and activity tolerance. tech'q improved with each set. cont OT POC. Pt remains a high fall risk and would benefit from rehab at d/c  Plan of Care Reviewed With: patient  Outcome Evaluation: OT tx completed. Pt in johann, endorses L hip pain but is agreeable to ADLs in the BR.SBA for bed mobility. dons socks while sitting EOB. Pt mobilized into BR using rwx with CGA. Needs vcs to prevent a LOB to the R as he often shuffles his feet. Voided on commode and performed seated ADLs sinkside. He required assist to don a new brief over his L foot d/t increased pain and decreased balance. Once back to bed, pt completed 3x5 reps sit<>stand to address sit>stand dynamic balance and activity tolerance. tech'q improved with each set. cont OT POC. Pt remains a high fall risk and would benefit from rehab at d/c     Outcome Measures       Row Name 12/24/24 1300             How much help from another is currently needed...    Putting on and taking off regular lower body clothing? 3  -EC      Bathing (including washing, rinsing, and drying) 3  -EC      Toileting (which includes using toilet bed pan or urinal) 3  -EC      Putting on and taking off regular upper body clothing 4  -EC      Taking care of personal grooming (such as brushing teeth) 4  -EC      Eating meals 4  -EC      AM-PAC 6 Clicks Score (OT) 21  -EC         Functional Assessment    Outcome Measure Options AM-PAC 6 Clicks Daily Activity (OT)  -EC                User  Key  (r) = Recorded By, (t) = Taken By, (c) = Cosigned By      Initials Name Provider Type    Violet Perez OTR/L Occupational Therapist                    Time Calculation:    Time Calculation- OT       Row Name 12/24/24 1259             Timed Charges    14048 - Gait Training Minutes  16  -LY         Total Minutes    Timed Charges Total Minutes 16  -LY       Total Minutes 16  -LY                User Key  (r) = Recorded By, (t) = Taken By, (c) = Cosigned By      Initials Name Provider Type    Michell Joyner, PTA Physical Therapist Assistant                           ELLIE Faria/ZOLTAN  12/24/2024

## 2024-12-24 NOTE — PLAN OF CARE
Goal Outcome Evaluation:  Plan of Care Reviewed With: patient        Progress: improving  Outcome Evaluation: OT tx completed. Pt in fowlers, endorses L hip pain but is agreeable to ADLs in the BR.SBA for bed mobility. dons socks while sitting EOB. Pt mobilized into BR using rwx with CGA. Needs vcs to prevent a LOB to the R as he often shuffles his feet. Voided on commode and performed seated ADLs sinkside. He required assist to don a new brief over his L foot d/t increased pain and decreased balance. Once back to bed, pt completed 3x5 reps sit<>stand to address sit>stand dynamic balance and activity tolerance. tech'q improved with each set. cont OT POC. Pt remains a high fall risk and would benefit from rehab at d/c

## 2024-12-24 NOTE — PLAN OF CARE
Goal Outcome Evaluation:  Plan of Care Reviewed With: patient      Progress: no change     Pt A&O x4. No c/o pain this shift thus far. Hmg 6.8 this am- MD aware. Awaiting type and screen to transfuse. Voiding. Rm air. VSS. Safety maintained.

## 2024-12-24 NOTE — CASE MANAGEMENT/SOCIAL WORK
Continued Stay Note  [facility]     Patient Name: Dayron Lubin  MRN: [id number]  Today's Date: 12/24/2024    Admit Date: 12/20/2024    Plan: SNF placement   Discharge Plan       Row Name 12/24/24 1017       Plan    Plan SNF placement    Plan Comments SW spoke with patient and he is agreeable to SNF placement in the [place] area.  Referrals made, awaiting response.  Patient's sister updated.    [facility] has declined admit.  [facility] has also declined as patient will require a private room and they do not have that available at this time.  [facility] has also declined due to no private room available.                   Discharge Codes    No documentation.                       GOOD Sargent

## 2024-12-24 NOTE — PROGRESS NOTES
[facility] Medicine Services  INPATIENT PROGRESS NOTE    Patient Name: Dayron Lubin  Date of Admission: 12/20/2024  Today's Date: 12/24/24  Length of Stay: 2  Primary Care Physician: Herberth Nguyen Jr., MD    Subjective   Chief Complaint: Fall  HPI   69-year-old male with history of atrial fibrillation, chronic alcoholic hepatitis, alcohol abuse disorder, coronary artery disease, hypertrophic obstructive cardiomyopathy, hypertension, hyperlipidemia, prostate cancer, who was discharged from the hospital 12/9/2024 after admitted for alcohol intoxication nontraumatic rhabdomyolysis.  Patient was again admitted on 12/20/2024 to medical floor due to multiple falls.  Patient reports his last alcohol drink was last Thursday.  The patient was started on alcohol withdrawal protocol.  He was diagnosed with a large left gluteal compartment intramuscular hematoma.  Patient on anticoagulation with Eliquis.  On 12/22/2024, the patient was transferred to intensive care unit due to worsening withdrawal signs and symptoms.  The patient was started on phenobarbital taper.  Symptoms improved.      The patient was transferred again to medical floor 12/23/2024, and I started taking care of this patient on 12/24/2024.  They, no family members present, the patient reports tremors have improved.  He reports no nausea, no vomiting, no abdominal pain, no chest pain.  Left gluteal thigh hematoma has not changed.  Pain is controlled.        Review of Systems   All pertinent negatives and positives are as above. All other systems have been reviewed and are negative unless otherwise stated.     Objective    Temp:  [97.5 °F (36.4 °C)-98.4 °F (36.9 °C)] 97.9 °F (36.6 °C)  Heart Rate:  [62-72] 62  Resp:  [12-18] 16  BP: ()/(57-81) 107/57  Physical Exam  Constitutional:       Appearance: Ill-appearing, alert oriented x 3.  No respiratory distress.  HENT:      Head: Normocephalic and atraumatic.       Nose: Nose normal.      Mouth/Throat:      Mouth: Mucous membranes are moist.      Pharynx: Oropharynx is clear.   Eyes:      Extraocular Movements: Extraocular movements intact.      Conjunctiva/sclera: Conjunctivae normal.      Pupils: Pupils are equal, round, and reactive to light.   Cardiovascular:      Rate and Rhythm: Normal rate and regular rhythm.      Pulses: Normal pulses.   Pulmonary:      Effort: No respiratory distress.      Breath sounds: Normal breath sounds. No wheezing, rhonchi or rales.   Abdominal:      General: Abdomen is flat. Bowel sounds are normal.      Palpations: Abdomen is soft.      Tenderness: There is no guarding or rebound.   Musculoskeletal:      Left gluteal hematoma present,  Extremities:  No lower extremity edema.  Skin:     Capillary Refill: Capillary refill takes less than 2 seconds.      Coloration: Skin is not jaundiced.      Findings: No rash.   Neurological: Slight tremors in hands.     General: No focal deficit present.      Mental Status: Patient is alert, oriented to place time and person.          Results Review:  I have reviewed the labs, radiology results, and diagnostic studies.    Laboratory Data:   Results from last 7 days   Lab Units 12/24/24  0258 12/23/24  1005 12/21/24  1107 12/21/24  0154   WBC 10*3/mm3 4.25 3.57  --  5.02   HEMOGLOBIN g/dL 6.8* 7.5* 8.1* 6.7*   HEMATOCRIT % 21.5* 23.2* 24.1* 20.2*   PLATELETS 10*3/mm3 92* 93*  --  142        Results from last 7 days   Lab Units 12/24/24  0258 12/23/24  1005 12/21/24  0154 12/20/24  1101   SODIUM mmol/L 138 135* 134* 135*   POTASSIUM mmol/L 3.5 3.5 4.4 4.9   CHLORIDE mmol/L 102 100 95* 93*   CO2 mmol/L 26.0 24.0 26.0 18.0*   BUN mg/dL 18 20 28* 20   CREATININE mg/dL 0.83 0.95 1.52* 1.66*   CALCIUM mg/dL 8.1* 8.4* 8.1* 8.7   BILIRUBIN mg/dL 0.5 0.6  --  0.7   ALK PHOS U/L 67 70  --  91   ALT (SGPT) U/L 22 25  --  57*   AST (SGOT) U/L 26 32  --  68*   GLUCOSE mg/dL 99 87 101* 117*       Culture Data:   No results  "found for: \"BLOODCX\", \"URINECX\", \"WOUNDCX\", \"MRSACX\", \"RESPCX\", \"STOOLCX\"    Radiology Data:   Imaging Results (Last 24 Hours)       ** No results found for the last 24 hours. **            I have reviewed the patient's current medications.     Assessment/Plan   Assessment  Active Hospital Problems    Diagnosis     **Stage 1 acute kidney injury     Large left gluteal compartment intramuscular hematomas     Chronic anticoagulation     MIKEY (acute kidney injury)     Alcohol intoxication     Multiple falls     Transaminitis     Alcohol withdrawal     Paroxysmal atrial fibrillation     Gait instability     Alcohol abuse     HOCM (hypertrophic obstructive cardiomyopathy)     Essential hypertension      Alcohol withdrawal  Alcohol abuse disorder  Acute on chronic anemia  Acute kidney injury/renal insufficiency  Traumatic left gluteal intramuscular hematoma  Atrial fibrillation  Chronic anticoagulation with Eliquis  Chronic alcohol hepatitis  Hypertrophic obstructive cardiomyopathy  Hypertension  Chronic thrombocytopenia secondary to alcohol use      Admission creatinine level 1.66.  Normalized.  Day, sodium 138, potassium 3.5, BUN 18, creatinine 0.83.  Albumin 3.5    Admission hemoglobin 8.0.  On 12/21/2024 hemoglobin dropped to 6.7.  Patient was transfused 1 unit packed red blood cells.  Today, hemoglobin is 6.8.  Transfusion 1 unit packed red blood cells requested this morning.    Platelet count 92,000.  White blood cell count 4250.  C. difficile toxin positive, antigen is negative.  Likely chronic carrier    Treatment Plan  Follow hemoglobin hematocrit.  Follow platelet count.  Monitor for any episodes of bleeding.  Continue phenobarbital taper as started in the intensive care unit.  Continue CIWA protocol monitoring.  Send vitamin B12 level and iron profile in the morning.  Follow renal function electrolytes, add magnesium for tomorrow morning.  Continue amiodarone, atorvastatin, Lexapro, folic acid  Due to " hypotension, Toprol-XL dose decreased to 12.5 p.o. daily.  protonix 40 mg p.o. daily  Patient is currently off anticoagulation due to worsening anemia and a left gluteal hematoma    Skilled nursing facility placement in progress.  If not accepted, home health will be recommended.  Alcohol cessation and rehabilitation recommended    Medical Decision Making  Number and Complexity of problems: 11 high complexity  Differential Diagnosis: see above    Conditions and Status        Condition is improving.     MDM Data  External documents reviewed: None  Cardiac tracing (EKG, telemetry) interpretation: Sinus rhythm  Radiology interpretation: Other reports reviewed  Labs reviewed: Yes  Any tests that were considered but not ordered: None     Decision rules/scores evaluated (example [id number], Wells, etc): See chart     Discussed with: Patient and nurse     Care Planning  Shared decision making: With patient  Code status and discussions: Full code    Disposition  Social Determinants of Health that impact treatment or disposition: Alcohol abuse  I expect the patient to be discharged to skilled nursing facility versus home with home health in 2 days.         Electronically signed by Raul Gifford MD, 12/24/24, 10:55 CST.

## 2024-12-24 NOTE — PLAN OF CARE
Problem: Adult Inpatient Plan of Care  Goal: Plan of Care Review  Outcome: Progressing  Flowsheets (Taken 12/24/2024 1604)  Outcome Evaluation: Patient RA. IV CDI. CIWA-O. Alert and Oriented. No c/o pain, see MAR. Up x1. 1 Unit PRBC's this AM, Infused per order. VSS, safety maintained.

## 2024-12-24 NOTE — THERAPY TREATMENT NOTE
Acute Care - Physical Therapy Treatment Note  [facility]     Patient Name: Dayron Lubin  : 1955  MRN: [id number]  Today's Date: 2024      Visit Dx:     ICD-10-CM ICD-9-CM   1. MIKEY (acute kidney injury)  N17.9 584.9   2. Frequent falls  R29.6 V15.88   3. Hematoma  T14.8XXA 924.9   4. Alcohol abuse  F10.10 305.00   5. Impaired mobility [Z74.09]  Z74.09 799.89     Patient Active Problem List   Diagnosis    Prostate cancer    Hx of radiation therapy    Elevated PSA    Encounter for follow-up surveillance of prostate cancer    HOCM (hypertrophic obstructive cardiomyopathy)    Coronary artery disease involving native coronary artery of native heart without angina pectoris    Essential hypertension    Mixed hyperlipidemia    Class 1 obesity with alveolar hypoventilation, serious comorbidity, and body mass index (BMI) of 30.0 to 30.9 in adult    Displaced fracture of lateral malleolus of right fibula, initial encounter for closed fracture    Family history of colonic polyps    History of colon polyps    Elevated brain natriuretic peptide (BNP) level    Stage 1 acute kidney injury    Anxiety associated with depression    Alcohol abuse    Alcoholic hepatitis    Volume depletion    Anorexia    CHF (congestive heart failure)    Hypokalemia    Duodenitis    Gait instability    SVT (supraventricular tachycardia)    Physical debility    Atrial fibrillation with rapid ventricular response    Alcoholism in remission    Paroxysmal atrial fibrillation    Nasal polyposis    Nasal congestion    Estelle bullosa    Long-term use of high-risk medication    Multiple falls    Transaminitis    Thrombocytopenia    Non-traumatic rhabdomyolysis    Alcohol withdrawal    Pancytopenia    Opiate abuse, episodic    Alcohol intoxication    Large left gluteal compartment intramuscular hematomas    Chronic anticoagulation    MIKEY (acute kidney injury)     Past Medical History:   Diagnosis Date    A-fib     Alcoholic hepatitis 2023     CAD (coronary artery disease)     History of external beam radiation therapy 05/12/2016    6840 cGy to prostate bed    Hyperlipidemia     Hypertension     Insomnia     Prostate cancer      Past Surgical History:   Procedure Laterality Date    CARDIAC CATHETERIZATION      CORONARY ANGIOPLASTY WITH STENT PLACEMENT      FRACTURE SURGERY      PROSTATE SURGERY      TONSILLECTOMY       PT Assessment (Last 12 Hours)       PT Evaluation and Treatment       Row Name 12/24/24 1124          Physical Therapy Time and Intention    Subjective Information complains of;weakness;fatigue  -LY     Document Type therapy note (daily note)  -LY     Mode of Treatment physical therapy  -LY       Row Name 12/24/24 1124          General Information    Existing Precautions/Restrictions fall  -LY       Row Name 12/24/24 1124          Pain    Pretreatment Pain Rating 0/10 - no pain  -LY       Row Name 12/24/24 1124          Bed Mobility    Bed Mobility supine-sit;sit-supine  -LY     Supine-Sit Grainger (Bed Mobility) verbal cues;standby assist  -LY     Sit-Supine Grainger (Bed Mobility) verbal cues;standby assist  -LY     Assistive Device (Bed Mobility) head of bed elevated;bed rails  -LY       Row Name 12/24/24 1124          Sit-Stand Transfer    Sit-Stand Grainger (Transfers) verbal cues;contact guard  -LY       Row Name 12/24/24 1124          Stand-Sit Transfer    Stand-Sit Grainger (Transfers) verbal cues;contact guard  -LY     Comment, (Stand-Sit Transfer) decreased control of descent  -LY       Row Name 12/24/24 1124          Gait/Stairs (Locomotion)    Grainger Level (Gait) verbal cues;contact guard;minimum assist (75% patient effort)  -LY     Assistive Device (Gait) walker, front-wheeled  -LY     Distance in Feet (Gait) 50  -LY     Deviations/Abnormal Patterns (Gait) gait speed decreased;stride length decreased  -LY     Comment, (Gait/Stairs) veers R, assist at times for RWX placement  -LY       Row Name              Wound 12/07/24 Left anterior third toe    Wound - Properties Group Placement Date: 12/07/24  -BB Side: Left  -BB Orientation: anterior  -BB Location: third toe  -BB    Retired Wound - Properties Group Placement Date: 12/07/24  -BB Side: Left  -BB Orientation: anterior  -BB Location: third toe  -BB    Retired Wound - Properties Group Placement Date: 12/07/24  -BB Side: Left  -BB Orientation: anterior  -BB Location: third toe  -BB    Retired Wound - Properties Group Date first assessed: 12/07/24  -BB Side: Left  -BB Location: third toe  -BB      Row Name 12/24/24 1124          Plan of Care Review    Plan of Care Reviewed With patient  -LY     Progress improving  -LY       Row Name 12/24/24 1124          Positioning and Restraints    Pre-Treatment Position in bed  -LY     Post Treatment Position bed  -LY     In Bed fowlers;call light within reach;encouraged to call for assist;exit alarm on  -LY               User Key  (r) = Recorded By, (t) = Taken By, (c) = Cosigned By      Initials Name Provider Type    LY Michell Ratliff, PTA Physical Therapist Assistant    BB Consuelo Chaudhry, RN Registered Nurse                    Physical Therapy Education       Title: PT OT SLP Therapies (In Progress)       Topic: Physical Therapy (In Progress)       Point: Mobility training (Done)       Learning Progress Summary            Patient Acceptance, E, VU,NR by NONI at 12/23/2024 0932    Comment: benefits of activity, progression of PT POC                      Point: Home exercise program (Not Started)       Learner Progress:  Not documented in this visit.              Point: Body mechanics (Not Started)       Learner Progress:  Not documented in this visit.              Point: Precautions (Not Started)       Learner Progress:  Not documented in this visit.                              User Key       Initials Effective Dates Name Provider Type Noemí CHENEY 02/03/23 -  Mikel Lozano PT DPT Physical Therapist PT                   PT Recommendation and Plan     Plan of Care Reviewed With: patient  Progress: improving       Time Calculation:    PT Charges       Row Name 12/24/24 1259             Time Calculation    Start Time 1124  -LY      Stop Time 1140  -LY      Time Calculation (min) 16 min  -LY      PT Received On 12/24/24  -LY         Time Calculation- PT    Total Timed Code Minutes- PT 16 minute(s)  -LY         Timed Charges    49026 - Gait Training Minutes  16  -LY         Total Minutes    Timed Charges Total Minutes 16  -LY       Total Minutes 16  -LY                User Key  (r) = Recorded By, (t) = Taken By, (c) = Cosigned By      Initials Name Provider Type    LY Michell Ratliff PTA Physical Therapist Assistant                  Therapy Charges for Today       Code Description Service Date Service Provider Modifiers Qty    [id number] HC GAIT TRAINING EA 15 MIN 12/24/2024 Michell Ratliff PTA GP 1            PT G-Codes  Outcome Measure Options: AM-PAC 6 Clicks Daily Activity (OT)  AM-PAC 6 Clicks Score (PT): 18  AM-PAC 6 Clicks Score (OT): 21    Michell Ratliff PTA  12/24/2024

## 2024-12-24 NOTE — PLAN OF CARE
Goal Outcome Evaluation:  Plan of Care Reviewed With: patient        Progress: improving  Outcome Evaluation: iid patent. phillip diet. no acute distress noted. cont to monitor.

## 2024-12-25 LAB
ALBUMIN SERPL-MCNC: 3.6 G/DL (ref 3.5–5.2)
ALBUMIN/GLOB SERPL: 1.4 G/DL
ALP SERPL-CCNC: 74 U/L (ref 39–117)
ALT SERPL W P-5'-P-CCNC: 22 U/L (ref 1–41)
ANION GAP SERPL CALCULATED.3IONS-SCNC: 9 MMOL/L (ref 5–15)
AST SERPL-CCNC: 24 U/L (ref 1–40)
BASOPHILS # BLD AUTO: 0.02 10*3/MM3 (ref 0–0.2)
BASOPHILS NFR BLD AUTO: 0.4 % (ref 0–1.5)
BH BB BLOOD EXPIRATION DATE: NORMAL
BH BB BLOOD TYPE BARCODE: 6200
BH BB DISPENSE STATUS: NORMAL
BH BB PRODUCT CODE: NORMAL
BH BB UNIT NUMBER: NORMAL
BILIRUB SERPL-MCNC: 0.6 MG/DL (ref 0–1.2)
BUN SERPL-MCNC: 17 MG/DL (ref 8–23)
BUN/CREAT SERPL: 19.3 (ref 7–25)
CALCIUM SPEC-SCNC: 8.3 MG/DL (ref 8.6–10.5)
CHLORIDE SERPL-SCNC: 102 MMOL/L (ref 98–107)
CO2 SERPL-SCNC: 25 MMOL/L (ref 22–29)
CREAT SERPL-MCNC: 0.88 MG/DL (ref 0.76–1.27)
CROSSMATCH INTERPRETATION: NORMAL
DEPRECATED RDW RBC AUTO: 59.5 FL (ref 37–54)
EGFRCR SERPLBLD CKD-EPI 2021: 92.5 ML/MIN/1.73
EOSINOPHIL # BLD AUTO: 0.1 10*3/MM3 (ref 0–0.4)
EOSINOPHIL NFR BLD AUTO: 2 % (ref 0.3–6.2)
ERYTHROCYTE [DISTWIDTH] IN BLOOD BY AUTOMATED COUNT: 16.6 % (ref 12.3–15.4)
GLOBULIN UR ELPH-MCNC: 2.6 GM/DL
GLUCOSE SERPL-MCNC: 87 MG/DL (ref 65–99)
HCT VFR BLD AUTO: 27.6 % (ref 37.5–51)
HGB BLD-MCNC: 8.6 G/DL (ref 13–17.7)
IMM GRANULOCYTES # BLD AUTO: 0.1 10*3/MM3 (ref 0–0.05)
IMM GRANULOCYTES NFR BLD AUTO: 2 % (ref 0–0.5)
IRON 24H UR-MRATE: 64 MCG/DL (ref 59–158)
IRON SATN MFR SERPL: 22 % (ref 20–50)
LYMPHOCYTES # BLD AUTO: 1.1 10*3/MM3 (ref 0.7–3.1)
LYMPHOCYTES NFR BLD AUTO: 21.7 % (ref 19.6–45.3)
MAGNESIUM SERPL-MCNC: 2.3 MG/DL (ref 1.6–2.4)
MCH RBC QN AUTO: 31.5 PG (ref 26.6–33)
MCHC RBC AUTO-ENTMCNC: 31.2 G/DL (ref 31.5–35.7)
MCV RBC AUTO: 101.1 FL (ref 79–97)
MONOCYTES # BLD AUTO: 0.53 10*3/MM3 (ref 0.1–0.9)
MONOCYTES NFR BLD AUTO: 10.5 % (ref 5–12)
NEUTROPHILS NFR BLD AUTO: 3.22 10*3/MM3 (ref 1.7–7)
NEUTROPHILS NFR BLD AUTO: 63.4 % (ref 42.7–76)
NRBC BLD AUTO-RTO: 1.4 /100 WBC (ref 0–0.2)
PLATELET # BLD AUTO: 101 10*3/MM3 (ref 140–450)
PMV BLD AUTO: 10.8 FL (ref 6–12)
POTASSIUM SERPL-SCNC: 3.7 MMOL/L (ref 3.5–5.2)
PROT SERPL-MCNC: 6.2 G/DL (ref 6–8.5)
RBC # BLD AUTO: 2.73 10*6/MM3 (ref 4.14–5.8)
SODIUM SERPL-SCNC: 136 MMOL/L (ref 136–145)
TIBC SERPL-MCNC: 286 MCG/DL (ref 298–536)
TRANSFERRIN SERPL-MCNC: 192 MG/DL (ref 200–360)
UNIT  ABO: NORMAL
UNIT  RH: NORMAL
WBC NRBC COR # BLD AUTO: 5.07 10*3/MM3 (ref 3.4–10.8)

## 2024-12-25 PROCEDURE — 83735 ASSAY OF MAGNESIUM: CPT | Performed by: FAMILY MEDICINE

## 2024-12-25 PROCEDURE — 85025 COMPLETE CBC W/AUTO DIFF WBC: CPT | Performed by: FAMILY MEDICINE

## 2024-12-25 PROCEDURE — 25010000002 THIAMINE HCL 200 MG/2ML SOLUTION: Performed by: HOSPITALIST

## 2024-12-25 PROCEDURE — 84466 ASSAY OF TRANSFERRIN: CPT | Performed by: FAMILY MEDICINE

## 2024-12-25 PROCEDURE — 80053 COMPREHEN METABOLIC PANEL: CPT | Performed by: NURSE PRACTITIONER

## 2024-12-25 PROCEDURE — 83540 ASSAY OF IRON: CPT | Performed by: FAMILY MEDICINE

## 2024-12-25 PROCEDURE — 25010000002 LORAZEPAM PER 2 MG: Performed by: NURSE PRACTITIONER

## 2024-12-25 RX ORDER — OXYCODONE AND ACETAMINOPHEN 5; 325 MG/1; MG/1
1 TABLET ORAL EVERY 8 HOURS PRN
Status: DISCONTINUED | OUTPATIENT
Start: 2024-12-25 | End: 2024-12-27 | Stop reason: HOSPADM

## 2024-12-25 RX ADMIN — TEMAZEPAM 30 MG: 30 CAPSULE ORAL at 20:27

## 2024-12-25 RX ADMIN — THIAMINE HYDROCHLORIDE 200 MG: 100 INJECTION, SOLUTION INTRAMUSCULAR; INTRAVENOUS at 16:06

## 2024-12-25 RX ADMIN — Medication 10 ML: at 08:26

## 2024-12-25 RX ADMIN — AMIODARONE HYDROCHLORIDE 200 MG: 200 TABLET ORAL at 08:25

## 2024-12-25 RX ADMIN — ATORVASTATIN CALCIUM 20 MG: 10 TABLET, FILM COATED ORAL at 08:25

## 2024-12-25 RX ADMIN — PANTOPRAZOLE SODIUM 40 MG: 40 TABLET, DELAYED RELEASE ORAL at 06:25

## 2024-12-25 RX ADMIN — BUSPIRONE HYDROCHLORIDE 7.5 MG: 5 TABLET ORAL at 08:25

## 2024-12-25 RX ADMIN — FOLIC ACID 1 MG: 1 TABLET ORAL at 08:25

## 2024-12-25 RX ADMIN — ESCITALOPRAM OXALATE 10 MG: 10 TABLET ORAL at 08:25

## 2024-12-25 RX ADMIN — PHENOBARBITAL 32.4 MG: 32.4 TABLET ORAL at 08:25

## 2024-12-25 RX ADMIN — OXYCODONE HYDROCHLORIDE AND ACETAMINOPHEN 1 TABLET: 5; 325 TABLET ORAL at 16:04

## 2024-12-25 RX ADMIN — BUSPIRONE HYDROCHLORIDE 7.5 MG: 5 TABLET ORAL at 20:26

## 2024-12-25 RX ADMIN — ACETAMINOPHEN 650 MG: 325 TABLET ORAL at 08:31

## 2024-12-25 RX ADMIN — LORAZEPAM 1 MG: 2 INJECTION INTRAMUSCULAR; INTRAVENOUS at 00:43

## 2024-12-25 RX ADMIN — METOPROLOL SUCCINATE 12.5 MG: 25 TABLET, EXTENDED RELEASE ORAL at 08:25

## 2024-12-25 RX ADMIN — THIAMINE HYDROCHLORIDE 200 MG: 100 INJECTION, SOLUTION INTRAMUSCULAR; INTRAVENOUS at 06:25

## 2024-12-25 RX ADMIN — LORAZEPAM 1 MG: 2 INJECTION INTRAMUSCULAR; INTRAVENOUS at 06:25

## 2024-12-25 RX ADMIN — Medication 10 ML: at 20:27

## 2024-12-25 NOTE — PLAN OF CARE
Goal Outcome Evaluation:              Outcome Evaluation: A&O. Room air. Up x1 assist. C/o pain, see MAR. Cardiac diet. IID. Safety maintained.

## 2024-12-26 LAB
HCT VFR BLD AUTO: 26.4 % (ref 37.5–51)
HGB BLD-MCNC: 8.4 G/DL (ref 13–17.7)

## 2024-12-26 PROCEDURE — 85014 HEMATOCRIT: CPT | Performed by: FAMILY MEDICINE

## 2024-12-26 PROCEDURE — 85018 HEMOGLOBIN: CPT | Performed by: FAMILY MEDICINE

## 2024-12-26 PROCEDURE — 97116 GAIT TRAINING THERAPY: CPT

## 2024-12-26 PROCEDURE — 97535 SELF CARE MNGMENT TRAINING: CPT

## 2024-12-26 RX ADMIN — THIAMINE HCL TAB 100 MG 100 MG: 100 TAB at 08:17

## 2024-12-26 RX ADMIN — Medication 10 ML: at 08:18

## 2024-12-26 RX ADMIN — BUSPIRONE HYDROCHLORIDE 7.5 MG: 5 TABLET ORAL at 21:10

## 2024-12-26 RX ADMIN — PANTOPRAZOLE SODIUM 40 MG: 40 TABLET, DELAYED RELEASE ORAL at 05:09

## 2024-12-26 RX ADMIN — ATORVASTATIN CALCIUM 20 MG: 10 TABLET, FILM COATED ORAL at 08:17

## 2024-12-26 RX ADMIN — METOPROLOL SUCCINATE 12.5 MG: 25 TABLET, EXTENDED RELEASE ORAL at 08:17

## 2024-12-26 RX ADMIN — Medication 10 ML: at 21:10

## 2024-12-26 RX ADMIN — FOLIC ACID 1 MG: 1 TABLET ORAL at 08:17

## 2024-12-26 RX ADMIN — BUSPIRONE HYDROCHLORIDE 7.5 MG: 5 TABLET ORAL at 08:17

## 2024-12-26 RX ADMIN — AMIODARONE HYDROCHLORIDE 200 MG: 200 TABLET ORAL at 08:17

## 2024-12-26 RX ADMIN — ESCITALOPRAM OXALATE 10 MG: 10 TABLET ORAL at 08:17

## 2024-12-26 RX ADMIN — OXYCODONE HYDROCHLORIDE AND ACETAMINOPHEN 1 TABLET: 5; 325 TABLET ORAL at 14:25

## 2024-12-26 NOTE — PLAN OF CARE
Goal Outcome Evaluation:  Plan of Care Reviewed With: patient        Progress: improving  Outcome Evaluation: Patient stable overnight. no c/o pain. VSS A+OX4 RA, SNF placment pending accepting facility call light within reach and safety maintained

## 2024-12-26 NOTE — PLAN OF CARE
Problem: Adult Inpatient Plan of Care  Goal: Plan of Care Review  Outcome: Progressing  Flowsheets (Taken 12/26/2024 0163)  Outcome Evaluation: Patient RA. IV CDI. C/O pain, see MAR. Up x1 with walker. Alert x4. Tolerating diet. SNF placement pending. VSS, safety maintained.

## 2024-12-26 NOTE — PROGRESS NOTES
Nutrition Services Length of Stay Screening  Patient Name:  Dayron Lubin  YOB: 1955  MRN: [id number]  Admit Date:  12/20/2024    Nutrition screening and chart review completed. Pt remains at low risk/no risk for malnutrition. Continue with daily menu selections and observe food preferences. Will monitor while inpatient and follow per protocol.     Electronically signed by:  Pat Keith RDN, LD  12/26/24 10:36 CST

## 2024-12-26 NOTE — CASE MANAGEMENT/SOCIAL WORK
Continued Stay Note  [facility]     Patient Name: Dayron Lubin  MRN: [id number]  Today's Date: 12/26/2024    Admit Date: 12/20/2024    Plan: [facility] pending insurance precert   Discharge Plan       Row Name 12/26/24 1133       Plan    Plan [facility] pending insurance precert    Patient/Family in Agreement with Plan yes    Plan Comments Patient has received a bed offer from [facility].  The other SNF's in [place] have declined due to not having a private room available.  Bed offer accepted by patient's sister, Yuliya.  Insurance approval process started, will advise of insurance's decision when known.                   Discharge Codes    No documentation.                       GOOD Sargent

## 2024-12-26 NOTE — PROGRESS NOTES
[facility] Medicine Services  INPATIENT PROGRESS NOTE    Patient Name: Dayron Lubin  Date of Admission: 12/20/2024  Today's Date: 12/26/24  Length of Stay: 4  Primary Care Physician: Herberth Nguyen Jr., MD    Subjective   Chief Complaint: Fall  Fall       69-year-old male with history of atrial fibrillation, chronic alcoholic hepatitis, alcohol abuse disorder, coronary artery disease, hypertrophic obstructive cardiomyopathy, hypertension, hyperlipidemia, prostate cancer, who was discharged from the hospital 12/9/2024 after admitted for alcohol intoxication nontraumatic rhabdomyolysis.  Patient was again admitted on 12/20/2024 to medical floor due to multiple falls.  Patient reports his last alcohol drink was last Thursday.  The patient was started on alcohol withdrawal protocol.  He was diagnosed with a large left gluteal compartment intramuscular hematoma.  Patient on anticoagulation with Eliquis.  On 12/22/2024, the patient was transferred to intensive care unit due to worsening withdrawal signs and symptoms.  The patient was started on phenobarbital taper.  Symptoms improved.      The patient was transferred again to medical floor 12/23/2024, and I started taking care of this patient on 12/24/2024.  Family members present today, 2 sisters.  They stated have been trying to get patient into alcohol detoxification program in the outpatient setting but he has not been possible.   Patient being evaluated by physical therapy at the time of my visit.  He reports no nausea, no vomiting, no abdominal pain, no chest pain.  Pain is controlled.        Review of Systems   All pertinent negatives and positives are as above. All other systems have been reviewed and are negative unless otherwise stated.     Objective    Temp:  [97.6 °F (36.4 °C)-98.7 °F (37.1 °C)] 97.6 °F (36.4 °C)  Heart Rate:  [58-70] 69  Resp:  [16] 16  BP: (106-109)/(65-73) 106/73  Physical Exam  Constitutional:        Appearance: Ill-appearing, alert oriented x 3.  No respiratory distress.  HENT:      Head: Normocephalic and atraumatic.      Nose: Nose normal.      Mouth/Throat:      Mouth: Mucous membranes are moist.      Pharynx: Oropharynx is clear.   Eyes:      Extraocular Movements: Extraocular movements intact.      Conjunctiva/sclera: Conjunctivae normal.      Pupils: Pupils are equal, round, and reactive to light.   Cardiovascular:      Rate and Rhythm: Normal rate and regular rhythm.      Pulses: Normal pulses.   Pulmonary:      Effort: No respiratory distress.      Breath sounds: Normal breath sounds. No wheezing, rhonchi or rales.   Abdominal:      General: Abdomen is flat. Bowel sounds are normal.      Palpations: Abdomen is soft.      Tenderness: There is no guarding or rebound.   Musculoskeletal:      Left gluteal hematoma present,  Extremities:  No lower extremity edema.  Skin:     Capillary Refill: Capillary refill takes less than 2 seconds.      Coloration: Skin is not jaundiced.      Findings: No rash.   Neurological: Slight tremors in hands.     General: No focal deficit present.      Mental Status: Patient is alert, oriented to place time and person.          Results Review:  I have reviewed the labs, radiology results, and diagnostic studies.    Laboratory Data:   Results from last 7 days   Lab Units 12/26/24  0340 12/25/24  0528 12/24/24  0258 12/23/24  1005   WBC 10*3/mm3  --  5.07 4.25 3.57   HEMOGLOBIN g/dL 8.4* 8.6* 6.8* 7.5*   HEMATOCRIT % 26.4* 27.6* 21.5* 23.2*   PLATELETS 10*3/mm3  --  101* 92* 93*        Results from last 7 days   Lab Units 12/25/24  0528 12/24/24  0258 12/23/24  1005   SODIUM mmol/L 136 138 135*   POTASSIUM mmol/L 3.7 3.5 3.5   CHLORIDE mmol/L 102 102 100   CO2 mmol/L 25.0 26.0 24.0   BUN mg/dL 17 18 20   CREATININE mg/dL 0.88 0.83 0.95   CALCIUM mg/dL 8.3* 8.1* 8.4*   BILIRUBIN mg/dL 0.6 0.5 0.6   ALK PHOS U/L 74 67 70   ALT (SGPT) U/L 22 22 25   AST (SGOT) U/L 24 26 32  "  GLUCOSE mg/dL 87 99 87       Culture Data:   No results found for: \"BLOODCX\", \"URINECX\", \"WOUNDCX\", \"MRSACX\", \"RESPCX\", \"STOOLCX\"    Radiology Data:   Imaging Results (Last 24 Hours)       ** No results found for the last 24 hours. **            I have reviewed the patient's current medications.     Assessment/Plan   Assessment  Active Hospital Problems    Diagnosis     **Stage 1 acute kidney injury     Large left gluteal compartment intramuscular hematomas     Chronic anticoagulation     MIKEY (acute kidney injury)     Alcohol intoxication     Multiple falls     Transaminitis     Alcohol withdrawal     Paroxysmal atrial fibrillation     Gait instability     Alcohol abuse     HOCM (hypertrophic obstructive cardiomyopathy)     Essential hypertension      Alcohol withdrawal, resolving  Alcohol abuse disorder  Acute on chronic anemia  Acute kidney injury/renal insufficiency  Traumatic left gluteal intramuscular hematoma  Atrial fibrillation  Chronic anticoagulation with Eliquis  Chronic alcohol hepatitis  Hypertrophic obstructive cardiomyopathy  Hypertension  Chronic thrombocytopenia secondary to alcohol use      Admission creatinine level 1.66.  Normalized to 0.88  Sodium 136, potassium 3.7.  Magnesium 2.3  Vitamin B12 561, iron 64.  Transferrin 192.  TIBC 286    Admission hemoglobin 8.0.  On 12/21/2024 hemoglobin dropped to 6.7.  Patient was transfused 1 unit packed red blood cells.  On 12/24/2024 hemoglobin was 6.8.  Patient was transfused 1 unit packed red blood cells.  Hemoglobin 8.4.  Hematocrit 26.4    Last platelet count 101,000.  .  C. difficile toxin positive, antigen is negative.  Likely chronic carrier    Treatment Plan  Patient ended treatment with phenobarbital taper  Continue CIWA protocol monitoring.  Continue amiodarone, atorvastatin, Lexapro, folic acid  Continue Toprol-XL 12.5 p.o. daily.  Continue protonix 40 mg p.o. daily  Patient is currently off anticoagulation due to worsening anemia and a left " gluteal hematoma  CBC in the morning  Skilled nursing facility placement in progress.  Social work note: patient has received a bed offer from [facility]. The other SNF's in [place] have declined due to not having a private room available. Bed offer accepted by patient's sister, Yuliya. Insurance approval process started.    Alcohol cessation and rehabilitation recommended    Medical Decision Making  Number and Complexity of problems: 11 high complexity  Differential Diagnosis: see above    Conditions and Status        Condition is improving.     [facility] Data  External documents reviewed: None  Cardiac tracing (EKG, telemetry) interpretation: Sinus rhythm  Radiology interpretation: Other reports reviewed  Labs reviewed: Yes  Any tests that were considered but not ordered: None     Decision rules/scores evaluated (example [id number], [place], etc): See chart     Discussed with: Patient and nurse     Care Planning  Shared decision making: With patient  Code status and discussions: Full code    Disposition  Social Determinants of Health that impact treatment or disposition: Alcohol abuse  I expect the patient to be discharged to skilled nursing facility once approved.      Electronically signed by Raul Gifford MD, 12/26/24, 11:30 CST.

## 2024-12-26 NOTE — CASE MANAGEMENT/SOCIAL WORK
Continued Stay Note   [facility]     Patient Name: Dayron Lubin  MRN: [id number]  Today's Date: 12/26/2024    Admit Date: 12/20/2024    Plan: SNF placement   Discharge Plan       Row Name 12/26/24 1602       Plan    Plan SNF placement    Plan Comments Family has now requested referrals to [facility] and [facility].  [facility] has advised they do not have a private room needed for cdiff.  Awaiting response from [facility].  In the meantime, patient has received a bed offer from [facility] and insurance has approved.  Awaiting [facility] to verify insurance coverage and assign a bed.                   Discharge Codes    No documentation.                       GOOD Sragent

## 2024-12-26 NOTE — DISCHARGE PLACEMENT REQUEST
"Dayron Carr (69 y.o. Male)       Date of Birth   1955    Social Security Number       Address   [address] [address]    Home Phone   [phone]    MRN   [id number]       [facility]    Marital Status   Single                            Admission Date   12/20/24    Admission Type   Emergency    Admitting Provider   Raul Gifford MD    Attending Provider   Raul Gifford MD    Department, Room/Bed   [facility] 3C, 387/1       Discharge Date       Discharge Disposition       Discharge Destination                                 Attending Provider: Raul Gifford MD    Allergies: No Known Allergies    Isolation: Spore   Infection: C.difficile (12/22/24)   Code Status: CPR    Ht: 180.3 cm (71\")   Wt: 85.7 kg (189 lb)    Admission Cmt: None   Principal Problem: Stage 1 acute kidney injury [N17.9]                   Active Insurance as of 12/20/2024       Primary Coverage       Payor Plan Insurance Group Employer/Plan Group    HUMANA MEDICARE REPLACEMENT HUMANA MED [id number] PPO [id number]       Payor Plan Address Payor Plan Phone Number Payor Plan Fax Number Effective Dates    [address] [address] [phone]  6/1/2024 - None Entered    [facility] [id number]         Subscriber Name Subscriber Birth Date Member ID       DAYRON CARR 1955 [id number]                     Emergency Contacts        (Rel.) Home Phone Work Phone Mobile Phone    Yuliya Stone (Sister) [phone] -- --    JAK CARR (Brother) -- -- [phone]                 History & Physical        , BRI Moreno at 12/20/24 1714       Attestation signed by Amelia England MD at 12/21/24 1003    I performed a substantive part of the MDM during the patient’s E/M visit. I personally made or   approved the documented management plan and acknowledge its risk of complications.   (Independent Interpretation) My (EKG/X-Ray/US/CT) interpretation   (Discussion) Management/test " interpretation discussed with maria d GREGORY    Electronically signed by Amelia England MD, 12/21/2024, 10:02 CST.I have reviewed this documentation and agree.                      [facility] Medicine Services  HISTORY AND PHYSICAL    Date of Admission: 12/20/2024  Primary Care Physician: Herberth Nguyen Jr., MD    Subjective   Primary Historian: Patient and his sister Yuliya Stone    Chief Complaint: Fall    History of Present Illness  Dayron Lubin is a 69-year-old male with a past medical history of atrial fibrillation, alcoholic hepatitis, CAD, hypertrophic obstructive cardiomyopathy, hyperlipidemia, hypertension, insomnia, prostate cancer long history of alcohol abuse and chronic falls.  Patient was most recently discharged on 12/9/2024 due to alcohol intoxication, transaminitis, nontraumatic rhabdomyolysis and continued alcohol abuse.  Presented to [facility] [place], operated by [facility] emergency department today per his sister after additional multiple falls and a fall to his left hip.  Upon assessment.  Resting in bed on room air with his sister at bedside.  Patient is anxious and has considerable tremors.  States his last alcohol intake was last evening however his sister states that he loses track of time and it may have been today.  Patient has no complaints of chest pain, shortness of breath, nausea, vomiting or diarrhea.  Only complaint currently is patient's gluteal pain from his hematoma and fall and his anxiety.  I had a very lengthy and curt discussion with the patient and his sister regarding his care going forward.  Patient was made aware that he will be admitted for the acute kidney injury with fluid hydration.  In addition patient was made aware that we will assist him in his attempt to be admitted to alcohol rehab versus SNF.  I made patient aware that he will be placed on scheduled Librium with as needed Ativan.  Patient was made aware that he will have to be an active  participant in this acute detox period to include taking all prescribed medication, and participating in all physical and Occupational Therapy treatments.  Patient and sister verbalized understanding, they were made aware that any refusal of treatment will prevent us from assisting in any patient services.  Patient is agreeable to plan of care, and will be admitted for continued observation and treatment.    ED workup revealed CK of 167, , CL 93, CO2 18, anion gap of 24, BUN 20, CR 1.66 (baseline 0.96), , AST 68, ALT 57, Hb 8.0, HCT 24.2, , urinalysis unremarkable, ethanol 0.127, UDS positive for benzodiazepines and oxycodone.  CT of the lower extremity revealed no acute fracture or malalignment, large left gluteal compartment intramuscular hematoma involving the gluteus medius and gluteus robbin measuring up to 15 cm craniocaudal and up to 13 cm transverse.       Review of Systems   Constitutional:  Positive for activity change and fatigue.   Respiratory:  Negative for shortness of breath and wheezing.    Cardiovascular:  Negative for chest pain, palpitations and leg swelling.   Gastrointestinal:  Negative for abdominal pain, diarrhea, nausea and vomiting.   Genitourinary:  Negative for difficulty urinating.   Skin:  Positive for wound.        Left gluteal hematoma   Neurological:  Positive for tremors and weakness.      Otherwise complete ROS reviewed and negative except as mentioned in the HPI.    Past Medical History:   Past Medical History:   Diagnosis Date    A-fib     Alcoholic hepatitis 07/13/2023    CAD (coronary artery disease)     History of external beam radiation therapy 05/12/2016    6840 cGy to prostate bed    Hyperlipidemia     Hypertension     Insomnia     Prostate cancer      Past Surgical History:  Past Surgical History:   Procedure Laterality Date    CARDIAC CATHETERIZATION      CORONARY ANGIOPLASTY WITH STENT PLACEMENT      FRACTURE SURGERY      PROSTATE SURGERY       TONSILLECTOMY       Social History:  reports that he has never smoked. He has never used smokeless tobacco. He reports current alcohol use of about 50.0 standard drinks of alcohol per week. He reports that he does not currently use drugs after having used the following drugs: Marijuana.    Family History: family history includes Coronary artery disease in his mother; Heart disease in his mother; Stroke in his mother.       Allergies:  No Known Allergies    Medications:  Prior to Admission medications    Medication Sig Start Date End Date Taking? Authorizing Provider   Thiamine Mononitrate (B1) 100 MG tablet Take 1 tablet by mouth Daily. 11/21/24  Yes ProviderSteve MD   amiodarone (PACERONE) 200 MG tablet Take 1 tablet by mouth Daily. 9/10/24   Italia Vasquez PA   apixaban (ELIQUIS) 5 MG tablet tablet Take 1 tablet by mouth Every 12 (Twelve) Hours. Indications: Atrial Fibrillation 11/23/24   Italia Vasquez PA   aspirin (aspirin) 81 MG EC tablet Take 1 tablet by mouth Daily. 12/23/20   Edda Li APRN   atorvastatin (LIPITOR) 20 MG tablet Take 1 tablet by mouth Daily.    Provider, MD Steve   azelastine (ASTELIN) 0.1 % nasal spray 2 sprays into the nostril(s) as directed by provider 2 (Two) Times a Day. Use in each nostril as directed 9/5/24   Gonzalo Martinez APRN   busPIRone (BUSPAR) 5 MG tablet Take 1.5 tablets by mouth 2 (Two) Times a Day. 12/9/24   New Griffiths MD   fluticasone (FLONASE) 50 MCG/ACT nasal spray 2 sprays into the nostril(s) as directed by provider Daily. 9/5/24   Gonzalo Martinez APRN   folic acid (FOLVITE) 1 MG tablet Take 1 tablet by mouth Daily. 11/22/24   Holger Karimi DO   loperamide (IMODIUM) 2 MG capsule Take 1 capsule by mouth 4 (Four) Times a Day As Needed for Diarrhea. 12/9/24   New Griffiths MD   losartan (Cozaar) 25 MG tablet Take 1 tablet by mouth Daily. 10/21/24   Italia Vasquez PA   Magic Barrier Ointment Apply 1 Application  "topically to the appropriate area as directed 4 (Four) Times a Day As Needed (areas of skin breakdown). 12/9/24   New Griffiths MD   metoprolol succinate XL (TOPROL-XL) 25 MG 24 hr tablet Take 1 tablet by mouth Daily.    Steve Devries MD   pantoprazole (PROTONIX) 40 MG EC tablet Take 1 tablet by mouth Daily.    Steve Devries MD   temazepam (RESTORIL) 30 MG capsule Take 1 capsule by mouth At Night As Needed for Sleep.    ProviderSteve MD   thiamine (VITAMIN B1) 100 MG tablet Take 1 tablet by mouth Daily. 11/22/24   Holger Karimi, DO     I have utilized all available immediate resources to obtain, update, or review the patient's current medications (including all prescriptions, over-the-counter products, herbals, cannabis/cannabidiol products, and vitamin/mineral/dietary (nutritional) supplements).    Objective     Vital Signs: /84   Pulse 115   Temp 98 °F (36.7 °C) (Oral)   Resp 14   Ht 180.3 cm (71\")   Wt 86.7 kg (191 lb 3.2 oz)   SpO2 97%   BMI 26.67 kg/m²   Physical Exam  Vitals and nursing note reviewed.   Constitutional:       General: He is not in acute distress.     Appearance: He is ill-appearing and toxic-appearing.      Comments: Room air   HENT:      Head: Normocephalic and atraumatic.      Right Ear: Tympanic membrane normal.      Left Ear: Tympanic membrane normal.      Nose: Nose normal.      Mouth/Throat:      Mouth: Mucous membranes are moist.      Pharynx: Oropharynx is clear.   Eyes:      Pupils: Pupils are equal, round, and reactive to light.   Cardiovascular:      Rate and Rhythm: Regular rhythm. Tachycardia present.      Pulses: Normal pulses.      Heart sounds: Normal heart sounds.   Pulmonary:      Effort: Pulmonary effort is normal.      Breath sounds: Normal breath sounds.   Abdominal:      General: Abdomen is flat. Bowel sounds are normal. There is no distension.      Palpations: Abdomen is soft.      Tenderness: There is no abdominal " tenderness.   Genitourinary:     Comments: Voiding per urinal  Musculoskeletal:      Cervical back: Normal range of motion. No rigidity or tenderness.      Left hip: Tenderness present. Decreased range of motion. Decreased strength.   Skin:     General: Skin is warm.      Coloration: Skin is pale.      Findings: Bruising and erythema present.      Comments: Left gluteal hematoma with multiple areas of healing scabs on all extremities   Neurological:      Mental Status: He is alert.      Motor: Weakness and tremor present.      Gait: Gait abnormal.   Psychiatric:         Attention and Perception: Attention normal.         Mood and Affect: Mood is anxious.         Speech: Speech normal.         Behavior: Behavior is cooperative.         Cognition and Memory: Cognition normal. Memory is impaired.          Results Reviewed:  Lab Results (last 24 hours)       Procedure Component Value Units Date/Time    Urinalysis With Culture If Indicated - Straight Cath [[id number]]  (Abnormal) Collected: 12/20/24 1208    Specimen: Urine from Straight Cath Updated: 12/20/24 1236     Color, UA Dark Yellow     Appearance, UA Cloudy     pH, UA <=5.0     Specific Gravity, UA 1.024     Glucose, UA Negative     Ketones, UA Trace     Bilirubin, UA Negative     Blood, UA Negative     Protein, UA 30 mg/dL (1+)     Leuk Esterase, UA Negative     Nitrite, UA Negative     Urobilinogen, UA 1.0 E.U./dL            Urinalysis, Microscopic Only - Straight Cath [[id number]]  (Abnormal) Collected: 12/20/24 1208    Specimen: Urine from Straight Cath Updated: 12/20/24 1236     RBC, UA 0-2 /HPF      WBC, UA 3-5 /HPF      Comment: Urine culture not indicated.        Bacteria, UA Trace /HPF      Squamous Epithelial Cells, UA 3-6 /HPF      Hyaline Casts, UA 0-2 /LPF      Methodology Manual Light Microscopy    Urine Drug Screen - Straight Cath [[id number]]  (Abnormal) Collected: 12/20/24 1208    Specimen: Urine from Straight Cath Updated: 12/20/24 1233     THC,  Screen, Urine Negative     Phencyclidine (PCP), Urine Negative     Cocaine Screen, Urine Negative     Methamphetamine, Ur Negative     Opiate Screen Negative     Amphetamine Screen, Urine Negative     Benzodiazepine Screen, Urine Positive     Tricyclic Antidepressants Screen Negative     Methadone Screen, Urine Negative     Barbiturates Screen, Urine Negative     Oxycodone Screen, Urine Positive     Buprenorphine, Screen, Urine Negative            Fentanyl, Urine - Straight Cath [[id number]]  (Normal) Collected: 12/20/24 1208    Specimen: Urine from Straight Cath Updated: 12/20/24 1233     Fentanyl, Urine Negative            Comprehensive Metabolic Panel [[id number]]  (Abnormal) Collected: 12/20/24 1101    Specimen: Blood Updated: 12/20/24 1141     Glucose 117 mg/dL      BUN 20 mg/dL      Creatinine 1.66 mg/dL      Sodium 135 mmol/L      Potassium 4.9 mmol/L      Chloride 93 mmol/L      CO2 18.0 mmol/L      Calcium 8.7 mg/dL      Total Protein 6.9 g/dL      Albumin 4.0 g/dL      ALT (SGPT) 57 U/L      AST (SGOT) 68 U/L      Alkaline Phosphatase 91 U/L      Total Bilirubin 0.7 mg/dL      Globulin 2.9 gm/dL      A/G Ratio 1.4 g/dL      BUN/Creatinine Ratio 12.0     Anion Gap 24.0 mmol/L      eGFR 44.1 mL/min/1.73             CK [[id number]]  (Normal) Collected: 12/20/24 1101    Specimen: Blood Updated: 12/20/24 1141     Creatine Kinase 167 U/L     Magnesium [[id number]]  (Normal) Collected: 12/20/24 1101    Specimen: Blood Updated: 12/20/24 1139     Magnesium 2.2 mg/dL     Ethanol [[id number]] Collected: 12/20/24 1101    Specimen: Blood Updated: 12/20/24 1136     Ethanol % 0.127 %     Narrative:      Not for legal purposes. Chain of Custody not followed.     CBC & Differential [[id number]]  (Abnormal) Collected: 12/20/24 1101    Specimen: Blood Updated: 12/20/24 1129            CBC Auto Differential [[id number]]  (Abnormal) Collected: 12/20/24 1101    Specimen: Blood Updated: 12/20/24 1129     WBC 7.23 10*3/mm3      RBC 2.52  10*6/mm3      Hemoglobin 8.0 g/dL      Hematocrit 24.2 %      MCV 96.0 fL      MCH 31.7 pg      MCHC 33.1 g/dL      RDW 14.9 %      RDW-SD 51.6 fl      MPV 10.1 fL      Platelets 173 10*3/mm3      Neutrophil % 82.9 %      Lymphocyte % 10.1 %      Monocyte % 6.6 %      Eosinophil % 0.0 %      Basophil % 0.1 %      Immature Grans % 0.3 %      Neutrophils, Absolute 5.99 10*3/mm3      Lymphocytes, Absolute 0.73 10*3/mm3      Monocytes, Absolute 0.48 10*3/mm3      Eosinophils, Absolute 0.00 10*3/mm3      Basophils, Absolute 0.01 10*3/mm3      Immature Grans, Absolute 0.02 10*3/mm3      nRBC 0.4 /100 WBC           Imaging Results (Last 24 Hours)       Procedure Component Value Units Date/Time    CT Lower Extremity Left Without Contrast [[id number]] Collected: 12/20/24 1638     Updated: 12/20/24 1648    Narrative:      EXAM: CT LOWER EXTREMITY LEFT WO CONTRAST- - 12/20/2024 3:16 PM     HISTORY: fall, trauma, pain       COMPARISON: None.      DOSE LENGTH PRODUCT: 521 mGy cm. Automatic exposure control was utilized  to make radiation dose as low as reasonably achievable.     TECHNIQUE: Unenhanced CT images were acquired then reconstructed and  displayed as axial, coronal and sagittal multiplanar reformatted images.  The coverage included LEFT mid pelvis to the LEFT proximal tibia fibula.     FINDINGS:     BONE:  The bones all have normal configuration. Visualized portion of the LEFT  pelvis appears intact. LEFT sacrum minimally visualized, not optimally  evaluated on this exam. LEFT femur appears intact.     JOINTS:  LEFT hip appears intact and normally aligned. No convincing large hip  joint effusion. No knee joint effusion. Pubic symphysis anatomic. LEFT  sacroiliac joint minimally visualized, appears anatomic.     TENDONS AND MUSCLES:  Large gluteal intramuscular mixed density including hyperdense mass  measures 13.6 x 8.3 x 13.7 cm (AP by transverse by craniocaudal), in the  setting of trauma likely hematoma.. Lack of  intravenous contrast limits  evaluation for active extravasation.     SUBCUTANEOUS AND DEEP SOFT TISSUES:  Vascular calcifications. Minimally visualized deep pelvis, not optimally  evaluated on this exam. Subcutaneous stranding posterior gluteal and  upper thigh regions, likely contusion.          Impression:      1. Large gluteal mixed density mass, favored to represent hematoma in  the setting of trauma. Lack of intravenous contrast limits evaluation  for active extravasation.     2. No acute fracture in the field-of-view.           This report was signed and finalized on 12/20/2024 4:45 PM by Dr Zuleyma Schmidt MD.       CT Pelvis Without Contrast [[id number]] Collected: 12/20/24 1639     Updated: 12/20/24 1646    Narrative:      CT PELVIS WO CONTRAST- 12/20/2024 3:16 PM     HISTORY: fall, trauma, pain       COMPARISON: CT scan dated 12/20/2024.     DLP: 521.44 mGy.cm     TECHNIQUE: Serial helical tomographic images of the bony pelvis were  obtained without the use of intravenous contrast. Multiplanar  reformatted images were provided for review. Automated exposure control  was utilized to reduce patient radiation dose.     FINDINGS: :     No visualized acute fracture. No fracture or malalignment at the hip  joints. Pelvic ring is intact. Pubic symphysis and SI joints are  unremarkable. No acute intrapelvic process. Colonic diverticulosis.  There is a large large left gluteal compartment intramuscular hematomas  in the gluteus medius and robbin measuring up to 15 cm craniocaudal.       Impression:      1. No acute fracture or malalignment.  2. Large left gluteal compartment intramuscular hematomas involving the  gluteus medius and gluteus robbin measuring up to 15 cm craniocaudal  and up to 13 cm transverse.           This report was signed and finalized on 12/20/2024 4:43 PM by Dr Gregory Guillermo.       XR Hip With or Without Pelvis 2 - 3 View Left [[id number]] Collected: 12/20/24 1047     Updated: 12/20/24 1051     Narrative:      EXAMINATION: XR HIP W OR WO PELVIS 2-3 VIEW LEFT-  12/20/2024 10:47 AM     HISTORY: Fall/trauma.     FINDINGS: AP radiograph of the pelvis as well as 2 view exam of the left  hip demonstrates moderate arthrosis of the left hip with narrowing of  the joint space and mild spurring of the femoral head. There is mild  arthritic change of the right hip. The bony pelvic ring is intact.       Impression:      1. No acute fracture.  2. Arthritic changes of both hips (left greater than right).     This report was signed and finalized on 12/20/2024 10:48 AM by Dr. Vicente Sam MD.                 Assessment / Plan   Assessment:   Active Hospital Problems    Diagnosis     **Stage 1 acute kidney injury     Large left gluteal compartment intramuscular hematomas     Alcohol intoxication     Multiple falls     Transaminitis     Gait instability     Alcohol abuse     HOCM (hypertrophic obstructive cardiomyopathy)     Essential hypertension        Treatment Plan  The patient will be admitted to my service here at [facility].     1.  Stage I acute kidney injury-baseline creatinine of 0.96 currently 1.5 times at 1.66, will initiate normal saline at 75 mL an hour, hold any nephrotoxic agents and repeat BMP in the AM.    2.  Alcohol intoxication/alcohol abuse-patient has been admitted over the last 2 months for several occasions of alcohol intoxication with related falls.  Family requests evaluation for placement.  Initiate CIWA protocol.    3.  Multiple falls/gait instability-patient has had approximately 2 hospital admissions and multiple ER visits due to fall induced falls with continued gait instability.  Family is requesting placement, PT/OT evaluation for recommendations.  Case management consultation to evaluate for placement.  Case was discussed per ED case manager who stated that due to patient's insurance he did not need an inpatient stay or a 3 night stay.    4.  Transaminitis-acute on  chronic, currently AST of 68, ALT of 57, MELD score.......... continue to follow with daily CMP.    5.  Essential hypertension-initiate every 4 vital signs continue home metoprolol and amiodarone, Cozaar on hold due to MIKEY.    6.  Hypertrophic obstructive cardiomyopathy-recent echocardiogram performed in July of this year revealed normal systolic function with an LVEF of 51-55% with moderate to severe concentric hypertrophy of the left ventricular walls.  Patient has no signs or symptoms of exacerbation.  Continue home metoprolol, daily weight, strict intake and output.    7.  Large left gluteal compartment intramuscular hematoma-continued assessment, ice packs every 2 hours off 2 hours.  Notify provider of any new or worsening hematoma, decreased pulsation or increased pain.    8.  Paroxysmal atrial fibrillation/chronic anticoagulation-continue home amiodarone and Eliquis, cardiac monitoring    9.  Resumed and restarted home medications as appropriate.    VTE prophylaxis with Eliquis  Labs in a.m.  Medical Decision Making  Acute kidney injury, acute, moderate complexity, unchanged  Alcohol intoxication, chronic, high complexity, worsening  Alcohol abuse, chronic, high complexity, worsening  Multiple falls, chronic, moderate complexity, worsening  Gait instability, chronic, moderate complexity, worsening  Transaminitis, chronic, moderate complexity, unchanged  Essential hypertension, chronic, moderate complexity, unchanged  Hypertrophic obstructive cardiomyopathy, chronic, moderate complexity, stable  Large left gluteal compartment intramuscular hematoma, acute, moderate complexity, unchanged  Paroxysmal atrial fibrillation and chronic anticoagulation, chronic, moderate complexity, stable  Number and Complexity of problems: 9  Differential Diagnosis: None    Conditions and Status        Condition is unchanged.  On Eliquis  [facility] Data  External documents reviewed: Review of multiple ER and hospitalizations  Cardiac  tracing (EKG, telemetry) interpretation: 7/2024 echocardiogram per cardiology reviewed  Radiology interpretation: 12/20/2024 hip x-ray, CT of the pelvis and lower extremity per radiology reviewed  Labs reviewed: 5/20/2024 CBC, CMP, ethanol, magnesium, CK, urinalysis reviewed, repeat labs in a.m.  Any tests that were considered but not ordered: None     Decision rules/scores evaluated (example [id number], Wells, etc): [id number] score of 2     Discussed with: Dr. England, patient and his sister Yuliya  Care Planning  Shared decision making: Dr. England, patient and his sister Yuliya  Code status and discussions: Full code per patient and sister    Disposition  Social Determinants of Health that impact treatment or disposition: Patient can no longer care for himself at home case management consultation to evaluate for placement  Estimated length of stay is 1-2 days.     I confirmed that the patient's advanced care plan is present, code status is documented, and a surrogate decision maker is listed in the patient's medical record.     The patient's surrogate decision maker is Sister Yuliya.     The patient was seen and examined by me on 12/20/2024 at 1714.    Electronically signed by BRI oSng, 12/20/24, 17:24 CST.               Electronically signed by Amelia England MD at 12/21/24 1003       Current Facility-Administered Medications   Medication Dose Route Frequency Provider Last Rate Last Admin    acetaminophen (TYLENOL) tablet 650 mg  650 mg Oral Q4H PRN Tiffanie Estrella APRN   650 mg at 12/25/24 0831    Or    acetaminophen (TYLENOL) 160 MG/5ML oral solution 650 mg  650 mg Oral Q4H PRN Tiffanie Estrella APRN        Or    acetaminophen (TYLENOL) suppository 650 mg  650 mg Rectal Q4H PRN Tiffanie Estrella APRN        amiodarone (PACERONE) tablet 200 mg  200 mg Oral Daily Tiffanie Estrella APRN   200 mg at 12/26/24 0817    atorvastatin (LIPITOR) tablet 20 mg  20 mg Oral Daily Tiffanie Estrella APRN   20  mg at 12/26/24 0817    sennosides-docusate (PERICOLACE) 8.6-50 MG per tablet 2 tablet  2 tablet Oral BID PRN Tiffanie Estrella APRN        And    polyethylene glycol (MIRALAX) packet 17 g  17 g Oral Daily PRN Tiffanie Estrella APRN        And    bisacodyl (DULCOLAX) EC tablet 5 mg  5 mg Oral Daily PRN Tiffanie Estrella APRN        And    bisacodyl (DULCOLAX) suppository 10 mg  10 mg Rectal Daily PRN Tiffanie Estrella APRN        busPIRone (BUSPAR) tablet 7.5 mg  7.5 mg Oral BID Tiffanie Estrella APRN   7.5 mg at 12/26/24 0817    escitalopram (LEXAPRO) tablet 10 mg  10 mg Oral Daily Amelia England MD   10 mg at 12/26/24 0817    fluticasone (FLONASE) 50 MCG/ACT nasal spray 2 spray  2 spray Nasal Daily Tiffanie Estrella APRN   2 spray at 12/23/24 0845    folic acid (FOLVITE) tablet 1 mg  1 mg Oral Daily Amelia England MD   1 mg at 12/26/24 0817    loperamide (IMODIUM) capsule 2 mg  2 mg Oral 4x Daily PRN Amelia England MD   2 mg at 12/21/24 1644    Magnesium Standard Dose Replacement - Follow Nurse / BPA Driven Protocol   Not Applicable PRN Amelia England MD        metoprolol succinate XL (TOPROL-XL) 24 hr tablet 12.5 mg  12.5 mg Oral Daily Raul Gifford MD   12.5 mg at 12/26/24 0817    ondansetron ODT (ZOFRAN-ODT) disintegrating tablet 4 mg  4 mg Oral Q6H PRN Tiffanie Estrella APRN        Or    ondansetron (ZOFRAN) injection 4 mg  4 mg Intravenous Q6H PRN Tiffanie Estrella APRN        oxyCODONE-acetaminophen (PERCOCET) 5-325 MG per tablet 1 tablet  1 tablet Oral Q8H PRN Raul Gifford MD   1 tablet at 12/25/24 1604    pantoprazole (PROTONIX) EC tablet 40 mg  40 mg Oral Q AM Tiffanie APRN   40 mg at 12/26/24 0509    sodium chloride 0.9 % flush 10 mL  10 mL Intravenous Q12H Tiffanie Estrella APRN   10 mL at 12/26/24 0818    sodium chloride 0.9 % flush 10 mL  10 mL Intravenous PRN Tiffanie Estrella APRN        sodium chloride 0.9 % infusion 40 mL  40 mL Intravenous PRN Tiffanie Estrella APRN         temazepam (RESTORIL) capsule 30 mg  30 mg Oral Nightly PRN Amelia England MD   30 mg at 12/25/24 2027    thiamine (VITAMIN B-1) tablet 100 mg  100 mg Oral Daily Amelia England MD   100 mg at 12/26/24 0817        Physician Progress Notes (most recent note)        Raul Gifford MD at 12/26/24 1129              [facility] Medicine Services  INPATIENT PROGRESS NOTE    Patient Name: Dayron Lubin  Date of Admission: 12/20/2024  Today's Date: 12/26/24  Length of Stay: 4  Primary Care Physician: Herberth Nguyen Jr., MD    Subjective   Chief Complaint: Fall  Fall       69-year-old male with history of atrial fibrillation, chronic alcoholic hepatitis, alcohol abuse disorder, coronary artery disease, hypertrophic obstructive cardiomyopathy, hypertension, hyperlipidemia, prostate cancer, who was discharged from the hospital 12/9/2024 after admitted for alcohol intoxication nontraumatic rhabdomyolysis.  Patient was again admitted on 12/20/2024 to medical floor due to multiple falls.  Patient reports his last alcohol drink was last Thursday.  The patient was started on alcohol withdrawal protocol.  He was diagnosed with a large left gluteal compartment intramuscular hematoma.  Patient on anticoagulation with Eliquis.  On 12/22/2024, the patient was transferred to intensive care unit due to worsening withdrawal signs and symptoms.  The patient was started on phenobarbital taper.  Symptoms improved.      The patient was transferred again to medical floor 12/23/2024, and I started taking care of this patient on 12/24/2024.  Family members present today, 2 sisters.  They stated have been trying to get patient into alcohol detoxification program in the outpatient setting but he has not been possible.   Patient being evaluated by physical therapy at the time of my visit.  He reports no nausea, no vomiting, no abdominal pain, no chest pain.  Pain is controlled.        Review of Systems   All  pertinent negatives and positives are as above. All other systems have been reviewed and are negative unless otherwise stated.     Objective    Temp:  [97.6 °F (36.4 °C)-98.7 °F (37.1 °C)] 97.6 °F (36.4 °C)  Heart Rate:  [58-70] 69  Resp:  [16] 16  BP: (106-109)/(65-73) 106/73  Physical Exam  Constitutional:       Appearance: Ill-appearing, alert oriented x 3.  No respiratory distress.  HENT:      Head: Normocephalic and atraumatic.      Nose: Nose normal.      Mouth/Throat:      Mouth: Mucous membranes are moist.      Pharynx: Oropharynx is clear.   Eyes:      Extraocular Movements: Extraocular movements intact.      Conjunctiva/sclera: Conjunctivae normal.      Pupils: Pupils are equal, round, and reactive to light.   Cardiovascular:      Rate and Rhythm: Normal rate and regular rhythm.      Pulses: Normal pulses.   Pulmonary:      Effort: No respiratory distress.      Breath sounds: Normal breath sounds. No wheezing, rhonchi or rales.   Abdominal:      General: Abdomen is flat. Bowel sounds are normal.      Palpations: Abdomen is soft.      Tenderness: There is no guarding or rebound.   Musculoskeletal:      Left gluteal hematoma present,  Extremities:  No lower extremity edema.  Skin:     Capillary Refill: Capillary refill takes less than 2 seconds.      Coloration: Skin is not jaundiced.      Findings: No rash.   Neurological: Slight tremors in hands.     General: No focal deficit present.      Mental Status: Patient is alert, oriented to place time and person.          Results Review:  I have reviewed the labs, radiology results, and diagnostic studies.    Laboratory Data:   Results from last 7 days   Lab Units 12/26/24  0340 12/25/24  0528 12/24/24  0258 12/23/24  1005   WBC 10*3/mm3  --  5.07 4.25 3.57   HEMOGLOBIN g/dL 8.4* 8.6* 6.8* 7.5*   HEMATOCRIT % 26.4* 27.6* 21.5* 23.2*   PLATELETS 10*3/mm3  --  101* 92* 93*        Results from last 7 days   Lab Units 12/25/24  0528 12/24/24  0258 12/23/24  1005  "  SODIUM mmol/L 136 138 135*   POTASSIUM mmol/L 3.7 3.5 3.5   CHLORIDE mmol/L 102 102 100   CO2 mmol/L 25.0 26.0 24.0   BUN mg/dL 17 18 20   CREATININE mg/dL 0.88 0.83 0.95   CALCIUM mg/dL 8.3* 8.1* 8.4*   BILIRUBIN mg/dL 0.6 0.5 0.6   ALK PHOS U/L 74 67 70   ALT (SGPT) U/L 22 22 25   AST (SGOT) U/L 24 26 32   GLUCOSE mg/dL 87 99 87       Culture Data:   No results found for: \"BLOODCX\", \"URINECX\", \"WOUNDCX\", \"MRSACX\", \"RESPCX\", \"STOOLCX\"    Radiology Data:   Imaging Results (Last 24 Hours)       ** No results found for the last 24 hours. **            I have reviewed the patient's current medications.     Assessment/Plan   Assessment  Active Hospital Problems    Diagnosis     **Stage 1 acute kidney injury     Large left gluteal compartment intramuscular hematomas     Chronic anticoagulation     MIKEY (acute kidney injury)     Alcohol intoxication     Multiple falls     Transaminitis     Alcohol withdrawal     Paroxysmal atrial fibrillation     Gait instability     Alcohol abuse     HOCM (hypertrophic obstructive cardiomyopathy)     Essential hypertension      Alcohol withdrawal, resolving  Alcohol abuse disorder  Acute on chronic anemia  Acute kidney injury/renal insufficiency  Traumatic left gluteal intramuscular hematoma  Atrial fibrillation  Chronic anticoagulation with Eliquis  Chronic alcohol hepatitis  Hypertrophic obstructive cardiomyopathy  Hypertension  Chronic thrombocytopenia secondary to alcohol use      Admission creatinine level 1.66.  Normalized to 0.88  Sodium 136, potassium 3.7.  Magnesium 2.3  Vitamin B12 561, iron 64.  Transferrin 192.  TIBC 286    Admission hemoglobin 8.0.  On 12/21/2024 hemoglobin dropped to 6.7.  Patient was transfused 1 unit packed red blood cells.  On 12/24/2024 hemoglobin was 6.8.  Patient was transfused 1 unit packed red blood cells.  Hemoglobin 8.4.  Hematocrit 26.4    Last platelet count 101,000.  .  C. difficile toxin positive, antigen is negative.  Likely chronic " carrier    Treatment Plan  Patient ended treatment with phenobarbital taper  Continue CIWA protocol monitoring.  Continue amiodarone, atorvastatin, Lexapro, folic acid  Continue Toprol-XL 12.5 p.o. daily.  Continue protonix 40 mg p.o. daily  Patient is currently off anticoagulation due to worsening anemia and a left gluteal hematoma  CBC in the morning  Skilled nursing facility placement in progress.  Social work note: patient has received a bed offer from [facility]. The other SNF's in [place] have declined due to not having a private room available. Bed offer accepted by patient's sister, Yuliya. Insurance approval process started.    Alcohol cessation and rehabilitation recommended    Medical Decision Making  Number and Complexity of problems: 11 high complexity  Differential Diagnosis: see above    Conditions and Status        Condition is improving.     [facility] Data  External documents reviewed: None  Cardiac tracing (EKG, telemetry) interpretation: Sinus rhythm  Radiology interpretation: Other reports reviewed  Labs reviewed: Yes  Any tests that were considered but not ordered: None     Decision rules/scores evaluated (example [id number], [place], etc): See chart     Discussed with: Patient and nurse     Care Planning  Shared decision making: With patient  Code status and discussions: Full code    Disposition  Social Determinants of Health that impact treatment or disposition: Alcohol abuse  I expect the patient to be discharged to skilled nursing facility once approved.      Electronically signed by Raul Gifford MD, 12/26/24, 11:30 CST.     Electronically signed by Raul Gifford MD at 12/26/24 1135          Consult Notes (most recent note)        Maldonado Crain APRN at 12/22/24 1135       Attestation signed by Kj Vasquez MD at 12/22/24 1523    I have reviewed this documentation and agree.  I agree with the assessment and plan by the APRN.    Brief clinical course.    Patient is a 69 years old   male with history of alcohol abuse, alcoholic hepatitis and history of alcohol withdrawal in the past but he also has A-fib hypertrophic obstructive cardiomyopathy hypertension hyperlipidemia and history of multiple falls at home presented to the hospital on 12/20/2024 after multiple falls following alcohol intoxication.  Imaging studies done on arrival showed no evidence of fracture but he had a left gluteal hematoma he also showed mild acute kidney injury elevated transaminases.  Hemoglobin was borderline low.  Patient was admitted to the medical floor but overnight on 1221 he was more anxious and agitated event while on the CIWA protocol and was transferred to the ICU on request from the hospitalist team.  The patient was initially stable on arrival to the ICU but later became anxious and agitated and was started on phenobarbital in addition to the CIWA protocol and was more comfortable.  He was requiring no oxygen and was on room air.    Clinical examination  Elderly  male clinically stable gets anxious agitated at times, HEENT: Atraumatic normocephalic.  Neck was supple no mass nodule or venous tension.  Heart: Sounds normal rate and rhythm regular no murmur.  Lungs: Clear to auscultation decreased breath sound at the bases.  Abdomen soft nontender bowel sounds present no organomegaly.  Extremities no edema normal pulses normal color.  Neurologic grossly intact skin there is no breakdown psych patient appears to be anxious and agitated and confused at times.    Assessment and plan.  Monitor him in the ICU closely.  Continue CIWA protocol and phenobarbital continue Librium for anxiety and agitation.  Fall precautions.  Close monitoring of neurologic status.  Repeat labs in studies and correct electrolytes.  Monitor renal function.  He has left gluteal hematoma and hemoglobin is borderline low.  Monitor hemoglobin and transfuse if needed.  Needs long-term alcohol abuse management plan.  No  family is present during the assessment today.  We will follow.    Electronically signed by    Kj Vasquez MD  Pulmonologist/Intensivist  12/22/2024 15:22 CST                    [facility] Intensivist Services  INPATIENT PROGRESS NOTE    Patient Name: Dayron Lubin  Date of Admission: 12/20/2024  Today's Date: 12/22/24  Length of Stay: 0  Primary Care Physician: Herberth Nguyen Jr., MD    Subjective   Fall       69-year-old male with history of A-fib, alcoholic hepatitis, CAD, hypertrophic obstructive cardiomyopathy, hypertension, hyperlipidemia and alcohol abuse with chronic falls, presented to the hospital on 12/20/2024 after sustaining multiple falls and having signs of alcohol intoxication.  CT lower extremity on arrival to ED found no fractures, but revealed a large left gluteal hematoma.  Lab work revealed a mild MIKEY and mildly elevated transaminases.  Anemic with H&H of 8/24.2.  Patient initially admitted to the medical floor.  Overnight on 12/21, patient became more confused and restless and required multiple doses of lorazepam.  Concern for worsening withdrawal Hospital medicine requested transfer to ICU for closer monitoring and sedation.  Upon arrival ICU, patient appeared alert, oriented, mildly tremulous.  Over the course of 1 to 2 hours, patient became increasingly agitated, anxious.  He is already on a Librium dose with taper.  Vital signs are currently stable.    Review of Systems   All pertinent negatives and positives are as above. All other systems have been reviewed and are negative unless otherwise stated.     Objective    Temp:  [97.8 °F (36.6 °C)-98.4 °F (36.9 °C)] 98 °F (36.7 °C)  Heart Rate:  [79-93] 79  Resp:  [16-18] 16  BP: (106-162)/(60-92) 108/75  Physical Exam  Constitutional:       General: He is not in acute distress.     Comments: Tremulous, restless   Eyes:      Pupils: Pupils are equal, round, and reactive to light.   Cardiovascular:       Rate and Rhythm: Normal rate and regular rhythm.      Pulses: Normal pulses.      Heart sounds: Normal heart sounds.   Pulmonary:      Effort: Pulmonary effort is normal.      Breath sounds: Normal breath sounds.   Abdominal:      Palpations: Abdomen is soft.      Tenderness: There is no abdominal tenderness.   Musculoskeletal:      Comments: Generalized weakness, left hip/gluteal hematoma   Skin:     General: Skin is warm.      Findings: Bruising present.   Neurological:      General: No focal deficit present.      Mental Status: He is alert.      Comments: Disoriented to time   Psychiatric:         Mood and Affect: Mood is anxious.         Results Review:  Lab Results (last 24 hours)       Procedure Component Value Units Date/Time    Clostridioides difficile toxin Ag, Reflex - Stool, Per Rectum [[id number]]  (Normal) Collected: 12/21/24 2051    Specimen: Stool from Per Rectum Updated: 12/22/24 0836     C.diff Toxin Ag Negative    Narrative:      DNA from a toxigenic strain of C.difficile was detected, although the free toxin itself was not detected. These findings are consistent with C.difficile colonization and may not reflect actual C.difficile infection. Clinical correlation needed.    Clostridioides difficile Toxin - Stool, Per Rectum [[id number]]  (Abnormal) Collected: 12/21/24 2051    Specimen: Stool from Per Rectum Updated: 12/22/24 0836    Narrative:      The following orders were created for panel order Clostridioides difficile Toxin - Stool, Per Rectum.  Procedure                               Abnormality         Status                     ---------                               -----------         ------                     Clostridioides difficile...[[id number]]  Abnormal            Final result                 Please view results for these tests on the individual orders.    Clostridioides difficile Toxin, PCR - Stool, Per Rectum [[id number]]  (Abnormal) Collected: 12/21/24 2051    Specimen: Stool from Per  Rectum Updated: 12/22/24 0836     Toxigenic C. difficile by PCR Positive    Narrative:      DNA from a toxigenic strain of C.difficile has been detected. Antigen testing for the presence of free C.difficile toxin is currently in progress, to help determine the clinical significance of this PCR result.     Gastrointestinal Panel, PCR - Stool, Per Rectum [[id number]]  (Normal) Collected: 12/21/24 2051    Specimen: Stool from Per Rectum Updated: 12/21/24 2218     Campylobacter Not Detected     Plesiomonas shigelloides Not Detected     Salmonella Not Detected     Vibrio Not Detected     Vibrio cholerae Not Detected     Yersinia enterocolitica Not Detected     Enteroaggregative E. coli (EAEC) Not Detected     Enteropathogenic E. coli (EPEC) Not Detected     Enterotoxigenic E. coli (ETEC) lt/st Not Detected     Shiga-like toxin-producing E. coli (STEC) stx1/stx2 Not Detected     Shigella/Enteroinvasive E. coli (EIEC) Not Detected     Cryptosporidium Not Detected     Cyclospora cayetanensis Not Detected     Entamoeba histolytica Not Detected     Giardia lamblia Not Detected     Adenovirus F40/41 Not Detected     Astrovirus Not Detected     Norovirus GI/GII Not Detected     Rotavirus A Not Detected     Sapovirus (I, II, IV or V) Not Detected    Narrative:      If Aeromonas, Staphylococcus aureus or Bacillus cereus are suspected, please order [id number]: Stool Culture, Aeromonas, S aureus, B Cereus.             CT Lower Extremity Left Without Contrast    Result Date: 12/20/2024  1. Large gluteal mixed density mass, favored to represent hematoma in the setting of trauma. Lack of intravenous contrast limits evaluation for active extravasation.  2. No acute fracture in the field-of-view.    This report was signed and finalized on 12/20/2024 4:45 PM by Dr Zuleyma Schmidt MD.      CT Pelvis Without Contrast    Result Date: 12/20/2024  1. No acute fracture or malalignment. 2. Large left gluteal compartment intramuscular hematomas  involving the gluteus medius and gluteus robbin measuring up to 15 cm craniocaudal and up to 13 cm transverse.    This report was signed and finalized on 12/20/2024 4:43 PM by Dr Gregory Guillermo.       Result Review:  I have personally reviewed the results from the time of this admission to 12/22/2024 11:35 CST and agree with these findings:  [x]  Laboratory list / accordion  [x]  Microbiology  [x]  Radiology  [x]  EKG/Telemetry   []  Cardiology/Vascular   []  Pathology  []  Old records  []  Other:    CBC          12/8/2024    03:44 12/20/2024    11:01 12/21/2024    01:54 12/21/2024    11:07   CBC   WBC 4.19  7.23  5.02     RBC 2.66  2.52  2.07     Hemoglobin 8.4  8.0  6.7  8.1    Hematocrit 26.6  24.2  20.2  24.1    .0  96.0  97.6     MCH 31.6  31.7  32.4     MCHC 31.6  33.1  33.2     RDW 14.2  14.9  15.1     Platelets 89  173  142       CMP          12/8/2024    03:44 12/20/2024    11:01 12/21/2024    01:54   CMP   Glucose 89  117  101    BUN 21  20  28    Creatinine 0.96  1.66  1.52    EGFR 85.0  44.1  49.0    Sodium 138  135  134    Potassium 4.5  4.9  4.4    Chloride 107  93  95    Calcium 8.0  8.7  8.1    Total Protein 5.7  6.9     Albumin 3.2  4.0     Globulin 2.5  2.9     Total Bilirubin 0.6  0.7     Alkaline Phosphatase 63  91     AST (SGOT) 36  68     ALT (SGPT) 38  57     Albumin/Globulin Ratio 1.3  1.4     BUN/Creatinine Ratio 21.9  12.0  18.4    Anion Gap 7.0  24.0  13.0        I have reviewed the patient's current medications.     Assessment/Plan   Assessment  Active Hospital Problems    Diagnosis     **Stage 1 acute kidney injury     Large left gluteal compartment intramuscular hematomas     Chronic anticoagulation     MIKEY (acute kidney injury)     Alcohol intoxication     Multiple falls     Transaminitis     Paroxysmal atrial fibrillation     Gait instability     Alcohol abuse     HOCM (hypertrophic obstructive cardiomyopathy)     Essential hypertension    69-year-old male with alcohol abuse,  now exhibiting progressing stages of alcohol withdrawal, admitted to ICU for critical care management.  Patient has been on Librium dose and taper with increasing requirements of benzodiazepine.  We will add phenobarbital taper.  Schedule Ativan every 6hr. patient noted to be anemic on admission has received 1 unit packed red blood cells.  His Eliquis has been discontinued at this time.  He is currently in normal sinus rhythm.    Alcohol abuse, alcohol withdrawal  -Start phenobarbital with taper  -Continue Librium  -Schedule Ativan with as needed doses  -If resistant to this, will need Precedex infusion    MIKEY  -Resolved, follow with daily metabolic panel    Left gluteal compartment muscular hematoma  -Currently off Eliquis  -Monitor for any signs of increase or vascular compromise  -Local ice for analgesia    Transaminitis  -Chronically elevated with alcoholic hepatitis, follow daily CMP    Paroxysmal atrial fibrillation  -Currently normal sinus rhythm, continue amiodarone and metoprolol  -Eliquis currently on hold given anemia and hematoma  -Patient with chronic falls, will need evaluation for appropriateness of long-term anticoagulation with his cardiologist and/or PCP    Total critical care time: Approximately 40 minutes     Due to a high probability of clinically significant, life threatening deterioration, the patient required my highest level of preparedness to intervene emergently and I personally spent this critical care time directly and personally managing the patient.      This critical care time included obtaining a history; examining the patient; pulse oximetry; ordering and review of studies; arranging urgent treatment with development of a management plan; evaluation of patient's response to treatment; frequent reassessment; and, discussions with other providers.     This critical care time was performed to assess and manage the high probability of imminent, life-threatening deterioration that could  result in multi-organ failure. It was exclusive of separately billable procedures and treating other patients and teaching time.     Please see MDM section and the rest of the note for further information on patient assessment and treatment.     Part of this note may be an electronic transcription/translation of spoken language to printed text using the Dragon dictation system     Electronically signed by BRI Machuca on 2024 at 11:56 CST      Electronically signed by Kj Vasquez MD at 24 1523          Physical Therapy Notes (most recent note)        Edda Moura, DAVID at 24 1111  Version 1 of 1         Acute Care - Physical Therapy Treatment Note   [place]     Patient Name: Dayron Lubin  : 1955  MRN: [id number]  Today's Date: 2024      Visit Dx:     ICD-10-CM ICD-9-CM   1. MIKEY (acute kidney injury)  N17.9 584.9   2. Frequent falls  R29.6 V15.88   3. Hematoma  T14.8XXA 924.9   4. Alcohol abuse  F10.10 305.00   5. Impaired mobility [Z74.09]  Z74.09 799.89     Patient Active Problem List   Diagnosis    Prostate cancer    Hx of radiation therapy    Elevated PSA    Encounter for follow-up surveillance of prostate cancer    HOCM (hypertrophic obstructive cardiomyopathy)    Coronary artery disease involving native coronary artery of native heart without angina pectoris    Essential hypertension    Mixed hyperlipidemia    Class 1 obesity with alveolar hypoventilation, serious comorbidity, and body mass index (BMI) of 30.0 to 30.9 in adult    Displaced fracture of lateral malleolus of right fibula, initial encounter for closed fracture    Family history of colonic polyps    History of colon polyps    Elevated brain natriuretic peptide (BNP) level    Stage 1 acute kidney injury    Anxiety associated with depression    Alcohol abuse    Alcoholic hepatitis    Volume depletion    Anorexia    CHF (congestive heart failure)    Hypokalemia    Duodenitis    Gait instability     SVT (supraventricular tachycardia)    Physical debility    Atrial fibrillation with rapid ventricular response    Alcoholism in remission    Paroxysmal atrial fibrillation    Nasal polyposis    Nasal congestion    Estelle bullosa    Long-term use of high-risk medication    Multiple falls    Transaminitis    Thrombocytopenia    Non-traumatic rhabdomyolysis    Alcohol withdrawal    Pancytopenia    Opiate abuse, episodic    Alcohol intoxication    Large left gluteal compartment intramuscular hematomas    Chronic anticoagulation    MIKEY (acute kidney injury)     Past Medical History:   Diagnosis Date    A-fib     Alcoholic hepatitis 07/13/2023    CAD (coronary artery disease)     History of external beam radiation therapy 05/12/2016    6840 cGy to prostate bed    Hyperlipidemia     Hypertension     Insomnia     Prostate cancer      Past Surgical History:   Procedure Laterality Date    CARDIAC CATHETERIZATION      CORONARY ANGIOPLASTY WITH STENT PLACEMENT      FRACTURE SURGERY      PROSTATE SURGERY      TONSILLECTOMY       PT Assessment (Last 12 Hours)       PT Evaluation and Treatment       Row Name 12/26/24 1037          Physical Therapy Time and Intention    Subjective Information complains of;pain  -AB     Document Type therapy note (daily note)  -AB     Mode of Treatment physical therapy  -AB       Row Name 12/26/24 1037          General Information    Existing Precautions/Restrictions fall  -AB       Row Name 12/26/24 1037          Bed Mobility    Supine-Sit [place] (Bed Mobility) --  up in chair  -AB     Sit-Supine [place] (Bed Mobility) standby assist  -AB       Row Name 12/26/24 1037          Sit-Stand Transfer    Sit-Stand [place] (Transfers) verbal cues;contact guard  -AB     Assistive Device (Sit-Stand Transfers) walker, front-wheeled  -AB       Row Name 12/26/24 1037          Stand-Sit Transfer    Stand-Sit [place] (Transfers) verbal cues;contact guard  -AB     Assistive Device (Stand-Sit  Transfers) walker, front-wheeled  -AB     Comment, (Stand-Sit Transfer) vc's for a controled sit  -AB       Row Name 12/26/24 1037          Gait/Stairs (Locomotion)    [place] Level (Gait) verbal cues;contact guard;minimum assist (75% patient effort)  -AB     Assistive Device (Gait) walker, front-wheeled  -AB     Distance in Feet (Gait) 50  x 6  -AB     Deviations/Abnormal Patterns (Gait) gait speed decreased;stride length decreased  -AB       Row Name             Wound 12/07/24 Left anterior third toe    Wound - Properties Group Placement Date: 12/07/24  -BB Side: Left  -BB Orientation: anterior  -BB Location: third toe  -BB    Retired Wound - Properties Group Placement Date: 12/07/24  -BB Side: Left  -BB Orientation: anterior  -BB Location: third toe  -BB    Retired Wound - Properties Group Placement Date: 12/07/24  -BB Side: Left  -BB Orientation: anterior  -BB Location: third toe  -BB    Retired Wound - Properties Group Date first assessed: 12/07/24  -BB Side: Left  -BB Location: third toe  -BB      Row Name 12/26/24 1037          Positioning and Restraints    Pre-Treatment Position sitting in chair/recliner  -AB     Post Treatment Position bed  -AB     In Bed supine;call light within reach;with family/caregiver  -AB               User Key  (r) = Recorded By, (t) = Taken By, (c) = Cosigned By      Initials Name Provider Type    AB Edda Moura, PTA Physical Therapist Assistant    BB Consuelo Chaudhry, RN Registered Nurse                    Physical Therapy Education       Title: PT OT SLP Therapies (In Progress)       Topic: Physical Therapy (In Progress)       Point: Mobility training (Done)       Learning Progress Summary            Patient Acceptance, E, VU,NR by NONI at 12/23/2024 0932    Comment: benefits of activity, progression of PT POC                      Point: Home exercise program (Not Started)       Learner Progress:  Not documented in this visit.              Point: Body mechanics (Not  Started)       Learner Progress:  Not documented in this visit.              Point: Precautions (Not Started)       Learner Progress:  Not documented in this visit.                              User Key       Initials Effective Dates Name Provider Type Noemí CHENEY 02/03/23 -  Mikel Lozano, PT DPT Physical Therapist PT                  PT Recommendation and Plan         Outcome Measures       Row Name 12/24/24 1300             How much help from another is currently needed...    Putting on and taking off regular lower body clothing? 3  -EC      Bathing (including washing, rinsing, and drying) 3  -EC      Toileting (which includes using toilet bed pan or urinal) 3  -EC      Putting on and taking off regular upper body clothing 4  -EC      Taking care of personal grooming (such as brushing teeth) 4  -EC      Eating meals 4  -EC      AM-PAC 6 Clicks Score (OT) 21  -EC         Functional Assessment    Outcome Measure Options AM-PAC 6 Clicks Daily Activity (OT)  -EC                User Key  (r) = Recorded By, (t) = Taken By, (c) = Cosigned By      Initials Name Provider Type    EC Violet Hunt, OTR/L Occupational Therapist                     Time Calculation:    PT Charges       Row Name 12/26/24 1037             Time Calculation    Start Time 1037  -AB      Stop Time 1102  -AB      Time Calculation (min) 25 min  -AB      PT Received On 12/26/24  -AB         Time Calculation- PT    Total Timed Code Minutes- PT 25 minute(s)  -AB                User Key  (r) = Recorded By, (t) = Taken By, (c) = Cosigned By      Initials Name Provider Type    AB Edda Moura PTA Physical Therapist Assistant                      PT G-Codes  Outcome Measure Options: AM-PAC 6 Clicks Daily Activity (OT)  AM-PAC 6 Clicks Score (PT): 20  AM-PAC 6 Clicks Score (OT): 21    Edda Moura PTA  12/26/2024      Electronically signed by Edda Moura PTA at 12/26/24 1111          Occupational Therapy Notes (most recent note)         Violet Hunt, OTR/L at 24 1350          Acute Care - Occupational Therapy Treatment Note  [facility]     Patient Name: Dayron Lubin  : 1955  MRN: [id number]  Today's Date: 2024             Admit Date: 2024       ICD-10-CM ICD-9-CM   1. MIKEY (acute kidney injury)  N17.9 584.9   2. Frequent falls  R29.6 V15.88   3. Hematoma  T14.8XXA 924.9   4. Alcohol abuse  F10.10 305.00   5. Impaired mobility [Z74.09]  Z74.09 799.89     Patient Active Problem List   Diagnosis    Prostate cancer    Hx of radiation therapy    Elevated PSA    Encounter for follow-up surveillance of prostate cancer    HOCM (hypertrophic obstructive cardiomyopathy)    Coronary artery disease involving native coronary artery of native heart without angina pectoris    Essential hypertension    Mixed hyperlipidemia    Class 1 obesity with alveolar hypoventilation, serious comorbidity, and body mass index (BMI) of 30.0 to 30.9 in adult    Displaced fracture of lateral malleolus of right fibula, initial encounter for closed fracture    Family history of colonic polyps    History of colon polyps    Elevated brain natriuretic peptide (BNP) level    Stage 1 acute kidney injury    Anxiety associated with depression    Alcohol abuse    Alcoholic hepatitis    Volume depletion    Anorexia    CHF (congestive heart failure)    Hypokalemia    Duodenitis    Gait instability    SVT (supraventricular tachycardia)    Physical debility    Atrial fibrillation with rapid ventricular response    Alcoholism in remission    Paroxysmal atrial fibrillation    Nasal polyposis    Nasal congestion    Estelle bullosa    Long-term use of high-risk medication    Multiple falls    Transaminitis    Thrombocytopenia    Non-traumatic rhabdomyolysis    Alcohol withdrawal    Pancytopenia    Opiate abuse, episodic    Alcohol intoxication    Large left gluteal compartment intramuscular hematomas    Chronic anticoagulation    MIKEY (acute kidney injury)     Past  Medical History:   Diagnosis Date    A-fib     Alcoholic hepatitis 07/13/2023    CAD (coronary artery disease)     History of external beam radiation therapy 05/12/2016    6840 cGy to prostate bed    Hyperlipidemia     Hypertension     Insomnia     Prostate cancer      Past Surgical History:   Procedure Laterality Date    CARDIAC CATHETERIZATION      CORONARY ANGIOPLASTY WITH STENT PLACEMENT      FRACTURE SURGERY      PROSTATE SURGERY      TONSILLECTOMY           OT ASSESSMENT FLOWSHEET (Last 12 Hours)       OT Evaluation and Treatment       Row Name 12/24/24 5120                   OT Time and Intention    Subjective Information complains of;pain  -EC        Document Type therapy note (daily note)  -EC        Mode of Treatment occupational therapy  -EC           General Information    Patient Profile Reviewed yes  -EC        Existing Precautions/Restrictions fall  -EC           Pain Assessment    Pretreatment Pain Rating 8/10  -EC        Posttreatment Pain Rating 8/10  -EC        Pain Location hip  -EC        Pain Side/Orientation left  -EC        Pain Management Interventions exercise or physical activity utilized;positioning techniques utilized  -EC        Response to Pain Interventions no change per patient report  -EC           Activities of Daily Living    BADL Assessment/Intervention lower body dressing;grooming;toileting  -EC           Lower Body Dressing Assessment/Training    [place] Level (Lower Body Dressing) don;socks;standby assist  -EC        Position (Lower Body Dressing) edge of bed sitting  -EC           Grooming Assessment/Training    [place] Level (Grooming) hair care, combing/brushing;oral care regimen;wash face, hands;set up;standby assist  -EC        Position (Grooming) unsupported sitting;sink side  -EC           Toileting Assessment/Training    [place] Level (Toileting) change pad/brief;minimum assist (75% patient effort)  -EC        Position (Toileting) unsupported  sitting;supported standing  -EC        Comment, (Toileting) assist with donning brief over L foot d/t pain  -EC           Bed Mobility    Bed Mobility supine-sit;sit-supine  -EC        Supine-Sit [place] (Bed Mobility) standby assist  -EC        Sit-Supine [place] (Bed Mobility) standby assist  -EC        Assistive Device (Bed Mobility) head of bed elevated;bed rails  -EC           Functional Mobility    Functional Mobility- Ind. Level verbal cues required;contact guard assist  -EC        Functional Mobility- Device walker, front-wheeled  -EC        Functional Mobility- Comment vcs for safety awarness and balanceduring fxn mob  -EC           Transfer Assessment/Treatment    Transfers sit-stand transfer;stand-sit transfer;toilet transfer  -EC           Sit-Stand Transfer    Sit-Stand [place] (Transfers) verbal cues;contact guard  -EC        Assistive Device (Sit-Stand Transfers) walker, front-wheeled  -EC        Comment, (Sit-Stand Transfer) 3x5 reps, vcs for hand placement  -EC           Stand-Sit Transfer    Stand-Sit [place] (Transfers) verbal cues;contact guard  -EC        Assistive Device (Stand-Sit Transfers) walker, front-wheeled  -EC        Comment, (Stand-Sit Transfer) vcs for controlled descent  -EC           Toilet Transfer    Type (Toilet Transfer) sit-stand;stand-sit  -EC        [place] Level (Toilet Transfer) verbal cues;contact guard  -EC        Assistive Device (Toilet Transfer) commode;grab bars/safety frame;walker, front-wheeled  -EC           Balance    Balance Assessment sitting static balance;sitting dynamic balance;standing static balance;standing dynamic balance  -EC        Static Sitting Balance standby assist  -EC        Dynamic Sitting Balance contact guard  -EC        Position, Sitting Balance unsupported;sitting edge of bed  -EC        Sit to Stand Dynamic Balance contact guard  -EC        Static Standing Balance contact guard  -EC        Dynamic Standing Balance  contact guard;verbal cues  -EC        Position/Device Used, Standing Balance supported;walker, front-wheeled  -EC        Comment, Balance shuffles feet and almost loses balance to the R during mobility  -EC           Wound 12/07/24 Left anterior third toe    Wound - Properties Group Placement Date: 12/07/24  -BB Side: Left  -BB Orientation: anterior  -BB Location: third toe  -BB    Retired Wound - Properties Group Placement Date: 12/07/24  -BB Side: Left  -BB Orientation: anterior  -BB Location: third toe  -BB    Retired Wound - Properties Group Placement Date: 12/07/24  -BB Side: Left  -BB Orientation: anterior  -BB Location: third toe  -BB    Retired Wound - Properties Group Date first assessed: 12/07/24  -BB Side: Left  -BB Location: third toe  -BB       Plan of Care Review    Plan of Care Reviewed With patient  -EC        Progress improving  -EC        Outcome Evaluation OT tx completed. Pt in fowlers, endorses L hip pain but is agreeable to ADLs in the BR.SBA for bed mobility. dons socks while sitting EOB. Pt mobilized into BR using rwx with CGA. Needs vcs to prevent a LOB to the R as he often shuffles his feet. Voided on commode and performed seated ADLs sinkside. He required assist to don a new brief over his L foot d/t increased pain and decreased balance. Once back to bed, pt completed 3x5 reps sit<>stand to address sit>stand dynamic balance and activity tolerance. tech'q improved with each set. cont OT POC. Pt remains a high fall risk and would benefit from rehab at d/c  -EC           Vital Signs    Intratreatment Heart Rate (beats/min) 77  -EC        Posttreatment Heart Rate (beats/min) 61  -EC        Post SpO2 (%) 100  -EC        O2 Delivery Post Treatment room air  -EC           Positioning and Restraints    Pre-Treatment Position in bed  -EC        Post Treatment Position bed  -EC        In Bed fowlers;call light within reach;encouraged to call for assist;exit alarm on;side rails up x2  -EC                   User Key  (r) = Recorded By, (t) = Taken By, (c) = Cosigned By      Initials Name Effective Dates     Violet Hunt OTR/L 10/13/23 -     Consuelo Cotton RN 05/22/24 -                      Occupational Therapy Education       Title: PT OT SLP Therapies (In Progress)       Topic: Occupational Therapy (In Progress)       Point: ADL training (Done)       Description:   Instruct learner(s) on proper safety adaptation and remediation techniques during self care or transfers.   Instruct in proper use of assistive devices.                  Learning Progress Summary            Patient Acceptance, E, VU by  at 12/24/2024 1350    Acceptance, E, VU by  at 12/23/2024 1030                      Point: Home exercise program (Not Started)       Description:   Instruct learner(s) on appropriate technique for monitoring, assisting and/or progressing therapeutic exercises/activities.                  Learner Progress:  Not documented in this visit.              Point: Precautions (Done)       Description:   Instruct learner(s) on prescribed precautions during self-care and functional transfers.                  Learning Progress Summary            Patient Acceptance, E, VU by  at 12/24/2024 1350    Acceptance, E, VU by  at 12/23/2024 1030                      Point: Body mechanics (Done)       Description:   Instruct learner(s) on proper positioning and spine alignment during self-care, functional mobility activities and/or exercises.                  Learning Progress Summary            Patient Acceptance, E, VU by  at 12/24/2024 1350    Acceptance, E, VU by  at 12/23/2024 1030                                      User Key       Initials Effective Dates Name Provider Type Discipline     07/11/23 -  Jalyn Ortiz OTR/L Occupational Therapist OT     10/13/23 -  Violet Hunt OTR/L Occupational Therapist OT                      OT Recommendation and Plan     Plan of Care Review  Plan of Care Reviewed  With: patient  Progress: improving  Outcome Evaluation: OT tx completed. Pt in johann, endorses L hip pain but is agreeable to ADLs in the BR.SBA for bed mobility. dons socks while sitting EOB. Pt mobilized into BR using rwx with CGA. Needs vcs to prevent a LOB to the R as he often shuffles his feet. Voided on commode and performed seated ADLs sinkside. He required assist to don a new brief over his L foot d/t increased pain and decreased balance. Once back to bed, pt completed 3x5 reps sit<>stand to address sit>stand dynamic balance and activity tolerance. tech'q improved with each set. cont OT POC. Pt remains a high fall risk and would benefit from rehab at d/c  Plan of Care Reviewed With: patient  Outcome Evaluation: OT tx completed. Pt in johann, endorses L hip pain but is agreeable to ADLs in the BR.SBA for bed mobility. dons socks while sitting EOB. Pt mobilized into BR using rwx with CGA. Needs vcs to prevent a LOB to the R as he often shuffles his feet. Voided on commode and performed seated ADLs sinkside. He required assist to don a new brief over his L foot d/t increased pain and decreased balance. Once back to bed, pt completed 3x5 reps sit<>stand to address sit>stand dynamic balance and activity tolerance. tech'q improved with each set. cont OT POC. Pt remains a high fall risk and would benefit from rehab at d/c     Outcome Measures       Row Name 12/24/24 1300             How much help from another is currently needed...    Putting on and taking off regular lower body clothing? 3  -EC      Bathing (including washing, rinsing, and drying) 3  -EC      Toileting (which includes using toilet bed pan or urinal) 3  -EC      Putting on and taking off regular upper body clothing 4  -EC      Taking care of personal grooming (such as brushing teeth) 4  -EC      Eating meals 4  -EC      AM-PAC 6 Clicks Score (OT) 21  -EC         Functional Assessment    Outcome Measure Options AM-PAC 6 Clicks Daily Activity (OT)   -EC                User Key  (r) = Recorded By, (t) = Taken By, (c) = Cosigned By      Initials Name Provider Type    EC Violet Hunt OTR/L Occupational Therapist                    Time Calculation:    Time Calculation- OT       Row Name 24 1259             Timed Charges    81300 - Gait Training Minutes  16  -LY         Total Minutes    Timed Charges Total Minutes 16  -LY       Total Minutes 16  -LY                User Key  (r) = Recorded By, (t) = Taken By, (c) = Cosigned By      Initials Name Provider Type    LY Michell Ratliff, PTA Physical Therapist Assistant                           Violet Hunt OTR/L  2024    Electronically signed by Violet Hunt OTR/L at 24 1350       Mikel Lozano, PT DPT   Physical Therapist  Physical Therapy     Therapy Evaluation     Signed     Date of Service: 24  Creation Time: 24     Signed       Expand All Collapse All    Patient Name: Dayron Lubin                   : 1955                        MRN: [id number]                              Today's Date: 2024                                 Admit Date: 2024                      Visit Dx:   Visit Diagnosis       ICD-10-CM ICD-9-CM   1. MIKEY (acute kidney injury)  N17.9 584.9   2. Frequent falls  R29.6 V15.88   3. Hematoma  T14.8XXA 924.9   4. Alcohol abuse  F10.10 305.00   5. Impaired mobility [Z74.09]  Z74.09 799.89         Problem List       Patient Active Problem List   Diagnosis    Prostate cancer    Hx of radiation therapy    Elevated PSA    Encounter for follow-up surveillance of prostate cancer    HOCM (hypertrophic obstructive cardiomyopathy)    Coronary artery disease involving native coronary artery of native heart without angina pectoris    Essential hypertension    Mixed hyperlipidemia    Class 1 obesity with alveolar hypoventilation, serious comorbidity, and body mass index (BMI) of 30.0 to 30.9 in adult    Displaced fracture of lateral malleolus of  right fibula, initial encounter for closed fracture    Family history of colonic polyps    History of colon polyps    Elevated brain natriuretic peptide (BNP) level    Stage 1 acute kidney injury    Anxiety associated with depression    Alcohol abuse    Alcoholic hepatitis    Volume depletion    Anorexia    CHF (congestive heart failure)    Hypokalemia    Duodenitis    Gait instability    SVT (supraventricular tachycardia)    Physical debility    Atrial fibrillation with rapid ventricular response    Alcoholism in remission    Paroxysmal atrial fibrillation    Nasal polyposis    Nasal congestion    Estelle bullosa    Long-term use of high-risk medication    Multiple falls    Transaminitis    Thrombocytopenia    Non-traumatic rhabdomyolysis    Alcohol withdrawal    Pancytopenia    Opiate abuse, episodic    Alcohol intoxication    Large left gluteal compartment intramuscular hematomas    Chronic anticoagulation    MIKEY (acute kidney injury)         Medical History        Past Medical History:   Diagnosis Date    A-fib      Alcoholic hepatitis 07/13/2023    CAD (coronary artery disease)      History of external beam radiation therapy 05/12/2016     6840 cGy to prostate bed    Hyperlipidemia      Hypertension      Insomnia      Prostate cancer           Surgical History         Past Surgical History:   Procedure Laterality Date    CARDIAC CATHETERIZATION        CORONARY ANGIOPLASTY WITH STENT PLACEMENT        FRACTURE SURGERY        PROSTATE SURGERY        TONSILLECTOMY               General Information         Row Name 12/23/24 0932                 Physical Therapy Time and Intention     Document Type evaluation  Falls. Dx: MIKEY, alcohol abuse/withdrawal, L gluteal hematoma, transaminitis.  -NONI       Mode of Treatment physical therapy  -NONI          Row Name 12/23/24 0932                 General Information     Patient Profile Reviewed yes  -NONI       Prior Level of Function independent:;bed mobility;transfer;gait  -NONI        Existing Precautions/Restrictions fall  -NONI       Barriers to Rehab medically complex;previous functional deficit  -NONI          Row Name 12/23/24 0932                 Living Environment     People in Home alone  -NONI          Row Name 12/23/24 0932                 Home Main Entrance     Number of Stairs, Main Entrance two  -NONI       Stair Railings, Main Entrance railing on left side (ascending)  -NONI          Row Name 12/23/24 0932                 Stairs Within Home, Primary     Number of Stairs, Within Home, Primary none  -NONI          Row Name 12/23/24 0932                 Cognition     Orientation Status (Cognition) oriented to;person;place;situation  -NONI          Row Name 12/23/24 0932                 Safety Issues/Impairments Affecting Functional Mobility     Safety Issues Affecting Function (Mobility) friction/shear risk  -NONI       Impairments Affecting Function (Mobility) balance;endurance/activity tolerance;pain;strength  -NONI                       User Key  (r) = Recorded By, (t) = Taken By, (c) = Cosigned By        Initials Name Provider Type     Mikel Handley, PT DPT Physical Therapist                            Mobility         Row Name 12/23/24 0932                 Bed Mobility     Bed Mobility supine-sit  -NONI       Supine-Sit [place] (Bed Mobility) contact guard  -NONI       Assistive Device (Bed Mobility) head of bed elevated;bed rails  -NONI          Row Name 12/23/24 0932                 Sit-Stand Transfer     Sit-Stand [place] (Transfers) contact guard  -NONI          Row Name 12/23/24 0932                 Gait/Stairs (Locomotion)     [place] Level (Gait) contact guard  -NONI       Distance in Feet (Gait) 50  -NONI       Deviations/Abnormal Patterns (Gait) judd decreased;gait speed decreased  -NONI                       User Key  (r) = Recorded By, (t) = Taken By, (c) = Cosigned By        Initials Name Provider Type     Mikel Handley, PT DPT Physical Therapist                             Obj/Interventions         Row Name 12/23/24 0932                 Range of Motion Comprehensive     General Range of Motion bilateral lower extremity ROM WFL  -NONI          [facility] Name 12/23/24 0932                 Strength Comprehensive (MMT)     Comment, General Manual Muscle Testing (MMT) Assessment B LE grossly 4-/5  -NONI          Row Name 12/23/24 0932                 Balance     Balance Assessment sitting dynamic balance;standing dynamic balance  -NONI       Dynamic Sitting Balance standby assist  -NONI       Position, Sitting Balance unsupported;sitting edge of bed  -NONI       Dynamic Standing Balance contact guard;minimal assist  -NONI       Position/Device Used, Standing Balance supported  -NONI                       User Key  (r) = Recorded By, (t) = Taken By, (c) = Cosigned By        Initials Name Provider Type     Mikel Handley, PT DPT Physical Therapist                            Goals/Plan         [facility] Name 12/23/24 0932                 Bed Mobility Goal 1 (PT)     Activity/Assistive Device (Bed Mobility Goal 1, PT) sit to supine/supine to sit  -NONI       [place] Level/Cues Needed (Bed Mobility Goal 1, PT) supervision required  -NONI       Time Frame (Bed Mobility Goal 1, PT) long term goal (LTG);10 days  -NONI       Progress/Outcomes (Bed Mobility Goal 1, PT) new goal  -NONI          Row Name 12/23/24 0932                 Transfer Goal 1 (PT)     Activity/Assistive Device (Transfer Goal 1, PT) sit-to-stand/stand-to-sit;bed-to-chair/chair-to-bed  -NONI       [place] Level/Cues Needed (Transfer Goal 1, PT) supervision required  -NONI       Time Frame (Transfer Goal 1, PT) long term goal (LTG);10 days  -NONI       Progress/Outcome (Transfer Goal 1, PT) new goal  -NONI          Row Name 12/23/24 0932                 Gait Training Goal 1 (PT)     Activity/Assistive Device (Gait Training Goal 1, PT) gait (walking locomotion);assistive device use;decrease fall risk;improve balance and speed;increase endurance/gait  distance  -NONI       [place] Level (Gait Training Goal 1, PT) supervision required  -NONI       Distance (Gait Training Goal 1, PT) 100 ft, no sway or LOB  -NONI       Time Frame (Gait Training Goal 1, PT) long term goal (LTG);10 days  -NONI       Progress/Outcome (Gait Training Goal 1, PT) new goal  -NONI          Row Name 12/23/24 0932                 Therapy Assessment/Plan (PT)     Planned Therapy Interventions (PT) bed mobility training;transfer training;gait training;balance training;home exercise program;patient/family education;postural re-education;stair training;strengthening;motor coordination training  -NONI                    User Key  (r) = Recorded By, (t) = Taken By, (c) = Cosigned By        Initials Name Provider Type     Mikel Handley, PT DPT Physical Therapist                         Clinical Impression         Row Name 12/23/24 0932                 Pain     Pain Location hip  -NONI       Pain Side/Orientation left  -NONI       Pain Management Interventions exercise or physical activity utilized  -NONI       Additional Documentation Pain Scale: FACES Pre/Post-Treatment (Group)  -NONI          Row Name 12/23/24 0932                 Pain Scale: FACES Pre/Post-Treatment     Pain: FACES Scale, Pretreatment 2-->hurts little bit  -NONI          Row Name 12/23/24 0932                 Plan of Care Review     Plan of Care Reviewed With patient  -NONI       Outcome Evaluation PT eval complete. Mr. Lubin is alert and oriented x 3. He reports living at home alone. he reports falls at home due to poor balance and alcohol use. Today needs CGA to stand and to ambulate ~ 50 ft total. Demos fair balance this session with support and generalized weakness. Complains of L hip pain from falls at home. Trial a RW for pain and balance safety with gait. PT will cont with mobility training, balance training and strengthening. Mr. Lubin is hopeful for some form of placement for rehab effforts.  -NONI          Row Name 12/23/24 0911                  Therapy Assessment/Plan (PT)     Patient/Family Therapy Goals Statement (PT) go to rehab  -NONI       Rehab Potential (PT) good  -NONI       Criteria for Skilled Interventions Met (PT) yes;meets criteria;skilled treatment is necessary  -NONI       Therapy Frequency (PT) 2 times/day  -NONI       Predicted Duration of Therapy Intervention (PT) until d/c  -NONI          Row Name 12/23/24 0932                 Positioning and Restraints     Pre-Treatment Position in bed  -NONI       Post Treatment Position chair  -NONI       In Chair notified nsg;reclined;call light within reach;encouraged to call for assist;patient within staff view  -NONI                       User Key  (r) = Recorded By, (t) = Taken By, (c) = Cosigned By        Initials Name Provider Type     NONI Mkiel Lozano, PT DPT Physical Therapist                            Outcome Measures         Row Name 12/23/24 1320 12/23/24 0932            How much help from another person do you currently need...     Turning from your back to your side while in flat bed without using bedrails? 3  -SE 3  -NONI     Moving from lying on back to sitting on the side of a flat bed without bedrails? 3  -SE 3  -NONI     Moving to and from a bed to a chair (including a wheelchair)? 3  -SE 3  -NONI     Standing up from a chair using your arms (e.g., wheelchair, bedside chair)? 3  -SE 3  -NONI     Climbing 3-5 steps with a railing? 3  -SE 3  -NONI     To walk in hospital room? 3  -SE 3  -NONI     AM-PAC 6 Clicks Score (PT) 18  -SE 18  -NONI     Highest Level of Mobility Goal 6 --> Walk 10 steps or more  -SE 6 --> Walk 10 steps or more  -NONI        Row Name 12/23/24 0748                 How much help from another person do you currently need...     Turning from your back to your side while in flat bed without using bedrails? 3  -SK       Moving from lying on back to sitting on the side of a flat bed without bedrails? 3  -SK       Moving to and from a bed to a chair (including a wheelchair)? 3  -SK        Standing up from a chair using your arms (e.g., wheelchair, bedside chair)? 3  -SK       Climbing 3-5 steps with a railing? 3  -SK       To walk in hospital room? 3  -SK       AM-PAC 6 Clicks Score (PT) 18  -SK       Highest Level of Mobility Goal 6 --> Walk 10 steps or more  -SK          Row Name 12/23/24 0935 12/23/24 0932            Functional Assessment     Outcome Measure Options AM-PAC 6 Clicks Daily Activity (OT)  - AM-PAC 6 Clicks Basic Mobility (PT)  -NONI                     User Key  (r) = Recorded By, (t) = Taken By, (c) = Cosigned By        Initials Name Provider Type      Jalyn Ortiz, OTR/L Occupational Therapist     Mikel Handley, PT DPT Physical Therapist     Laquita Atkins, LPN Licensed Nurse     Yuliya Askew RN Registered Nurse                          Physical Therapy Education            Title: PT OT SLP Therapies (In Progress)         Topic: Physical Therapy (In Progress)         Point: Mobility training (Done)         Learning Progress Summary             Patient Acceptance, E, VU,NR by NONI at 12/23/2024 0932     Comment: benefits of activity, progression of PT POC                            Point: Home exercise program (Not Started)         Learner Progress:  Not documented in this visit.                  Point: Body mechanics (Not Started)         Learner Progress:  Not documented in this visit.                  Point: Precautions (Not Started)         Learner Progress:  Not documented in this visit.                                      User Key         Initials Effective Dates Name Provider Type Discipline     NONI 02/03/23 -  Mikel Lozano, PT DPT Physical Therapist PT                          PT Recommendation and Plan  Planned Therapy Interventions (PT): bed mobility training, transfer training, gait training, balance training, home exercise program, patient/family education, postural re-education, stair training, strengthening, motor coordination  training  Outcome Evaluation: PT eval complete. Mr. Lubin is alert and oriented x 3. He reports living at home alone. he reports falls at home due to poor balance and alcohol use. Today needs CGA to stand and to ambulate ~ 50 ft total. Demos fair balance this session with support and generalized weakness. Complains of L hip pain from falls at home. Trial a RW for pain and balance safety with gait. PT will cont with mobility training, balance training and strengthening. Mr. Lubin is hopeful for some form of placement for rehab effforts.      Time Calculation:        PT Charges         Row Name 12/23/24 1540                       Time Calculation     Start Time 0932  -NONI         Stop Time 1000  + 10 minutes w chart review, 38 total minutes.  -NONI         Time Calculation (min) 28 min  -NONI         PT Received On 12/23/24  -NONI         PT Goal Re-Cert Due Date 01/02/25  -NONI                 Untimed Charges     PT Eval/Re-eval Minutes 38  -NONI                 Total Minutes     Untimed Charges Total Minutes 38  -NONI          Total Minutes 38  -NONI                      User Key  (r) = Recorded By, (t) = Taken By, (c) = Cosigned By        Initials Name Provider Type     Mikel Handley, PT DPT Physical Therapist                       Therapy Charges for Today         Code Description Service Date Service Provider Modifiers Qty     68139879406 HC PT EVAL LOW COMPLEXITY 3 12/23/2024 Mikel Lozano PT DPT GP 1                PT G-Codes  Outcome Measure Options: AM-PAC 6 Clicks Daily Activity (OT)  AM-PAC 6 Clicks Score (PT): 18  AM-PAC 6 Clicks Score (OT): 21  PT Discharge Summary  Anticipated Discharge Disposition (PT): sub acute care setting, skilled nursing facility     Mikel Lozano, PT DPT               12/23/2024

## 2024-12-26 NOTE — THERAPY TREATMENT NOTE
Acute Care - Physical Therapy Treatment Note  [facility]     Patient Name: Dayron Lubin  : 1955  MRN: [id number]  Today's Date: 2024      Visit Dx:     ICD-10-CM ICD-9-CM   1. MIKEY (acute kidney injury)  N17.9 584.9   2. Frequent falls  R29.6 V15.88   3. Hematoma  T14.8XXA 924.9   4. Alcohol abuse  F10.10 305.00   5. Impaired mobility [Z74.09]  Z74.09 799.89     Patient Active Problem List   Diagnosis    Prostate cancer    Hx of radiation therapy    Elevated PSA    Encounter for follow-up surveillance of prostate cancer    HOCM (hypertrophic obstructive cardiomyopathy)    Coronary artery disease involving native coronary artery of native heart without angina pectoris    Essential hypertension    Mixed hyperlipidemia    Class 1 obesity with alveolar hypoventilation, serious comorbidity, and body mass index (BMI) of 30.0 to 30.9 in adult    Displaced fracture of lateral malleolus of right fibula, initial encounter for closed fracture    Family history of colonic polyps    History of colon polyps    Elevated brain natriuretic peptide (BNP) level    Stage 1 acute kidney injury    Anxiety associated with depression    Alcohol abuse    Alcoholic hepatitis    Volume depletion    Anorexia    CHF (congestive heart failure)    Hypokalemia    Duodenitis    Gait instability    SVT (supraventricular tachycardia)    Physical debility    Atrial fibrillation with rapid ventricular response    Alcoholism in remission    Paroxysmal atrial fibrillation    Nasal polyposis    Nasal congestion    Estelle bullosa    Long-term use of high-risk medication    Multiple falls    Transaminitis    Thrombocytopenia    Non-traumatic rhabdomyolysis    Alcohol withdrawal    Pancytopenia    Opiate abuse, episodic    Alcohol intoxication    Large left gluteal compartment intramuscular hematomas    Chronic anticoagulation    MIKEY (acute kidney injury)     Past Medical History:   Diagnosis Date    A-fib     Alcoholic hepatitis 2023     CAD (coronary artery disease)     History of external beam radiation therapy 05/12/2016    6840 cGy to prostate bed    Hyperlipidemia     Hypertension     Insomnia     Prostate cancer      Past Surgical History:   Procedure Laterality Date    CARDIAC CATHETERIZATION      CORONARY ANGIOPLASTY WITH STENT PLACEMENT      FRACTURE SURGERY      PROSTATE SURGERY      TONSILLECTOMY       PT Assessment (Last 12 Hours)       PT Evaluation and Treatment       Row Name 12/26/24 1037          Physical Therapy Time and Intention    Subjective Information complains of;pain  -AB     Document Type therapy note (daily note)  -AB     Mode of Treatment physical therapy  -AB       Row Name 12/26/24 1037          General Information    Existing Precautions/Restrictions fall  -AB       Row Name 12/26/24 1037          Bed Mobility    Supine-Sit [place] (Bed Mobility) --  up in chair  -AB     Sit-Supine [place] (Bed Mobility) standby assist  -AB       Row Name 12/26/24 1037          Sit-Stand Transfer    Sit-Stand [place] (Transfers) verbal cues;contact guard  -AB     Assistive Device (Sit-Stand Transfers) walker, front-wheeled  -AB       Row Name 12/26/24 1037          Stand-Sit Transfer    Stand-Sit [place] (Transfers) verbal cues;contact guard  -AB     Assistive Device (Stand-Sit Transfers) walker, front-wheeled  -AB     Comment, (Stand-Sit Transfer) vc's for a controled sit  -AB       Row Name 12/26/24 1037          Gait/Stairs (Locomotion)    [place] Level (Gait) verbal cues;contact guard;minimum assist (75% patient effort)  -AB     Assistive Device (Gait) walker, front-wheeled  -AB     Distance in Feet (Gait) 50  x 6  -AB     Deviations/Abnormal Patterns (Gait) gait speed decreased;stride length decreased  -AB       Row Name             Wound 12/07/24 Left anterior third toe    Wound - Properties Group Placement Date: 12/07/24  -BB Side: Left  -BB Orientation: anterior  -BB Location: third toe  -BB     Retired Wound - Properties Group Placement Date: 12/07/24  -BB Side: Left  -BB Orientation: anterior  -BB Location: third toe  -BB    Retired Wound - Properties Group Placement Date: 12/07/24  -BB Side: Left  -BB Orientation: anterior  -BB Location: third toe  -BB    Retired Wound - Properties Group Date first assessed: 12/07/24  -BB Side: Left  -BB Location: third toe  -BB      Row Name 12/26/24 1037          Positioning and Restraints    Pre-Treatment Position sitting in chair/recliner  -AB     Post Treatment Position bed  -AB     In Bed supine;call light within reach;with family/caregiver  -AB               User Key  (r) = Recorded By, (t) = Taken By, (c) = Cosigned By      Initials Name Provider Type    AB Edda Moura PTA Physical Therapist Assistant    Consuelo Cotton, RN Registered Nurse                    Physical Therapy Education       Title: PT OT SLP Therapies (In Progress)       Topic: Physical Therapy (In Progress)       Point: Mobility training (Done)       Learning Progress Summary            Patient Acceptance, E, VU,NR by NONI at 12/23/2024 0932    Comment: benefits of activity, progression of PT POC                      Point: Home exercise program (Not Started)       Learner Progress:  Not documented in this visit.              Point: Body mechanics (Not Started)       Learner Progress:  Not documented in this visit.              Point: Precautions (Not Started)       Learner Progress:  Not documented in this visit.                              User Key       Initials Effective Dates Name Provider Type Noemí CHENEY 02/03/23 -  Mikel Lozano PT DPT Physical Therapist PT                  PT Recommendation and Plan         Outcome Measures       Row Name 12/24/24 1300             How much help from another is currently needed...    Putting on and taking off regular lower body clothing? 3  -EC      Bathing (including washing, rinsing, and drying) 3  -EC      Toileting (which includes  using toilet bed pan or urinal) 3  -EC      Putting on and taking off regular upper body clothing 4  -EC      Taking care of personal grooming (such as brushing teeth) 4  -EC      Eating meals 4  -EC      AM-PAC 6 Clicks Score (OT) 21  -EC         Functional Assessment    Outcome Measure Options AM-PAC 6 Clicks Daily Activity (OT)  -EC                User Key  (r) = Recorded By, (t) = Taken By, (c) = Cosigned By      Initials Name Provider Type    Violet Perez, OTR/L Occupational Therapist                     Time Calculation:    PT Charges       Row Name 12/26/24 1037             Time Calculation    Start Time 1037  -AB      Stop Time 1102  -AB      Time Calculation (min) 25 min  -AB      PT Received On 12/26/24  -AB         Time Calculation- PT    Total Timed Code Minutes- PT 25 minute(s)  -AB                User Key  (r) = Recorded By, (t) = Taken By, (c) = Cosigned By      Initials Name Provider Type    AB Edda Moura PTA Physical Therapist Assistant                      PT G-Codes  Outcome Measure Options: AM-PAC 6 Clicks Daily Activity (OT)  AM-PAC 6 Clicks Score (PT): 20  AM-PAC 6 Clicks Score (OT): 21    Edda Moura PTA  12/26/2024

## 2024-12-27 VITALS
TEMPERATURE: 98.4 F | DIASTOLIC BLOOD PRESSURE: 70 MMHG | HEIGHT: 71 IN | OXYGEN SATURATION: 97 % | HEART RATE: 72 BPM | SYSTOLIC BLOOD PRESSURE: 118 MMHG | WEIGHT: 190.8 LBS | BODY MASS INDEX: 26.71 KG/M2 | RESPIRATION RATE: 18 BRPM

## 2024-12-27 PROBLEM — N17.9 AKI (ACUTE KIDNEY INJURY): Status: RESOLVED | Noted: 2024-12-20 | Resolved: 2024-12-27

## 2024-12-27 PROBLEM — F10.929 ALCOHOL INTOXICATION: Status: RESOLVED | Noted: 2024-12-04 | Resolved: 2024-12-27

## 2024-12-27 PROBLEM — N17.9 STAGE 1 ACUTE KIDNEY INJURY: Status: RESOLVED | Noted: 2023-07-13 | Resolved: 2024-12-27

## 2024-12-27 PROBLEM — R74.01 TRANSAMINITIS: Status: RESOLVED | Noted: 2024-11-16 | Resolved: 2024-12-27

## 2024-12-27 LAB
ANION GAP SERPL CALCULATED.3IONS-SCNC: 10 MMOL/L (ref 5–15)
BUN SERPL-MCNC: 16 MG/DL (ref 8–23)
BUN/CREAT SERPL: 21.3 (ref 7–25)
CALCIUM SPEC-SCNC: 8.2 MG/DL (ref 8.6–10.5)
CHLORIDE SERPL-SCNC: 103 MMOL/L (ref 98–107)
CO2 SERPL-SCNC: 24 MMOL/L (ref 22–29)
CREAT SERPL-MCNC: 0.75 MG/DL (ref 0.76–1.27)
DEPRECATED RDW RBC AUTO: 59.8 FL (ref 37–54)
EGFRCR SERPLBLD CKD-EPI 2021: 97.1 ML/MIN/1.73
ERYTHROCYTE [DISTWIDTH] IN BLOOD BY AUTOMATED COUNT: 16.3 % (ref 12.3–15.4)
GLUCOSE SERPL-MCNC: 98 MG/DL (ref 65–99)
HCT VFR BLD AUTO: 26.5 % (ref 37.5–51)
HGB BLD-MCNC: 8.3 G/DL (ref 13–17.7)
MCH RBC QN AUTO: 31.6 PG (ref 26.6–33)
MCHC RBC AUTO-ENTMCNC: 31.3 G/DL (ref 31.5–35.7)
MCV RBC AUTO: 100.8 FL (ref 79–97)
PLATELET # BLD AUTO: 128 10*3/MM3 (ref 140–450)
PMV BLD AUTO: 10.6 FL (ref 6–12)
POTASSIUM SERPL-SCNC: 3.7 MMOL/L (ref 3.5–5.2)
RBC # BLD AUTO: 2.63 10*6/MM3 (ref 4.14–5.8)
SODIUM SERPL-SCNC: 137 MMOL/L (ref 136–145)
WBC NRBC COR # BLD AUTO: 5.47 10*3/MM3 (ref 3.4–10.8)

## 2024-12-27 PROCEDURE — 97116 GAIT TRAINING THERAPY: CPT

## 2024-12-27 PROCEDURE — 80048 BASIC METABOLIC PNL TOTAL CA: CPT | Performed by: FAMILY MEDICINE

## 2024-12-27 PROCEDURE — 85027 COMPLETE CBC AUTOMATED: CPT | Performed by: FAMILY MEDICINE

## 2024-12-27 PROCEDURE — 97535 SELF CARE MNGMENT TRAINING: CPT

## 2024-12-27 PROCEDURE — 97110 THERAPEUTIC EXERCISES: CPT

## 2024-12-27 RX ORDER — TEMAZEPAM 15 MG/1
15 CAPSULE ORAL NIGHTLY PRN
Qty: 3 CAPSULE | Refills: 0 | Status: SHIPPED | OUTPATIENT
Start: 2024-12-27 | End: 2024-12-27

## 2024-12-27 RX ORDER — METOPROLOL SUCCINATE 25 MG/1
12.5 TABLET, EXTENDED RELEASE ORAL DAILY
Qty: 15 TABLET | Refills: 0 | Status: SHIPPED | OUTPATIENT
Start: 2024-12-28 | End: 2025-01-27

## 2024-12-27 RX ORDER — TEMAZEPAM 15 MG/1
15 CAPSULE ORAL NIGHTLY PRN
Qty: 3 CAPSULE | Refills: 0 | Status: SHIPPED | OUTPATIENT
Start: 2024-12-27 | End: 2024-12-30

## 2024-12-27 RX ORDER — OXYCODONE AND ACETAMINOPHEN 5; 325 MG/1; MG/1
1 TABLET ORAL EVERY 8 HOURS PRN
Qty: 9 TABLET | Refills: 0 | Status: SHIPPED | OUTPATIENT
Start: 2024-12-27 | End: 2024-12-30

## 2024-12-27 RX ADMIN — METOPROLOL SUCCINATE 12.5 MG: 25 TABLET, EXTENDED RELEASE ORAL at 09:00

## 2024-12-27 RX ADMIN — AMIODARONE HYDROCHLORIDE 200 MG: 200 TABLET ORAL at 09:00

## 2024-12-27 RX ADMIN — OXYCODONE HYDROCHLORIDE AND ACETAMINOPHEN 1 TABLET: 5; 325 TABLET ORAL at 15:37

## 2024-12-27 RX ADMIN — FLUTICASONE PROPIONATE 2 SPRAY: 50 SPRAY, METERED NASAL at 09:03

## 2024-12-27 RX ADMIN — OXYCODONE HYDROCHLORIDE AND ACETAMINOPHEN 1 TABLET: 5; 325 TABLET ORAL at 02:28

## 2024-12-27 RX ADMIN — OXYCODONE HYDROCHLORIDE AND ACETAMINOPHEN 1 TABLET: 5; 325 TABLET ORAL at 08:59

## 2024-12-27 RX ADMIN — PANTOPRAZOLE SODIUM 40 MG: 40 TABLET, DELAYED RELEASE ORAL at 05:36

## 2024-12-27 RX ADMIN — FOLIC ACID 1 MG: 1 TABLET ORAL at 09:00

## 2024-12-27 RX ADMIN — THIAMINE HCL TAB 100 MG 100 MG: 100 TAB at 08:59

## 2024-12-27 RX ADMIN — ATORVASTATIN CALCIUM 20 MG: 10 TABLET, FILM COATED ORAL at 09:00

## 2024-12-27 RX ADMIN — BUSPIRONE HYDROCHLORIDE 7.5 MG: 5 TABLET ORAL at 09:00

## 2024-12-27 RX ADMIN — ESCITALOPRAM OXALATE 10 MG: 10 TABLET ORAL at 09:00

## 2024-12-27 RX ADMIN — Medication 10 ML: at 09:10

## 2024-12-27 NOTE — PLAN OF CARE
Problem: Adult Inpatient Plan of Care  Goal: Plan of Care Review  Outcome: Adequate for Care Transition  Goal: Patient-Specific Goal (Individualized)  Outcome: Adequate for Care Transition  Goal: Absence of Hospital-Acquired Illness or Injury  Outcome: Adequate for Care Transition  Intervention: Identify and Manage Fall Risk  Recent Flowsheet Documentation  Taken 12/27/2024 0800 by Bob Miller RN  Safety Promotion/Fall Prevention: safety round/check completed  Goal: Optimal Comfort and Wellbeing  Outcome: Adequate for Care Transition  Intervention: Monitor Pain and Promote Comfort  Recent Flowsheet Documentation  Taken 12/27/2024 0800 by Bob Miller RN  Pain Management Interventions: pain medication given  Intervention: Provide Person-Centered Care  Recent Flowsheet Documentation  Taken 12/27/2024 0800 by Bob Miller RN  Trust Relationship/Rapport:   care explained   choices provided   empathic listening provided   emotional support provided   questions encouraged   reassurance provided  Goal: Readiness for Transition of Care  Outcome: Adequate for Care Transition       Goal Outcome Evaluation:

## 2024-12-27 NOTE — PROGRESS NOTES
[facility] Medicine Services  INPATIENT PROGRESS NOTE    Patient Name: Dayron Lubin  Date of Admission: 12/20/2024  Today's Date: 12/27/24  Length of Stay: 5  Primary Care Physician: Herberth Nguyen Jr., MD    Subjective   Chief Complaint: Fall  Fall       69-year-old male with history of atrial fibrillation, chronic alcoholic hepatitis, alcohol abuse disorder, coronary artery disease, hypertrophic obstructive cardiomyopathy, hypertension, hyperlipidemia, prostate cancer, who was discharged from the hospital 12/9/2024 after admitted for alcohol intoxication nontraumatic rhabdomyolysis.  Patient was again admitted on 12/20/2024 to medical floor due to multiple falls.  Patient reports his last alcohol drink was last Thursday.  The patient was started on alcohol withdrawal protocol.  He was diagnosed with a large left gluteal compartment intramuscular hematoma.  Patient on anticoagulation with Eliquis.  On 12/22/2024, the patient was transferred to intensive care unit due to worsening withdrawal signs and symptoms.  The patient was started on phenobarbital taper.  Symptoms improved.      The patient was transferred again to medical floor 12/23/2024, and I started taking care of this patient on 12/24/2024.  Discussed with family members yesterday.  They stated have been trying to get patient into alcohol detoxification program in the outpatient setting but he has not been possible.   Patient being evaluated by physical therapy.    Today, no tremors.  He reports no nausea, no vomiting, no abdominal pain, no chest pain.  Pain is controlled.        Review of Systems   All pertinent negatives and positives are as above. All other systems have been reviewed and are negative unless otherwise stated.     Objective    Temp:  [97.6 °F (36.4 °C)-98.7 °F (37.1 °C)] 98.3 °F (36.8 °C)  Heart Rate:  [65-69] 68  Resp:  [16] 16  BP: ()/(57-70) 124/57  Physical Exam  Constitutional:        Appearance: Alert oriented x 3.  No respiratory distress.  HENT:      Head: Normocephalic and atraumatic.      Nose: Nose normal.      Mouth/Throat:      Mouth: Mucous membranes are moist.      Pharynx: Oropharynx is clear.   Eyes:      Extraocular Movements: Extraocular movements intact.      Conjunctiva/sclera: Conjunctivae normal.      Pupils: Pupils are equal, round, and reactive to light.   Cardiovascular:      Rate and Rhythm: Normal rate and regular rhythm.      Pulses: Normal pulses.   Pulmonary:      Effort: No respiratory distress.      Breath sounds: Normal breath sounds. No wheezing, rhonchi or rales.   Abdominal:      General: Abdomen is flat. Bowel sounds are normal.      Palpations: Abdomen is soft.      Tenderness: There is no guarding or rebound.   Musculoskeletal:      Left gluteal hematoma present,  Extremities:  No lower extremity edema.  Skin:     Capillary Refill: Capillary refill takes less than 2 seconds.      Coloration: Skin is not jaundiced.      Findings: No rash.   Neurological: There are no tremors.  No asterixis.     General: No focal deficit present.      Mental Status: Patient is alert, oriented to place time and person.          Results Review:  I have reviewed the labs, radiology results, and diagnostic studies.    Laboratory Data:   Results from last 7 days   Lab Units 12/27/24  0509 12/26/24  0340 12/25/24  0528 12/24/24  0258   WBC 10*3/mm3 5.47  --  5.07 4.25   HEMOGLOBIN g/dL 8.3* 8.4* 8.6* 6.8*   HEMATOCRIT % 26.5* 26.4* 27.6* 21.5*   PLATELETS 10*3/mm3 128*  --  101* 92*        Results from last 7 days   Lab Units 12/27/24  0509 12/25/24  0528 12/24/24  0258 12/23/24  1005   SODIUM mmol/L 137 136 138 135*   POTASSIUM mmol/L 3.7 3.7 3.5 3.5   CHLORIDE mmol/L 103 102 102 100   CO2 mmol/L 24.0 25.0 26.0 24.0   BUN mg/dL 16 17 18 20   CREATININE mg/dL 0.75* 0.88 0.83 0.95   CALCIUM mg/dL 8.2* 8.3* 8.1* 8.4*   BILIRUBIN mg/dL  --  0.6 0.5 0.6   ALK PHOS U/L  --  74 67 70   ALT  "(SGPT) U/L  --  22 22 25   AST (SGOT) U/L  --  24 26 32   GLUCOSE mg/dL 98 87 99 87       Culture Data:   No results found for: \"BLOODCX\", \"URINECX\", \"WOUNDCX\", \"MRSACX\", \"RESPCX\", \"STOOLCX\"    Radiology Data:   Imaging Results (Last 24 Hours)       ** No results found for the last 24 hours. **            I have reviewed the patient's current medications.     Assessment/Plan   Assessment  Active Hospital Problems    Diagnosis     **Stage 1 acute kidney injury     Large left gluteal compartment intramuscular hematomas     Chronic anticoagulation     MIKEY (acute kidney injury)     Alcohol intoxication     Multiple falls     Transaminitis     Alcohol withdrawal     Paroxysmal atrial fibrillation     Gait instability     Alcohol abuse     HOCM (hypertrophic obstructive cardiomyopathy)     Essential hypertension      Alcohol withdrawal, resolved  Alcohol abuse disorder  Acute on chronic anemia  Acute kidney injury/renal insufficiency  Traumatic left gluteal intramuscular hematoma  Atrial fibrillation  Chronic anticoagulation with Eliquis  Chronic alcohol hepatitis  Hypertrophic obstructive cardiomyopathy  Hypertension  Chronic thrombocytopenia secondary to alcohol use  Macrocytic anemia likely secondary to alcohol abuse      Admission creatinine level 1.66.  Normalized to 0.75  137.  Potassium 3.7.  Vitamin B12 561, iron 64.  Transferrin 192.  TIBC 286    Admission hemoglobin 8.0.  On 12/21/2024 hemoglobin dropped to 6.7.  Patient was transfused 1 unit packed red blood cells.  On 12/24/2024 hemoglobin was 6.8.  Patient was transfused 1 unit packed red blood cells.  Today hemoglobin 8.3.      Last platelet count 128,000.  .  C. difficile toxin positive, antigen is negative.  Likely chronic carrier    Treatment Plan  Patient ended treatment with phenobarbital taper  Continue amiodarone, atorvastatin, Lexapro, folic acid  Continue Toprol-XL 12.5 p.o. daily.  Continue protonix 40 mg p.o. daily  Patient is currently off " anticoagulation due to worsening anemia and a left gluteal hematoma    Skilled nursing facility placement in progress.    Social work note: patient has received a bed offer from [facility]. The other SNF's in [place] have declined due to not having a private room available. Bed offer accepted by patient's sister, Yuliya. Insurance approval process started.    Alcohol cessation and rehabilitation recommended    Medical Decision Making  Number and Complexity of problems: 11 high complexity  Differential Diagnosis: see above    Conditions and Status        Condition is improving.     MDM Data  External documents reviewed: None  Cardiac tracing (EKG, telemetry) interpretation: Sinus rhythm  Radiology interpretation: Other reports reviewed  Labs reviewed: Yes  Any tests that were considered but not ordered: None     Decision rules/scores evaluated (example [id number], [place], etc): See chart     Discussed with: Patient and nurse     Care Planning  Shared decision making: With patient  Code status and discussions: Full code    Disposition  Social Determinants of Health that impact treatment or disposition: Alcohol abuse  I expect the patient to be discharged to skilled nursing facility once approved.      Electronically signed by Raul Gifford MD, 12/27/24, 10:42 CST.

## 2024-12-27 NOTE — PLAN OF CARE
Goal Outcome Evaluation:  Plan of Care Reviewed With: patient        Progress: improving  Outcome Evaluation: OT tx completed. Pt in fowlers upon therapist arrival; A&Ox4; c/o 8/10 L hip pain. Pt performed supine<>sit utilizing bedrail with HOB elevated with supervision. Pt performed all sit<>stand transfers to/from bed, toilet, and chair utilizing rwx with CGA. Pt ambulated from bed>BR>chair>bed utilizing rwx with CGA-SBA. Pt performed 3 sets, 10 reps of chair push ups/modified tricep dips; Pt tolerated well. Educated Pt on performing these exercises at home in order to maintain BUE strength; Pt verbalized understanding. Cont OT POC. Recommend sub acute rehab at discharge.    Anticipated Discharge Disposition (OT): sub acute care setting

## 2024-12-27 NOTE — PLAN OF CARE
Goal Outcome Evaluation:  Plan of Care Reviewed With: patient        Progress: improving  Outcome Evaluation: Pt A&Ox4 on RA Normal Sinus throughout the night. C/O pain once PRN perc 5 given x1. Up x1 with walker safery maintained.       Problem: Adult Inpatient Plan of Care  Goal: Plan of Care Review  Outcome: Progressing  Flowsheets (Taken 12/27/2024 0602)  Progress: improving  Outcome Evaluation: Pt A&Ox4 on RA Normal Sinus throughout the night. C/O pain once PRN perc 5 given x1. Up x1 with walker safery maintained.  Plan of Care Reviewed With: patient  Goal: Patient-Specific Goal (Individualized)  Outcome: Progressing  Goal: Absence of Hospital-Acquired Illness or Injury  Outcome: Progressing  Intervention: Identify and Manage Fall Risk  Description: Perform standard risk assessment on admission using a validated tool or comprehensive approach appropriate to the patient; reassess fall risk frequently, with change in status or transfer to another level of care.  Communicate risk to interprofessional healthcare team; ensure fall risk visible cue.  Determine need for increased observation, equipment and environmental modification, as well as use of supportive, nonskid footwear.  Adjust safety measures to individual needs and identified risk factors.  Reinforce the importance of active participation with fall risk prevention, safety, and physical activity with the patient and family.  Perform regular intentional rounding to assess need for position change, pain assessment and personal needs, including assistance with toileting.  Recent Flowsheet Documentation  Taken 12/27/2024 0600 by Marco A Costello, RN  Safety Promotion/Fall Prevention: safety round/check completed  Taken 12/27/2024 0500 by Marco A Costello RN  Safety Promotion/Fall Prevention: safety round/check completed  Taken 12/27/2024 0400 by Marco A Costello RN  Safety Promotion/Fall Prevention: safety round/check completed  Taken 12/27/2024 0200 by Pravin  GIANLUCA Strickland  Safety Promotion/Fall Prevention: safety round/check completed  Taken 12/27/2024 0100 by Marco A Costello RN  Safety Promotion/Fall Prevention: safety round/check completed  Taken 12/27/2024 0000 by Marco A Costello RN  Safety Promotion/Fall Prevention: safety round/check completed  Taken 12/26/2024 2300 by Marco A Costello RN  Safety Promotion/Fall Prevention: safety round/check completed  Taken 12/26/2024 2200 by Marco A Costello RN  Safety Promotion/Fall Prevention: safety round/check completed  Taken 12/26/2024 2100 by Marco A Costello RN  Safety Promotion/Fall Prevention: safety round/check completed  Taken 12/26/2024 2000 by Marco A Costello RN  Safety Promotion/Fall Prevention: safety round/check completed  Taken 12/26/2024 1900 by Marco A Costello RN  Safety Promotion/Fall Prevention: safety round/check completed  Intervention: Prevent Skin Injury  Description: Perform a screening for skin injury risk, such as pressure or moisture-associated skin damage on admission and at regular intervals throughout hospital stay.  Keep all areas of skin (especially folds) clean and dry.  Maintain adequate skin hydration.  Relieve and redistribute pressure and protect bony prominences and skin at risk for injury; implement measures based on patient-specific risk factors.  Match turning and repositioning schedule to clinical condition.  Encourage weight shift frequently; assist with reposition if unable to complete independently.  Float heels off bed; avoid pressure on the Achilles tendon.  Keep skin free from extended contact with medical devices.  Optimize nutrition and hydration.  Encourage functional activity and mobility, as early as tolerated.  Use aids (e.g., slide boards, mechanical lift) during transfer.  Recent Flowsheet Documentation  Taken 12/26/2024 2000 by Marco A Costello RN  Body Position: position changed independently  Goal: Optimal Comfort and Wellbeing  Outcome: Progressing  Intervention: Monitor Pain and  Promote Comfort  Description: Assess pain level, treatment efficacy and patient response at regular intervals using a consistent pain scale.  Consider the presence and impact of preexisting chronic pain.  Encourage patient and caregiver involvement in pain assessment, interventions and safety measures.  Promote activity; balance with sleep and rest to enhance healing.  Recent Flowsheet Documentation  Taken 12/27/2024 0200 by Marco A Costello RN  Pain Management Interventions: pain medication given  Intervention: Provide Person-Centered Care  Description: Use a family-focused approach to care; encourage support system presence and participation.  Develop trust and rapport by proactively providing information, encouraging questions, addressing concerns and offering reassurance.  Acknowledge emotional response to hospitalization.  Recognize and utilize personal coping strategies and strengths; develop goals via shared decision-making.  Honor spiritual and cultural preferences.  Recent Flowsheet Documentation  Taken 12/26/2024 2000 by Marco A Costello RN  Trust Relationship/Rapport:   care explained   choices provided  Goal: Readiness for Transition of Care  Outcome: Progressing

## 2024-12-27 NOTE — THERAPY TREATMENT NOTE
Patient Name: Dayron Lubin  : 1955    MRN: [id number]                              Today's Date: 2024       Admit Date: 2024    Visit Dx:     ICD-10-CM ICD-9-CM   1. MIKEY (acute kidney injury)  N17.9 584.9   2. Frequent falls  R29.6 V15.88   3. Hematoma  T14.8XXA 924.9   4. Alcohol abuse  F10.10 305.00   5. Impaired mobility [Z74.09]  Z74.09 799.89     Patient Active Problem List   Diagnosis    Prostate cancer    Hx of radiation therapy    Elevated PSA    Encounter for follow-up surveillance of prostate cancer    HOCM (hypertrophic obstructive cardiomyopathy)    Coronary artery disease involving native coronary artery of native heart without angina pectoris    Essential hypertension    Mixed hyperlipidemia    Class 1 obesity with alveolar hypoventilation, serious comorbidity, and body mass index (BMI) of 30.0 to 30.9 in adult    Displaced fracture of lateral malleolus of right fibula, initial encounter for closed fracture    Family history of colonic polyps    History of colon polyps    Elevated brain natriuretic peptide (BNP) level    Stage 1 acute kidney injury    Anxiety associated with depression    Alcohol abuse    Alcoholic hepatitis    Volume depletion    Anorexia    CHF (congestive heart failure)    Hypokalemia    Duodenitis    Gait instability    SVT (supraventricular tachycardia)    Physical debility    Atrial fibrillation with rapid ventricular response    Alcoholism in remission    Paroxysmal atrial fibrillation    Nasal polyposis    Nasal congestion    Estelle bullosa    Long-term use of high-risk medication    Multiple falls    Transaminitis    Thrombocytopenia    Non-traumatic rhabdomyolysis    Alcohol withdrawal    Pancytopenia    Opiate abuse, episodic    Alcohol intoxication    Large left gluteal compartment intramuscular hematomas    Chronic anticoagulation    MIKEY (acute kidney injury)     Past Medical History:   Diagnosis Date    A-fib     Alcoholic hepatitis 2023     CAD (coronary artery disease)     History of external beam radiation therapy 05/12/2016    6840 cGy to prostate bed    Hyperlipidemia     Hypertension     Insomnia     Prostate cancer      Past Surgical History:   Procedure Laterality Date    CARDIAC CATHETERIZATION      CORONARY ANGIOPLASTY WITH STENT PLACEMENT      FRACTURE SURGERY      PROSTATE SURGERY      TONSILLECTOMY        General Information       Row Name 12/27/24 0922          OT Time and Intention    Subjective Information complains of;pain  -     Document Type therapy note (daily note)  -     Mode of Treatment occupational therapy  -     Patient Effort good  -       Row Name 12/27/24 0922          General Information    Existing Precautions/Restrictions fall  -       Row Name 12/27/24 0922          Cognition    Orientation Status (Cognition) oriented x 4  -               User Key  (r) = Recorded By, (t) = Taken By, (c) = Cosigned By      Initials Name Provider Type     Michell Blackman OTR/L Occupational Therapist                     Mobility/ADL's       Row Name 12/27/24 0922          Bed Mobility    Bed Mobility supine-sit;sit-supine  -LS     Supine-Sit [place] (Bed Mobility) supervision  -     Sit-Supine [place] (Bed Mobility) supervision  -     Assistive Device (Bed Mobility) bed rails;head of bed elevated  -       Row Name 12/27/24 0922          Transfers    Transfers sit-stand transfer;stand-sit transfer;toilet transfer  -       Row Name 12/27/24 0922          Sit-Stand Transfer    Sit-Stand [place] (Transfers) contact guard  -LS     Assistive Device (Sit-Stand Transfers) walker, front-wheeled  -       Row Name 12/27/24 0922          Stand-Sit Transfer    Stand-Sit [place] (Transfers) contact guard  -LS     Assistive Device (Stand-Sit Transfers) walker, front-wheeled  -       Row Name 12/27/24 0922          Toilet Transfer    Type (Toilet Transfer) sit-stand;stand-sit  -LS     [place] Level (Toilet  Transfer) contact guard  -LS       Row Name 12/27/24 0922          Functional Mobility    Functional Mobility- Ind. Level contact guard assist;standby assist  -LS     Functional Mobility- Device walker, front-wheeled  -LS               User Key  (r) = Recorded By, (t) = Taken By, (c) = Cosigned By      Initials Name Provider Type    LS Michell Blackman OTR/L Occupational Therapist                   Obj/Interventions       Row Name 12/27/24 0922          Motor Skills    Therapeutic Exercise other (see comments)  3 sets, 10 reps of chair push ups/modified tricep dips  -LS               User Key  (r) = Recorded By, (t) = Taken By, (c) = Cosigned By      Initials Name Provider Type    LS Michell Blackman, OTR/L Occupational Therapist                   Goals/Plan    No documentation.                  Clinical Impression       Row Name 12/27/24 0922          Pain Assessment    Pretreatment Pain Rating 8/10  -LS     Posttreatment Pain Rating 8/10  -LS     Pain Location hip  -LS     Pain Side/Orientation left  -LS     Pain Management Interventions exercise or physical activity utilized  -LS     Response to Pain Interventions activity participation with tolerable pain  -LS       Row Name 12/27/24 0922          Plan of Care Review    Plan of Care Reviewed With patient  -LS     Progress improving  -LS     Outcome Evaluation OT tx completed. Pt in fowlers upon therapist arrival; A&Ox4; c/o 8/10 L hip pain. Pt performed supine<>sit utilizing bedrail with HOB elevated with supervision. Pt performed all sit<>stand transfers to/from bed, toilet, and chair utilizing rwx with CGA. Pt ambulated from bed>BR>chair>bed utilizing rwx with CGA-SBA. Pt performed 3 sets, 10 reps of chair push ups/modified tricep dips; Pt tolerated well. Educated Pt on performing these exercises at home in order to maintain BUE strength; Pt verbalized understanding. Cont OT POC. Recommend sub acute rehab at discharge.  -       Row Name 12/27/24 0922           Therapy Plan Review/Discharge Plan (OT)    Anticipated Discharge Disposition (OT) sub acute care setting  -       Row Name 12/27/24 0922          Positioning and Restraints    Pre-Treatment Position in bed  -LS     Post Treatment Position bed  -LS     In Bed fowlers;side rails up x2;call light within reach;encouraged to call for assist  -LS               User Key  (r) = Recorded By, (t) = Taken By, (c) = Cosigned By      Initials Name Provider Type    Michell Wheeler OTR/L Occupational Therapist                   Outcome Measures       Row Name 12/27/24 0922          How much help from another is currently needed...    Putting on and taking off regular lower body clothing? 3  -LS     Bathing (including washing, rinsing, and drying) 3  -LS     Toileting (which includes using toilet bed pan or urinal) 3  -LS     Putting on and taking off regular upper body clothing 4  -LS     Taking care of personal grooming (such as brushing teeth) 4  -LS     Eating meals 4  -LS     AM-PAC 6 Clicks Score (OT) 21  -       Row Name 12/27/24 0922          Functional Assessment    Outcome Measure Options AM-PAC 6 Clicks Daily Activity (OT)  -LS               User Key  (r) = Recorded By, (t) = Taken By, (c) = Cosigned By      Initials Name Provider Type    Michell Wheeler OTR/L Occupational Therapist                    Occupational Therapy Education       Title: PT OT SLP Therapies (In Progress)       Topic: Occupational Therapy (Done)       Point: ADL training (Done)       Description:   Instruct learner(s) on proper safety adaptation and remediation techniques during self care or transfers.   Instruct in proper use of assistive devices.                  Learning Progress Summary            Patient Acceptance, E, VU by LS at 12/27/2024 0941    Acceptance, E, VU by EC at 12/24/2024 1350    Acceptance, E, VU by  at 12/23/2024 1030                      Point: Home exercise program (Done)       Description:   Instruct learner(s) on  appropriate technique for monitoring, assisting and/or progressing therapeutic exercises/activities.                  Learning Progress Summary            Patient Acceptance, E, VU by  at 12/27/2024 0941                      Point: Precautions (Done)       Description:   Instruct learner(s) on prescribed precautions during self-care and functional transfers.                  Learning Progress Summary            Patient Acceptance, E, VU by  at 12/24/2024 1350    Acceptance, E, VU by  at 12/23/2024 1030                      Point: Body mechanics (Done)       Description:   Instruct learner(s) on proper positioning and spine alignment during self-care, functional mobility activities and/or exercises.                  Learning Progress Summary            Patient Acceptance, E, VU by  at 12/24/2024 1350    Acceptance, E, VU by  at 12/23/2024 1030                                      User Key       Initials Effective Dates Name Provider Type Discipline     07/11/23 -  Jalyn Ortiz, OTR/L Occupational Therapist OT     06/20/22 -  Michell Blackman OTR/L Occupational Therapist OT     10/13/23 -  Violet Hunt, OTR/L Occupational Therapist OT                  OT Recommendation and Plan     Plan of Care Review  Plan of Care Reviewed With: patient  Progress: improving  Outcome Evaluation: OT tx completed. Pt in fowlers upon therapist arrival; A&Ox4; c/o 8/10 L hip pain. Pt performed supine<>sit utilizing bedrail with HOB elevated with supervision. Pt performed all sit<>stand transfers to/from bed, toilet, and chair utilizing rwx with CGA. Pt ambulated from bed>BR>chair>bed utilizing rwx with CGA-SBA. Pt performed 3 sets, 10 reps of chair push ups/modified tricep dips; Pt tolerated well. Educated Pt on performing these exercises at home in order to maintain BUE strength; Pt verbalized understanding. Cont OT POC. Recommend sub acute rehab at discharge.     Time Calculation:         Time Calculation- OT       Row  Name 12/27/24 0922             Time Calculation- OT    OT Start Time 0922  -LS      OT Stop Time 0945  -LS      OT Time Calculation (min) 23 min  -LS      Total Timed Code Minutes- OT 23 minute(s)  -      OT Received On 12/27/24  -                User Key  (r) = Recorded By, (t) = Taken By, (c) = Cosigned By      Initials Name Provider Type     Michell Blackman OTR/L Occupational Therapist                  Therapy Charges for Today       Code Description Service Date Service Provider Modifiers Qty    [id number] HC OT SELF CARE/MGMT/TRAIN EA 15 MIN 12/27/2024 Michell Blackman OTR/L GO 2                 Michell Blackman OTR/L  12/27/2024

## 2024-12-27 NOTE — DISCHARGE PLACEMENT REQUEST
"To: SNF    From: [facility] [phone]        Dayron Carr (69 y.o. Male)       Date of Birth   1955    Social Security Number       Address   [address] [address]    Home Phone   [phone]    MRN   [id number]       Yazdanism   Sikh    Marital Status   Single                            Admission Date   12/20/24    Admission Type   Emergency    Admitting Provider   Raul Gifford MD    Attending Provider   Raul Gifford MD    Department, Room/Bed   [facility] 3C, 387/1       Discharge Date       Discharge Disposition       Discharge Destination                                 Attending Provider: Raul Gifford MD    Allergies: No Known Allergies    Isolation: Spore   Infection: C.difficile (12/22/24)   Code Status: CPR    Ht: 180.3 cm (71\")   Wt: 86.5 kg (190 lb 12.8 oz)    Admission Cmt: None   Principal Problem: Stage 1 acute kidney injury [N17.9]                   Active Insurance as of 12/20/2024       Primary Coverage       Payor Plan Insurance Group Employer/Plan Group    HUMANA MEDICARE REPLACEMENT HUMANA MED [id number] PPO [id number]       Payor Plan Address Payor Plan Phone Number Payor Plan Fax Number Effective Dates    [address] [phone]  6/1/2024 - None Entered    [facility] [address]         Subscriber Name Subscriber Birth Date Member ID       DAYRON CARR 1955 [id number]                     Emergency Contacts        (Rel.) Home Phone Work Phone Mobile Phone    Yuliya Stone (Sister) [phone] -- --    JAK CARR (Brother) -- -- [phone]                 History & Physical        , BRI Moreno at 12/20/24 1716       Attestation signed by Amelia England MD at 12/21/24 1006    I performed a substantive part of the MDM during the patient’s E/M visit. I personally made or   approved the documented management plan and acknowledge its risk of complications.   (Independent Interpretation) My " (EKG/X-Ray/US/CT) interpretation   (Discussion) Management/test interpretation discussed with maria d GREGORY    Electronically signed by Amelia England MD, 12/21/2024, 10:02 CST.I have reviewed this documentation and agree.                      [facility] Medicine Services  HISTORY AND PHYSICAL    Date of Admission: 12/20/2024  Primary Care Physician: Herberth Nguyen Jr., MD    Subjective   Primary Historian: Patient and his sister Yuliya Stone    Chief Complaint: Fall    History of Present Illness  Dayron Lubin is a 69-year-old male with a past medical history of atrial fibrillation, alcoholic hepatitis, CAD, hypertrophic obstructive cardiomyopathy, hyperlipidemia, hypertension, insomnia, prostate cancer long history of alcohol abuse and chronic falls.  Patient was most recently discharged on 12/9/2024 due to alcohol intoxication, transaminitis, nontraumatic rhabdomyolysis and continued alcohol abuse.  Presented to [facility] [place] emergency department today per his sister after additional multiple falls and a fall to his left hip.  Upon assessment.  Resting in bed on room air with his sister at bedside.  Patient is anxious and has considerable tremors.  States his last alcohol intake was last evening however his sister states that he loses track of time and it may have been today.  Patient has no complaints of chest pain, shortness of breath, nausea, vomiting or diarrhea.  Only complaint currently is patient's gluteal pain from his hematoma and fall and his anxiety.  I had a very lengthy and curt discussion with the patient and his sister regarding his care going forward.  Patient was made aware that he will be admitted for the acute kidney injury with fluid hydration.  In addition patient was made aware that we will assist him in his attempt to be admitted to alcohol rehab versus SNF.  I made patient aware that he will be placed on scheduled Librium with as needed Ativan.   Patient was made aware that he will have to be an active participant in this acute detox period to include taking all prescribed medication, and participating in all physical and Occupational Therapy treatments.  Patient and sister verbalized understanding, they were made aware that any refusal of treatment will prevent us from assisting in any patient services.  Patient is agreeable to plan of care, and will be admitted for continued observation and treatment.    ED workup revealed CK of 167, , CL 93, CO2 18, anion gap of 24, BUN 20, CR 1.66 (baseline 0.96), , AST 68, ALT 57, Hb 8.0, HCT 24.2, , urinalysis unremarkable, ethanol 0.127, UDS positive for benzodiazepines and oxycodone.  CT of the lower extremity revealed no acute fracture or malalignment, large left gluteal compartment intramuscular hematoma involving the gluteus medius and gluteus robbin measuring up to 15 cm craniocaudal and up to 13 cm transverse.       Review of Systems   Constitutional:  Positive for activity change and fatigue.   Respiratory:  Negative for shortness of breath and wheezing.    Cardiovascular:  Negative for chest pain, palpitations and leg swelling.   Gastrointestinal:  Negative for abdominal pain, diarrhea, nausea and vomiting.   Genitourinary:  Negative for difficulty urinating.   Skin:  Positive for wound.        Left gluteal hematoma   Neurological:  Positive for tremors and weakness.      Otherwise complete ROS reviewed and negative except as mentioned in the HPI.    Past Medical History:   Past Medical History:   Diagnosis Date    A-fib     Alcoholic hepatitis 07/13/2023    CAD (coronary artery disease)     History of external beam radiation therapy 05/12/2016    6840 cGy to prostate bed    Hyperlipidemia     Hypertension     Insomnia     Prostate cancer      Past Surgical History:  Past Surgical History:   Procedure Laterality Date    CARDIAC CATHETERIZATION      CORONARY ANGIOPLASTY WITH STENT PLACEMENT       FRACTURE SURGERY      PROSTATE SURGERY      TONSILLECTOMY       Social History:  reports that he has never smoked. He has never used smokeless tobacco. He reports current alcohol use of about 50.0 standard drinks of alcohol per week. He reports that he does not currently use drugs after having used the following drugs: Marijuana.    Family History: family history includes Coronary artery disease in his mother; Heart disease in his mother; Stroke in his mother.       Allergies:  No Known Allergies    Medications:  Prior to Admission medications    Medication Sig Start Date End Date Taking? Authorizing Provider   Thiamine Mononitrate (B1) 100 MG tablet Take 1 tablet by mouth Daily. 11/21/24  Yes ProviderSteve MD   amiodarone (PACERONE) 200 MG tablet Take 1 tablet by mouth Daily. 9/10/24   Italia Vasquez PA   apixaban (ELIQUIS) 5 MG tablet tablet Take 1 tablet by mouth Every 12 (Twelve) Hours. Indications: Atrial Fibrillation 11/23/24   Italia Vasquez PA   aspirin (aspirin) 81 MG EC tablet Take 1 tablet by mouth Daily. 12/23/20   Edda Li APRN   atorvastatin (LIPITOR) 20 MG tablet Take 1 tablet by mouth Daily.    Provider, MD Steve   azelastine (ASTELIN) 0.1 % nasal spray 2 sprays into the nostril(s) as directed by provider 2 (Two) Times a Day. Use in each nostril as directed 9/5/24   Gonzalo Martinez APRN   busPIRone (BUSPAR) 5 MG tablet Take 1.5 tablets by mouth 2 (Two) Times a Day. 12/9/24   New Griffiths MD   fluticasone (FLONASE) 50 MCG/ACT nasal spray 2 sprays into the nostril(s) as directed by provider Daily. 9/5/24   Gonzalo Martinez APRN   folic acid (FOLVITE) 1 MG tablet Take 1 tablet by mouth Daily. 11/22/24   Holger Karimi DO   loperamide (IMODIUM) 2 MG capsule Take 1 capsule by mouth 4 (Four) Times a Day As Needed for Diarrhea. 12/9/24   New Griffiths MD   losartan (Cozaar) 25 MG tablet Take 1 tablet by mouth Daily. 10/21/24   Italia Vasquez PA  "  Magic Barrier Ointment Apply 1 Application topically to the appropriate area as directed 4 (Four) Times a Day As Needed (areas of skin breakdown). 12/9/24   New Griffiths MD   metoprolol succinate XL (TOPROL-XL) 25 MG 24 hr tablet Take 1 tablet by mouth Daily.    ProviderSteve MD   pantoprazole (PROTONIX) 40 MG EC tablet Take 1 tablet by mouth Daily.    Steve Devries MD   temazepam (RESTORIL) 30 MG capsule Take 1 capsule by mouth At Night As Needed for Sleep.    ProviderSteve MD   thiamine (VITAMIN B1) 100 MG tablet Take 1 tablet by mouth Daily. 11/22/24   Holger Karimi DO     I have utilized all available immediate resources to obtain, update, or review the patient's current medications (including all prescriptions, over-the-counter products, herbals, cannabis/cannabidiol products, and vitamin/mineral/dietary (nutritional) supplements).    Objective     Vital Signs: /84   Pulse 115   Temp 98 °F (36.7 °C) (Oral)   Resp 14   Ht 180.3 cm (71\")   Wt 86.7 kg (191 lb 3.2 oz)   SpO2 97%   BMI 26.67 kg/m²   Physical Exam  Vitals and nursing note reviewed.   Constitutional:       General: He is not in acute distress.     Appearance: He is ill-appearing and toxic-appearing.      Comments: Room air   HENT:      Head: Normocephalic and atraumatic.      Right Ear: Tympanic membrane normal.      Left Ear: Tympanic membrane normal.      Nose: Nose normal.      Mouth/Throat:      Mouth: Mucous membranes are moist.      Pharynx: Oropharynx is clear.   Eyes:      Pupils: Pupils are equal, round, and reactive to light.   Cardiovascular:      Rate and Rhythm: Regular rhythm. Tachycardia present.      Pulses: Normal pulses.      Heart sounds: Normal heart sounds.   Pulmonary:      Effort: Pulmonary effort is normal.      Breath sounds: Normal breath sounds.   Abdominal:      General: Abdomen is flat. Bowel sounds are normal. There is no distension.      Palpations: Abdomen is soft. "      Tenderness: There is no abdominal tenderness.   Genitourinary:     Comments: Voiding per urinal  Musculoskeletal:      Cervical back: Normal range of motion. No rigidity or tenderness.      Left hip: Tenderness present. Decreased range of motion. Decreased strength.   Skin:     General: Skin is warm.      Coloration: Skin is pale.      Findings: Bruising and erythema present.      Comments: Left gluteal hematoma with multiple areas of healing scabs on all extremities   Neurological:      Mental Status: He is alert.      Motor: Weakness and tremor present.      Gait: Gait abnormal.   Psychiatric:         Attention and Perception: Attention normal.         Mood and Affect: Mood is anxious.         Speech: Speech normal.         Behavior: Behavior is cooperative.         Cognition and Memory: Cognition normal. Memory is impaired.          Results Reviewed:  Lab Results (last 24 hours)       Procedure Component Value Units Date/Time    Urinalysis With Culture If Indicated - Straight Cath [[id number]]  (Abnormal) Collected: 12/20/24 1208    Specimen: Urine from Straight Cath Updated: 12/20/24 1236     Color, UA Dark Yellow     Appearance, UA Cloudy     pH, UA <=5.0     Specific Gravity, UA 1.024     Glucose, UA Negative     Ketones, UA Trace     Bilirubin, UA Negative     Blood, UA Negative     Protein, UA 30 mg/dL (1+)     Leuk Esterase, UA Negative     Nitrite, UA Negative     Urobilinogen, UA 1.0 E.U./dL            Urinalysis, Microscopic Only - Straight Cath [[id number]]  (Abnormal) Collected: 12/20/24 1208    Specimen: Urine from Straight Cath Updated: 12/20/24 1236     RBC, UA 0-2 /HPF      WBC, UA 3-5 /HPF      Comment: Urine culture not indicated.        Bacteria, UA Trace /HPF      Squamous Epithelial Cells, UA 3-6 /HPF      Hyaline Casts, UA 0-2 /LPF      Methodology Manual Light Microscopy    Urine Drug Screen - Straight Cath [[id number]]  (Abnormal) Collected: 12/20/24 1208    Specimen: Urine from Straight  Cath Updated: 12/20/24 1233     THC, Screen, Urine Negative     Phencyclidine (PCP), Urine Negative     Cocaine Screen, Urine Negative     Methamphetamine, Ur Negative     Opiate Screen Negative     Amphetamine Screen, Urine Negative     Benzodiazepine Screen, Urine Positive     Tricyclic Antidepressants Screen Negative     Methadone Screen, Urine Negative     Barbiturates Screen, Urine Negative     Oxycodone Screen, Urine Positive     Buprenorphine, Screen, Urine Negative            Fentanyl, Urine - Straight Cath [[id number]]  (Normal) Collected: 12/20/24 1208    Specimen: Urine from Straight Cath Updated: 12/20/24 1233     Fentanyl, Urine Negative            Comprehensive Metabolic Panel [[id number]]  (Abnormal) Collected: 12/20/24 1101    Specimen: Blood Updated: 12/20/24 1141     Glucose 117 mg/dL      BUN 20 mg/dL      Creatinine 1.66 mg/dL      Sodium 135 mmol/L      Potassium 4.9 mmol/L      Chloride 93 mmol/L      CO2 18.0 mmol/L      Calcium 8.7 mg/dL      Total Protein 6.9 g/dL      Albumin 4.0 g/dL      ALT (SGPT) 57 U/L      AST (SGOT) 68 U/L      Alkaline Phosphatase 91 U/L      Total Bilirubin 0.7 mg/dL      Globulin 2.9 gm/dL      A/G Ratio 1.4 g/dL      BUN/Creatinine Ratio 12.0     Anion Gap 24.0 mmol/L      eGFR 44.1 mL/min/1.73             CK [[id number]]  (Normal) Collected: 12/20/24 1101    Specimen: Blood Updated: 12/20/24 1141     Creatine Kinase 167 U/L     Magnesium [[id number]]  (Normal) Collected: 12/20/24 1101    Specimen: Blood Updated: 12/20/24 1139     Magnesium 2.2 mg/dL     Ethanol [[id number]] Collected: 12/20/24 1101    Specimen: Blood Updated: 12/20/24 1136     Ethanol % 0.127 %     Narrative:      Not for legal purposes. Chain of Custody not followed.     CBC & Differential [[id number]]  (Abnormal) Collected: 12/20/24 1101    Specimen: Blood Updated: 12/20/24 1129            CBC Auto Differential [[id number]]  (Abnormal) Collected: 12/20/24 1101    Specimen: Blood Updated: 12/20/24  1129     WBC 7.23 10*3/mm3      RBC 2.52 10*6/mm3      Hemoglobin 8.0 g/dL      Hematocrit 24.2 %      MCV 96.0 fL      MCH 31.7 pg      MCHC 33.1 g/dL      RDW 14.9 %      RDW-SD 51.6 fl      MPV 10.1 fL      Platelets 173 10*3/mm3      Neutrophil % 82.9 %      Lymphocyte % 10.1 %      Monocyte % 6.6 %      Eosinophil % 0.0 %      Basophil % 0.1 %      Immature Grans % 0.3 %      Neutrophils, Absolute 5.99 10*3/mm3      Lymphocytes, Absolute 0.73 10*3/mm3      Monocytes, Absolute 0.48 10*3/mm3      Eosinophils, Absolute 0.00 10*3/mm3      Basophils, Absolute 0.01 10*3/mm3      Immature Grans, Absolute 0.02 10*3/mm3      nRBC 0.4 /100 WBC           Imaging Results (Last 24 Hours)       Procedure Component Value Units Date/Time    CT Lower Extremity Left Without Contrast [[id number]] Collected: 12/20/24 1638     Updated: 12/20/24 1648    Narrative:      EXAM: CT LOWER EXTREMITY LEFT WO CONTRAST- - 12/20/2024 3:16 PM     HISTORY: fall, trauma, pain       COMPARISON: None.      DOSE LENGTH PRODUCT: 521 mGy cm. Automatic exposure control was utilized  to make radiation dose as low as reasonably achievable.     TECHNIQUE: Unenhanced CT images were acquired then reconstructed and  displayed as axial, coronal and sagittal multiplanar reformatted images.  The coverage included LEFT mid pelvis to the LEFT proximal tibia fibula.     FINDINGS:     BONE:  The bones all have normal configuration. Visualized portion of the LEFT  pelvis appears intact. LEFT sacrum minimally visualized, not optimally  evaluated on this exam. LEFT femur appears intact.     JOINTS:  LEFT hip appears intact and normally aligned. No convincing large hip  joint effusion. No knee joint effusion. Pubic symphysis anatomic. LEFT  sacroiliac joint minimally visualized, appears anatomic.     TENDONS AND MUSCLES:  Large gluteal intramuscular mixed density including hyperdense mass  measures 13.6 x 8.3 x 13.7 cm (AP by transverse by craniocaudal), in  the  setting of trauma likely hematoma.. Lack of intravenous contrast limits  evaluation for active extravasation.     SUBCUTANEOUS AND DEEP SOFT TISSUES:  Vascular calcifications. Minimally visualized deep pelvis, not optimally  evaluated on this exam. Subcutaneous stranding posterior gluteal and  upper thigh regions, likely contusion.          Impression:      1. Large gluteal mixed density mass, favored to represent hematoma in  the setting of trauma. Lack of intravenous contrast limits evaluation  for active extravasation.     2. No acute fracture in the field-of-view.           This report was signed and finalized on 12/20/2024 4:45 PM by Dr Zuleyma Schmidt MD.       CT Pelvis Without Contrast [[id number]] Collected: 12/20/24 1639     Updated: 12/20/24 1646    Narrative:      CT PELVIS WO CONTRAST- 12/20/2024 3:16 PM     HISTORY: fall, trauma, pain       COMPARISON: CT scan dated 12/20/2024.     DLP: 521.44 mGy.cm     TECHNIQUE: Serial helical tomographic images of the bony pelvis were  obtained without the use of intravenous contrast. Multiplanar  reformatted images were provided for review. Automated exposure control  was utilized to reduce patient radiation dose.     FINDINGS: :     No visualized acute fracture. No fracture or malalignment at the hip  joints. Pelvic ring is intact. Pubic symphysis and SI joints are  unremarkable. No acute intrapelvic process. Colonic diverticulosis.  There is a large large left gluteal compartment intramuscular hematomas  in the gluteus medius and robbin measuring up to 15 cm craniocaudal.       Impression:      1. No acute fracture or malalignment.  2. Large left gluteal compartment intramuscular hematomas involving the  gluteus medius and gluteus robbin measuring up to 15 cm craniocaudal  and up to 13 cm transverse.           This report was signed and finalized on 12/20/2024 4:43 PM by Dr Gregory Guillermo.       XR Hip With or Without Pelvis 2 - 3 View Left [[id number]]  Collected: 12/20/24 1047     Updated: 12/20/24 1051    Narrative:      EXAMINATION: XR HIP W OR WO PELVIS 2-3 VIEW LEFT-  12/20/2024 10:47 AM     HISTORY: Fall/trauma.     FINDINGS: AP radiograph of the pelvis as well as 2 view exam of the left  hip demonstrates moderate arthrosis of the left hip with narrowing of  the joint space and mild spurring of the femoral head. There is mild  arthritic change of the right hip. The bony pelvic ring is intact.       Impression:      1. No acute fracture.  2. Arthritic changes of both hips (left greater than right).     This report was signed and finalized on 12/20/2024 10:48 AM by Dr. Vicente Sam MD.                 Assessment / Plan   Assessment:   Active Hospital Problems    Diagnosis     **Stage 1 acute kidney injury     Large left gluteal compartment intramuscular hematomas     Alcohol intoxication     Multiple falls     Transaminitis     Gait instability     Alcohol abuse     HOCM (hypertrophic obstructive cardiomyopathy)     Essential hypertension        Treatment Plan  The patient will be admitted to my service here at [facility].     1.  Stage I acute kidney injury-baseline creatinine of 0.96 currently 1.5 times at 1.66, will initiate normal saline at 75 mL an hour, hold any nephrotoxic agents and repeat BMP in the AM.    2.  Alcohol intoxication/alcohol abuse-patient has been admitted over the last 2 months for several occasions of alcohol intoxication with related falls.  Family requests evaluation for placement.  Initiate CIWA protocol.    3.  Multiple falls/gait instability-patient has had approximately 2 hospital admissions and multiple ER visits due to fall induced falls with continued gait instability.  Family is requesting placement, PT/OT evaluation for recommendations.  Case management consultation to evaluate for placement.  Case was discussed per ED case manager who stated that due to patient's insurance he did not need an inpatient stay  or a 3 night stay.    4.  Transaminitis-acute on chronic, currently AST of 68, ALT of 57, MELD score.......... continue to follow with daily CMP.    5.  Essential hypertension-initiate every 4 vital signs continue home metoprolol and amiodarone, Cozaar on hold due to MIKEY.    6.  Hypertrophic obstructive cardiomyopathy-recent echocardiogram performed in July of this year revealed normal systolic function with an LVEF of 51-55% with moderate to severe concentric hypertrophy of the left ventricular walls.  Patient has no signs or symptoms of exacerbation.  Continue home metoprolol, daily weight, strict intake and output.    7.  Large left gluteal compartment intramuscular hematoma-continued assessment, ice packs every 2 hours off 2 hours.  Notify provider of any new or worsening hematoma, decreased pulsation or increased pain.    8.  Paroxysmal atrial fibrillation/chronic anticoagulation-continue home amiodarone and Eliquis, cardiac monitoring    9.  Resumed and restarted home medications as appropriate.    VTE prophylaxis with Eliquis  Labs in a.m.  Medical Decision Making  Acute kidney injury, acute, moderate complexity, unchanged  Alcohol intoxication, chronic, high complexity, worsening  Alcohol abuse, chronic, high complexity, worsening  Multiple falls, chronic, moderate complexity, worsening  Gait instability, chronic, moderate complexity, worsening  Transaminitis, chronic, moderate complexity, unchanged  Essential hypertension, chronic, moderate complexity, unchanged  Hypertrophic obstructive cardiomyopathy, chronic, moderate complexity, stable  Large left gluteal compartment intramuscular hematoma, acute, moderate complexity, unchanged  Paroxysmal atrial fibrillation and chronic anticoagulation, chronic, moderate complexity, stable  Number and Complexity of problems: 9  Differential Diagnosis: None    Conditions and Status        Condition is unchanged.  On Eliquis  [facility] Data  External documents reviewed: Review  of multiple ER and hospitalizations  Cardiac tracing (EKG, telemetry) interpretation: 7/2024 echocardiogram per cardiology reviewed  Radiology interpretation: 12/20/2024 hip x-ray, CT of the pelvis and lower extremity per radiology reviewed  Labs reviewed: 5/20/2024 CBC, CMP, ethanol, magnesium, CK, urinalysis reviewed, repeat labs in a.m.  Any tests that were considered but not ordered: None     Decision rules/scores evaluated (example [id number], Wells, etc): [id number] score of 2     Discussed with: Dr. England, patient and his sister Yuliya  Care Planning  Shared decision making: Dr. England, patient and his sister Yuliya  Code status and discussions: Full code per patient and sister    Disposition  Social Determinants of Health that impact treatment or disposition: Patient can no longer care for himself at home case management consultation to evaluate for placement  Estimated length of stay is 1-2 days.     I confirmed that the patient's advanced care plan is present, code status is documented, and a surrogate decision maker is listed in the patient's medical record.     The patient's surrogate decision maker is Sister Yuliya.     The patient was seen and examined by me on 12/20/2024 at 1714.    Electronically signed by BRI Song, 12/20/24, 17:24 CST.               Electronically signed by Amelia England MD at 12/21/24 1003       Current Facility-Administered Medications   Medication Dose Route Frequency Provider Last Rate Last Admin    acetaminophen (TYLENOL) tablet 650 mg  650 mg Oral Q4H PRN Tiffanie Estrella APRN   650 mg at 12/25/24 0831    Or    acetaminophen (TYLENOL) 160 MG/5ML oral solution 650 mg  650 mg Oral Q4H PRN Tiffanie Estrella APRN        Or    acetaminophen (TYLENOL) suppository 650 mg  650 mg Rectal Q4H PRN Tiffanie Estrella APRN        amiodarone (PACERONE) tablet 200 mg  200 mg Oral Daily Tiffanie Estrella APRN   200 mg at 12/26/24 0817    atorvastatin (LIPITOR) tablet 20 mg   20 mg Oral Daily Tiffanie Estrella APRN   20 mg at 12/26/24 0817    sennosides-docusate (PERICOLACE) 8.6-50 MG per tablet 2 tablet  2 tablet Oral BID PRN Tiffanie Estrella APRN        And    polyethylene glycol (MIRALAX) packet 17 g  17 g Oral Daily PRN Tiffanie Estrella APRN        And    bisacodyl (DULCOLAX) EC tablet 5 mg  5 mg Oral Daily PRN Tiffanie Estrella APRN        And    bisacodyl (DULCOLAX) suppository 10 mg  10 mg Rectal Daily PRN Tiffanie Estrella APRN        busPIRone (BUSPAR) tablet 7.5 mg  7.5 mg Oral BID Tiffanie Estrella APRN   7.5 mg at 12/26/24 2110    escitalopram (LEXAPRO) tablet 10 mg  10 mg Oral Daily Amelia England MD   10 mg at 12/26/24 0817    fluticasone (FLONASE) 50 MCG/ACT nasal spray 2 spray  2 spray Nasal Daily Tiffanie Estrella APRN   2 spray at 12/23/24 0845    folic acid (FOLVITE) tablet 1 mg  1 mg Oral Daily Amelia England MD   1 mg at 12/26/24 0817    loperamide (IMODIUM) capsule 2 mg  2 mg Oral 4x Daily PRN Amelia England MD   2 mg at 12/21/24 1644    Magnesium Standard Dose Replacement - Follow Nurse / BPA Driven Protocol   Not Applicable PRN Amelia England MD        metoprolol succinate XL (TOPROL-XL) 24 hr tablet 12.5 mg  12.5 mg Oral Daily Raul Gifford MD   12.5 mg at 12/26/24 0817    ondansetron ODT (ZOFRAN-ODT) disintegrating tablet 4 mg  4 mg Oral Q6H PRN Tiffanie Estrella APRN        Or    ondansetron (ZOFRAN) injection 4 mg  4 mg Intravenous Q6H PRN Tiffanie Estrella APRN        oxyCODONE-acetaminophen (PERCOCET) 5-325 MG per tablet 1 tablet  1 tablet Oral Q8H PRN Raul Gifford MD   1 tablet at 12/27/24 0228    pantoprazole (PROTONIX) EC tablet 40 mg  40 mg Oral Q AM Tiffanie APRN   40 mg at 12/27/24 0536    sodium chloride 0.9 % flush 10 mL  10 mL Intravenous Q12H Tiffanie Estrella APRN   10 mL at 12/26/24 2110    sodium chloride 0.9 % flush 10 mL  10 mL Intravenous PRN Tiffanie Estrella APRN        sodium chloride 0.9 % infusion 40 mL  40 mL  Intravenous PRN Tiffanie APRN        temazepam (RESTORIL) capsule 30 mg  30 mg Oral Nightly PRN Amelia England MD   30 mg at 12/25/24 2027    thiamine (VITAMIN B-1) tablet 100 mg  100 mg Oral Daily Amelia England MD   100 mg at 12/26/24 0817        Physician Progress Notes (most recent note)        Raul Gifford MD at 12/26/24 1129              [facility] Medicine Services  INPATIENT PROGRESS NOTE    Patient Name: Dayron Lubin  Date of Admission: 12/20/2024  Today's Date: 12/26/24  Length of Stay: 4  Primary Care Physician: Herberth Nguyen Jr., MD    Subjective   Chief Complaint: Fall  Fall       69-year-old male with history of atrial fibrillation, chronic alcoholic hepatitis, alcohol abuse disorder, coronary artery disease, hypertrophic obstructive cardiomyopathy, hypertension, hyperlipidemia, prostate cancer, who was discharged from the hospital 12/9/2024 after admitted for alcohol intoxication nontraumatic rhabdomyolysis.  Patient was again admitted on 12/20/2024 to medical floor due to multiple falls.  Patient reports his last alcohol drink was last Thursday.  The patient was started on alcohol withdrawal protocol.  He was diagnosed with a large left gluteal compartment intramuscular hematoma.  Patient on anticoagulation with Eliquis.  On 12/22/2024, the patient was transferred to intensive care unit due to worsening withdrawal signs and symptoms.  The patient was started on phenobarbital taper.  Symptoms improved.      The patient was transferred again to medical floor 12/23/2024, and I started taking care of this patient on 12/24/2024.  Family members present today, 2 sisters.  They stated have been trying to get patient into alcohol detoxification program in the outpatient setting but he has not been possible.   Patient being evaluated by physical therapy at the time of my visit.  He reports no nausea, no vomiting, no abdominal pain, no chest pain.  Pain  is controlled.        Review of Systems   All pertinent negatives and positives are as above. All other systems have been reviewed and are negative unless otherwise stated.     Objective    Temp:  [97.6 °F (36.4 °C)-98.7 °F (37.1 °C)] 97.6 °F (36.4 °C)  Heart Rate:  [58-70] 69  Resp:  [16] 16  BP: (106-109)/(65-73) 106/73  Physical Exam  Constitutional:       Appearance: Ill-appearing, alert oriented x 3.  No respiratory distress.  HENT:      Head: Normocephalic and atraumatic.      Nose: Nose normal.      Mouth/Throat:      Mouth: Mucous membranes are moist.      Pharynx: Oropharynx is clear.   Eyes:      Extraocular Movements: Extraocular movements intact.      Conjunctiva/sclera: Conjunctivae normal.      Pupils: Pupils are equal, round, and reactive to light.   Cardiovascular:      Rate and Rhythm: Normal rate and regular rhythm.      Pulses: Normal pulses.   Pulmonary:      Effort: No respiratory distress.      Breath sounds: Normal breath sounds. No wheezing, rhonchi or rales.   Abdominal:      General: Abdomen is flat. Bowel sounds are normal.      Palpations: Abdomen is soft.      Tenderness: There is no guarding or rebound.   Musculoskeletal:      Left gluteal hematoma present,  Extremities:  No lower extremity edema.  Skin:     Capillary Refill: Capillary refill takes less than 2 seconds.      Coloration: Skin is not jaundiced.      Findings: No rash.   Neurological: Slight tremors in hands.     General: No focal deficit present.      Mental Status: Patient is alert, oriented to place time and person.          Results Review:  I have reviewed the labs, radiology results, and diagnostic studies.    Laboratory Data:   Results from last 7 days   Lab Units 12/26/24  0340 12/25/24  0528 12/24/24  0258 12/23/24  1005   WBC 10*3/mm3  --  5.07 4.25 3.57   HEMOGLOBIN g/dL 8.4* 8.6* 6.8* 7.5*   HEMATOCRIT % 26.4* 27.6* 21.5* 23.2*   PLATELETS 10*3/mm3  --  101* 92* 93*        Results from last 7 days   Lab Units  "12/25/24  0528 12/24/24  0258 12/23/24  1005   SODIUM mmol/L 136 138 135*   POTASSIUM mmol/L 3.7 3.5 3.5   CHLORIDE mmol/L 102 102 100   CO2 mmol/L 25.0 26.0 24.0   BUN mg/dL 17 18 20   CREATININE mg/dL 0.88 0.83 0.95   CALCIUM mg/dL 8.3* 8.1* 8.4*   BILIRUBIN mg/dL 0.6 0.5 0.6   ALK PHOS U/L 74 67 70   ALT (SGPT) U/L 22 22 25   AST (SGOT) U/L 24 26 32   GLUCOSE mg/dL 87 99 87       Culture Data:   No results found for: \"BLOODCX\", \"URINECX\", \"WOUNDCX\", \"MRSACX\", \"RESPCX\", \"STOOLCX\"    Radiology Data:   Imaging Results (Last 24 Hours)       ** No results found for the last 24 hours. **            I have reviewed the patient's current medications.     Assessment/Plan   Assessment  Active Hospital Problems    Diagnosis     **Stage 1 acute kidney injury     Large left gluteal compartment intramuscular hematomas     Chronic anticoagulation     MIKEY (acute kidney injury)     Alcohol intoxication     Multiple falls     Transaminitis     Alcohol withdrawal     Paroxysmal atrial fibrillation     Gait instability     Alcohol abuse     HOCM (hypertrophic obstructive cardiomyopathy)     Essential hypertension      Alcohol withdrawal, resolving  Alcohol abuse disorder  Acute on chronic anemia  Acute kidney injury/renal insufficiency  Traumatic left gluteal intramuscular hematoma  Atrial fibrillation  Chronic anticoagulation with Eliquis  Chronic alcohol hepatitis  Hypertrophic obstructive cardiomyopathy  Hypertension  Chronic thrombocytopenia secondary to alcohol use      Admission creatinine level 1.66.  Normalized to 0.88  Sodium 136, potassium 3.7.  Magnesium 2.3  Vitamin B12 561, iron 64.  Transferrin 192.  TIBC 286    Admission hemoglobin 8.0.  On 12/21/2024 hemoglobin dropped to 6.7.  Patient was transfused 1 unit packed red blood cells.  On 12/24/2024 hemoglobin was 6.8.  Patient was transfused 1 unit packed red blood cells.  Hemoglobin 8.4.  Hematocrit 26.4    Last platelet count 101,000.  .  C. difficile toxin positive, " antigen is negative.  Likely chronic carrier    Treatment Plan  Patient ended treatment with phenobarbital taper  Continue CIWA protocol monitoring.  Continue amiodarone, atorvastatin, Lexapro, folic acid  Continue Toprol-XL 12.5 p.o. daily.  Continue protonix 40 mg p.o. daily  Patient is currently off anticoagulation due to worsening anemia and a left gluteal hematoma  CBC in the morning  Skilled nursing facility placement in progress.  Social work note: patient has received a bed offer from [facility]. The other SNF's in [place] have declined due to not having a private room available. Bed offer accepted by patient's sister, Yuliya. Insurance approval process started.    Alcohol cessation and rehabilitation recommended    Medical Decision Making  Number and Complexity of problems: 11 high complexity  Differential Diagnosis: see above    Conditions and Status        Condition is improving.     [facility] Data  External documents reviewed: None  Cardiac tracing (EKG, telemetry) interpretation: Sinus rhythm  Radiology interpretation: Other reports reviewed  Labs reviewed: Yes  Any tests that were considered but not ordered: None     Decision rules/scores evaluated (example [id number], Wells, etc): See chart     Discussed with: Patient and nurse     Care Planning  Shared decision making: With patient  Code status and discussions: Full code    Disposition  Social Determinants of Health that impact treatment or disposition: Alcohol abuse  I expect the patient to be discharged to skilled nursing facility once approved.      Electronically signed by Raul Gifford MD, 12/26/24, 11:30 CST.     Electronically signed by Raul Gifford MD at 12/26/24 1135          Consult Notes (most recent note)        Maldonado rCain APRN at 12/22/24 1135       Attestation signed by Kj Vasquez MD at 12/22/24 1523    I have reviewed this documentation and agree.  I agree with the assessment and plan by the APRN.    Brief clinical  course.    Patient is a 69 years old  male with history of alcohol abuse, alcoholic hepatitis and history of alcohol withdrawal in the past but he also has A-fib hypertrophic obstructive cardiomyopathy hypertension hyperlipidemia and history of multiple falls at home presented to the hospital on 12/20/2024 after multiple falls following alcohol intoxication.  Imaging studies done on arrival showed no evidence of fracture but he had a left gluteal hematoma he also showed mild acute kidney injury elevated transaminases.  Hemoglobin was borderline low.  Patient was admitted to the medical floor but overnight on 1221 he was more anxious and agitated event while on the CIWA protocol and was transferred to the ICU on request from the hospitalist team.  The patient was initially stable on arrival to the ICU but later became anxious and agitated and was started on phenobarbital in addition to the CIWA protocol and was more comfortable.  He was requiring no oxygen and was on room air.    Clinical examination  Elderly  male clinically stable gets anxious agitated at times, HEENT: Atraumatic normocephalic.  Neck was supple no mass nodule or venous tension.  Heart: Sounds normal rate and rhythm regular no murmur.  Lungs: Clear to auscultation decreased breath sound at the bases.  Abdomen soft nontender bowel sounds present no organomegaly.  Extremities no edema normal pulses normal color.  Neurologic grossly intact skin there is no breakdown psych patient appears to be anxious and agitated and confused at times.    Assessment and plan.  Monitor him in the ICU closely.  Continue CIWA protocol and phenobarbital continue Librium for anxiety and agitation.  Fall precautions.  Close monitoring of neurologic status.  Repeat labs in studies and correct electrolytes.  Monitor renal function.  He has left gluteal hematoma and hemoglobin is borderline low.  Monitor hemoglobin and transfuse if needed.  Needs long-term  alcohol abuse management plan.  No family is present during the assessment today.  We will follow.    Electronically signed by    Kj Vasquez MD  Pulmonologist/Intensivist  12/22/2024 15:22 CST                    [facility] Intensivist Services  INPATIENT PROGRESS NOTE    Patient Name: Dayron Lubin  Date of Admission: 12/20/2024  Today's Date: 12/22/24  Length of Stay: 0  Primary Care Physician: Herberth Nguyen Jr., MD    Subjective   Fall       69-year-old male with history of A-fib, alcoholic hepatitis, CAD, hypertrophic obstructive cardiomyopathy, hypertension, hyperlipidemia and alcohol abuse with chronic falls, presented to the hospital on 12/20/2024 after sustaining multiple falls and having signs of alcohol intoxication.  CT lower extremity on arrival to ED found no fractures, but revealed a large left gluteal hematoma.  Lab work revealed a mild MIKEY and mildly elevated transaminases.  Anemic with H&H of 8/24.2.  Patient initially admitted to the medical floor.  Overnight on 12/21, patient became more confused and restless and required multiple doses of lorazepam.  Concern for worsening withdrawal Hospital medicine requested transfer to ICU for closer monitoring and sedation.  Upon arrival ICU, patient appeared alert, oriented, mildly tremulous.  Over the course of 1 to 2 hours, patient became increasingly agitated, anxious.  He is already on a Librium dose with taper.  Vital signs are currently stable.    Review of Systems   All pertinent negatives and positives are as above. All other systems have been reviewed and are negative unless otherwise stated.     Objective    Temp:  [97.8 °F (36.6 °C)-98.4 °F (36.9 °C)] 98 °F (36.7 °C)  Heart Rate:  [79-93] 79  Resp:  [16-18] 16  BP: (106-162)/(60-92) 108/75  Physical Exam  Constitutional:       General: He is not in acute distress.     Comments: Tremulous, restless   Eyes:      Pupils: Pupils are equal, round, and  reactive to light.   Cardiovascular:      Rate and Rhythm: Normal rate and regular rhythm.      Pulses: Normal pulses.      Heart sounds: Normal heart sounds.   Pulmonary:      Effort: Pulmonary effort is normal.      Breath sounds: Normal breath sounds.   Abdominal:      Palpations: Abdomen is soft.      Tenderness: There is no abdominal tenderness.   Musculoskeletal:      Comments: Generalized weakness, left hip/gluteal hematoma   Skin:     General: Skin is warm.      Findings: Bruising present.   Neurological:      General: No focal deficit present.      Mental Status: He is alert.      Comments: Disoriented to time   Psychiatric:         Mood and Affect: Mood is anxious.         Results Review:  Lab Results (last 24 hours)       Procedure Component Value Units Date/Time    Clostridioides difficile toxin Ag, Reflex - Stool, Per Rectum [[id number]]  (Normal) Collected: 12/21/24 2051    Specimen: Stool from Per Rectum Updated: 12/22/24 0836     C.diff Toxin Ag Negative    Narrative:      DNA from a toxigenic strain of C.difficile was detected, although the free toxin itself was not detected. These findings are consistent with C.difficile colonization and may not reflect actual C.difficile infection. Clinical correlation needed.    Clostridioides difficile Toxin - Stool, Per Rectum [[id number]]  (Abnormal) Collected: 12/21/24 2051    Specimen: Stool from Per Rectum Updated: 12/22/24 0836    Narrative:      The following orders were created for panel order Clostridioides difficile Toxin - Stool, Per Rectum.  Procedure                               Abnormality         Status                     ---------                               -----------         ------                     Clostridioides difficile...[[id number]]  Abnormal            Final result                 Please view results for these tests on the individual orders.    Clostridioides difficile Toxin, PCR - Stool, Per Rectum [[id number]]  (Abnormal) Collected:  12/21/24 2051    Specimen: Stool from Per Rectum Updated: 12/22/24 0836     Toxigenic C. difficile by PCR Positive    Narrative:      DNA from a toxigenic strain of C.difficile has been detected. Antigen testing for the presence of free C.difficile toxin is currently in progress, to help determine the clinical significance of this PCR result.     Gastrointestinal Panel, PCR - Stool, Per Rectum [[id number]]  (Normal) Collected: 12/21/24 2051    Specimen: Stool from Per Rectum Updated: 12/21/24 2218     Campylobacter Not Detected     Plesiomonas shigelloides Not Detected     Salmonella Not Detected     Vibrio Not Detected     Vibrio cholerae Not Detected     Yersinia enterocolitica Not Detected     Enteroaggregative E. coli (EAEC) Not Detected     Enteropathogenic E. coli (EPEC) Not Detected     Enterotoxigenic E. coli (ETEC) lt/st Not Detected     Shiga-like toxin-producing E. coli (STEC) stx1/stx2 Not Detected     Shigella/Enteroinvasive E. coli (EIEC) Not Detected     Cryptosporidium Not Detected     Cyclospora cayetanensis Not Detected     Entamoeba histolytica Not Detected     Giardia lamblia Not Detected     Adenovirus F40/41 Not Detected     Astrovirus Not Detected     Norovirus GI/GII Not Detected     Rotavirus A Not Detected     Sapovirus (I, II, IV or V) Not Detected    Narrative:      If Aeromonas, Staphylococcus aureus or Bacillus cereus are suspected, please order [id number]: Stool Culture, Aeromonas, S aureus, B Cereus.             CT Lower Extremity Left Without Contrast    Result Date: 12/20/2024  1. Large gluteal mixed density mass, favored to represent hematoma in the setting of trauma. Lack of intravenous contrast limits evaluation for active extravasation.  2. No acute fracture in the field-of-view.    This report was signed and finalized on 12/20/2024 4:45 PM by Dr Zuleyma Schmidt MD.      CT Pelvis Without Contrast    Result Date: 12/20/2024  1. No acute fracture or malalignment. 2. Large left  gluteal compartment intramuscular hematomas involving the gluteus medius and gluteus robbin measuring up to 15 cm craniocaudal and up to 13 cm transverse.    This report was signed and finalized on 12/20/2024 4:43 PM by Dr Gregory Guillermo.       Result Review:  I have personally reviewed the results from the time of this admission to 12/22/2024 11:35 CST and agree with these findings:  [x]  Laboratory list / accordion  [x]  Microbiology  [x]  Radiology  [x]  EKG/Telemetry   []  Cardiology/Vascular   []  Pathology  []  Old records  []  Other:    CBC          12/8/2024    03:44 12/20/2024    11:01 12/21/2024    01:54 12/21/2024    11:07   CBC   WBC 4.19  7.23  5.02     RBC 2.66  2.52  2.07     Hemoglobin 8.4  8.0  6.7  8.1    Hematocrit 26.6  24.2  20.2  24.1    .0  96.0  97.6     MCH 31.6  31.7  32.4     MCHC 31.6  33.1  33.2     RDW 14.2  14.9  15.1     Platelets 89  173  142       CMP          12/8/2024    03:44 12/20/2024    11:01 12/21/2024    01:54   CMP   Glucose 89  117  101    BUN 21  20  28    Creatinine 0.96  1.66  1.52    EGFR 85.0  44.1  49.0    Sodium 138  135  134    Potassium 4.5  4.9  4.4    Chloride 107  93  95    Calcium 8.0  8.7  8.1    Total Protein 5.7  6.9     Albumin 3.2  4.0     Globulin 2.5  2.9     Total Bilirubin 0.6  0.7     Alkaline Phosphatase 63  91     AST (SGOT) 36  68     ALT (SGPT) 38  57     Albumin/Globulin Ratio 1.3  1.4     BUN/Creatinine Ratio 21.9  12.0  18.4    Anion Gap 7.0  24.0  13.0        I have reviewed the patient's current medications.     Assessment/Plan   Assessment  Active Hospital Problems    Diagnosis     **Stage 1 acute kidney injury     Large left gluteal compartment intramuscular hematomas     Chronic anticoagulation     MIKEY (acute kidney injury)     Alcohol intoxication     Multiple falls     Transaminitis     Paroxysmal atrial fibrillation     Gait instability     Alcohol abuse     HOCM (hypertrophic obstructive cardiomyopathy)     Essential  hypertension    69-year-old male with alcohol abuse, now exhibiting progressing stages of alcohol withdrawal, admitted to ICU for critical care management.  Patient has been on Librium dose and taper with increasing requirements of benzodiazepine.  We will add phenobarbital taper.  Schedule Ativan every 6hr. patient noted to be anemic on admission has received 1 unit packed red blood cells.  His Eliquis has been discontinued at this time.  He is currently in normal sinus rhythm.    Alcohol abuse, alcohol withdrawal  -Start phenobarbital with taper  -Continue Librium  -Schedule Ativan with as needed doses  -If resistant to this, will need Precedex infusion    MIKEY  -Resolved, follow with daily metabolic panel    Left gluteal compartment muscular hematoma  -Currently off Eliquis  -Monitor for any signs of increase or vascular compromise  -Local ice for analgesia    Transaminitis  -Chronically elevated with alcoholic hepatitis, follow daily CMP    Paroxysmal atrial fibrillation  -Currently normal sinus rhythm, continue amiodarone and metoprolol  -Eliquis currently on hold given anemia and hematoma  -Patient with chronic falls, will need evaluation for appropriateness of long-term anticoagulation with his cardiologist and/or PCP    Total critical care time: Approximately 40 minutes     Due to a high probability of clinically significant, life threatening deterioration, the patient required my highest level of preparedness to intervene emergently and I personally spent this critical care time directly and personally managing the patient.      This critical care time included obtaining a history; examining the patient; pulse oximetry; ordering and review of studies; arranging urgent treatment with development of a management plan; evaluation of patient's response to treatment; frequent reassessment; and, discussions with other providers.     This critical care time was performed to assess and manage the high probability of  imminent, life-threatening deterioration that could result in multi-organ failure. It was exclusive of separately billable procedures and treating other patients and teaching time.     Please see MDM section and the rest of the note for further information on patient assessment and treatment.     Part of this note may be an electronic transcription/translation of spoken language to printed text using the Dragon dictation system     Electronically signed by BRI Machuca on 2024 at 11:56 CST      Electronically signed by Kj Vasquez MD at 24 1523          Physical Therapy Notes (most recent note)        Edda Moura PTA at 24 1111  Version 1 of 1         Acute Care - Physical Therapy Treatment Note   [place]     Patient Name: Dayron Lubin  : 1955  MRN: [id number]  Today's Date: 2024      Visit Dx:     ICD-10-CM ICD-9-CM   1. MIKEY (acute kidney injury)  N17.9 584.9   2. Frequent falls  R29.6 V15.88   3. Hematoma  T14.8XXA 924.9   4. Alcohol abuse  F10.10 305.00   5. Impaired mobility [Z74.09]  Z74.09 799.89     Patient Active Problem List   Diagnosis    Prostate cancer    Hx of radiation therapy    Elevated PSA    Encounter for follow-up surveillance of prostate cancer    HOCM (hypertrophic obstructive cardiomyopathy)    Coronary artery disease involving native coronary artery of native heart without angina pectoris    Essential hypertension    Mixed hyperlipidemia    Class 1 obesity with alveolar hypoventilation, serious comorbidity, and body mass index (BMI) of 30.0 to 30.9 in adult    Displaced fracture of lateral malleolus of right fibula, initial encounter for closed fracture    Family history of colonic polyps    History of colon polyps    Elevated brain natriuretic peptide (BNP) level    Stage 1 acute kidney injury    Anxiety associated with depression    Alcohol abuse    Alcoholic hepatitis    Volume depletion    Anorexia    CHF (congestive heart failure)     Hypokalemia    Duodenitis    Gait instability    SVT (supraventricular tachycardia)    Physical debility    Atrial fibrillation with rapid ventricular response    Alcoholism in remission    Paroxysmal atrial fibrillation    Nasal polyposis    Nasal congestion    Estelle bullosa    Long-term use of high-risk medication    Multiple falls    Transaminitis    Thrombocytopenia    Non-traumatic rhabdomyolysis    Alcohol withdrawal    Pancytopenia    Opiate abuse, episodic    Alcohol intoxication    Large left gluteal compartment intramuscular hematomas    Chronic anticoagulation    MIKEY (acute kidney injury)     Past Medical History:   Diagnosis Date    A-fib     Alcoholic hepatitis 07/13/2023    CAD (coronary artery disease)     History of external beam radiation therapy 05/12/2016    6840 cGy to prostate bed    Hyperlipidemia     Hypertension     Insomnia     Prostate cancer      Past Surgical History:   Procedure Laterality Date    CARDIAC CATHETERIZATION      CORONARY ANGIOPLASTY WITH STENT PLACEMENT      FRACTURE SURGERY      PROSTATE SURGERY      TONSILLECTOMY       PT Assessment (Last 12 Hours)       PT Evaluation and Treatment       Row Name 12/26/24 1037          Physical Therapy Time and Intention    Subjective Information complains of;pain  -AB     Document Type therapy note (daily note)  -AB     Mode of Treatment physical therapy  -AB       Row Name 12/26/24 1037          General Information    Existing Precautions/Restrictions fall  -AB       Row Name 12/26/24 1037          Bed Mobility    Supine-Sit [place] (Bed Mobility) --  up in chair  -AB     Sit-Supine [place] (Bed Mobility) standby assist  -AB       Row Name 12/26/24 1037          Sit-Stand Transfer    Sit-Stand [place] (Transfers) verbal cues;contact guard  -AB     Assistive Device (Sit-Stand Transfers) walker, front-wheeled  -AB       Row Name 12/26/24 1037          Stand-Sit Transfer    Stand-Sit [place] (Transfers) verbal  cues;contact guard  -AB     Assistive Device (Stand-Sit Transfers) walker, front-wheeled  -AB     Comment, (Stand-Sit Transfer) vc's for a controled sit  -AB       Row Name 12/26/24 1037          Gait/Stairs (Locomotion)    [place] Level (Gait) verbal cues;contact guard;minimum assist (75% patient effort)  -AB     Assistive Device (Gait) walker, front-wheeled  -AB     Distance in Feet (Gait) 50  x 6  -AB     Deviations/Abnormal Patterns (Gait) gait speed decreased;stride length decreased  -AB       Row Name             Wound 12/07/24 Left anterior third toe    Wound - Properties Group Placement Date: 12/07/24  -BB Side: Left  -BB Orientation: anterior  -BB Location: third toe  -BB    Retired Wound - Properties Group Placement Date: 12/07/24  -BB Side: Left  -BB Orientation: anterior  -BB Location: third toe  -BB    Retired Wound - Properties Group Placement Date: 12/07/24  -BB Side: Left  -BB Orientation: anterior  -BB Location: third toe  -BB    Retired Wound - Properties Group Date first assessed: 12/07/24  -BB Side: Left  -BB Location: third toe  -BB      Row Name 12/26/24 1037          Positioning and Restraints    Pre-Treatment Position sitting in chair/recliner  -AB     Post Treatment Position bed  -AB     In Bed supine;call light within reach;with family/caregiver  -AB               User Key  (r) = Recorded By, (t) = Taken By, (c) = Cosigned By      Initials Name Provider Type    AB Edda Moura PTA Physical Therapist Assistant    BB Consuelo Chaudhry, RN Registered Nurse                    Physical Therapy Education       Title: PT OT SLP Therapies (In Progress)       Topic: Physical Therapy (In Progress)       Point: Mobility training (Done)       Learning Progress Summary            Patient Acceptance, E, VU,NR by NONI at 12/23/2024 0921    Comment: benefits of activity, progression of PT POC                      Point: Home exercise program (Not Started)       Learner Progress:  Not documented in  this visit.              Point: Body mechanics (Not Started)       Learner Progress:  Not documented in this visit.              Point: Precautions (Not Started)       Learner Progress:  Not documented in this visit.                              User Key       Initials Effective Dates Name Provider Type Noemí CHENEY 02/03/23 -  Mikel Lozano, PT DPT Physical Therapist PT                  PT Recommendation and Plan         Outcome Measures       Row Name 12/24/24 1300             How much help from another is currently needed...    Putting on and taking off regular lower body clothing? 3  -EC      Bathing (including washing, rinsing, and drying) 3  -EC      Toileting (which includes using toilet bed pan or urinal) 3  -EC      Putting on and taking off regular upper body clothing 4  -EC      Taking care of personal grooming (such as brushing teeth) 4  -EC      Eating meals 4  -EC      AM-PAC 6 Clicks Score (OT) 21  -EC         Functional Assessment    Outcome Measure Options AM-PAC 6 Clicks Daily Activity (OT)  -EC                User Key  (r) = Recorded By, (t) = Taken By, (c) = Cosigned By      Initials Name Provider Type    EC Violet Hunt, OTR/L Occupational Therapist                     Time Calculation:    PT Charges       Row Name 12/26/24 1037             Time Calculation    Start Time 1037  -AB      Stop Time 1102  -AB      Time Calculation (min) 25 min  -AB      PT Received On 12/26/24  -AB         Time Calculation- PT    Total Timed Code Minutes- PT 25 minute(s)  -AB                User Key  (r) = Recorded By, (t) = Taken By, (c) = Cosigned By      Initials Name Provider Type    AB Edda Moura PTA Physical Therapist Assistant                      PT G-Codes  Outcome Measure Options: AM-PAC 6 Clicks Daily Activity (OT)  AM-PAC 6 Clicks Score (PT): 20  AM-PAC 6 Clicks Score (OT): 21    Edda Moura PTA  12/26/2024      Electronically signed by Edda Moura PTA at 12/26/24 1111

## 2024-12-27 NOTE — DISCHARGE SUMMARY
[facility] Medicine Services  DISCHARGE SUMMARY       Date of Admission: 12/20/2024  Date of Discharge:  12/27/2024  Primary Care Physician: Herberth Nguyen Jr., MD    Presenting Problem/History of Present Illness:  69-year-old male with history of atrial fibrillation, chronic alcoholic hepatitis, alcohol abuse disorder, coronary artery disease, hypertrophic obstructive cardiomyopathy, hypertension, hyperlipidemia, prostate cancer, who was discharged from the hospital 12/9/2024 after admitted for alcohol intoxication nontraumatic rhabdomyolysis. Patient was again admitted on 12/20/2024 to medical floor due to multiple falls.     Final Discharge Diagnoses:  Active Hospital Problems    Diagnosis     Large left gluteal compartment intramuscular hematomas     Chronic anticoagulation     Multiple falls     Alcohol withdrawal     Paroxysmal atrial fibrillation     Gait instability     Alcohol abuse     HOCM (hypertrophic obstructive cardiomyopathy)     Essential hypertension        Consults: None    Procedures Performed: None    Pertinent Test Results:   Results for orders placed during the hospital encounter of 07/29/24    Adult Transthoracic Echo Complete W/ Cont if Necessary Per Protocol    Interpretation Summary    Left ventricular systolic function is low normal. Left ventricular ejection fraction appears to be 51 - 55%.    Left ventricular wall thickness is consistent with moderate to severe concentric hypertrophy.    Mild to moderate mitral valve regurgitation is present.    The aortic valve is thickened and calcified and there is some degree of aortic valve stenosis, likely moderate, most of the flow acceleration appears to be in the left ventricular outflow tract as a result of hypertrophic obstructive cardiomyopathy.      Imaging Results (All)       Procedure Component Value Units Date/Time    CT Lower Extremity Left Without Contrast [[id number]] Collected: 12/20/24 1343      Updated: 12/20/24 1648    Narrative:      EXAM: CT LOWER EXTREMITY LEFT WO CONTRAST- - 12/20/2024 3:16 PM     HISTORY: fall, trauma, pain       COMPARISON: None.      DOSE LENGTH PRODUCT: 521 mGy cm. Automatic exposure control was utilized  to make radiation dose as low as reasonably achievable.     TECHNIQUE: Unenhanced CT images were acquired then reconstructed and  displayed as axial, coronal and sagittal multiplanar reformatted images.  The coverage included LEFT mid pelvis to the LEFT proximal tibia fibula.     FINDINGS:     BONE:  The bones all have normal configuration. Visualized portion of the LEFT  pelvis appears intact. LEFT sacrum minimally visualized, not optimally  evaluated on this exam. LEFT femur appears intact.     JOINTS:  LEFT hip appears intact and normally aligned. No convincing large hip  joint effusion. No knee joint effusion. Pubic symphysis anatomic. LEFT  sacroiliac joint minimally visualized, appears anatomic.     TENDONS AND MUSCLES:  Large gluteal intramuscular mixed density including hyperdense mass  measures 13.6 x 8.3 x 13.7 cm (AP by transverse by craniocaudal), in the  setting of trauma likely hematoma.. Lack of intravenous contrast limits  evaluation for active extravasation.     SUBCUTANEOUS AND DEEP SOFT TISSUES:  Vascular calcifications. Minimally visualized deep pelvis, not optimally  evaluated on this exam. Subcutaneous stranding posterior gluteal and  upper thigh regions, likely contusion.          Impression:      1. Large gluteal mixed density mass, favored to represent hematoma in  the setting of trauma. Lack of intravenous contrast limits evaluation  for active extravasation.     2. No acute fracture in the field-of-view.           This report was signed and finalized on 12/20/2024 4:45 PM by Dr Zuleyma Schmidt MD.       CT Pelvis Without Contrast [[id number]] Collected: 12/20/24 1639     Updated: 12/20/24 1646    Narrative:      CT PELVIS WO CONTRAST- 12/20/2024  3:16 PM     HISTORY: fall, trauma, pain       COMPARISON: CT scan dated 12/20/2024.     DLP: 521.44 mGy.cm     TECHNIQUE: Serial helical tomographic images of the bony pelvis were  obtained without the use of intravenous contrast. Multiplanar  reformatted images were provided for review. Automated exposure control  was utilized to reduce patient radiation dose.     FINDINGS: :     No visualized acute fracture. No fracture or malalignment at the hip  joints. Pelvic ring is intact. Pubic symphysis and SI joints are  unremarkable. No acute intrapelvic process. Colonic diverticulosis.  There is a large large left gluteal compartment intramuscular hematomas  in the gluteus medius and robbin measuring up to 15 cm craniocaudal.       Impression:      1. No acute fracture or malalignment.  2. Large left gluteal compartment intramuscular hematomas involving the  gluteus medius and gluteus robbin measuring up to 15 cm craniocaudal  and up to 13 cm transverse.           This report was signed and finalized on 12/20/2024 4:43 PM by Dr Gregory Guillermo.       XR Hip With or Without Pelvis 2 - 3 View Left [[id number]] Collected: 12/20/24 1047     Updated: 12/20/24 1051    Narrative:      EXAMINATION: XR HIP W OR WO PELVIS 2-3 VIEW LEFT-  12/20/2024 10:47 AM     HISTORY: Fall/trauma.     FINDINGS: AP radiograph of the pelvis as well as 2 view exam of the left  hip demonstrates moderate arthrosis of the left hip with narrowing of  the joint space and mild spurring of the femoral head. There is mild  arthritic change of the right hip. The bony pelvic ring is intact.       Impression:      1. No acute fracture.  2. Arthritic changes of both hips (left greater than right).     This report was signed and finalized on 12/20/2024 10:48 AM by Dr. Vicente Sam MD.             LAB RESULTS:      Lab 12/27/24  0509 12/26/24  0340 12/25/24  0528 12/24/24  0258 12/23/24  1005 12/21/24  1107 12/21/24  0154   WBC 5.47  --  5.07 4.25 3.57  --   5.02   HEMOGLOBIN 8.3* 8.4* 8.6* 6.8* 7.5*   < > 6.7*   HEMATOCRIT 26.5* 26.4* 27.6* 21.5* 23.2*   < > 20.2*   PLATELETS 128*  --  101* 92* 93*  --  142   NEUTROS ABS  --   --  3.22 3.19 2.27  --   --    IMMATURE GRANS (ABS)  --   --  0.10*  --  0.05  --   --    LYMPHS ABS  --   --  1.10  --  0.87  --   --    MONOS ABS  --   --  0.53  --  0.30  --   --    EOS ABS  --   --  0.10 0.04 0.06  --   --    .8*  --  101.1* 101.9* 100.9*  --  97.6*    < > = values in this interval not displayed.         Lab 12/27/24  0509 12/25/24  0528 12/24/24  0258 12/23/24  1005 12/21/24  0154   SODIUM 137 136 138 135* 134*   POTASSIUM 3.7 3.7 3.5 3.5 4.4   CHLORIDE 103 102 102 100 95*   CO2 24.0 25.0 26.0 24.0 26.0   ANION GAP 10.0 9.0 10.0 11.0 13.0   BUN 16 17 18 20 28*   CREATININE 0.75* 0.88 0.83 0.95 1.52*   EGFR 97.1 92.5 94.2 86.1 49.0*   GLUCOSE 98 87 99 87 101*   CALCIUM 8.2* 8.3* 8.1* 8.4* 8.1*   MAGNESIUM  --  2.3  --   --  2.2   HEMOGLOBIN A1C  --   --   --   --  4.70*   TSH  --   --   --   --  1.750         Lab 12/25/24  0528 12/24/24  0258 12/23/24  1005 12/21/24  0154   TOTAL PROTEIN 6.2 5.7* 6.2  --    ALBUMIN 3.6 3.5 3.6  --    GLOBULIN 2.6 2.2 2.6  --    ALT (SGPT) 22 22 25  --    AST (SGOT) 24 26 32  --    BILIRUBIN 0.6 0.5 0.6  --    ALK PHOS 74 67 70  --    LIPASE  --   --   --  64*                 Lab 12/25/24  0528 12/24/24  0914 12/24/24  0529 12/21/24  0354   IRON 64  --   --   --    IRON SATURATION (TSAT) 22  --   --   --    TIBC 286*  --   --   --    TRANSFERRIN 192*  --   --   --    VITAMIN B 12  --  561  --   --    ABO TYPING  --   --  A A   RH TYPING  --   --  Positive Positive   ANTIBODY SCREEN  --   --  Negative Negative         Brief Urine Lab Results  (Last result in the past 365 days)        Color   Clarity   Blood   Leuk Est   Nitrite   Protein   CREAT   Urine HCG        12/20/24 1208 Dark Yellow   Cloudy   Negative   Negative   Negative   30 mg/dL (1+)                 Microbiology Results  (last 10 days)       Procedure Component Value - Date/Time    Gastrointestinal Panel, PCR - Stool, Per Rectum [[id number]]  (Normal) Collected: 12/21/24 2051    Lab Status: Final result Specimen: Stool from Per Rectum Updated: 12/21/24 2218     Campylobacter Not Detected     Plesiomonas shigelloides Not Detected     Salmonella Not Detected     Vibrio Not Detected     Vibrio cholerae Not Detected     Yersinia enterocolitica Not Detected     Enteroaggregative E. coli (EAEC) Not Detected     Enteropathogenic E. coli (EPEC) Not Detected     Enterotoxigenic E. coli (ETEC) lt/st Not Detected     Shiga-like toxin-producing E. coli (STEC) stx1/stx2 Not Detected     Shigella/Enteroinvasive E. coli (EIEC) Not Detected     Cryptosporidium Not Detected     Cyclospora cayetanensis Not Detected     Entamoeba histolytica Not Detected     Giardia lamblia Not Detected     Adenovirus F40/41 Not Detected     Astrovirus Not Detected     Norovirus GI/GII Not Detected     Rotavirus A Not Detected     Sapovirus (I, II, IV or V) Not Detected    Narrative:      If Aeromonas, Staphylococcus aureus or Bacillus cereus are suspected, please order [id number]: Stool Culture, Aeromonas, S aureus, B Cereus.    Clostridioides difficile Toxin - Stool, Per Rectum [[id number]]  (Abnormal) Collected: 12/21/24 2051    Lab Status: Final result Specimen: Stool from Per Rectum Updated: 12/22/24 0836    Narrative:      The following orders were created for panel order Clostridioides difficile Toxin - Stool, Per Rectum.  Procedure                               Abnormality         Status                     ---------                               -----------         ------                     Clostridioides difficile...[[id number]]  Abnormal            Final result                 Please view results for these tests on the individual orders.    Clostridioides difficile Toxin, PCR - Stool, Per Rectum [[id number]]  (Abnormal) Collected: 12/21/24 2051    Lab Status:  Final result Specimen: Stool from Per Rectum Updated: 12/22/24 0836     Toxigenic C. difficile by PCR Positive    Narrative:      DNA from a toxigenic strain of C.difficile has been detected. Antigen testing for the presence of free C.difficile toxin is currently in progress, to help determine the clinical significance of this PCR result.     Clostridioides difficile toxin Ag, Reflex - Stool, Per Rectum [[id number]]  (Normal) Collected: 12/21/24 2051    Lab Status: Final result Specimen: Stool from Per Rectum Updated: 12/22/24 0836     C.diff Toxin Ag Negative    Narrative:      DNA from a toxigenic strain of C.difficile was detected, although the free toxin itself was not detected. These findings are consistent with C.difficile colonization and may not reflect actual C.difficile infection. Clinical correlation needed.            Hospital Course:  Patient was again admitted on 12/20/2024 to medical floor due to multiple falls.  Patient reported his last alcohol drink was prior Thursday.  The patient was started on alcohol withdrawal protocol.  He was diagnosed with a large left gluteal compartment intramuscular hematoma, of traumatic etiology.  Patient was on anticoagulation with Eliquis, and medication was placed on hold.  On 12/22/2024, the patient was transferred to intensive care unit due to worsening withdrawal signs and symptoms.  The patient was started on phenobarbital taper.  Symptoms improved.   The patient was transferred again to medical floor 12/23/2024, and I started taking care of this patient on 12/24/2024. Discussed with family members on 12/26/2024.  They stated have been trying to get patient into alcohol detoxification program in the outpatient setting but had not been possible. Patient was evaluated by physical therapy.  On the day of discharge, patient had reached maximal benefit of hospital admission, he had no tremors, he was feeling much better.  He had performed physical therapy and was  "ambulating with assistive device.  It was recommended for patient to be discharged to skilled nursing facility.  Regarding laboratory tests, admission creatinine level 1.66.  Normalized to 0.75; on discharge, sodium 137.  Potassium 3.7.  Vitamin B12 561, iron 64.  Transferrin 192.  TIBC 286  On admission hemoglobin was 8.0. On 12/21/2024 hemoglobin dropped to 6.7.  Patient was transfused 1 unit packed red blood cells. On 12/24/2024 hemoglobin was 6.8.  Patient was transfused 1 unit packed red blood cells.  On discharge, hemoglobin was 8.3. Platelet count improved to 128,000.  .  C. difficile toxin was positive, antigen was negative.  This is consistent with a chronic carrier, and patient did not have recurrent abdominal pain or diarrhea.  On discharge, recommend to continue amiodarone, atorvastatin, folic acid, thiamine. Continue Toprol-XL 12.5 mg p.o. daily. Continue protonix 40 mg p.o. daily.  Patient to resume anticoagulation with Eliquis given history of atrial fibrillation and risk of cerebrovascular accident. [id number] Score: 4 (12/27/2024 12:08 PM)  Recommended outpatient alcohol rehabilitation program.  Social work arranged placement, and patient was transferred to [facility] nursing [facility].  Recommend outpatient primary care provider follow-up as well as cardiology follow-up.    Physical Exam on Discharge:  /70 (BP Location: Right arm, Patient Position: Lying)   Pulse 72   Temp 98.4 °F (36.9 °C) (Oral)   Resp 18   Ht 180.3 cm (71\")   Wt 86.5 kg (190 lb 12.8 oz)   SpO2 97%   BMI 26.61 kg/m²   Physical Exam  Performed on the same day.  See progress note    Condition on Discharge: Stable    Discharge Disposition:  Skilled Nursing Facility (DC - External)    Discharge Medications:     Discharge Medications        Changes to Medications        Instructions Start Date   metoprolol succinate XL 25 MG 24 hr tablet  Commonly known as: TOPROL-XL  What changed: how much to take   12.5 mg, " Oral, Daily   Start Date: December 28, 2024     temazepam 15 MG capsule  Commonly known as: RESTORIL  What changed:   medication strength  how much to take   15 mg, Oral, Nightly PRN             Continue These Medications        Instructions Start Date   amiodarone 200 MG tablet  Commonly known as: PACERONE   200 mg, Oral, Daily      apixaban 5 MG tablet tablet  Commonly known as: ELIQUIS   5 mg, Oral, Every 12 Hours Scheduled      atorvastatin 20 MG tablet  Commonly known as: LIPITOR   20 mg, Daily      azelastine 0.1 % nasal spray  Commonly known as: ASTELIN   2 sprays, Nasal, 2 Times Daily, Use in each nostril as directed      busPIRone 5 MG tablet  Commonly known as: BUSPAR   7.5 mg, Oral, 2 Times Daily      fluticasone 50 MCG/ACT nasal spray  Commonly known as: FLONASE   2 sprays, Nasal, Daily      folic acid 1 MG tablet  Commonly known as: FOLVITE   1 mg, Oral, Daily      loperamide 2 MG capsule  Commonly known as: IMODIUM   2 mg, Oral, 4 Times Daily PRN      pantoprazole 40 MG EC tablet  Commonly known as: PROTONIX   1 tablet, Daily      thiamine 100 MG tablet  Commonly known as: VITAMIN B1   100 mg, Oral, Daily             Stop These Medications      aspirin 81 MG EC tablet     losartan 25 MG tablet  Commonly known as: Cozaar     Magic Barrier Ointment              Discharge Diet:   Diet Instructions       Diet: Cardiac Diets; Healthy Heart (2-3 Na+); Regular (IDDSI 7); Thin (IDDSI 0)      Discharge Diet: Cardiac Diets    Cardiac Diet: Healthy Heart (2-3 Na+)    Texture: Regular (IDDSI 7)    Fluid Consistency: Thin (IDDSI 0)            Activity at Discharge: Assistive device.  Fall precautions.    Follow-up Appointments:   Future Appointments   Date Time Provider Department Center   3/18/2025  8:00 AM Miki Cavazos PA [facility]       Test Results Pending at Discharge: No    Electronically signed by Raul Gifford MD, 12/27/24, 12:03 CST.    Time: 60 minutes.

## 2024-12-27 NOTE — CASE MANAGEMENT/SOCIAL WORK
Continued Stay Note   [facility]     Patient Name: Dayron Lubin  MRN: [id number]  Today's Date: 12/27/2024    Admit Date: 12/20/2024    Plan: [facility]   Discharge Plan       Row Name 12/27/24 1157       Plan    Plan [facility]    Plan Comments [facility] advised they will still require a private room and they do not have one available.  [facility] re review referral due to their facility no longer requiring a private room and they have still declined admit, now due to ETOH abuse.  [facility] facilities also continue to decline admit.  [facility] also continues to decline.  SW has updated patient and patient's sister.  Patient is ready for discharge and he will go to home or to [facility] today.                   Discharge Codes    No documentation.                       ANUM SargentW

## 2024-12-28 NOTE — THERAPY DISCHARGE NOTE
Acute Care - Occupational Therapy Discharge Summary  [facility]     Patient Name: Dayron Lubin  : 1955  MRN: [id number]    Today's Date: 2024                 Admit Date: 2024        OT Recommendation and Plan    Visit Dx:    ICD-10-CM ICD-9-CM   1. MIKEY (acute kidney injury)  N17.9 584.9   2. Frequent falls  R29.6 V15.88   3. Hematoma  T14.8XXA 924.9   4. Alcohol abuse  F10.10 305.00   5. Impaired mobility [Z74.09]  Z74.09 799.89   6. Alcohol-induced insomnia  F10.982 291.82   7. Large left gluteal compartment intramuscular hematomas  T14.8XXA 924.9                OT Rehab Goals       Row Name 24 0700             Transfer Goal 1 (OT)    Activity/Assistive Device (Transfer Goal 1, OT) sit-to-stand/stand-to-sit;bed-to-chair/chair-to-bed;toilet;shower chair  -      [place] Level/Cues Needed (Transfer Goal 1, OT) supervision required  -      Time Frame (Transfer Goal 1, OT) long term goal (LTG);by discharge  -      Progress/Outcome (Transfer Goal 1, OT) goal not met;discharged from facility  -         Bathing Goal 1 (OT)    Activity/Device (Bathing Goal 1, OT) bathing skills, all;upper body bathing;lower body bathing  -      [place] Level/Cues Needed (Bathing Goal 1, OT) supervision required  -      Time Frame (Bathing Goal 1, OT) long term goal (LTG);by discharge  -      Progress/Outcomes (Bathing Goal 1, OT) goal not met;discharged from facility  -         Dressing Goal 1 (OT)    Activity/Device (Dressing Goal 1, OT) lower body dressing  -      [place]/Cues Needed (Dressing Goal 1, OT) set-up required  -      Time Frame (Dressing Goal 1, OT) long term goal (LTG);10 days;by discharge  -      Progress/Outcome (Dressing Goal 1, OT) goal not met;discharged from facility  -                User Key  (r) = Recorded By, (t) = Taken By, (c) = Cosigned By      Initials Name Provider Type Discipline    Bri Reyes, OTR/L, CSRS Occupational Therapist  OT                        Timed Therapy Charges  Total Units: 2      Suggested Charges  Total Units: 2      Procedure Name Documented Minutes Units Code    HC OT SELF CARE/MGMT/TRAIN EA 15 MIN 25 2   05618 (CPT®)                 Documented Minutes  Total Minutes: 25      Therapy Provided Minutes    01701 - OT Self Care/Mgmt Minutes 25                        OT Discharge Summary  Anticipated Discharge Disposition (OT): sub acute care setting  Reason for Discharge: Discharge from facility  Outcomes Achieved: Refer to plan of care for updates on goals achieved  Discharge Destination: SNF      ELLIE Johnson/L, CSRS  12/28/2024

## 2024-12-28 NOTE — THERAPY DISCHARGE NOTE
Acute Care - Physical Therapy Discharge Summary  [facility]       Patient Name: Dayron Lubin  : 1955  MRN: [id number]    Today's Date: 2024                 Admit Date: 2024      PT Recommendation and Plan    Visit Dx:    ICD-10-CM ICD-9-CM   1. MIKEY (acute kidney injury)  N17.9 584.9   2. Frequent falls  R29.6 V15.88   3. Hematoma  T14.8XXA 924.9   4. Alcohol abuse  F10.10 305.00   5. Impaired mobility [Z74.09]  Z74.09 799.89   6. Alcohol-induced insomnia  F10.982 291.82   7. Large left gluteal compartment intramuscular hematomas  T14.8XXA 924.9                PT Rehab Goals       Row Name 24 1604             Bed Mobility Goal 1 (PT)    Activity/Assistive Device (Bed Mobility Goal 1, PT) sit to supine/supine to sit  -MONIQUE      [place] Level/Cues Needed (Bed Mobility Goal 1, PT) supervision required  -MONIQUE      Time Frame (Bed Mobility Goal 1, PT) long term goal (LTG);10 days  -MONIQUE      Progress/Outcomes (Bed Mobility Goal 1, PT) goal not met  -MONIQUE         Transfer Goal 1 (PT)    Activity/Assistive Device (Transfer Goal 1, PT) sit-to-stand/stand-to-sit;bed-to-chair/chair-to-bed  -MONIQUE      [place] Level/Cues Needed (Transfer Goal 1, PT) supervision required  -MONIQUE      Time Frame (Transfer Goal 1, PT) long term goal (LTG);10 days  -MONIQUE      Progress/Outcome (Transfer Goal 1, PT) goal not met  -MONIQUE         Gait Training Goal 1 (PT)    Activity/Assistive Device (Gait Training Goal 1, PT) gait (walking locomotion);assistive device use;decrease fall risk;improve balance and speed;increase endurance/gait distance  -MONIQUE      [place] Level (Gait Training Goal 1, PT) supervision required  -MONIQUE      Distance (Gait Training Goal 1, PT) 100 ft, no sway or LOB  -MONIQUE      Time Frame (Gait Training Goal 1, PT) long term goal (LTG);10 days  -MONIQUE      Progress/Outcome (Gait Training Goal 1, PT) goal not met  -MONIQUE                User Key  (r) = Recorded By, (t) = Taken By, (c) = Cosigned By      Initials  Name Provider Type Discipline    MONIQUE Parish Santos, PTA Physical Therapist Assistant PT                        PT Discharge Summary  Anticipated Discharge Disposition (PT): skilled nursing facility  Reason for Discharge: Discharge from facility  Outcomes Achieved: Refer to plan of care for updates on goals achieved  Discharge Destination: SNF      Parish Santos PTA   12/28/2024

## 2025-01-17 ENCOUNTER — APPOINTMENT (OUTPATIENT)
Dept: GENERAL RADIOLOGY | Facility: HOSPITAL | Age: 70
End: 2025-01-17
Payer: MEDICARE

## 2025-01-17 ENCOUNTER — HOSPITAL ENCOUNTER (INPATIENT)
Facility: HOSPITAL | Age: 70
LOS: 5 days | Discharge: NURSING, REHAB OR LONG-TERM CARE | End: 2025-01-22
Attending: INTERNAL MEDICINE | Admitting: FAMILY MEDICINE
Payer: MEDICARE

## 2025-01-17 ENCOUNTER — APPOINTMENT (OUTPATIENT)
Dept: CT IMAGING | Facility: HOSPITAL | Age: 70
End: 2025-01-17
Payer: MEDICARE

## 2025-01-17 DIAGNOSIS — Z74.09 IMPAIRED MOBILITY: ICD-10-CM

## 2025-01-17 DIAGNOSIS — L03.90 CELLULITIS, UNSPECIFIED CELLULITIS SITE: Primary | ICD-10-CM

## 2025-01-17 DIAGNOSIS — N39.0 ACUTE UTI: ICD-10-CM

## 2025-01-17 DIAGNOSIS — Z87.19 HISTORY OF ALCOHOLIC HEPATITIS: ICD-10-CM

## 2025-01-17 DIAGNOSIS — T14.8XXA HEMATOMA: ICD-10-CM

## 2025-01-17 DIAGNOSIS — R13.10 DYSPHAGIA, UNSPECIFIED TYPE: ICD-10-CM

## 2025-01-17 DIAGNOSIS — F19.90 DRUG USE: ICD-10-CM

## 2025-01-17 DIAGNOSIS — R29.6 MULTIPLE FALLS: ICD-10-CM

## 2025-01-17 DIAGNOSIS — Z87.898 HISTORY OF ALCOHOL CONSUMPTION: ICD-10-CM

## 2025-01-17 DIAGNOSIS — R62.7 FAILURE TO THRIVE IN ADULT: ICD-10-CM

## 2025-01-17 DIAGNOSIS — L89.303 PRESSURE INJURY OF BUTTOCK, STAGE 3, UNSPECIFIED LATERALITY: ICD-10-CM

## 2025-01-17 PROBLEM — L30.9 DERMATITIS NEGLECTA: Status: ACTIVE | Noted: 2025-01-17

## 2025-01-17 PROBLEM — Z22.1 CLOSTRIDIOIDES DIFFICILE CARRIER: Status: ACTIVE | Noted: 2025-01-17

## 2025-01-17 PROBLEM — N30.90 CYSTITIS: Status: ACTIVE | Noted: 2025-01-17

## 2025-01-17 LAB
ALBUMIN SERPL-MCNC: 4.1 G/DL (ref 3.5–5.2)
ALBUMIN/GLOB SERPL: 1.2 G/DL
ALP SERPL-CCNC: 84 U/L (ref 39–117)
ALT SERPL W P-5'-P-CCNC: 21 U/L (ref 1–41)
AMPHET+METHAMPHET UR QL: NEGATIVE
AMPHETAMINES UR QL: NEGATIVE
ANION GAP SERPL CALCULATED.3IONS-SCNC: 18 MMOL/L (ref 5–15)
APAP SERPL-MCNC: <5 MCG/ML (ref 0–30)
AST SERPL-CCNC: 50 U/L (ref 1–40)
BACTERIA UR QL AUTO: ABNORMAL /HPF
BARBITURATES UR QL SCN: POSITIVE
BASOPHILS # BLD AUTO: 0.02 10*3/MM3 (ref 0–0.2)
BASOPHILS NFR BLD AUTO: 0.2 % (ref 0–1.5)
BENZODIAZ UR QL SCN: POSITIVE
BILIRUB SERPL-MCNC: 1.7 MG/DL (ref 0–1.2)
BILIRUB UR QL STRIP: ABNORMAL
BUN SERPL-MCNC: 19 MG/DL (ref 8–23)
BUN/CREAT SERPL: 21.1 (ref 7–25)
BUPRENORPHINE SERPL-MCNC: NEGATIVE NG/ML
CALCIUM SPEC-SCNC: 9.4 MG/DL (ref 8.6–10.5)
CANNABINOIDS SERPL QL: NEGATIVE
CHLORIDE SERPL-SCNC: 90 MMOL/L (ref 98–107)
CLARITY UR: CLEAR
CO2 SERPL-SCNC: 24 MMOL/L (ref 22–29)
COCAINE UR QL: POSITIVE
COLOR UR: ABNORMAL
CREAT SERPL-MCNC: 0.9 MG/DL (ref 0.76–1.27)
D-LACTATE SERPL-SCNC: 1.4 MMOL/L (ref 0.5–2)
D-LACTATE SERPL-SCNC: 3.3 MMOL/L (ref 0.5–2)
DEPRECATED RDW RBC AUTO: 51.1 FL (ref 37–54)
EGFRCR SERPLBLD CKD-EPI 2021: 91.9 ML/MIN/1.73
EOSINOPHIL # BLD AUTO: 0.01 10*3/MM3 (ref 0–0.4)
EOSINOPHIL NFR BLD AUTO: 0.1 % (ref 0.3–6.2)
ERYTHROCYTE [DISTWIDTH] IN BLOOD BY AUTOMATED COUNT: 15.3 % (ref 12.3–15.4)
ETHANOL UR QL: <0.01 %
FENTANYL UR-MCNC: NEGATIVE NG/ML
GLOBULIN UR ELPH-MCNC: 3.5 GM/DL
GLUCOSE SERPL-MCNC: 139 MG/DL (ref 65–99)
GLUCOSE UR STRIP-MCNC: NEGATIVE MG/DL
HCT VFR BLD AUTO: 32.6 % (ref 37.5–51)
HGB BLD-MCNC: 10.9 G/DL (ref 13–17.7)
HGB UR QL STRIP.AUTO: ABNORMAL
HYALINE CASTS UR QL AUTO: ABNORMAL /LPF
IMM GRANULOCYTES # BLD AUTO: 0.08 10*3/MM3 (ref 0–0.05)
IMM GRANULOCYTES NFR BLD AUTO: 0.7 % (ref 0–0.5)
KETONES UR QL STRIP: ABNORMAL
LEUKOCYTE ESTERASE UR QL STRIP.AUTO: ABNORMAL
LIPASE SERPL-CCNC: 88 U/L (ref 13–60)
LYMPHOCYTES # BLD AUTO: 0.89 10*3/MM3 (ref 0.7–3.1)
LYMPHOCYTES NFR BLD AUTO: 8.2 % (ref 19.6–45.3)
MAGNESIUM SERPL-MCNC: 2.2 MG/DL (ref 1.6–2.4)
MCH RBC QN AUTO: 30.4 PG (ref 26.6–33)
MCHC RBC AUTO-ENTMCNC: 33.4 G/DL (ref 31.5–35.7)
MCV RBC AUTO: 90.8 FL (ref 79–97)
METHADONE UR QL SCN: NEGATIVE
MONOCYTES # BLD AUTO: 1 10*3/MM3 (ref 0.1–0.9)
MONOCYTES NFR BLD AUTO: 9.2 % (ref 5–12)
NEUTROPHILS NFR BLD AUTO: 8.91 10*3/MM3 (ref 1.7–7)
NEUTROPHILS NFR BLD AUTO: 81.6 % (ref 42.7–76)
NITRITE UR QL STRIP: POSITIVE
NRBC BLD AUTO-RTO: 0 /100 WBC (ref 0–0.2)
OPIATES UR QL: NEGATIVE
OXYCODONE UR QL SCN: POSITIVE
PCP UR QL SCN: NEGATIVE
PH UR STRIP.AUTO: 8 [PH] (ref 5–8)
PLATELET # BLD AUTO: 296 10*3/MM3 (ref 140–450)
PMV BLD AUTO: 10.5 FL (ref 6–12)
POTASSIUM SERPL-SCNC: 4.1 MMOL/L (ref 3.5–5.2)
PROT SERPL-MCNC: 7.6 G/DL (ref 6–8.5)
PROT UR QL STRIP: ABNORMAL
QT INTERVAL: 444 MS
QTC INTERVAL: 557 MS
RBC # BLD AUTO: 3.59 10*6/MM3 (ref 4.14–5.8)
RBC # UR STRIP: ABNORMAL /HPF
REF LAB TEST METHOD: ABNORMAL
SALICYLATES SERPL-MCNC: <0.3 MG/DL
SODIUM SERPL-SCNC: 132 MMOL/L (ref 136–145)
SP GR UR STRIP: >1.03 (ref 1–1.03)
SQUAMOUS #/AREA URNS HPF: ABNORMAL /HPF
TRICYCLICS UR QL SCN: NEGATIVE
UROBILINOGEN UR QL STRIP: ABNORMAL
WBC # UR STRIP: ABNORMAL /HPF
WBC NRBC COR # BLD AUTO: 10.91 10*3/MM3 (ref 3.4–10.8)

## 2025-01-17 PROCEDURE — 73564 X-RAY EXAM KNEE 4 OR MORE: CPT

## 2025-01-17 PROCEDURE — P9612 CATHETERIZE FOR URINE SPEC: HCPCS

## 2025-01-17 PROCEDURE — 25010000002 CEFTRIAXONE PER 250 MG

## 2025-01-17 PROCEDURE — 25010000002 PIPERACILLIN SOD-TAZOBACTAM PER 1 G: Performed by: PHYSICIAN ASSISTANT

## 2025-01-17 PROCEDURE — 83605 ASSAY OF LACTIC ACID: CPT | Performed by: PHYSICIAN ASSISTANT

## 2025-01-17 PROCEDURE — 80143 DRUG ASSAY ACETAMINOPHEN: CPT | Performed by: EMERGENCY MEDICINE

## 2025-01-17 PROCEDURE — 87076 CULTURE ANAEROBE IDENT EACH: CPT | Performed by: EMERGENCY MEDICINE

## 2025-01-17 PROCEDURE — 80307 DRUG TEST PRSMV CHEM ANLYZR: CPT | Performed by: EMERGENCY MEDICINE

## 2025-01-17 PROCEDURE — 25010000002 THIAMINE HCL 200 MG/2ML SOLUTION: Performed by: INTERNAL MEDICINE

## 2025-01-17 PROCEDURE — 80179 DRUG ASSAY SALICYLATE: CPT | Performed by: EMERGENCY MEDICINE

## 2025-01-17 PROCEDURE — 80053 COMPREHEN METABOLIC PANEL: CPT | Performed by: EMERGENCY MEDICINE

## 2025-01-17 PROCEDURE — 73590 X-RAY EXAM OF LOWER LEG: CPT

## 2025-01-17 PROCEDURE — 74177 CT ABD & PELVIS W/CONTRAST: CPT

## 2025-01-17 PROCEDURE — 93005 ELECTROCARDIOGRAM TRACING: CPT

## 2025-01-17 PROCEDURE — 83690 ASSAY OF LIPASE: CPT | Performed by: EMERGENCY MEDICINE

## 2025-01-17 PROCEDURE — 36415 COLL VENOUS BLD VENIPUNCTURE: CPT

## 2025-01-17 PROCEDURE — 81001 URINALYSIS AUTO W/SCOPE: CPT | Performed by: EMERGENCY MEDICINE

## 2025-01-17 PROCEDURE — 99285 EMERGENCY DEPT VISIT HI MDM: CPT

## 2025-01-17 PROCEDURE — 25010000002 LORAZEPAM PER 2 MG: Performed by: EMERGENCY MEDICINE

## 2025-01-17 PROCEDURE — 70450 CT HEAD/BRAIN W/O DYE: CPT

## 2025-01-17 PROCEDURE — 87040 BLOOD CULTURE FOR BACTERIA: CPT | Performed by: EMERGENCY MEDICINE

## 2025-01-17 PROCEDURE — 25510000001 IOPAMIDOL 61 % SOLUTION: Performed by: EMERGENCY MEDICINE

## 2025-01-17 PROCEDURE — 82077 ASSAY SPEC XCP UR&BREATH IA: CPT | Performed by: EMERGENCY MEDICINE

## 2025-01-17 PROCEDURE — 25810000003 LACTATED RINGERS PER 1000 ML: Performed by: EMERGENCY MEDICINE

## 2025-01-17 PROCEDURE — 87185 SC STD ENZYME DETCJ PER NZM: CPT | Performed by: EMERGENCY MEDICINE

## 2025-01-17 PROCEDURE — 83735 ASSAY OF MAGNESIUM: CPT

## 2025-01-17 PROCEDURE — 25010000002 VANCOMYCIN 1.75-0.9 GM/500ML-% SOLUTION: Performed by: PHYSICIAN ASSISTANT

## 2025-01-17 PROCEDURE — 72125 CT NECK SPINE W/O DYE: CPT

## 2025-01-17 PROCEDURE — 85025 COMPLETE CBC W/AUTO DIFF WBC: CPT | Performed by: EMERGENCY MEDICINE

## 2025-01-17 RX ORDER — SODIUM CHLORIDE 9 MG/ML
40 INJECTION, SOLUTION INTRAVENOUS AS NEEDED
Status: DISCONTINUED | OUTPATIENT
Start: 2025-01-17 | End: 2025-01-22 | Stop reason: HOSPADM

## 2025-01-17 RX ORDER — ONDANSETRON 4 MG/1
4 TABLET, ORALLY DISINTEGRATING ORAL EVERY 6 HOURS PRN
Status: DISCONTINUED | OUTPATIENT
Start: 2025-01-17 | End: 2025-01-22 | Stop reason: HOSPADM

## 2025-01-17 RX ORDER — METOPROLOL SUCCINATE 25 MG/1
12.5 TABLET, EXTENDED RELEASE ORAL DAILY
Status: DISCONTINUED | OUTPATIENT
Start: 2025-01-17 | End: 2025-01-22 | Stop reason: HOSPADM

## 2025-01-17 RX ORDER — CHLORDIAZEPOXIDE HYDROCHLORIDE 25 MG/1
50 CAPSULE, GELATIN COATED ORAL NIGHTLY
Status: COMPLETED | OUTPATIENT
Start: 2025-01-20 | End: 2025-01-20

## 2025-01-17 RX ORDER — SODIUM CHLORIDE 0.9 % (FLUSH) 0.9 %
10 SYRINGE (ML) INJECTION AS NEEDED
Status: DISCONTINUED | OUTPATIENT
Start: 2025-01-17 | End: 2025-01-22 | Stop reason: HOSPADM

## 2025-01-17 RX ORDER — LORAZEPAM 1 MG/1
1 TABLET ORAL
Status: DISCONTINUED | OUTPATIENT
Start: 2025-01-17 | End: 2025-01-22 | Stop reason: HOSPADM

## 2025-01-17 RX ORDER — LORAZEPAM 2 MG/ML
1 INJECTION INTRAMUSCULAR ONCE
Status: COMPLETED | OUTPATIENT
Start: 2025-01-17 | End: 2025-01-17

## 2025-01-17 RX ORDER — THIAMINE HYDROCHLORIDE 100 MG/ML
200 INJECTION, SOLUTION INTRAMUSCULAR; INTRAVENOUS EVERY 8 HOURS SCHEDULED
Status: DISCONTINUED | OUTPATIENT
Start: 2025-01-17 | End: 2025-01-22 | Stop reason: HOSPADM

## 2025-01-17 RX ORDER — METOPROLOL SUCCINATE 25 MG/1
12.5 TABLET, EXTENDED RELEASE ORAL DAILY
Status: DISCONTINUED | OUTPATIENT
Start: 2025-01-18 | End: 2025-01-17

## 2025-01-17 RX ORDER — BUSPIRONE HYDROCHLORIDE 5 MG/1
7.5 TABLET ORAL 2 TIMES DAILY
Status: DISCONTINUED | OUTPATIENT
Start: 2025-01-17 | End: 2025-01-22 | Stop reason: HOSPADM

## 2025-01-17 RX ORDER — LORAZEPAM 2 MG/ML
2 INJECTION INTRAMUSCULAR
Status: DISCONTINUED | OUTPATIENT
Start: 2025-01-17 | End: 2025-01-22 | Stop reason: HOSPADM

## 2025-01-17 RX ORDER — FLUTICASONE PROPIONATE 50 MCG
2 SPRAY, SUSPENSION (ML) NASAL DAILY PRN
Status: DISCONTINUED | OUTPATIENT
Start: 2025-01-17 | End: 2025-01-22 | Stop reason: HOSPADM

## 2025-01-17 RX ORDER — AMIODARONE HYDROCHLORIDE 200 MG/1
200 TABLET ORAL DAILY
Status: DISCONTINUED | OUTPATIENT
Start: 2025-01-18 | End: 2025-01-17

## 2025-01-17 RX ORDER — ONDANSETRON 2 MG/ML
4 INJECTION INTRAMUSCULAR; INTRAVENOUS EVERY 6 HOURS PRN
Status: DISCONTINUED | OUTPATIENT
Start: 2025-01-17 | End: 2025-01-22 | Stop reason: HOSPADM

## 2025-01-17 RX ORDER — LORAZEPAM 2 MG/ML
1 INJECTION INTRAMUSCULAR
Status: DISCONTINUED | OUTPATIENT
Start: 2025-01-17 | End: 2025-01-22 | Stop reason: HOSPADM

## 2025-01-17 RX ORDER — CHLORDIAZEPOXIDE HYDROCHLORIDE 25 MG/1
50 CAPSULE, GELATIN COATED ORAL EVERY 6 HOURS SCHEDULED
Status: COMPLETED | OUTPATIENT
Start: 2025-01-17 | End: 2025-01-18

## 2025-01-17 RX ORDER — SODIUM CHLORIDE 0.9 % (FLUSH) 0.9 %
10 SYRINGE (ML) INJECTION EVERY 12 HOURS SCHEDULED
Status: DISCONTINUED | OUTPATIENT
Start: 2025-01-17 | End: 2025-01-22 | Stop reason: HOSPADM

## 2025-01-17 RX ORDER — TEMAZEPAM 30 MG/1
30 CAPSULE ORAL NIGHTLY PRN
COMMUNITY

## 2025-01-17 RX ORDER — PANTOPRAZOLE SODIUM 40 MG/1
40 TABLET, DELAYED RELEASE ORAL
Status: DISCONTINUED | OUTPATIENT
Start: 2025-01-18 | End: 2025-01-22 | Stop reason: HOSPADM

## 2025-01-17 RX ORDER — LORAZEPAM 1 MG/1
2 TABLET ORAL
Status: DISCONTINUED | OUTPATIENT
Start: 2025-01-17 | End: 2025-01-22 | Stop reason: HOSPADM

## 2025-01-17 RX ORDER — CHLORDIAZEPOXIDE HYDROCHLORIDE 25 MG/1
50 CAPSULE, GELATIN COATED ORAL EVERY 12 HOURS
Status: COMPLETED | OUTPATIENT
Start: 2025-01-19 | End: 2025-01-20

## 2025-01-17 RX ORDER — SODIUM CHLORIDE, SODIUM LACTATE, POTASSIUM CHLORIDE, CALCIUM CHLORIDE 600; 310; 30; 20 MG/100ML; MG/100ML; MG/100ML; MG/100ML
125 INJECTION, SOLUTION INTRAVENOUS CONTINUOUS
Status: DISPENSED | OUTPATIENT
Start: 2025-01-17 | End: 2025-01-17

## 2025-01-17 RX ORDER — FOLIC ACID 1 MG/1
1 TABLET ORAL DAILY
Status: DISCONTINUED | OUTPATIENT
Start: 2025-01-17 | End: 2025-01-22 | Stop reason: HOSPADM

## 2025-01-17 RX ORDER — ACETAMINOPHEN 325 MG/1
650 TABLET ORAL EVERY 4 HOURS PRN
Status: DISCONTINUED | OUTPATIENT
Start: 2025-01-17 | End: 2025-01-22 | Stop reason: HOSPADM

## 2025-01-17 RX ORDER — ACETAMINOPHEN 160 MG/5ML
650 SOLUTION ORAL EVERY 4 HOURS PRN
Status: DISCONTINUED | OUTPATIENT
Start: 2025-01-17 | End: 2025-01-22 | Stop reason: HOSPADM

## 2025-01-17 RX ORDER — ACETAMINOPHEN 650 MG/1
650 SUPPOSITORY RECTAL EVERY 4 HOURS PRN
Status: DISCONTINUED | OUTPATIENT
Start: 2025-01-17 | End: 2025-01-22 | Stop reason: HOSPADM

## 2025-01-17 RX ORDER — VANCOMYCIN 1.75 GRAM/500 ML IN 0.9 % SODIUM CHLORIDE INTRAVENOUS
20 ONCE
Status: COMPLETED | OUTPATIENT
Start: 2025-01-17 | End: 2025-01-17

## 2025-01-17 RX ORDER — ATORVASTATIN CALCIUM 10 MG/1
20 TABLET, FILM COATED ORAL DAILY
Status: DISCONTINUED | OUTPATIENT
Start: 2025-01-18 | End: 2025-01-22 | Stop reason: HOSPADM

## 2025-01-17 RX ORDER — IOPAMIDOL 612 MG/ML
100 INJECTION, SOLUTION INTRAVASCULAR
Status: COMPLETED | OUTPATIENT
Start: 2025-01-17 | End: 2025-01-17

## 2025-01-17 RX ORDER — CHLORDIAZEPOXIDE HYDROCHLORIDE 25 MG/1
50 CAPSULE, GELATIN COATED ORAL EVERY 8 HOURS SCHEDULED
Status: COMPLETED | OUTPATIENT
Start: 2025-01-18 | End: 2025-01-19

## 2025-01-17 RX ORDER — AMIODARONE HYDROCHLORIDE 200 MG/1
200 TABLET ORAL DAILY
Status: DISCONTINUED | OUTPATIENT
Start: 2025-01-17 | End: 2025-01-22 | Stop reason: HOSPADM

## 2025-01-17 RX ADMIN — LORAZEPAM 1 MG: 1 TABLET ORAL at 21:51

## 2025-01-17 RX ADMIN — ACETAMINOPHEN 650 MG: 325 TABLET ORAL at 21:52

## 2025-01-17 RX ADMIN — APIXABAN 5 MG: 5 TABLET, FILM COATED ORAL at 20:37

## 2025-01-17 RX ADMIN — Medication 10 ML: at 20:26

## 2025-01-17 RX ADMIN — METOPROLOL SUCCINATE 12.5 MG: 25 TABLET, EXTENDED RELEASE ORAL at 21:37

## 2025-01-17 RX ADMIN — CHLORDIAZEPOXIDE HYDROCHLORIDE 50 MG: 25 CAPSULE ORAL at 20:37

## 2025-01-17 RX ADMIN — AMIODARONE HYDROCHLORIDE 200 MG: 200 TABLET ORAL at 21:37

## 2025-01-17 RX ADMIN — Medication 1750 MG: at 15:33

## 2025-01-17 RX ADMIN — PIPERACILLIN SODIUM AND TAZOBACTAM SODIUM 3.38 G: 3; .375 INJECTION, POWDER, LYOPHILIZED, FOR SOLUTION INTRAVENOUS at 14:50

## 2025-01-17 RX ADMIN — IOPAMIDOL 100 ML: 612 INJECTION, SOLUTION INTRAVENOUS at 14:33

## 2025-01-17 RX ADMIN — CEFTRIAXONE SODIUM 2000 MG: 2 INJECTION, POWDER, FOR SOLUTION INTRAMUSCULAR; INTRAVENOUS at 20:39

## 2025-01-17 RX ADMIN — SODIUM CHLORIDE, POTASSIUM CHLORIDE, SODIUM LACTATE AND CALCIUM CHLORIDE 125 ML/HR: 600; 310; 30; 20 INJECTION, SOLUTION INTRAVENOUS at 13:24

## 2025-01-17 RX ADMIN — BUSPIRONE HYDROCHLORIDE 7.5 MG: 5 TABLET ORAL at 20:38

## 2025-01-17 RX ADMIN — THIAMINE HYDROCHLORIDE 200 MG: 100 INJECTION, SOLUTION INTRAMUSCULAR; INTRAVENOUS at 21:37

## 2025-01-17 RX ADMIN — LORAZEPAM 1 MG: 2 INJECTION INTRAMUSCULAR; INTRAVENOUS at 13:26

## 2025-01-17 NOTE — ED PROVIDER NOTES
Subjective   History of Present Illness    Patient is a 69-year-old male with chief complaint of bilateral knee pain status post fall.    Patient has a significant medical history of recurrent alcohol intoxication, alcoholic hepatitis, coronary disease, hypertension, hyperlipidemia, persistent atrial fibrillation chronically anticoagulated on Eliquis, insomnia, prostate cancer.    Yesterday, the patient tripped over a rug and fell on a heater hitting his knee.  He got up again and hit his head.  He denies any loss of conscious.  He said he got up within a few minutes is able to move around.  His brother visited him this afternoon and called EMS.  The patient did not call for help.  He denies any really new pain.  He admits that he has a drinking problem.  He last drank alcohol at 11:00 this morning in which he added 2 shots of alcohol to his orange juice.  The patient for like he has tremors, none right now.  He denies any neck, back, chest, abdominal pain.  He had noted some redness in his left groin area for the past few days that has been somewhat tender.  He wears a diaper every day and changes it just once a day.  He says that when he has a bowel movement he just keeps in the same diaper until he changes it.    Review of Systems   Constitutional:  Positive for activity change. Negative for fever.   HENT: Negative.     Eyes:  Negative for visual disturbance.   Respiratory: Negative.  Negative for chest tightness.    Cardiovascular: Negative.  Negative for chest pain.   Gastrointestinal:  Negative for abdominal pain.   Genitourinary: Negative.    Musculoskeletal:  Negative for back pain and neck pain.   Skin:  Positive for wound.   Neurological:  Negative for headaches.   All other systems reviewed and are negative.      Past Medical History:   Diagnosis Date    A-fib     Alcoholic hepatitis 07/13/2023    CAD (coronary artery disease)     History of external beam radiation therapy 05/12/2016    6840 cGy to  prostate bed    Hyperlipidemia     Hypertension     Insomnia     Prostate cancer        No Known Allergies    Past Surgical History:   Procedure Laterality Date    CARDIAC CATHETERIZATION      CORONARY ANGIOPLASTY WITH STENT PLACEMENT      FRACTURE SURGERY      PROSTATE SURGERY      TONSILLECTOMY         Family History   Problem Relation Age of Onset    Heart disease Mother     Coronary artery disease Mother     Stroke Mother        Social History     Socioeconomic History    Marital status: Single   Tobacco Use    Smoking status: Never    Smokeless tobacco: Never   Vaping Use    Vaping status: Never Used   Substance and Sexual Activity    Alcohol use: Yes     Alcohol/week: 50.0 standard drinks of alcohol     Types: 50 Shots of liquor per week    Drug use: Not Currently     Types: Marijuana    Sexual activity: Defer       Prior to Admission medications    Medication Sig Start Date End Date Taking? Authorizing Provider   amiodarone (PACERONE) 200 MG tablet Take 1 tablet by mouth Daily. 9/10/24  Yes Italia Vasquez PA   apixaban (ELIQUIS) 5 MG tablet tablet Take 1 tablet by mouth Every 12 (Twelve) Hours. Indications: Atrial Fibrillation 11/23/24  Yes Italia Vasquez PA   atorvastatin (LIPITOR) 20 MG tablet Take 1 tablet by mouth Daily.   Yes ProviderSteve MD   folic acid (FOLVITE) 1 MG tablet Take 1 tablet by mouth Daily. 11/22/24  Yes Holger Karimi DO   pantoprazole (PROTONIX) 40 MG EC tablet Take 1 tablet by mouth Daily.   Yes ProviderSteve MD   thiamine (VITAMIN B1) 100 MG tablet Take 1 tablet by mouth Daily. 11/22/24  Yes Holger Karimi DO   azelastine (ASTELIN) 0.1 % nasal spray 2 sprays into the nostril(s) as directed by provider 2 (Two) Times a Day. Use in each nostril as directed  Patient taking differently: Administer 2 sprays into the nostril(s) as directed by provider 2 (Two) Times a Day As Needed for Allergies. Use in each nostril as directed 9/5/24   Gonzalo Martinez, APRN  "  busPIRone (BUSPAR) 5 MG tablet Take 1.5 tablets by mouth 2 (Two) Times a Day. 12/9/24   New Griffiths MD   fluticasone (FLONASE) 50 MCG/ACT nasal spray 2 sprays into the nostril(s) as directed by provider Daily.  Patient taking differently: Administer 2 sprays into the nostril(s) as directed by provider Daily As Needed for Allergies. 9/5/24   Gonzalo Martinez APRN   loperamide (IMODIUM) 2 MG capsule Take 1 capsule by mouth 4 (Four) Times a Day As Needed for Diarrhea. 12/9/24   New Griffiths MD   metoprolol succinate XL (TOPROL-XL) 25 MG 24 hr tablet Take 0.5 tablets by mouth Daily for 30 days. 12/28/24 1/27/25  Raul Gifford MD   naloxone (NARCAN) 4 MG/0.1ML nasal spray Call 911. Don't prime. [place] in 1 nostril for overdose. Repeat in 2-3 minutes in other nostril if no or minimal breathing/responsiveness. 12/27/24   Raul Gifford MD       Medications   sodium chloride 0.9 % flush 10 mL (has no administration in time range)   lactated ringers infusion (0 mL/hr Intravenous Stopped 1/17/25 1533)   vancomycin IVPB 1750 mg in 0.9% Sodium Chloride (premix) 500 mL (1,750 mg Intravenous New Bag 1/17/25 1533)   LORazepam (ATIVAN) injection 1 mg (1 mg Intravenous Given 1/17/25 1326)   piperacillin-tazobactam (ZOSYN) 3.375 g IVPB in 100 mL NS MBP (CD) (0 g Intravenous Stopped 1/17/25 1533)   iopamidol (ISOVUE-300) 61 % injection 100 mL (100 mL Intravenous Given 1/17/25 1433)       /89   Pulse 87   Temp 98.9 °F (37.2 °C) (Oral)   Resp 16   Ht 180.3 cm (71\")   Wt 84.5 kg (186 lb 3.2 oz)   SpO2 95%   BMI 25.97 kg/m²       Objective   Physical Exam  Vitals reviewed.   Constitutional:       Appearance: Normal appearance.      Comments: Malodorous and unkempt   HENT:      Nose: Nose normal.      Mouth/Throat:      Mouth: Mucous membranes are moist.   Neck:      Vascular: No carotid bruit.   Cardiovascular:      Rate and Rhythm: Normal rate and regular rhythm.      Pulses: Normal pulses.      " Heart sounds: Normal heart sounds.   Pulmonary:      Effort: Pulmonary effort is normal. No respiratory distress.      Breath sounds: Normal breath sounds. No stridor. No wheezing, rhonchi or rales.   Chest:      Chest wall: No tenderness.   Abdominal:      Comments: Patient's anterior abdominal examinations unremarkable.    On his inguinal region left side, it is markedly erythematous down to the perineum.  He is covered in so much stool that is difficult to ascertain how much erythema is going on.  Did not see the obvious induration or abscess.  He is tender to move and touch.   Musculoskeletal:         General: Swelling and tenderness present. Normal range of motion.      Cervical back: Normal range of motion and neck supple. No rigidity or tenderness.      Comments: Patient does have full range of motion to his lower extremities but he has different aged areas of ecchymosis to his bilateral lower extremities predominately bilateral knee and left lower tib-fib.  Weak palpable pulses bilaterally.  Normal sensation.   Lymphadenopathy:      Cervical: No cervical adenopathy.   Skin:     General: Skin is warm.      Capillary Refill: Capillary refill takes less than 2 seconds.      Comments: Patient's lower extremities and groin are covered in fecal matter.  We cleaned it up and he has notable erythema to his right buttock and perineum.  No skin breakdown.  Nontender on the left buttock with no evidence of a induration or obvious abscess.   Neurological:      Mental Status: He is alert and oriented to person, place, and time.   Psychiatric:         Mood and Affect: Mood normal.         Procedures         Lab Results (last 24 hours)       Procedure Component Value Units Date/Time    CBC & Differential [[id number]]  (Abnormal) Collected: 01/17/25 1313    Specimen: Blood Updated: 01/17/25 1324    Narrative:      The following orders were created for panel order CBC & Differential.  Procedure                                Abnormality         Status                     ---------                               -----------         ------                     CBC Auto Differential[[id number]]        Abnormal            Final result                 Please view results for these tests on the individual orders.    Comprehensive Metabolic Panel [[id number]]  (Abnormal) Collected: 01/17/25 1313    Specimen: Blood Updated: 01/17/25 1344     Glucose 139 mg/dL      BUN 19 mg/dL      Creatinine 0.90 mg/dL      Sodium 132 mmol/L      Potassium 4.1 mmol/L      Comment: Slight hemolysis detected by analyzer. Result may be falsely elevated.        Chloride 90 mmol/L      CO2 24.0 mmol/L      Calcium 9.4 mg/dL      Total Protein 7.6 g/dL      Albumin 4.1 g/dL      ALT (SGPT) 21 U/L      AST (SGOT) 50 U/L      Comment: Slight hemolysis detected by analyzer. Result may be falsely elevated.        Alkaline Phosphatase 84 U/L      Total Bilirubin 1.7 mg/dL      Globulin 3.5 gm/dL      A/G Ratio 1.2 g/dL      BUN/Creatinine Ratio 21.1     Anion Gap 18.0 mmol/L      eGFR 91.9 mL/min/1.73     Narrative:      GFR Categories in Chronic Kidney Disease (CKD)      GFR Category          GFR (mL/min/1.73)    Interpretation  G1                     90 or greater         Normal or high (1)  G2                      60-89                Mild decrease (1)  G3a                   45-59                Mild to moderate decrease  G3b                   30-44                Moderate to severe decrease  G4                    15-29                Severe decrease  G5                    14 or less           Kidney failure          (1)In the absence of evidence of kidney disease, neither GFR category G1 or G2 fulfill the criteria for CKD.    eGFR calculation 2021 CKD-EPI creatinine equation, which does not include race as a factor    Lipase [[id number]]  (Abnormal) Collected: 01/17/25 1313    Specimen: Blood Updated: 01/17/25 1336     Lipase 88 U/L     Blood Culture - Blood, Hand, Right  [[id number]] Collected: 01/17/25 1313    Specimen: Blood from Hand, Right Updated: 01/17/25 1322    Blood Culture - Blood, Hand, Left [[id number]] Collected: 01/17/25 1313    Specimen: Blood from Hand, Left Updated: 01/17/25 1322    Acetaminophen Level [[id number]]  (Normal) Collected: 01/17/25 1313    Specimen: Blood Updated: 01/17/25 1340     Acetaminophen <5.0 mcg/mL     Ethanol [[id number]] Collected: 01/17/25 1313    Specimen: Blood Updated: 01/17/25 1335     Ethanol % <0.010 %     Narrative:      Not for legal purposes. Chain of Custody not followed.     Salicylate Level [[id number]]  (Normal) Collected: 01/17/25 1313    Specimen: Blood Updated: 01/17/25 1340     Salicylate <0.3 mg/dL     CBC Auto Differential [[id number]]  (Abnormal) Collected: 01/17/25 1313    Specimen: Blood Updated: 01/17/25 1324     WBC 10.91 10*3/mm3      RBC 3.59 10*6/mm3      Hemoglobin 10.9 g/dL      Hematocrit 32.6 %      MCV 90.8 fL      MCH 30.4 pg      MCHC 33.4 g/dL      RDW 15.3 %      RDW-SD 51.1 fl      MPV 10.5 fL      Platelets 296 10*3/mm3      Neutrophil % 81.6 %      Lymphocyte % 8.2 %      Monocyte % 9.2 %      Eosinophil % 0.1 %      Basophil % 0.2 %      Immature Grans % 0.7 %      Neutrophils, Absolute 8.91 10*3/mm3      Lymphocytes, Absolute 0.89 10*3/mm3      Monocytes, Absolute 1.00 10*3/mm3      Eosinophils, Absolute 0.01 10*3/mm3      Basophils, Absolute 0.02 10*3/mm3      Immature Grans, Absolute 0.08 10*3/mm3      nRBC 0.0 /100 WBC     Lactic Acid, Plasma [[id number]]  (Abnormal) Collected: 01/17/25 1313    Specimen: Blood Updated: 01/17/25 1556     Lactate 3.3 mmol/L     Urinalysis With Culture If Indicated - Straight Cath [[id number]]  (Abnormal) Collected: 01/17/25 1322    Specimen: Urine from Straight Cath Updated: 01/17/25 1353     Color, UA [place]     Appearance, UA Clear     pH, UA 8.0     Specific Gravity, UA >1.030     Glucose, UA Negative     Ketones, UA 40 mg/dL (2+)     Bilirubin, UA Small (1+)     Blood,  UA Small (1+)     Protein, UA >=300 mg/dL (3+)     Leuk Esterase, UA Small (1+)     Nitrite, UA Positive     Urobilinogen, UA 1.0 E.U./dL    Narrative:      Dipstick results may be inaccurate due to color interference.    Urine Drug Screen - Straight Cath [[id number]]  (Abnormal) Collected: 01/17/25 1322    Specimen: Urine from Straight Cath Updated: 01/17/25 1353     THC, Screen, Urine Negative     Phencyclidine (PCP), Urine Negative     Cocaine Screen, Urine Positive     Methamphetamine, Ur Negative     Opiate Screen Negative     Amphetamine Screen, Urine Negative     Benzodiazepine Screen, Urine Positive     Tricyclic Antidepressants Screen Negative     Methadone Screen, Urine Negative     Barbiturates Screen, Urine Positive     Oxycodone Screen, Urine Positive     Buprenorphine, Screen, Urine Negative    Narrative:      Cutoff For Drugs Screened:    Amphetamines               500 ng/ml  Barbiturates               200 ng/ml  Benzodiazepines            150 ng/ml  Cocaine                    150 ng/ml  Methadone                  200 ng/ml  Opiates                    100 ng/ml  Phencyclidine               25 ng/ml  THC                         50 ng/ml  Methamphetamine            500 ng/ml  Tricyclic Antidepressants  300 ng/ml  Oxycodone                  100 ng/ml  Buprenorphine               10 ng/ml    The normal value for all drugs tested is negative. This report includes unconfirmed screening results, with the cutoff values listed, to be used for medical treatment purposes only.  Unconfirmed results must not be used for non-medical purposes such as employment or legal testing.  Clinical consideration should be applied to any drug of abuse test, particularly when unconfirmed results are used.      Fentanyl, Urine - Straight Cath [[id number]]  (Normal) Collected: 01/17/25 1322    Specimen: Urine from Straight Cath Updated: 01/17/25 1354     Fentanyl, Urine Negative    Narrative:      Negative Threshold:      Fentanyl  5 ng/mL     The normal value for the drug tested is negative. This report includes final unconfirmed screening results to be used for medical treatment purposes only. Unconfirmed results must not be used for non-medical purposes such as employment or legal testing. Clinical consideration should be applied to any drug of abuse test, particularly when unconfirmed results are used.           Urinalysis, Microscopic Only - Straight Cath [[id number]]  (Abnormal) Collected: 01/17/25 1322    Specimen: Urine from Straight Cath Updated: 01/17/25 1421     RBC, UA 0-2 /HPF      WBC, UA 0-2 /HPF      Comment: Urine culture not indicated.        Bacteria, UA Trace /HPF      Squamous Epithelial Cells, UA 0-2 /HPF      Hyaline Casts, UA 13-20 /LPF      Methodology Manual Light Microscopy            CT Abdomen Pelvis With Contrast    Result Date: 1/17/2025  Narrative: EXAMINATION: CT ABDOMEN PELVIS W CONTRAST-   1/17/2025 1:24 PM  HISTORY: pelvic infection/cellulitis. nec fas?  In order to have a CT radiation dose as low as reasonably achievable Automated Exposure Control was utilized for adjustment of the mA and/or KV according to patient size.  Total DLP = 401.9 mGy.cm  The CT scan of the abdomen and pelvis is performed after intravenous contrast enhancement.  Images are acquired in axial plane and subsequent reconstruction in coronal and sagittal planes.  Comparison is made with the previous study dated 12/20/2024 and 11/16/2024.  The lung bases included in the study are unremarkable.  Limited visualized cardiomediastinal structures show moderate cardiomegaly. There is an apparent focal bulge of the left ventricle which may suggest an aneurysm of the left ventricle?. This may be clinically correlated.  There is fatty infiltration of the liver. The spleen is normal.  The gallbladder is moderately distended with high density material which may represent sludge or contrast excretion. No definite radiopaque calculi.  Pancreas is  normal.  The adrenal glands are normal.  Moderate lobulation of renal contour bilaterally is seen. There is a small low-density nodule located posteriorly in the lower pole of the left kidney measuring 8 mm in diameter. CT density suggest a cyst. No radiopaque calculi on either side. No hydronephrosis. The ureters are nondilated. Urinary bladder poorly distended with mild wall thickening. No obvious intrinsic abnormality.  The prostate is surgically absent.  There is a fat-containing left inguinoscrotal hernia.  The stomach and duodenum are normal. Small bowel is nondistended. Appendix is normal. Small amount of stool and gas is seen in the colon. There is diverticulosis of the distal colon. No finding to suggest diverticulitis.  Atheromatous changes of the abdominal aorta and iliac arteries. No aneurysmal dilatation.  No evidence of abdominal or pelvic lymphadenopathy.  Images reviewed in bone window show no acute bony abnormality. Chronic degenerative changes of the lumbar spine are seen. There is bilateral spondylolysis L5 without a significant spondylolisthesis. Old healed fractures of the left lower ribs are seen.  There is a lobulated oblong high density fluid accumulation predominantly in the left gluteus medius with a surrounding ring-shaped enhancement measuring 12 cm in craniocaudal projection in the coronal plane images and 10 cm in horizontal projection and axial plane images. The size of the gluteus medius and robbin has overall decreased since the previous study. There is another high density lobulated masslike area located between the tensor fascia froilan laterally, rectus femoris anteriorly and vastus lateralis and medius posterolaterally. This may represent an evolving hematoma?. This was not noted in the previous study.      Impression: 1. There is overall improvement in size of the hematoma/swelling of the left gluteus medius and robbin. However there is now a well-formed collection, in the  gluteus medius, with a surrounding ring enhancement which may represent an abscess. There is a new high density masslike lobulated area located more anteriorly which measures 3.8 cm in diameter in axial plane images. This may represent hematoma?. 2. Hepatic steatosis. 3. Moderately dilated gallbladder with sludge?. No stones. 4. Diverticulosis of the colon. No evidence for diverticulitis. 4. Other nonacute findings similar to the previous study.                This report was signed and finalized on 1/17/2025 3:04 PM by Dr. Aleta Ramos MD.      CT Cervical Spine Without Contrast    Result Date: 1/17/2025  Narrative: Exam: CT CERVICAL SPINE WO CONTRAST- 1/17/2025 1:24 PM  HISTORY: fall etoh eliquis  COMPARISON: 12/4/2024  DOSE LENGTH PRODUCT: 1153.51 mGy.cm mGy cm. Automated exposure control was also utilized to decrease patient radiation dose.  TECHNIQUE: Serial helical tomographic images of the cervical spine were obtained without the use of intravenous contrast. Additionally, sagittal and coronal reformatted images were also provided for review.  FINDINGS:  Straightening of the cervical lordosis.  No acute fracture. The vertebral body heights are preserved.  Multilevel degenerative disc disease with posterior disc osteophyte complexes. Multilevel uncovertebral and facet hypertrophic changes.  Suspect mild spinal canal narrowing at C3-C4, C4-C5. Variable degrees of foraminal narrowing most notable with at least moderate left C4-C5 foraminal narrowing.  Right greater the left carotid atherosclerosis.  Other:None      Impression:  No acute fracture or traumatic malalignment.  Moderate degenerative changes with suspected mild spinal canal narrowing at C3-C4 and C4-C5.  Right greater than left carotid atherosclerosis.    This report was signed and finalized on 1/17/2025 2:42 PM by Regan Garner.      CT Head Without Contrast    Result Date: 1/17/2025  Narrative: Exam: CT HEAD WO CONTRAST- 1/17/2025 1:24 PM   HISTORY: head injury eliquis   DOSE LENGTH PRODUCT: 1153.51 mGy.cm mGy cm. Automated exposure control was also utilized to decrease patient radiation dose.  Technique: Helically acquired CT of the brain without IV contrast was performed. Sagittal and coronal reformations are also provided for review. Soft tissue and bone kernels are available for interpretation.  Comparison: 12/4/2024.  Findings:  Ventricles and extra-axial CSF spaces are normal in size.  No intraparenchymal or extra-axial hemorrhage.  Gray-white matter differentiation is preserved.  Orbits are grossly unremarkable. Paranasal sinuses are grossly clear. Mastoid air cells are grossly clear.  No suspicious calvarial or extracranial soft tissue abnormality.      Impression: Impression:   No acute intracranial abnormality.  This report was signed and finalized on 1/17/2025 2:38 PM by Regan Garner.      XR Tibia Fibula 2 View Left    Result Date: 1/17/2025  Narrative: XR TIBIA FIBULA 2 VW LEFT-  HISTORY: fall left low leg pain eliquis  COMPARISON: None  FINDINGS: Frontal and lateral views of the left tibia and fibula are obtained.  No fracture or dislocation. Mild to moderate medial joint space narrowing. Vascular calcifications.      Impression: 1. No acute osseous injury left tibia or fibula.   This report was signed and finalized on 1/17/2025 2:06 PM by Dr. Bri Duong MD.      XR Knee 4+ View Bilateral    Result Date: 1/17/2025  Narrative: XR KNEE 4+ VW BILATERAL-  HISTORY: fall bilateral knee injury eliquis  COMPARISON: None  FINDINGS: 4 views of the right and left knee are obtained.  There is mild medial left joint space narrowing. Borderline medial right joint space narrowing. Minimal bony spurring of the patella. No acute fracture or dislocation. No knee joint effusion.  Diffuse vascular calcifications.      Impression: 1. Mild arthritic changes of the knee, joint space narrowing greatest in the medial compartment of the left knee. No acute  osseous injury or joint effusion.     This report was signed and finalized on 1/17/2025 2:05 PM by Dr. Bri Duong MD.      CT Lower Extremity Left Without Contrast    Result Date: 12/20/2024  Narrative: EXAM: CT LOWER EXTREMITY LEFT WO CONTRAST- - 12/20/2024 3:16 PM  HISTORY: fall, trauma, pain   COMPARISON: None.  DOSE LENGTH PRODUCT: 521 mGy cm. Automatic exposure control was utilized to make radiation dose as low as reasonably achievable.  TECHNIQUE: Unenhanced CT images were acquired then reconstructed and displayed as axial, coronal and sagittal multiplanar reformatted images. The coverage included LEFT mid pelvis to the LEFT proximal tibia fibula.  FINDINGS:  BONE: The bones all have normal configuration. Visualized portion of the LEFT pelvis appears intact. LEFT sacrum minimally visualized, not optimally evaluated on this exam. LEFT femur appears intact.  JOINTS: LEFT hip appears intact and normally aligned. No convincing large hip joint effusion. No knee joint effusion. Pubic symphysis anatomic. LEFT sacroiliac joint minimally visualized, appears anatomic.  TENDONS AND MUSCLES: Large gluteal intramuscular mixed density including hyperdense mass measures 13.6 x 8.3 x 13.7 cm (AP by transverse by craniocaudal), in the setting of trauma likely hematoma.. Lack of intravenous contrast limits evaluation for active extravasation.  SUBCUTANEOUS AND DEEP SOFT TISSUES: Vascular calcifications. Minimally visualized deep pelvis, not optimally evaluated on this exam. Subcutaneous stranding posterior gluteal and upper thigh regions, likely contusion.       Impression: 1. Large gluteal mixed density mass, favored to represent hematoma in the setting of trauma. Lack of intravenous contrast limits evaluation for active extravasation.  2. No acute fracture in the field-of-view.    This report was signed and finalized on 12/20/2024 4:45 PM by Dr Zuleyma Schmidt MD.      CT Pelvis Without Contrast    Result Date:  12/20/2024  Narrative: CT PELVIS WO CONTRAST- 12/20/2024 3:16 PM  HISTORY: fall, trauma, pain   COMPARISON: CT scan dated 12/20/2024.  DLP: 521.44 mGy.cm  TECHNIQUE: Serial helical tomographic images of the bony pelvis were obtained without the use of intravenous contrast. Multiplanar reformatted images were provided for review. Automated exposure control was utilized to reduce patient radiation dose.  FINDINGS: :  No visualized acute fracture. No fracture or malalignment at the hip joints. Pelvic ring is intact. Pubic symphysis and SI joints are unremarkable. No acute intrapelvic process. Colonic diverticulosis. There is a large large left gluteal compartment intramuscular hematomas in the gluteus medius and robbin measuring up to 15 cm craniocaudal.      Impression: 1. No acute fracture or malalignment. 2. Large left gluteal compartment intramuscular hematomas involving the gluteus medius and gluteus robbin measuring up to 15 cm craniocaudal and up to 13 cm transverse.    This report was signed and finalized on 12/20/2024 4:43 PM by Dr Gregory Guillermo.      XR Hip With or Without Pelvis 2 - 3 View Left    Result Date: 12/20/2024  Narrative: EXAMINATION: XR HIP W OR WO PELVIS 2-3 VIEW LEFT-  12/20/2024 10:47 AM  HISTORY: Fall/trauma.  FINDINGS: AP radiograph of the pelvis as well as 2 view exam of the left hip demonstrates moderate arthrosis of the left hip with narrowing of the joint space and mild spurring of the femoral head. There is mild arthritic change of the right hip. The bony pelvic ring is intact.      Impression: 1. No acute fracture. 2. Arthritic changes of both hips (left greater than right).  This report was signed and finalized on 12/20/2024 10:48 AM by Dr. Vicente Sam MD.       ED Course  ED Course as of 01/17/25 1628   Fri Jan 17, 2025   1616 I have reviewed this case with Dr. Alvarez, general surgeon on-call, who is reviewed the patient's CT scan of the ab pelvis with IV contrast.  She does  not believe anything surgical is necessary at this time but requests medicine consult or if they deem necessary.  I do have a call to hospitalist currently.  In the meantime, the patient has received Zosyn and vancomycin for his cellulitis and urinary tract infection.  Ativan has been given for alcohol intoxication and hopefully avoiding withdrawal. [TK]   8826 I discussed this case with BRI Mcmillan, with hospitalist.  She is gracious to admit the patient under their services. [TK]      ED Course User Index  [TK] Mac Felix PA          [facility]      Final diagnoses:   Cellulitis, unspecified cellulitis site   Acute UTI   History of alcohol consumption   History of alcoholic hepatitis   Drug use       Disposition: patient will admitted to hospitalists' services.        Mac Felix PA  01/17/25 5111

## 2025-01-17 NOTE — ED NOTES
Nursing report ED to floor  Dayron Lubin  69 y.o.  male    HPI:   Chief Complaint   Patient presents with    Fall       Admitting doctor:   Dayron Griggs DO    Consulting provider(s):  Consults       No orders found from 12/19/2024 to 1/18/2025.             Admitting diagnosis:   The primary encounter diagnosis was Cellulitis, unspecified cellulitis site. Diagnoses of Acute UTI, History of alcohol consumption, History of alcoholic hepatitis, and Drug use were also pertinent to this visit.    Code status:   Current Code Status       Date Active Code Status Order ID Comments User Context       Prior            Allergies:   Patient has no known allergies.    Intake and Output    Intake/Output Summary (Last 24 hours) at 1/17/2025 1649  Last data filed at 1/17/2025 1533  Gross per 24 hour   Intake 368.75 ml   Output --   Net 368.75 ml       Weight:       01/17/25  1223   Weight: 84.5 kg (186 lb 3.2 oz)       Most recent vitals:   Vitals:    01/17/25 1516 01/17/25 1531 01/17/25 1546 01/17/25 1601   BP: 157/91 171/88 156/96 145/89   BP Location:       Patient Position:       Pulse: 90 89 89 87   Resp:       Temp:       TempSrc:       SpO2: 94% 93% 94% 95%   Weight:       Height:         Oxygen Therapy: .    Active LDAs/IV Access:   Lines, Drains & Airways       Active LDAs       Name Placement date Placement time Site Days    Peripheral IV 01/17/25 1313 Right;Posterior Hand 01/17/25  1313  Hand  less than 1    Peripheral IV 01/17/25 1323 Left;Posterior Hand 01/17/25  1323  Hand  less than 1                    Labs (abnormal labs have a star):   Labs Reviewed   COMPREHENSIVE METABOLIC PANEL - Abnormal; Notable for the following components:       Result Value    Glucose 139 (*)     Sodium 132 (*)     Chloride 90 (*)     AST (SGOT) 50 (*)     Total Bilirubin 1.7 (*)     Anion Gap 18.0 (*)     All other components within normal limits    Narrative:     GFR Categories in Chronic Kidney Disease (CKD)      GFR Category           GFR (mL/min/1.73)    Interpretation  G1                     90 or greater         Normal or high (1)  G2                      60-89                Mild decrease (1)  G3a                   45-59                Mild to moderate decrease  G3b                   30-44                Moderate to severe decrease  G4                    15-29                Severe decrease  G5                    14 or less           Kidney failure          (1)In the absence of evidence of kidney disease, neither GFR category G1 or G2 fulfill the criteria for CKD.    eGFR calculation 2021 CKD-EPI creatinine equation, which does not include race as a factor   LIPASE - Abnormal; Notable for the following components:    Lipase 88 (*)     All other components within normal limits   URINALYSIS W/ CULTURE IF INDICATED - Abnormal; Notable for the following components:    Color, UA Orange (*)     Specific Gravity, UA >1.030 (*)     Ketones, UA 40 mg/dL (2+) (*)     Bilirubin, UA Small (1+) (*)     Blood, UA Small (1+) (*)     Protein, UA >=300 mg/dL (3+) (*)     Leuk Esterase, UA Small (1+) (*)     Nitrite, UA Positive (*)     All other components within normal limits    Narrative:     Dipstick results may be inaccurate due to color interference.   URINE DRUG SCREEN - Abnormal; Notable for the following components:    Cocaine Screen, Urine Positive (*)     Benzodiazepine Screen, Urine Positive (*)     Barbiturates Screen, Urine Positive (*)     Oxycodone Screen, Urine Positive (*)     All other components within normal limits    Narrative:     Cutoff For Drugs Screened:    Amphetamines               500 ng/ml  Barbiturates               200 ng/ml  Benzodiazepines            150 ng/ml  Cocaine                    150 ng/ml  Methadone                  200 ng/ml  Opiates                    100 ng/ml  Phencyclidine               25 ng/ml  THC                         50 ng/ml  Methamphetamine            500 ng/ml  Tricyclic Antidepressants  300  ng/ml  Oxycodone                  100 ng/ml  Buprenorphine               10 ng/ml    The normal value for all drugs tested is negative. This report includes unconfirmed screening results, with the cutoff values listed, to be used for medical treatment purposes only.  Unconfirmed results must not be used for non-medical purposes such as employment or legal testing.  Clinical consideration should be applied to any drug of abuse test, particularly when unconfirmed results are used.     CBC WITH AUTO DIFFERENTIAL - Abnormal; Notable for the following components:    WBC 10.91 (*)     RBC 3.59 (*)     Hemoglobin 10.9 (*)     Hematocrit 32.6 (*)     Neutrophil % 81.6 (*)     Lymphocyte % 8.2 (*)     Eosinophil % 0.1 (*)     Immature Grans % 0.7 (*)     Neutrophils, Absolute 8.91 (*)     Monocytes, Absolute 1.00 (*)     Immature Grans, Absolute 0.08 (*)     All other components within normal limits   URINALYSIS, MICROSCOPIC ONLY - Abnormal; Notable for the following components:    Bacteria, UA Trace (*)     All other components within normal limits   LACTIC ACID, PLASMA - Abnormal; Notable for the following components:    Lactate 3.3 (*)     All other components within normal limits   ACETAMINOPHEN LEVEL - Normal   SALICYLATE LEVEL - Normal   FENTANYL, URINE - Normal    Narrative:     Negative Threshold:      Fentanyl 5 ng/mL     The normal value for the drug tested is negative. This report includes final unconfirmed screening results to be used for medical treatment purposes only. Unconfirmed results must not be used for non-medical purposes such as employment or legal testing. Clinical consideration should be applied to any drug of abuse test, particularly when unconfirmed results are used.          BLOOD CULTURE   BLOOD CULTURE   ETHANOL    Narrative:     Not for legal purposes. Chain of Custody not followed.    LACTIC ACID, REFLEX   CBC AND DIFFERENTIAL    Narrative:     The following orders were created for panel order  CBC & Differential.  Procedure                               Abnormality         Status                     ---------                               -----------         ------                     CBC Auto Differential[[id number]]        Abnormal            Final result                 Please view results for these tests on the individual orders.       Meds given in ED:   Medications   sodium chloride 0.9 % flush 10 mL (has no administration in time range)   lactated ringers infusion (0 mL/hr Intravenous Stopped 1/17/25 1533)   vancomycin IVPB 1750 mg in 0.9% Sodium Chloride (premix) 500 mL (1,750 mg Intravenous New Bag 1/17/25 1533)   LORazepam (ATIVAN) injection 1 mg (1 mg Intravenous Given 1/17/25 1326)   piperacillin-tazobactam (ZOSYN) 3.375 g IVPB in 100 mL NS MBP (CD) (0 g Intravenous Stopped 1/17/25 1533)   iopamidol (ISOVUE-300) 61 % injection 100 mL (100 mL Intravenous Given 1/17/25 1433)     lactated ringers, 125 mL/hr, Last Rate: Stopped (01/17/25 1533)         NIH Stroke Scale:       Isolation/Infection(s):  No active isolations   C.difficile     COVID Testing  Collected .  Resulted .    Nursing report ED to floor:  Mental status: .  Ambulatory status: .  Precautions: .    ED nurse phone extentsion- ..

## 2025-01-17 NOTE — H&P
[facility] [place] Medicine Services  HISTORY AND PHYSICAL    Date of Admission: 1/17/2025  Primary Care Physician: Herberth Nguyen Jr., MD    Subjective   Primary Historian: Patient    Chief Complaint: Frequent falls    History of Present Illness  Dayron Lubin is a 69-year-old male with an extensive past history of failure to care for himself due to alcoholism.  Additional history of paroxysmal atrial fibrillation on chronic Eliquis, CAD, prostate cancer in remission, hyperlipidemia, hypertension and most recently treated for a large gluteal compartment intramuscular hematoma due to a fall at home.  This resulted in patient being discharged to skilled nursing facility which the patient left AMA.  He presented today per EMS.  Patient reported that yesterday he tripped over a rug and fell on a heater hitting his knee when he got up he hit his head.  He denied any loss of consciousness he stated he got up within a few minutes and was able to remove.  His brother visited him this afternoon and called EMS.  Upon presentation he denied any new pain he admits to leaving SNF AMA and zooming drinking last alcohol consumption was 11 AM this morning where he added 2 shots of vodka to his orange juice.  The patient on last admission had to be transferred to ICU due to delirium tremors and placed on phenobarbital.  He denied any neck, back, chest or abdominal pain.  X-rays of knee, tib-fib, CT of the head, cervical spine and abdomen pelvis were obtained all with no acute findings or acute fractures.  He stated his only complaint with the he noticed some redness in his left groin area for the past few days and it has been somewhat tender.  He states he wears a brief due to urinary incontinence and he states that he has daily bowel movements with occasional diarrhea and he only changes the diaper once daily regardless.    Upon admission patient is resting in bed on room air with no family at  bedside.  Patient is alert and oriented and able to participate in assessment.  As patient is familiar to me I had a lengthy discussion with patient regarding his alcohol use, noncompliance with treatment and his extensive erythema with possible cellulitis to previous hematoma coccyx, perineum and scrotal area.  Patient is agreeable to return to skilled nursing facility at discharge as he will need extensive wound care.  Patient would be placed on CIWA protocol initially with scheduled Librium and as needed Ativan.  Will evaluate very closely for any worsening symptoms and need for restarting phenobarbital.  Patient verbalized understanding of plan of care, all questions were answered to the best my ability and he is in agreement for admission and treatment.    ED workup revealed , CL 90, anion gap of 18, , AST 50, ALT 21, total bilirubin 1.7, lactate 3.3, after fluid bolus 1.4, lipase 88, WBC 10.91 with no left shift or bandemia, urinalysis revealed small blood, positive nitrates small leukocytes and trace bacteria.  Patient was positive for barbiturates, benzodiazepines, oxycodone and cocaine of which patient denies any usage.       Review of Systems   Constitutional:  Positive for fatigue.   Respiratory:  Negative for shortness of breath.    Cardiovascular:  Negative for chest pain.   Gastrointestinal:  Positive for diarrhea. Negative for nausea and vomiting.   Skin:  Positive for wound.   Neurological:  Negative for tremors and syncope.      Otherwise complete ROS reviewed and negative except as mentioned in the HPI.    Past Medical History:   Past Medical History:   Diagnosis Date    A-fib     Alcoholic hepatitis 07/13/2023    CAD (coronary artery disease)     History of external beam radiation therapy 05/12/2016    6840 cGy to prostate bed    Hyperlipidemia     Hypertension     Insomnia     Prostate cancer      Past Surgical History:  Past Surgical History:   Procedure Laterality Date    CARDIAC  CATHETERIZATION      CORONARY ANGIOPLASTY WITH STENT PLACEMENT      FRACTURE SURGERY      PROSTATE SURGERY      TONSILLECTOMY       Social History:  reports that he has never smoked. He has never used smokeless tobacco. He reports current alcohol use of about 50.0 standard drinks of alcohol per week. He reports that he does not currently use drugs after having used the following drugs: Marijuana.    Family History: family history includes Coronary artery disease in his mother; Heart disease in his mother; Stroke in his mother.       Allergies:  No Known Allergies    Medications:  Prior to Admission medications    Medication Sig Start Date End Date Taking? Authorizing Provider   amiodarone (PACERONE) 200 MG tablet Take 1 tablet by mouth Daily. 9/10/24  Yes Italia Vasquez PA   apixaban (ELIQUIS) 5 MG tablet tablet Take 1 tablet by mouth Every 12 (Twelve) Hours. Indications: Atrial Fibrillation 11/23/24  Yes Italia Vasquez PA   atorvastatin (LIPITOR) 20 MG tablet Take 1 tablet by mouth Daily.   Yes ProviderSteve MD   folic acid (FOLVITE) 1 MG tablet Take 1 tablet by mouth Daily. 11/22/24  Yes Holger Karimi DO   pantoprazole (PROTONIX) 40 MG EC tablet Take 1 tablet by mouth Daily.   Yes ProviderSteve MD   thiamine (VITAMIN B1) 100 MG tablet Take 1 tablet by mouth Daily. 11/22/24  Yes Holger Karimi DO   azelastine (ASTELIN) 0.1 % nasal spray 2 sprays into the nostril(s) as directed by provider 2 (Two) Times a Day. Use in each nostril as directed  Patient taking differently: Administer 2 sprays into the nostril(s) as directed by provider 2 (Two) Times a Day As Needed for Allergies. Use in each nostril as directed 9/5/24   Gonzalo Martinez APRN   busPIRone (BUSPAR) 5 MG tablet Take 1.5 tablets by mouth 2 (Two) Times a Day. 12/9/24   New Griffiths MD   fluticasone (FLONASE) 50 MCG/ACT nasal spray 2 sprays into the nostril(s) as directed by provider Daily.  Patient taking  "differently: Administer 2 sprays into the nostril(s) as directed by provider Daily As Needed for Allergies. 9/5/24   Gonzalo Martinez APRN   loperamide (IMODIUM) 2 MG capsule Take 1 capsule by mouth 4 (Four) Times a Day As Needed for Diarrhea. 12/9/24   New Griffiths MD   metoprolol succinate XL (TOPROL-XL) 25 MG 24 hr tablet Take 0.5 tablets by mouth Daily for 30 days. 12/28/24 1/27/25  Raul Gifford MD   naloxone (NARCAN) 4 MG/0.1ML nasal spray Call 911. Don't prime. [place] in 1 nostril for overdose. Repeat in 2-3 minutes in other nostril if no or minimal breathing/responsiveness. 12/27/24   Raul Gifford MD     I have utilized all available immediate resources to obtain, update, or review the patient's current medications (including all prescriptions, over-the-counter products, herbals, cannabis/cannabidiol products, and vitamin/mineral/dietary (nutritional) supplements).    Objective     Vital Signs: /89   Pulse 92   Temp 98.9 °F (37.2 °C) (Oral)   Resp 16   Ht 180.3 cm (71\")   Wt 84.5 kg (186 lb 3.2 oz)   SpO2 97%   BMI 25.97 kg/m²   Physical Exam  Constitutional:       General: He is not in acute distress.     Appearance: He is not ill-appearing or toxic-appearing.      Comments: Patient is extremely disheveled, feces apparent on his hands and fingers.  Multiple areas of scabbing and dirt on his feet.   HENT:      Right Ear: Tympanic membrane normal.      Left Ear: Tympanic membrane normal.      Nose: Nose normal.      Mouth/Throat:      Mouth: Mucous membranes are moist.      Pharynx: Oropharynx is clear.   Eyes:      Pupils: Pupils are equal, round, and reactive to light.   Cardiovascular:      Rate and Rhythm: Normal rate and regular rhythm.      Pulses: Normal pulses.      Heart sounds: Normal heart sounds.   Pulmonary:      Effort: Pulmonary effort is normal.      Breath sounds: Normal breath sounds.   Abdominal:      General: Abdomen is protuberant. Bowel sounds are normal. "      Palpations: Abdomen is soft.      Tenderness: There is no abdominal tenderness.   Genitourinary:     Comments: Voiding per urinal  Musculoskeletal:      Cervical back: Normal range of motion. No rigidity or tenderness.      Right lower leg: No edema.      Left lower leg: No edema.   Skin:     Findings: Bruising, erythema, rash and wound present. Rash is crusting.      Comments: See attached photos   Neurological:      Mental Status: He is alert and oriented to person, place, and time.   Psychiatric:         Attention and Perception: Attention normal.         Mood and Affect: Mood normal.         Speech: Speech normal.         Behavior: Behavior is cooperative.         Thought Content: Thought content normal.         Cognition and Memory: Memory is impaired.            1/17/2025 coccyx region  Results Reviewed:  Lab Results (last 24 hours)       Procedure Component Value Units Date/Time    Lactic Acid, Plasma [[id number]]  (Abnormal) Collected: 01/17/25 1313    Specimen: Blood Updated: 01/17/25 1556     Lactate 3.3 mmol/L     Urinalysis, Microscopic Only - Straight Cath [[id number]]  (Abnormal) Collected: 01/17/25 1322    Specimen: Urine from Straight Cath Updated: 01/17/25 1421     RBC, UA 0-2 /HPF      WBC, UA 0-2 /HPF      Comment: Urine culture not indicated.        Bacteria, UA Trace /HPF      Squamous Epithelial Cells, UA 0-2 /HPF      Hyaline Casts, UA 13-20 /LPF      Methodology Manual Light Microscopy    Fentanyl, Urine - Straight Cath [[id number]]  (Normal) Collected: 01/17/25 1322    Specimen: Urine from Straight Cath Updated: 01/17/25 1354     Fentanyl, Urine Negative            Urinalysis With Culture If Indicated - Straight Cath [[id number]]  (Abnormal) Collected: 01/17/25 1322    Specimen: Urine from Straight Cath Updated: 01/17/25 1353     Color, UA [place]     Appearance, UA Clear     pH, UA 8.0     Specific Gravity, UA >1.030     Glucose, UA Negative     Ketones, UA 40 mg/dL (2+)     Bilirubin, UA  Small (1+)     Blood, UA Small (1+)     Protein, UA >=300 mg/dL (3+)     Leuk Esterase, UA Small (1+)     Nitrite, UA Positive     Urobilinogen, UA 1.0 E.U./dL    Narrative:      Dipstick results may be inaccurate due to color interference.    Urine Drug Screen - Straight Cath [[id number]]  (Abnormal) Collected: 01/17/25 1322    Specimen: Urine from Straight Cath Updated: 01/17/25 1353     THC, Screen, Urine Negative     Phencyclidine (PCP), Urine Negative     Cocaine Screen, Urine Positive     Methamphetamine, Ur Negative     Opiate Screen Negative     Amphetamine Screen, Urine Negative     Benzodiazepine Screen, Urine Positive     Tricyclic Antidepressants Screen Negative     Methadone Screen, Urine Negative     Barbiturates Screen, Urine Positive     Oxycodone Screen, Urine Positive     Buprenorphine, Screen, Urine Negative            Comprehensive Metabolic Panel [[id number]]  (Abnormal) Collected: 01/17/25 1313    Specimen: Blood Updated: 01/17/25 1344     Glucose 139 mg/dL      BUN 19 mg/dL      Creatinine 0.90 mg/dL      Sodium 132 mmol/L      Potassium 4.1 mmol/L      Comment: Slight hemolysis detected by analyzer. Result may be falsely elevated.        Chloride 90 mmol/L      CO2 24.0 mmol/L      Calcium 9.4 mg/dL      Total Protein 7.6 g/dL      Albumin 4.1 g/dL      ALT (SGPT) 21 U/L      AST (SGOT) 50 U/L      Comment: Slight hemolysis detected by analyzer. Result may be falsely elevated.        Alkaline Phosphatase 84 U/L      Total Bilirubin 1.7 mg/dL      Globulin 3.5 gm/dL      A/G Ratio 1.2 g/dL      BUN/Creatinine Ratio 21.1     Anion Gap 18.0 mmol/L      eGFR 91.9 mL/min/1.73             Salicylate Level [[id number]]  (Normal) Collected: 01/17/25 1313    Specimen: Blood Updated: 01/17/25 1340     Salicylate <0.3 mg/dL     Acetaminophen Level [[id number]]  (Normal) Collected: 01/17/25 1313    Specimen: Blood Updated: 01/17/25 1340     Acetaminophen <5.0 mcg/mL     Lipase [[id number]]  (Abnormal)  Collected: 01/17/25 1313    Specimen: Blood Updated: 01/17/25 1336     Lipase 88 U/L     Ethanol [[id number]] Collected: 01/17/25 1313    Specimen: Blood Updated: 01/17/25 1335     Ethanol % <0.010 %     Narrative:      Not for legal purposes. Chain of Custody not followed.     CBC & Differential [[id number]]  (Abnormal) Collected: 01/17/25 1313    Specimen: Blood Updated: 01/17/25 1324            CBC Auto Differential [[id number]]  (Abnormal) Collected: 01/17/25 1313    Specimen: Blood Updated: 01/17/25 1324     WBC 10.91 10*3/mm3      RBC 3.59 10*6/mm3      Hemoglobin 10.9 g/dL      Hematocrit 32.6 %      MCV 90.8 fL      MCH 30.4 pg      MCHC 33.4 g/dL      RDW 15.3 %      RDW-SD 51.1 fl      MPV 10.5 fL      Platelets 296 10*3/mm3      Neutrophil % 81.6 %      Lymphocyte % 8.2 %      Monocyte % 9.2 %      Eosinophil % 0.1 %      Basophil % 0.2 %      Immature Grans % 0.7 %      Neutrophils, Absolute 8.91 10*3/mm3      Lymphocytes, Absolute 0.89 10*3/mm3      Monocytes, Absolute 1.00 10*3/mm3      Eosinophils, Absolute 0.01 10*3/mm3      Basophils, Absolute 0.02 10*3/mm3      Immature Grans, Absolute 0.08 10*3/mm3      nRBC 0.0 /100 WBC     Blood Culture - Blood, Hand, Right [[id number]] Collected: 01/17/25 1313    Specimen: Blood from Hand, Right Updated: 01/17/25 1322    Blood Culture - Blood, Hand, Left [[id number]] Collected: 01/17/25 1313    Specimen: Blood from Hand, Left Updated: 01/17/25 1322          Imaging Results (Last 24 Hours)       Procedure Component Value Units Date/Time    CT Abdomen Pelvis With Contrast [[id number]] Collected: 01/17/25 1441     Updated: 01/17/25 1507    Narrative:      EXAMINATION: CT ABDOMEN PELVIS W CONTRAST-      1/17/2025 1:24 PM     HISTORY: pelvic infection/cellulitis. nec fas?     In order to have a CT radiation dose as low as reasonably achievable  Automated Exposure Control was utilized for adjustment of the mA and/or  KV according to patient size.     Total DLP = 401.9  mGy.cm     The CT scan of the abdomen and pelvis is performed after intravenous  contrast enhancement.     Images are acquired in axial plane and subsequent reconstruction in  coronal and sagittal planes.     Comparison is made with the previous study dated 12/20/2024 and  11/16/2024.     The lung bases included in the study are unremarkable.     Limited visualized cardiomediastinal structures show moderate  cardiomegaly. There is an apparent focal bulge of the left ventricle  which may suggest an aneurysm of the left ventricle?. This may be  clinically correlated.     There is fatty infiltration of the liver. The spleen is normal.     The gallbladder is moderately distended with high density material which  may represent sludge or contrast excretion. No definite radiopaque  calculi.     Pancreas is normal.     The adrenal glands are normal.     Moderate lobulation of renal contour bilaterally is seen. There is a  small low-density nodule located posteriorly in the lower pole of the  left kidney measuring 8 mm in diameter. CT density suggest a cyst. No  radiopaque calculi on either side. No hydronephrosis. The ureters are  nondilated. Urinary bladder poorly distended with mild wall thickening.  No obvious intrinsic abnormality.     The prostate is surgically absent.     There is a fat-containing left inguinoscrotal hernia.     The stomach and duodenum are normal. Small bowel is nondistended.  Appendix is normal. Small amount of stool and gas is seen in the colon.  There is diverticulosis of the distal colon. No finding to suggest  diverticulitis.     Atheromatous changes of the abdominal aorta and iliac arteries. No  aneurysmal dilatation.     No evidence of abdominal or pelvic lymphadenopathy.     Images reviewed in bone window show no acute bony abnormality. Chronic  degenerative changes of the lumbar spine are seen. There is bilateral  spondylolysis L5 without a significant spondylolisthesis. Old  healed  fractures of the left lower ribs are seen.     There is a lobulated oblong high density fluid accumulation  predominantly in the left gluteus medius with a surrounding ring-shaped  enhancement measuring 12 cm in craniocaudal projection in the coronal  plane images and 10 cm in horizontal projection and axial plane images.  The size of the gluteus medius and robbin has overall decreased since  the previous study. There is another high density lobulated masslike  area located between the tensor fascia froilan laterally, rectus femoris  anteriorly and vastus lateralis and medius posterolaterally. This may  represent an evolving hematoma?. This was not noted in the previous  study.       Impression:      1. There is overall improvement in size of the hematoma/swelling of the  left gluteus medius and robbin. However there is now a well-formed  collection, in the gluteus medius, with a surrounding ring enhancement  which may represent an abscess. There is a new high density masslike  lobulated area located more anteriorly which measures 3.8 cm in diameter  in axial plane images. This may represent hematoma?.  2. Hepatic steatosis.  3. Moderately dilated gallbladder with sludge?. No stones.  4. Diverticulosis of the colon. No evidence for diverticulitis.  4. Other nonacute findings similar to the previous study.                                               This report was signed and finalized on 1/17/2025 3:04 PM by Dr. Aleta Ramos MD.       CT Cervical Spine Without Contrast [[id number]] Collected: 01/17/25 1438     Updated: 01/17/25 1445    Narrative:      Exam: CT CERVICAL SPINE WO CONTRAST- 1/17/2025 1:24 PM     HISTORY: fall etoh eliquis     COMPARISON: 12/4/2024     DOSE LENGTH PRODUCT: 1153.51 mGy.cm mGy cm. Automated exposure control  was also utilized to decrease patient radiation dose.     TECHNIQUE: Serial helical tomographic images of the cervical spine were  obtained without the use of  intravenous contrast. Additionally, sagittal  and coronal reformatted images were also provided for review.     FINDINGS:     Straightening of the cervical lordosis.     No acute fracture. The vertebral body heights are preserved.     Multilevel degenerative disc disease with posterior disc osteophyte  complexes. Multilevel uncovertebral and facet hypertrophic changes.     Suspect mild spinal canal narrowing at C3-C4, C4-C5. Variable degrees of  foraminal narrowing most notable with at least moderate left C4-C5  foraminal narrowing.     Right greater the left carotid atherosclerosis.     Other:None       Impression:         No acute fracture or traumatic malalignment.     Moderate degenerative changes with suspected mild spinal canal narrowing  at C3-C4 and C4-C5.     Right greater than left carotid atherosclerosis.           This report was signed and finalized on 1/17/2025 2:42 PM by Regan Garner.       CT Head Without Contrast [[id number]] Collected: 01/17/25 1434     Updated: 01/17/25 1441    Narrative:      Exam: CT HEAD WO CONTRAST- 1/17/2025 1:24 PM     HISTORY: head injury eliquis       DOSE LENGTH PRODUCT: 1153.51 mGy.cm mGy cm. Automated exposure control  was also utilized to decrease patient radiation dose.     Technique:  Helically acquired CT of the brain without IV contrast was performed.  Sagittal and coronal reformations are also provided for review. Soft  tissue and bone kernels are available for interpretation.     Comparison: 12/4/2024.     Findings:     Ventricles and extra-axial CSF spaces are normal in size.     No intraparenchymal or extra-axial hemorrhage.     Gray-white matter differentiation is preserved.     Orbits are grossly unremarkable. Paranasal sinuses are grossly clear.  Mastoid air cells are grossly clear.     No suspicious calvarial or extracranial soft tissue abnormality.       Impression:      Impression:       No acute intracranial abnormality.     This report was signed and  finalized on 1/17/2025 2:38 PM by Regan Garner.       XR Tibia Fibula 2 View Left [[id number]] Collected: 01/17/25 1405     Updated: 01/17/25 1409    Narrative:      XR TIBIA FIBULA 2 VW LEFT-     HISTORY: fall left low leg pain eliquis     COMPARISON: None     FINDINGS: Frontal and lateral views of the left tibia and fibula are  obtained.     No fracture or dislocation. Mild to moderate medial joint space  narrowing. Vascular calcifications.       Impression:      1. No acute osseous injury left tibia or fibula.        This report was signed and finalized on 1/17/2025 2:06 PM by Dr. Bri Duong MD.       XR Knee 4+ View Bilateral [[id number]] Collected: 01/17/25 1403     Updated: 01/17/25 1408    Narrative:      XR KNEE 4+ VW BILATERAL-     HISTORY: fall bilateral knee injury eliquis     COMPARISON: None     FINDINGS: 4 views of the right and left knee are obtained.     There is mild medial left joint space narrowing. Borderline medial right  joint space narrowing. Minimal bony spurring of the patella. No acute  fracture or dislocation. No knee joint effusion.     Diffuse vascular calcifications.       Impression:      1. Mild arthritic changes of the knee, joint space narrowing greatest in  the medial compartment of the left knee. No acute osseous injury or  joint effusion.              This report was signed and finalized on 1/17/2025 2:05 PM by Dr. Bri Duong MD.                 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 Griggs's service here at [facility].    1.  Failure to  thrive-patient admitted after multiple falls at home after he left AMA from SNF.  Patient continues to use alcohol and prescription medications.  Patient is not taking care of himself, his wounds and requires continued treatment.  PT/OT and case management consultation for transfer to skilled nursing facility    2.  Cystitis-urinalysis revealed small blood, positive nitrates, small leukocytes and trace bacteria.  Rocephin 2 g initiated.    3.  Dermatitis neglecta-patient upon arrival was covered in feces and dirt.  Orders placed for bathing daily.    4.  Large left gluteal compartment intramuscular hematoma - continuing to heal, ED provider believed it was cellulitis after evaluation I believe it is significant erythema and moisture associated rash from patient sitting in his own feces and urine 4 days at a time.  Discussed the case with Daylin Prasad RN, wound nurse and recommendations for significant wound care placed.  Will continue to monitor closely and wound care APRN will evaluate on Monday.    5.  Paroxysmal atrial fibrillation/chronic anticoagulation-continue home amiodarone and Eliquis, cardiac telemetry.    6.  Alcoholic hepatitis/alcohol abuse-AST 50, ALT 21, total bilirubin 1.7, lipase 88.  Currently stable we will continue to monitor with daily CMP and lipase.  Patient placed on CIWA protocol, scheduled Librium and as needed Ativan.  Previous admission patient had to be transferred to ICU due to significant DTs and placed on phenobarbital.  Patient will be monitored closely for any worsening signs or symptoms.    7.  Essential hypertension-resume home metoprolol, vital signs every 4.    8.  Resumed and restarted home medications as appropriate.    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  VTE prophylaxis with home Eliquis  Labs in a.m.    Medical Decision Making  Failure to thrive, acute on chronic, high complexity, unchanged  Cystitis, acute, moderate complexity,  unchanged  Otitis neglected, acute on chronic, moderate complexity, unchanged  Healing wound, chronic, moderate complexity, worsening  Paroxysmal atrial fibrillation, chronic, moderate complexity, stable  Chronic anticoagulation, chronic, moderate complexity, stable  Alcoholic hepatitis, chronic, high complexity, stable  Alcoholic abuse, chronic, high complexity, worsening  Essential hypertension, chronic, moderate complexity, unchanged  Number and Complexity of problems: 8  Differential Diagnosis: None    Conditions and Status        Condition is unchanged.     MDM Data  External documents reviewed: Extensive epic review of previous hospitalizations  Cardiac tracing (EKG, telemetry) interpretation: Stat EKG pending for QTc measurement  Radiology interpretation: 1/17/2025 x-ray of the knee, tib-fib, CT of the head, abdomen pelvis and cervical spine per radiology reviewed  Labs reviewed: 17 2025 CBC, CMP, lipase, acetaminophen, ethanol, salicylate, lactic acid, magnesium, urinalysis, UDS, lactic acid reviewed, repeat labs in a.m.  Any tests that were considered but not ordered: None     Decision rules/scores evaluated (example [id number], [facility], etc): CIWA score of 3     Discussed with: Dr. Griggs and patient     Care Planning  Shared decision making: Dr. Griggs and patient  Code status and discussions: Full code per patient    Disposition  Social Determinants of Health that impact treatment or disposition: Failure to thrive will need SNF at discharge, case management consult  Estimated length of stay is undetermined at this time.     I confirmed that the patient's advanced care plan is present, code status is documented, and a surrogate decision maker is listed in the patient's medical record.     The patient's surrogate decision maker is Sister Yuliya Stone.     The patient was seen and examined by me on 1/17/2025 at 1627.    Electronically signed by BRI Song, 01/17/25, 18:31 CST.

## 2025-01-17 NOTE — PLAN OF CARE
"                      Established Patient        Chief Complaint:   Chief Complaint   Patient presents with    Sinusitis     Pt has been taking mucinex d and nose spray            History of Present Illness:    Mario Pearce is a 15 y.o. female who presents today with mother for complaints of sinus congestion with thick green mucous, sore throat, cough, swollen lymph nodes.     Onset about 2 weeks ago. Noticed lymphadenopathy yesterday.     Has taken mucinex D OTC.     Subjective     The following portions of the patient's history were reviewed and updated as appropriate: allergies, current medications, past family history, past medical history, past social history, past surgical history and problem list.    ALLERGIES  No Known Allergies    Review of Systems  Gen- No fevers, + chills  HEENT--No runny nose, +congestion, sore throat, No ear pain  CV- No chest pain, palpitations  Resp- +cough, No dyspnea  GI- No N/V/D, abd pain  -No dysuria, flank pain, difficulty urinating  Musc-No tenderness, swelling, decreased ROM  Neuro-No dizziness, headaches  Psych-No SI/HI, sleep disturbance    Objective     Vital Signs:   BP (!) 118/82   Pulse (!) 98   Resp 16   Ht 172.7 cm (68\")   Wt 103 kg (228 lb)   SpO2 98%   BMI 34.67 kg/m²     Pediatric BMI = 98 %ile (Z= 2.05) based on CDC (Girls, 2-20 Years) BMI-for-age based on BMI available on 1/17/2025..     Physical Exam   Physical Exam  Vitals and nursing note reviewed.   HENT:      Right Ear: No middle ear effusion.      Left Ear:  No middle ear effusion.      Nose: Nose normal. Mucosal edema and rhinorrhea present. No congestion.      Right Turbinates: Swollen.      Left Turbinates: Swollen.      Right Sinus: Maxillary sinus tenderness present. No frontal sinus tenderness.      Left Sinus: Maxillary sinus tenderness present. No frontal sinus tenderness.      Mouth/Throat:      Lips: Pink.      Pharynx: Posterior oropharyngeal erythema and postnasal drip present. No " Goal Outcome Evaluation:  Plan of Care Reviewed With: patient           Outcome Evaluation: PT eval complete. He is alert and oriented to self, place and admit situation. He reports alcohol use and an unsteady gait/balance at baseline with multiple falls. Today needs min assist to ambulate within his room and to the chair. Demos a very unsteady gait needing min assist for gait. Experienced 1 loss of balance, again needing min assist to maintain dynamic balance. RW left in room for balance. PT will cont w mobility and balance training. Anticipate needing short term rehab for gait, safety awareness and balance.    Anticipated Discharge Disposition (PT): sub acute care setting                         pharyngeal swelling or oropharyngeal exudate.   Eyes:      General: Lids are normal.      Conjunctiva/sclera: Conjunctivae normal.      Pupils: Pupils are equal, round, and reactive to light.   Cardiovascular:      Rate and Rhythm: Normal rate and regular rhythm.   Pulmonary:      Effort: Pulmonary effort is normal.      Breath sounds: Normal breath sounds.   Lymphadenopathy:      Head:      Right side of head: Submandibular, tonsillar, preauricular and occipital adenopathy present.      Left side of head: Submandibular, tonsillar, preauricular and occipital adenopathy present.      Cervical: Cervical adenopathy present.   Neurological:      Mental Status: She is alert and oriented to person, place, and time.      Gait: Gait is intact.   Psychiatric:         Attention and Perception: Attention normal.         Mood and Affect: Mood and affect normal.         Speech: Speech normal.         Behavior: Behavior normal. Behavior is cooperative.         Assessment and Plan      Assessment/Plan:   Diagnoses and all orders for this visit:    1. Acute non-recurrent maxillary sinusitis (Primary)  -     amoxicillin (AMOXIL) 500 MG capsule; Take 2 capsules by mouth 2 (Two) Times a Day for 5 days.  Dispense: 20 capsule; Refill: 0    2. Lymphadenopathy of head and neck    Risks, benefits, and potential side effects of current/new medications reviewed with patient.  Patient voiced understanding and wished to proceed with treatment.    May alternate Acetaminophen and Ibuprofen every 4-6 hours as needed for pain, fever > 101.    Patient was encouraged to keep me informed of any acute changes, lack of improvement, or any new concerning symptoms.      Discussion Summary:  Discussed plan of care in detail with pt today; pt verb understanding and agrees.      I have reviewed and updated all copied forward information, as appropriate.  I attest to the accuracy and relevance of any unchanged information.      Follow up:  No follow-ups on  file.     Patient Education:  There are no Patient Instructions on file for this visit.    CHIN Pabon  01/17/25  17:02 EST          Please note that portions of this note may have been completed with a voice recognition program.

## 2025-01-18 PROBLEM — N30.90 CYSTITIS: Status: RESOLVED | Noted: 2025-01-17 | Resolved: 2025-01-18

## 2025-01-18 PROBLEM — N39.0 URINARY TRACT INFECTION: Status: ACTIVE | Noted: 2025-01-18

## 2025-01-18 LAB
ALBUMIN SERPL-MCNC: 3.4 G/DL (ref 3.5–5.2)
ALBUMIN/GLOB SERPL: 1.2 G/DL
ALP SERPL-CCNC: 70 U/L (ref 39–117)
ALT SERPL W P-5'-P-CCNC: 15 U/L (ref 1–41)
ANION GAP SERPL CALCULATED.3IONS-SCNC: 9 MMOL/L (ref 5–15)
AST SERPL-CCNC: 31 U/L (ref 1–40)
BASOPHILS # BLD AUTO: 0.02 10*3/MM3 (ref 0–0.2)
BASOPHILS NFR BLD AUTO: 0.4 % (ref 0–1.5)
BILIRUB SERPL-MCNC: 0.8 MG/DL (ref 0–1.2)
BUN SERPL-MCNC: 19 MG/DL (ref 8–23)
BUN/CREAT SERPL: 21.1 (ref 7–25)
CALCIUM SPEC-SCNC: 8.8 MG/DL (ref 8.6–10.5)
CHLORIDE SERPL-SCNC: 98 MMOL/L (ref 98–107)
CO2 SERPL-SCNC: 29 MMOL/L (ref 22–29)
CREAT SERPL-MCNC: 0.9 MG/DL (ref 0.76–1.27)
DEPRECATED RDW RBC AUTO: 53.4 FL (ref 37–54)
EGFRCR SERPLBLD CKD-EPI 2021: 91.9 ML/MIN/1.73
EOSINOPHIL # BLD AUTO: 0.03 10*3/MM3 (ref 0–0.4)
EOSINOPHIL NFR BLD AUTO: 0.5 % (ref 0.3–6.2)
ERYTHROCYTE [DISTWIDTH] IN BLOOD BY AUTOMATED COUNT: 15.3 % (ref 12.3–15.4)
GLOBULIN UR ELPH-MCNC: 2.9 GM/DL
GLUCOSE SERPL-MCNC: 92 MG/DL (ref 65–99)
HBA1C MFR BLD: 4.7 % (ref 4.8–5.6)
HCT VFR BLD AUTO: 29.4 % (ref 37.5–51)
HGB BLD-MCNC: 9.2 G/DL (ref 13–17.7)
IMM GRANULOCYTES # BLD AUTO: 0.03 10*3/MM3 (ref 0–0.05)
IMM GRANULOCYTES NFR BLD AUTO: 0.5 % (ref 0–0.5)
LIPASE SERPL-CCNC: 47 U/L (ref 13–60)
LYMPHOCYTES # BLD AUTO: 1.17 10*3/MM3 (ref 0.7–3.1)
LYMPHOCYTES NFR BLD AUTO: 21.4 % (ref 19.6–45.3)
MAGNESIUM SERPL-MCNC: 2.3 MG/DL (ref 1.6–2.4)
MCH RBC QN AUTO: 29.9 PG (ref 26.6–33)
MCHC RBC AUTO-ENTMCNC: 31.3 G/DL (ref 31.5–35.7)
MCV RBC AUTO: 95.5 FL (ref 79–97)
MONOCYTES # BLD AUTO: 0.43 10*3/MM3 (ref 0.1–0.9)
MONOCYTES NFR BLD AUTO: 7.8 % (ref 5–12)
NEUTROPHILS NFR BLD AUTO: 3.8 10*3/MM3 (ref 1.7–7)
NEUTROPHILS NFR BLD AUTO: 69.4 % (ref 42.7–76)
NRBC BLD AUTO-RTO: 0 /100 WBC (ref 0–0.2)
PLATELET # BLD AUTO: 194 10*3/MM3 (ref 140–450)
PMV BLD AUTO: 10.6 FL (ref 6–12)
POTASSIUM SERPL-SCNC: 3.8 MMOL/L (ref 3.5–5.2)
PROT SERPL-MCNC: 6.3 G/DL (ref 6–8.5)
RBC # BLD AUTO: 3.08 10*6/MM3 (ref 4.14–5.8)
SODIUM SERPL-SCNC: 136 MMOL/L (ref 136–145)
WBC NRBC COR # BLD AUTO: 5.48 10*3/MM3 (ref 3.4–10.8)

## 2025-01-18 PROCEDURE — 85025 COMPLETE CBC W/AUTO DIFF WBC: CPT

## 2025-01-18 PROCEDURE — 87427 SHIGA-LIKE TOXIN AG IA: CPT

## 2025-01-18 PROCEDURE — 83036 HEMOGLOBIN GLYCOSYLATED A1C: CPT

## 2025-01-18 PROCEDURE — 25010000002 THIAMINE HCL 200 MG/2ML SOLUTION: Performed by: INTERNAL MEDICINE

## 2025-01-18 PROCEDURE — 92610 EVALUATE SWALLOWING FUNCTION: CPT

## 2025-01-18 PROCEDURE — 83690 ASSAY OF LIPASE: CPT

## 2025-01-18 PROCEDURE — 97161 PT EVAL LOW COMPLEX 20 MIN: CPT | Performed by: PHYSICAL THERAPIST

## 2025-01-18 PROCEDURE — 83735 ASSAY OF MAGNESIUM: CPT

## 2025-01-18 PROCEDURE — 25010000002 CEFTRIAXONE PER 250 MG

## 2025-01-18 PROCEDURE — 87046 STOOL CULTR AEROBIC BACT EA: CPT

## 2025-01-18 PROCEDURE — 80053 COMPREHEN METABOLIC PANEL: CPT

## 2025-01-18 PROCEDURE — 87045 FECES CULTURE AEROBIC BACT: CPT

## 2025-01-18 RX ORDER — TEMAZEPAM 30 MG/1
30 CAPSULE ORAL NIGHTLY PRN
Status: DISCONTINUED | OUTPATIENT
Start: 2025-01-18 | End: 2025-01-22 | Stop reason: HOSPADM

## 2025-01-18 RX ADMIN — ATORVASTATIN CALCIUM 20 MG: 10 TABLET, FILM COATED ORAL at 09:19

## 2025-01-18 RX ADMIN — CEFTRIAXONE SODIUM 2000 MG: 2 INJECTION, POWDER, FOR SOLUTION INTRAMUSCULAR; INTRAVENOUS at 20:01

## 2025-01-18 RX ADMIN — APIXABAN 5 MG: 5 TABLET, FILM COATED ORAL at 09:19

## 2025-01-18 RX ADMIN — FOLIC ACID 1 MG: 1 TABLET ORAL at 09:19

## 2025-01-18 RX ADMIN — BUSPIRONE HYDROCHLORIDE 7.5 MG: 5 TABLET ORAL at 09:19

## 2025-01-18 RX ADMIN — LORAZEPAM 1 MG: 1 TABLET ORAL at 14:09

## 2025-01-18 RX ADMIN — Medication 10 ML: at 20:01

## 2025-01-18 RX ADMIN — LORAZEPAM 1 MG: 1 TABLET ORAL at 16:11

## 2025-01-18 RX ADMIN — THIAMINE HYDROCHLORIDE 200 MG: 100 INJECTION, SOLUTION INTRAMUSCULAR; INTRAVENOUS at 14:09

## 2025-01-18 RX ADMIN — THIAMINE HYDROCHLORIDE 200 MG: 100 INJECTION, SOLUTION INTRAMUSCULAR; INTRAVENOUS at 06:16

## 2025-01-18 RX ADMIN — BUSPIRONE HYDROCHLORIDE 7.5 MG: 5 TABLET ORAL at 20:01

## 2025-01-18 RX ADMIN — CHLORDIAZEPOXIDE HYDROCHLORIDE 50 MG: 25 CAPSULE ORAL at 18:36

## 2025-01-18 RX ADMIN — CHLORDIAZEPOXIDE HYDROCHLORIDE 50 MG: 25 CAPSULE ORAL at 01:29

## 2025-01-18 RX ADMIN — CHLORDIAZEPOXIDE HYDROCHLORIDE 50 MG: 25 CAPSULE ORAL at 06:16

## 2025-01-18 RX ADMIN — PANTOPRAZOLE SODIUM 40 MG: 40 TABLET, DELAYED RELEASE ORAL at 06:15

## 2025-01-18 RX ADMIN — ACETAMINOPHEN 650 MG: 325 TABLET ORAL at 16:04

## 2025-01-18 RX ADMIN — THIAMINE HYDROCHLORIDE 200 MG: 100 INJECTION, SOLUTION INTRAMUSCULAR; INTRAVENOUS at 21:35

## 2025-01-18 RX ADMIN — APIXABAN 5 MG: 5 TABLET, FILM COATED ORAL at 20:01

## 2025-01-18 RX ADMIN — LORAZEPAM 1 MG: 1 TABLET ORAL at 09:23

## 2025-01-18 RX ADMIN — CHLORDIAZEPOXIDE HYDROCHLORIDE 50 MG: 25 CAPSULE ORAL at 11:55

## 2025-01-18 RX ADMIN — TEMAZEPAM 30 MG: 30 CAPSULE ORAL at 21:35

## 2025-01-18 RX ADMIN — Medication 10 ML: at 09:19

## 2025-01-18 NOTE — THERAPY EVALUATION
Patient Name: Dayron Lubin  : 1955    MRN: [id number]                              Today's Date: 2025       Admit Date: 2025    Visit Dx:     ICD-10-CM ICD-9-CM   1. Cellulitis, unspecified cellulitis site  L03.90 682.9   2. Acute UTI  N39.0 599.0   3. History of alcohol consumption  Z87.898 V11.3   4. History of alcoholic hepatitis  Z87.19 V12.79   5. Drug use  F19.90 305.90   6. Dysphagia, unspecified type  R13.10 787.20   7. Impaired mobility [Z74.09]  Z74.09 799.89     Patient Active Problem List   Diagnosis    Prostate cancer    Hx of radiation therapy    Elevated PSA    Encounter for follow-up surveillance of prostate cancer    HOCM (hypertrophic obstructive cardiomyopathy)    Coronary artery disease involving native coronary artery of native heart without angina pectoris    Essential hypertension    Mixed hyperlipidemia    Class 1 obesity with alveolar hypoventilation, serious comorbidity, and body mass index (BMI) of 30.0 to 30.9 in adult    Displaced fracture of lateral malleolus of right fibula, initial encounter for closed fracture    Family history of colonic polyps    History of colon polyps    Elevated brain natriuretic peptide (BNP) level    Anxiety associated with depression    Alcohol abuse    Alcoholic hepatitis    Volume depletion    Anorexia    CHF (congestive heart failure)    Hypokalemia    Duodenitis    Gait instability    SVT (supraventricular tachycardia)    Physical debility    Atrial fibrillation with rapid ventricular response    Alcoholism in remission    Paroxysmal atrial fibrillation    Nasal polyposis    Nasal congestion    Estelle bullosa    Long-term use of high-risk medication    Multiple falls    Thrombocytopenia    Non-traumatic rhabdomyolysis    Alcohol withdrawal    Pancytopenia    Opiate abuse, episodic    Large left gluteal compartment intramuscular hematomas, currently still healing    Chronic anticoagulation    Cellulitis    Dermatitis neglecta     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    Failure to thrive in adult    Urinary tract infection     Past Medical History:   Diagnosis Date    A-fib     Alcoholic hepatitis 07/13/2023    CAD (coronary artery disease)     History of external beam radiation therapy 05/12/2016    6840 cGy to prostate bed    Hyperlipidemia     Hypertension     Insomnia     Prostate cancer      Past Surgical History:   Procedure Laterality Date    CARDIAC CATHETERIZATION      CORONARY ANGIOPLASTY WITH STENT PLACEMENT      FRACTURE SURGERY      PROSTATE SURGERY      TONSILLECTOMY        General Information       Row Name 01/18/25 1445          Physical Therapy Time and Intention    Document Type evaluation  Cellulitis, frequent falls  -MS (r) BL (t) MS (c)     Mode of Treatment individual therapy;physical therapy  -MS (r) BL (t) MS (c)       Row Name 01/18/25 1445          General Information    Patient Profile Reviewed yes  -MS (r) BL (t) MS (c)     Prior Level of Function independent:;all household mobility;gait;transfer;ADL's;home management  -MS (r) BL (t) MS (c)     Existing Precautions/Restrictions fall  -MS (r) BL (t) MS (c)     Barriers to Rehab previous functional deficit  -MS (r) BL (t) MS (c)       Row Name 01/18/25 1445          Living Environment    People in Home alone  -MS (r) BL (t) MS (c)       Row Name 01/18/25 1445          Home Main Entrance    Number of Stairs, Main Entrance two  -MS (r) BL (t) MS (c)     Stair Railings, Main Entrance railing on left side (ascending)  -MS (r) BL (t) MS (c)       Row Name 01/18/25 1446          Cognition    Orientation Status (Cognition) oriented x 4  -MS (r) BL (t) MS (c)       Row Name 01/18/25 1440          Safety Issues/Impairments Affecting Functional Mobility    Safety Issues Affecting Function (Mobility) positioning of assistive device;safety precautions follow-through/compliance  -MS (r) BL (t) MS (c)     Impairments Affecting Function (Mobility)  balance;coordination;endurance/activity tolerance;pain;strength  -MS (r) BL (t) MS (c)               User Key  (r) = Recorded By, (t) = Taken By, (c) = Cosigned By      Initials Name Provider Type    Keke Marie LO, PT, DPT, NCS Physical Therapist    BL Gael Joe, PT Student PT Student                   Mobility       Row Name 01/18/25 1445          Bed Mobility    Bed Mobility supine-sit  -MS (r) BL (t) MS (c)     Supine-Sit [place] (Bed Mobility) standby assist  -MS (r) BL (t) MS (c)     Assistive Device (Bed Mobility) bed rails  -MS (r) BL (t) MS (c)       Row Name 01/18/25 1445          Bed-Chair Transfer    Bed-Chair [place] (Transfers) minimum assist (75% patient effort);verbal cues  -MS (r) BL (t) MS (c)     Assistive Device (Bed-Chair Transfers) other (see comments)  -MS (r) BL (t) MS (c)       Row Name 01/18/25 1445          Sit-Stand Transfer    Sit-Stand [place] (Transfers) contact guard  -MS (r) BL (t) MS (c)     Assistive Device (Sit-Stand Transfers) walker, front-wheeled  Pushed up from bed then grabbed FWW  -MS (r) BL (t) MS (c)       Row Name 01/18/25 1445          Gait/Stairs (Locomotion)    [place] Level (Gait) contact guard;1 person assist  -MS (r) BL (t) MS (c)     Assistive Device (Gait) walker, front-wheeled  -MS (r) BL (t) MS (c)     Distance in Feet (Gait) 50  -MS (r) BL (t) MS (c)     Bilateral Gait Deviations forward flexed posture  -MS (r) BL (t) MS (c)     Left Sided Gait Deviations --  L LE externally rotated  -MS (r) BL (t) MS (c)               User Key  (r) = Recorded By, (t) = Taken By, (c) = Cosigned By      Initials Name Provider Type    Keke Marie R, PT, DPT, NCS Physical Therapist    BL Heath, Gael, PT Student PT Student                   Obj/Interventions       Row Name 01/18/25 1440          Range of Motion Comprehensive    General Range of Motion no range of motion deficits identified  -MS (r) BL (t) MS (c)       Row Name 01/18/25 144           Strength Comprehensive (MMT)    Comment, General Manual Muscle Testing (MMT) Assessment L knee extension 4/5, R knee extension 3/5 all other LE MMT 3+/5  -MS (r) BL (t) MS (c)       Row Name 01/18/25 1445          Motor Skills    Motor Skills functional endurance  -MS (r) BL (t) MS (c)       Row Name 01/18/25 1443          Balance    Balance Assessment sitting static balance;sitting dynamic balance;standing static balance;standing dynamic balance  -MS (r) BL (t) MS (c)     Static Sitting Balance independent  -MS (r) BL (t) MS (c)     Dynamic Sitting Balance standby assist  -MS (r) BL (t) MS (c)     Position, Sitting Balance unsupported;sitting edge of bed  -MS (r) BL (t) MS (c)     Static Standing Balance contact guard;verbal cues  -MS (r) BL (t) MS (c)     Dynamic Standing Balance contact guard;verbal cues;minimal assist  -MS (r) BL (t) MS (c)     Position/Device Used, Standing Balance supported;walker, front-wheeled  -MS (r) BL (t) MS (c)     Balance Interventions sitting;standing;sit to stand;supported;dynamic;moderate challenge  -MS (r) BL (t) MS (c)       Row Name 01/18/25 1447          Sensory Assessment (Somatosensory)    Sensory Assessment (Somatosensory) LE sensation intact  -MS (r) BL (t) MS (c)               User Key  (r) = Recorded By, (t) = Taken By, (c) = Cosigned By      Initials Name Provider Type    Keke Marie R, PT, DPT, NCS Physical Therapist    BL Gael Joe PT Student PT Student                   Goals/Plan       Row Name 01/18/25 7711          Bed Mobility Goal 1 (PT)    Activity/Assistive Device (Bed Mobility Goal 1, PT) bed mobility activities, all  -MS (r) BL (t) MS (c)     [place] Level/Cues Needed (Bed Mobility Goal 1, PT) independent  -MS (r) BL (t) MS (c)     Time Frame (Bed Mobility Goal 1, PT) long term goal (LTG);by discharge  -MS (r) BL (t) MS (c)     Progress/Outcomes (Bed Mobility Goal 1, PT) new goal  -MS (r) BL (t) MS (c)       Row Name 01/18/25 1548           Transfer Goal 1 (PT)    Activity/Assistive Device (Transfer Goal 1, PT) sit-to-stand/stand-to-sit;bed-to-chair/chair-to-bed  -MS (r) BL (t) MS (c)     [place] Level/Cues Needed (Transfer Goal 1, PT) independent  -MS (r) BL (t) MS (c)     Time Frame (Transfer Goal 1, PT) long term goal (LTG);by discharge  -MS (r) BL (t) MS (c)     Progress/Outcome (Transfer Goal 1, PT) new goal  -MS (r) BL (t) MS (c)       Row Name 01/18/25 1548          Gait Training Goal 1 (PT)    Activity/Assistive Device (Gait Training Goal 1, PT) gait (walking locomotion);assistive device use;walker, rolling;decrease fall risk;diminish gait deviation;improve balance and speed;increase endurance/gait distance;turning, left;turning, right  -MS (r) BL (t) MS (c)     [place] Level (Gait Training Goal 1, PT) independent  -MS (r) BL (t) MS (c)     Distance (Gait Training Goal 1, PT) 150  -MS (r) BL (t) MS (c)     Time Frame (Gait Training Goal 1, PT) long term goal (LTG);by discharge  -MS (r) BL (t) MS (c)     Progress/Outcome (Gait Training Goal 1, PT) new goal  -MS (r) BL (t) MS (c)       Row Name 01/18/25 1548          Balance Goal 1 (PT)    Activity/Assistive Device (Balance Goal) standing dynamic balance;unsupported;with ADLs;with functional activities/occupations  -MS (r) BL (t) MS (c)     [place] Level/Cues Needed (Balance Goal 1, PT) modified independence  -MS (r) BL (t) MS (c)     Time Frame (Balance Goal 1, PT) long-term goal (LTG);by discharge  -MS (r) BL (t) MS (c)     Progress/Outcomes (Balance Goal 1, PT) other (see comments)  New goal  -MS (r) BL (t) MS (c)       Row Name 01/18/25 1548          Stairs Goal 1 (PT)    Activity/Assistive Device (Stairs Goal 1, PT) ascending stairs;using handrail, left;step-to-step;descending stairs;using handrail, right;decrease fall risk;improve balance and speed  -MS (r) BL (t) MS (c)     [place] Level/Cues Needed (Stairs Goal 1, PT) independent  -MS (r) BL (t) MS (c)     Number of  Stairs (Stairs Goal 1, PT) 4  -MS (r) BL (t) MS (c)     Time Frame (Stairs Goal 1, PT) long term goal (LTG);by discharge  -MS (r) BL (t) MS (c)     Progress/Outcome (Stairs Goal 1, PT) new goal  -MS (r) BL (t) MS (c)       Row Name 01/18/25 1547          Patient Education Goal (PT)    Activity (Patient Education Goal, PT) Patient able to safely maneuver AD 75% of the time  -MS (r) BL (t) MS (c)     [place]/Cues/Accuracy (Memory Goal 2, PT) independent;demonstrates adequately  -MS (r) BL (t) MS (c)     Time Frame (Patient Education Goal, PT) long term goal (LTG);by discharge  -MS (r) BL (t) MS (c)     Progress/Outcome (Patient Education Goal, PT) new goal  -MS (r) BL (t) MS (c)       Row Name 01/18/25 7697          Therapy Assessment/Plan (PT)    Planned Therapy Interventions (PT) balance training;gait training;home exercise program;motor coordination training;bed mobility training;neuromuscular re-education;patient/family education;stair training;strengthening;transfer training  -MS (r) BL (t) MS (c)               User Key  (r) = Recorded By, (t) = Taken By, (c) = Cosigned By      Initials Name Provider Type    MS Xiong Keke R, PT, DPT, NCS Physical Therapist    BL Gael Joe, PT Student PT Student                   Clinical Impression       Row Name 01/18/25 0641          Pain    Pretreatment Pain Rating 8/10  -MS (r) BL (t) MS (c)     Posttreatment Pain Rating 8/10  -MS (r) BL (t) MS (c)     Pain Location hip  -MS (r) BL (t) MS (c)     Pain Side/Orientation left  -MS (r) BL (t) MS (c)     Pain Management Interventions exercise or physical activity utilized  -MS (r) BL (t) MS (c)     Response to Pain Interventions no change per patient report  -MS (r) BL (t) MS (c)       Row Name 01/18/25 9014          Plan of Care Review    Plan of Care Reviewed With patient  -MS (r) BL (t) MS (c)     Progress improving  -MS (r) BL (t) MS (c)     Outcome Evaluation PT eval completed. A&Ox4. Patient shows global  strength deficits in LE. Sensation intact, ROM WNL. Patient needed to use bedside commode prior to being able to assess standing stability, therefore patient was transfered ModAx2 to bedside commode. Patient then transfered back to bed ModAx2. Formal testing performed for standing static and dynamic balance. Patient able to ambulate w/ FWW CGAx1. Patient showed excessive ER in LLE and forward flexed posture while walking. Patient instructed on properly turning w/ FWW to ensure safer ambulation. Patient returned to bed w/ call light w/in reach. Patientrepeatedly asked me to reach out to resources for him to attend rehab for his alcohol dependence. Patient informed to talk w/ case workers to address discharge plans. Patient is a good candidate for skilled therapy. Discharge recommdenations: Home/alcohol rehab facility.  -MS (r) BL (t) MS (c)       Row Name 01/18/25 1452          Therapy Assessment/Plan (PT)    Patient/Family Therapy Goals Statement (PT) Be well enough to attend [facility] rehab facility  -MS (r) BL (t) MS (c)     Rehab Potential (PT) good  -MS (r) BL (t) MS (c)     Criteria for Skilled Interventions Met (PT) yes;meets criteria;skilled treatment is necessary  -MS (r) BL (t) MS (c)     Therapy Frequency (PT) 2 times/day  -MS (r) BL (t) MS (c)     Predicted Duration of Therapy Intervention (PT) Until discharge  -MS (r) BL (t) MS (c)       Row Name 01/18/25 145          Vital Signs    Pre Patient Position Supine  -MS (r) BL (t) MS (c)     Intra Patient Position Standing  -MS (r) BL (t) MS (c)     Post Patient Position Supine  -MS (r) BL (t) MS (c)       Row Name 01/18/25 1454          Positioning and Restraints    Pre-Treatment Position in bed  -MS (r) BL (t) MS (c)     Post Treatment Position bed  -MS (r) BL (t) MS (c)     In Bed supine;fowlers;call light within reach;encouraged to call for assist;side rails up x3  -MS (r) BL (t) MS (c)               User Key  (r) = Recorded By, (t) = Taken By, (c) =  Cosigned By      Initials Name Provider Type    Keke Marie LO, PT, DPT, NCS Physical Therapist    Gael Masterson, PT Student PT Student                   Outcome Measures       Row Name 01/18/25 1553 01/18/25 0900       How much help from another person do you currently need...    Turning from your back to your side while in flat bed without using bedrails? 4  -MS (r) BL (t) MS (c) 4  -AW    Moving from lying on back to sitting on the side of a flat bed without bedrails? 3  -MS (r) BL (t) MS (c) 3  -AW    Moving to and from a bed to a chair (including a wheelchair)? 3  -MS (r) BL (t) MS (c) 3  -AW    Standing up from a chair using your arms (e.g., wheelchair, bedside chair)? 3  -MS (r) BL (t) MS (c) 3  -AW    Climbing 3-5 steps with a railing? 2  -MS (r) BL (t) MS (c) 2  -AW    To walk in hospital room? 3  -MS (r) BL (t) MS (c) 2  -AW    AM-PAC 6 Clicks Score (PT) 18  -MS (r) BL (t) 17  -AW    Highest Level of Mobility Goal 6 --> Walk 10 steps or more  -MS (r) BL (t) 5 --> Static standing  -AW      Row Name 01/18/25 1553          Functional Assessment    Outcome Measure Options AM-PAC 6 Clicks Basic Mobility (PT)  -MS (r) BL (t) MS (c)               User Key  (r) = Recorded By, (t) = Taken By, (c) = Cosigned By      Initials Name Provider Type    Keke Marie LO, PT, DPT, NCS Physical Therapist    AW Zuleyma Alvarez, RN Registered Nurse    Gael Masterson PT Student PT Student                                 Physical Therapy Education       Title: PT OT SLP Therapies (In Progress)       Topic: Physical Therapy (In Progress)       Point: Mobility training (Done)       Learning Progress Summary            Patient Acceptance, E,D, VU by  at 1/18/2025 8101    Comment: Walker usage, transfer techniques, plan of care, home safety                      Point: Home exercise program (Not Started)       Learner Progress:  Not documented in this visit.              Point: Body mechanics (Done)       Learning  Progress Summary            Patient Acceptance, E,D, VU by  at 1/18/2025 1555    Comment: Walker usage, transfer techniques, plan of care, home safety                      Point: Precautions (Done)       Learning Progress Summary            Patient Acceptance, E,D, VU by  at 1/18/2025 1555    Comment: Walker usage, transfer techniques, plan of care, home safety                                      User Key       Initials Effective Dates Name Provider Type Discipline     12/17/24 -  Long, [place], PT Student PT Student PT                  PT Recommendation and Plan  Planned Therapy Interventions (PT): balance training, gait training, home exercise program, motor coordination training, bed mobility training, neuromuscular re-education, patient/family education, stair training, strengthening, transfer training  Progress: improving  Outcome Evaluation: PT eval completed. A&Ox4. Patient shows global strength deficits in LE. Sensation intact, ROM WNL. Patient needed to use bedside commode prior to being able to assess standing stability, therefore patient was transfered ModAx2 to bedside commode. Patient then transfered back to bed ModAx2. Formal testing performed for standing static and dynamic balance. Patient able to ambulate w/ FWW CGAx1. Patient showed excessive ER in LLE and forward flexed posture while walking. Patient instructed on properly turning w/ FWW to ensure safer ambulation. Patient returned to bed w/ call light w/in reach. Patientrepeatedly asked me to reach out to resources for him to attend rehab for his alcohol dependence. Patient informed to talk w/ case workers to address discharge plans. Patient is a good candidate for skilled therapy. Discharge recommdenations: Home/alcohol rehab facility.     Time Calculation:         PT Charges       Row Name 01/18/25 1554             Time Calculation    Start Time 1255  15 min chart time  -MS (r) BL (t) MS (c)      Stop Time 1335  -MS (r) BL (t) MS (c)       Time Calculation (min) 40 min  -MS (r) BL (t)      PT Received On 01/18/25  -MS (r) BL (t) MS (c)      PT Goal Re-Cert Due Date 01/28/25  -MS (r) BL (t) MS (c)         Untimed Charges    PT Eval/Re-eval Minutes 55  -MS (r) BL (t) MS (c)         Total Minutes    Untimed Charges Total Minutes 55  -MS (r) BL (t)       Total Minutes 55  -MS (r) BL (t)                User Key  (r) = Recorded By, (t) = Taken By, (c) = Cosigned By      Initials Name Provider Type    MS Xiong Keke R, PT, DPT, NCS Physical Therapist    Gael Masterson PT Student PT Student                      PT G-Codes  Outcome Measure Options: AM-PAC 6 Clicks Basic Mobility (PT)  AM-PAC 6 Clicks Score (PT): 18  PT Discharge Summary  Anticipated Discharge Disposition (PT): home (Substance abuse/Alcohol rehab facility)    Gael Joe PT Student  1/18/2025

## 2025-01-18 NOTE — PLAN OF CARE
Goal Outcome Evaluation:  Plan of Care Reviewed With: patient        Progress: no change  Outcome Evaluation: AOx4 at this time, voiding per urinal, incontinence at times, turn q2h per wound care order d/t excoriated coccyx, CIWA per protocol, ativan given x1, rocephine given, tele NSR, OT/PT, isolation for hx C Diff, stool needed, BC+ has to be sent to [place] to be ID'd, bed check on, pt has a hx of DT's.

## 2025-01-18 NOTE — PLAN OF CARE
Goal Outcome Evaluation:  Plan of Care Reviewed With: patient        Progress: improving  Outcome Evaluation: PT eval completed. A&Ox4. Patient shows global strength deficits in LE. Sensation intact, ROM WNL. Patient needed to use bedside commode prior to being able to assess standing stability, therefore patient was transfered ModAx2 to bedside commode. Patient then transfered back to bed ModAx2. Formal testing performed for standing static and dynamic balance. Patient able to ambulate w/ FWW CGAx1. Patient showed excessive ER in LLE and forward flexed posture while walking. Patient instructed on properly turning w/ FWW to ensure safer ambulation. Patient returned to bed w/ call light w/in reach. Patientrepeatedly asked me to reach out to resources for him to attend rehab for his alcohol dependence. Patient informed to talk w/ case workers to address discharge plans. Patient is a good candidate for skilled therapy. Discharge recommdenations: Home/alcohol rehab facility.    Anticipated Discharge Disposition (PT): home (Substance abuse/Alcohol rehab facility)

## 2025-01-18 NOTE — PLAN OF CARE
Goal Outcome Evaluation:  Plan of Care Reviewed With: patient      Progress: improving      SLP Swallowing Diagnosis: swallow WFL/no suspected pharyngeal impairment (01/18/25 1050)     SPEECH-LANGUAGE PATHOLOGY EVALUATION - SWALLOW    Subjective: The patient was seen on this date for a Clinical Swallow evaluation.  Patient was alert and cooperative.  Significant history: Patient is admitted s/p fall in the [place]ok[place] after leaving SNF AMA. Admitting diagnosis includes failure to thrive, cystitis, EtOH, drug use, dermatitis neglecta, alcohol hepatitis, C Diff, and HTN. Medical history includes A fib, prostate cancer, and gluteal hematoma. Patient is alert and oriented.     Objective: Textures given included thin liquid, puree consistency, and regular consistency.    Assessment: Difficulties were noted with none of the above consistencies.    Observations:  Natural teeth. Oral [facility] exam, all structures WFL.    SLP Findings:  Patient presents with functional swallow, with esophageal component per report of medications.     Recommendations: Diet Textures: thin liquid, regular consistency food.  Medications should be taken whole  thin liquids . May have water and ice between meals after oral care, under staff or family supervision and with the recommended strategies for safe swallowing.     Recommended Strategies: Upright for PO, small bites and sips, may use straw, and alternate liquids and solids. Oral care before breakfast, after all meals and PRN.    Other Recommended Evaluations:  n/a.       Dysphagia therapy is not recommended. Rationale: no indication for treatment.     Lubna Vigil, SLP 1/18/2025 12:29 CST

## 2025-01-18 NOTE — CASE MANAGEMENT/SOCIAL WORK
Discharge Planning Assessment  [facility]     Patient Name: Dayron Lubin  MRN: [id number]  Today's Date: 1/18/2025    Admit Date: 1/17/2025        Discharge Needs Assessment       Row Name 01/18/25 1615       Living Environment    People in Home alone    Current Living Arrangements home    Potentially Unsafe Housing Conditions none    Primary Care Provided by self    Provides Primary Care For no one    Family Caregiver if Needed sibling(s)    Quality of Family Relationships helpful;involved;supportive    Able to Return to Prior Arrangements no       Transition Planning    Patient/Family Anticipates Transition to inpatient rehabilitation facility    Patient/Family Anticipated Services at Transition skilled nursing    Transportation Anticipated family or friend will provide       Discharge Needs Assessment    Readmission Within the Last 30 Days no previous admission in last 30 days    Current Outpatient/Agency/Support Group skilled nursing facility    Equipment Currently Used at Home walker, rolling    Concerns to be Addressed care coordination/care conferences;discharge planning    Anticipated Changes Related to Illness inability to care for self    Equipment Needed After Discharge none    Outpatient/Agency/Support Group Needs skilled nursing facility    Discharge Facility/Level of Care Needs nursing facility, skilled    Provided Post Acute Provider List? Yes    Post Acute Provider List Nursing Home    Provided Post Acute Provider Quality & Resource List? Yes    Post Acute Provider Quality and Resource List Nursing Home    Delivered To Patient    Method of Delivery In person    Current Discharge Risk lives alone;substance use/abuse    Discharge Coordination/Progress Spoke with pt about d/c needs. He lives at home alone but says he cannot take care of himself at this time. He is requesting a snf referrral. Discussed options and referrals sent to [facility], [facility], [facility], and [facility]. He has been  to [facility] and [facility] in the past. He wants to eventually go to an alcohol rehab but states he knows he would not be able to get around well enough at this time. Provided a chemical dependency packet as well.                   Discharge Plan    No documentation.                 Continued Care and Services - Admitted Since 1/17/2025    No active coordination exists for this encounter.       Selected Continued Care - Prior Encounters Includes continued care and service providers with selected services from prior encounters from 10/19/2024 to 1/18/2025      Discharged on 12/27/2024 Admission date: 12/20/2024 - Discharge disposition: Skilled Nursing Facility (DC - External)      Destination       Service Provider Services Address Phone Fax Patient Preferred    [facility] [address] [address] [phone] --                      Discharged on 11/21/2024 Admission date: 11/16/2024 - Discharge disposition: Home-Health Care Svc      Durable Medical Equipment       Service Provider Services Address Phone Fax Patient Preferred    [facility] Durable Medical Equipment [address] [address] [phone] --              Home Medical Care       Service Provider Services Address Phone Fax Patient Preferred    [facility], [facility] Rehabilitation [address] [place] [address] [address] [address] [phone] --                             Demographic Summary    No documentation.                  Functional Status    No documentation.                  Psychosocial    No documentation.                  Abuse/Neglect    No documentation.                  Legal    No documentation.                  Substance Abuse    No documentation.                  Patient Forms    No documentation.                     LILIAN Jackson

## 2025-01-18 NOTE — PLAN OF CARE
Goal Outcome Evaluation:              Outcome Evaluation: AOx4. vitals stable, NSR on tele. voids per urinal, incont of urine at times. incont. stool, diarrhea, stool culture sent. q2h turn.  wound care provided to coccyx and periarea per order x2 this shift, and prn with incontinence. bathed this shift. CIWA per protocol, ativan given. PT tx today, walked in hallway x1 walker, unsteady on feet. safety maintained.

## 2025-01-18 NOTE — PROGRESS NOTES
Patient Name: Dayron Lubin  Date of Admission: 1/17/2025  Today's Date: 01/18/25  Length of Stay: 1  Primary Care Physician: Herberth Nguyen Jr., MD    Subjective   Chief Complaint: Frequent falls  Fall  Pertinent negatives include no nausea or vomiting.      Dayron Lubin is a 69-year-old male with an extensive past history of failure to care for himself due to alcoholism. Additional history of paroxysmal atrial fibrillation on chronic Eliquis, CAD, prostate cancer in remission, hyperlipidemia, hypertension and most recently treated for a large gluteal compartment intramuscular hematoma due to a fall at home. This resulted in patient being discharged to skilled nursing facility which the patient left AMA. He presented today per EMS. Patient reported that yesterday he tripped over a rug and fell on a heater hitting his knee when he got up he hit his head. He denied any loss of consciousness he stated he got up within a few minutes and was able to remove. His brother visited him this afternoon and called EMS. Upon presentation he denied any new pain he admits to leaving SNF AMA and zooming drinking last alcohol consumption was 11 AM this morning where he added 2 shots of vodka to his orange juice. The patient on last admission had to be transferred to ICU due to delirium tremors and placed on phenobarbital. He denied any neck, back, chest or abdominal pain. X-rays of knee, tib-fib, CT of the head, cervical spine and abdomen pelvis were obtained all with no acute findings or acute fractures. He stated his only complaint with the he noticed some redness in his left groin area for the past few days and it has been somewhat tender. He states he wears a brief due to urinary incontinence and he states that he has daily bowel movements with occasional diarrhea and he only changes the diaper once daily regardless.     Today:   Patient is resting comfortably in bed on room air with no family at bedside.  Patient is alert  and oriented and able to participate in assessment.  Patient does have a visible tremor however he states he feels like he is in control still of his withdrawal symptoms.  He states that after wound care was performed on his coccyx region and perineum he is feeling much better.  He again verbalizes that he would like to be sent to a nursing facility for better care and then to alcohol rehab from there.  He has no complaints of nausea, vomiting and he states his diarrhea is improving.  Verbalizes no complaints of shortness of breath, chest pain or palpitations.  Awaiting PT/OT evaluation and case management for transfer to skilled nursing facility.    I additionally had a pharmacy do a thorough review of patient's home medications and there are none that could cause a false positive for cocaine, which patient denies per his UDS.    Documented weights    01/17/25 1223   Weight: 84.5 kg (186 lb 3.2 oz)          Intake/Output Summary (Last 24 hours) at 1/18/2025 1045  Last data filed at 1/18/2025 0330  Gross per 24 hour   Intake 868.75 ml   Output 200 ml   Net 668.75 ml       Results for orders placed during the hospital encounter of 07/29/24    Adult Transthoracic Echo Complete W/ Cont if Necessary Per Protocol    Interpretation Summary    Left ventricular systolic function is low normal. Left ventricular ejection fraction appears to be 51 - 55%.    Left ventricular wall thickness is consistent with moderate to severe concentric hypertrophy.    Mild to moderate mitral valve regurgitation is present.    The aortic valve is thickened and calcified and there is some degree of aortic valve stenosis, likely moderate, most of the flow acceleration appears to be in the left ventricular outflow tract as a result of hypertrophic obstructive cardiomyopathy.       Review of Systems   Constitutional:  Positive for fatigue.   Respiratory:  Negative for shortness of breath.    Gastrointestinal:  Negative for diarrhea, nausea and  vomiting.   Skin:  Positive for wound.   Neurological:  Positive for tremors.   Psychiatric/Behavioral:  The patient is nervous/anxious.       All pertinent negatives and positives are as above. All other systems have been reviewed and are negative unless otherwise stated.     Objective    Temp:  [97.5 °F (36.4 °C)-98.9 °F (37.2 °C)] 97.5 °F (36.4 °C)  Heart Rate:  [74-98] 82  Resp:  [16] 16  BP: ()/() 120/65  Physical Exam  Vitals and nursing note reviewed.   Constitutional:       General: He is not in acute distress.     Appearance: He is obese. He is not ill-appearing or toxic-appearing.      Comments: Room air   HENT:      Right Ear: Tympanic membrane normal.      Left Ear: Tympanic membrane normal.      Nose: Nose normal.      Mouth/Throat:      Mouth: Mucous membranes are moist.      Pharynx: Oropharynx is clear.   Eyes:      Pupils: Pupils are equal, round, and reactive to light.   Cardiovascular:      Rate and Rhythm: Normal rate and regular rhythm.      Pulses: Normal pulses.      Heart sounds: Normal heart sounds.      Comments: NSR 71-94  Abdominal:      General: Abdomen is protuberant. Bowel sounds are normal.      Palpations: Abdomen is soft.      Tenderness: There is no abdominal tenderness.   Musculoskeletal:      Cervical back: Normal range of motion and neck supple. No rigidity or tenderness.      Right lower leg: No edema.      Left lower leg: No edema.   Skin:     General: Skin is warm.      Capillary Refill: Capillary refill takes less than 2 seconds.      Coloration: Skin is pale.      Comments: Please see attached photos for coccyx and perineal area.  Patient does have a large scab to his right forehead from previous fall.   Neurological:      General: No focal deficit present.      Mental Status: He is oriented to person, place, and time.   Psychiatric:         Mood and Affect: Mood normal.         Behavior: Behavior normal. Behavior is cooperative.         Thought Content: Thought  content normal.         Judgment: Judgment normal.         1/17/2025 coccyx region on admission  Results Review:  I have reviewed the labs, radiology results, and diagnostic studies.    Laboratory Data:   Results from last 7 days   Lab Units 01/18/25  0457 01/17/25  1313   WBC 10*3/mm3 5.48 10.91*   HEMOGLOBIN g/dL 9.2* 10.9*   HEMATOCRIT % 29.4* 32.6*   PLATELETS 10*3/mm3 194 296        Results from last 7 days   Lab Units 01/18/25  0457 01/17/25  1313   SODIUM mmol/L 136 132*   POTASSIUM mmol/L 3.8 4.1   CHLORIDE mmol/L 98 90*   CO2 mmol/L 29.0 24.0   BUN mg/dL 19 19   CREATININE mg/dL 0.90 0.90   CALCIUM mg/dL 8.8 9.4   BILIRUBIN mg/dL 0.8 1.7*   ALK PHOS U/L 70 84   ALT (SGPT) U/L 15 21   AST (SGOT) U/L 31 50*   GLUCOSE mg/dL 92 139*       Culture Data:     Microbiology Results (last 10 days)       Procedure Component Value - Date/Time    Blood Culture - Blood, Hand, Right [[id number]]  (Abnormal) Collected: 01/17/25 1313    Lab Status: Preliminary result Specimen: Blood from Hand, Right Updated: 01/18/25 0449     Blood Culture Abnormal Stain     Gram Stain Anaerobic Bottle Gram variable bacilli             Radiology Data:   Imaging Results (Last 7 Days)       Procedure Component Value Units Date/Time    CT Abdomen Pelvis With Contrast [[id number]] Collected: 01/17/25 1441     Updated: 01/17/25 1507    Narrative:      EXAMINATION: CT ABDOMEN PELVIS W CONTRAST-      1/17/2025 1:24 PM     HISTORY: pelvic infection/cellulitis. nec fas?     In order to have a CT radiation dose as low as reasonably achievable  Automated Exposure Control was utilized for adjustment of the mA and/or  KV according to patient size.     Total DLP = 401.9 mGy.cm     The CT scan of the abdomen and pelvis is performed after intravenous  contrast enhancement.     Images are acquired in axial plane and subsequent reconstruction in  coronal and sagittal planes.     Comparison is made with the previous study dated 12/20/2024 and  11/16/2024.      The lung bases included in the study are unremarkable.     Limited visualized cardiomediastinal structures show moderate  cardiomegaly. There is an apparent focal bulge of the left ventricle  which may suggest an aneurysm of the left ventricle?. This may be  clinically correlated.     There is fatty infiltration of the liver. The spleen is normal.     The gallbladder is moderately distended with high density material which  may represent sludge or contrast excretion. No definite radiopaque  calculi.     Pancreas is normal.     The adrenal glands are normal.     Moderate lobulation of renal contour bilaterally is seen. There is a  small low-density nodule located posteriorly in the lower pole of the  left kidney measuring 8 mm in diameter. CT density suggest a cyst. No  radiopaque calculi on either side. No hydronephrosis. The ureters are  nondilated. Urinary bladder poorly distended with mild wall thickening.  No obvious intrinsic abnormality.     The prostate is surgically absent.     There is a fat-containing left inguinoscrotal hernia.     The stomach and duodenum are normal. Small bowel is nondistended.  Appendix is normal. Small amount of stool and gas is seen in the colon.  There is diverticulosis of the distal colon. No finding to suggest  diverticulitis.     Atheromatous changes of the abdominal aorta and iliac arteries. No  aneurysmal dilatation.     No evidence of abdominal or pelvic lymphadenopathy.     Images reviewed in bone window show no acute bony abnormality. Chronic  degenerative changes of the lumbar spine are seen. There is bilateral  spondylolysis L5 without a significant spondylolisthesis. Old healed  fractures of the left lower ribs are seen.     There is a lobulated oblong high density fluid accumulation  predominantly in the left gluteus medius with a surrounding ring-shaped  enhancement measuring 12 cm in craniocaudal projection in the coronal  plane images and 10 cm in horizontal  projection and axial plane images.  The size of the gluteus medius and robbin has overall decreased since  the previous study. There is another high density lobulated masslike  area located between the tensor fascia froilan laterally, rectus femoris  anteriorly and vastus lateralis and medius posterolaterally. This may  represent an evolving hematoma?. This was not noted in the previous  study.       Impression:      1. There is overall improvement in size of the hematoma/swelling of the  left gluteus medius and robbin. However there is now a well-formed  collection, in the gluteus medius, with a surrounding ring enhancement  which may represent an abscess. There is a new high density masslike  lobulated area located more anteriorly which measures 3.8 cm in diameter  in axial plane images. This may represent hematoma?.  2. Hepatic steatosis.  3. Moderately dilated gallbladder with sludge?. No stones.  4. Diverticulosis of the colon. No evidence for diverticulitis.  4. Other nonacute findings similar to the previous study.                                               This report was signed and finalized on 1/17/2025 3:04 PM by Dr. Aleta Ramos MD.       CT Cervical Spine Without Contrast [[id number]] Collected: 01/17/25 1438     Updated: 01/17/25 1445    Narrative:      Exam: CT CERVICAL SPINE WO CONTRAST- 1/17/2025 1:24 PM     HISTORY: fall etoh eliquis     COMPARISON: 12/4/2024     DOSE LENGTH PRODUCT: 1153.51 mGy.cm mGy cm. Automated exposure control  was also utilized to decrease patient radiation dose.     TECHNIQUE: Serial helical tomographic images of the cervical spine were  obtained without the use of intravenous contrast. Additionally, sagittal  and coronal reformatted images were also provided for review.     FINDINGS:     Straightening of the cervical lordosis.     No acute fracture. The vertebral body heights are preserved.     Multilevel degenerative disc disease with posterior disc  osteophyte  complexes. Multilevel uncovertebral and facet hypertrophic changes.     Suspect mild spinal canal narrowing at C3-C4, C4-C5. Variable degrees of  foraminal narrowing most notable with at least moderate left C4-C5  foraminal narrowing.     Right greater the left carotid atherosclerosis.     Other:None       Impression:         No acute fracture or traumatic malalignment.     Moderate degenerative changes with suspected mild spinal canal narrowing  at C3-C4 and C4-C5.     Right greater than left carotid atherosclerosis.           This report was signed and finalized on 1/17/2025 2:42 PM by Regan Garner.       CT Head Without Contrast [[id number]] Collected: 01/17/25 1434     Updated: 01/17/25 1441    Narrative:      Exam: CT HEAD WO CONTRAST- 1/17/2025 1:24 PM     HISTORY: head injury eliquis       DOSE LENGTH PRODUCT: 1153.51 mGy.cm mGy cm. Automated exposure control  was also utilized to decrease patient radiation dose.     Technique:  Helically acquired CT of the brain without IV contrast was performed.  Sagittal and coronal reformations are also provided for review. Soft  tissue and bone kernels are available for interpretation.     Comparison: 12/4/2024.     Findings:     Ventricles and extra-axial CSF spaces are normal in size.     No intraparenchymal or extra-axial hemorrhage.     Gray-white matter differentiation is preserved.     Orbits are grossly unremarkable. Paranasal sinuses are grossly clear.  Mastoid air cells are grossly clear.     No suspicious calvarial or extracranial soft tissue abnormality.       Impression:      Impression:       No acute intracranial abnormality.     This report was signed and finalized on 1/17/2025 2:38 PM by Regan Garner.       XR Tibia Fibula 2 View Left [[id number]] Collected: 01/17/25 1405     Updated: 01/17/25 1409    Narrative:      XR TIBIA FIBULA 2 VW LEFT-     HISTORY: fall left low leg pain eliquis     COMPARISON: None     FINDINGS: Frontal and  lateral views of the left tibia and fibula are  obtained.     No fracture or dislocation. Mild to moderate medial joint space  narrowing. Vascular calcifications.       Impression:      1. No acute osseous injury left tibia or fibula.        This report was signed and finalized on 1/17/2025 2:06 PM by Dr. Bri Duong MD.       XR Knee 4+ View Bilateral [[id number]] Collected: 01/17/25 1403     Updated: 01/17/25 1408    Narrative:      XR KNEE 4+ VW BILATERAL-     HISTORY: fall bilateral knee injury eliquis     COMPARISON: None     FINDINGS: 4 views of the right and left knee are obtained.     There is mild medial left joint space narrowing. Borderline medial right  joint space narrowing. Minimal bony spurring of the patella. No acute  fracture or dislocation. No knee joint effusion.     Diffuse vascular calcifications.       Impression:      1. Mild arthritic changes of the knee, joint space narrowing greatest in  the medial compartment of the left knee. No acute osseous injury or  joint effusion.              This report was signed and finalized on 1/17/2025 2:05 PM by Dr. Bri Duong MD.                I have reviewed the patient's current medications.     amiodarone, 200 mg, Oral, Daily  apixaban, 5 mg, Oral, Q12H  atorvastatin, 20 mg, Oral, Daily  busPIRone, 7.5 mg, Oral, BID  cefTRIAXone, 2,000 mg, Intravenous, Q24H  chlordiazePOXIDE, 50 mg, Oral, Q6H   Followed by  chlordiazePOXIDE, 50 mg, Oral, Q8H   Followed by  [START ON 1/19/2025] chlordiazePOXIDE, 50 mg, Oral, Q12H   Followed by  [START ON 1/20/2025] chlordiazePOXIDE, 50 mg, Oral, Nightly  folic acid, 1 mg, Oral, Daily  metoprolol succinate XL, 12.5 mg, Oral, Daily  pantoprazole, 40 mg, Oral, Q AM  sodium chloride, 10 mL, Intravenous, Q12H  thiamine (B-1) IV, 200 mg, Intravenous, Q8H   Followed by  [START ON 1/23/2025] thiamine, 100 mg, Oral, Daily            Assessment/Plan   Assessment  Active Hospital Problems    Diagnosis     **Failure to  thrive in adult     Urinary tract infection     Dermatitis neglecta     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  Failure to thrive-patient admitted after multiple falls at home after he left AMA from SNF.  Patient continues to use alcohol and prescription medications.  Patient is not taking care of himself, his wounds and requires continued treatment.  PT/OT and case management consultation for transfer to skilled nursing facility remain pending     2.  Cystitis-urinalysis revealed small blood, positive nitrates, small leukocytes and trace bacteria.  Continue Rocephin due to abnormal blood culture x 1 which has to be sent off for review.      3.  Dermatitis neglecta-patient upon arrival was covered in feces and dirt.  Patient has had significant bathing and treatment to his wounds skin hands and feet.  Continue bathing daily and wound care per orders.      4.  Large left gluteal compartment intramuscular hematoma - continuing to heal, ED provider believed it was cellulitis after evaluation I believe it is significant erythema and moisture associated rash from patient sitting in his own feces and urine 4 days at a time.  Discussed the case with Daylin Prasad RN, wound nurse and recommendations for significant wound care placed.  Will continue to monitor closely and wound care APRN will evaluate on Monday.     5.  Paroxysmal atrial fibrillation/chronic anticoagulation-continue home amiodarone and Eliquis, continue cardiac telemetry, overnight normal sinus rhythm 71-94     6.  Alcoholic hepatitis/alcohol abuse-AST 50, 31 ALT 21, 15 total bilirubin 1.7, 0.8 lipase 88, 47.  Currently stable we will continue to monitor with daily CMP and lipase.  Continue CIWA protocol,  scheduled Librium and as needed Ativan.  Previous admission patient had to be transferred to ICU due to significant DTs and placed on phenobarbital.  Patient will be monitored closely for any worsening signs or symptoms.     7.  Essential hypertension-resume home metoprolol, vital signs every 4.    Medical Decision Making  Failure to thrive, acute on chronic, high complexity, unchanged  Cystitis, acute, moderate complexity, unchanged  Dermatitis neglecta, acute on chronic, moderate complexity, improving  Healing wound, chronic, moderate complexity, worsening  Paroxysmal atrial fibrillation, chronic, moderate complexity, stable  Chronic anticoagulation, chronic, moderate complexity, stable  Alcoholic hepatitis, chronic, high complexity, stable  Alcoholic abuse, chronic, high complexity, worsening  Essential hypertension, chronic, moderate complexity, unchanged    Number and Complexity of problems: 8  Differential Diagnosis: None    Conditions and Status        Condition is unchanged.     [facility] Data  External documents reviewed: Extensive epic review of previous hospitalizations  Cardiac tracing (EKG, telemetry) interpretation: Stat EKG pending for QTc measurement  Radiology interpretation: 1/17/2025 x-ray of the knee, tib-fib, CT of the head, abdomen pelvis and cervical spine per radiology reviewed  Labs reviewed: 1/18/2025 magnesium, lipase, A1c, CMP, CBC, blood and urine cultures remain pending, repeat labs in a.m.  Any tests that were considered but not ordered: None     Decision rules/scores evaluated (example [id number], Wells, etc): CIWA score of 3     Discussed with: Dr. Griggs, patient, and bedside nurse Dana     Care Planning  Shared decision making: Dr. Griggs, patient, and bedside nurse Dana  Code status and discussions: Full code per patient  Surrogate Decision Maker his sister Yuliya Dale  Social Determinants of Health that impact treatment or disposition: Will need SNF at [facility]  I expect the  patient to be discharged to SNF in 1-2 days.     Electronically signed by Tiffanie Estrella, TACOS, 01/18/25, 10:45 CST.

## 2025-01-18 NOTE — THERAPY DISCHARGE NOTE
Acute Care - Speech Language Pathology   Swallow Initial Evaluation/Discharge [facility]     Patient Name: Dayron Lubin  : 1955  MRN: [id number]  Today's Date: 2025               Admit Date: 2025    SPEECH-LANGUAGE PATHOLOGY EVALUATION - SWALLOW    Subjective: The patient was seen on this date for a Clinical Swallow evaluation.  Patient was alert and cooperative.  Significant history: Patient is admitted s/p fall in the [place] after leaving SNF AMA. Admitting diagnosis includes failure to thrive, cystitis, EtOH, drug use, dermatitis neglecta, alcohol hepatitis, C Diff, and HTN. Medical history includes A fib, prostate cancer, and gluteal hematoma. Patient is alert and oriented.     Objective: Textures given included thin liquid, puree consistency, and regular consistency.    Assessment: Difficulties were noted with none of the above consistencies.    Observations: Natural teeth. Oral [facility] exam, all structures WFL.    SLP Findings:  Patient presents with functional swallow, with esophageal component per report of medications.     Recommendations: Diet Textures: thin liquid, regular consistency food.  Medications should be taken whole thin liquids. May have water and ice between meals after oral care, under staff or family supervision and with the recommended strategies for safe swallowing.     Recommended Strategies: Upright for PO, small bites and sips, may use straw, and alternate liquids and solids. Oral care before breakfast, after all meals and PRN.    Other Recommended Evaluations: n/a.      Dysphagia therapy is not recommended. Rationale: no indication for treatment.          Lubna Vigil, SLP 2025 12:32 CST      Visit Dx:    ICD-10-CM ICD-9-CM   1. Cellulitis, unspecified cellulitis site  L03.90 682.9   2. Acute UTI  N39.0 599.0   3. History of alcohol consumption  Z87.898 V11.3   4. History of alcoholic hepatitis  Z87.19 V12.79   5. Drug use  F19.90 305.90   6. Dysphagia,  unspecified type  R13.10 787.20     Patient Active Problem List   Diagnosis    Prostate cancer    Hx of radiation therapy    Elevated PSA    Encounter for follow-up surveillance of prostate cancer    HOCM (hypertrophic obstructive cardiomyopathy)    Coronary artery disease involving native coronary artery of native heart without angina pectoris    Essential hypertension    Mixed hyperlipidemia    Class 1 obesity with alveolar hypoventilation, serious comorbidity, and body mass index (BMI) of 30.0 to 30.9 in adult    Displaced fracture of lateral malleolus of right fibula, initial encounter for closed fracture    Family history of colonic polyps    History of colon polyps    Elevated brain natriuretic peptide (BNP) level    Anxiety associated with depression    Alcohol abuse    Alcoholic hepatitis    Volume depletion    Anorexia    CHF (congestive heart failure)    Hypokalemia    Duodenitis    Gait instability    SVT (supraventricular tachycardia)    Physical debility    Atrial fibrillation with rapid ventricular response    Alcoholism in remission    Paroxysmal atrial fibrillation    Nasal polyposis    Nasal congestion    Estelle bullosa    Long-term use of high-risk medication    Multiple falls    Thrombocytopenia    Non-traumatic rhabdomyolysis    Alcohol withdrawal    Pancytopenia    Opiate abuse, episodic    Large left gluteal compartment intramuscular hematomas, currently still healing    Chronic anticoagulation    Cellulitis    Dermatitis neglecta    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    Failure to thrive in adult    Urinary tract infection     Past Medical History:   Diagnosis Date    A-fib     Alcoholic hepatitis 07/13/2023    CAD (coronary artery disease)     History of external beam radiation therapy 05/12/2016    6840 cGy to prostate bed    Hyperlipidemia     Hypertension     Insomnia     Prostate cancer      Past Surgical History:   Procedure Laterality Date    CARDIAC CATHETERIZATION      CORONARY  ANGIOPLASTY WITH STENT PLACEMENT      FRACTURE SURGERY      PROSTATE SURGERY      TONSILLECTOMY         SLP Recommendation and Plan  SLP Swallowing Diagnosis: swallow WFL/no suspected pharyngeal impairment (01/18/25 1050)  SLP Diet Recommendation: regular textures, thin liquids (01/18/25 1050)           Swallow Criteria for Skilled Therapeutic Interventions Met: no problems identified which require skilled intervention (01/18/25 1050)        Therapy Frequency (Swallow): evaluation only (01/18/25 1050)                                           Progress: improving (01/18/25 1226)    SWALLOW EVALUATION (Last 72 Hours)       SLP Adult Swallow Evaluation       Row Name 01/18/25 1050                   Rehab Evaluation    Document Type evaluation  -MD        Subjective Information no complaints  -MD        Patient Observations alert;cooperative;agree to therapy  -MD        Patient/Family/Caregiver Comments/Observations No family present.  -MD        Patient Effort good  -MD        Symptoms Noted During/After Treatment none  -MD           General Information    Patient Profile Reviewed yes  -MD        Pertinent History Of Current Problem Patient is admitted s/p fall in the [place]ok[place] after leaving SNF AMA. Admitting diagnosis includes failure to thrive, cystitis, EtOH, drug use, dermatitis neglecta, alcohol hepatitis, C Diff, and HTN. Medical history includes A fib, prostate cancer, and gluteal hematoma.  -MD        Current Method of Nutrition regular textures;thin liquids  -MD        Precautions/Limitations, Vision WFL;for purposes of eval  -MD        Precautions/Limitations, Hearing WFL;for purposes of eval  -MD        Prior Level of Function-Communication WFL  -MD        Prior Level of Function-Swallowing no diet consistency restrictions  -MD        Plans/Goals Discussed with patient  -MD           Pain    Pretreatment Pain Rating 5/10  -MD        Posttreatment Pain Rating 5/10  -MD        Pain Side/Orientation generalized   -MD        Pain Management Interventions nursing notified  -MD        Response to Pain Interventions no change per patient report  -MD           Oral Motor Structure and Function    Dentition Assessment natural, present and adequate  -MD        Secretion Management WNL/WFL  -MD        Mucosal Quality moist, healthy  -MD        Gag Response WFL  -MD        Volitional Swallow WFL  -MD        Volitional Cough BASHIR  -MD           Oral Musculature and Cranial Nerve Assessment    Oral Motor General Assessment WFL  -MD           General Eating/Swallowing Observations    Respiratory Support Currently in Use room air  -MD        Eating/Swallowing Skills self-fed  -MD        Positioning During Eating upright in bed  -MD        Utensils Used spoon;straw  -MD        Consistencies Trialed regular textures;pureed;thin liquids  -MD           Clinical Swallow Eval    Oral Prep Phase WFL  -MD        Oral Transit WFL  -MD        Oral Residue WFL  -MD        Pharyngeal Phase WFL  -MD        Esophageal Phase unremarkable  -MD        Clinical Swallow Evaluation Summary see note  -MD           SLP Evaluation Clinical Impression    SLP Swallowing Diagnosis swallow WFL/no suspected pharyngeal impairment  -MD        Functional Impact no impact on function  -MD        Swallow Criteria for Skilled Therapeutic Interventions Met no problems identified which require skilled intervention  -MD           Recommendations    Therapy Frequency (Swallow) evaluation only  -MD        SLP Diet Recommendation regular textures;thin liquids  -MD        Recommended Precautions and Strategies upright posture during/after eating;small bites of food and sips of liquid;alternate between small bites of food and sips of liquid;general aspiration precautions;reflux precautions  -MD        Oral Care Recommendations Oral Care BID/PRN  -MD        SLP Rec. for Method of Medication Administration meds whole;with thin liquids  -MD                  User Key  (r) = Recorded  By, (t) = Taken By, (c) = Cosigned By      Initials Name Effective Dates    Lubna Xoing, SLP 06/21/22 -                     EDUCATION  The patient has been educated in the following areas:   Dysphagia (Swallowing Impairment).                   Time Calculation:    Time Calculation- SLP       Row Name 01/18/25 1230             Time Calculation- SLP    SLP Start Time 1050  -MD      SLP Stop Time 1135  -MD      SLP Time Calculation (min) 45 min  -MD      SLP Received On 01/18/25  -MD         Untimed Charges    41594-BL Eval Oral Pharyng Swallow Minutes 45  -MD         Total Minutes    Untimed Charges Total Minutes 45  -MD       Total Minutes 45  -MD                User Key  (r) = Recorded By, (t) = Taken By, (c) = Cosigned By      Initials Name Provider Type    Lubna Xiong, SLP Speech and Language Pathologist                    Therapy Charges for Today       Code Description Service Date Service Provider Modifiers Qty    [id number] HC ST EVAL ORAL PHARYNG SWALLOW 3 1/18/2025 Lubna Vigil, SLP GN 1                      Lubna Vigil, SLP  1/18/2025

## 2025-01-18 NOTE — DISCHARGE PLACEMENT REQUEST
"Dayron Carr (69 y.o. Male)       Date of Birth   1955    Social Security Number       Address   [address] [place] [address]    Home Phone   [phone]    MRN   [id number]       [facility]    Marital Status   Single                            Admission Date   1/17/25    Admission Type   Emergency    Admitting Provider   Dayron Griggs DO    Attending Provider   Dayron Griggs DO    Department, Room/Bed   [facility] 3C, 391/1       Discharge Date       Discharge Disposition       Discharge Destination                                 Attending Provider: Dayron Griggs DO    Allergies: No Known Allergies    Isolation: Spore   Infection: C.difficile (12/22/24)   Code Status: CPR    Ht: 180.3 cm (71\")   Wt: 84.5 kg (186 lb 3.2 oz)    Admission Cmt: None   Principal Problem: Failure to thrive in adult [R62.7]                   Active Insurance as of 1/17/2025       Primary Coverage       Payor Plan Insurance Group Employer/Plan Group    MEDICARE MEDICARE A & B        Payor Plan Address Payor Plan Phone Number Payor Plan Fax Number Effective Dates    [address] [phone]  7/1/2019 - None Entered    [place] [address]         Subscriber Name Subscriber Birth Date Member ID       DAYRON CARR 1955 [id number]                     Emergency Contacts        (Rel.) Home Phone Work Phone Mobile Phone    StoneYuliya harper (Sister) [phone] -- --    LILLYJAK CHEN (Brother) -- -- [phone]            "

## 2025-01-19 PROBLEM — R19.7 DIARRHEA: Status: ACTIVE | Noted: 2025-01-19

## 2025-01-19 LAB
ADV 40+41 DNA STL QL NAA+NON-PROBE: NOT DETECTED
ALBUMIN SERPL-MCNC: 3.3 G/DL (ref 3.5–5.2)
ALBUMIN/GLOB SERPL: 1.2 G/DL
ALP SERPL-CCNC: 66 U/L (ref 39–117)
ALT SERPL W P-5'-P-CCNC: 12 U/L (ref 1–41)
ANION GAP SERPL CALCULATED.3IONS-SCNC: 11 MMOL/L (ref 5–15)
AST SERPL-CCNC: 21 U/L (ref 1–40)
ASTRO TYP 1-8 RNA STL QL NAA+NON-PROBE: NOT DETECTED
BASOPHILS # BLD AUTO: 0.01 10*3/MM3 (ref 0–0.2)
BASOPHILS NFR BLD AUTO: 0.2 % (ref 0–1.5)
BILIRUB SERPL-MCNC: 0.5 MG/DL (ref 0–1.2)
BUN SERPL-MCNC: 17 MG/DL (ref 8–23)
BUN/CREAT SERPL: 20.2 (ref 7–25)
C CAYETANENSIS DNA STL QL NAA+NON-PROBE: NOT DETECTED
C COLI+JEJ+UPSA DNA STL QL NAA+NON-PROBE: NOT DETECTED
CALCIUM SPEC-SCNC: 8.9 MG/DL (ref 8.6–10.5)
CHLORIDE SERPL-SCNC: 99 MMOL/L (ref 98–107)
CO2 SERPL-SCNC: 26 MMOL/L (ref 22–29)
CREAT SERPL-MCNC: 0.84 MG/DL (ref 0.76–1.27)
CRYPTOSP DNA STL QL NAA+NON-PROBE: NOT DETECTED
DEPRECATED RDW RBC AUTO: 51.4 FL (ref 37–54)
E HISTOLYT DNA STL QL NAA+NON-PROBE: NOT DETECTED
EAEC PAA PLAS AGGR+AATA ST NAA+NON-PRB: NOT DETECTED
EC STX1+STX2 GENES STL QL NAA+NON-PROBE: NOT DETECTED
EGFRCR SERPLBLD CKD-EPI 2021: 93.8 ML/MIN/1.73
EOSINOPHIL # BLD AUTO: 0.07 10*3/MM3 (ref 0–0.4)
EOSINOPHIL NFR BLD AUTO: 1.3 % (ref 0.3–6.2)
EPEC EAE GENE STL QL NAA+NON-PROBE: NOT DETECTED
ERYTHROCYTE [DISTWIDTH] IN BLOOD BY AUTOMATED COUNT: 14.8 % (ref 12.3–15.4)
ETEC LTA+ST1A+ST1B TOX ST NAA+NON-PROBE: NOT DETECTED
FERRITIN SERPL-MCNC: 768.9 NG/ML (ref 30–400)
G LAMBLIA DNA STL QL NAA+NON-PROBE: NOT DETECTED
GLOBULIN UR ELPH-MCNC: 2.7 GM/DL
GLUCOSE SERPL-MCNC: 96 MG/DL (ref 65–99)
HCT VFR BLD AUTO: 26.6 % (ref 37.5–51)
HGB BLD-MCNC: 8.3 G/DL (ref 13–17.7)
IMM GRANULOCYTES # BLD AUTO: 0.03 10*3/MM3 (ref 0–0.05)
IMM GRANULOCYTES NFR BLD AUTO: 0.5 % (ref 0–0.5)
LYMPHOCYTES # BLD AUTO: 1.08 10*3/MM3 (ref 0.7–3.1)
LYMPHOCYTES NFR BLD AUTO: 19.6 % (ref 19.6–45.3)
MCH RBC QN AUTO: 29.2 PG (ref 26.6–33)
MCHC RBC AUTO-ENTMCNC: 31.2 G/DL (ref 31.5–35.7)
MCV RBC AUTO: 93.7 FL (ref 79–97)
MONOCYTES # BLD AUTO: 0.38 10*3/MM3 (ref 0.1–0.9)
MONOCYTES NFR BLD AUTO: 6.9 % (ref 5–12)
NEUTROPHILS NFR BLD AUTO: 3.93 10*3/MM3 (ref 1.7–7)
NEUTROPHILS NFR BLD AUTO: 71.5 % (ref 42.7–76)
NOROVIRUS GI+II RNA STL QL NAA+NON-PROBE: NOT DETECTED
NRBC BLD AUTO-RTO: 0 /100 WBC (ref 0–0.2)
P SHIGELLOIDES DNA STL QL NAA+NON-PROBE: NOT DETECTED
PLATELET # BLD AUTO: 156 10*3/MM3 (ref 140–450)
PMV BLD AUTO: 10.5 FL (ref 6–12)
POTASSIUM SERPL-SCNC: 3.6 MMOL/L (ref 3.5–5.2)
PROT SERPL-MCNC: 6 G/DL (ref 6–8.5)
RBC # BLD AUTO: 2.84 10*6/MM3 (ref 4.14–5.8)
RVA RNA STL QL NAA+NON-PROBE: NOT DETECTED
S ENT+BONG DNA STL QL NAA+NON-PROBE: NOT DETECTED
SAPO I+II+IV+V RNA STL QL NAA+NON-PROBE: NOT DETECTED
SHIGELLA SP+EIEC IPAH ST NAA+NON-PROBE: NOT DETECTED
SODIUM SERPL-SCNC: 136 MMOL/L (ref 136–145)
V CHOL+PARA+VUL DNA STL QL NAA+NON-PROBE: NOT DETECTED
V CHOLERAE DNA STL QL NAA+NON-PROBE: NOT DETECTED
WBC NRBC COR # BLD AUTO: 5.5 10*3/MM3 (ref 3.4–10.8)
Y ENTEROCOL DNA STL QL NAA+NON-PROBE: NOT DETECTED

## 2025-01-19 PROCEDURE — 25010000002 THIAMINE PER 100 MG: Performed by: INTERNAL MEDICINE

## 2025-01-19 PROCEDURE — 82728 ASSAY OF FERRITIN: CPT

## 2025-01-19 PROCEDURE — 85025 COMPLETE CBC W/AUTO DIFF WBC: CPT

## 2025-01-19 PROCEDURE — 97165 OT EVAL LOW COMPLEX 30 MIN: CPT | Performed by: OCCUPATIONAL THERAPIST

## 2025-01-19 PROCEDURE — 87507 IADNA-DNA/RNA PROBE TQ 12-25: CPT

## 2025-01-19 PROCEDURE — 25010000002 CEFTRIAXONE PER 250 MG

## 2025-01-19 PROCEDURE — 25010000002 THIAMINE HCL 200 MG/2ML SOLUTION: Performed by: INTERNAL MEDICINE

## 2025-01-19 PROCEDURE — 80053 COMPREHEN METABOLIC PANEL: CPT

## 2025-01-19 RX ORDER — LOPERAMIDE HYDROCHLORIDE 2 MG/1
2 CAPSULE ORAL 4 TIMES DAILY PRN
Status: DISCONTINUED | OUTPATIENT
Start: 2025-01-19 | End: 2025-01-22 | Stop reason: HOSPADM

## 2025-01-19 RX ORDER — TRAMADOL HYDROCHLORIDE 50 MG/1
25 TABLET ORAL EVERY 6 HOURS PRN
Status: DISCONTINUED | OUTPATIENT
Start: 2025-01-19 | End: 2025-01-21

## 2025-01-19 RX ADMIN — THIAMINE HYDROCHLORIDE 200 MG: 100 INJECTION, SOLUTION INTRAMUSCULAR; INTRAVENOUS at 14:18

## 2025-01-19 RX ADMIN — CHLORDIAZEPOXIDE HYDROCHLORIDE 50 MG: 25 CAPSULE ORAL at 06:31

## 2025-01-19 RX ADMIN — Medication 10 ML: at 09:20

## 2025-01-19 RX ADMIN — Medication 10 ML: at 20:01

## 2025-01-19 RX ADMIN — TRAMADOL HYDROCHLORIDE 25 MG: 50 TABLET, COATED ORAL at 07:26

## 2025-01-19 RX ADMIN — METOPROLOL SUCCINATE 12.5 MG: 25 TABLET, EXTENDED RELEASE ORAL at 09:19

## 2025-01-19 RX ADMIN — BUSPIRONE HYDROCHLORIDE 7.5 MG: 5 TABLET ORAL at 09:20

## 2025-01-19 RX ADMIN — AMIODARONE HYDROCHLORIDE 200 MG: 200 TABLET ORAL at 09:19

## 2025-01-19 RX ADMIN — LORAZEPAM 1 MG: 1 TABLET ORAL at 09:19

## 2025-01-19 RX ADMIN — ACETAMINOPHEN 650 MG: 325 TABLET ORAL at 19:29

## 2025-01-19 RX ADMIN — TEMAZEPAM 30 MG: 30 CAPSULE ORAL at 21:19

## 2025-01-19 RX ADMIN — PANTOPRAZOLE SODIUM 40 MG: 40 TABLET, DELAYED RELEASE ORAL at 06:31

## 2025-01-19 RX ADMIN — APIXABAN 5 MG: 5 TABLET, FILM COATED ORAL at 20:00

## 2025-01-19 RX ADMIN — CHLORDIAZEPOXIDE HYDROCHLORIDE 50 MG: 25 CAPSULE ORAL at 21:10

## 2025-01-19 RX ADMIN — LOPERAMIDE HYDROCHLORIDE 2 MG: 2 CAPSULE ORAL at 19:29

## 2025-01-19 RX ADMIN — LORAZEPAM 1 MG: 1 TABLET ORAL at 02:55

## 2025-01-19 RX ADMIN — CEFTRIAXONE SODIUM 2000 MG: 2 INJECTION, POWDER, FOR SOLUTION INTRAMUSCULAR; INTRAVENOUS at 20:00

## 2025-01-19 RX ADMIN — THIAMINE HYDROCHLORIDE 200 MG: 100 INJECTION, SOLUTION INTRAMUSCULAR; INTRAVENOUS at 06:31

## 2025-01-19 RX ADMIN — FOLIC ACID 1 MG: 1 TABLET ORAL at 09:20

## 2025-01-19 RX ADMIN — APIXABAN 5 MG: 5 TABLET, FILM COATED ORAL at 09:19

## 2025-01-19 RX ADMIN — ACETAMINOPHEN 650 MG: 325 TABLET ORAL at 02:54

## 2025-01-19 RX ADMIN — TRAMADOL HYDROCHLORIDE 25 MG: 50 TABLET, COATED ORAL at 19:29

## 2025-01-19 RX ADMIN — CHLORDIAZEPOXIDE HYDROCHLORIDE 50 MG: 25 CAPSULE ORAL at 14:18

## 2025-01-19 RX ADMIN — BUSPIRONE HYDROCHLORIDE 7.5 MG: 5 TABLET ORAL at 20:00

## 2025-01-19 RX ADMIN — THIAMINE HYDROCHLORIDE 200 MG: 100 INJECTION, SOLUTION INTRAMUSCULAR; INTRAVENOUS at 21:10

## 2025-01-19 RX ADMIN — ATORVASTATIN CALCIUM 20 MG: 10 TABLET, FILM COATED ORAL at 09:20

## 2025-01-19 NOTE — THERAPY EVALUATION
Patient Name: Dayron Lubin  : 1955    MRN: [id number]                              Today's Date: 2025       Admit Date: 2025    Visit Dx:     ICD-10-CM ICD-9-CM   1. Cellulitis, unspecified cellulitis site  L03.90 682.9   2. Acute UTI  N39.0 599.0   3. History of alcohol consumption  Z87.898 V11.3   4. History of alcoholic hepatitis  Z87.19 V12.79   5. Drug use  F19.90 305.90   6. Dysphagia, unspecified type  R13.10 787.20   7. Impaired mobility [Z74.09]  Z74.09 799.89     Patient Active Problem List   Diagnosis    Prostate cancer    Hx of radiation therapy    Elevated PSA    Encounter for follow-up surveillance of prostate cancer    HOCM (hypertrophic obstructive cardiomyopathy)    Coronary artery disease involving native coronary artery of native heart without angina pectoris    Essential hypertension    Mixed hyperlipidemia    Class 1 obesity with alveolar hypoventilation, serious comorbidity, and body mass index (BMI) of 30.0 to 30.9 in adult    Displaced fracture of lateral malleolus of right fibula, initial encounter for closed fracture    Family history of colonic polyps    History of colon polyps    Elevated brain natriuretic peptide (BNP) level    Anxiety associated with depression    Alcohol abuse    Alcoholic hepatitis    Volume depletion    Anorexia    CHF (congestive heart failure)    Hypokalemia    Duodenitis    Gait instability    SVT (supraventricular tachycardia)    Physical debility    Atrial fibrillation with rapid ventricular response    Alcoholism in remission    Paroxysmal atrial fibrillation    Nasal polyposis    Nasal congestion    Estelle bullosa    Long-term use of high-risk medication    Multiple falls    Thrombocytopenia    Non-traumatic rhabdomyolysis    Alcohol withdrawal    Pancytopenia    Opiate abuse, episodic    Large left gluteal compartment intramuscular hematomas, currently still healing    Chronic anticoagulation    Cellulitis    Dermatitis neglecta     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    Failure to thrive in adult    Urinary tract infection    Diarrhea     Past Medical History:   Diagnosis Date    A-fib     Alcoholic hepatitis 07/13/2023    CAD (coronary artery disease)     History of external beam radiation therapy 05/12/2016    6840 cGy to prostate bed    Hyperlipidemia     Hypertension     Insomnia     Prostate cancer      Past Surgical History:   Procedure Laterality Date    CARDIAC CATHETERIZATION      CORONARY ANGIOPLASTY WITH STENT PLACEMENT      FRACTURE SURGERY      PROSTATE SURGERY      TONSILLECTOMY        General Information       Row Name 01/19/25 1135          OT Time and Intention    Subjective Information complains of;weakness;fatigue  -     Document Type evaluation  -     Mode of Treatment occupational therapy  -     Patient Effort good  -       Row Name 01/19/25 1135          General Information    Patient Profile Reviewed yes  -     Prior Level of Function independent:;ADL's;all household mobility  -     Existing Precautions/Restrictions fall  -     Barriers to Rehab medically complex  -       Row Name 01/19/25 1135          Living Environment    People in Home alone  -       Row Name 01/19/25 1135          Home Main Entrance    Number of Stairs, Main Entrance two  -     Stair Railings, Main Entrance railing on left side (ascending)  -       Row Name 01/19/25 1135          Cognition    Orientation Status (Cognition) oriented x 4  -       Row Name 01/19/25 1135          Safety Issues/Impairments Affecting Functional Mobility    Safety Issues Affecting Function (Mobility) friction/shear risk  -     Impairments Affecting Function (Mobility) balance;coordination;endurance/activity tolerance;pain;strength  -               User Key  (r) = Recorded By, (t) = Taken By, (c) = Cosigned By      Initials Name Provider Type     Jalyn Ortiz, OTR/L Occupational Therapist                     Mobility/ADL's       Row Name  01/19/25 1135          Transfers    Transfers sit-stand transfer;stand-sit transfer  -       Row Name 01/19/25 1135          Sit-Stand Transfer    Sit-Stand [place] (Transfers) contact guard  -     Assistive Device (Sit-Stand Transfers) walker, front-wheeled  -       Row Name 01/19/25 1135          Stand-Sit Transfer    Stand-Sit [place] (Transfers) contact guard  -     Assistive Device (Stand-Sit Transfers) walker, front-wheeled  -[facility] Name 01/19/25 1135          Functional Mobility    Functional Mobility- Ind. Level contact guard assist  -     Functional Mobility- Device walker, front-wheeled  -[facility] Name 01/19/25 1135          Activities of Daily Living    BADL Assessment/Intervention lower body dressing  -[facility] Name 01/19/25 1135          Lower Body Dressing Assessment/Training    [place] Level (Lower Body Dressing) set up;don;pants/bottoms  -     Position (Lower Body Dressing) unsupported sitting;supported standing  -               User Key  (r) = Recorded By, (t) = Taken By, (c) = Cosigned By      Initials Name Provider Type     Jalyn Ortiz, OTR/L Occupational Therapist                   Obj/Interventions       [facility] Name 01/19/25 1135          Vision Assessment/Intervention    Visual Impairment/Limitations WFL  -[facility] Name 01/19/25 1135          Range of Motion Comprehensive    General Range of Motion no range of motion deficits identified  -[facility] Name 01/19/25 1135          Strength Comprehensive (MMT)    General Manual Muscle Testing (MMT) Assessment no strength deficits identified  -[facility] Name 01/19/25 1135          Balance    Balance Assessment sitting static balance;sitting dynamic balance;sit to stand dynamic balance;standing dynamic balance;standing static balance  -     Static Sitting Balance independent  -     Dynamic Sitting Balance supervision  -     Position, Sitting Balance unsupported;supported;sitting in chair  -      Sit to Stand Dynamic Balance contact guard  -     Static Standing Balance contact guard  -CH     Dynamic Standing Balance contact guard  -     Position/Device Used, Standing Balance walker, front-wheeled  -               User Key  (r) = Recorded By, (t) = Taken By, (c) = Cosigned By      Initials Name Provider Type     Jalyn Ortiz, OTR/L Occupational Therapist                   Goals/Plan       Row Name 01/19/25 1135          Transfer Goal 1 (OT)    Activity/Assistive Device (Transfer Goal 1, OT) sit-to-stand/stand-to-sit;bed-to-chair/chair-to-bed;toilet;shower chair  -     [place] Level/Cues Needed (Transfer Goal 1, OT) modified independence  -CH     Time Frame (Transfer Goal 1, OT) long term goal (LTG);by discharge  -     Progress/Outcome (Transfer Goal 1, OT) new [facility]       Row Name 01/19/25 1135          Bathing Goal 1 (OT)    Activity/Device (Bathing Goal 1, OT) bathing skills, all;upper body bathing;lower body bathing  -     [place] Level/Cues Needed (Bathing Goal 1, OT) modified independence  -CH     Time Frame (Bathing Goal 1, OT) long term goal (LTG);by discharge  -     Progress/Outcomes (Bathing Goal 1, OT) new [facility]  -       Row Name 01/19/25 1135          Dressing Goal 1 (OT)    Activity/Device (Dressing Goal 1, OT) lower body dressing  -     [place]/Cues Needed (Dressing Goal 1, OT) modified independence  -CH     Time Frame (Dressing Goal 1, OT) long term goal (LTG);10 days;by discharge  -     Progress/Outcome (Dressing Goal 1, OT) new [facility]       Row Name 01/19/25 1135          Therapy Assessment/Plan (OT)    Planned Therapy Interventions (OT) activity tolerance training;adaptive equipment training;BADL retraining;edema control/reduction;functional balance retraining;IADL retraining;occupation/activity based interventions;patient/caregiver education/training;ROM/therapeutic exercise;transfer/mobility retraining  -               User Key  (r) =  Recorded By, (t) = Taken By, (c) = Cosigned By      Initials Name Provider Type     Jalyn Ortiz, OTR/L Occupational Therapist                   Clinical Impression       Row Name 01/19/25 1135          Pain Assessment    Additional Documentation Pain Scale: FACES Pre/Post-Treatment (Group)  -       Row Name 01/19/25 1135          Plan of Care Review    Plan of Care Reviewed With patient  -     Progress improving  -     Outcome Evaluation OT eval complete.  Pt. c/o fatigue but participatory and pleasant.  Mr. Lubin was able to KRISTEN brief & shoes with setup, stand & ambulate at [facility] with rwx.  He is a fall risk and demo'd decreased act phillip.  He would benefit from skilled OT tx to increase his fxl act phillip, improve his balance & safety, & provide him with adaptive strategies to reduce his fall risk.  Anticipate Dc to subacute rehab & alcohol rehabilitation.  -       Row Name 01/19/25 1136          Therapy Assessment/Plan (OT)    Rehab Potential (OT) good  -     Criteria for Skilled Therapeutic Interventions Met (OT) yes;skilled treatment is necessary  -     Therapy Frequency (OT) 3 times/wk  -     Predicted Duration of Therapy Intervention (OT) 10 days  -       Row Name 01/19/25 1137          Therapy Plan Review/Discharge Plan (OT)    Anticipated Discharge Disposition (OT) sub acute care setting  -[facility] Name 01/19/25 1137          Positioning and Restraints    Pre-Treatment Position sitting in chair/recliner  -     Post Treatment Position chair  -     In Chair sitting;call light within reach;encouraged to call for assist;notified [facility]  -               User Key  (r) = Recorded By, (t) = Taken By, (c) = Cosigned By      Initials Name Provider Type     Jalyn Ortiz, OTR/L Occupational Therapist                   Outcome Measures       Row Name 01/19/25 1135          How much help from another is currently needed...    Putting on and taking off regular lower body clothing? 4  -      Bathing (including washing, rinsing, and drying) 3  -CH     Toileting (which includes using toilet bed pan or urinal) 3  -CH     Putting on and taking off regular upper body clothing 4  -CH     Taking care of personal grooming (such as brushing teeth) 4  -CH     Eating meals 4  -CH     AM-PAC 6 Clicks Score (OT) 22  -CH       Row Name 01/19/25 0900          How much help from another person do you currently need...    Turning from your back to your side while in flat bed without using bedrails? 4  -AW     Moving from lying on back to sitting on the side of a flat bed without bedrails? 4  -AW     Moving to and from a bed to a chair (including a wheelchair)? 3  -AW     Standing up from a chair using your arms (e.g., wheelchair, bedside chair)? 3  -AW     Climbing 3-5 steps with a railing? 2  -AW     To walk in hospital room? 3  -AW     AM-PAC 6 Clicks Score (PT) 19  -AW     Highest Level of Mobility Goal 6 --> Walk 10 steps or more  -AW       Row Name 01/19/25 1135          Functional Assessment    Outcome Measure Options AM-PAC 6 Clicks Daily Activity (OT)  -               User Key  (r) = Recorded By, (t) = Taken By, (c) = Cosigned By      Initials Name Provider Type     Jalyn Ortiz, OTR/L Occupational Therapist    Zuleyma Betancourt, RN Registered Nurse                    Occupational Therapy Education       Title: PT OT SLP Therapies (In Progress)       Topic: Occupational Therapy (In Progress)       Point: ADL training (Done)       Description:   Instruct learner(s) on proper safety adaptation and remediation techniques during self care or transfers.   Instruct in proper use of assistive devices.                  Learning Progress Summary            Patient Acceptance, E, VU,NR by  at 1/19/2025 1505                      Point: Home exercise program (Not Started)       Description:   Instruct learner(s) on appropriate technique for monitoring, assisting and/or progressing therapeutic  exercises/activities.                  Learner Progress:  Not documented in this visit.              Point: Precautions (Done)       Description:   Instruct learner(s) on prescribed precautions during self-care and functional transfers.                  Learning Progress Summary            Patient Acceptance, E, VU,NR by  at 1/19/2025 1505                      Point: Body mechanics (Done)       Description:   Instruct learner(s) on proper positioning and spine alignment during self-care, functional mobility activities and/or exercises.                  Learning Progress Summary            Patient Acceptance, E, VU,NR by  at 1/19/2025 1505                                      User Key       Initials Effective Dates Name Provider Type Discipline     07/11/23 -  Jalyn Ortiz, OTR/L Occupational Therapist OT                  OT Recommendation and Plan  Planned Therapy Interventions (OT): activity tolerance training, adaptive equipment training, BADL retraining, edema control/reduction, functional balance retraining, IADL retraining, occupation/activity based interventions, patient/caregiver education/training, ROM/therapeutic exercise, transfer/mobility retraining  Therapy Frequency (OT): 3 times/wk  Plan of Care Review  Plan of Care Reviewed With: patient  Progress: improving  Outcome Evaluation: OT eval complete.  Pt. c/o fatigue but participatory and pleasant.  Mr. Lubin was able to KRISTEN brief & shoes with setup, stand & ambulate at [facility] with rwx.  He is a fall risk and demo'd decreased act phillip.  He would benefit from skilled OT tx to increase his fxl act phillip, improve his balance & safety, & provide him with adaptive strategies to reduce his fall risk.  Anticipate Dc to subacute rehab & alcohol rehabilitation.     Time Calculation:         Time Calculation- OT       Row Name 01/19/25 1135             Time Calculation-     OT Start Time 1135  -      OT Stop Time 1210  -      OT Time Calculation (min) 35  min  -CH      OT Received On 01/19/25  -CH      OT Goal Re-Cert Due Date 01/29/25  -CH         Untimed Charges    OT Eval/Re-eval Minutes 35  -CH         Total Minutes    Untimed Charges Total Minutes 35  -CH       Total Minutes 35  -CH                User Key  (r) = Recorded By, (t) = Taken By, (c) = Cosigned By      Initials Name Provider Type     Jalyn Ortiz, OTR/L Occupational Therapist                  Therapy Charges for Today       Code Description Service Date Service Provider Modifiers Qty    [id number]  OT EVAL LOW COMPLEXITY 3 1/19/2025 Jalyn Ortiz OTR/L GO 1                 Jalyn Ortiz OTR/L  1/19/2025

## 2025-01-19 NOTE — PROGRESS NOTES
Patient Name: Dayron Lubin  Date of Admission: 1/17/2025  Today's Date: 01/19/25  Length of Stay: 2  Primary Care Physician: Herberth Nguyen Jr., MD    Subjective   Chief Complaint: Frequent falls  Fall  Pertinent negatives include no nausea or vomiting.      Dayron Lubin is a 69-year-old male with an extensive past history of failure to care for himself due to alcoholism. Additional history of paroxysmal atrial fibrillation on chronic Eliquis, CAD, prostate cancer in remission, hyperlipidemia, hypertension and most recently treated for a large gluteal compartment intramuscular hematoma due to a fall at home. This resulted in patient being discharged to skilled nursing facility which the patient left AMA. He presented today per EMS. Patient reported that yesterday he tripped over a rug and fell on a heater hitting his knee when he got up he hit his head. He denied any loss of consciousness he stated he got up within a few minutes and was able to remove. His brother visited him this afternoon and called EMS. Upon presentation he denied any new pain he admits to leaving SNF AMA and zooming drinking last alcohol consumption was 11 AM this morning where he added 2 shots of vodka to his orange juice. The patient on last admission had to be transferred to ICU due to delirium tremors and placed on phenobarbital. He denied any neck, back, chest or abdominal pain. X-rays of knee, tib-fib, CT of the head, cervical spine and abdomen pelvis were obtained all with no acute findings or acute fractures. He stated his only complaint with the he noticed some redness in his left groin area for the past few days and it has been somewhat tender. He states he wears a brief due to urinary incontinence and he states that he has daily bowel movements with occasional diarrhea and he only changes the diaper once daily regardless.     Today:   Patient is resting in bed on room air with no family at bedside.  Patient is alert and  oriented and able to participate in assessment.  Currently patient is being cleaned up from an incontinent stool episode.  Extensive discussion with patient regarding his need to get up and go to the bathroom or to use a bedside commode due to his extensive erythema and wound.  Patient verbalized understanding and nursing staff placed patient in the chair with a bedside commode next to him.  He was encouraged to ambulate with physical therapy and once his stool sample came back we would be able to initiate Imodium to assist.  Case management sent multiple referrals for placement awaiting responses.  All patient's questions were answered the best my ability and he is in agreement with current plan of care.      Documented weights    01/17/25 1223   Weight: 84.5 kg (186 lb 3.2 oz)          Intake/Output Summary (Last 24 hours) at 1/19/2025 0938  Last data filed at 1/19/2025 0858  Gross per 24 hour   Intake 480 ml   Output 700 ml   Net -220 ml       Results for orders placed during the hospital encounter of 07/29/24    Adult Transthoracic Echo Complete W/ Cont if Necessary Per Protocol    Interpretation Summary    Left ventricular systolic function is low normal. Left ventricular ejection fraction appears to be 51 - 55%.    Left ventricular wall thickness is consistent with moderate to severe concentric hypertrophy.    Mild to moderate mitral valve regurgitation is present.    The aortic valve is thickened and calcified and there is some degree of aortic valve stenosis, likely moderate, most of the flow acceleration appears to be in the left ventricular outflow tract as a result of hypertrophic obstructive cardiomyopathy.       Review of Systems   Constitutional:  Positive for fatigue.   Respiratory:  Negative for shortness of breath.    Gastrointestinal:  Positive for diarrhea. Negative for nausea and vomiting.   Skin:  Positive for wound.   Neurological:  Positive for tremors.   Psychiatric/Behavioral:  The patient  is nervous/anxious.       All pertinent negatives and positives are as above. All other systems have been reviewed and are negative unless otherwise stated.     Objective    Temp:  [97.2 °F (36.2 °C)-99.6 °F (37.6 °C)] 97.2 °F (36.2 °C)  Heart Rate:  [71-86] 73  Resp:  [16] 16  BP: ()/(55-80) 105/74  Physical Exam  Vitals and nursing note reviewed.   Constitutional:       General: He is not in acute distress.     Appearance: He is obese. He is not ill-appearing or toxic-appearing.      Comments: Room air   HENT:      Right Ear: Tympanic membrane normal.      Left Ear: Tympanic membrane normal.      Nose: Nose normal.      Mouth/Throat:      Mouth: Mucous membranes are moist.      Pharynx: Oropharynx is clear.   Eyes:      Pupils: Pupils are equal, round, and reactive to light.   Cardiovascular:      Rate and Rhythm: Normal rate and regular rhythm.      Pulses: Normal pulses.      Heart sounds: Normal heart sounds.      Comments: NSR 69-82-  Abdominal:      General: Abdomen is protuberant. Bowel sounds are normal.      Palpations: Abdomen is soft.      Tenderness: There is no abdominal tenderness.   Musculoskeletal:      Cervical back: Normal range of motion and neck supple. No rigidity or tenderness.      Right lower leg: No edema.      Left lower leg: No edema.   Skin:     General: Skin is warm.      Capillary Refill: Capillary refill takes less than 2 seconds.      Coloration: Skin is pale.      Comments: Please see attached photos for coccyx and perineal area.  Patient does have a large scab to his right forehead from previous fall.   Neurological:      General: No focal deficit present.      Mental Status: He is oriented to person, place, and time.   Psychiatric:         Mood and Affect: Mood normal.         Behavior: Behavior normal. Behavior is cooperative.         Thought Content: Thought content normal.         Judgment: Judgment normal.         1/17/2025 coccyx region on admission  Results Review:  I  have reviewed the labs, radiology results, and diagnostic studies.    Laboratory Data:   Results from last 7 days   Lab Units 01/19/25 0448 01/18/25 0457 01/17/25  1313   WBC 10*3/mm3 5.50 5.48 10.91*   HEMOGLOBIN g/dL 8.3* 9.2* 10.9*   HEMATOCRIT % 26.6* 29.4* 32.6*   PLATELETS 10*3/mm3 156 194 296        Results from last 7 days   Lab Units 01/19/25 0448 01/18/25 0457 01/17/25  1313   SODIUM mmol/L 136 136 132*   POTASSIUM mmol/L 3.6 3.8 4.1   CHLORIDE mmol/L 99 98 90*   CO2 mmol/L 26.0 29.0 24.0   BUN mg/dL 17 19 19   CREATININE mg/dL 0.84 0.90 0.90   CALCIUM mg/dL 8.9 8.8 9.4   BILIRUBIN mg/dL 0.5 0.8 1.7*   ALK PHOS U/L 66 70 84   ALT (SGPT) U/L 12 15 21   AST (SGOT) U/L 21 31 50*   GLUCOSE mg/dL 96 92 139*       Culture Data:     Microbiology Results (last 10 days)       Procedure Component Value - Date/Time    Blood Culture - Blood, Hand, Right [[id number]]  (Abnormal) Collected: 01/17/25 1313    Lab Status: Preliminary result Specimen: Blood from Hand, Right Updated: 01/18/25 0449     Blood Culture Abnormal Stain     Gram Stain Anaerobic Bottle Gram variable bacilli    Blood Culture - Blood, Hand, Left [[id number]]  (Normal) Collected: 01/17/25 1313    Lab Status: Preliminary result Specimen: Blood from Hand, Left Updated: 01/18/25 1330     Blood Culture No growth at 24 hours             Radiology Data:   Imaging Results (Last 7 Days)       Procedure Component Value Units Date/Time    CT Abdomen Pelvis With Contrast [[id number]] Collected: 01/17/25 1441     Updated: 01/17/25 1507    Narrative:      EXAMINATION: CT ABDOMEN PELVIS W CONTRAST-      1/17/2025 1:24 PM     HISTORY: pelvic infection/cellulitis. nec fas?     In order to have a CT radiation dose as low as reasonably achievable  Automated Exposure Control was utilized for adjustment of the mA and/or  KV according to patient size.     Total DLP = 401.9 mGy.cm     The CT scan of the abdomen and pelvis is performed after intravenous  contrast  enhancement.     Images are acquired in axial plane and subsequent reconstruction in  coronal and sagittal planes.     Comparison is made with the previous study dated 12/20/2024 and  11/16/2024.     The lung bases included in the study are unremarkable.     Limited visualized cardiomediastinal structures show moderate  cardiomegaly. There is an apparent focal bulge of the left ventricle  which may suggest an aneurysm of the left ventricle?. This may be  clinically correlated.     There is fatty infiltration of the liver. The spleen is normal.     The gallbladder is moderately distended with high density material which  may represent sludge or contrast excretion. No definite radiopaque  calculi.     Pancreas is normal.     The adrenal glands are normal.     Moderate lobulation of renal contour bilaterally is seen. There is a  small low-density nodule located posteriorly in the lower pole of the  left kidney measuring 8 mm in diameter. CT density suggest a cyst. No  radiopaque calculi on either side. No hydronephrosis. The ureters are  nondilated. Urinary bladder poorly distended with mild wall thickening.  No obvious intrinsic abnormality.     The prostate is surgically absent.     There is a fat-containing left inguinoscrotal hernia.     The stomach and duodenum are normal. Small bowel is nondistended.  Appendix is normal. Small amount of stool and gas is seen in the colon.  There is diverticulosis of the distal colon. No finding to suggest  diverticulitis.     Atheromatous changes of the abdominal aorta and iliac arteries. No  aneurysmal dilatation.     No evidence of abdominal or pelvic lymphadenopathy.     Images reviewed in bone window show no acute bony abnormality. Chronic  degenerative changes of the lumbar spine are seen. There is bilateral  spondylolysis L5 without a significant spondylolisthesis. Old healed  fractures of the left lower ribs are seen.     There is a lobulated oblong high density fluid  accumulation  predominantly in the left gluteus medius with a surrounding ring-shaped  enhancement measuring 12 cm in craniocaudal projection in the coronal  plane images and 10 cm in horizontal projection and axial plane images.  The size of the gluteus medius and robbin has overall decreased since  the previous study. There is another high density lobulated masslike  area located between the tensor fascia froilan laterally, rectus femoris  anteriorly and vastus lateralis and medius posterolaterally. This may  represent an evolving hematoma?. This was not noted in the previous  study.       Impression:      1. There is overall improvement in size of the hematoma/swelling of the  left gluteus medius and robbin. However there is now a well-formed  collection, in the gluteus medius, with a surrounding ring enhancement  which may represent an abscess. There is a new high density masslike  lobulated area located more anteriorly which measures 3.8 cm in diameter  in axial plane images. This may represent hematoma?.  2. Hepatic steatosis.  3. Moderately dilated gallbladder with sludge?. No stones.  4. Diverticulosis of the colon. No evidence for diverticulitis.  4. Other nonacute findings similar to the previous study.                                               This report was signed and finalized on 1/17/2025 3:04 PM by Dr. Aleta Ramos MD.       CT Cervical Spine Without Contrast [[id number]] Collected: 01/17/25 1438     Updated: 01/17/25 1445    Narrative:      Exam: CT CERVICAL SPINE WO CONTRAST- 1/17/2025 1:24 PM     HISTORY: fall etoh eliquis     COMPARISON: 12/4/2024     DOSE LENGTH PRODUCT: 1153.51 mGy.cm mGy cm. Automated exposure control  was also utilized to decrease patient radiation dose.     TECHNIQUE: Serial helical tomographic images of the cervical spine were  obtained without the use of intravenous contrast. Additionally, sagittal  and coronal reformatted images were also provided for review.      FINDINGS:     Straightening of the cervical lordosis.     No acute fracture. The vertebral body heights are preserved.     Multilevel degenerative disc disease with posterior disc osteophyte  complexes. Multilevel uncovertebral and facet hypertrophic changes.     Suspect mild spinal canal narrowing at C3-C4, C4-C5. Variable degrees of  foraminal narrowing most notable with at least moderate left C4-C5  foraminal narrowing.     Right greater the left carotid atherosclerosis.     Other:None       Impression:         No acute fracture or traumatic malalignment.     Moderate degenerative changes with suspected mild spinal canal narrowing  at C3-C4 and C4-C5.     Right greater than left carotid atherosclerosis.           This report was signed and finalized on 1/17/2025 2:42 PM by Regan Garner.       CT Head Without Contrast [[id number]] Collected: 01/17/25 1434     Updated: 01/17/25 1441    Narrative:      Exam: CT HEAD WO CONTRAST- 1/17/2025 1:24 PM     HISTORY: head injury eliquis       DOSE LENGTH PRODUCT: 1153.51 mGy.cm mGy cm. Automated exposure control  was also utilized to decrease patient radiation dose.     Technique:  Helically acquired CT of the brain without IV contrast was performed.  Sagittal and coronal reformations are also provided for review. Soft  tissue and bone kernels are available for interpretation.     Comparison: 12/4/2024.     Findings:     Ventricles and extra-axial CSF spaces are normal in size.     No intraparenchymal or extra-axial hemorrhage.     Gray-white matter differentiation is preserved.     Orbits are grossly unremarkable. Paranasal sinuses are grossly clear.  Mastoid air cells are grossly clear.     No suspicious calvarial or extracranial soft tissue abnormality.       Impression:      Impression:       No acute intracranial abnormality.     This report was signed and finalized on 1/17/2025 2:38 PM by Regan Garner.       XR Tibia Fibula 2 View Left [[id number]] Collected:  01/17/25 1405     Updated: 01/17/25 1409    Narrative:      XR TIBIA FIBULA 2 VW LEFT-     HISTORY: fall left low leg pain eliquis     COMPARISON: None     FINDINGS: Frontal and lateral views of the left tibia and fibula are  obtained.     No fracture or dislocation. Mild to moderate medial joint space  narrowing. Vascular calcifications.       Impression:      1. No acute osseous injury left tibia or fibula.        This report was signed and finalized on 1/17/2025 2:06 PM by Dr. Bri Duong MD.       XR Knee 4+ View Bilateral [[id number]] Collected: 01/17/25 1403     Updated: 01/17/25 1408    Narrative:      XR KNEE 4+ VW BILATERAL-     HISTORY: fall bilateral knee injury eliquis     COMPARISON: None     FINDINGS: 4 views of the right and left knee are obtained.     There is mild medial left joint space narrowing. Borderline medial right  joint space narrowing. Minimal bony spurring of the patella. No acute  fracture or dislocation. No knee joint effusion.     Diffuse vascular calcifications.       Impression:      1. Mild arthritic changes of the knee, joint space narrowing greatest in  the medial compartment of the left knee. No acute osseous injury or  joint effusion.              This report was signed and finalized on 1/17/2025 2:05 PM by Dr. Bri Duong MD.                I have reviewed the patient's current medications.     amiodarone, 200 mg, Oral, Daily  apixaban, 5 mg, Oral, Q12H  atorvastatin, 20 mg, Oral, Daily  busPIRone, 7.5 mg, Oral, BID  cefTRIAXone, 2,000 mg, Intravenous, Q24H  chlordiazePOXIDE, 50 mg, Oral, Q8H   Followed by  chlordiazePOXIDE, 50 mg, Oral, Q12H   Followed by  [START ON 1/20/2025] chlordiazePOXIDE, 50 mg, Oral, Nightly  folic acid, 1 mg, Oral, Daily  metoprolol succinate XL, 12.5 mg, Oral, Daily  pantoprazole, 40 mg, Oral, Q AM  sodium chloride, 10 mL, Intravenous, Q12H  thiamine (B-1) IV, 200 mg, Intravenous, Q8H   Followed by  [START ON 1/23/2025] thiamine, 100 mg,  Oral, Daily            Assessment/Plan   Assessment  Active Hospital Problems    Diagnosis     **Failure to thrive in adult     Urinary tract infection     Dermatitis neglecta     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  Failure to thrive-patient admitted after multiple falls at home after he left AMA from SNF.  Patient continues to use alcohol and prescription medications.  Patient is not taking care of himself, his wounds and requires continued treatment.  PT/OT and case management consultation for transfer to skilled nursing facility remain pending     2.  Cystitis-urinalysis revealed small blood, positive nitrates, small leukocytes and trace bacteria.  Continue Rocephin due to abnormal blood culture x 1 which has to be sent off for review, remains pending.    3.  Dermatitis neglecta-patient upon arrival was covered in feces and dirt.  Patient has had significant bathing and treatment to his wounds skin hands and feet.  Continue bathing daily and wound care per orders.      4.  Large left gluteal compartment intramuscular hematoma - continuing to heal, ED provider believed it was cellulitis after evaluation I believe it is significant erythema and moisture associated rash from patient sitting in his own feces and urine 4 days at a time.  Discussed the case with Daylin Prasad RN, wound nurse and recommendations for significant wound care placed.  Will continue to monitor closely and wound care APRN will evaluate on Monday.     5.  Paroxysmal atrial fibrillation/chronic anticoagulation-continue home amiodarone and Eliquis, continue cardiac telemetry, overnight normal sinus rhythm 69-82     6.  Alcoholic hepatitis/alcohol abuse-AST 50, 31, 21 ALT 21, 15, 12 total  bilirubin 1.7, 0.8, 0.5 lipase 88, 47.  Currently stable we will continue to monitor with daily CMP.  Continue CIWA protocol, scheduled Librium and as needed Ativan.   Patient will be monitored closely for any worsening signs or symptoms.     7.  Essential hypertension-resume home metoprolol, vital signs every 4.    8.  Diarrhea-patient is currently a C. difficile carrier, C. difficile was not ordered due to no signs or symptoms of infection.  Stool culture and GI panel pending and will resume patient's Imodium which she takes chronically.    VTE prophylaxis with SCDs  Labs in a.m.    Medical Decision Making  Failure to thrive, acute on chronic, high complexity, unchanged  Cystitis, acute, moderate complexity, unchanged  Dermatitis neglecta, acute on chronic, moderate complexity, improving  Healing wound, chronic, moderate complexity, improving  Paroxysmal atrial fibrillation, chronic, moderate complexity, stable  Chronic anticoagulation, chronic, moderate complexity, stable  Alcoholic hepatitis, chronic, high complexity, improving  Alcoholic abuse, chronic, high complexity, worsening  Essential hypertension, chronic, moderate complexity, unchanged    Number and Complexity of problems: 8  Differential Diagnosis: None    Conditions and Status        Condition is unchanged.     [facility] Data  External documents reviewed: Extensive epic review of previous hospitalizations  Cardiac tracing (EKG, telemetry) interpretation: Stat EKG pending for QTc measurement  Radiology interpretation: 1/17/2025 x-ray of the knee, tib-fib, CT of the head, abdomen pelvis and cervical spine per radiology reviewed  Labs reviewed: 1/19/2025 magnesium,, CMP, CBC, blood and urine cultures remain pending, repeat labs in a.m.  Any tests that were considered but not ordered: None     Decision rules/scores evaluated (example [id number], Wells, etc): CIWA score remains at a 3.     Discussed with: Dr. Griggs, patient, and bedside nurse Dana     Care  Planning  Shared decision making: Dr. Griggs, patient, and bedside nurse Dana  Code status and discussions: Full code per patient  Surrogate Decision Maker his sister Yuliya Dale  Social Determinants of Health that impact treatment or disposition: Will need SNF at DC  I expect the patient to be discharged to SNF in 1-2 days.     Electronically signed by Tiffanie Estrella, TACOS, 01/19/25, 09:38 CST.

## 2025-01-19 NOTE — PLAN OF CARE
Goal Outcome Evaluation:              Outcome Evaluation: AOx4. vitals stable, NSR on tele. encourged ambulation, up to chair this shift. OT tx today, walked in hallway x1 walker with OT. continues to have diarrhea, GI panel negative, PRN imodium ordered. q2h turn.  wound care provided to coccyx and periarea per order x2 this shift. scheduled librium, CIWA per protocol, ativan given. safety maintained.

## 2025-01-19 NOTE — PLAN OF CARE
Goal Outcome Evaluation:  Plan of Care Reviewed With: patient        Progress: improving  Outcome Evaluation: AOx4, voiding per urinal, BM per bedpan, soft/loose brown, turn q2h, wound care per order, rm air, ativan x1 per order, CIWA q4h, IV rocephine, tele NSR, isolation for C diff, waiting on stool cx to result, bed check on.

## 2025-01-19 NOTE — PLAN OF CARE
Goal Outcome Evaluation:  Plan of Care Reviewed With: patient        Progress: improving  Outcome Evaluation: OT eval complete.  Pt. c/o fatigue but participatory and pleasant.  Mr. Lubin was able to KRISTEN brief & shoes with setup, stand & ambulate at CGA with rwx.  He is a fall risk and demo'd decreased act phillip.  He would benefit from skilled OT tx to increase his fxl act phillip, improve his balance & safety, & provide him with adaptive strategies to reduce his fall risk.  Anticipate Dc to subacute rehab & alcohol rehabilitation.    Anticipated Discharge Disposition (OT): sub acute care setting

## 2025-01-20 PROBLEM — L89.303 PRESSURE INJURY OF BUTTOCK, STAGE 3: Status: ACTIVE | Noted: 2025-01-20

## 2025-01-20 LAB
ALBUMIN SERPL-MCNC: 3.3 G/DL (ref 3.5–5.2)
ALBUMIN/GLOB SERPL: 1.1 G/DL
ALP SERPL-CCNC: 66 U/L (ref 39–117)
ALT SERPL W P-5'-P-CCNC: 11 U/L (ref 1–41)
ANION GAP SERPL CALCULATED.3IONS-SCNC: 8 MMOL/L (ref 5–15)
AST SERPL-CCNC: 18 U/L (ref 1–40)
BACTERIA SPEC AEROBE CULT: ABNORMAL
BASOPHILS # BLD AUTO: 0.01 10*3/MM3 (ref 0–0.2)
BASOPHILS NFR BLD AUTO: 0.2 % (ref 0–1.5)
BILIRUB SERPL-MCNC: 0.5 MG/DL (ref 0–1.2)
BUN SERPL-MCNC: 15 MG/DL (ref 8–23)
BUN/CREAT SERPL: 13.5 (ref 7–25)
CALCIUM SPEC-SCNC: 8.7 MG/DL (ref 8.6–10.5)
CHLORIDE SERPL-SCNC: 103 MMOL/L (ref 98–107)
CO2 SERPL-SCNC: 27 MMOL/L (ref 22–29)
CREAT SERPL-MCNC: 1.11 MG/DL (ref 0.76–1.27)
DEPRECATED RDW RBC AUTO: 52 FL (ref 37–54)
EGFRCR SERPLBLD CKD-EPI 2021: 71.4 ML/MIN/1.73
EOSINOPHIL # BLD AUTO: 0.15 10*3/MM3 (ref 0–0.4)
EOSINOPHIL NFR BLD AUTO: 2.8 % (ref 0.3–6.2)
ERYTHROCYTE [DISTWIDTH] IN BLOOD BY AUTOMATED COUNT: 14.9 % (ref 12.3–15.4)
GLOBULIN UR ELPH-MCNC: 2.9 GM/DL
GLUCOSE SERPL-MCNC: 96 MG/DL (ref 65–99)
GRAM STN SPEC: ABNORMAL
HCT VFR BLD AUTO: 27.2 % (ref 37.5–51)
HGB BLD-MCNC: 8.5 G/DL (ref 13–17.7)
IMM GRANULOCYTES # BLD AUTO: 0.05 10*3/MM3 (ref 0–0.05)
IMM GRANULOCYTES NFR BLD AUTO: 0.9 % (ref 0–0.5)
ISOLATED FROM: ABNORMAL
LYMPHOCYTES # BLD AUTO: 1.35 10*3/MM3 (ref 0.7–3.1)
LYMPHOCYTES NFR BLD AUTO: 25.1 % (ref 19.6–45.3)
MCH RBC QN AUTO: 29.8 PG (ref 26.6–33)
MCHC RBC AUTO-ENTMCNC: 31.3 G/DL (ref 31.5–35.7)
MCV RBC AUTO: 95.4 FL (ref 79–97)
MONOCYTES # BLD AUTO: 0.35 10*3/MM3 (ref 0.1–0.9)
MONOCYTES NFR BLD AUTO: 6.5 % (ref 5–12)
NEUTROPHILS NFR BLD AUTO: 3.46 10*3/MM3 (ref 1.7–7)
NEUTROPHILS NFR BLD AUTO: 64.5 % (ref 42.7–76)
NRBC BLD AUTO-RTO: 0 /100 WBC (ref 0–0.2)
PLATELET # BLD AUTO: 156 10*3/MM3 (ref 140–450)
PMV BLD AUTO: 10.4 FL (ref 6–12)
POTASSIUM SERPL-SCNC: 3.6 MMOL/L (ref 3.5–5.2)
PROT SERPL-MCNC: 6.2 G/DL (ref 6–8.5)
RBC # BLD AUTO: 2.85 10*6/MM3 (ref 4.14–5.8)
SODIUM SERPL-SCNC: 138 MMOL/L (ref 136–145)
WBC NRBC COR # BLD AUTO: 5.37 10*3/MM3 (ref 3.4–10.8)

## 2025-01-20 PROCEDURE — 25010000002 CEFTRIAXONE PER 250 MG

## 2025-01-20 PROCEDURE — 97116 GAIT TRAINING THERAPY: CPT

## 2025-01-20 PROCEDURE — 85025 COMPLETE CBC W/AUTO DIFF WBC: CPT

## 2025-01-20 PROCEDURE — 25010000002 THIAMINE HCL 200 MG/2ML SOLUTION: Performed by: INTERNAL MEDICINE

## 2025-01-20 PROCEDURE — 80053 COMPREHEN METABOLIC PANEL: CPT

## 2025-01-20 PROCEDURE — 97110 THERAPEUTIC EXERCISES: CPT

## 2025-01-20 RX ORDER — MULTIPLE VITAMINS W/ MINERALS TAB 9MG-400MCG
1 TAB ORAL DAILY
Status: DISCONTINUED | OUTPATIENT
Start: 2025-01-21 | End: 2025-01-22 | Stop reason: HOSPADM

## 2025-01-20 RX ORDER — ACETAMINOPHEN 325 MG/1
650 TABLET ORAL EVERY 4 HOURS PRN
Status: ON HOLD | COMMUNITY
End: 2025-01-20

## 2025-01-20 RX ORDER — GUAIFENESIN 600 MG/1
600 TABLET, EXTENDED RELEASE ORAL 2 TIMES DAILY
Status: ON HOLD | COMMUNITY
End: 2025-01-20

## 2025-01-20 RX ORDER — ROFLUMILAST 500 UG/1
500 TABLET ORAL DAILY
Status: ON HOLD | COMMUNITY
End: 2025-01-20

## 2025-01-20 RX ORDER — GLIMEPIRIDE 4 MG/1
4 TABLET ORAL
Status: ON HOLD | COMMUNITY
End: 2025-01-20

## 2025-01-20 RX ORDER — FUROSEMIDE 40 MG/1
40 TABLET ORAL 2 TIMES DAILY
Status: ON HOLD | COMMUNITY
End: 2025-01-20

## 2025-01-20 RX ORDER — BENZONATATE 100 MG/1
100 CAPSULE ORAL 3 TIMES DAILY PRN
Status: ON HOLD | COMMUNITY
End: 2025-01-20

## 2025-01-20 RX ORDER — METOPROLOL TARTRATE 25 MG/1
25 TABLET, FILM COATED ORAL 2 TIMES DAILY
Status: ON HOLD | COMMUNITY
End: 2025-01-20

## 2025-01-20 RX ORDER — ASPIRIN 81 MG/1
81 TABLET ORAL DAILY
COMMUNITY

## 2025-01-20 RX ORDER — RISPERIDONE 2 MG/1
2 TABLET ORAL DAILY
Status: ON HOLD | COMMUNITY
End: 2025-01-20

## 2025-01-20 RX ORDER — INSULIN GLARGINE 100 [IU]/ML
10 INJECTION, SOLUTION SUBCUTANEOUS DAILY
Status: ON HOLD | COMMUNITY
End: 2025-01-20

## 2025-01-20 RX ORDER — ERGOCALCIFEROL 1.25 MG/1
50000 CAPSULE, LIQUID FILLED ORAL WEEKLY
Status: ON HOLD | COMMUNITY
End: 2025-01-20

## 2025-01-20 RX ORDER — POTASSIUM CHLORIDE 1500 MG/1
20 TABLET, EXTENDED RELEASE ORAL 2 TIMES DAILY
Status: ON HOLD | COMMUNITY
End: 2025-01-20

## 2025-01-20 RX ORDER — RISPERIDONE 3 MG/1
3 TABLET ORAL NIGHTLY
Status: ON HOLD | COMMUNITY
End: 2025-01-20

## 2025-01-20 RX ORDER — BUSPIRONE HYDROCHLORIDE 5 MG/1
2.5 TABLET ORAL 2 TIMES DAILY
Status: ON HOLD | COMMUNITY
End: 2025-01-20

## 2025-01-20 RX ORDER — LEVOTHYROXINE SODIUM 50 UG/1
50 TABLET ORAL
Status: ON HOLD | COMMUNITY
End: 2025-01-20

## 2025-01-20 RX ORDER — LOSARTAN POTASSIUM 25 MG/1
25 TABLET ORAL DAILY
COMMUNITY
End: 2025-01-22 | Stop reason: HOSPADM

## 2025-01-20 RX ORDER — LORATADINE 10 MG/1
10 TABLET ORAL DAILY
Status: ON HOLD | COMMUNITY
End: 2025-01-20

## 2025-01-20 RX ADMIN — LORAZEPAM 1 MG: 1 TABLET ORAL at 16:20

## 2025-01-20 RX ADMIN — ATORVASTATIN CALCIUM 20 MG: 10 TABLET, FILM COATED ORAL at 09:40

## 2025-01-20 RX ADMIN — CHLORDIAZEPOXIDE HYDROCHLORIDE 50 MG: 25 CAPSULE ORAL at 21:46

## 2025-01-20 RX ADMIN — PANTOPRAZOLE SODIUM 40 MG: 40 TABLET, DELAYED RELEASE ORAL at 06:09

## 2025-01-20 RX ADMIN — METOPROLOL SUCCINATE 12.5 MG: 25 TABLET, EXTENDED RELEASE ORAL at 09:41

## 2025-01-20 RX ADMIN — Medication 10 ML: at 21:46

## 2025-01-20 RX ADMIN — CHLORDIAZEPOXIDE HYDROCHLORIDE 50 MG: 25 CAPSULE ORAL at 09:40

## 2025-01-20 RX ADMIN — THIAMINE HYDROCHLORIDE 200 MG: 100 INJECTION, SOLUTION INTRAMUSCULAR; INTRAVENOUS at 14:56

## 2025-01-20 RX ADMIN — BUSPIRONE HYDROCHLORIDE 7.5 MG: 5 TABLET ORAL at 09:40

## 2025-01-20 RX ADMIN — FOLIC ACID 1 MG: 1 TABLET ORAL at 09:41

## 2025-01-20 RX ADMIN — AMIODARONE HYDROCHLORIDE 200 MG: 200 TABLET ORAL at 09:40

## 2025-01-20 RX ADMIN — ACETAMINOPHEN 650 MG: 325 TABLET ORAL at 06:09

## 2025-01-20 RX ADMIN — Medication 10 ML: at 09:43

## 2025-01-20 RX ADMIN — THIAMINE HYDROCHLORIDE 200 MG: 100 INJECTION, SOLUTION INTRAMUSCULAR; INTRAVENOUS at 21:46

## 2025-01-20 RX ADMIN — BUSPIRONE HYDROCHLORIDE 7.5 MG: 5 TABLET ORAL at 21:46

## 2025-01-20 RX ADMIN — APIXABAN 5 MG: 5 TABLET, FILM COATED ORAL at 09:40

## 2025-01-20 RX ADMIN — CEFTRIAXONE SODIUM 2000 MG: 2 INJECTION, POWDER, FOR SOLUTION INTRAMUSCULAR; INTRAVENOUS at 21:46

## 2025-01-20 RX ADMIN — APIXABAN 5 MG: 5 TABLET, FILM COATED ORAL at 21:46

## 2025-01-20 RX ADMIN — TRAMADOL HYDROCHLORIDE 25 MG: 50 TABLET, COATED ORAL at 06:09

## 2025-01-20 NOTE — CONSULTS
[facility]  INPATIENT WOUND & OSTOMY CARE    Today's Date: 01/20/25    Patient Name: Dayron Lubin  MRN: [id number]  CSN: [id number]  PCP: Herberth Nguyen Jr., MD  Attending Provider: Dayron Griggs DO  Length of Stay: 3    Wound care consulted for severe excoriation to buttocks, kieran-area present on admission. Nursing has uploaded picture to chart. Patient was admitted with failure to thrive on 1/17/2025. Patient is currently sitting up in bed eating breakfast.     Patient was last seen by inpatient wound care on 12/05/2024 by BRI Murcia. He was treated for incontinence associated dermatitis and a stage 3 pressure injury to the left buttock area. This was treated with magic barrier cream. Patient had fecal management device to assist with moisture management from frequent incontinence episodes.     Patients incontinence dermatitis looks to be improved since admission. Patient states he is letting staff know when he has an incontinence episode and they are quickly cleaning him up. He has zinc barrier cream with menthol in place during my assessment.           Wound: Healing stage 3 pressure injury to left buttock  Base: red & moist   Periwound: pink blanchable   Edges: open attached to wound   Drainage: small amount of serosanguinous drainage      Wound is no longer down to subcutaneous tissue.     Wound RN Orders (last 12 hours) (12h ago, onward)       Start     Ordered    01/20/25 0852  Wound Care  Per Order Details        Question:  Wound Care Instructions  Answer:  Apply Moisture Barrier After Any Incontinence and PRN. Zinc barrier with menthol    01/20/25 0851    01/20/25 0851  Head of Bed 30 Degrees or Less (Unless Contraindicated)  Until Discontinued         01/20/25 0851    01/20/25 0851  Elevate Heels Off of Bed  Until Discontinued         01/20/25 0851    01/20/25 0851  Use Seat Cushion When Up In Chair  Continuous         01/20/25 0851    01/20/25 0851  Silicone Border  Dressing to Bony Prominences  Per Order Details        Comments: Apply silicone border foam dressing per protocol to bilateral heels for protection.  Nursing to change dressing every 3 days and PRN if soiled. Nursing is to peel back dressing with every assessment to assess skin underneath dressing. No barrier cream under dressing.    01/20/25 0851    01/20/25 0851  Use Repositioning Wedge to Position Patient  Continuous        Comments: Use Comfort Glide repositioning sheet and wedges to position patient.    01/20/25 0851    01/17/25 2200  Wound Care  4 Times Daily      Comments: Make sure patient is turned every 2 with air as much as possible to the area.   Question Answer Comment   Wound Locations Coccyx and perineum    Wound Care Instructions Patient is to be bathed thoroughly daily.  Apply ostomy powder to perineum, dust off excess.  Apply zinc barrier with methanol over crusting area 4 times daily and as needed        01/17/25 1817    Unscheduled  Up With Assistance  As Needed       01/17/25 2024              Patient educated on importance of turning and repositioning at frequent intervals to prevent skin breakdown.     Inpatient wound care will continue to follow during hospital stay.  Please contact if any issues or concerns arise.     This document has been electronically signed by Daylin Ross RN on 1/20/2025 08:14 CST

## 2025-01-20 NOTE — THERAPY TREATMENT NOTE
Acute Care - Physical Therapy Treatment Note  [facility]     Patient Name: Dayron Lubin  : 1955  MRN: [id number]  Today's Date: 2025      Visit Dx:     ICD-10-CM ICD-9-CM   1. Cellulitis, unspecified cellulitis site  L03.90 682.9   2. Acute UTI  N39.0 599.0   3. History of alcohol consumption  Z87.898 V11.3   4. History of alcoholic hepatitis  Z87.19 V12.79   5. Drug use  F19.90 305.90   6. Dysphagia, unspecified type  R13.10 787.20   7. Impaired mobility [Z74.09]  Z74.09 799.89     Patient Active Problem List   Diagnosis    Prostate cancer    Hx of radiation therapy    Elevated PSA    Encounter for follow-up surveillance of prostate cancer    HOCM (hypertrophic obstructive cardiomyopathy)    Coronary artery disease involving native coronary artery of native heart without angina pectoris    Essential hypertension    Mixed hyperlipidemia    Class 1 obesity with alveolar hypoventilation, serious comorbidity, and body mass index (BMI) of 30.0 to 30.9 in adult    Displaced fracture of lateral malleolus of right fibula, initial encounter for closed fracture    Family history of colonic polyps    History of colon polyps    Elevated brain natriuretic peptide (BNP) level    Anxiety associated with depression    Alcohol abuse    Alcoholic hepatitis    Volume depletion    Anorexia    CHF (congestive heart failure)    Hypokalemia    Duodenitis    Gait instability    SVT (supraventricular tachycardia)    Physical debility    Atrial fibrillation with rapid ventricular response    Alcoholism in remission    Paroxysmal atrial fibrillation    Nasal polyposis    Nasal congestion    Estelle bullosa    Long-term use of high-risk medication    Multiple falls    Thrombocytopenia    Non-traumatic rhabdomyolysis    Alcohol withdrawal    Pancytopenia    Opiate abuse, episodic    Large left gluteal compartment intramuscular hematomas, currently still healing    Chronic anticoagulation    Cellulitis    Dermatitis neglecta     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    Failure to thrive in adult    Urinary tract infection    Diarrhea     Past Medical History:   Diagnosis Date    A-fib     Alcoholic hepatitis 07/13/2023    CAD (coronary artery disease)     History of external beam radiation therapy 05/12/2016    6840 cGy to prostate bed    Hyperlipidemia     Hypertension     Insomnia     Prostate cancer      Past Surgical History:   Procedure Laterality Date    CARDIAC CATHETERIZATION      CORONARY ANGIOPLASTY WITH STENT PLACEMENT      FRACTURE SURGERY      PROSTATE SURGERY      TONSILLECTOMY       PT Assessment (Last 12 Hours)       PT Evaluation and Treatment       Row Name 01/20/25 0931          Physical Therapy Time and Intention    Subjective Information complains of;pain  -MONIQUE     Document Type therapy note (daily note)  -MONIQUE     Mode of Treatment physical therapy  -MONIQUE       Row Name 01/20/25 0931          General Information    Existing Precautions/Restrictions fall  -MONIQUE       Row Name 01/20/25 0931          Pain    Pretreatment Pain Rating 8/10  -MONIQUE     Posttreatment Pain Rating 8/10  -MONIQUE     Pain Location hip  -MONIQUE     Pain Side/Orientation left  -MONIQUE     Pain Management Interventions exercise or physical activity utilized  -MONIQUE     Response to Pain Interventions activity participation with tolerable pain  -MONIQUE       Row Name 01/20/25 0931          Bed Mobility    Supine-Sit [place] (Bed Mobility) standby assist  -MONIQUE       Row Name 01/20/25 0931          Sit-Stand Transfer    Sit-Stand [place] (Transfers) verbal cues;contact guard  -MONIQUE     Assistive Device (Sit-Stand Transfers) walker, front-wheeled  -MONIQUE       Row Name 01/20/25 0931          Stand-Sit Transfer    Stand-Sit [place] (Transfers) verbal cues;contact guard  -MONIQUE     Assistive Device (Stand-Sit Transfers) walker, front-wheeled  -MONIQUE       Row Name 01/20/25 0931          Gait/Stairs (Locomotion)    [place]otte Level (Gait) verbal cues;contact guard  -MONIQUE     Assistive  Device (Gait) walker, front-wheeled  -MONIQUE     Distance in Feet (Gait) 50  -MONIQUE     Bilateral Gait Deviations forward flexed posture  -MONIQUE       Row Name 01/20/25 0931          Motor Skills    Comments, Therapeutic Exercise sitting AROM BLE X 20  -MONIQUE     Additional Documentation Comments, Therapeutic Exercise (Row)  -MONIQUE       Row Name             Wound 12/04/24 1248 coccyx    Wound - Properties Group Placement Date: 12/04/24  -AH Placement Time: 1248  -AH Location: coccyx  -AH    Retired Wound - Properties Group Placement Date: 12/04/24  -AH Placement Time: 1248  -AH Location: coccyx  -AH    Retired Wound - Properties Group Placement Date: 12/04/24  -AH Placement Time: 1248  -AH Location: coccyx  -AH    Retired Wound - Properties Group Date first assessed: 12/04/24  -AH Time first assessed: 1248  -AH Location: coccyx  -AH      Row Name             Wound 11/16/24 1205 coccyx    Wound - Properties Group Placement Date: 11/16/24  -OS Placement Time: 1205  -OS Location: coccyx  -OS    Retired Wound - Properties Group Placement Date: 11/16/24  -OS Placement Time: 1205  -OS Location: coccyx  -OS    Retired Wound - Properties Group Placement Date: 11/16/24  -OS Placement Time: 1205  -OS Location: coccyx  -OS    Retired Wound - Properties Group Date first assessed: 11/16/24  -OS Time first assessed: 1205  -OS Location: coccyx  -OS      Row Name             Wound 12/07/24 Left anterior third toe    Wound - Properties Group Placement Date: 12/07/24  -BB Side: Left  -BB Orientation: anterior  -BB Location: third toe  -BB    Retired Wound - Properties Group Placement Date: 12/07/24  -BB Side: Left  -BB Orientation: anterior  -BB Location: third toe  -BB    Retired Wound - Properties Group Placement Date: 12/07/24  -BB Side: Left  -BB Orientation: anterior  -BB Location: third toe  -BB    Retired Wound - Properties Group Date first assessed: 12/07/24  -BB Side: Left  -BB Location: third toe  -BB      Row Name 01/20/25 0931           Positioning and Restraints    Pre-Treatment Position in bed  -MONIQUE     Post Treatment Position chair  -MONIQUE     In Chair reclined;call light within reach;encouraged to call for assist  -MONIQUE               User Key  (r) = Recorded By, (t) = Taken By, (c) = Cosigned By      Initials Name Provider Type    MONIQUE Parish Santos PTA Physical Therapist Assistant    Aura Guzman, RN Registered Nurse    Claudia Howard RN Registered Nurse    Consuelo Cotton RN Registered Nurse                    Physical Therapy Education       Title: PT OT SLP Therapies (In Progress)       Topic: Physical Therapy (In Progress)       Point: Mobility training (Done)       Learning Progress Summary            Patient Acceptance, E, VU by  at 1/20/2025 0931    Comment: gait, safety, benefits of activity    Acceptance, E,D, VU by  at 1/18/2025 1555    Comment: Walker usage, transfer techniques, plan of care, home safety                      Point: Home exercise program (Not Started)       Learner Progress:  Not documented in this visit.              Point: Body mechanics (Done)       Learning Progress Summary            Patient Acceptance, E,D, VU by  at 1/18/2025 1555    Comment: Walker usage, transfer techniques, plan of care, home safety                      Point: Precautions (Done)       Learning Progress Summary            Patient Acceptance, E,D, VU by  at 1/18/2025 1555    Comment: Walker usage, transfer techniques, plan of care, home safety                                      User Key       Initials Effective Dates Name Provider Type [facility] 02/03/23 -  Parish Santos PTA Physical Therapist Assistant PT    BL 12/17/24 -  Gael Joe PT Student PT Student PT                  PT Recommendation and Plan         Outcome Measures       Row Name 01/20/25 0931             How much help from another person do you currently need...    Turning from your back to your side while in flat bed without using bedrails?  4  -MONIQUE      Moving from lying on back to sitting on the side of a flat bed without bedrails? 4  -MONIQUE      Moving to and from a bed to a chair (including a wheelchair)? 3  -MONIQUE      Standing up from a chair using your arms (e.g., wheelchair, bedside chair)? 3  -MONIQUE      Climbing 3-5 steps with a railing? 3  -MONIQUE      To walk in hospital room? 3  -MONIQUE      AM-PAC 6 Clicks Score (PT) 20  -MONIQUE         Functional Assessment    Outcome Measure Options AM-PAC 6 Clicks Basic Mobility (PT)  -MONIQUE                User Key  (r) = Recorded By, (t) = Taken By, (c) = Cosigned By      Initials Name Provider Type    Parish King PTA Physical Therapist Assistant                     Time Calculation:    PT Charges       Row Name 01/20/25 0931             Time Calculation    Start Time 0931  -MONIQUE      Stop Time 0955  -MONIQUE      Time Calculation (min) 24 min  -MONIQUE      PT Received On 01/20/25  -MONIQUE         Time Calculation- PT    Total Timed Code Minutes- PT 24 minute(s)  -OMNIQUE         Timed Charges    25154 - PT Therapeutic Exercise Minutes 10  -MONIQUE      15645 - Gait Training Minutes  14  -MONIQUE         Total Minutes    Timed Charges Total Minutes 24  -MONIQUE       Total Minutes 24  -MONIQUE                User Key  (r) = Recorded By, (t) = Taken By, (c) = Cosigned By      Initials Name Provider Type    Parish King PTA Physical Therapist Assistant                  Therapy Charges for Today       Code Description Service Date Service Provider Modifiers Qty    32060007746 HC GAIT TRAINING EA 15 MIN 1/20/2025 Parish Santos PTA GP 1    84995165860 HC PT THER PROC EA 15 MIN 1/20/2025 Parish Santos PTA GP 1            PT G-Codes  Outcome Measure Options: AM-PAC 6 Clicks Basic Mobility (PT)  AM-PAC 6 Clicks Score (PT): 20  AM-PAC 6 Clicks Score (OT): 22    Parish Santos PTA  1/20/2025

## 2025-01-20 NOTE — PLAN OF CARE
Goal Outcome Evaluation:  Plan of Care Reviewed With: patient        Progress: improving  Outcome Evaluation: AOx4, voiding per urinal, incontinent at times, BM x2 this shift per BSC, loose stools cont, CIWA protocol, wound care to coccyx and kieran area, rocephine, tele NSR, isolation for C diff, waiting on stool cx, +BC sent to [place] for ID, turn q2h for excoriated coccyx per wound care, excoriated coccyx improving, PRN restoril given for sleep, ultram given x2 for c/o pain.

## 2025-01-20 NOTE — CASE MANAGEMENT/SOCIAL WORK
Continued Stay Note  SHAYLA Hernandez     Patient Name: Dayron Lubin  MRN: [id number]  Today's Date: 1/20/2025    Admit Date: 1/17/2025    Plan: SNF Referrals   Discharge Plan       Row Name 01/20/25 1607       Plan    Plan SNF Referrals    Patient/Family in Agreement with Plan yes    Plan Comments [facility] and [facility] unable to take pt. [facility] reviewing. Spoke with pt's sister Yuliya [phone] about possibly taking pt home if he does not get accepted. She requested to check on the two places in [place] and [place]. Referral sent.                   Discharge Codes    No documentation.                       LILIAN Jackson

## 2025-01-20 NOTE — DISCHARGE PLACEMENT REQUEST
"TristianDayron head (69 y.o. Male)       Date of Birth   1955    Social Security Number       Address   [address] [id number]    Home Phone   [phone]    MRN   [id number]       [facility]    Marital Status   Single                            Admission Date   1/17/25    Admission Type   Emergency    Admitting Provider   Dayron Griggs DO    Attending Provider   Dayron Griggs DO    Department, Room/Bed   [facility] 3C, 391/1       Discharge Date       Discharge Disposition       Discharge Destination                                 Attending Provider: Dayron Griggs DO    Allergies: No Known Allergies    Isolation: Spore   Infection: C.difficile (12/22/24)   Code Status: CPR    Ht: 180.3 cm (71\")   Wt: 84.5 kg (186 lb 3.2 oz)    Admission Cmt: None   Principal Problem: Failure to thrive in adult [R62.7]                   Active Insurance as of 1/17/2025       Primary Coverage       Payor Plan Insurance Group Employer/Plan Group    MEDICARE MEDICARE A & B        Payor Plan Address Payor Plan Phone Number Payor Plan Fax Number Effective Dates    [address] [phone]  7/1/2019 - None Entered    [address] [id number]         Subscriber Name Subscriber Birth Date Member ID       DAYRON CARR 1955 [id number]                     Emergency Contacts        (Rel.) Home Phone Work Phone Mobile Phone    Yuliya Stone (Sister) [phone] -- --    JAK CARR (Brother) -- -- [phone]                 History & Physical        , BRI Moreno at 01/17/25 1627       Attestation signed by Dayron Griggs DO at 01/17/25 2207    I performed a substantive part of the MDM during the patient’s E/M visit. I personally made or   approved the documented management plan and acknowledge its risk of complications.   (Independent Interpretation) My (EKG/X-Ray/US/CT) interpretation - imaging reports reviewed  (Discussion) Management/test " interpretation discussed with Tiffanie , BRI.    Patient does have some bilirubin elevation on arrival but likely in the setting of recent EtOH and drug use.  CT abdomen pelvis did mention some possible sludge in the gallbladder.  Repeat labs in the morning and monitor closely.  Does not seem to have any overt abdominal symptoms.  His previous hematomas improving in the gluteus medius and robbin.  Overall patient is a chronically ill noncompliant patient with alcohol and drug abuse.  Significantly complicated patient to treat due to his noncompliance and general adventures in life.    Electronically signed by Dayron Griggs DO, 1/17/2025, 21:59 CST.                      [facility]  HISTORY AND PHYSICAL    Date of Admission: 1/17/2025  Primary Care Physician: Herberth Nguyen Jr., MD    Subjective   Primary Historian: Patient    Chief Complaint: Frequent falls    History of Present Illness  Dayron Lubin is a 69-year-old male with an extensive past history of failure to care for himself due to alcoholism.  Additional history of paroxysmal atrial fibrillation on chronic Eliquis, CAD, prostate cancer in remission, hyperlipidemia, hypertension and most recently treated for a large gluteal compartment intramuscular hematoma due to a fall at home.  This resulted in patient being discharged to skilled nursing facility which the patient left AMA.  He presented today per EMS.  Patient reported that yesterday he tripped over a rug and fell on a heater hitting his knee when he got up he hit his head.  He denied any loss of consciousness he stated he got up within a few minutes and was able to remove.  His brother visited him this afternoon and called EMS.  Upon presentation he denied any new pain he admits to leaving SNF AMA and zooming drinking last alcohol consumption was 11 AM this morning where he added 2 shots of vodka to his orange juice.  The patient on last  admission had to be transferred to ICU due to delirium tremors and placed on phenobarbital.  He denied any neck, back, chest or abdominal pain.  X-rays of knee, tib-fib, CT of the head, cervical spine and abdomen pelvis were obtained all with no acute findings or acute fractures.  He stated his only complaint with the he noticed some redness in his left groin area for the past few days and it has been somewhat tender.  He states he wears a brief due to urinary incontinence and he states that he has daily bowel movements with occasional diarrhea and he only changes the diaper once daily regardless.    Upon admission patient is resting in bed on room air with no family at bedside.  Patient is alert and oriented and able to participate in assessment.  As patient is familiar to me I had a lengthy discussion with patient regarding his alcohol use, noncompliance with treatment and his extensive erythema with possible cellulitis to previous hematoma coccyx, perineum and scrotal area.  Patient is agreeable to return to skilled nursing facility at discharge as he will need extensive wound care.  Patient would be placed on CIWA protocol initially with scheduled Librium and as needed Ativan.  Will evaluate very closely for any worsening symptoms and need for restarting phenobarbital.  Patient verbalized understanding of plan of care, all questions were answered to the best my ability and he is in agreement for admission and treatment.    ED workup revealed , CL 90, anion gap of 18, , AST 50, ALT 21, total bilirubin 1.7, lactate 3.3, after fluid bolus 1.4, lipase 88, WBC 10.91 with no left shift or bandemia, urinalysis revealed small blood, positive nitrates small leukocytes and trace bacteria.  Patient was positive for barbiturates, benzodiazepines, oxycodone and cocaine of which patient denies any usage.       Review of Systems   Constitutional:  Positive for fatigue.   Respiratory:  Negative for shortness of  breath.    Cardiovascular:  Negative for chest pain.   Gastrointestinal:  Positive for diarrhea. Negative for nausea and vomiting.   Skin:  Positive for wound.   Neurological:  Negative for tremors and syncope.      Otherwise complete ROS reviewed and negative except as mentioned in the HPI.    Past Medical History:   Past Medical History:   Diagnosis Date    A-fib     Alcoholic hepatitis 07/13/2023    CAD (coronary artery disease)     History of external beam radiation therapy 05/12/2016    6840 cGy to prostate bed    Hyperlipidemia     Hypertension     Insomnia     Prostate cancer      Past Surgical History:  Past Surgical History:   Procedure Laterality Date    CARDIAC CATHETERIZATION      CORONARY ANGIOPLASTY WITH STENT PLACEMENT      FRACTURE SURGERY      PROSTATE SURGERY      TONSILLECTOMY       Social History:  reports that he has never smoked. He has never used smokeless tobacco. He reports current alcohol use of about 50.0 standard drinks of alcohol per week. He reports that he does not currently use drugs after having used the following drugs: Marijuana.    Family History: family history includes Coronary artery disease in his mother; Heart disease in his mother; Stroke in his mother.       Allergies:  No Known Allergies    Medications:  Prior to Admission medications    Medication Sig Start Date End Date Taking? Authorizing Provider   amiodarone (PACERONE) 200 MG tablet Take 1 tablet by mouth Daily. 9/10/24  Yes Italia Vasquez PA   apixaban (ELIQUIS) 5 MG tablet tablet Take 1 tablet by mouth Every 12 (Twelve) Hours. Indications: Atrial Fibrillation 11/23/24  Yes Italia Vasquez PA   atorvastatin (LIPITOR) 20 MG tablet Take 1 tablet by mouth Daily.   Yes Steve Devries MD   folic acid (FOLVITE) 1 MG tablet Take 1 tablet by mouth Daily. 11/22/24  Yes Holger Karimi DO   pantoprazole (PROTONIX) 40 MG EC tablet Take 1 tablet by mouth Daily.   Yes Steve Devries MD   thiamine (VITAMIN  "B1) 100 MG tablet Take 1 tablet by mouth Daily. 11/22/24  Yes Holger Karimi DO   azelastine (ASTELIN) 0.1 % nasal spray 2 sprays into the nostril(s) as directed by provider 2 (Two) Times a Day. Use in each nostril as directed  Patient taking differently: Administer 2 sprays into the nostril(s) as directed by provider 2 (Two) Times a Day As Needed for Allergies. Use in each nostril as directed 9/5/24   Gonzalo Martinez APRN   busPIRone (BUSPAR) 5 MG tablet Take 1.5 tablets by mouth 2 (Two) Times a Day. 12/9/24   New Griffiths MD   fluticasone (FLONASE) 50 MCG/ACT nasal spray 2 sprays into the nostril(s) as directed by provider Daily.  Patient taking differently: Administer 2 sprays into the nostril(s) as directed by provider Daily As Needed for Allergies. 9/5/24   Gonzalo Martinez APRN   loperamide (IMODIUM) 2 MG capsule Take 1 capsule by mouth 4 (Four) Times a Day As Needed for Diarrhea. 12/9/24   New Griffiths MD   metoprolol succinate XL (TOPROL-XL) 25 MG 24 hr tablet Take 0.5 tablets by mouth Daily for 30 days. 12/28/24 1/27/25  Raul Gifford MD   naloxone (NARCAN) 4 MG/0.1ML nasal spray Call 911. Don't prime. [place] in 1 nostril for overdose. Repeat in 2-3 minutes in other nostril if no or minimal breathing/responsiveness. 12/27/24   Raul Gifford MD     I have utilized all available immediate resources to obtain, update, or review the patient's current medications (including all prescriptions, over-the-counter products, herbals, cannabis/cannabidiol products, and vitamin/mineral/dietary (nutritional) supplements).    Objective     Vital Signs: /89   Pulse 92   Temp 98.9 °F (37.2 °C) (Oral)   Resp 16   Ht 180.3 cm (71\")   Wt 84.5 kg (186 lb 3.2 oz)   SpO2 97%   BMI 25.97 kg/m²   Physical Exam  Constitutional:       General: He is not in acute distress.     Appearance: He is not ill-appearing or toxic-appearing.      Comments: Patient is extremely disheveled, feces " apparent on his hands and fingers.  Multiple areas of scabbing and dirt on his feet.   HENT:      Right Ear: Tympanic membrane normal.      Left Ear: Tympanic membrane normal.      Nose: Nose normal.      Mouth/Throat:      Mouth: Mucous membranes are moist.      Pharynx: Oropharynx is clear.   Eyes:      Pupils: Pupils are equal, round, and reactive to light.   Cardiovascular:      Rate and Rhythm: Normal rate and regular rhythm.      Pulses: Normal pulses.      Heart sounds: Normal heart sounds.   Pulmonary:      Effort: Pulmonary effort is normal.      Breath sounds: Normal breath sounds.   Abdominal:      General: Abdomen is protuberant. Bowel sounds are normal.      Palpations: Abdomen is soft.      Tenderness: There is no abdominal tenderness.   Genitourinary:     Comments: Voiding per urinal  Musculoskeletal:      Cervical back: Normal range of motion. No rigidity or tenderness.      Right lower leg: No edema.      Left lower leg: No edema.   Skin:     Findings: Bruising, erythema, rash and wound present. Rash is crusting.      Comments: See attached photos   Neurological:      Mental Status: He is alert and oriented to person, place, and time.   Psychiatric:         Attention and Perception: Attention normal.         Mood and Affect: Mood normal.         Speech: Speech normal.         Behavior: Behavior is cooperative.         Thought Content: Thought content normal.         Cognition and Memory: Memory is impaired.            1/17/2025 coccyx region  Results Reviewed:  Lab Results (last 24 hours)       Procedure Component Value Units Date/Time    Lactic Acid, Plasma [[id number]]  (Abnormal) Collected: 01/17/25 1313    Specimen: Blood Updated: 01/17/25 1556     Lactate 3.3 mmol/L     Urinalysis, Microscopic Only - Straight Cath [[id number]]  (Abnormal) Collected: 01/17/25 1322    Specimen: Urine from Straight Cath Updated: 01/17/25 1421     RBC, UA 0-2 /HPF      WBC, UA 0-2 /HPF      Comment: Urine culture not  indicated.        Bacteria, UA Trace /HPF      Squamous Epithelial Cells, UA 0-2 /HPF      Hyaline Casts, UA 13-20 /LPF      Methodology Manual Light Microscopy    Fentanyl, Urine - Straight Cath [[id number]]  (Normal) Collected: 01/17/25 1322    Specimen: Urine from Straight Cath Updated: 01/17/25 1354     Fentanyl, Urine Negative            Urinalysis With Culture If Indicated - Straight Cath [[id number]]  (Abnormal) Collected: 01/17/25 1322    Specimen: Urine from Straight Cath Updated: 01/17/25 1353     Color, UA [place]     Appearance, UA Clear     pH, UA 8.0     Specific Gravity, UA >1.030     Glucose, UA Negative     Ketones, UA 40 mg/dL (2+)     Bilirubin, UA Small (1+)     Blood, UA Small (1+)     Protein, UA >=300 mg/dL (3+)     Leuk Esterase, UA Small (1+)     Nitrite, UA Positive     Urobilinogen, UA 1.0 E.U./dL    Narrative:      Dipstick results may be inaccurate due to color interference.    Urine Drug Screen - Straight Cath [[id number]]  (Abnormal) Collected: 01/17/25 1322    Specimen: Urine from Straight Cath Updated: 01/17/25 1353     THC, Screen, Urine Negative     Phencyclidine (PCP), Urine Negative     Cocaine Screen, Urine Positive     Methamphetamine, Ur Negative     Opiate Screen Negative     Amphetamine Screen, Urine Negative     Benzodiazepine Screen, Urine Positive     Tricyclic Antidepressants Screen Negative     Methadone Screen, Urine Negative     Barbiturates Screen, Urine Positive     Oxycodone Screen, Urine Positive     Buprenorphine, Screen, Urine Negative            Comprehensive Metabolic Panel [[id number]]  (Abnormal) Collected: 01/17/25 1313    Specimen: Blood Updated: 01/17/25 1344     Glucose 139 mg/dL      BUN 19 mg/dL      Creatinine 0.90 mg/dL      Sodium 132 mmol/L      Potassium 4.1 mmol/L      Comment: Slight hemolysis detected by analyzer. Result may be falsely elevated.        Chloride 90 mmol/L      CO2 24.0 mmol/L      Calcium 9.4 mg/dL      Total Protein 7.6 g/dL       Albumin 4.1 g/dL      ALT (SGPT) 21 U/L      AST (SGOT) 50 U/L      Comment: Slight hemolysis detected by analyzer. Result may be falsely elevated.        Alkaline Phosphatase 84 U/L      Total Bilirubin 1.7 mg/dL      Globulin 3.5 gm/dL      A/G Ratio 1.2 g/dL      BUN/Creatinine Ratio 21.1     Anion Gap 18.0 mmol/L      eGFR 91.9 mL/min/1.73             Salicylate Level [[id number]]  (Normal) Collected: 01/17/25 1313    Specimen: Blood Updated: 01/17/25 1340     Salicylate <0.3 mg/dL     Acetaminophen Level [[id number]]  (Normal) Collected: 01/17/25 1313    Specimen: Blood Updated: 01/17/25 1340     Acetaminophen <5.0 mcg/mL     Lipase [[id number]]  (Abnormal) Collected: 01/17/25 1313    Specimen: Blood Updated: 01/17/25 1336     Lipase 88 U/L     Ethanol [[id number]] Collected: 01/17/25 1313    Specimen: Blood Updated: 01/17/25 1335     Ethanol % <0.010 %     Narrative:      Not for legal purposes. Chain of Custody not followed.     CBC & Differential [[id number]]  (Abnormal) Collected: 01/17/25 1313    Specimen: Blood Updated: 01/17/25 1324            CBC Auto Differential [[id number]]  (Abnormal) Collected: 01/17/25 1313    Specimen: Blood Updated: 01/17/25 1324     WBC 10.91 10*3/mm3      RBC 3.59 10*6/mm3      Hemoglobin 10.9 g/dL      Hematocrit 32.6 %      MCV 90.8 fL      MCH 30.4 pg      MCHC 33.4 g/dL      RDW 15.3 %      RDW-SD 51.1 fl      MPV 10.5 fL      Platelets 296 10*3/mm3      Neutrophil % 81.6 %      Lymphocyte % 8.2 %      Monocyte % 9.2 %      Eosinophil % 0.1 %      Basophil % 0.2 %      Immature Grans % 0.7 %      Neutrophils, Absolute 8.91 10*3/mm3      Lymphocytes, Absolute 0.89 10*3/mm3      Monocytes, Absolute 1.00 10*3/mm3      Eosinophils, Absolute 0.01 10*3/mm3      Basophils, Absolute 0.02 10*3/mm3      Immature Grans, Absolute 0.08 10*3/mm3      nRBC 0.0 /100 WBC     Blood Culture - Blood, Hand, Right [[id number]] Collected: 01/17/25 1313    Specimen: Blood from Hand, Right Updated:  01/17/25 1322    Blood Culture - Blood, Hand, Left [[id number]] Collected: 01/17/25 1313    Specimen: Blood from Hand, Left Updated: 01/17/25 1322          Imaging Results (Last 24 Hours)       Procedure Component Value Units Date/Time    CT Abdomen Pelvis With Contrast [[id number]] Collected: 01/17/25 1441     Updated: 01/17/25 1507    Narrative:      EXAMINATION: CT ABDOMEN PELVIS W CONTRAST-      1/17/2025 1:24 PM     HISTORY: pelvic infection/cellulitis. nec fas?     In order to have a CT radiation dose as low as reasonably achievable  Automated Exposure Control was utilized for adjustment of the mA and/or  KV according to patient size.     Total DLP = 401.9 mGy.cm     The CT scan of the abdomen and pelvis is performed after intravenous  contrast enhancement.     Images are acquired in axial plane and subsequent reconstruction in  coronal and sagittal planes.     Comparison is made with the previous study dated 12/20/2024 and  11/16/2024.     The lung bases included in the study are unremarkable.     Limited visualized cardiomediastinal structures show moderate  cardiomegaly. There is an apparent focal bulge of the left ventricle  which may suggest an aneurysm of the left ventricle?. This may be  clinically correlated.     There is fatty infiltration of the liver. The spleen is normal.     The gallbladder is moderately distended with high density material which  may represent sludge or contrast excretion. No definite radiopaque  calculi.     Pancreas is normal.     The adrenal glands are normal.     Moderate lobulation of renal contour bilaterally is seen. There is a  small low-density nodule located posteriorly in the lower pole of the  left kidney measuring 8 mm in diameter. CT density suggest a cyst. No  radiopaque calculi on either side. No hydronephrosis. The ureters are  nondilated. Urinary bladder poorly distended with mild wall thickening.  No obvious intrinsic abnormality.     The prostate is surgically  absent.     There is a fat-containing left inguinoscrotal hernia.     The stomach and duodenum are normal. Small bowel is nondistended.  Appendix is normal. Small amount of stool and gas is seen in the colon.  There is diverticulosis of the distal colon. No finding to suggest  diverticulitis.     Atheromatous changes of the abdominal aorta and iliac arteries. No  aneurysmal dilatation.     No evidence of abdominal or pelvic lymphadenopathy.     Images reviewed in bone window show no acute bony abnormality. Chronic  degenerative changes of the lumbar spine are seen. There is bilateral  spondylolysis L5 without a significant spondylolisthesis. Old healed  fractures of the left lower ribs are seen.     There is a lobulated oblong high density fluid accumulation  predominantly in the left gluteus medius with a surrounding ring-shaped  enhancement measuring 12 cm in craniocaudal projection in the coronal  plane images and 10 cm in horizontal projection and axial plane images.  The size of the gluteus medius and robbin has overall decreased since  the previous study. There is another high density lobulated masslike  area located between the tensor fascia froilan laterally, rectus femoris  anteriorly and vastus lateralis and medius posterolaterally. This may  represent an evolving hematoma?. This was not noted in the previous  study.       Impression:      1. There is overall improvement in size of the hematoma/swelling of the  left gluteus medius and robbin. However there is now a well-formed  collection, in the gluteus medius, with a surrounding ring enhancement  which may represent an abscess. There is a new high density masslike  lobulated area located more anteriorly which measures 3.8 cm in diameter  in axial plane images. This may represent hematoma?.  2. Hepatic steatosis.  3. Moderately dilated gallbladder with sludge?. No stones.  4. Diverticulosis of the colon. No evidence for diverticulitis.  4. Other nonacute  findings similar to the previous study.                                               This report was signed and finalized on 1/17/2025 3:04 PM by Dr. Aleat Ramos MD.       CT Cervical Spine Without Contrast [[id number]] Collected: 01/17/25 1438     Updated: 01/17/25 1445    Narrative:      Exam: CT CERVICAL SPINE WO CONTRAST- 1/17/2025 1:24 PM     HISTORY: fall etoh eliquis     COMPARISON: 12/4/2024     DOSE LENGTH PRODUCT: 1153.51 mGy.cm mGy cm. Automated exposure control  was also utilized to decrease patient radiation dose.     TECHNIQUE: Serial helical tomographic images of the cervical spine were  obtained without the use of intravenous contrast. Additionally, sagittal  and coronal reformatted images were also provided for review.     FINDINGS:     Straightening of the cervical lordosis.     No acute fracture. The vertebral body heights are preserved.     Multilevel degenerative disc disease with posterior disc osteophyte  complexes. Multilevel uncovertebral and facet hypertrophic changes.     Suspect mild spinal canal narrowing at C3-C4, C4-C5. Variable degrees of  foraminal narrowing most notable with at least moderate left C4-C5  foraminal narrowing.     Right greater the left carotid atherosclerosis.     Other:None       Impression:         No acute fracture or traumatic malalignment.     Moderate degenerative changes with suspected mild spinal canal narrowing  at C3-C4 and C4-C5.     Right greater than left carotid atherosclerosis.           This report was signed and finalized on 1/17/2025 2:42 PM by Regan Garner.       CT Head Without Contrast [[id number]] Collected: 01/17/25 1434     Updated: 01/17/25 1441    Narrative:      Exam: CT HEAD WO CONTRAST- 1/17/2025 1:24 PM     HISTORY: head injury eliquis       DOSE LENGTH PRODUCT: 1153.51 mGy.cm mGy cm. Automated exposure control  was also utilized to decrease patient radiation dose.     Technique:  Helically acquired CT of the brain without IV  contrast was performed.  Sagittal and coronal reformations are also provided for review. Soft  tissue and bone kernels are available for interpretation.     Comparison: 12/4/2024.     Findings:     Ventricles and extra-axial CSF spaces are normal in size.     No intraparenchymal or extra-axial hemorrhage.     Gray-white matter differentiation is preserved.     Orbits are grossly unremarkable. Paranasal sinuses are grossly clear.  Mastoid air cells are grossly clear.     No suspicious calvarial or extracranial soft tissue abnormality.       Impression:      Impression:       No acute intracranial abnormality.     This report was signed and finalized on 1/17/2025 2:38 PM by Regan Garner.       XR Tibia Fibula 2 View Left [[id number]] Collected: 01/17/25 1405     Updated: 01/17/25 1409    Narrative:      XR TIBIA FIBULA 2 VW LEFT-     HISTORY: fall left low leg pain eliquis     COMPARISON: None     FINDINGS: Frontal and lateral views of the left tibia and fibula are  obtained.     No fracture or dislocation. Mild to moderate medial joint space  narrowing. Vascular calcifications.       Impression:      1. No acute osseous injury left tibia or fibula.        This report was signed and finalized on 1/17/2025 2:06 PM by Dr. Bri Duong MD.       XR Knee 4+ View Bilateral [[id number]] Collected: 01/17/25 1403     Updated: 01/17/25 1408    Narrative:      XR KNEE 4+ VW BILATERAL-     HISTORY: fall bilateral knee injury eliquis     COMPARISON: None     FINDINGS: 4 views of the right and left knee are obtained.     There is mild medial left joint space narrowing. Borderline medial right  joint space narrowing. Minimal bony spurring of the patella. No acute  fracture or dislocation. No knee joint effusion.     Diffuse vascular calcifications.       Impression:      1. Mild arthritic changes of the knee, joint space narrowing greatest in  the medial compartment of the left knee. No acute osseous injury or  joint  effusion.              This report was signed and finalized on 1/17/2025 2:05 PM by Dr. Bri Duong MD.                 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 Griggs's service here at [facility].    1.  Failure to thrive-patient admitted after multiple falls at home after he left AMA from SNF.  Patient continues to use alcohol and prescription medications.  Patient is not taking care of himself, his wounds and requires continued treatment.  PT/OT and case management consultation for transfer to skilled nursing facility    2.  Cystitis-urinalysis revealed small blood, positive nitrates, small leukocytes and trace bacteria.  Rocephin 2 g initiated.    3.  Dermatitis neglecta-patient upon arrival was covered in feces and dirt.  Orders placed for bathing daily.    4.  Large left gluteal compartment intramuscular hematoma - continuing to heal, ED provider believed it was cellulitis after evaluation I believe it is significant erythema and moisture associated rash from patient sitting in his own feces and urine 4 days at a time.  Discussed the case with Daylin Prasad RN, wound nurse and recommendations for significant wound care placed.  Will continue to monitor closely and wound care APRN will evaluate on Monday.    5.  Paroxysmal atrial fibrillation/chronic anticoagulation-continue home amiodarone and Eliquis, cardiac telemetry.    6.  Alcoholic hepatitis/alcohol abuse-AST 50, ALT 21, total bilirubin 1.7, lipase 88.  Currently stable we will continue to monitor with daily CMP and lipase.   Patient placed on CIWA protocol, scheduled Librium and as needed Ativan.  Previous admission patient had to be transferred to ICU due to significant DTs and placed on phenobarbital.  Patient will be monitored closely for any worsening signs or symptoms.    7.  Essential hypertension-resume home metoprolol, vital signs every 4.    8.  Resumed and restarted home medications as appropriate.    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  VTE prophylaxis with home Eliquis  Labs in a.m.    Medical Decision Making  Failure to thrive, acute on chronic, high complexity, unchanged  Cystitis, acute, moderate complexity, unchanged  Otitis neglected, acute on chronic, moderate complexity, unchanged  Healing wound, chronic, moderate complexity, worsening  Paroxysmal atrial fibrillation, chronic, moderate complexity, stable  Chronic anticoagulation, chronic, moderate complexity, stable  Alcoholic hepatitis, chronic, high complexity, stable  Alcoholic abuse, chronic, high complexity, worsening  Essential hypertension, chronic, moderate complexity, unchanged  Number and Complexity of problems: 8  Differential Diagnosis: None    Conditions and Status        Condition is unchanged.     [facility] Data  External documents reviewed: Extensive epic review of previous hospitalizations  Cardiac tracing (EKG, telemetry) interpretation: Stat EKG pending for QTc measurement  Radiology interpretation: 1/17/2025 x-ray of the knee, tib-fib, CT of the head, abdomen pelvis and cervical spine per radiology reviewed  Labs reviewed: 17 2025 CBC, CMP, lipase, acetaminophen, ethanol, salicylate, lactic acid, magnesium, urinalysis, UDS, lactic acid reviewed, repeat labs in a.m.  Any tests that were considered but not ordered: None     Decision rules/scores evaluated (example [id number], Wells, etc): CIWA score of 3     Discussed with: Dr. Griggs and patient     Care Planning  Shared decision making: Dr. Griggs and patient  Code status  and discussions: Full code per patient    Disposition  Social Determinants of Health that impact treatment or disposition: Failure to thrive will need SNF at discharge, case management consult  Estimated length of stay is undetermined at this time.     I confirmed that the patient's advanced care plan is present, code status is documented, and a surrogate decision maker is listed in the patient's medical record.     The patient's surrogate decision maker is Sister Yuliya Stone.     The patient was seen and examined by me on 1/17/2025 at 1627.    Electronically signed by BRI Song, 01/17/25, 18:31 CST.               Electronically signed by Dayron Griggs DO at 01/17/25 2202          Physician Progress Notes (last 24 hours)        Tiffanie Estrella APRN at 01/20/25 1339          Patient Name: Dayron Lubin  Date of Admission: 1/17/2025  Today's Date: 01/20/25  Length of Stay: 3  Primary Care Physician: Herberth Nguyen Jr., MD    Subjective   Chief Complaint: Frequent falls  Fall  Pertinent negatives include no nausea or vomiting.      Dayron Lubin is a 69-year-old male with an extensive past history of failure to care for himself due to alcoholism. Additional history of paroxysmal atrial fibrillation on chronic Eliquis, CAD, prostate cancer in remission, hyperlipidemia, hypertension and most recently treated for a large gluteal compartment intramuscular hematoma due to a fall at home. This resulted in patient being discharged to skilled nursing facility which the patient left AMA. He presented today per EMS. Patient reported that yesterday he tripped over a rug and fell on a heater hitting his knee when he got up he hit his head. He denied any loss of consciousness he stated he got up within a few minutes and was able to remove. His brother visited him this afternoon and called EMS. Upon presentation he denied any new pain he admits to leaving SNF AMA and zooming drinking last alcohol  consumption was 11 AM this morning where he added 2 shots of vodka to his orange juice. The patient on last admission had to be transferred to ICU due to delirium tremors and placed on phenobarbital. He denied any neck, back, chest or abdominal pain. X-rays of knee, tib-fib, CT of the head, cervical spine and abdomen pelvis were obtained all with no acute findings or acute fractures. He stated his only complaint with the he noticed some redness in his left groin area for the past few days and it has been somewhat tender. He states he wears a brief due to urinary incontinence and he states that he has daily bowel movements with occasional diarrhea and he only changes the diaper once daily regardless.     Today:   Patient is resting comfortably in a chair on room air with family at bedside.  Patient has mild tremors however no additional withdrawal symptoms present.  Patient states he is feeling much better, he was evaluated by Raysa Prasad RN wound nurse today agrees patient's wound is a an incontinence dermatitis with a healing stage III pressure injury to the left buttock region .  She is pleased with his progress over the weekend after we discussed wound care initiated Friday night.  Additional instructions to continue current wound care orders, silicone border dressing to bony prominences and to notify her of any worsening symptoms.  Patient was evaluated by [facility] nursing and rehab today who offered a bed until they were made aware of patient's C. difficile carrier status and had to decline.  We are for additional evaluations from other facilities.  Case management will discuss options with patient and his sister for returning home with her and home health until patient is stable to start alcohol rehab.  Patient has no additional complaints of nausea, vomiting and diarrhea has improved with Imodium.  Patient states his pain is improving with Ultram.  Continue PT/OT, patient made aware of current situation,  all questions were answered to the best my ability and he is in agreement to continue current plan of care    Documented weights    01/17/25 1223   Weight: 84.5 kg (186 lb 3.2 oz)          Intake/Output Summary (Last 24 hours) at 1/20/2025 1345  Last data filed at 1/19/2025 1900  Gross per 24 hour   Intake --   Output 100 ml   Net -100 ml       Results for orders placed during the hospital encounter of 07/29/24    Adult Transthoracic Echo Complete W/ Cont if Necessary Per Protocol    Interpretation Summary    Left ventricular systolic function is low normal. Left ventricular ejection fraction appears to be 51 - 55%.    Left ventricular wall thickness is consistent with moderate to severe concentric hypertrophy.    Mild to moderate mitral valve regurgitation is present.    The aortic valve is thickened and calcified and there is some degree of aortic valve stenosis, likely moderate, most of the flow acceleration appears to be in the left ventricular outflow tract as a result of hypertrophic obstructive cardiomyopathy.       Review of Systems   Constitutional:  Positive for fatigue.   Respiratory:  Negative for shortness of breath.    Gastrointestinal:  Positive for diarrhea (Improving). Negative for nausea and vomiting.   Skin:  Positive for wound.   Neurological:  Positive for tremors.   Psychiatric/Behavioral:  The patient is nervous/anxious.       All pertinent negatives and positives are as above. All other systems have been reviewed and are negative unless otherwise stated.     Objective    Temp:  [96.6 °F (35.9 °C)-97.7 °F (36.5 °C)] 96.6 °F (35.9 °C)  Heart Rate:  [63-72] 63  Resp:  [16] 16  BP: (103-131)/(69-81) 116/73  Physical Exam  Vitals and nursing note reviewed.   Constitutional:       General: He is not in acute distress.     Appearance: He is obese. He is not ill-appearing or toxic-appearing.      Comments: Room air   HENT:      Right Ear: Tympanic membrane normal.      Left Ear: Tympanic membrane  normal.      Nose: Nose normal.      Mouth/Throat:      Mouth: Mucous membranes are moist.      Pharynx: Oropharynx is clear.   Eyes:      Pupils: Pupils are equal, round, and reactive to light.   Cardiovascular:      Rate and Rhythm: Normal rate and regular rhythm.      Pulses: Normal pulses.      Heart sounds: Normal heart sounds.      Comments: NSR 72-91  Abdominal:      General: Abdomen is protuberant. Bowel sounds are normal.      Palpations: Abdomen is soft.      Tenderness: There is no abdominal tenderness.   Musculoskeletal:      Cervical back: Normal range of motion and neck supple. No rigidity or tenderness.      Right lower leg: No edema.      Left lower leg: No edema.   Skin:     General: Skin is warm.      Capillary Refill: Capillary refill takes less than 2 seconds.      Coloration: Skin is pale.      Comments: Please see attached photos for coccyx and perineal area.  Patient does have a large scab to his right forehead from previous fall.   Neurological:      General: No focal deficit present.      Mental Status: He is oriented to person, place, and time.   Psychiatric:         Mood and Affect: Mood normal.         Behavior: Behavior normal. Behavior is cooperative.         Thought Content: Thought content normal.         Judgment: Judgment normal.         1/17/2025 coccyx region on admission  Results Review:  I have reviewed the labs, radiology results, and diagnostic studies.    Laboratory Data:   Results from last 7 days   Lab Units 01/20/25  0354 01/19/25 0448 01/18/25 0457   WBC 10*3/mm3 5.37 5.50 5.48   HEMOGLOBIN g/dL 8.5* 8.3* 9.2*   HEMATOCRIT % 27.2* 26.6* 29.4*   PLATELETS 10*3/mm3 156 156 194        Results from last 7 days   Lab Units 01/20/25  0354 01/19/25 0448 01/18/25  0457   SODIUM mmol/L 138 136 136   POTASSIUM mmol/L 3.6 3.6 3.8   CHLORIDE mmol/L 103 99 98   CO2 mmol/L 27.0 26.0 29.0   BUN mg/dL 15 17 19   CREATININE mg/dL 1.11 0.84 0.90   CALCIUM mg/dL 8.7 8.9 8.8   BILIRUBIN  mg/dL 0.5 0.5 0.8   ALK PHOS U/L 66 66 70   ALT (SGPT) U/L 11 12 15   AST (SGOT) U/L 18 21 31   GLUCOSE mg/dL 96 96 92       Culture Data:     Microbiology Results (last 10 days)       Procedure Component Value - Date/Time    Gastrointestinal Panel, PCR - Stool, Per Rectum [[id number]]  (Normal) Collected: 01/19/25 1311    Lab Status: Final result Specimen: Stool from Per Rectum Updated: 01/19/25 1441     Campylobacter Not Detected     Plesiomonas shigelloides Not Detected     Salmonella Not Detected     Vibrio Not Detected     Vibrio cholerae Not Detected     Yersinia enterocolitica Not Detected     Enteroaggregative E. coli (EAEC) Not Detected     Enteropathogenic E. coli (EPEC) Not Detected     Enterotoxigenic E. coli (ETEC) lt/st Not Detected     Shiga-like toxin-producing E. coli (STEC) stx1/stx2 Not Detected     Shigella/Enteroinvasive E. coli (EIEC) Not Detected     Cryptosporidium Not Detected     Cyclospora cayetanensis Not Detected     Entamoeba histolytica Not Detected     Giardia lamblia Not Detected     Adenovirus F40/41 Not Detected     Astrovirus Not Detected     Norovirus GI/GII Not Detected     Rotavirus A Not Detected     Sapovirus (I, II, IV or V) Not Detected    Blood Culture - Blood, Hand, Right [[id number]]  (Abnormal) Collected: 01/17/25 1313    Lab Status: Final result Specimen: Blood from Hand, Right Updated: 01/20/25 0947     Blood Culture Clostridium perfringens     Comment: Beta-lactamase negative        Isolated from Anaerobic Bottle     Gram Stain Anaerobic Bottle Gram variable bacilli    Blood Culture - Blood, Hand, Left [[id number]]  (Normal) Collected: 01/17/25 1313    Lab Status: Preliminary result Specimen: Blood from Hand, Left Updated: 01/20/25 1330     Blood Culture No growth at 3 days             Radiology Data:   Imaging Results (Last 7 Days)       Procedure Component Value Units Date/Time    CT Abdomen Pelvis With Contrast [[id number]] Collected: 01/17/25 1441     Updated:  01/17/25 1507    Narrative:      EXAMINATION: CT ABDOMEN PELVIS W CONTRAST-      1/17/2025 1:24 PM     HISTORY: pelvic infection/cellulitis. nec fas?     In order to have a CT radiation dose as low as reasonably achievable  Automated Exposure Control was utilized for adjustment of the mA and/or  KV according to patient size.     Total DLP = 401.9 mGy.cm     The CT scan of the abdomen and pelvis is performed after intravenous  contrast enhancement.     Images are acquired in axial plane and subsequent reconstruction in  coronal and sagittal planes.     Comparison is made with the previous study dated 12/20/2024 and  11/16/2024.     The lung bases included in the study are unremarkable.     Limited visualized cardiomediastinal structures show moderate  cardiomegaly. There is an apparent focal bulge of the left ventricle  which may suggest an aneurysm of the left ventricle?. This may be  clinically correlated.     There is fatty infiltration of the liver. The spleen is normal.     The gallbladder is moderately distended with high density material which  may represent sludge or contrast excretion. No definite radiopaque  calculi.     Pancreas is normal.     The adrenal glands are normal.     Moderate lobulation of renal contour bilaterally is seen. There is a  small low-density nodule located posteriorly in the lower pole of the  left kidney measuring 8 mm in diameter. CT density suggest a cyst. No  radiopaque calculi on either side. No hydronephrosis. The ureters are  nondilated. Urinary bladder poorly distended with mild wall thickening.  No obvious intrinsic abnormality.     The prostate is surgically absent.     There is a fat-containing left inguinoscrotal hernia.     The stomach and duodenum are normal. Small bowel is nondistended.  Appendix is normal. Small amount of stool and gas is seen in the colon.  There is diverticulosis of the distal colon. No finding to suggest  diverticulitis.     Atheromatous changes of  the abdominal aorta and iliac arteries. No  aneurysmal dilatation.     No evidence of abdominal or pelvic lymphadenopathy.     Images reviewed in bone window show no acute bony abnormality. Chronic  degenerative changes of the lumbar spine are seen. There is bilateral  spondylolysis L5 without a significant spondylolisthesis. Old healed  fractures of the left lower ribs are seen.     There is a lobulated oblong high density fluid accumulation  predominantly in the left gluteus medius with a surrounding ring-shaped  enhancement measuring 12 cm in craniocaudal projection in the coronal  plane images and 10 cm in horizontal projection and axial plane images.  The size of the gluteus medius and robbin has overall decreased since  the previous study. There is another high density lobulated masslike  area located between the tensor fascia froilan laterally, rectus femoris  anteriorly and vastus lateralis and medius posterolaterally. This may  represent an evolving hematoma?. This was not noted in the previous  study.       Impression:      1. There is overall improvement in size of the hematoma/swelling of the  left gluteus medius and robbin. However there is now a well-formed  collection, in the gluteus medius, with a surrounding ring enhancement  which may represent an abscess. There is a new high density masslike  lobulated area located more anteriorly which measures 3.8 cm in diameter  in axial plane images. This may represent hematoma?.  2. Hepatic steatosis.  3. Moderately dilated gallbladder with sludge?. No stones.  4. Diverticulosis of the colon. No evidence for diverticulitis.  4. Other nonacute findings similar to the previous study.                                               This report was signed and finalized on 1/17/2025 3:04 PM by Dr. Aleta Ramos MD.       CT Cervical Spine Without Contrast [[id number]] Collected: 01/17/25 1438     Updated: 01/17/25 1445    Narrative:      Exam: CT CERVICAL SPINE  WO CONTRAST- 1/17/2025 1:24 PM     HISTORY: fall etoh eliquis     COMPARISON: 12/4/2024     DOSE LENGTH PRODUCT: 1153.51 mGy.cm mGy cm. Automated exposure control  was also utilized to decrease patient radiation dose.     TECHNIQUE: Serial helical tomographic images of the cervical spine were  obtained without the use of intravenous contrast. Additionally, sagittal  and coronal reformatted images were also provided for review.     FINDINGS:     Straightening of the cervical lordosis.     No acute fracture. The vertebral body heights are preserved.     Multilevel degenerative disc disease with posterior disc osteophyte  complexes. Multilevel uncovertebral and facet hypertrophic changes.     Suspect mild spinal canal narrowing at C3-C4, C4-C5. Variable degrees of  foraminal narrowing most notable with at least moderate left C4-C5  foraminal narrowing.     Right greater the left carotid atherosclerosis.     Other:None       Impression:         No acute fracture or traumatic malalignment.     Moderate degenerative changes with suspected mild spinal canal narrowing  at C3-C4 and C4-C5.     Right greater than left carotid atherosclerosis.           This report was signed and finalized on 1/17/2025 2:42 PM by Regan Garner.       CT Head Without Contrast [[id number]] Collected: 01/17/25 1434     Updated: 01/17/25 1441    Narrative:      Exam: CT HEAD WO CONTRAST- 1/17/2025 1:24 PM     HISTORY: head injury eliquis       DOSE LENGTH PRODUCT: 1153.51 mGy.cm mGy cm. Automated exposure control  was also utilized to decrease patient radiation dose.     Technique:  Helically acquired CT of the brain without IV contrast was performed.  Sagittal and coronal reformations are also provided for review. Soft  tissue and bone kernels are available for interpretation.     Comparison: 12/4/2024.     Findings:     Ventricles and extra-axial CSF spaces are normal in size.     No intraparenchymal or extra-axial hemorrhage.     Gray-white  matter differentiation is preserved.     Orbits are grossly unremarkable. Paranasal sinuses are grossly clear.  Mastoid air cells are grossly clear.     No suspicious calvarial or extracranial soft tissue abnormality.       Impression:      Impression:       No acute intracranial abnormality.     This report was signed and finalized on 1/17/2025 2:38 PM by Regan Garner.       XR Tibia Fibula 2 View Left [[id number]] Collected: 01/17/25 1405     Updated: 01/17/25 1409    Narrative:      XR TIBIA FIBULA 2 VW LEFT-     HISTORY: fall left low leg pain eliquis     COMPARISON: None     FINDINGS: Frontal and lateral views of the left tibia and fibula are  obtained.     No fracture or dislocation. Mild to moderate medial joint space  narrowing. Vascular calcifications.       Impression:      1. No acute osseous injury left tibia or fibula.        This report was signed and finalized on 1/17/2025 2:06 PM by Dr. Bri Duong MD.       XR Knee 4+ View Bilateral [[id number]] Collected: 01/17/25 1403     Updated: 01/17/25 1408    Narrative:      XR KNEE 4+ VW BILATERAL-     HISTORY: fall bilateral knee injury eliquis     COMPARISON: None     FINDINGS: 4 views of the right and left knee are obtained.     There is mild medial left joint space narrowing. Borderline medial right  joint space narrowing. Minimal bony spurring of the patella. No acute  fracture or dislocation. No knee joint effusion.     Diffuse vascular calcifications.       Impression:      1. Mild arthritic changes of the knee, joint space narrowing greatest in  the medial compartment of the left knee. No acute osseous injury or  joint effusion.              This report was signed and finalized on 1/17/2025 2:05 PM by Dr. Bri Duong MD.                I have reviewed the patient's current medications.     amiodarone, 200 mg, Oral, Daily  apixaban, 5 mg, Oral, Q12H  atorvastatin, 20 mg, Oral, Daily  busPIRone, 7.5 mg, Oral, BID  cefTRIAXone, 2,000 mg,  Intravenous, Q24H  chlordiazePOXIDE, 50 mg, Oral, Nightly  folic acid, 1 mg, Oral, Daily  metoprolol succinate XL, 12.5 mg, Oral, Daily  pantoprazole, 40 mg, Oral, Q AM  sodium chloride, 10 mL, Intravenous, Q12H  thiamine (B-1) IV, 200 mg, Intravenous, Q8H   Followed by  [START ON 1/23/2025] thiamine, 100 mg, Oral, Daily            Assessment/Plan   Assessment  Active Hospital Problems    Diagnosis     **Failure to thrive in adult     Pressure injury of buttock, stage 3     Diarrhea     Urinary tract infection     Dermatitis neglecta     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  Failure to thrive-patient admitted after multiple falls at home after he left AMA from SNF.  Patient continues to use alcohol and prescription medications.  Patient is not taking care of himself, his wounds and requires continued treatment.  PT/OT and case management consultation for transfer to skilled nursing facility remain pending     2.  Cystitis-urinalysis revealed small blood, positive nitrates, small leukocytes and trace bacteria.  Patient has received 4 days of IV Rocephin, discontinued due to negative blood cultures.    3.  Dermatitis neglecta-patient upon arrival was covered in feces and dirt.  Patient has had significant bathing and treatment to his wounds skin hands and feet.  Continue bathing daily and wound care per orders.      4.  Large left gluteal compartment intramuscular hematoma/healing stage III or injury- continuing to heal, ED provider believed it was cellulitis after evaluation I believe it is significant erythema and moisture associated rash from patient sitting in his own feces and urine 4 days at a time.  Discussed the case with Daylin Prasad RN, wound nurse and  recommendations for significant wound care placed.  Will continue to monitor closely and wound care APRN will evaluate on Monday.     5.  Paroxysmal atrial fibrillation/chronic anticoagulation-continue home amiodarone and Eliquis, continue cardiac telemetry, overnight normal sinus rhythm 72-91     6.  Alcoholic hepatitis/alcohol abuse-AST 50, 31, 21, 18 ALT 21, 15, 12, 11 total bilirubin 1.7, 0.8, 0.5, 0.5 lipase 88, 47.  Currently stable we will continue to monitor with daily CMP.  Continue CIWA protocol, scheduled Librium and as needed Ativan.   Patient will be monitored closely for any worsening signs or symptoms.     7.  Essential hypertension-r continue home metoprolol, vital signs every 4.    8.  Diarrhea-patient is currently a C. difficile carrier, C. difficile was not ordered due to no signs or symptoms of infection.  GI Panel negative, stool culture pending    VTE prophylaxis with SCDs  Labs in a.m.    Medical Decision Making  Failure to thrive, acute on chronic, high complexity, unchanged  Cystitis, acute, moderate complexity, unchanged  Dermatitis neglecta, acute on chronic, moderate complexity, improving  Healing wound, chronic, moderate complexity, improving  Paroxysmal atrial fibrillation, chronic, moderate complexity, stable  Chronic anticoagulation, chronic, moderate complexity, stable  Alcoholic hepatitis, chronic, high complexity, improving  Alcoholic abuse, chronic, high complexity, worsening  Essential hypertension, chronic, moderate complexity, unchanged    Number and Complexity of problems: 8  Differential Diagnosis: None    Conditions and Status        Condition is unchanged.     [facility] Data  External documents reviewed: Extensive epic review of previous hospitalizations  Cardiac tracing (EKG, telemetry) interpretation: Stat EKG pending for QTc measurement  Radiology interpretation: 1/17/2025 x-ray of the knee, tib-fib, CT of the head, abdomen pelvis and cervical spine per radiology  reviewed  Labs reviewed: 2025 magnesium,, CMP, CBC, blood and urine cultures remain pending, repeat labs in a.m.  Any tests that were considered but not ordered: None     Decision rules/scores evaluated (example [id number], Wells, etc): CIWA score remains at a 2.     Discussed with: Dr. Griggs, patient, and bedside nurse Dana     Care Planning  Shared decision making: Dr. Griggs, patient, and bedside nurse Dana  Code status and discussions: Full code per patient  Surrogate Decision Maker his sister Yuliya Dale  Social Determinants of Health that impact treatment or disposition: Will need SNF at [place]  I expect the patient to be discharged to SNF in 1-2 days.     Electronically signed by TACOS Song, 25, 13:45 CST.     Electronically signed by Tiffanie Estrella APRN at 25 1353          Physical Therapy Notes (last 24 hours)        Parish Santos, PTA at 25 1006  Version 1 of 1         Acute Care - Physical Therapy Treatment Note  [facility]     Patient Name: Dayron Lubin  : 1955  MRN: [id number]  Today's Date: 2025      Visit Dx:     ICD-10-CM ICD-9-CM   1. Cellulitis, unspecified cellulitis site  L03.90 682.9   2. Acute UTI  N39.0 599.0   3. History of alcohol consumption  Z87.898 V11.3   4. History of alcoholic hepatitis  Z87.19 V12.79   5. Drug use  F19.90 305.90   6. Dysphagia, unspecified type  R13.10 787.20   7. Impaired mobility [Z74.09]  Z74.09 799.89     Patient Active Problem List   Diagnosis    Prostate cancer    Hx of radiation therapy    Elevated PSA    Encounter for follow-up surveillance of prostate cancer    HOCM (hypertrophic obstructive cardiomyopathy)    Coronary artery disease involving native coronary artery of native heart without angina pectoris    Essential hypertension    Mixed hyperlipidemia    Class 1 obesity with alveolar hypoventilation, serious comorbidity, and body mass index (BMI) of 30.0 to 30.9 in adult    Displaced  fracture of lateral malleolus of right fibula, initial encounter for closed fracture    Family history of colonic polyps    History of colon polyps    Elevated brain natriuretic peptide (BNP) level    Anxiety associated with depression    Alcohol abuse    Alcoholic hepatitis    Volume depletion    Anorexia    CHF (congestive heart failure)    Hypokalemia    Duodenitis    Gait instability    SVT (supraventricular tachycardia)    Physical debility    Atrial fibrillation with rapid ventricular response    Alcoholism in remission    Paroxysmal atrial fibrillation    Nasal polyposis    Nasal congestion    Estelle bullosa    Long-term use of high-risk medication    Multiple falls    Thrombocytopenia    Non-traumatic rhabdomyolysis    Alcohol withdrawal    Pancytopenia    Opiate abuse, episodic    Large left gluteal compartment intramuscular hematomas, currently still healing    Chronic anticoagulation    Cellulitis    Dermatitis neglecta    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    Failure to thrive in adult    Urinary tract infection    Diarrhea     Past Medical History:   Diagnosis Date    A-fib     Alcoholic hepatitis 07/13/2023    CAD (coronary artery disease)     History of external beam radiation therapy 05/12/2016    6840 cGy to prostate bed    Hyperlipidemia     Hypertension     Insomnia     Prostate cancer      Past Surgical History:   Procedure Laterality Date    CARDIAC CATHETERIZATION      CORONARY ANGIOPLASTY WITH STENT PLACEMENT      FRACTURE SURGERY      PROSTATE SURGERY      TONSILLECTOMY       PT Assessment (Last 12 Hours)       PT Evaluation and Treatment       Row Name 01/20/25 0931          Physical Therapy Time and Intention    Subjective Information complains of;pain  -MONIQUE     Document Type therapy note (daily note)  -MONIQUE     Mode of Treatment physical therapy  -MONIQUE       Row Name 01/20/25 0931          General Information    Existing Precautions/Restrictions fall  -MONIQUE       Row Name 01/20/25 0931           Pain    Pretreatment Pain Rating 8/10  -MONIQUE     Posttreatment Pain Rating 8/10  -MONIQUE     Pain Location hip  -MONIQUE     Pain Side/Orientation left  -MONIQUE     Pain Management Interventions exercise or physical activity utilized  -MONIQUE     Response to Pain Interventions activity participation with tolerable pain  -MONIQUE       Row Name 01/20/25 0931          Bed Mobility    Supine-Sit [place] (Bed Mobility) standby assist  -MONIQUE       Row Name 01/20/25 0931          Sit-Stand Transfer    Sit-Stand [place] (Transfers) verbal cues;contact guard  -MONIQUE     Assistive Device (Sit-Stand Transfers) walker, front-wheeled  -MONIQUE       Row Name 01/20/25 0931          Stand-Sit Transfer    Stand-Sit [place] (Transfers) verbal cues;contact guard  -MONIQUE     Assistive Device (Stand-Sit Transfers) walker, front-wheeled  -MONIQUE       Row Name 01/20/25 0931          Gait/Stairs (Locomotion)    [place] Level (Gait) verbal cues;contact guard  -MONIQUE     Assistive Device (Gait) walker, front-wheeled  -MONIQUE     Distance in Feet (Gait) 50  -MONIQUE     Bilateral Gait Deviations forward flexed posture  -MONIQUE       Row Name 01/20/25 0931          Motor Skills    Comments, Therapeutic Exercise sitting AROM BLE X 20  -MONIQUE     Additional Documentation Comments, Therapeutic Exercise (Row)  -MONIQUE       Row Name             Wound 12/04/24 1248 coccyx    Wound - Properties Group Placement Date: 12/04/24  -AH Placement Time: 1248  -AH Location: coccyx  -AH    Retired Wound - Properties Group Placement Date: 12/04/24  -AH Placement Time: 1248  -AH Location: coccyx  -AH    Retired Wound - Properties Group Placement Date: 12/04/24  -AH Placement Time: 1248  -AH Location: coccyx  -AH    Retired Wound - Properties Group Date first assessed: 12/04/24  -AH Time first assessed: 1248  -AH Location: coccyx  -AH      Row Name             Wound 11/16/24 1205 coccyx    Wound - Properties Group Placement Date: 11/16/24  -OS Placement Time: 1205  -OS Location: coccyx  -OS    Retired  Wound - Properties Group Placement Date: 11/16/24  -OS Placement Time: 1205  -OS Location: coccyx  -OS    Retired Wound - Properties Group Placement Date: 11/16/24  -OS Placement Time: 1205  -OS Location: coccyx  -OS    Retired Wound - Properties Group Date first assessed: 11/16/24  -OS Time first assessed: 1205  -OS Location: coccyx  -OS      Row Name             Wound 12/07/24 Left anterior third toe    Wound - Properties Group Placement Date: 12/07/24  -BB Side: Left  -BB Orientation: anterior  -BB Location: third toe  -BB    Retired Wound - Properties Group Placement Date: 12/07/24  -BB Side: Left  -BB Orientation: anterior  -BB Location: third toe  -BB    Retired Wound - Properties Group Placement Date: 12/07/24  -BB Side: Left  -BB Orientation: anterior  -BB Location: third toe  -BB    Retired Wound - Properties Group Date first assessed: 12/07/24  -BB Side: Left  -BB Location: third toe  -BB      Row Name 01/20/25 0931          Positioning and Restraints    Pre-Treatment Position in bed  -MONIQUE     Post Treatment Position chair  -MONIQUE     In Chair reclined;call light within reach;encouraged to call for assist  -MONIQUE               User Key  (r) = Recorded By, (t) = Taken By, (c) = Cosigned By      Initials Name Provider Type    Parish King, DAVID Physical Therapist Assistant    OS Aura Velasquez RN Registered Nurse    Claudia Howard RN Registered Nurse    Consuelo Cotton RN Registered Nurse                    Physical Therapy Education       Title: PT OT SLP Therapies (In Progress)       Topic: Physical Therapy (In Progress)       Point: Mobility training (Done)       Learning Progress Summary            Patient Acceptance, E, VU by MONIQUE at 1/20/2025 0931    Comment: gait, safety, benefits of activity    Acceptance, E,D, VU by BL at 1/18/2025 1555    Comment: Walker usage, transfer techniques, plan of care, home safety                      Point: Home exercise program (Not Started)       Learner  Progress:  Not documented in this visit.              Point: Body mechanics (Done)       Learning Progress Summary            Patient Acceptance, E,D, VU by  at 1/18/2025 1555    Comment: Walker usage, transfer techniques, plan of care, home safety                      Point: Precautions (Done)       Learning Progress Summary            Patient Acceptance, E,D, VU by  at 1/18/2025 1555    Comment: Walker usage, transfer techniques, plan of care, home safety                                      User Key       Initials Effective Dates Name Provider Type Discipline     02/03/23 -  Parish Santos PTA Physical Therapist Assistant PT     12/17/24 -  Gael Joe PT Student PT Student PT                  PT Recommendation and Plan         Outcome Measures       Row Name 01/20/25 0931             How much help from another person do you currently need...    Turning from your back to your side while in flat bed without using bedrails? 4  -MONIQUE      Moving from lying on back to sitting on the side of a flat bed without bedrails? 4  -MONIQUE      Moving to and from a bed to a chair (including a wheelchair)? 3  -MONIQUE      Standing up from a chair using your arms (e.g., wheelchair, bedside chair)? 3  -MONIQUE      Climbing 3-5 steps with a railing? 3  -MONIQUE      To walk in hospital room? 3  -MONIQUE      AM-PAC 6 Clicks Score (PT) 20  -MONIQUE         Functional Assessment    Outcome Measure Options AM-PAC 6 Clicks Basic Mobility (PT)  -MONIQUE                User Key  (r) = Recorded By, (t) = Taken By, (c) = Cosigned By      Initials Name Provider Type    MONIQUE Parish Santos PTA Physical Therapist Assistant                     Time Calculation:    PT Charges       Row Name 01/20/25 0931             Time Calculation    Start Time 0931  -MONIQUE      Stop Time 0955  -MONIQUE      Time Calculation (min) 24 min  -MONIQUE      PT Received On 01/20/25  -MONIQUE         Time Calculation- PT    Total Timed Code Minutes- PT 24 minute(s)  -MONIQUE         Timed Charges     23313 - PT Therapeutic Exercise Minutes 10  -MONIQUE      67007 - Gait Training Minutes  14  -MONIQUE         Total Minutes    Timed Charges Total Minutes 24  -MONIQUE       Total Minutes 24  -MONIQUE                User Key  (r) = Recorded By, (t) = Taken By, (c) = Cosigned By      Initials Name Provider Type    Parish King PTA Physical Therapist Assistant                  Therapy Charges for Today       Code Description Service Date Service Provider Modifiers Qty    93341776710 HC GAIT TRAINING EA 15 MIN 1/20/2025 Parish Santos, DAVID GP 1    29782265309 HC PT THER PROC EA 15 MIN 1/20/2025 Parish Santos, PTA GP 1            PT G-Codes  Outcome Measure Options: AM-PAC 6 Clicks Basic Mobility (PT)  AM-PAC 6 Clicks Score (PT): 20  AM-PAC 6 Clicks Score (OT): 22    Parish Santos PTA  1/20/2025      Electronically signed by Parish Santos PTA at 01/20/25 1000

## 2025-01-20 NOTE — PLAN OF CARE
Goal Outcome Evaluation:              Outcome Evaluation: Patient up with assist. Voiding per urinal. Up in chair.Ambulated in hallways with pt. contact percautions maintained

## 2025-01-20 NOTE — NURSING NOTE
IV ATTEMPT X2, BLOOD RETURN NOTED X1, UNSUCCESSFUL, NO BLOOD RETURN NOTED X1, RFA, GIANLUCA HATHAWAY

## 2025-01-20 NOTE — DISCHARGE PLACEMENT REQUEST
"Dayron Carr (69 y.o. Male)       Date of Birth   1955    Social Security Number       Address   [address] [place] [address]    Home Phone   [phone]    MRN   [id number]       [facility]    Marital Status   Single                            Admission Date   1/17/25    Admission Type   Emergency    Admitting Provider   Dayron Griggs DO    Attending Provider   Dayron Griggs DO    Department, Room/Bed   [facility] 3C, 391/1       Discharge Date       Discharge Disposition       Discharge Destination                                 Attending Provider: Dayron Griggs DO    Allergies: No Known Allergies    Isolation: Spore   Infection: C.difficile (12/22/24)   Code Status: CPR    Ht: 180.3 cm (71\")   Wt: 84.5 kg (186 lb 3.2 oz)    Admission Cmt: None   Principal Problem: Failure to thrive in adult [R62.7]                   Active Insurance as of 1/17/2025       Primary Coverage       Payor Plan Insurance Group Employer/Plan Group    MEDICARE MEDICARE A & B        Payor Plan Address Payor Plan Phone Number Payor Plan Fax Number Effective Dates    [address] [phone]  7/1/2019 - None Entered    [place] [id number]         Subscriber Name Subscriber Birth Date Member ID       DAYRON CARR 1955 [id number]                     Emergency Contacts        (Rel.) Home Phone Work Phone Mobile Phone    Yuliya Stone (Sister) [phone] -- --    JAK CARR (Brother) -- -- [phone]        Mitzi Ellington, RN   Registered Nurse  Nursing  Plan of Care     Signed  Date of Service:  01/20/25 0529  Creation Time:  01/20/25 0529     Signed        Goal Outcome Evaluation:  Plan of Care Reviewed With: patient  Progress: improving  Outcome Evaluation: AOx4, voiding per urinal, incontinent at times, BM x2 this shift per BSC, loose stools cont, CIWA protocol, wound care to coccyx and kieran area, rocephine, tele NSR, isolation for C diff, waiting on " stool cx, +BC sent to [facility] for ID, turn q2h for excoriated coccyx per wound care, excoriated coccyx improving, PRN restoril given for sleep, ultram given x2 for c/o pain.                   Gael Joe, PT Student   PT Student  Physical Therapy  Plan of Care     Attested  Date of Service:  01/18/25 1601  Creation Time:  01/18/25 1601     Attested            Attestation signed by Keke Xiong PT, DPT, NCS at 01/18/25 1602     I reviewed the documentation and agree.                Goal Outcome Evaluation:  Plan of Care Reviewed With: patient  Progress: improving  Outcome Evaluation: PT eval completed. A&Ox4. Patient shows global strength deficits in LE. Sensation intact, ROM WNL. Patient needed to use bedside commode prior to being able to assess standing stability, therefore patient was transfered ModAx2 to bedside commode. Patient then transfered back to bed ModAx2. Formal testing performed for standing static and dynamic balance. Patient able to ambulate w/ FWW CGAx1. Patient showed excessive ER in LLE and forward flexed posture while walking. Patient instructed on properly turning w/ FWW to ensure safer ambulation. Patient returned to bed w/ call light w/in reach. Patientrepeatedly asked me to reach out to resources for him to attend rehab for his alcohol dependence. Patient informed to talk w/ case workers to address discharge plans. Patient is a good candidate for skilled therapy. Discharge recommdenations: Home/alcohol rehab facility.     Anticipated Discharge Disposition (PT): home (Substance abuse/Alcohol rehab facility)                  Cosigned by: Keke Xiong PT, DPT, NCS at 01/18/25 1602          Signed             [facility]  INPATIENT WOUND & OSTOMY CARE     Today's Date: 01/20/25     Patient Name: Dayron Lubin  MRN: [id number]  CSN: [id number]  PCP: Herberth Nguyen Jr., MD  Attending Provider: Dayron Griggs DO  Length of Stay: 3     Wound care consulted for  severe excoriation to buttocks, kieran-area present on admission. Nursing has uploaded picture to chart. Patient was admitted with failure to thrive on 1/17/2025. Patient is currently sitting up in bed eating breakfast.     Patient was last seen by inpatient wound care on 12/05/2024 by BRI Murcia. He was treated for incontinence associated dermatitis and a stage 3 pressure injury to the left buttock area. This was treated with magic barrier cream. Patient had fecal management device to assist with moisture management from frequent incontinence episodes.      Patients incontinence dermatitis looks to be improved since admission. Patient states he is letting staff know when he has an incontinence episode and they are quickly cleaning him up. He has zinc barrier cream with menthol in place during my assessment.            Wound: Healing stage 3 pressure injury to left buttock  Base: red & moist   Periwound: pink blanchable   Edges: open attached to wound   Drainage: small amount of serosanguinous drainage       Wound is no longer down to subcutaneous tissue.      Wound RN Orders (last 12 hours) (12h ago, onward)          Start     Ordered     01/20/25 0852   Wound Care  Per Order Details        Question:  Wound Care Instructions  Answer:  Apply Moisture Barrier After Any Incontinence and PRN. Zinc barrier with menthol    01/20/25 0851     01/20/25 0851   Head of Bed 30 Degrees or Less (Unless Contraindicated)  Until Discontinued         01/20/25 0851     01/20/25 0851   Elevate Heels Off of Bed  Until Discontinued         01/20/25 0851     01/20/25 0851   Use Seat Cushion When Up In Chair  Continuous         01/20/25 0851     01/20/25 0851   Silicone Border Dressing to Bony Prominences  Per Order Details        Comments: Apply silicone border foam dressing per protocol to bilateral heels for protection.  Nursing to change dressing every 3 days and PRN if soiled. Nursing is to peel back dressing with every  assessment to assess skin underneath dressing. No barrier cream under dressing.    01/20/25 0851     01/20/25 0851   Use Repositioning Wedge to Position Patient  Continuous        Comments: Use Comfort Glide repositioning sheet and wedges to position patient.    01/20/25 0851     01/17/25 2200   Wound Care  4 Times Daily      Comments: Make sure patient is turned every 2 with air as much as possible to the area.   Question Answer Comment   Wound Locations Coccyx and perineum     Wound Care Instructions Patient is to be bathed thoroughly daily.  Apply ostomy powder to perineum, dust off excess.  Apply zinc barrier with methanol over crusting area 4 times daily and as needed         01/17/25 1817     Unscheduled   Up With Assistance  As Needed       01/17/25 2024                Patient educated on importance of turning and repositioning at frequent intervals to prevent skin breakdown.      Inpatient wound care will continue to follow during hospital stay.  Please contact if any issues or concerns arise.      This document has been electronically signed by Daylin Ross RN on 1/20/2025 08:14 CST                       Attested            Attestation signed by Dayron Griggs DO at 01/17/25 2202     I performed a substantive part of the MDM during the patient’s E/M visit. I personally made or   approved the documented management plan and acknowledge its risk of complications.   (Independent Interpretation) My (EKG/X-Ray/US/CT) interpretation - imaging reports reviewed  (Discussion) Management/test interpretation discussed with BRI Song.     Patient does have some bilirubin elevation on arrival but likely in the setting of recent EtOH and drug use.  CT abdomen pelvis did mention some possible sludge in the gallbladder.  Repeat labs in the morning and monitor closely.  Does not seem to have any overt abdominal symptoms.  His previous hematomas improving in the gluteus medius and robbin.  Overall patient  is a chronically ill noncompliant patient with alcohol and drug abuse.  Significantly complicated patient to treat due to his noncompliance and general adventures in life.     Electronically signed by Dayron FABIOLA DO [place], 1/17/2025, 21:59 CST.               Expand All Collapse All[]Expand All by Default       [facility] Medicine Services  HISTORY AND PHYSICAL     Date of Admission: 1/17/2025  Primary Care Physician: Herberth Nguyen Jr., MD     Subjective   Primary Historian: Patient     Chief Complaint: Frequent falls     History of Present Illness  Dayron Lubin is a 69-year-old male with an extensive past history of failure to care for himself due to alcoholism.  Additional history of paroxysmal atrial fibrillation on chronic Eliquis, CAD, prostate cancer in remission, hyperlipidemia, hypertension and most recently treated for a large gluteal compartment intramuscular hematoma due to a fall at home.  This resulted in patient being discharged to skilled nursing facility which the patient left AMA.  He presented today per EMS.  Patient reported that yesterday he tripped over a rug and fell on a heater hitting his knee when he got up he hit his head.  He denied any loss of consciousness he stated he got up within a few minutes and was able to remove.  His brother visited him this afternoon and called EMS.  Upon presentation he denied any new pain he admits to leaving SNF AMA and zooming drinking last alcohol consumption was 11 AM this morning where he added 2 shots of vodka to his orange juice.  The patient on last admission had to be transferred to ICU due to delirium tremors and placed on phenobarbital.  He denied any neck, back, chest or abdominal pain.  X-rays of knee, tib-fib, CT of the head, cervical spine and abdomen pelvis were obtained all with no acute findings or acute fractures.  He stated his only complaint with the he noticed some redness in his left groin area for the  past few days and it has been somewhat tender.  He states he wears a brief due to urinary incontinence and he states that he has daily bowel movements with occasional diarrhea and he only changes the diaper once daily regardless.     Upon admission patient is resting in bed on room air with no family at bedside.  Patient is alert and oriented and able to participate in assessment.  As patient is familiar to me I had a lengthy discussion with patient regarding his alcohol use, noncompliance with treatment and his extensive erythema with possible cellulitis to previous hematoma coccyx, perineum and scrotal area.  Patient is agreeable to return to skilled nursing facility at discharge as he will need extensive wound care.  Patient would be placed on CIWA protocol initially with scheduled Librium and as needed Ativan.  Will evaluate very closely for any worsening symptoms and need for restarting phenobarbital.  Patient verbalized understanding of plan of care, all questions were answered to the best my ability and he is in agreement for admission and treatment.     ED workup revealed , CL 90, anion gap of 18, , AST 50, ALT 21, total bilirubin 1.7, lactate 3.3, after fluid bolus 1.4, lipase 88, WBC 10.91 with no left shift or bandemia, urinalysis revealed small blood, positive nitrates small leukocytes and trace bacteria.  Patient was positive for barbiturates, benzodiazepines, oxycodone and cocaine of which patient denies any usage.         Review of Systems   Constitutional:  Positive for fatigue.   Respiratory:  Negative for shortness of breath.    Cardiovascular:  Negative for chest pain.   Gastrointestinal:  Positive for diarrhea. Negative for nausea and vomiting.   Skin:  Positive for wound.   Neurological:  Negative for tremors and syncope.      Otherwise complete ROS reviewed and negative except as mentioned in the HPI.     Past Medical History:   Medical History        Past Medical History:    Diagnosis Date    A-fib      Alcoholic hepatitis 07/13/2023    CAD (coronary artery disease)      History of external beam radiation therapy 05/12/2016     6840 cGy to prostate bed    Hyperlipidemia      Hypertension      Insomnia      Prostate cancer           Past Surgical History:  Surgical History         Past Surgical History:   Procedure Laterality Date    CARDIAC CATHETERIZATION        CORONARY ANGIOPLASTY WITH STENT PLACEMENT        FRACTURE SURGERY        PROSTATE SURGERY        TONSILLECTOMY             Social History:  reports that he has never smoked. He has never used smokeless tobacco. He reports current alcohol use of about 50.0 standard drinks of alcohol per week. He reports that he does not currently use drugs after having used the following drugs: Marijuana.     Family History: family history includes Coronary artery disease in his mother; Heart disease in his mother; Stroke in his mother.        Allergies:  Allergies   No Known Allergies        Medications:          Prior to Admission medications    Medication Sig Start Date End Date Taking? Authorizing Provider   amiodarone (PACERONE) 200 MG tablet Take 1 tablet by mouth Daily. 9/10/24   Yes Italia Vasquez PA   apixaban (ELIQUIS) 5 MG tablet tablet Take 1 tablet by mouth Every 12 (Twelve) Hours. Indications: Atrial Fibrillation 11/23/24   Yes Italia Vasquez PA   atorvastatin (LIPITOR) 20 MG tablet Take 1 tablet by mouth Daily.     Yes ProviderSteve MD   folic acid (FOLVITE) 1 MG tablet Take 1 tablet by mouth Daily. 11/22/24   Yes Holger Karimi DO   pantoprazole (PROTONIX) 40 MG EC tablet Take 1 tablet by mouth Daily.     Yes Steve Devries MD   thiamine (VITAMIN B1) 100 MG tablet Take 1 tablet by mouth Daily. 11/22/24   Yes Holger Karimi DO   azelastine (ASTELIN) 0.1 % nasal spray 2 sprays into the nostril(s) as directed by provider 2 (Two) Times a Day. Use in each nostril as directed  Patient taking  "differently: Administer 2 sprays into the nostril(s) as directed by provider 2 (Two) Times a Day As Needed for Allergies. Use in each nostril as directed 9/5/24     Gonzalo Martinez APRN   busPIRone (BUSPAR) 5 MG tablet Take 1.5 tablets by mouth 2 (Two) Times a Day. 12/9/24     New Griffiths MD   fluticasone (FLONASE) 50 MCG/ACT nasal spray 2 sprays into the nostril(s) as directed by provider Daily.  Patient taking differently: Administer 2 sprays into the nostril(s) as directed by provider Daily As Needed for Allergies. 9/5/24     Gonzalo Martinez APRN   loperamide (IMODIUM) 2 MG capsule Take 1 capsule by mouth 4 (Four) Times a Day As Needed for Diarrhea. 12/9/24     New Griffiths MD   metoprolol succinate XL (TOPROL-XL) 25 MG 24 hr tablet Take 0.5 tablets by mouth Daily for 30 days. 12/28/24 1/27/25   Raul Gifford MD   naloxone (NARCAN) 4 MG/0.1ML nasal spray Call 911. Don't prime. [place] in 1 nostril for overdose. Repeat in 2-3 minutes in other nostril if no or minimal breathing/responsiveness. 12/27/24     Raul Gifford MD      I have utilized all available immediate resources to obtain, update, or review the patient's current medications (including all prescriptions, over-the-counter products, herbals, cannabis/cannabidiol products, and vitamin/mineral/dietary (nutritional) supplements).     Objective      Vital Signs: /89   Pulse 92   Temp 98.9 °F (37.2 °C) (Oral)   Resp 16   Ht 180.3 cm (71\")   Wt 84.5 kg (186 lb 3.2 oz)   SpO2 97%   BMI 25.97 kg/m²   Physical Exam  Constitutional:       General: He is not in acute distress.     Appearance: He is not ill-appearing or toxic-appearing.      Comments: Patient is extremely disheveled, feces apparent on his hands and fingers.  Multiple areas of scabbing and dirt on his feet.   HENT:      Right Ear: Tympanic membrane normal.      Left Ear: Tympanic membrane normal.      Nose: Nose normal.      Mouth/Throat:      Mouth: Mucous " membranes are moist.      Pharynx: Oropharynx is clear.   Eyes:      Pupils: Pupils are equal, round, and reactive to light.   Cardiovascular:      Rate and Rhythm: Normal rate and regular rhythm.      Pulses: Normal pulses.      Heart sounds: Normal heart sounds.   Pulmonary:      Effort: Pulmonary effort is normal.      Breath sounds: Normal breath sounds.   Abdominal:      General: Abdomen is protuberant. Bowel sounds are normal.      Palpations: Abdomen is soft.      Tenderness: There is no abdominal tenderness.   Genitourinary:     Comments: Voiding per urinal  Musculoskeletal:      Cervical back: Normal range of motion. No rigidity or tenderness.      Right lower leg: No edema.      Left lower leg: No edema.   Skin:     Findings: Bruising, erythema, rash and wound present. Rash is crusting.      Comments: See attached photos   Neurological:      Mental Status: He is alert and oriented to person, place, and time.   Psychiatric:         Attention and Perception: Attention normal.         Mood and Affect: Mood normal.         Speech: Speech normal.         Behavior: Behavior is cooperative.         Thought Content: Thought content normal.         Cognition and Memory: Memory is impaired.              1/17/2025 coccyx region  Results Reviewed:  Lab Results (last 24 hours)         Procedure Component Value Units Date/Time     Lactic Acid, Plasma [[id number]]  (Abnormal) Collected: 01/17/25 1313     Specimen: Blood Updated: 01/17/25 1556       Lactate 3.3 mmol/L       Urinalysis, Microscopic Only - Straight Cath [[id number]]  (Abnormal) Collected: 01/17/25 1322     Specimen: Urine from Straight Cath Updated: 01/17/25 1421       RBC, UA 0-2 /HPF         WBC, UA 0-2 /HPF         Comment: Urine culture not indicated.          Bacteria, UA Trace /HPF         Squamous Epithelial Cells, UA 0-2 /HPF         Hyaline Casts, UA 13-20 /LPF         Methodology Manual Light Microscopy     Fentanyl, Urine - Straight Cath  [[id number]]  (Normal) Collected: 01/17/25 1322     Specimen: Urine from Straight Cath Updated: 01/17/25 1354       Fentanyl, Urine Negative                 Urinalysis With Culture If Indicated - Straight Cath [[id number]]  (Abnormal) Collected: 01/17/25 1322     Specimen: Urine from Straight Cath Updated: 01/17/25 1353       Color, UA [place]       Appearance, UA Clear       pH, UA 8.0       Specific Gravity, UA >1.030       Glucose, UA Negative       Ketones, UA 40 mg/dL (2+)       Bilirubin, UA Small (1+)       Blood, UA Small (1+)       Protein, UA >=300 mg/dL (3+)       Leuk Esterase, UA Small (1+)       Nitrite, UA Positive       Urobilinogen, UA 1.0 E.U./dL     Narrative:       Dipstick results may be inaccurate due to color interference.     Urine Drug Screen - Straight Cath [[id number]]  (Abnormal) Collected: 01/17/25 1322     Specimen: Urine from Straight Cath Updated: 01/17/25 1353       THC, Screen, Urine Negative       Phencyclidine (PCP), Urine Negative       Cocaine Screen, Urine Positive       Methamphetamine, Ur Negative       Opiate Screen Negative       Amphetamine Screen, Urine Negative       Benzodiazepine Screen, Urine Positive       Tricyclic Antidepressants Screen Negative       Methadone Screen, Urine Negative       Barbiturates Screen, Urine Positive       Oxycodone Screen, Urine Positive       Buprenorphine, Screen, Urine Negative                 Comprehensive Metabolic Panel [[id number]]  (Abnormal) Collected: 01/17/25 1313     Specimen: Blood Updated: 01/17/25 1344       Glucose 139 mg/dL         BUN 19 mg/dL         Creatinine 0.90 mg/dL         Sodium 132 mmol/L         Potassium 4.1 mmol/L         Comment: Slight hemolysis detected by analyzer. Result may be falsely elevated.          Chloride 90 mmol/L         CO2 24.0 mmol/L         Calcium 9.4 mg/dL         Total Protein 7.6 g/dL         Albumin 4.1 g/dL         ALT (SGPT) 21 U/L         AST (SGOT) 50 U/L         Comment: Slight  hemolysis detected by analyzer. Result may be falsely elevated.          Alkaline Phosphatase 84 U/L         Total Bilirubin 1.7 mg/dL         Globulin 3.5 gm/dL         A/G Ratio 1.2 g/dL         BUN/Creatinine Ratio 21.1       Anion Gap 18.0 mmol/L         eGFR 91.9 mL/min/1.73                   Salicylate Level [[id number]]  (Normal) Collected: 01/17/25 1313     Specimen: Blood Updated: 01/17/25 1340       Salicylate <0.3 mg/dL       Acetaminophen Level [[id number]]  (Normal) Collected: 01/17/25 1313     Specimen: Blood Updated: 01/17/25 1340       Acetaminophen <5.0 mcg/mL       Lipase [[id number]]  (Abnormal) Collected: 01/17/25 1313     Specimen: Blood Updated: 01/17/25 1336       Lipase 88 U/L       Ethanol [[id number]] Collected: 01/17/25 1313     Specimen: Blood Updated: 01/17/25 1335       Ethanol % <0.010 %       Narrative:       Not for legal purposes. Chain of Custody not followed.      CBC & Differential [[id number]]  (Abnormal) Collected: 01/17/25 1313     Specimen: Blood Updated: 01/17/25 1324                 CBC Auto Differential [[id number]]  (Abnormal) Collected: 01/17/25 1313     Specimen: Blood Updated: 01/17/25 1324       WBC 10.91 10*3/mm3         RBC 3.59 10*6/mm3         Hemoglobin 10.9 g/dL         Hematocrit 32.6 %         MCV 90.8 fL         MCH 30.4 pg         MCHC 33.4 g/dL         RDW 15.3 %         RDW-SD 51.1 fl         MPV 10.5 fL         Platelets 296 10*3/mm3         Neutrophil % 81.6 %         Lymphocyte % 8.2 %         Monocyte % 9.2 %         Eosinophil % 0.1 %         Basophil % 0.2 %         Immature Grans % 0.7 %         Neutrophils, Absolute 8.91 10*3/mm3         Lymphocytes, Absolute 0.89 10*3/mm3         Monocytes, Absolute 1.00 10*3/mm3         Eosinophils, Absolute 0.01 10*3/mm3         Basophils, Absolute 0.02 10*3/mm3         Immature Grans, Absolute 0.08 10*3/mm3         nRBC 0.0 /100 WBC       Blood Culture - Blood, Hand, Right [[id number]] Collected: 01/17/25 1313      Specimen: Blood from Hand, Right Updated: 01/17/25 1322     Blood Culture - Blood, Hand, Left [[id number]] Collected: 01/17/25 1313     Specimen: Blood from Hand, Left Updated: 01/17/25 1322             Imaging Results (Last 24 Hours)         Procedure Component Value Units Date/Time     CT Abdomen Pelvis With Contrast [[id number]] Collected: 01/17/25 1441       Updated: 01/17/25 1507     Narrative:       EXAMINATION: CT ABDOMEN PELVIS W CONTRAST-      1/17/2025 1:24 PM     HISTORY: pelvic infection/cellulitis. nec fas?     In order to have a CT radiation dose as low as reasonably achievable  Automated Exposure Control was utilized for adjustment of the mA and/or  KV according to patient size.     Total DLP = 401.9 mGy.cm     The CT scan of the abdomen and pelvis is performed after intravenous  contrast enhancement.     Images are acquired in axial plane and subsequent reconstruction in  coronal and sagittal planes.     Comparison is made with the previous study dated 12/20/2024 and  11/16/2024.     The lung bases included in the study are unremarkable.     Limited visualized cardiomediastinal structures show moderate  cardiomegaly. There is an apparent focal bulge of the left ventricle  which may suggest an aneurysm of the left ventricle?. This may be  clinically correlated.     There is fatty infiltration of the liver. The spleen is normal.     The gallbladder is moderately distended with high density material which  may represent sludge or contrast excretion. No definite radiopaque  calculi.     Pancreas is normal.     The adrenal glands are normal.     Moderate lobulation of renal contour bilaterally is seen. There is a  small low-density nodule located posteriorly in the lower pole of the  left kidney measuring 8 mm in diameter. CT density suggest a cyst. No  radiopaque calculi on either side. No hydronephrosis. The ureters are  nondilated. Urinary bladder poorly distended with mild wall thickening.  No obvious  intrinsic abnormality.     The prostate is surgically absent.     There is a fat-containing left inguinoscrotal hernia.     The stomach and duodenum are normal. Small bowel is nondistended.  Appendix is normal. Small amount of stool and gas is seen in the colon.  There is diverticulosis of the distal colon. No finding to suggest  diverticulitis.     Atheromatous changes of the abdominal aorta and iliac arteries. No  aneurysmal dilatation.     No evidence of abdominal or pelvic lymphadenopathy.     Images reviewed in bone window show no acute bony abnormality. Chronic  degenerative changes of the lumbar spine are seen. There is bilateral  spondylolysis L5 without a significant spondylolisthesis. Old healed  fractures of the left lower ribs are seen.     There is a lobulated oblong high density fluid accumulation  predominantly in the left gluteus medius with a surrounding ring-shaped  enhancement measuring 12 cm in craniocaudal projection in the coronal  plane images and 10 cm in horizontal projection and axial plane images.  The size of the gluteus medius and robbin has overall decreased since  the previous study. There is another high density lobulated masslike  area located between the tensor fascia frolian laterally, rectus femoris  anteriorly and vastus lateralis and medius posterolaterally. This may  represent an evolving hematoma?. This was not noted in the previous  study.        Impression:       1. There is overall improvement in size of the hematoma/swelling of the  left gluteus medius and robbin. However there is now a well-formed  collection, in the gluteus medius, with a surrounding ring enhancement  which may represent an abscess. There is a new high density masslike  lobulated area located more anteriorly which measures 3.8 cm in diameter  in axial plane images. This may represent hematoma?.  2. Hepatic steatosis.  3. Moderately dilated gallbladder with sludge?. No stones.  4. Diverticulosis of the  colon. No evidence for diverticulitis.  4. Other nonacute findings similar to the previous study.                                               This report was signed and finalized on 1/17/2025 3:04 PM by Dr. Aleta Ramos MD.        CT Cervical Spine Without Contrast [[id number]] Collected: 01/17/25 1438       Updated: 01/17/25 1445     Narrative:       Exam: CT CERVICAL SPINE WO CONTRAST- 1/17/2025 1:24 PM     HISTORY: fall etoh eliquis     COMPARISON: 12/4/2024     DOSE LENGTH PRODUCT: 1153.51 mGy.cm mGy cm. Automated exposure control  was also utilized to decrease patient radiation dose.     TECHNIQUE: Serial helical tomographic images of the cervical spine were  obtained without the use of intravenous contrast. Additionally, sagittal  and coronal reformatted images were also provided for review.     FINDINGS:     Straightening of the cervical lordosis.     No acute fracture. The vertebral body heights are preserved.     Multilevel degenerative disc disease with posterior disc osteophyte  complexes. Multilevel uncovertebral and facet hypertrophic changes.     Suspect mild spinal canal narrowing at C3-C4, C4-C5. Variable degrees of  foraminal narrowing most notable with at least moderate left C4-C5  foraminal narrowing.     Right greater the left carotid atherosclerosis.     Other:None        Impression:          No acute fracture or traumatic malalignment.     Moderate degenerative changes with suspected mild spinal canal narrowing  at C3-C4 and C4-C5.     Right greater than left carotid atherosclerosis.           This report was signed and finalized on 1/17/2025 2:42 PM by Regan Garner.        CT Head Without Contrast [[id number]] Collected: 01/17/25 1434       Updated: 01/17/25 1441     Narrative:       Exam: CT HEAD WO CONTRAST- 1/17/2025 1:24 PM     HISTORY: head injury eliquis       DOSE LENGTH PRODUCT: 1153.51 mGy.cm mGy cm. Automated exposure control  was also utilized to decrease patient radiation  dose.     Technique:  Helically acquired CT of the brain without IV contrast was performed.  Sagittal and coronal reformations are also provided for review. Soft  tissue and bone kernels are available for interpretation.     Comparison: 12/4/2024.     Findings:     Ventricles and extra-axial CSF spaces are normal in size.     No intraparenchymal or extra-axial hemorrhage.     Gray-white matter differentiation is preserved.     Orbits are grossly unremarkable. Paranasal sinuses are grossly clear.  Mastoid air cells are grossly clear.     No suspicious calvarial or extracranial soft tissue abnormality.        Impression:       Impression:       No acute intracranial abnormality.     This report was signed and finalized on 1/17/2025 2:38 PM by Regan Garner.        XR Tibia Fibula 2 View Left [[id number]] Collected: 01/17/25 1405       Updated: 01/17/25 1409     Narrative:       XR TIBIA FIBULA 2 VW LEFT-     HISTORY: fall left low leg pain eliquis     COMPARISON: None     FINDINGS: Frontal and lateral views of the left tibia and fibula are  obtained.     No fracture or dislocation. Mild to moderate medial joint space  narrowing. Vascular calcifications.        Impression:       1. No acute osseous injury left tibia or fibula.        This report was signed and finalized on 1/17/2025 2:06 PM by Dr. Bri Duong MD.        XR Knee 4+ View Bilateral [[id number]] Collected: 01/17/25 1403       Updated: 01/17/25 1408     Narrative:       XR KNEE 4+ VW BILATERAL-     HISTORY: fall bilateral knee injury eliquis     COMPARISON: None     FINDINGS: 4 views of the right and left knee are obtained.     There is mild medial left joint space narrowing. Borderline medial right  joint space narrowing. Minimal bony spurring of the patella. No acute  fracture or dislocation. No knee joint effusion.     Diffuse vascular calcifications.        Impression:       1. Mild arthritic changes of the knee, joint space narrowing greatest  in  the medial compartment of the left knee. No acute osseous injury or  joint effusion.              This report was signed and finalized on 1/17/2025 2:05 PM by Dr. Bri Duong MD.                      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 Griggs's service here at [facility].     1.  Failure to thrive-patient admitted after multiple falls at home after he left AMA from SNF.  Patient continues to use alcohol and prescription medications.  Patient is not taking care of himself, his wounds and requires continued treatment.  PT/OT and case management consultation for transfer to skilled nursing facility     2.  Cystitis-urinalysis revealed small blood, positive nitrates, small leukocytes and trace bacteria.  Rocephin 2 g initiated.     3.  Dermatitis neglecta-patient upon arrival was covered in feces and dirt.  Orders placed for bathing daily.     4.  Large left gluteal compartment intramuscular hematoma - continuing to heal, ED provider believed it was cellulitis after evaluation I believe it is significant erythema and moisture associated rash from patient sitting in his own feces and urine 4 days at a time.  Discussed the case with Daylin Prasad RN, wound nurse and recommendations for significant wound care placed.  Will continue to monitor closely and wound care APRN will evaluate on Monday.     5.  Paroxysmal atrial fibrillation/chronic anticoagulation-continue home amiodarone and Eliquis, cardiac telemetry.     6.  Alcoholic hepatitis/alcohol abuse-AST 50, ALT 21,  total bilirubin 1.7, lipase 88.  Currently stable we will continue to monitor with daily CMP and lipase.  Patient placed on CIWA protocol, scheduled Librium and as needed Ativan.  Previous admission patient had to be transferred to ICU due to significant DTs and placed on phenobarbital.  Patient will be monitored closely for any worsening signs or symptoms.     7.  Essential hypertension-resume home metoprolol, vital signs every 4.     8.  Resumed and restarted home medications as appropriate.     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  VTE prophylaxis with home Eliquis  Labs in a.m.     Medical Decision Making  Failure to thrive, acute on chronic, high complexity, unchanged  Cystitis, acute, moderate complexity, unchanged  Otitis neglected, acute on chronic, moderate complexity, unchanged  Healing wound, chronic, moderate complexity, worsening  Paroxysmal atrial fibrillation, chronic, moderate complexity, stable  Chronic anticoagulation, chronic, moderate complexity, stable  Alcoholic hepatitis, chronic, high complexity, stable  Alcoholic abuse, chronic, high complexity, worsening  Essential hypertension, chronic, moderate complexity, unchanged  Number and Complexity of problems: 8  Differential Diagnosis: None     Conditions and Status        Condition is unchanged.     [facility] Data  External documents reviewed: Extensive epic review of previous hospitalizations  Cardiac tracing (EKG, telemetry) interpretation: Stat EKG pending for QTc measurement  Radiology interpretation: 1/17/2025 x-ray of the knee, tib-fib, CT of the head, abdomen pelvis and cervical spine per radiology reviewed  Labs reviewed: 17 2025 CBC, CMP, lipase, acetaminophen, ethanol, salicylate, lactic acid, magnesium, urinalysis, UDS, lactic acid reviewed, repeat labs in a.m.  Any tests that were considered but not ordered: None     Decision rules/scores evaluated (example [id number], Wells, etc): CIWA score of 3      Discussed with: Dr. Griggs and patient     Care Planning  Shared decision making: Dr. Griggs and patient  Code status and discussions: Full code per patient     Disposition  Social Determinants of Health that impact treatment or disposition: Failure to thrive will need SNF at discharge, case management consult  Estimated length of stay is undetermined at this time.      I confirmed that the patient's advanced care plan is present, code status is documented, and a surrogate decision maker is listed in the patient's medical record.      The patient's surrogate decision maker is Sister Yuliya Stone.      The patient was seen and examined by me on 1/17/2025 at 1627.     Electronically signed by BRI Song, 01/17/25, 18:31 CST.                           Cosigned by: Dayron Griggs DO at 01/17/25 2497

## 2025-01-20 NOTE — PROGRESS NOTES
Patient Name: Dayron Lubin  Date of Admission: 1/17/2025  Today's Date: 01/20/25  Length of Stay: 3  Primary Care Physician: Herberth Nguyen Jr., MD    Subjective   Chief Complaint: Frequent falls  Fall  Pertinent negatives include no nausea or vomiting.      Dayron Lubin is a 69-year-old male with an extensive past history of failure to care for himself due to alcoholism. Additional history of paroxysmal atrial fibrillation on chronic Eliquis, CAD, prostate cancer in remission, hyperlipidemia, hypertension and most recently treated for a large gluteal compartment intramuscular hematoma due to a fall at home. This resulted in patient being discharged to skilled nursing facility which the patient left AMA. He presented today per EMS. Patient reported that yesterday he tripped over a rug and fell on a heater hitting his knee when he got up he hit his head. He denied any loss of consciousness he stated he got up within a few minutes and was able to remove. His brother visited him this afternoon and called EMS. Upon presentation he denied any new pain he admits to leaving SNF AMA and zooming drinking last alcohol consumption was 11 AM this morning where he added 2 shots of vodka to his orange juice. The patient on last admission had to be transferred to ICU due to delirium tremors and placed on phenobarbital. He denied any neck, back, chest or abdominal pain. X-rays of knee, tib-fib, CT of the head, cervical spine and abdomen pelvis were obtained all with no acute findings or acute fractures. He stated his only complaint with the he noticed some redness in his left groin area for the past few days and it has been somewhat tender. He states he wears a brief due to urinary incontinence and he states that he has daily bowel movements with occasional diarrhea and he only changes the diaper once daily regardless.     Today:   Patient is resting comfortably in a chair on room air with family at bedside.  Patient has  mild tremors however no additional withdrawal symptoms present.  Patient states he is feeling much better, he was evaluated by Raysa Prasad RN wound nurse today agrees patient's wound is a an incontinence dermatitis with a healing stage III pressure injury to the left buttock region .  She is pleased with his progress over the weekend after we discussed wound care initiated Friday night.  Additional instructions to continue current wound care orders, silicone border dressing to bony prominences and to notify her of any worsening symptoms.  Patient was evaluated by [facility] nursing and rehab today who offered a bed until they were made aware of patient's C. difficile carrier status and had to decline.  We are for additional evaluations from other facilities.  Case management will discuss options with patient and his sister for returning home with her and home health until patient is stable to start alcohol rehab.  Patient has no additional complaints of nausea, vomiting and diarrhea has improved with Imodium.  Patient states his pain is improving with Ultram.  Continue PT/OT, patient made aware of current situation, all questions were answered to the best my ability and he is in agreement to continue current plan of care    Documented weights    01/17/25 1223   Weight: 84.5 kg (186 lb 3.2 oz)          Intake/Output Summary (Last 24 hours) at 1/20/2025 1345  Last data filed at 1/19/2025 1900  Gross per 24 hour   Intake --   Output 100 ml   Net -100 ml       Results for orders placed during the hospital encounter of 07/29/24    Adult Transthoracic Echo Complete W/ Cont if Necessary Per Protocol    Interpretation Summary    Left ventricular systolic function is low normal. Left ventricular ejection fraction appears to be 51 - 55%.    Left ventricular wall thickness is consistent with moderate to severe concentric hypertrophy.    Mild to moderate mitral valve regurgitation is present.    The aortic valve is thickened  and calcified and there is some degree of aortic valve stenosis, likely moderate, most of the flow acceleration appears to be in the left ventricular outflow tract as a result of hypertrophic obstructive cardiomyopathy.       Review of Systems   Constitutional:  Positive for fatigue.   Respiratory:  Negative for shortness of breath.    Gastrointestinal:  Positive for diarrhea (Improving). Negative for nausea and vomiting.   Skin:  Positive for wound.   Neurological:  Positive for tremors.   Psychiatric/Behavioral:  The patient is nervous/anxious.       All pertinent negatives and positives are as above. All other systems have been reviewed and are negative unless otherwise stated.     Objective    Temp:  [96.6 °F (35.9 °C)-97.7 °F (36.5 °C)] 96.6 °F (35.9 °C)  Heart Rate:  [63-72] 63  Resp:  [16] 16  BP: (103-131)/(69-81) 116/73  Physical Exam  Vitals and nursing note reviewed.   Constitutional:       General: He is not in acute distress.     Appearance: He is obese. He is not ill-appearing or toxic-appearing.      Comments: Room air   HENT:      Right Ear: Tympanic membrane normal.      Left Ear: Tympanic membrane normal.      Nose: Nose normal.      Mouth/Throat:      Mouth: Mucous membranes are moist.      Pharynx: Oropharynx is clear.   Eyes:      Pupils: Pupils are equal, round, and reactive to light.   Cardiovascular:      Rate and Rhythm: Normal rate and regular rhythm.      Pulses: Normal pulses.      Heart sounds: Normal heart sounds.      Comments: NSR 72-91  Abdominal:      General: Abdomen is protuberant. Bowel sounds are normal.      Palpations: Abdomen is soft.      Tenderness: There is no abdominal tenderness.   Musculoskeletal:      Cervical back: Normal range of motion and neck supple. No rigidity or tenderness.      Right lower leg: No edema.      Left lower leg: No edema.   Skin:     General: Skin is warm.      Capillary Refill: Capillary refill takes less than 2 seconds.      Coloration: Skin is  pale.      Comments: Please see attached photos for coccyx and perineal area.  Patient does have a large scab to his right forehead from previous fall.   Neurological:      General: No focal deficit present.      Mental Status: He is oriented to person, place, and time.   Psychiatric:         Mood and Affect: Mood normal.         Behavior: Behavior normal. Behavior is cooperative.         Thought Content: Thought content normal.         Judgment: Judgment normal.         1/17/2025 coccyx region on admission  Results Review:  I have reviewed the labs, radiology results, and diagnostic studies.    Laboratory Data:   Results from last 7 days   Lab Units 01/20/25  0354 01/19/25 0448 01/18/25 0457   WBC 10*3/mm3 5.37 5.50 5.48   HEMOGLOBIN g/dL 8.5* 8.3* 9.2*   HEMATOCRIT % 27.2* 26.6* 29.4*   PLATELETS 10*3/mm3 156 156 194        Results from last 7 days   Lab Units 01/20/25  0354 01/19/25 0448 01/18/25  0457   SODIUM mmol/L 138 136 136   POTASSIUM mmol/L 3.6 3.6 3.8   CHLORIDE mmol/L 103 99 98   CO2 mmol/L 27.0 26.0 29.0   BUN mg/dL 15 17 19   CREATININE mg/dL 1.11 0.84 0.90   CALCIUM mg/dL 8.7 8.9 8.8   BILIRUBIN mg/dL 0.5 0.5 0.8   ALK PHOS U/L 66 66 70   ALT (SGPT) U/L 11 12 15   AST (SGOT) U/L 18 21 31   GLUCOSE mg/dL 96 96 92       Culture Data:     Microbiology Results (last 10 days)       Procedure Component Value - Date/Time    Gastrointestinal Panel, PCR - Stool, Per Rectum [[id number]]  (Normal) Collected: 01/19/25 1311    Lab Status: Final result Specimen: Stool from Per Rectum Updated: 01/19/25 1441     Campylobacter Not Detected     Plesiomonas shigelloides Not Detected     Salmonella Not Detected     Vibrio Not Detected     Vibrio cholerae Not Detected     Yersinia enterocolitica Not Detected     Enteroaggregative E. coli (EAEC) Not Detected     Enteropathogenic E. coli (EPEC) Not Detected     Enterotoxigenic E. coli (ETEC) lt/st Not Detected     Shiga-like toxin-producing E. coli (STEC) stx1/stx2 Not  Detected     Shigella/Enteroinvasive E. coli (EIEC) Not Detected     Cryptosporidium Not Detected     Cyclospora cayetanensis Not Detected     Entamoeba histolytica Not Detected     Giardia lamblia Not Detected     Adenovirus F40/41 Not Detected     Astrovirus Not Detected     Norovirus GI/GII Not Detected     Rotavirus A Not Detected     Sapovirus (I, II, IV or V) Not Detected    Blood Culture - Blood, Hand, Right [[id number]]  (Abnormal) Collected: 01/17/25 1313    Lab Status: Final result Specimen: Blood from Hand, Right Updated: 01/20/25 0947     Blood Culture Clostridium perfringens     Comment: Beta-lactamase negative        Isolated from Anaerobic Bottle     Gram Stain Anaerobic Bottle Gram variable bacilli    Blood Culture - Blood, Hand, Left [[id number]]  (Normal) Collected: 01/17/25 1313    Lab Status: Preliminary result Specimen: Blood from Hand, Left Updated: 01/20/25 1330     Blood Culture No growth at 3 days             Radiology Data:   Imaging Results (Last 7 Days)       Procedure Component Value Units Date/Time    CT Abdomen Pelvis With Contrast [[id number]] Collected: 01/17/25 1441     Updated: 01/17/25 1507    Narrative:      EXAMINATION: CT ABDOMEN PELVIS W CONTRAST-      1/17/2025 1:24 PM     HISTORY: pelvic infection/cellulitis. nec fas?     In order to have a CT radiation dose as low as reasonably achievable  Automated Exposure Control was utilized for adjustment of the mA and/or  KV according to patient size.     Total DLP = 401.9 mGy.cm     The CT scan of the abdomen and pelvis is performed after intravenous  contrast enhancement.     Images are acquired in axial plane and subsequent reconstruction in  coronal and sagittal planes.     Comparison is made with the previous study dated 12/20/2024 and  11/16/2024.     The lung bases included in the study are unremarkable.     Limited visualized cardiomediastinal structures show moderate  cardiomegaly. There is an apparent focal bulge of the left  ventricle  which may suggest an aneurysm of the left ventricle?. This may be  clinically correlated.     There is fatty infiltration of the liver. The spleen is normal.     The gallbladder is moderately distended with high density material which  may represent sludge or contrast excretion. No definite radiopaque  calculi.     Pancreas is normal.     The adrenal glands are normal.     Moderate lobulation of renal contour bilaterally is seen. There is a  small low-density nodule located posteriorly in the lower pole of the  left kidney measuring 8 mm in diameter. CT density suggest a cyst. No  radiopaque calculi on either side. No hydronephrosis. The ureters are  nondilated. Urinary bladder poorly distended with mild wall thickening.  No obvious intrinsic abnormality.     The prostate is surgically absent.     There is a fat-containing left inguinoscrotal hernia.     The stomach and duodenum are normal. Small bowel is nondistended.  Appendix is normal. Small amount of stool and gas is seen in the colon.  There is diverticulosis of the distal colon. No finding to suggest  diverticulitis.     Atheromatous changes of the abdominal aorta and iliac arteries. No  aneurysmal dilatation.     No evidence of abdominal or pelvic lymphadenopathy.     Images reviewed in bone window show no acute bony abnormality. Chronic  degenerative changes of the lumbar spine are seen. There is bilateral  spondylolysis L5 without a significant spondylolisthesis. Old healed  fractures of the left lower ribs are seen.     There is a lobulated oblong high density fluid accumulation  predominantly in the left gluteus medius with a surrounding ring-shaped  enhancement measuring 12 cm in craniocaudal projection in the coronal  plane images and 10 cm in horizontal projection and axial plane images.  The size of the gluteus medius and robbin has overall decreased since  the previous study. There is another high density lobulated masslike  area located  between the tensor fascia froilan laterally, rectus femoris  anteriorly and vastus lateralis and medius posterolaterally. This may  represent an evolving hematoma?. This was not noted in the previous  study.       Impression:      1. There is overall improvement in size of the hematoma/swelling of the  left gluteus medius and robbin. However there is now a well-formed  collection, in the gluteus medius, with a surrounding ring enhancement  which may represent an abscess. There is a new high density masslike  lobulated area located more anteriorly which measures 3.8 cm in diameter  in axial plane images. This may represent hematoma?.  2. Hepatic steatosis.  3. Moderately dilated gallbladder with sludge?. No stones.  4. Diverticulosis of the colon. No evidence for diverticulitis.  4. Other nonacute findings similar to the previous study.                                               This report was signed and finalized on 1/17/2025 3:04 PM by Dr. Aleta Ramos MD.       CT Cervical Spine Without Contrast [[id number]] Collected: 01/17/25 1438     Updated: 01/17/25 1445    Narrative:      Exam: CT CERVICAL SPINE WO CONTRAST- 1/17/2025 1:24 PM     HISTORY: fall etoh eliquis     COMPARISON: 12/4/2024     DOSE LENGTH PRODUCT: 1153.51 mGy.cm mGy cm. Automated exposure control  was also utilized to decrease patient radiation dose.     TECHNIQUE: Serial helical tomographic images of the cervical spine were  obtained without the use of intravenous contrast. Additionally, sagittal  and coronal reformatted images were also provided for review.     FINDINGS:     Straightening of the cervical lordosis.     No acute fracture. The vertebral body heights are preserved.     Multilevel degenerative disc disease with posterior disc osteophyte  complexes. Multilevel uncovertebral and facet hypertrophic changes.     Suspect mild spinal canal narrowing at C3-C4, C4-C5. Variable degrees of  foraminal narrowing most notable with at least  moderate left C4-C5  foraminal narrowing.     Right greater the left carotid atherosclerosis.     Other:None       Impression:         No acute fracture or traumatic malalignment.     Moderate degenerative changes with suspected mild spinal canal narrowing  at C3-C4 and C4-C5.     Right greater than left carotid atherosclerosis.           This report was signed and finalized on 1/17/2025 2:42 PM by Regan Garner.       CT Head Without Contrast [[id number]] Collected: 01/17/25 1434     Updated: 01/17/25 1441    Narrative:      Exam: CT HEAD WO CONTRAST- 1/17/2025 1:24 PM     HISTORY: head injury eliquis       DOSE LENGTH PRODUCT: 1153.51 mGy.cm mGy cm. Automated exposure control  was also utilized to decrease patient radiation dose.     Technique:  Helically acquired CT of the brain without IV contrast was performed.  Sagittal and coronal reformations are also provided for review. Soft  tissue and bone kernels are available for interpretation.     Comparison: 12/4/2024.     Findings:     Ventricles and extra-axial CSF spaces are normal in size.     No intraparenchymal or extra-axial hemorrhage.     Gray-white matter differentiation is preserved.     Orbits are grossly unremarkable. Paranasal sinuses are grossly clear.  Mastoid air cells are grossly clear.     No suspicious calvarial or extracranial soft tissue abnormality.       Impression:      Impression:       No acute intracranial abnormality.     This report was signed and finalized on 1/17/2025 2:38 PM by Regan Garner.       XR Tibia Fibula 2 View Left [[id number]] Collected: 01/17/25 1405     Updated: 01/17/25 1409    Narrative:      XR TIBIA FIBULA 2 VW LEFT-     HISTORY: fall left low leg pain eliquis     COMPARISON: None     FINDINGS: Frontal and lateral views of the left tibia and fibula are  obtained.     No fracture or dislocation. Mild to moderate medial joint space  narrowing. Vascular calcifications.       Impression:      1. No acute osseous  injury left tibia or fibula.        This report was signed and finalized on 1/17/2025 2:06 PM by Dr. Bri Duong MD.       XR Knee 4+ View Bilateral [[id number]] Collected: 01/17/25 1403     Updated: 01/17/25 1408    Narrative:      XR KNEE 4+ VW BILATERAL-     HISTORY: fall bilateral knee injury eliquis     COMPARISON: None     FINDINGS: 4 views of the right and left knee are obtained.     There is mild medial left joint space narrowing. Borderline medial right  joint space narrowing. Minimal bony spurring of the patella. No acute  fracture or dislocation. No knee joint effusion.     Diffuse vascular calcifications.       Impression:      1. Mild arthritic changes of the knee, joint space narrowing greatest in  the medial compartment of the left knee. No acute osseous injury or  joint effusion.              This report was signed and finalized on 1/17/2025 2:05 PM by Dr. Bri Duong MD.                I have reviewed the patient's current medications.     amiodarone, 200 mg, Oral, Daily  apixaban, 5 mg, Oral, Q12H  atorvastatin, 20 mg, Oral, Daily  busPIRone, 7.5 mg, Oral, BID  cefTRIAXone, 2,000 mg, Intravenous, Q24H  chlordiazePOXIDE, 50 mg, Oral, Nightly  folic acid, 1 mg, Oral, Daily  metoprolol succinate XL, 12.5 mg, Oral, Daily  pantoprazole, 40 mg, Oral, Q AM  sodium chloride, 10 mL, Intravenous, Q12H  thiamine (B-1) IV, 200 mg, Intravenous, Q8H   Followed by  [START ON 1/23/2025] thiamine, 100 mg, Oral, Daily            Assessment/Plan   Assessment  Active Hospital Problems    Diagnosis     **Failure to thrive in adult     Pressure injury of buttock, stage 3     Diarrhea     Urinary tract infection     Dermatitis neglecta     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  Failure to thrive-patient admitted after multiple falls at home after he left AMA from SNF.  Patient continues to use alcohol and prescription medications.  Patient is not taking care of himself, his wounds and requires continued treatment.  PT/OT and case management consultation for transfer to skilled nursing facility remain pending     2.  Cystitis-urinalysis revealed small blood, positive nitrates, small leukocytes and trace bacteria.  Patient has received 4 days of IV Rocephin, discontinued due to negative blood cultures.    3.  Dermatitis neglecta-patient upon arrival was covered in feces and dirt.  Patient has had significant bathing and treatment to his wounds skin hands and feet.  Continue bathing daily and wound care per orders.      4.  Large left gluteal compartment intramuscular hematoma/healing stage III or injury- continuing to heal, ED provider believed it was cellulitis after evaluation I believe it is significant erythema and moisture associated rash from patient sitting in his own feces and urine 4 days at a time.  Discussed the case with Daylin Prasad RN, wound nurse and recommendations for significant wound care placed.  Will continue to monitor closely and wound care APRN will evaluate on Monday.     5.  Paroxysmal atrial fibrillation/chronic anticoagulation-continue home amiodarone and Eliquis, continue cardiac telemetry, overnight normal sinus rhythm 72-91     6.  Alcoholic hepatitis/alcohol abuse-AST 50, 31, 21, 18 ALT 21, 15, 12, 11 total bilirubin 1.7, 0.8, 0.5, 0.5 lipase 88, 47.  Currently stable we will continue to monitor with daily CMP.  Continue CIWA protocol, scheduled Librium and as needed Ativan.   Patient will be monitored closely for any worsening signs or symptoms.     7.  Essential hypertension-r continue home metoprolol, vital signs every 4.    8.  Diarrhea-patient is currently a C. difficile carrier, C. difficile was not ordered due to no signs or  symptoms of infection.  GI Panel negative, stool culture pending    VTE prophylaxis with SCDs  Labs in a.m.    Medical Decision Making  Failure to thrive, acute on chronic, high complexity, unchanged  Cystitis, acute, moderate complexity, unchanged  Dermatitis neglecta, acute on chronic, moderate complexity, improving  Healing wound, chronic, moderate complexity, improving  Paroxysmal atrial fibrillation, chronic, moderate complexity, stable  Chronic anticoagulation, chronic, moderate complexity, stable  Alcoholic hepatitis, chronic, high complexity, improving  Alcoholic abuse, chronic, high complexity, worsening  Essential hypertension, chronic, moderate complexity, unchanged    Number and Complexity of problems: 8  Differential Diagnosis: None    Conditions and Status        Condition is unchanged.     [facility] Data  External documents reviewed: Extensive epic review of previous hospitalizations  Cardiac tracing (EKG, telemetry) interpretation: Stat EKG pending for QTc measurement  Radiology interpretation: 1/17/2025 x-ray of the knee, tib-fib, CT of the head, abdomen pelvis and cervical spine per radiology reviewed  Labs reviewed: 1/20/2025 magnesium,, CMP, CBC, blood and urine cultures remain pending, repeat labs in a.m.  Any tests that were considered but not ordered: None     Decision rules/scores evaluated (example [id number], Wells, etc): CIWA score remains at a 2.     Discussed with: Dr. Griggs, patient, and bedside nurse Dana     Care Planning  Shared decision making: Dr. Griggs, patient, and bedside nurse Dana  Code status and discussions: Full code per patient  Surrogate Decision Maker his sister Yuliya Dale  Social Determinants of Health that impact treatment or disposition: Will need SNF at DC  I expect the patient to be discharged to SNF in 1-2 days.     Electronically signed by TACOS Song, 01/20/25, 13:45 CST.

## 2025-01-20 NOTE — PLAN OF CARE
Goal Outcome Evaluation:  Plan of Care Reviewed With: patient        Progress: improving  Outcome Evaluation: Initial nutrition assessment secondary to wound care provider consult. Pt has incontinence dermatitis to buttocks and kieran area with a healing stage 3 PI to L buttock. He was recently in the hospital and was d/c'd to a SNF. He left the SNF AMA and went home. He had a fall at home and he hit his knee and head. He has a forehead abrasion from the fall. He is on CIWA protocol d/t alcohol abuse. He realizes that he cannot take care of himself at home. He is very weak. He is a C.Difficile carrier and has diarrhea. Last BM noted as 1/19 with fecal incontinence. He is ordered a healthy heart diet. He reports a good appetite. He has consumed 75% of the last 3 meals. Per review of weight hx, he has lost 5# in the last month (2.6%) and 11# in the last 3 months (5.5%), no clinically significant changes. Recommend to start a MVI w/minerals daily. He has been on Ricki in the past, recommend to resume while in the hospital to cont to aid in healing PI. He declines other supplements. He has several referrals out to SNF's. He is aware of alternate food selections if needed. Cont to follow.

## 2025-01-21 LAB
AMPHET+METHAMPHET UR QL: NEGATIVE
AMPHETAMINES UR QL: NEGATIVE
ANION GAP SERPL CALCULATED.3IONS-SCNC: 9 MMOL/L (ref 5–15)
BARBITURATES UR QL SCN: NEGATIVE
BENZODIAZ UR QL SCN: POSITIVE
BUN SERPL-MCNC: 13 MG/DL (ref 8–23)
BUN/CREAT SERPL: 15.5 (ref 7–25)
BUPRENORPHINE SERPL-MCNC: NEGATIVE NG/ML
CALCIUM SPEC-SCNC: 9 MG/DL (ref 8.6–10.5)
CANNABINOIDS SERPL QL: NEGATIVE
CHLORIDE SERPL-SCNC: 102 MMOL/L (ref 98–107)
CO2 SERPL-SCNC: 25 MMOL/L (ref 22–29)
COCAINE UR QL: NEGATIVE
CREAT SERPL-MCNC: 0.84 MG/DL (ref 0.76–1.27)
EGFRCR SERPLBLD CKD-EPI 2021: 93.8 ML/MIN/1.73
FENTANYL UR-MCNC: NEGATIVE NG/ML
GLUCOSE SERPL-MCNC: 90 MG/DL (ref 65–99)
METHADONE UR QL SCN: NEGATIVE
OPIATES UR QL: NEGATIVE
OXYCODONE UR QL SCN: NEGATIVE
PCP UR QL SCN: NEGATIVE
POTASSIUM SERPL-SCNC: 4.2 MMOL/L (ref 3.5–5.2)
SODIUM SERPL-SCNC: 136 MMOL/L (ref 136–145)
TRICYCLICS UR QL SCN: NEGATIVE

## 2025-01-21 PROCEDURE — 80307 DRUG TEST PRSMV CHEM ANLYZR: CPT

## 2025-01-21 PROCEDURE — 97116 GAIT TRAINING THERAPY: CPT

## 2025-01-21 PROCEDURE — 80048 BASIC METABOLIC PNL TOTAL CA: CPT

## 2025-01-21 PROCEDURE — 25010000002 THIAMINE HCL 200 MG/2ML SOLUTION: Performed by: INTERNAL MEDICINE

## 2025-01-21 RX ORDER — HYDROXYZINE HYDROCHLORIDE 25 MG/1
25 TABLET, FILM COATED ORAL 3 TIMES DAILY PRN
Status: DISCONTINUED | OUTPATIENT
Start: 2025-01-21 | End: 2025-01-22 | Stop reason: HOSPADM

## 2025-01-21 RX ORDER — TRAMADOL HYDROCHLORIDE 50 MG/1
50 TABLET ORAL EVERY 6 HOURS PRN
Status: DISCONTINUED | OUTPATIENT
Start: 2025-01-21 | End: 2025-01-22 | Stop reason: HOSPADM

## 2025-01-21 RX ORDER — TRAMADOL HYDROCHLORIDE 50 MG/1
50 TABLET ORAL EVERY 6 HOURS PRN
Qty: 12 TABLET | Refills: 3 | Status: SHIPPED | OUTPATIENT
Start: 2025-01-21 | End: 2025-01-21 | Stop reason: HOSPADM

## 2025-01-21 RX ADMIN — Medication 1 TABLET: at 10:23

## 2025-01-21 RX ADMIN — FOLIC ACID 1 MG: 1 TABLET ORAL at 10:23

## 2025-01-21 RX ADMIN — HYDROXYZINE HYDROCHLORIDE 25 MG: 25 TABLET ORAL at 11:44

## 2025-01-21 RX ADMIN — TEMAZEPAM 30 MG: 30 CAPSULE ORAL at 21:32

## 2025-01-21 RX ADMIN — APIXABAN 5 MG: 5 TABLET, FILM COATED ORAL at 21:26

## 2025-01-21 RX ADMIN — THIAMINE HYDROCHLORIDE 200 MG: 100 INJECTION, SOLUTION INTRAMUSCULAR; INTRAVENOUS at 06:43

## 2025-01-21 RX ADMIN — Medication 10 ML: at 10:24

## 2025-01-21 RX ADMIN — PANTOPRAZOLE SODIUM 40 MG: 40 TABLET, DELAYED RELEASE ORAL at 06:43

## 2025-01-21 RX ADMIN — BUSPIRONE HYDROCHLORIDE 7.5 MG: 5 TABLET ORAL at 21:26

## 2025-01-21 RX ADMIN — TRAMADOL HYDROCHLORIDE 50 MG: 50 TABLET, COATED ORAL at 14:19

## 2025-01-21 RX ADMIN — AMIODARONE HYDROCHLORIDE 200 MG: 200 TABLET ORAL at 10:24

## 2025-01-21 RX ADMIN — ATORVASTATIN CALCIUM 20 MG: 10 TABLET, FILM COATED ORAL at 10:23

## 2025-01-21 RX ADMIN — TRAMADOL HYDROCHLORIDE 25 MG: 50 TABLET, COATED ORAL at 06:48

## 2025-01-21 RX ADMIN — APIXABAN 5 MG: 5 TABLET, FILM COATED ORAL at 10:24

## 2025-01-21 RX ADMIN — LOPERAMIDE HYDROCHLORIDE 2 MG: 2 CAPSULE ORAL at 10:24

## 2025-01-21 RX ADMIN — BUSPIRONE HYDROCHLORIDE 7.5 MG: 5 TABLET ORAL at 10:23

## 2025-01-21 RX ADMIN — METOPROLOL SUCCINATE 12.5 MG: 25 TABLET, EXTENDED RELEASE ORAL at 10:23

## 2025-01-21 NOTE — PLAN OF CARE
Goal Outcome Evaluation:  Plan of Care Reviewed With: patient        Progress: improving  Outcome Evaluation: Medicated x 1 for pain, x 1 for anxiety. NIVIA 6, pt adamant that UDS was false positive for cocaine, would like it done again, as well as Diony Saucedo requesting it be repeated to be able to take him as a patient. Repeat UDS negative for all but benzos, which he has received here. Tramadol dose increased to 50 mg. Imodium given for diahhrea, lessening per patient. Walked with therapy, and worked on stairs, has stairs at home. Diony Saucedo will take tomorrow.

## 2025-01-21 NOTE — PLAN OF CARE
Goal Outcome Evaluation:  Plan of Care Reviewed With: patient        Progress: no change  Outcome Evaluation: VSS. PRN pain med given x1 for c/o R hip pain. Safety maintained. Isolation precautions continued. No signs of withdrawl this shift.

## 2025-01-21 NOTE — CASE MANAGEMENT/SOCIAL WORK
Continued Stay Note   Mary     Patient Name: Dayron Lubin  MRN: [id number]  Today's Date: 1/21/2025    Admit Date: 1/17/2025    Plan: Home   Discharge Plan       Row Name 01/21/25 1136       Plan    Plan Home    Patient/Family in Agreement with Plan yes    Plan Comments [facility], [facility], and [facility] unable to take pt. Checked with other facilities [facility], [facility], [facility], [facility], [facility] and they are unable to take. Spoke with pt and informed him with his UDS being positive for cocaine, he will not find placement. He is going to contact his sister.                   Discharge Codes    No documentation.                       LILIAN Jackson

## 2025-01-21 NOTE — DISCHARGE SUMMARY
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 Left ventricular ejection fraction appears to be 51 - 55%.    Left ventricular wall thickness is consistent with moderate to severe concentric hypertrophy.    Mild to moderate mitral valve regurgitation is present.    The aortic valve is thickened and calcified and there is some degree of aortic valve stenosis, likely moderate, most of the flow acceleration appears to be in the left ventricular outflow tract as a result of hypertrophic obstructive cardiomyopathy.      Imaging Results (All)       Procedure Component Value Units Date/Time    CT Abdomen Pelvis With Contrast [[id number]] Collected: 01/17/25 1441     Updated: 01/17/25 1507    Narrative:      EXAMINATION: CT ABDOMEN PELVIS W CONTRAST-      1/17/2025 1:24 PM     HISTORY: pelvic infection/cellulitis. nec fas?     In order to have a CT radiation dose as low as reasonably achievable  Automated Exposure Control was utilized for  adjustment of the mA and/or  KV according to patient size.     Total DLP = 401.9 mGy.cm     The CT scan of the abdomen and pelvis is performed after intravenous  contrast enhancement.     Images are acquired in axial plane and subsequent reconstruction in  coronal and sagittal planes.     Comparison is made with the previous study dated 12/20/2024 and  11/16/2024.     The lung bases included in the study are unremarkable.     Limited visualized cardiomediastinal structures show moderate  cardiomegaly. There is an apparent focal bulge of the left ventricle  which may suggest an aneurysm of the left ventricle?. This may be  clinically correlated.     There is fatty infiltration of the liver. The spleen is normal.     The gallbladder is moderately distended with high density material which  may represent sludge or contrast excretion. No definite radiopaque  calculi.     Pancreas is normal.     The adrenal glands are normal.     Moderate lobulation of renal contour bilaterally is seen. There is a  small low-density nodule located posteriorly in the lower pole of the  left kidney measuring 8 mm in diameter. CT density suggest a cyst. No  radiopaque calculi on either side. No hydronephrosis. The ureters are  nondilated. Urinary bladder poorly distended with mild wall thickening.  No obvious intrinsic abnormality.     The prostate is surgically absent.     There is a fat-containing left inguinoscrotal hernia.     The stomach and duodenum are normal. Small bowel is nondistended.  Appendix is normal. Small amount of stool and gas is seen in the colon.  There is diverticulosis of the distal colon. No finding to suggest  diverticulitis.     Atheromatous changes of the abdominal aorta and iliac arteries. No  aneurysmal dilatation.     No evidence of abdominal or pelvic lymphadenopathy.     Images reviewed in bone window show no acute bony abnormality. Chronic  degenerative changes of the lumbar spine are seen. There is  bilateral  spondylolysis L5 without a significant spondylolisthesis. Old healed  fractures of the left lower ribs are seen.     There is a lobulated oblong high density fluid accumulation  predominantly in the left gluteus medius with a surrounding ring-shaped  enhancement measuring 12 cm in craniocaudal projection in the coronal  plane images and 10 cm in horizontal projection and axial plane images.  The size of the gluteus medius and robbin has overall decreased since  the previous study. There is another high density lobulated masslike  area located between the tensor fascia froilan laterally, rectus femoris  anteriorly and vastus lateralis and medius posterolaterally. This may  represent an evolving hematoma?. This was not noted in the previous  study.       Impression:      1. There is overall improvement in size of the hematoma/swelling of the  left gluteus medius and robbin. However there is now a well-formed  collection, in the gluteus medius, with a surrounding ring enhancement  which may represent an abscess. There is a new high density masslike  lobulated area located more anteriorly which measures 3.8 cm in diameter  in axial plane images. This may represent hematoma?.  2. Hepatic steatosis.  3. Moderately dilated gallbladder with sludge?. No stones.  4. Diverticulosis of the colon. No evidence for diverticulitis.  4. Other nonacute findings similar to the previous study.                                               This report was signed and finalized on 1/17/2025 3:04 PM by Dr. Aleta Ramos MD.       CT Cervical Spine Without Contrast [[id number]] Collected: 01/17/25 1438     Updated: 01/17/25 1445    Narrative:      Exam: CT CERVICAL SPINE WO CONTRAST- 1/17/2025 1:24 PM     HISTORY: fall etoh eliquis     COMPARISON: 12/4/2024     DOSE LENGTH PRODUCT: 1153.51 mGy.cm mGy cm. Automated exposure control  was also utilized to decrease patient radiation dose.     TECHNIQUE: Serial helical tomographic  images of the cervical spine were  obtained without the use of intravenous contrast. Additionally, sagittal  and coronal reformatted images were also provided for review.     FINDINGS:     Straightening of the cervical lordosis.     No acute fracture. The vertebral body heights are preserved.     Multilevel degenerative disc disease with posterior disc osteophyte  complexes. Multilevel uncovertebral and facet hypertrophic changes.     Suspect mild spinal canal narrowing at C3-C4, C4-C5. Variable degrees of  foraminal narrowing most notable with at least moderate left C4-C5  foraminal narrowing.     Right greater the left carotid atherosclerosis.     Other:None       Impression:         No acute fracture or traumatic malalignment.     Moderate degenerative changes with suspected mild spinal canal narrowing  at C3-C4 and C4-C5.     Right greater than left carotid atherosclerosis.           This report was signed and finalized on 1/17/2025 2:42 PM by Regan Garner.       CT Head Without Contrast [[id number]] Collected: 01/17/25 1434     Updated: 01/17/25 1441    Narrative:      Exam: CT HEAD WO CONTRAST- 1/17/2025 1:24 PM     HISTORY: head injury eliquis       DOSE LENGTH PRODUCT: 1153.51 mGy.cm mGy cm. Automated exposure control  was also utilized to decrease patient radiation dose.     Technique:  Helically acquired CT of the brain without IV contrast was performed.  Sagittal and coronal reformations are also provided for review. Soft  tissue and bone kernels are available for interpretation.     Comparison: 12/4/2024.     Findings:     Ventricles and extra-axial CSF spaces are normal in size.     No intraparenchymal or extra-axial hemorrhage.     Gray-white matter differentiation is preserved.     Orbits are grossly unremarkable. Paranasal sinuses are grossly clear.  Mastoid air cells are grossly clear.     No suspicious calvarial or extracranial soft tissue abnormality.       Impression:      Impression:       No  acute intracranial abnormality.     This report was signed and finalized on 1/17/2025 2:38 PM by Regan Garner.       XR Tibia Fibula 2 View Left [[id number]] Collected: 01/17/25 1405     Updated: 01/17/25 1409    Narrative:      XR TIBIA FIBULA 2 VW LEFT-     HISTORY: fall left low leg pain eliquis     COMPARISON: None     FINDINGS: Frontal and lateral views of the left tibia and fibula are  obtained.     No fracture or dislocation. Mild to moderate medial joint space  narrowing. Vascular calcifications.       Impression:      1. No acute osseous injury left tibia or fibula.        This report was signed and finalized on 1/17/2025 2:06 PM by Dr. Bri Duong MD.       XR Knee 4+ View Bilateral [[id number]] Collected: 01/17/25 1403     Updated: 01/17/25 1408    Narrative:      XR KNEE 4+ VW BILATERAL-     HISTORY: fall bilateral knee injury eliquis     COMPARISON: None     FINDINGS: 4 views of the right and left knee are obtained.     There is mild medial left joint space narrowing. Borderline medial right  joint space narrowing. Minimal bony spurring of the patella. No acute  fracture or dislocation. No knee joint effusion.     Diffuse vascular calcifications.       Impression:      1. Mild arthritic changes of the knee, joint space narrowing greatest in  the medial compartment of the left knee. No acute osseous injury or  joint effusion.              This report was signed and finalized on 1/17/2025 2:05 PM by Dr. Bri Duong MD.             LAB RESULTS:      Lab 01/22/25  0428 01/20/25  0354 01/19/25  0448 01/18/25  0457 01/17/25  1730 01/17/25  1313   WBC 6.15 5.37 5.50 5.48  --  10.91*   HEMOGLOBIN 8.7* 8.5* 8.3* 9.2*  --  10.9*   HEMATOCRIT 28.2* 27.2* 26.6* 29.4*  --  32.6*   PLATELETS 135* 156 156 194  --  296   NEUTROS ABS 3.83 3.46 3.93 3.80  --  8.91*   IMMATURE GRANS (ABS) 0.07* 0.05 0.03 0.03  --  0.08*   LYMPHS ABS 1.56 1.35 1.08 1.17  --  0.89   MONOS ABS 0.53 0.35 0.38 0.43  --  1.00*   EOS  ABS 0.14 0.15 0.07 0.03  --  0.01   MCV 96.2 95.4 93.7 95.5  --  90.8   LACTATE  --   --   --   --  1.4 3.3*         Lab 01/22/25  0428 01/21/25  0403 01/20/25  0354 01/19/25  0448 01/18/25  0457 01/17/25  1313   SODIUM 137 136 138 136 136 132*   POTASSIUM 4.1 4.2 3.6 3.6 3.8 4.1   CHLORIDE 103 102 103 99 98 90*   CO2 25.0 25.0 27.0 26.0 29.0 24.0   ANION GAP 9.0 9.0 8.0 11.0 9.0 18.0*   BUN 15 13 15 17 19 19   CREATININE 0.97 0.84 1.11 0.84 0.90 0.90   EGFR 84.0 93.8 71.4 93.8 91.9 91.9   GLUCOSE 93 90 96 96 92 139*   CALCIUM 9.1 9.0 8.7 8.9 8.8 9.4   MAGNESIUM  --   --   --   --  2.3 2.2   HEMOGLOBIN A1C  --   --   --   --  4.70*  --          Lab 01/20/25  0354 01/19/25  0448 01/18/25  0457 01/17/25  1313   TOTAL PROTEIN 6.2 6.0 6.3 7.6   ALBUMIN 3.3* 3.3* 3.4* 4.1   GLOBULIN 2.9 2.7 2.9 3.5   ALT (SGPT) 11 12 15 21   AST (SGOT) 18 21 31 50*   BILIRUBIN 0.5 0.5 0.8 1.7*   ALK PHOS 66 66 70 84   LIPASE  --   --  47 88*                 Lab 01/19/25  0448   FERRITIN 768.90*         Brief Urine Lab Results  (Last result in the past 365 days)        Color   Clarity   Blood   Leuk Est   Nitrite   Protein   CREAT   Urine HCG        01/17/25 1322 Mono   Clear   Small (1+)   Small (1+)   Positive   >=300 mg/dL (3+)                 Microbiology Results (last 10 days)       Procedure Component Value - Date/Time    Gastrointestinal Panel, PCR - Stool, Per Rectum [[id number]]  (Normal) Collected: 01/19/25 1311    Lab Status: Final result Specimen: Stool from Per Rectum Updated: 01/19/25 1441     Campylobacter Not Detected     Plesiomonas shigelloides Not Detected     Salmonella Not Detected     Vibrio Not Detected     Vibrio cholerae Not Detected     Yersinia enterocolitica Not Detected     Enteroaggregative E. coli (EAEC) Not Detected     Enteropathogenic E. coli (EPEC) Not Detected     Enterotoxigenic E. coli (ETEC) lt/st Not Detected     Shiga-like toxin-producing E. coli (STEC) stx1/stx2 Not Detected      Shigella/Enteroinvasive E. coli (EIEC) Not Detected     Cryptosporidium Not Detected     Cyclospora cayetanensis Not Detected     Entamoeba histolytica Not Detected     Giardia lamblia Not Detected     Adenovirus F40/41 Not Detected     Astrovirus Not Detected     Norovirus GI/GII Not Detected     Rotavirus A Not Detected     Sapovirus (I, II, IV or V) Not Detected    Blood Culture - Blood, Hand, Right [[id number]]  (Abnormal) Collected: 01/17/25 1313    Lab Status: Final result Specimen: Blood from Hand, Right Updated: 01/20/25 0947     Blood Culture Clostridium perfringens     Comment: Beta-lactamase negative        Isolated from Anaerobic Bottle     Gram Stain Anaerobic Bottle Gram variable bacilli    Blood Culture - Blood, Hand, Left [[id number]]  (Normal) Collected: 01/17/25 1313    Lab Status: Preliminary result Specimen: Blood from Hand, Left Updated: 01/21/25 1330     Blood Culture No growth at 4 days          Microbiology Results (last 10 days)       Procedure Component Value - Date/Time    Gastrointestinal Panel, PCR - Stool, Per Rectum [[id number]]  (Normal) Collected: 01/19/25 1311    Lab Status: Final result Specimen: Stool from Per Rectum Updated: 01/19/25 1441     Campylobacter Not Detected     Plesiomonas shigelloides Not Detected     Salmonella Not Detected     Vibrio Not Detected     Vibrio cholerae Not Detected     Yersinia enterocolitica Not Detected     Enteroaggregative E. coli (EAEC) Not Detected     Enteropathogenic E. coli (EPEC) Not Detected     Enterotoxigenic E. coli (ETEC) lt/st Not Detected     Shiga-like toxin-producing E. coli (STEC) stx1/stx2 Not Detected     Shigella/Enteroinvasive E. coli (EIEC) Not Detected     Cryptosporidium Not Detected     Cyclospora cayetanensis Not Detected     Entamoeba histolytica Not Detected     Giardia lamblia Not Detected     Adenovirus F40/41 Not Detected     Astrovirus Not Detected     Norovirus GI/GII Not Detected     Rotavirus A Not Detected      Sapovirus (I, II, IV or V) Not Detected    Blood Culture - Blood, Hand, Right [[id number]]  (Abnormal) Collected: 01/17/25 1313    Lab Status: Final result Specimen: Blood from Hand, Right Updated: 01/20/25 0947     Blood Culture Clostridium perfringens     Comment: Beta-lactamase negative        Isolated from Anaerobic Bottle     Gram Stain Anaerobic Bottle Gram variable bacilli    Blood Culture - Blood, Hand, Left [[id number]]  (Normal) Collected: 01/17/25 1313    Lab Status: Preliminary result Specimen: Blood from Hand, Left Updated: 01/21/25 1330     Blood Culture No growth at 4 days             Documented weights    01/17/25 1223   Weight: 84.5 kg (186 lb 3.2 oz)        Hospital Course: Dayron Lubin is a 69-year-old male with an extensive past history of failure to care for himself due to alcoholism. Additional history of paroxysmal atrial fibrillation on chronic Eliquis, CAD, prostate cancer in remission, hyperlipidemia, hypertension and most recently treated for a large gluteal compartment intramuscular hematoma due to a fall at home. This resulted in patient being discharged to skilled nursing facility which the patient left AMA. He presented today per EMS. Patient reported that yesterday he tripped over a rug and fell on a heater hitting his knee when he got up he hit his head. He denied any loss of consciousness he stated he got up within a few minutes and was able to remove. His brother visited him this afternoon and called EMS. Upon presentation he denied any new pain he admits to leaving SNF AMA and zooming drinking last alcohol consumption was 11 AM this morning where he added 2 shots of vodka to his orange juice. The patient on last admission had to be transferred to ICU due to delirium tremors and placed on phenobarbital. He denied any neck, back, chest or abdominal pain. X-rays of knee, tib-fib, CT of the head, cervical spine and abdomen pelvis were obtained all with no acute findings or acute  fractures. He stated his only complaint with the he noticed some redness in his left groin area for the past few days and it has been somewhat tender. He states he wears a brief due to urinary incontinence and he states that he has daily bowel movements with occasional diarrhea and he only changes the diaper once daily regardless.      Failure to thrive-patient admitted after multiple falls at home after he left AMA from SNF.  Patient continues to use alcohol and prescription medications.  Patient is not taking care of himself, his wounds and requires continued treatment.  PT/OT and case management debrided and treated during hospitalization recommended skilled nursing facility.  Patient will be transferred to [facility] today.    Cystitis-urinalysis revealed small blood, positive nitrates, small leukocytes and trace bacteria.  Patient has received 4 days of IV Rocephin, discontinued due to negative blood cultures.     Dermatitis neglecta-patient upon arrival was covered in feces and dirt.  Patient has had significant bathing and treatment to his wounds skin hands and feet.  Continue bathing daily and wound care per orders.        Large left gluteal compartment intramuscular hematoma/healing stage III or injury- continuing to heal, ED provider believed it was cellulitis after evaluation I believe it is significant erythema and moisture associated rash from patient sitting in his own feces and urine 4 days at a time.  Discussed the case with Daylin Prasad RN, wound nurse and recommendations for significant wound care placed.  Will continue to monitor closely and wound care APRN will evaluate on Monday.      Paroxysmal atrial fibrillation/chronic anticoagulation-continue home amiodarone and Eliquis, continue cardiac telemetry, overnight normal sinus rhythm 72-91       Alcoholic hepatitis/alcohol abuse-AST 50, 31, 21, 18 ALT 21, 15, 12, 11 total bilirubin 1.7, 0.8, 0.5, 0.5 lipase 88, 47.  Currently stable we will  "continue to monitor with daily CMP.  Continue CIWA protocol, scheduled Librium and as needed Ativan.     Patient received tapering doses of Librium, last dose administered today.7.  Essential hypertension-r continue home metoprolol, vital signs every 4.      Diarrhea-patient is currently a C. difficile carrier, C. difficile was not ordered due to no signs or symptoms of infection.  GI Panel negative, stool culture pending    This morning patient is resting comfortably in a chair on room air with no family at bedside.  Patient is alert and oriented and able to participate in assessment and discharge planning.  Patient is very excited to transition to skilled rehab so he can get sinew with his alcohol rehabilitation.  Patient was encouraged to participate as much as possible and therapy when he arrives and we additionally had a long conversation as this patient is well-known to me to take control of his life again and to take himself out of any situations that arise when he goes home that encourage his alcohol use.  Patient has been very appreciative of care, all questions were answered the best my ability and he is in agreement to discharge to [facility] nursing and rehab today.    Patient has reached the maximum benefit of hospitalization and is stable for discharge  Patient has been evaluated today 01/22/25 and is stable for discharge.       Physical Exam on Discharge:  /75 (BP Location: Right arm, Patient Position: Lying)   Pulse 62   Temp 97 °F (36.1 °C) (Oral)   Resp 18   Ht 180.3 cm (71\")   Wt 84.5 kg (186 lb 3.2 oz)   SpO2 97%   BMI 25.97 kg/m²   Physical Exam  itals and nursing note reviewed.   Constitutional:       General: He is not in acute distress.     Appearance: He is obese. He is not ill-appearing or toxic-appearing.      Comments: Room air   HENT:      Right Ear: Tympanic membrane normal.      Left Ear: Tympanic membrane normal.      Nose: Nose normal.      Mouth/Throat:      Mouth: " Mucous membranes are moist.      Pharynx: Oropharynx is clear.   Eyes:      Pupils: Pupils are equal, round, and reactive to light.   Cardiovascular:      Rate and Rhythm: Normal rate and regular rhythm.      Pulses: Normal pulses.      Heart sounds: Normal heart sounds.      Comments: NSR 72-91  Abdominal:      General: Abdomen is protuberant. Bowel sounds are normal.      Palpations: Abdomen is soft.      Tenderness: There is no abdominal tenderness.   Musculoskeletal:      Cervical back: Normal range of motion and neck supple. No rigidity or tenderness.      Right lower leg: No edema.      Left lower leg: No edema.   Skin:     General: Skin is warm.      Capillary Refill: Capillary refill takes less than 2 seconds.      Coloration: Skin is pale.      Comments: Please see attached photos for coccyx and perineal area.  Patient does have a large scab to his right forehead from previous fall.   Neurological:      General: No focal deficit present.      Mental Status: He is oriented to person, place, and time.   Psychiatric:         Mood and Affect: Mood normal.         Behavior: Behavior normal. Behavior is cooperative.         Thought Content: Thought content normal.         Judgment: Judgment normal.             Condition on Discharge: Stable for discharge home with home health    Discharge Disposition:  Skilled Nursing Facility (DC - External)    Discharge Medications:     Discharge Medications        New Medications        Instructions Start Date   hydrOXYzine 25 MG tablet  Commonly known as: ATARAX   25 mg, Oral, 3 Times Daily PRN      traMADol 50 MG tablet  Commonly known as: ULTRAM   50 mg, Oral, Every 6 Hours PRN             Changes to Medications        Instructions Start Date   thiamine 100 MG tablet  Commonly known as: VITAMIN B1  What changed: Another medication with the same name was added. Make sure you understand how and when to take each.   100 mg, Oral, Daily      thiamine 100 MG tablet  Commonly  known as: VITAMIN B1  What changed: You were already taking a medication with the same name, and this prescription was added. Make sure you understand how and when to take each.   100 mg, Oral, Daily   Start Date: January 23, 2025            Continue These Medications        Instructions Start Date   amiodarone 200 MG tablet  Commonly known as: PACERONE   200 mg, Oral, Daily      apixaban 5 MG tablet tablet  Commonly known as: ELIQUIS   5 mg, 2 Times Daily      aspirin 81 MG EC tablet   81 mg, Daily      atorvastatin 20 MG tablet  Commonly known as: LIPITOR   20 mg, Daily      busPIRone 5 MG tablet  Commonly known as: BUSPAR   7.5 mg, Oral, 2 Times Daily      fluticasone 50 MCG/ACT nasal spray  Commonly known as: FLONASE   2 sprays, Nasal, Daily      folic acid 1 MG tablet  Commonly known as: FOLVITE   1 mg, Oral, Daily      loperamide 2 MG capsule  Commonly known as: IMODIUM   2 mg, Oral, 4 Times Daily PRN      metoprolol succinate XL 25 MG 24 hr tablet  Commonly known as: TOPROL-XL   12.5 mg, Oral, Daily      pantoprazole 40 MG EC tablet  Commonly known as: PROTONIX   1 tablet, Daily      temazepam 30 MG capsule  Commonly known as: RESTORIL   30 mg, Oral, Nightly PRN             Stop These Medications      losartan 25 MG tablet  Commonly known as: COZAAR     naloxone 4 MG/0.1ML nasal spray  Commonly known as: NARCAN                Discharge Diet: Advance diet as tolerated    Activity at Discharge:   Activity Instructions       Activity as Tolerated              Discharge Instructions:   1.  Patient was instructed to return to medical attention for any new or worsening chest pain, shortness of breath or significant pain, bleeding or swelling at his wound site.  2.  Patient instructed to follow-up with PCP in 1 week.  3.  Patient was given extensive education again regarding his wound care, alcohol cessation and taking better care of himself.  Home health initiated for PT/OT/wound care.    Follow-up Appointments:    Future Appointments   Date Time Provider Department Center   1/31/2025 11:15 AM My Haque MD [facility]   3/18/2025  8:00 AM Miki Cavazos PA [facility]       Test Results Pending at Discharge: None    Electronically signed by PATRICK SongBC, 01/22/25, 08:26 CST.    Time: 40 minutes.

## 2025-01-21 NOTE — CASE MANAGEMENT/SOCIAL WORK
Continued Stay Note   Mary     Patient Name: Dayron Lubin  MRN: [id number]  Today's Date: 1/21/2025    Admit Date: 1/17/2025    Plan: Undetermined   Discharge Plan       Row Name 01/21/25 1443       Plan    Plan Undetermined    Patient/Family in Agreement with Plan yes    Plan Comments Pt was being d/c'ed home today. Rec'd a call from Catia at [facility] [phone] and she said if he can get another UDS and it's negative, then she will take pt. Tiffanie has ordered it.                   Discharge Codes    No documentation.                 Expected Discharge Date and Time       Expected Discharge Date Expected Discharge Time    Jan 21, 2025               LILIAN Jackson

## 2025-01-21 NOTE — PLAN OF CARE
Goal Outcome Evaluation:  Plan of Care Reviewed With: patient        Progress: improving  Outcome Evaluation: Pt was in bed, stated to be feeling better.    Able to perform bed mobility with SBA.  Transfered sit to stand with CGA.  Amb 200' with RWX and CGA.  Pt was able to ascend/descend 8 stairs with 1 HR and CGA/min assist, cues to perform step to step not step over step, and cues for safety.  Will continue to work with pt to increase strength and improve mobility for pt to be independent.

## 2025-01-21 NOTE — THERAPY TREATMENT NOTE
Acute Care - Physical Therapy Treatment Note  [facility]     Patient Name: Dayron Lubin  : 1955  MRN: [id number]  Today's Date: 2025      Visit Dx:     ICD-10-CM ICD-9-CM   1. Cellulitis, unspecified cellulitis site  L03.90 682.9   2. Acute UTI  N39.0 599.0   3. History of alcohol consumption  Z87.898 V11.3   4. History of alcoholic hepatitis  Z87.19 V12.79   5. Drug use  F19.90 305.90   6. Dysphagia, unspecified type  R13.10 787.20   7. Impaired mobility [Z74.09]  Z74.09 799.89   8. Pressure injury of buttock, stage 3, unspecified laterality  L89.303 707.05     707.23   9. Failure to thrive in adult  R62.7 783.7   10. Multiple falls  R29.6 V15.88     Patient Active Problem List   Diagnosis    Prostate cancer    Hx of radiation therapy    Elevated PSA    Encounter for follow-up surveillance of prostate cancer    HOCM (hypertrophic obstructive cardiomyopathy)    Coronary artery disease involving native coronary artery of native heart without angina pectoris    Essential hypertension    Mixed hyperlipidemia    Class 1 obesity with alveolar hypoventilation, serious comorbidity, and body mass index (BMI) of 30.0 to 30.9 in adult    Displaced fracture of lateral malleolus of right fibula, initial encounter for closed fracture    Family history of colonic polyps    History of colon polyps    Elevated brain natriuretic peptide (BNP) level    Anxiety associated with depression    Alcohol abuse    Alcoholic hepatitis    Volume depletion    Anorexia    CHF (congestive heart failure)    Hypokalemia    Duodenitis    Gait instability    SVT (supraventricular tachycardia)    Physical debility    Atrial fibrillation with rapid ventricular response    Alcoholism in remission    Paroxysmal atrial fibrillation    Nasal polyposis    Nasal congestion    Estelle bullosa    Long-term use of high-risk medication    Multiple falls    Thrombocytopenia    Non-traumatic rhabdomyolysis    Alcohol withdrawal    Pancytopenia     Opiate abuse, episodic    Large left gluteal compartment intramuscular hematomas, currently still healing    Chronic anticoagulation    Cellulitis    Dermatitis neglecta    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    Failure to thrive in adult    Urinary tract infection    Diarrhea    Pressure injury of buttock, stage 3     Past Medical History:   Diagnosis Date    A-fib     Alcoholic hepatitis 07/13/2023    CAD (coronary artery disease)     History of external beam radiation therapy 05/12/2016    6840 cGy to prostate bed    Hyperlipidemia     Hypertension     Insomnia     Prostate cancer      Past Surgical History:   Procedure Laterality Date    CARDIAC CATHETERIZATION      CORONARY ANGIOPLASTY WITH STENT PLACEMENT      FRACTURE SURGERY      PROSTATE SURGERY      TONSILLECTOMY       PT Assessment (Last 12 Hours)       PT Evaluation and Treatment       Row Name 01/21/25 1430          Physical Therapy Time and Intention    Subjective Information complains of;pain  -MONIQUE     Document Type therapy note (daily note)  -     Mode of Treatment physical therapy  -       Row Name 01/21/25 1430          General Information    Existing Precautions/Restrictions fall  -       Row Name 01/21/25 1430          Pain    Pretreatment Pain Rating 7/10  -MONIQUE     Posttreatment Pain Rating 7/10  -MONIQUE     Pain Location hip  -MONIQUE     Pain Side/Orientation left  -     Pain Management Interventions exercise or physical activity utilized  -     Response to Pain Interventions activity participation with tolerable pain  -MONIQUE       Row Name 01/21/25 1430          Bed Mobility    Bed Mobility supine-sit;sit-supine  -MONIQUE     Supine-Sit [place] (Bed Mobility) standby assist  -     Sit-Supine [place] (Bed Mobility) supervision  -       Row Name 01/21/25 1430          Sit-Stand Transfer    Sit-Stand [place] (Transfers) verbal cues;contact guard  -     Assistive Device (Sit-Stand Transfers) walker, front-wheeled  -       Row Name  01/21/25 1430          Stand-Sit Transfer    Stand-Sit [place] (Transfers) verbal cues;contact guard  -MONIQUE     Assistive Device (Stand-Sit Transfers) walker, front-wheeled  -MONIQUE       Row Name 01/21/25 1430          Gait/Stairs (Locomotion)    [place] Level (Gait) verbal cues;contact guard  -MONIQUE     Assistive Device (Gait) walker, front-wheeled  -MONIQUE     Distance in Feet (Gait) 200  -MONIQUE     Bilateral Gait Deviations forward flexed posture  -MONIQUE     [place] Level (Stairs) verbal cues;contact guard  -MONIQUE     Handrail Location (Stairs) left side (ascending)  -MONIQUE     Number of Steps (Stairs) 8  -MONIQUE     Ascending Technique (Stairs) step-to-step  -MONIQUE     Descending Technique (Stairs) step-to-step  -MONIQUE       Row Name             Wound 12/04/24 1248 coccyx    Wound - Properties Group Placement Date: 12/04/24  -AH Placement Time: 1248  -AH Location: coccyx  -AH    Retired Wound - Properties Group Placement Date: 12/04/24  -AH Placement Time: 1248  -AH Location: coccyx  -AH    Retired Wound - Properties Group Placement Date: 12/04/24  -AH Placement Time: 1248  -AH Location: coccyx  -AH    Retired Wound - Properties Group Date first assessed: 12/04/24  -AH Time first assessed: 1248  -AH Location: coccyx  -AH      Row Name             Wound 11/16/24 1205 coccyx    Wound - Properties Group Placement Date: 11/16/24  -OS Placement Time: 1205  -OS Location: coccyx  -OS    Retired Wound - Properties Group Placement Date: 11/16/24  -OS Placement Time: 1205  -OS Location: coccyx  -OS    Retired Wound - Properties Group Placement Date: 11/16/24  -OS Placement Time: 1205  -OS Location: coccyx  -OS    Retired Wound - Properties Group Date first assessed: 11/16/24  -OS Time first assessed: 1205  -OS Location: coccyx  -OS      Row Name             Wound 12/07/24 Left anterior third toe    Wound - Properties Group Placement Date: 12/07/24  -BB Side: Left  -BB Orientation: anterior  -BB Location: third toe  -BB    Retired Wound -  Properties Group Placement Date: 12/07/24  -BB Side: Left  -BB Orientation: anterior  -BB Location: third toe  -BB    Retired Wound - Properties Group Placement Date: 12/07/24  -BB Side: Left  -BB Orientation: anterior  -BB Location: third toe  -BB    Retired Wound - Properties Group Date first assessed: 12/07/24  -BB Side: Left  -BB Location: third toe  -BB      Row Name 01/21/25 1430          Positioning and Restraints    Pre-Treatment Position in bed  -MONIQUE     Post Treatment Position bed  -MONIQUE     In Bed fowlers;call light within reach;encouraged to call for assist;side rails up x2  -MONIQUE               User Key  (r) = Recorded By, (t) = Taken By, (c) = Cosigned By      Initials Name Provider Type    Parish King PTA Physical Therapist Assistant    Aura Guzman, RN Registered Nurse    Claudia Howard RN Registered Nurse    Consuelo Cotton RN Registered Nurse                    Physical Therapy Education       Title: PT OT SLP Therapies (In Progress)       Topic: Physical Therapy (In Progress)       Point: Mobility training (Done)       Learning Progress Summary            Patient Acceptance, E, VU by MONIQUE at 1/21/2025 1430    Comment: stairs    Acceptance, E, VU by MONIQUE at 1/20/2025 0931    Comment: gait, safety, benefits of activity    Acceptance, E,D, VU by  at 1/18/2025 1555    Comment: Walker usage, transfer techniques, plan of care, home safety                      Point: Home exercise program (Not Started)       Learner Progress:  Not documented in this visit.              Point: Body mechanics (Done)       Learning Progress Summary            Patient Acceptance, E,D, VU by  at 1/18/2025 1555    Comment: Walker usage, transfer techniques, plan of care, home safety                      Point: Precautions (Done)       Learning Progress Summary            Patient Acceptance, E,D, VU by  at 1/18/2025 1555    Comment: Walker usage, transfer techniques, plan of care, home safety                                       User Key       Initials Effective Dates Name Provider Type Discipline    MONIQUE 02/03/23 -  Parish Santos PTA Physical Therapist Assistant PT     12/17/24 -  Gael Joe PT Student PT Student PT                  PT Recommendation and Plan     Progress: improving  Outcome Evaluation: Pt was in bed, stated to be feeling better.    Able to perform bed mobility with SBA.  Transfered sit to stand with CGA.  Amb 200' with RWX and CGA.  Pt was able to ascend/descend 8 stairs with 1 HR and CGA/min assist, cues to perform step to step not step over step, and cues for safety.  Will continue to work with pt to increase strength and improve mobility for pt to be independent.   Outcome Measures       Row Name 01/21/25 1430 01/20/25 0931          How much help from another person do you currently need...    Turning from your back to your side while in flat bed without using bedrails? 4  -MONIQUE 4  -MONIQUE     Moving from lying on back to sitting on the side of a flat bed without bedrails? 4  -MONIQUE 4  -MONIQUE     Moving to and from a bed to a chair (including a wheelchair)? 3  -MONIQUE 3  -MONIQUE     Standing up from a chair using your arms (e.g., wheelchair, bedside chair)? 3  -MONIQUE 3  -MONIQUE     Climbing 3-5 steps with a railing? 3  -MONIQUE 3  -MONIQUE     To walk in hospital room? 3  -MONIQUE 3  -MONIQUE     AM-PAC 6 Clicks Score (PT) 20  -MONIQUE 20  -MONIQUE        Functional Assessment    Outcome Measure Options AM-PAC 6 Clicks Basic Mobility (PT)  -MONIQUE AM-PAC 6 Clicks Basic Mobility (PT)  -MONIQUE               User Key  (r) = Recorded By, (t) = Taken By, (c) = Cosigned By      Initials Name Provider Type    MONIQUE Parish Santos PTA Physical Therapist Assistant                     Time Calculation:    PT Charges       Row Name 01/21/25 1430             Time Calculation    Start Time 1430  -MONIQUE      Stop Time 1455  -MONIQUE      Time Calculation (min) 25 min  -MONIQUE      PT Received On 01/21/25  -MONIQUE         Time Calculation- PT    Total Timed Code Minutes- PT 25  minute(s)  -MONIQUE         Timed Charges    02869 - Gait Training Minutes  25  -MONIQUE         Total Minutes    Timed Charges Total Minutes 25  -MONIQUE       Total Minutes 25  -MONIQUE                User Key  (r) = Recorded By, (t) = Taken By, (c) = Cosigned By      Initials Name Provider Type    Parish King PTA Physical Therapist Assistant                  Therapy Charges for Today       Code Description Service Date Service Provider Modifiers Qty    76515041333 HC GAIT TRAINING EA 15 MIN 1/20/2025 Parish Santos, DAVID GP 1    91749084373 HC PT THER PROC EA 15 MIN 1/20/2025 Parish Santos, PTA GP 1    47131034043 HC GAIT TRAINING EA 15 MIN 1/21/2025 Parish Santos, DAVID GP 2            PT G-Codes  Outcome Measure Options: AM-PAC 6 Clicks Basic Mobility (PT)  AM-PAC 6 Clicks Score (PT): 20  AM-PAC 6 Clicks Score (OT): 22    Parish Santos PTA  1/21/2025

## 2025-01-21 NOTE — PROGRESS NOTES
Patient Name: Dayron Lubin  Date of Admission: 1/17/2025  Today's Date: 01/21/25  Length of Stay: 4  Primary Care Physician: Herberth Nguyen Jr., MD    Subjective   Chief Complaint: Frequent falls  Fall  Pertinent negatives include no nausea or vomiting.      Dayron Lubin is a 69-year-old male with an extensive past history of failure to care for himself due to alcoholism. Additional history of paroxysmal atrial fibrillation on chronic Eliquis, CAD, prostate cancer in remission, hyperlipidemia, hypertension and most recently treated for a large gluteal compartment intramuscular hematoma due to a fall at home. This resulted in patient being discharged to skilled nursing facility which the patient left AMA. He presented today per EMS. Patient reported that yesterday he tripped over a rug and fell on a heater hitting his knee when he got up he hit his head. He denied any loss of consciousness he stated he got up within a few minutes and was able to remove. His brother visited him this afternoon and called EMS. Upon presentation he denied any new pain he admits to leaving SNF AMA and zooming drinking last alcohol consumption was 11 AM this morning where he added 2 shots of vodka to his orange juice. The patient on last admission had to be transferred to ICU due to delirium tremors and placed on phenobarbital. He denied any neck, back, chest or abdominal pain. X-rays of knee, tib-fib, CT of the head, cervical spine and abdomen pelvis were obtained all with no acute findings or acute fractures. He stated his only complaint with the he noticed some redness in his left groin area for the past few days and it has been somewhat tender. He states he wears a brief due to urinary incontinence and he states that he has daily bowel movements with occasional diarrhea and he only changes the diaper once daily regardless.     Today:   Patient is resting on the side of the bed with no family at bedside.  Patient continues to  feel significantly better, wound evaluation this morning shows very minimal erythema.  Patient has no tremors or withdrawal symptoms.  Patient completed his tapering dose of Librium today Ativan as needed will remain.  Initiated Atarax as needed anxiety today if any additional symptoms.  Patient was scheduled to discharge home with his sister today as no SNF beds were available.  [facility] re[facility] requested a repeat UDS and it was negative for cocaine.  They offered patient a bed and can accept tomorrow.  Patient is very excited about transition for rehab so that he can enter all treatment once he is strong enough.  Discussed plan of care with patient and he is agreeable for discharge in the morning.    Documented weights    01/17/25 1223   Weight: 84.5 kg (186 lb 3.2 oz)          Intake/Output Summary (Last 24 hours) at 1/21/2025 1749  Last data filed at 1/21/2025 1508  Gross per 24 hour   Intake 720 ml   Output 1400 ml   Net -680 ml       Results for orders placed during the hospital encounter of 07/29/24    Adult Transthoracic Echo Complete W/ Cont if Necessary Per Protocol    Interpretation Summary    Left ventricular systolic function is low normal. Left ventricular ejection fraction appears to be 51 - 55%.    Left ventricular wall thickness is consistent with moderate to severe concentric hypertrophy.    Mild to moderate mitral valve regurgitation is present.    The aortic valve is thickened and calcified and there is some degree of aortic valve stenosis, likely moderate, most of the flow acceleration appears to be in the left ventricular outflow tract as a result of hypertrophic obstructive cardiomyopathy.       Review of Systems   Constitutional:  Positive for fatigue.   Respiratory:  Negative for shortness of breath.    Gastrointestinal:  Positive for diarrhea (Improving). Negative for nausea and vomiting.   Skin:  Positive for wound.   Neurological:  Positive for tremors.    Psychiatric/Behavioral:  The patient is nervous/anxious.       All pertinent negatives and positives are as above. All other systems have been reviewed and are negative unless otherwise stated.     Objective    Temp:  [97.1 °F (36.2 °C)-98.1 °F (36.7 °C)] 97.5 °F (36.4 °C)  Heart Rate:  [64-70] 70  Resp:  [16] 16  BP: (103-142)/(62-88) 142/88  Physical Exam  Vitals and nursing note reviewed.   Constitutional:       General: He is not in acute distress.     Appearance: He is obese. He is not ill-appearing or toxic-appearing.      Comments: Room air   HENT:      Right Ear: Tympanic membrane normal.      Left Ear: Tympanic membrane normal.      Nose: Nose normal.      Mouth/Throat:      Mouth: Mucous membranes are moist.      Pharynx: Oropharynx is clear.   Eyes:      Pupils: Pupils are equal, round, and reactive to light.   Cardiovascular:      Rate and Rhythm: Normal rate and regular rhythm.      Pulses: Normal pulses.      Heart sounds: Normal heart sounds.      Comments: NSR 68-78-qsanirwyv discontinued  Abdominal:      General: Abdomen is protuberant. Bowel sounds are normal.      Palpations: Abdomen is soft.      Tenderness: There is no abdominal tenderness.   Musculoskeletal:      Cervical back: Normal range of motion and neck supple. No rigidity or tenderness.      Right lower leg: No edema.      Left lower leg: No edema.   Skin:     General: Skin is warm.      Capillary Refill: Capillary refill takes less than 2 seconds.      Coloration: Skin is pale.      Comments: Please see attached photos for coccyx and perineal area.  Patient does have a large scab to his right forehead from previous fall.   Neurological:      General: No focal deficit present.      Mental Status: He is oriented to person, place, and time.   Psychiatric:         Mood and Affect: Mood normal.         Behavior: Behavior normal. Behavior is cooperative.         Thought Content: Thought content normal.         Judgment: Judgment normal.          1/17/2025 coccyx region on admission  Results Review:  I have reviewed the labs, radiology results, and diagnostic studies.    Laboratory Data:   Results from last 7 days   Lab Units 01/20/25  0354 01/19/25 0448 01/18/25  0457   WBC 10*3/mm3 5.37 5.50 5.48   HEMOGLOBIN g/dL 8.5* 8.3* 9.2*   HEMATOCRIT % 27.2* 26.6* 29.4*   PLATELETS 10*3/mm3 156 156 194        Results from last 7 days   Lab Units 01/21/25  0403 01/20/25  0354 01/19/25 0448 01/18/25  0457   SODIUM mmol/L 136 138 136 136   POTASSIUM mmol/L 4.2 3.6 3.6 3.8   CHLORIDE mmol/L 102 103 99 98   CO2 mmol/L 25.0 27.0 26.0 29.0   BUN mg/dL 13 15 17 19   CREATININE mg/dL 0.84 1.11 0.84 0.90   CALCIUM mg/dL 9.0 8.7 8.9 8.8   BILIRUBIN mg/dL  --  0.5 0.5 0.8   ALK PHOS U/L  --  66 66 70   ALT (SGPT) U/L  --  11 12 15   AST (SGOT) U/L  --  18 21 31   GLUCOSE mg/dL 90 96 96 92       Culture Data:     Microbiology Results (last 10 days)       Procedure Component Value - Date/Time    Gastrointestinal Panel, PCR - Stool, Per Rectum [[id number]]  (Normal) Collected: 01/19/25 1311    Lab Status: Final result Specimen: Stool from Per Rectum Updated: 01/19/25 1441     Campylobacter Not Detected     Plesiomonas shigelloides Not Detected     Salmonella Not Detected     Vibrio Not Detected     Vibrio cholerae Not Detected     Yersinia enterocolitica Not Detected     Enteroaggregative E. coli (EAEC) Not Detected     Enteropathogenic E. coli (EPEC) Not Detected     Enterotoxigenic E. coli (ETEC) lt/st Not Detected     Shiga-like toxin-producing E. coli (STEC) stx1/stx2 Not Detected     Shigella/Enteroinvasive E. coli (EIEC) Not Detected     Cryptosporidium Not Detected     Cyclospora cayetanensis Not Detected     Entamoeba histolytica Not Detected     Giardia lamblia Not Detected     Adenovirus F40/41 Not Detected     Astrovirus Not Detected     Norovirus GI/GII Not Detected     Rotavirus A Not Detected     Sapovirus (I, II, IV or V) Not Detected    Blood Culture -  Blood, Hand, Right [[id number]]  (Abnormal) Collected: 01/17/25 1313    Lab Status: Final result Specimen: Blood from Hand, Right Updated: 01/20/25 0947     Blood Culture Clostridium perfringens     Comment: Beta-lactamase negative        Isolated from Anaerobic Bottle     Gram Stain Anaerobic Bottle Gram variable bacilli    Blood Culture - Blood, Hand, Left [[id number]]  (Normal) Collected: 01/17/25 1313    Lab Status: Preliminary result Specimen: Blood from Hand, Left Updated: 01/21/25 1330     Blood Culture No growth at 4 days             Radiology Data:   Imaging Results (Last 7 Days)       Procedure Component Value Units Date/Time    CT Abdomen Pelvis With Contrast [[id number]] Collected: 01/17/25 1441     Updated: 01/17/25 1507    Narrative:      EXAMINATION: CT ABDOMEN PELVIS W CONTRAST-      1/17/2025 1:24 PM     HISTORY: pelvic infection/cellulitis. nec fas?     In order to have a CT radiation dose as low as reasonably achievable  Automated Exposure Control was utilized for adjustment of the mA and/or  KV according to patient size.     Total DLP = 401.9 mGy.cm     The CT scan of the abdomen and pelvis is performed after intravenous  contrast enhancement.     Images are acquired in axial plane and subsequent reconstruction in  coronal and sagittal planes.     Comparison is made with the previous study dated 12/20/2024 and  11/16/2024.     The lung bases included in the study are unremarkable.     Limited visualized cardiomediastinal structures show moderate  cardiomegaly. There is an apparent focal bulge of the left ventricle  which may suggest an aneurysm of the left ventricle?. This may be  clinically correlated.     There is fatty infiltration of the liver. The spleen is normal.     The gallbladder is moderately distended with high density material which  may represent sludge or contrast excretion. No definite radiopaque  calculi.     Pancreas is normal.     The adrenal glands are normal.     Moderate  lobulation of renal contour bilaterally is seen. There is a  small low-density nodule located posteriorly in the lower pole of the  left kidney measuring 8 mm in diameter. CT density suggest a cyst. No  radiopaque calculi on either side. No hydronephrosis. The ureters are  nondilated. Urinary bladder poorly distended with mild wall thickening.  No obvious intrinsic abnormality.     The prostate is surgically absent.     There is a fat-containing left inguinoscrotal hernia.     The stomach and duodenum are normal. Small bowel is nondistended.  Appendix is normal. Small amount of stool and gas is seen in the colon.  There is diverticulosis of the distal colon. No finding to suggest  diverticulitis.     Atheromatous changes of the abdominal aorta and iliac arteries. No  aneurysmal dilatation.     No evidence of abdominal or pelvic lymphadenopathy.     Images reviewed in bone window show no acute bony abnormality. Chronic  degenerative changes of the lumbar spine are seen. There is bilateral  spondylolysis L5 without a significant spondylolisthesis. Old healed  fractures of the left lower ribs are seen.     There is a lobulated oblong high density fluid accumulation  predominantly in the left gluteus medius with a surrounding ring-shaped  enhancement measuring 12 cm in craniocaudal projection in the coronal  plane images and 10 cm in horizontal projection and axial plane images.  The size of the gluteus medius and robbin has overall decreased since  the previous study. There is another high density lobulated masslike  area located between the tensor fascia froilan laterally, rectus femoris  anteriorly and vastus lateralis and medius posterolaterally. This may  represent an evolving hematoma?. This was not noted in the previous  study.       Impression:      1. There is overall improvement in size of the hematoma/swelling of the  left gluteus medius and robbin. However there is now a well-formed  collection, in the  gluteus medius, with a surrounding ring enhancement  which may represent an abscess. There is a new high density masslike  lobulated area located more anteriorly which measures 3.8 cm in diameter  in axial plane images. This may represent hematoma?.  2. Hepatic steatosis.  3. Moderately dilated gallbladder with sludge?. No stones.  4. Diverticulosis of the colon. No evidence for diverticulitis.  4. Other nonacute findings similar to the previous study.                                               This report was signed and finalized on 1/17/2025 3:04 PM by Dr. Aleta Ramos MD.       CT Cervical Spine Without Contrast [[id number]] Collected: 01/17/25 1438     Updated: 01/17/25 1445    Narrative:      Exam: CT CERVICAL SPINE WO CONTRAST- 1/17/2025 1:24 PM     HISTORY: fall etoh eliquis     COMPARISON: 12/4/2024     DOSE LENGTH PRODUCT: 1153.51 mGy.cm mGy cm. Automated exposure control  was also utilized to decrease patient radiation dose.     TECHNIQUE: Serial helical tomographic images of the cervical spine were  obtained without the use of intravenous contrast. Additionally, sagittal  and coronal reformatted images were also provided for review.     FINDINGS:     Straightening of the cervical lordosis.     No acute fracture. The vertebral body heights are preserved.     Multilevel degenerative disc disease with posterior disc osteophyte  complexes. Multilevel uncovertebral and facet hypertrophic changes.     Suspect mild spinal canal narrowing at C3-C4, C4-C5. Variable degrees of  foraminal narrowing most notable with at least moderate left C4-C5  foraminal narrowing.     Right greater the left carotid atherosclerosis.     Other:None       Impression:         No acute fracture or traumatic malalignment.     Moderate degenerative changes with suspected mild spinal canal narrowing  at C3-C4 and C4-C5.     Right greater than left carotid atherosclerosis.           This report was signed and finalized on 1/17/2025  2:42 PM by Regan Garner.       CT Head Without Contrast [[id number]] Collected: 01/17/25 1434     Updated: 01/17/25 1441    Narrative:      Exam: CT HEAD WO CONTRAST- 1/17/2025 1:24 PM     HISTORY: head injury eliquis       DOSE LENGTH PRODUCT: 1153.51 mGy.cm mGy cm. Automated exposure control  was also utilized to decrease patient radiation dose.     Technique:  Helically acquired CT of the brain without IV contrast was performed.  Sagittal and coronal reformations are also provided for review. Soft  tissue and bone kernels are available for interpretation.     Comparison: 12/4/2024.     Findings:     Ventricles and extra-axial CSF spaces are normal in size.     No intraparenchymal or extra-axial hemorrhage.     Gray-white matter differentiation is preserved.     Orbits are grossly unremarkable. Paranasal sinuses are grossly clear.  Mastoid air cells are grossly clear.     No suspicious calvarial or extracranial soft tissue abnormality.       Impression:      Impression:       No acute intracranial abnormality.     This report was signed and finalized on 1/17/2025 2:38 PM by Regan Garner.       XR Tibia Fibula 2 View Left [[id number]] Collected: 01/17/25 1405     Updated: 01/17/25 1409    Narrative:      XR TIBIA FIBULA 2 VW LEFT-     HISTORY: fall left low leg pain eliquis     COMPARISON: None     FINDINGS: Frontal and lateral views of the left tibia and fibula are  obtained.     No fracture or dislocation. Mild to moderate medial joint space  narrowing. Vascular calcifications.       Impression:      1. No acute osseous injury left tibia or fibula.        This report was signed and finalized on 1/17/2025 2:06 PM by Dr. Bri Duong MD.       XR Knee 4+ View Bilateral [[id number]] Collected: 01/17/25 1403     Updated: 01/17/25 1408    Narrative:      XR KNEE 4+ VW BILATERAL-     HISTORY: fall bilateral knee injury eliquis     COMPARISON: None     FINDINGS: 4 views of the right and left knee are obtained.      There is mild medial left joint space narrowing. Borderline medial right  joint space narrowing. Minimal bony spurring of the patella. No acute  fracture or dislocation. No knee joint effusion.     Diffuse vascular calcifications.       Impression:      1. Mild arthritic changes of the knee, joint space narrowing greatest in  the medial compartment of the left knee. No acute osseous injury or  joint effusion.              This report was signed and finalized on 1/17/2025 2:05 PM by Dr. Bri Duong MD.                I have reviewed the patient's current medications.     amiodarone, 200 mg, Oral, Daily  apixaban, 5 mg, Oral, Q12H  atorvastatin, 20 mg, Oral, Daily  busPIRone, 7.5 mg, Oral, BID  folic acid, 1 mg, Oral, Daily  metoprolol succinate XL, 12.5 mg, Oral, Daily  multivitamin with minerals, 1 tablet, Oral, Daily  pantoprazole, 40 mg, Oral, Q AM  sodium chloride, 10 mL, Intravenous, Q12H  thiamine (B-1) IV, 200 mg, Intravenous, Q8H   Followed by  [START ON 1/23/2025] thiamine, 100 mg, Oral, Daily            Assessment/Plan   Assessment  Active Hospital Problems    Diagnosis     **Failure to thrive in adult     Pressure injury of buttock, stage 3     Diarrhea     Urinary tract infection     Dermatitis neglecta     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  Failure to thrive-patient admitted after multiple falls at home after he left AMA from SNF.  Patient continues to use alcohol and prescription medications.  Patient is not taking care of himself, his wounds and requires continued treatment.  PT/OT and case management consultation recommendation's for skilled nursing or inpatient facility.    2.  Cystitis-urinalysis revealed small blood, positive nitrates,  small leukocytes and trace bacteria.  Patient has received 4 days of IV Rocephin, discontinued after evaluation of finalized blood culture which had to be sent to [place] for evaluation.  1 of 2 bottles positive for Clostridium [place]fringen, most likely contaminant as patient presented with significant amounts of stool and dirt on his hands and arms.  Edition patient presented with no febrile episodes, WBC is remained within normal limits and no additional signs or symptoms of infection.      3.  Dermatitis neglecta-patient upon arrival was covered in feces and dirt.  Patient has had significant bathing and treatment to his wounds skin hands and feet.  Continue bathing daily and wound care per orders.      4.  Large left gluteal compartment intramuscular hematoma/healing stage III or injury- continuing to heal, ED provider believed it was cellulitis after evaluation I believe it is significant erythema and moisture associated rash from patient sitting in his own feces and urine 4 days at a time.  Discussed the case with Daylin Prasad RN, wound nurse and recommendations for significant wound care placed.       5.  Paroxysmal atrial fibrillation/chronic anticoagulation-continue home amiodarone and Eliquis, continue cardiac telemetry, overnight normal sinus rhythm 72-91, DC'd telemetry    6.  Alcoholic hepatitis/alcohol abuse-AST 50, 31, 21, 18 ALT 21, 15, 12, 11 total bilirubin 1.7, 0.8, 0.5, 0.5 lipase 88, 47.  Currently stable we will continue to monitor with daily CMP.  And has completed his tapering dose of Librium, continue Ativan as needed and initiate Atarax as needed.  Patient will be monitored closely for any worsening signs or symptoms.     7.  Essential hypertension- continue home metoprolol, vital signs every 4.    8.  Diarrhea-patient is currently a C. difficile carrier, C. difficile was not ordered due to no signs or symptoms of infection.  GI Panel negative, stool culture pending    VTE prophylaxis  with SCDs  Labs in a.m.    Medical Decision Making  Failure to thrive, acute on chronic, high complexity, unchanged  Cystitis, acute, moderate complexity, unchanged  Dermatitis neglecta, acute on chronic, moderate complexity, improving  Healing wound, chronic, moderate complexity, improving  Paroxysmal atrial fibrillation, chronic, moderate complexity, stable  Chronic anticoagulation, chronic, moderate complexity, stable  Alcoholic hepatitis, chronic, high complexity, improving  Alcoholic abuse, chronic, high complexity, improving  Essential hypertension, chronic, moderate complexity, unchanged    Number and Complexity of problems: 8  Differential Diagnosis: None    Conditions and Status        Condition is unchanged.     [facility] Data  External documents reviewed: Extensive epic review of previous hospitalizations  Cardiac tracing (EKG, telemetry) interpretation: Stat EKG pending for QTc measurement  Radiology interpretation: 1/17/2025 x-ray of the knee, tib-fib, CT of the head, abdomen pelvis and cervical spine per radiology reviewed  Labs reviewed: 1/22/2025 BMP, CBC, blood and urine cultures, reviewed,Repeat labs in a.m.  Any tests that were considered but not ordered: None     Decision rules/scores evaluated (example [id number], [place], etc): CIWA score 0.    Discussed with: Dr. Griggs, and patient    Care Planning  Shared decision making: Dr. Griggs, and patient patient,   Code status and discussions: Full code per patient  Surrogate Decision Maker his sister Yuliya Dale  Social Determinants of Health that impact treatment or disposition: Will need SNF at DC  I expect the patient to be discharged to SNF in the AM..     Electronically signed by TACOS Song, 01/21/25, 17:49 CST.

## 2025-01-22 VITALS
WEIGHT: 186.2 LBS | OXYGEN SATURATION: 97 % | DIASTOLIC BLOOD PRESSURE: 75 MMHG | TEMPERATURE: 97 F | HEART RATE: 62 BPM | BODY MASS INDEX: 26.07 KG/M2 | RESPIRATION RATE: 18 BRPM | HEIGHT: 71 IN | SYSTOLIC BLOOD PRESSURE: 124 MMHG

## 2025-01-22 LAB
ANION GAP SERPL CALCULATED.3IONS-SCNC: 9 MMOL/L (ref 5–15)
BACTERIA SPEC AEROBE CULT: NORMAL
BASOPHILS # BLD AUTO: 0.02 10*3/MM3 (ref 0–0.2)
BASOPHILS NFR BLD AUTO: 0.3 % (ref 0–1.5)
BUN SERPL-MCNC: 15 MG/DL (ref 8–23)
BUN/CREAT SERPL: 15.5 (ref 7–25)
CALCIUM SPEC-SCNC: 9.1 MG/DL (ref 8.6–10.5)
CHLORIDE SERPL-SCNC: 103 MMOL/L (ref 98–107)
CO2 SERPL-SCNC: 25 MMOL/L (ref 22–29)
CREAT SERPL-MCNC: 0.97 MG/DL (ref 0.76–1.27)
DEPRECATED RDW RBC AUTO: 54.4 FL (ref 37–54)
EGFRCR SERPLBLD CKD-EPI 2021: 84 ML/MIN/1.73
EOSINOPHIL # BLD AUTO: 0.14 10*3/MM3 (ref 0–0.4)
EOSINOPHIL NFR BLD AUTO: 2.3 % (ref 0.3–6.2)
ERYTHROCYTE [DISTWIDTH] IN BLOOD BY AUTOMATED COUNT: 15.6 % (ref 12.3–15.4)
GLUCOSE SERPL-MCNC: 93 MG/DL (ref 65–99)
HCT VFR BLD AUTO: 28.2 % (ref 37.5–51)
HGB BLD-MCNC: 8.7 G/DL (ref 13–17.7)
IMM GRANULOCYTES # BLD AUTO: 0.07 10*3/MM3 (ref 0–0.05)
IMM GRANULOCYTES NFR BLD AUTO: 1.1 % (ref 0–0.5)
LYMPHOCYTES # BLD AUTO: 1.56 10*3/MM3 (ref 0.7–3.1)
LYMPHOCYTES NFR BLD AUTO: 25.4 % (ref 19.6–45.3)
MCH RBC QN AUTO: 29.7 PG (ref 26.6–33)
MCHC RBC AUTO-ENTMCNC: 30.9 G/DL (ref 31.5–35.7)
MCV RBC AUTO: 96.2 FL (ref 79–97)
MONOCYTES # BLD AUTO: 0.53 10*3/MM3 (ref 0.1–0.9)
MONOCYTES NFR BLD AUTO: 8.6 % (ref 5–12)
NEUTROPHILS NFR BLD AUTO: 3.83 10*3/MM3 (ref 1.7–7)
NEUTROPHILS NFR BLD AUTO: 62.3 % (ref 42.7–76)
PLATELET # BLD AUTO: 135 10*3/MM3 (ref 140–450)
PMV BLD AUTO: 10 FL (ref 6–12)
POTASSIUM SERPL-SCNC: 4.1 MMOL/L (ref 3.5–5.2)
RBC # BLD AUTO: 2.93 10*6/MM3 (ref 4.14–5.8)
SODIUM SERPL-SCNC: 137 MMOL/L (ref 136–145)
WBC NRBC COR # BLD AUTO: 6.15 10*3/MM3 (ref 3.4–10.8)

## 2025-01-22 PROCEDURE — 80048 BASIC METABOLIC PNL TOTAL CA: CPT

## 2025-01-22 PROCEDURE — 85025 COMPLETE CBC W/AUTO DIFF WBC: CPT

## 2025-01-22 RX ORDER — HYDROXYZINE HYDROCHLORIDE 25 MG/1
25 TABLET, FILM COATED ORAL 3 TIMES DAILY PRN
Start: 2025-01-22

## 2025-01-22 RX ORDER — TRAMADOL HYDROCHLORIDE 50 MG/1
50 TABLET ORAL EVERY 6 HOURS PRN
Qty: 12 TABLET | Refills: 0 | Status: SHIPPED | OUTPATIENT
Start: 2025-01-22 | End: 2025-01-22

## 2025-01-22 RX ORDER — TRAMADOL HYDROCHLORIDE 50 MG/1
50 TABLET ORAL EVERY 6 HOURS PRN
Qty: 12 TABLET | Refills: 0 | Status: SHIPPED | OUTPATIENT
Start: 2025-01-22 | End: 2025-01-25

## 2025-01-22 RX ORDER — LANOLIN ALCOHOL/MO/W.PET/CERES
100 CREAM (GRAM) TOPICAL DAILY
Start: 2025-01-23

## 2025-01-22 RX ADMIN — APIXABAN 5 MG: 5 TABLET, FILM COATED ORAL at 08:30

## 2025-01-22 RX ADMIN — FOLIC ACID 1 MG: 1 TABLET ORAL at 08:31

## 2025-01-22 RX ADMIN — TRAMADOL HYDROCHLORIDE 50 MG: 50 TABLET, COATED ORAL at 08:56

## 2025-01-22 RX ADMIN — AMIODARONE HYDROCHLORIDE 200 MG: 200 TABLET ORAL at 08:30

## 2025-01-22 RX ADMIN — BUSPIRONE HYDROCHLORIDE 7.5 MG: 5 TABLET ORAL at 08:30

## 2025-01-22 RX ADMIN — ATORVASTATIN CALCIUM 20 MG: 10 TABLET, FILM COATED ORAL at 08:30

## 2025-01-22 RX ADMIN — PANTOPRAZOLE SODIUM 40 MG: 40 TABLET, DELAYED RELEASE ORAL at 06:26

## 2025-01-22 RX ADMIN — Medication 1 TABLET: at 08:30

## 2025-01-22 RX ADMIN — METOPROLOL SUCCINATE 12.5 MG: 25 TABLET, EXTENDED RELEASE ORAL at 08:30

## 2025-01-22 NOTE — CASE MANAGEMENT/SOCIAL WORK
Continued Stay Note   [place]     Patient Name: Dayron Lubin  MRN: [id number]  Today's Date: 1/22/2025    Admit Date: 1/17/2025    Plan: [facility]   Discharge Plan       Row Name 01/22/25 1006       Plan    Plan [facility]    Patient/Family in Agreement with Plan yes    Final Discharge Disposition Code 03 - skilled nursing facility (SNF)    Final Note [facility] accepted pt. Report number is [id number]. They have already pulled the d/c summary. Left a message for pt's sister, Yuliya [phone], to transport.                   Discharge Codes    No documentation.                 Expected Discharge Date and Time       Expected Discharge Date Expected Discharge Time    Jan 22, 2025               LILIAN Jackson

## 2025-01-22 NOTE — THERAPY DISCHARGE NOTE
Acute Care - Occupational Therapy Discharge Summary  [facility]     Patient Name: Dayron Lubin  : 1955  MRN: [id number]    Today's Date: 2025                 Admit Date: 2025        OT Recommendation and Plan    Visit Dx:    ICD-10-CM ICD-9-CM   1. Cellulitis, unspecified cellulitis site  L03.90 682.9   2. Acute UTI  N39.0 599.0   3. History of alcohol consumption  Z87.898 V11.3   4. History of alcoholic hepatitis  Z87.19 V12.79   5. Drug use  F19.90 305.90   6. Dysphagia, unspecified type  R13.10 787.20   7. Impaired mobility [Z74.09]  Z74.09 799.89   8. Pressure injury of buttock, stage 3, unspecified laterality  L89.303 707.05     707.23   9. Failure to thrive in adult  R62.7 783.7   10. Multiple falls  R29.6 V15.88   11. Large left gluteal compartment intramuscular hematomas, currently still healing  T14.8XXA 924.9                OT Rehab Goals       Row Name 25 1300             Transfer Goal 1 (OT)    Activity/Assistive Device (Transfer Goal 1, OT) sit-to-stand/stand-to-sit;bed-to-chair/chair-to-bed;toilet;shower chair  -      [place] Level/Cues Needed (Transfer Goal 1, OT) modified independence  -      Time Frame (Transfer Goal 1, OT) long term goal (LTG);by discharge  -      Progress/Outcome (Transfer Goal 1, OT) goal not met;discharged from facility  -         Bathing Goal 1 (OT)    Activity/Device (Bathing Goal 1, OT) bathing skills, all;upper body bathing;lower body bathing  -      [place] Level/Cues Needed (Bathing Goal 1, OT) modified independence  -      Time Frame (Bathing Goal 1, OT) long term goal (LTG);by discharge  -      Progress/Outcomes (Bathing Goal 1, OT) goal not met;discharged from facility  -         Dressing Goal 1 (OT)    Activity/Device (Dressing Goal 1, OT) lower body dressing  -      [place]/Cues Needed (Dressing Goal 1, OT) modified independence  -JW      Time Frame (Dressing Goal 1, OT) long term goal (LTG);10 days;by  discharge  -SANDY      Progress/Outcome (Dressing Goal 1, OT) goal not met;discharged from facility  -SANDY                User Key  (r) = Recorded By, (t) = Taken By, (c) = Cosigned By      Initials Name Provider Type Discipline    Bri Reyes OTR/L, CSRS Occupational Therapist OT                     Outcome Measures       Row Name 01/21/25 1430 01/20/25 0931          How much help from another person do you currently need...    Turning from your back to your side while in flat bed without using bedrails? 4  -MONIQUE 4  -MONIQUE     Moving from lying on back to sitting on the side of a flat bed without bedrails? 4  -MONIQUE 4  -MONIQUE     Moving to and from a bed to a chair (including a wheelchair)? 3  -MONIQUE 3  -MONIQUE     Standing up from a chair using your arms (e.g., wheelchair, bedside chair)? 3  -MONIQUE 3  -MONIQUE     Climbing 3-5 steps with a railing? 3  -MONIQUE 3  -MONIQUE     To walk in hospital room? 3  -MONIQUE 3  -MONIQUE     AM-PAC 6 Clicks Score (PT) 20  -MONIQUE 20  -MONIQUE        Functional Assessment    Outcome Measure Options AM-PAC 6 Clicks Basic Mobility (PT)  -MONIQUE AM-PAC 6 Clicks Basic Mobility (PT)  -MONIQUE               User Key  (r) = Recorded By, (t) = Taken By, (c) = Cosigned By      Initials Name Provider Type    Parish King, PTA Physical Therapist Assistant                            OT Discharge Summary  Anticipated Discharge Disposition (OT): sub acute care setting  Reason for Discharge: Discharge from facility  Outcomes Achieved: Refer to plan of care for updates on goals achieved  Discharge Destination: SNF      ELLIE Johnson/L, CSRS  1/22/2025

## 2025-01-22 NOTE — THERAPY DISCHARGE NOTE
Acute Care - Physical Therapy Discharge Summary  [facility]       Patient Name: Dayron Lubin  : 1955  MRN: [id number]    Today's Date: 2025                 Admit Date: 2025      PT Recommendation and Plan    Visit Dx:    ICD-10-CM ICD-9-CM   1. Cellulitis, unspecified cellulitis site  L03.90 682.9   2. Acute UTI  N39.0 599.0   3. History of alcohol consumption  Z87.898 V11.3   4. History of alcoholic hepatitis  Z87.19 V12.79   5. Drug use  F19.90 305.90   6. Dysphagia, unspecified type  R13.10 787.20   7. Impaired mobility [Z74.09]  Z74.09 799.89   8. Pressure injury of buttock, stage 3, unspecified laterality  L89.303 707.05     707.23   9. Failure to thrive in adult  R62.7 783.7   10. Multiple falls  R29.6 V15.88   11. Large left gluteal compartment intramuscular hematomas, currently still healing  T14.8XXA 924.9        Outcome Measures       Row Name 25 1430 25 0931          How much help from another person do you currently need...    Turning from your back to your side while in flat bed without using bedrails? 4  -OMNIQUE 4  -MONIQUE     Moving from lying on back to sitting on the side of a flat bed without bedrails? 4  -MONIQUE 4  -MONIQUE     Moving to and from a bed to a chair (including a wheelchair)? 3  -MONIQUE 3  -MONIQUE     Standing up from a chair using your arms (e.g., wheelchair, bedside chair)? 3  -MONIQUE 3  -MONIQUE     Climbing 3-5 steps with a railing? 3  -MONIQUE 3  -MONIQUE     To walk in hospital room? 3  -MONIQUE 3  -MONIQUE     AM-PAC 6 Clicks Score (PT) 20  -MONIQUE 20  -MONIQUE        Functional Assessment    Outcome Measure Options AM-PAC 6 Clicks Basic Mobility (PT)  -MONIQUE AM-PAC 6 Clicks Basic Mobility (PT)  -MONIQUE               User Key  (r) = Recorded By, (t) = Taken By, (c) = Cosigned By      Initials Name Provider Type    Parish King, PTA Physical Therapist Assistant                         PT Rehab Goals       Row Name 25 1400             Bed Mobility Goal 1 (PT)    Activity/Assistive Device (Bed Mobility  Goal 1, PT) bed mobility activities, all  -AB      [place] Level/Cues Needed (Bed Mobility Goal 1, PT) independent  -AB      Time Frame (Bed Mobility Goal 1, PT) long term goal (LTG);by discharge  -AB      Progress/Outcomes (Bed Mobility Goal 1, PT) goal not met  -AB         Transfer Goal 1 (PT)    Activity/Assistive Device (Transfer Goal 1, PT) sit-to-stand/stand-to-sit;bed-to-chair/chair-to-bed  -AB      [place] Level/Cues Needed (Transfer Goal 1, PT) independent  -AB      Time Frame (Transfer Goal 1, PT) long term goal (LTG);by discharge  -AB      Progress/Outcome (Transfer Goal 1, PT) goal not met  -AB         Gait Training Goal 1 (PT)    Activity/Assistive Device (Gait Training Goal 1, PT) gait (walking locomotion);assistive device use;walker, rolling;decrease fall risk;diminish gait deviation;improve balance and speed;increase endurance/gait distance;turning, left;turning, right  -AB      [place] Level (Gait Training Goal 1, PT) independent  -AB      Distance (Gait Training Goal 1, PT) 150  -AB      Time Frame (Gait Training Goal 1, PT) long term goal (LTG);by discharge  -AB      Progress/Outcome (Gait Training Goal 1, PT) goal not met  -AB         Balance Goal 1 (PT)    Activity/Assistive Device (Balance Goal) standing dynamic balance;unsupported;with ADLs;with functional activities/occupations  -AB      [place] Level/Cues Needed (Balance Goal 1, PT) modified independence  -AB      Time Frame (Balance Goal 1, PT) long-term goal (LTG);by discharge  -AB      Progress/Outcomes (Balance Goal 1, PT) goal not met  -AB         Stairs Goal 1 (PT)    Activity/Assistive Device (Stairs Goal 1, PT) ascending stairs;using handrail, left;step-to-step;descending stairs;using handrail, right;decrease fall risk;improve balance and speed  -AB      [place] Level/Cues Needed (Stairs Goal 1, PT) independent  -AB      Number of Stairs (Stairs Goal 1, PT) 4  -AB      Time Frame (Stairs Goal 1, PT) long term  goal (LTG);by discharge  -AB      Progress/Outcome (Stairs Goal 1, PT) goal not met  -AB         Patient Education Goal (PT)    Activity (Patient Education Goal, PT) Patient able to safely maneuver AD 75% of the time  -AB      [place]/Cues/Accuracy (Memory Goal 2, PT) independent;demonstrates adequately  -AB      Time Frame (Patient Education Goal, PT) long term goal (LTG);by discharge  -AB      Progress/Outcome (Patient Education Goal, PT) goal not met  -AB                User Key  (r) = Recorded By, (t) = Taken By, (c) = Cosigned By      Initials Name Provider Type Discipline    Edda Ambrose, PTA Physical Therapist Assistant PT                        PT Discharge Summary  Anticipated Discharge Disposition (PT): home  Reason for Discharge: Discharge from facility  Outcomes Achieved: Refer to plan of care for updates on goals achieved  Discharge Destination: SNF      Edda Moura PTA   1/22/2025

## 2025-01-22 NOTE — PLAN OF CARE
Goal Outcome Evaluation:  Plan of Care Reviewed With: patient        Progress: improving  Outcome Evaluation: VSS. Safety maintained. No request for pain meds this shift. [facility].

## 2025-01-24 LAB
BACTERIA SPEC CULT: NORMAL
BACTERIA SPEC CULT: NORMAL
CAMPYLOBACTER STL CULT: NORMAL
E COLI SXT STL QL IA: NEGATIVE
SALM + SHIG STL CULT: NORMAL

## 2025-01-31 ENCOUNTER — TELEPHONE (OUTPATIENT)
Dept: CARDIOLOGY | Facility: CLINIC | Age: 70
End: 2025-01-31

## 2025-01-31 NOTE — TELEPHONE ENCOUNTER
Caller: Dayron Lubin    Relationship to patient: Self    Best call back number: [phone]    Type of visit: HFU    Requested date: ASAP     If rescheduling, when is the original appointment: 1.31.25     Additional notes:PT STATES HE'S BEEN IN PHYSICAL THERAPY FROM RECENTLY FALLING AND BREAKING A HIP AND JUST WAS RELEASED YESTERDAY. HE JUST REALIZED HE HAD AN APPT HE MISSED TODAY WITH DR QUACH AND WAS ASKING TO R/S THAT ASAP. NO DONNA UNTIL MAY. PLEASE ADVISE IF APPT CAN BE PUT OUT UNTIL MAY OR PT CAN BE SCHEDULED SOONER WITH APRN.

## 2025-02-14 RX ORDER — AMOXICILLIN 500 MG/1
500 CAPSULE ORAL 3 TIMES DAILY
Qty: 30 CAPSULE | Refills: 0 | Status: SHIPPED | OUTPATIENT
Start: 2025-02-14 | End: 2025-02-24

## 2025-03-03 NOTE — PROGRESS NOTES
Subjective    Mr. Lubin is 69 y.o. male    Chief Complaint: Prostate Cancer    History of Present Illness  Patient presents for follow-up with history of prostate cancer diagnosed July 2015.  Disease status high risk disease.  He underwent robot-assisted laparoscopic prostatectomy and then external beam radiation.  Revealed T3b Wilson 7 adenocarcinoma the prostate with negative surgical margins.  He completed external beam radiation May 2016 and Lupron x 2 years.  This was completed March 2019 patient denies any new or worsening voiding symptoms although he does have an AUA score 13 out of 35.  No bothersome complaints.  His PSA is unchanged and stable at 0.17 compared to 0.17 six months ago.  Lab Results   Component Value Date    PSA 0.176 03/12/2025    PSA 0.177 09/09/2024    PSA <0.014 09/06/2023       The following portions of the patient's history were reviewed and updated as appropriate: allergies, current medications, past family history, past medical history, past social history, past surgical history and problem list.    Review of Systems   Genitourinary:         Incontinence         Current Outpatient Medications:     amiodarone (PACERONE) 200 MG tablet, Take 1 tablet by mouth Daily., Disp: 90 tablet, Rfl: 1    apixaban (ELIQUIS) 5 MG tablet tablet, Take 1 tablet by mouth 2 (Two) Times a Day., Disp: , Rfl:     aspirin 81 MG EC tablet, Take 1 tablet by mouth Daily., Disp: , Rfl:     atorvastatin (LIPITOR) 20 MG tablet, Take 1 tablet by mouth Daily., Disp: , Rfl:     busPIRone (BUSPAR) 5 MG tablet, Take 1.5 tablets by mouth 2 (Two) Times a Day., Disp: 90 tablet, Rfl: 2    folic acid (FOLVITE) 1 MG tablet, Take 1 tablet by mouth Daily., Disp: 30 tablet, Rfl: 2    loperamide (IMODIUM) 2 MG capsule, Take 1 capsule by mouth 4 (Four) Times a Day As Needed for Diarrhea., Disp: 20 capsule, Rfl: 1    metoprolol succinate XL (TOPROL-XL) 25 MG 24 hr tablet, Take 0.5 tablets by mouth Daily for 30 days., Disp: 15  "tablet, Rfl: 0    pantoprazole (PROTONIX) 40 MG EC tablet, Take 1 tablet by mouth Daily., Disp: , Rfl:     thiamine (VITAMIN B1) 100 MG tablet, Take 1 tablet by mouth Daily., Disp: 30 tablet, Rfl: 2    fluticasone (FLONASE) 50 MCG/ACT nasal spray, 2 sprays into the nostril(s) as directed by provider Daily. (Patient not taking: Reported on 3/18/2025), Disp: 16 g, Rfl: 11    hydrOXYzine (ATARAX) 25 MG tablet, Take 1 tablet by mouth 3 (Three) Times a Day As Needed for Anxiety. (Patient not taking: Reported on 3/18/2025), Disp: , Rfl:     temazepam (RESTORIL) 30 MG capsule, Take 1 capsule by mouth At Night As Needed for Sleep. (Patient not taking: Reported on 3/18/2025), Disp: , Rfl:     thiamine (VITAMIN B1) 100 MG tablet, Take 1 tablet by mouth Daily. (Patient not taking: Reported on 3/18/2025), Disp: , Rfl:     Past Medical History:   Diagnosis Date    A-fib     Alcoholic hepatitis 07/13/2023    CAD (coronary artery disease)     History of external beam radiation therapy 05/12/2016    6840 cGy to prostate bed    Hyperlipidemia     Hypertension     Insomnia     Prostate cancer        Past Surgical History:   Procedure Laterality Date    CARDIAC CATHETERIZATION      CORONARY ANGIOPLASTY WITH STENT PLACEMENT      FRACTURE SURGERY      PROSTATE SURGERY      TONSILLECTOMY         Social History     Socioeconomic History    Marital status: Single   Tobacco Use    Smoking status: Never    Smokeless tobacco: Never   Vaping Use    Vaping status: Never Used   Substance and Sexual Activity    Alcohol use: Yes     Alcohol/week: 50.0 standard drinks of alcohol     Types: 50 Shots of liquor per week    Drug use: Not Currently     Types: Marijuana    Sexual activity: Defer       Family History   Problem Relation Age of Onset    Heart disease Mother     Coronary artery disease Mother     Stroke Mother        Objective    Temp 98.1 °F (36.7 °C) (Infrared)   Ht 180.3 cm (71\")   Wt 84.4 kg (186 lb)   BMI 25.94 kg/m²     Physical " Exam  Vitals reviewed.   HENT:      Head: Normocephalic and atraumatic.   Pulmonary:      Effort: Pulmonary effort is normal.   Psychiatric:         Mood and Affect: Mood normal.         Behavior: Behavior normal.             Results for orders placed or performed in visit on 03/18/25   POC Urinalysis Dipstick, Multipro    Collection Time: 03/18/25  8:26 AM    Specimen: Urine   Result Value Ref Range    Color Yellow Yellow, Straw, Dark Yellow, Ying    Clarity, UA Clear Clear    Glucose, UA Negative Negative mg/dL    Bilirubin Negative Negative    Ketones, UA Negative Negative    Specific Gravity  1.025 1.005 - 1.030    Blood, UA Negative Negative    pH, Urine 5.5 5.0 - 8.0    Protein, POC Negative Negative mg/dL    Urobilinogen, UA 0.2 E.U./dL Normal, 0.2 E.U./dL    Nitrite, UA Negative Negative    Leukocytes Negative Negative   IPSS Questionnaire (AUA-7):  Incomplete emptying  Over the past month, how often have you had a sensation of not emptying your bladder completely after you finished urinating?: Less than half the time (03/18/25 0823)  Frequency  Over the past month, how often have you had to urinate again less than two hours after you finishied urinating ?: Less than half the time (03/18/25 0823)  Intermittency  Over the past month, how often have you found you stopped and started again several time when you urinated ?: Less than 1 time in 5 (03/18/25 0823)  Urgency  Over the last month, how often have you found it difficult  have you found it difficult to postpone urination ?: About half the time (03/18/25 0823)  Weak Stream  Over the past month, how often have you had a weak urinary stream ?: Less than half the time (03/18/25 0823)  Straining  Over the past month, how often have you had to push or strain to begin urination ?: Less than 1 time in 5 (03/18/25 0823)  Nocturia  Over the past month, how many times did you most typically get up to urinate from the time you went to bed until the time you got up  in the morning ?: 2 times (03/18/25 0823)  Quality of life due to urinary symptoms  If you were to spend the rest of your life with your urinary condition the way it is now, how would feel about that?: Mixed - about equally satisfied (03/18/25 0823)    Scores  Total IPSS Score: (!) 13 (03/18/25 0823)  Total Score = Symptomatic Level: Moderately symptomatic: 8-19 (03/18/25 0823)        Assessment and Plan    Diagnoses and all orders for this visit:    1. Prostate cancer (Primary)  -     POC Urinalysis Dipstick, Multipro  -     PSA DIAGNOSTIC; Future    2. Male stress incontinence      His PSA is unchanged from previous 0.17 which was unchanged from 6 months ago.  I explained that detectable lower PSA recurrence is considered 2 consecutive PSAs of 0.2 or greater.  Monitor follow-up 6 months PSA prior.  Has no new or worsening symptoms although he has continued stress type incontinence any seemed interested in the artificial urinary sphincter and/or male urethral sling gave him a pamphlet on these.

## 2025-03-12 ENCOUNTER — LAB (OUTPATIENT)
Dept: LAB | Facility: HOSPITAL | Age: 70
End: 2025-03-12
Payer: MEDICARE

## 2025-03-12 DIAGNOSIS — C61 PROSTATE CANCER: ICD-10-CM

## 2025-03-12 LAB — PSA SERPL-MCNC: 0.18 NG/ML (ref 0–4)

## 2025-03-12 PROCEDURE — 36415 COLL VENOUS BLD VENIPUNCTURE: CPT

## 2025-03-12 PROCEDURE — 84153 ASSAY OF PSA TOTAL: CPT

## 2025-03-18 ENCOUNTER — OFFICE VISIT (OUTPATIENT)
Dept: UROLOGY | Facility: CLINIC | Age: 70
End: 2025-03-18
Payer: MEDICARE

## 2025-03-18 VITALS — HEIGHT: 71 IN | TEMPERATURE: 98.1 F | WEIGHT: 186 LBS | BODY MASS INDEX: 26.04 KG/M2

## 2025-03-18 DIAGNOSIS — N39.3 MALE STRESS INCONTINENCE: ICD-10-CM

## 2025-03-18 DIAGNOSIS — C61 PROSTATE CANCER: Primary | ICD-10-CM

## 2025-03-24 ENCOUNTER — TELEPHONE (OUTPATIENT)
Dept: OTOLARYNGOLOGY | Facility: CLINIC | Age: 70
End: 2025-03-24

## 2025-03-24 NOTE — TELEPHONE ENCOUNTER
Caller: DEE DEE    Relationship: SISTER    Best call back number: [phone]    What is the best time to reach you: ANY    Who are you requesting to speak with (clinical staff, provider,  specific staff member): DR. CHA/SRINATH    Do you know the name of the person who called: PTS SISTER    What was the call regarding: PTS SISTER CALLED STATING PT HAS BEEN HAVING TROUBLE WITH A NASAL POLYP. SHE STATES HE WAS ON AN ANTIBIOTIC FOR IT AND THAT SEEMED TO HELP A BIT BUT NOT AT NIGHT. HE HAS AN APPT WITH DR. CHA ON 4/29/25 AND HIS SISTER WANTS TO KNOW IF THERE IS ANYTHING HE CAN TAKE OR DO IN THE MEANTIME TO HELP WITH HIS BREATHING UNTIL HIS APPT. PLEASE CALL PT TO ADVISE. THANK YOU.

## 2025-04-09 ENCOUNTER — OFFICE VISIT (OUTPATIENT)
Dept: OTOLARYNGOLOGY | Facility: CLINIC | Age: 70
End: 2025-04-09
Payer: MEDICARE

## 2025-04-09 VITALS — BODY MASS INDEX: 26.6 KG/M2 | HEIGHT: 71 IN | WEIGHT: 190 LBS

## 2025-04-09 DIAGNOSIS — H61.23 BILATERAL IMPACTED CERUMEN: ICD-10-CM

## 2025-04-09 DIAGNOSIS — J33.9 NASAL POLYPOSIS: Primary | ICD-10-CM

## 2025-04-09 DIAGNOSIS — J34.3 NASAL TURBINATE HYPERTROPHY: ICD-10-CM

## 2025-04-09 DIAGNOSIS — J34.89 CONCHA BULLOSA: ICD-10-CM

## 2025-04-14 ENCOUNTER — TELEPHONE (OUTPATIENT)
Dept: OTOLARYNGOLOGY | Facility: CLINIC | Age: 70
End: 2025-04-14
Payer: MEDICARE

## 2025-04-14 NOTE — TELEPHONE ENCOUNTER
You can send the following to the office of Dr. De La Rosa:     From an EP standpoint, the patient has had no recent ablations or recent thrombotic events that would put them at higher risk that typical for discontinuation of oral anticoagulation.  The patient is overall at low thrombotic risk (<5% annual risk and no prior thrombotic event) without anticoagulation.  The risk of this should always be weighed against the risk of bleeding.       For patients taking a NOAC with relatively renal function, guidelines recommend stopping the blood thinner 48 hours prior to a procedure with high risk of bleeding complications.  The decision to hold anticoagulation beyond this duration would be at the discretion of the treating provider with understanding of the risks and benefits involved.      For patients on NOAC, bridging anticoagulation is not recommended.      Following the operation, if post-procedural bleeding risk is low, then it is reasonable to resume full anticoagulation the day after the procedure.       Sincerely,  My Haque

## 2025-04-18 ENCOUNTER — HOSPITAL ENCOUNTER (INPATIENT)
Facility: HOSPITAL | Age: 70
LOS: 4 days | Discharge: HOME | End: 2025-04-23
Attending: EMERGENCY MEDICINE
Payer: MEDICARE

## 2025-04-18 ENCOUNTER — APPOINTMENT (OUTPATIENT)
Dept: GENERAL RADIOLOGY | Facility: HOSPITAL | Age: 70
End: 2025-04-18
Payer: MEDICARE

## 2025-04-18 ENCOUNTER — APPOINTMENT (OUTPATIENT)
Dept: CT IMAGING | Facility: HOSPITAL | Age: 70
End: 2025-04-18
Payer: MEDICARE

## 2025-04-18 DIAGNOSIS — Z74.09 IMPAIRED MOBILITY: ICD-10-CM

## 2025-04-18 DIAGNOSIS — J18.9 PNEUMONIA OF BOTH LUNGS DUE TO INFECTIOUS ORGANISM, UNSPECIFIED PART OF LUNG: Primary | ICD-10-CM

## 2025-04-18 DIAGNOSIS — J96.01 ACUTE HYPOXIC RESPIRATORY FAILURE: ICD-10-CM

## 2025-04-18 LAB
ALBUMIN SERPL-MCNC: 4.5 G/DL (ref 3.5–5.2)
ALBUMIN/GLOB SERPL: 1.4 G/DL
ALP SERPL-CCNC: 62 U/L (ref 39–117)
ALT SERPL W P-5'-P-CCNC: 8 U/L (ref 1–41)
ANION GAP SERPL CALCULATED.3IONS-SCNC: 15 MMOL/L (ref 5–15)
ANISOCYTOSIS BLD QL: ABNORMAL
APTT PPP: 38.8 SECONDS (ref 24.5–36)
ARTERIAL PATENCY WRIST A: ABNORMAL
AST SERPL-CCNC: 17 U/L (ref 1–40)
ATMOSPHERIC PRESS: 752 MMHG
BASE EXCESS BLDA CALC-SCNC: -7.8 MMOL/L (ref 0–2)
BASOPHILS # BLD MANUAL: 0 10*3/MM3 (ref 0–0.2)
BASOPHILS NFR BLD MANUAL: 0 % (ref 0–1.5)
BDY SITE: ABNORMAL
BILIRUB SERPL-MCNC: 0.3 MG/DL (ref 0–1.2)
BODY TEMPERATURE: 37
BUN SERPL-MCNC: 22 MG/DL (ref 8–23)
BUN/CREAT SERPL: 20.6 (ref 7–25)
CALCIUM SPEC-SCNC: 9.3 MG/DL (ref 8.6–10.5)
CHLORIDE SERPL-SCNC: 105 MMOL/L (ref 98–107)
CO2 SERPL-SCNC: 19 MMOL/L (ref 22–29)
COHGB MFR BLD: 1.7 % (ref 0–5)
CREAT SERPL-MCNC: 1.07 MG/DL (ref 0.76–1.27)
D-LACTATE SERPL-SCNC: 3.9 MMOL/L (ref 0.5–2)
DEPRECATED RDW RBC AUTO: 54.4 FL (ref 37–54)
EGFRCR SERPLBLD CKD-EPI 2021: 74.7 ML/MIN/1.73
EOSINOPHIL # BLD MANUAL: 0.24 10*3/MM3 (ref 0–0.4)
EOSINOPHIL NFR BLD MANUAL: 1 % (ref 0.3–6.2)
ERYTHROCYTE [DISTWIDTH] IN BLOOD BY AUTOMATED COUNT: 16.6 % (ref 12.3–15.4)
GAS FLOW AIRWAY: 15 LPM
GLOBULIN UR ELPH-MCNC: 3.3 GM/DL
GLUCOSE SERPL-MCNC: 220 MG/DL (ref 65–99)
HCO3 BLDA-SCNC: 20.5 MMOL/L (ref 20–26)
HCT VFR BLD AUTO: 25.7 % (ref 37.5–51)
HCT VFR BLD CALC: 22.6 % (ref 38–51)
HGB BLD-MCNC: 7.6 G/DL (ref 13–17.7)
HGB BLDA-MCNC: 7.4 G/DL (ref 14–18)
HYPOCHROMIA BLD QL: ABNORMAL
INR PPP: 1.42 (ref 0.91–1.09)
LIPASE SERPL-CCNC: 80 U/L (ref 13–60)
LYMPHOCYTES # BLD MANUAL: 2.93 10*3/MM3 (ref 0.7–3.1)
LYMPHOCYTES NFR BLD MANUAL: 2 % (ref 5–12)
Lab: ABNORMAL
Lab: ABNORMAL
MCH RBC QN AUTO: 26.6 PG (ref 26.6–33)
MCHC RBC AUTO-ENTMCNC: 29.6 G/DL (ref 31.5–35.7)
MCV RBC AUTO: 89.9 FL (ref 79–97)
METHGB BLD QL: 0 % (ref 0–3)
MODALITY: ABNORMAL
MONOCYTES # BLD: 0.48 10*3/MM3 (ref 0.1–0.9)
NEUTROPHILS # BLD AUTO: 20.52 10*3/MM3 (ref 1.7–7)
NEUTROPHILS NFR BLD MANUAL: 82.8 % (ref 42.7–76)
NEUTS BAND NFR BLD MANUAL: 2 % (ref 0–5)
NOTIFIED BY: ABNORMAL
NOTIFIED WHO: ABNORMAL
OXYHGB MFR BLDV: 99.1 % (ref 94–99)
PCO2 BLDA: 57.7 MM HG (ref 35–45)
PCO2 TEMP ADJ BLD: 57.7 MM HG (ref 35–45)
PH BLDA: 7.16 PH UNITS (ref 7.35–7.45)
PH, TEMP CORRECTED: 7.16 PH UNITS (ref 7.35–7.45)
PLAT MORPH BLD: NORMAL
PLATELET # BLD AUTO: 233 10*3/MM3 (ref 140–450)
PMV BLD AUTO: 12.1 FL (ref 6–12)
PO2 BLDA: 150 MM HG (ref 83–108)
PO2 TEMP ADJ BLD: 150 MM HG (ref 83–108)
POIKILOCYTOSIS BLD QL SMEAR: ABNORMAL
POLYCHROMASIA BLD QL SMEAR: ABNORMAL
POTASSIUM BLDA-SCNC: 3.9 MMOL/L (ref 3.5–5.2)
POTASSIUM SERPL-SCNC: 4.1 MMOL/L (ref 3.5–5.2)
PROT SERPL-MCNC: 7.8 G/DL (ref 6–8.5)
PROTHROMBIN TIME: 18.1 SECONDS (ref 11.8–14.8)
RBC # BLD AUTO: 2.86 10*6/MM3 (ref 4.14–5.8)
SAO2 % BLDCOA: >100.1 % (ref 94–99)
SODIUM BLDA-SCNC: 143 MMOL/L (ref 136–145)
SODIUM SERPL-SCNC: 139 MMOL/L (ref 136–145)
TROPONIN T SERPL HS-MCNC: 22 NG/L
VARIANT LYMPHS NFR BLD MANUAL: 12.1 % (ref 19.6–45.3)
VENTILATOR MODE: ABNORMAL
WBC MORPH BLD: NORMAL
WBC NRBC COR # BLD AUTO: 24.18 10*3/MM3 (ref 3.4–10.8)

## 2025-04-18 PROCEDURE — 36415 COLL VENOUS BLD VENIPUNCTURE: CPT

## 2025-04-18 PROCEDURE — 71045 X-RAY EXAM CHEST 1 VIEW: CPT

## 2025-04-18 PROCEDURE — 36600 WITHDRAWAL OF ARTERIAL BLOOD: CPT

## 2025-04-18 PROCEDURE — 94799 UNLISTED PULMONARY SVC/PX: CPT

## 2025-04-18 PROCEDURE — 82805 BLOOD GASES W/O2 SATURATION: CPT

## 2025-04-18 PROCEDURE — 94640 AIRWAY INHALATION TREATMENT: CPT

## 2025-04-18 PROCEDURE — 84484 ASSAY OF TROPONIN QUANT: CPT | Performed by: EMERGENCY MEDICINE

## 2025-04-18 PROCEDURE — 83050 HGB METHEMOGLOBIN QUAN: CPT

## 2025-04-18 PROCEDURE — 85610 PROTHROMBIN TIME: CPT | Performed by: EMERGENCY MEDICINE

## 2025-04-18 PROCEDURE — 83690 ASSAY OF LIPASE: CPT | Performed by: EMERGENCY MEDICINE

## 2025-04-18 PROCEDURE — 85007 BL SMEAR W/DIFF WBC COUNT: CPT | Performed by: EMERGENCY MEDICINE

## 2025-04-18 PROCEDURE — 99285 EMERGENCY DEPT VISIT HI MDM: CPT

## 2025-04-18 PROCEDURE — 83605 ASSAY OF LACTIC ACID: CPT | Performed by: EMERGENCY MEDICINE

## 2025-04-18 PROCEDURE — 94660 CPAP INITIATION&MGMT: CPT

## 2025-04-18 PROCEDURE — 85025 COMPLETE CBC W/AUTO DIFF WBC: CPT | Performed by: EMERGENCY MEDICINE

## 2025-04-18 PROCEDURE — 93005 ELECTROCARDIOGRAM TRACING: CPT | Performed by: EMERGENCY MEDICINE

## 2025-04-18 PROCEDURE — 80053 COMPREHEN METABOLIC PANEL: CPT | Performed by: EMERGENCY MEDICINE

## 2025-04-18 PROCEDURE — 85730 THROMBOPLASTIN TIME PARTIAL: CPT | Performed by: EMERGENCY MEDICINE

## 2025-04-18 PROCEDURE — 87040 BLOOD CULTURE FOR BACTERIA: CPT | Performed by: EMERGENCY MEDICINE

## 2025-04-18 PROCEDURE — 82375 ASSAY CARBOXYHB QUANT: CPT

## 2025-04-18 RX ORDER — SODIUM CHLORIDE 0.9 % (FLUSH) 0.9 %
10 SYRINGE (ML) INJECTION AS NEEDED
Status: CANCELLED | OUTPATIENT
Start: 2025-04-18

## 2025-04-18 RX ORDER — IPRATROPIUM BROMIDE AND ALBUTEROL SULFATE 2.5; .5 MG/3ML; MG/3ML
3 SOLUTION RESPIRATORY (INHALATION) ONCE
Status: COMPLETED | OUTPATIENT
Start: 2025-04-18 | End: 2025-04-18

## 2025-04-18 RX ORDER — METHYLPREDNISOLONE SODIUM SUCCINATE 125 MG/2ML
125 INJECTION, POWDER, LYOPHILIZED, FOR SOLUTION INTRAMUSCULAR; INTRAVENOUS ONCE
Status: COMPLETED | OUTPATIENT
Start: 2025-04-18 | End: 2025-04-19

## 2025-04-18 RX ORDER — SODIUM CHLORIDE 0.9 % (FLUSH) 0.9 %
10 SYRINGE (ML) INJECTION AS NEEDED
Status: DISCONTINUED | OUTPATIENT
Start: 2025-04-18 | End: 2025-04-23 | Stop reason: HOSPADM

## 2025-04-18 RX ADMIN — IPRATROPIUM BROMIDE AND ALBUTEROL SULFATE 3 ML: .5; 3 SOLUTION RESPIRATORY (INHALATION) at 23:27

## 2025-04-18 NOTE — Clinical Note
Level of Care: Remote Telemetry [26]   Diagnosis: Bacterial pneumonia [[id number]]   Admitting Physician: MAX LARSON [[id number]]   Attending Physician: MAX LARSON [[id number]]

## 2025-04-19 ENCOUNTER — APPOINTMENT (OUTPATIENT)
Dept: CT IMAGING | Facility: HOSPITAL | Age: 70
End: 2025-04-19
Payer: MEDICARE

## 2025-04-19 PROBLEM — J15.9 BACTERIAL PNEUMONIA: Status: ACTIVE | Noted: 2025-04-19

## 2025-04-19 LAB
ABO GROUP BLD: NORMAL
ALBUMIN SERPL-MCNC: 4.1 G/DL (ref 3.5–5.2)
ALBUMIN/GLOB SERPL: 1.5 G/DL
ALP SERPL-CCNC: 50 U/L (ref 39–117)
ALT SERPL W P-5'-P-CCNC: 6 U/L (ref 1–41)
ANION GAP SERPL CALCULATED.3IONS-SCNC: 12 MMOL/L (ref 5–15)
ARTERIAL PATENCY WRIST A: ABNORMAL
ARTERIAL PATENCY WRIST A: POSITIVE
AST SERPL-CCNC: 15 U/L (ref 1–40)
ATMOSPHERIC PRESS: 752 MMHG
ATMOSPHERIC PRESS: 753 MMHG
B PARAPERT DNA SPEC QL NAA+PROBE: NOT DETECTED
B PERT DNA SPEC QL NAA+PROBE: NOT DETECTED
BASE EXCESS BLDA CALC-SCNC: -2 MMOL/L (ref 0–2)
BASE EXCESS BLDA CALC-SCNC: -2.2 MMOL/L (ref 0–2)
BDY SITE: ABNORMAL
BDY SITE: ABNORMAL
BILIRUB SERPL-MCNC: 0.2 MG/DL (ref 0–1.2)
BLD GP AB SCN SERPL QL: NEGATIVE
BODY TEMPERATURE: 37
BODY TEMPERATURE: 37
BUN SERPL-MCNC: 21 MG/DL (ref 8–23)
BUN/CREAT SERPL: 25.9 (ref 7–25)
C PNEUM DNA NPH QL NAA+NON-PROBE: NOT DETECTED
CALCIUM SPEC-SCNC: 8.8 MG/DL (ref 8.6–10.5)
CHLORIDE SERPL-SCNC: 106 MMOL/L (ref 98–107)
CO2 SERPL-SCNC: 21 MMOL/L (ref 22–29)
COHGB MFR BLD: 1.8 % (ref 0–5)
CREAT SERPL-MCNC: 0.81 MG/DL (ref 0.76–1.27)
D-LACTATE SERPL-SCNC: 1.5 MMOL/L (ref 0.5–2)
DEPRECATED RDW RBC AUTO: 54.7 FL (ref 37–54)
DEVELOPER EXPIRATION DATE: ABNORMAL
DEVELOPER LOT NUMBER: 285
EGFRCR SERPLBLD CKD-EPI 2021: 94.8 ML/MIN/1.73
EPAP: 10
ERYTHROCYTE [DISTWIDTH] IN BLOOD BY AUTOMATED COUNT: 16.8 % (ref 12.3–15.4)
EXPIRATION DATE: ABNORMAL
FECAL OCCULT BLOOD SCREEN, POC: POSITIVE
FLUAV SUBTYP SPEC NAA+PROBE: NOT DETECTED
FLUBV RNA ISLT QL NAA+PROBE: NOT DETECTED
GAS FLOW AIRWAY: 3 LPM
GEN 5 1HR TROPONIN T REFLEX: 23 NG/L
GLOBULIN UR ELPH-MCNC: 2.7 GM/DL
GLUCOSE SERPL-MCNC: 124 MG/DL (ref 65–99)
HADV DNA SPEC NAA+PROBE: NOT DETECTED
HCO3 BLDA-SCNC: 21.8 MMOL/L (ref 20–26)
HCO3 BLDA-SCNC: 22.5 MMOL/L (ref 20–26)
HCOV 229E RNA SPEC QL NAA+PROBE: NOT DETECTED
HCOV HKU1 RNA SPEC QL NAA+PROBE: NOT DETECTED
HCOV NL63 RNA SPEC QL NAA+PROBE: NOT DETECTED
HCOV OC43 RNA SPEC QL NAA+PROBE: NOT DETECTED
HCT VFR BLD AUTO: 20.9 % (ref 37.5–51)
HCT VFR BLD AUTO: 23.2 % (ref 37.5–51)
HCT VFR BLD CALC: 18.4 % (ref 38–51)
HGB BLD-MCNC: 6.1 G/DL (ref 13–17.7)
HGB BLD-MCNC: 7.5 G/DL (ref 13–17.7)
HGB BLDA-MCNC: 6 G/DL (ref 14–18)
HMPV RNA NPH QL NAA+NON-PROBE: NOT DETECTED
HPIV1 RNA ISLT QL NAA+PROBE: NOT DETECTED
HPIV2 RNA SPEC QL NAA+PROBE: NOT DETECTED
HPIV3 RNA NPH QL NAA+PROBE: DETECTED
HPIV4 P GENE NPH QL NAA+PROBE: NOT DETECTED
INHALED O2 CONCENTRATION: 100 %
IPAP: 20
Lab: 285
Lab: ABNORMAL
M PNEUMO IGG SER IA-ACNC: NOT DETECTED
MCH RBC QN AUTO: 25.8 PG (ref 26.6–33)
MCHC RBC AUTO-ENTMCNC: 29.2 G/DL (ref 31.5–35.7)
MCV RBC AUTO: 88.6 FL (ref 79–97)
METHGB BLD QL: 0.7 % (ref 0–3)
MODALITY: ABNORMAL
MODALITY: ABNORMAL
NEGATIVE CONTROL: NEGATIVE
NOTIFIED BY: ABNORMAL
NOTIFIED WHO: ABNORMAL
OXYHGB MFR BLDV: 99.1 % (ref 94–99)
PCO2 BLDA: 31.6 MM HG (ref 35–45)
PCO2 BLDA: 37.2 MM HG (ref 35–45)
PCO2 TEMP ADJ BLD: 31.6 MM HG (ref 35–45)
PCO2 TEMP ADJ BLD: 37.2 MM HG (ref 35–45)
PH BLDA: 7.39 PH UNITS (ref 7.35–7.45)
PH BLDA: 7.45 PH UNITS (ref 7.35–7.45)
PH, TEMP CORRECTED: 7.39 PH UNITS (ref 7.35–7.45)
PH, TEMP CORRECTED: 7.45 PH UNITS (ref 7.35–7.45)
PLATELET # BLD AUTO: 145 10*3/MM3 (ref 140–450)
PMV BLD AUTO: 11.9 FL (ref 6–12)
PO2 BLDA: 399 MM HG (ref 83–108)
PO2 BLDA: 79.9 MM HG (ref 83–108)
PO2 TEMP ADJ BLD: 399 MM HG (ref 83–108)
PO2 TEMP ADJ BLD: 79.9 MM HG (ref 83–108)
POSITIVE CONTROL: POSITIVE
POTASSIUM BLDA-SCNC: 3.9 MMOL/L (ref 3.5–5.2)
POTASSIUM SERPL-SCNC: 4.4 MMOL/L (ref 3.5–5.2)
PROT SERPL-MCNC: 6.8 G/DL (ref 6–8.5)
QT INTERVAL: 374 MS
QTC INTERVAL: 445 MS
RBC # BLD AUTO: 2.36 10*6/MM3 (ref 4.14–5.8)
RH BLD: POSITIVE
RHINOVIRUS RNA SPEC NAA+PROBE: NOT DETECTED
RSV RNA NPH QL NAA+NON-PROBE: NOT DETECTED
SAO2 % BLDCOA: 97.9 % (ref 94–99)
SAO2 % BLDCOA: >100.1 % (ref 94–99)
SARS-COV-2 RNA RESP QL NAA+PROBE: NOT DETECTED
SET MECH RESP RATE: 18
SODIUM BLDA-SCNC: 142 MMOL/L (ref 136–145)
SODIUM SERPL-SCNC: 139 MMOL/L (ref 136–145)
T&S EXPIRATION DATE: NORMAL
TROPONIN T % DELTA: 5
TROPONIN T NUMERIC DELTA: 1 NG/L
VENTILATOR MODE: ABNORMAL
VENTILATOR MODE: ABNORMAL
WBC NRBC COR # BLD AUTO: 13.66 10*3/MM3 (ref 3.4–10.8)

## 2025-04-19 PROCEDURE — 82805 BLOOD GASES W/O2 SATURATION: CPT

## 2025-04-19 PROCEDURE — 87070 CULTURE OTHR SPECIMN AEROBIC: CPT | Performed by: INTERNAL MEDICINE

## 2025-04-19 PROCEDURE — 82270 OCCULT BLOOD FECES: CPT | Performed by: INTERNAL MEDICINE

## 2025-04-19 PROCEDURE — 25010000002 DOXYCYCLINE 100 MG RECONSTITUTED SOLUTION 1 EACH VIAL

## 2025-04-19 PROCEDURE — 82803 BLOOD GASES ANY COMBINATION: CPT

## 2025-04-19 PROCEDURE — 71275 CT ANGIOGRAPHY CHEST: CPT

## 2025-04-19 PROCEDURE — 80053 COMPREHEN METABOLIC PANEL: CPT

## 2025-04-19 PROCEDURE — 86923 COMPATIBILITY TEST ELECTRIC: CPT

## 2025-04-19 PROCEDURE — 36430 TRANSFUSION BLD/BLD COMPNT: CPT

## 2025-04-19 PROCEDURE — 86900 BLOOD TYPING SEROLOGIC ABO: CPT

## 2025-04-19 PROCEDURE — 84484 ASSAY OF TROPONIN QUANT: CPT | Performed by: EMERGENCY MEDICINE

## 2025-04-19 PROCEDURE — 25010000002 CEFTRIAXONE PER 250 MG

## 2025-04-19 PROCEDURE — 25010000002 CEFTRIAXONE PER 250 MG: Performed by: EMERGENCY MEDICINE

## 2025-04-19 PROCEDURE — 85014 HEMATOCRIT: CPT | Performed by: INTERNAL MEDICINE

## 2025-04-19 PROCEDURE — 94799 UNLISTED PULMONARY SVC/PX: CPT

## 2025-04-19 PROCEDURE — 94761 N-INVAS EAR/PLS OXIMETRY MLT: CPT

## 2025-04-19 PROCEDURE — 85027 COMPLETE CBC AUTOMATED: CPT

## 2025-04-19 PROCEDURE — 25010000002 AZITHROMYCIN PER 500 MG: Performed by: EMERGENCY MEDICINE

## 2025-04-19 PROCEDURE — 25510000001 IOPAMIDOL PER 1 ML: Performed by: EMERGENCY MEDICINE

## 2025-04-19 PROCEDURE — 0202U NFCT DS 22 TRGT SARS-COV-2: CPT

## 2025-04-19 PROCEDURE — 25010000002 METHYLPREDNISOLONE PER 125 MG: Performed by: EMERGENCY MEDICINE

## 2025-04-19 PROCEDURE — 25010000002 FUROSEMIDE PER 20 MG: Performed by: INTERNAL MEDICINE

## 2025-04-19 PROCEDURE — 86850 RBC ANTIBODY SCREEN: CPT

## 2025-04-19 PROCEDURE — 25810000003 SODIUM CHLORIDE 0.9 % SOLUTION 250 ML FLEX CONT: Performed by: EMERGENCY MEDICINE

## 2025-04-19 PROCEDURE — 25810000003 SODIUM CHLORIDE 0.9 % SOLUTION: Performed by: EMERGENCY MEDICINE

## 2025-04-19 PROCEDURE — 86901 BLOOD TYPING SEROLOGIC RH(D): CPT

## 2025-04-19 PROCEDURE — 82375 ASSAY CARBOXYHB QUANT: CPT

## 2025-04-19 PROCEDURE — 25010000002 METHYLPREDNISOLONE PER 40 MG

## 2025-04-19 PROCEDURE — 36600 WITHDRAWAL OF ARTERIAL BLOOD: CPT

## 2025-04-19 PROCEDURE — 99221 1ST HOSP IP/OBS SF/LOW 40: CPT | Performed by: INTERNAL MEDICINE

## 2025-04-19 PROCEDURE — P9016 RBC LEUKOCYTES REDUCED: HCPCS

## 2025-04-19 PROCEDURE — 83605 ASSAY OF LACTIC ACID: CPT | Performed by: EMERGENCY MEDICINE

## 2025-04-19 PROCEDURE — 93010 ELECTROCARDIOGRAM REPORT: CPT | Performed by: HOSPITALIST

## 2025-04-19 PROCEDURE — 83050 HGB METHEMOGLOBIN QUAN: CPT

## 2025-04-19 PROCEDURE — 94660 CPAP INITIATION&MGMT: CPT

## 2025-04-19 PROCEDURE — 87205 SMEAR GRAM STAIN: CPT | Performed by: INTERNAL MEDICINE

## 2025-04-19 PROCEDURE — 85018 HEMOGLOBIN: CPT | Performed by: INTERNAL MEDICINE

## 2025-04-19 RX ORDER — METOPROLOL SUCCINATE 25 MG/1
12.5 TABLET, EXTENDED RELEASE ORAL DAILY
Status: DISCONTINUED | OUTPATIENT
Start: 2025-04-19 | End: 2025-04-23 | Stop reason: HOSPADM

## 2025-04-19 RX ORDER — ASPIRIN 81 MG/1
81 TABLET ORAL DAILY
Status: DISCONTINUED | OUTPATIENT
Start: 2025-04-19 | End: 2025-04-23 | Stop reason: HOSPADM

## 2025-04-19 RX ORDER — SODIUM CHLORIDE 0.9 % (FLUSH) 0.9 %
10 SYRINGE (ML) INJECTION AS NEEDED
Status: DISCONTINUED | OUTPATIENT
Start: 2025-04-19 | End: 2025-04-23 | Stop reason: HOSPADM

## 2025-04-19 RX ORDER — PANTOPRAZOLE SODIUM 40 MG/10ML
40 INJECTION, POWDER, LYOPHILIZED, FOR SOLUTION INTRAVENOUS ONCE
Status: DISCONTINUED | OUTPATIENT
Start: 2025-04-19 | End: 2025-04-19

## 2025-04-19 RX ORDER — METHYLPREDNISOLONE SODIUM SUCCINATE 40 MG/ML
40 INJECTION, POWDER, LYOPHILIZED, FOR SOLUTION INTRAMUSCULAR; INTRAVENOUS EVERY 24 HOURS
Status: DISCONTINUED | OUTPATIENT
Start: 2025-04-19 | End: 2025-04-23

## 2025-04-19 RX ORDER — PANTOPRAZOLE SODIUM 40 MG/1
40 TABLET, DELAYED RELEASE ORAL DAILY
Status: CANCELLED | OUTPATIENT
Start: 2025-04-19

## 2025-04-19 RX ORDER — PANTOPRAZOLE SODIUM 40 MG/10ML
40 INJECTION, POWDER, LYOPHILIZED, FOR SOLUTION INTRAVENOUS EVERY 12 HOURS SCHEDULED
Status: DISCONTINUED | OUTPATIENT
Start: 2025-04-19 | End: 2025-04-22

## 2025-04-19 RX ORDER — ACETAMINOPHEN 325 MG/1
650 TABLET ORAL EVERY 4 HOURS PRN
Status: DISCONTINUED | OUTPATIENT
Start: 2025-04-19 | End: 2025-04-23 | Stop reason: HOSPADM

## 2025-04-19 RX ORDER — IPRATROPIUM BROMIDE AND ALBUTEROL SULFATE 2.5; .5 MG/3ML; MG/3ML
3 SOLUTION RESPIRATORY (INHALATION) EVERY 4 HOURS PRN
Status: DISCONTINUED | OUTPATIENT
Start: 2025-04-19 | End: 2025-04-23 | Stop reason: HOSPADM

## 2025-04-19 RX ORDER — FLUTICASONE PROPIONATE 50 MCG
2 SPRAY, SUSPENSION (ML) NASAL DAILY
Status: DISCONTINUED | OUTPATIENT
Start: 2025-04-19 | End: 2025-04-23 | Stop reason: HOSPADM

## 2025-04-19 RX ORDER — AMIODARONE HYDROCHLORIDE 200 MG/1
200 TABLET ORAL DAILY
Status: DISCONTINUED | OUTPATIENT
Start: 2025-04-19 | End: 2025-04-23 | Stop reason: HOSPADM

## 2025-04-19 RX ORDER — IPRATROPIUM BROMIDE AND ALBUTEROL SULFATE 2.5; .5 MG/3ML; MG/3ML
3 SOLUTION RESPIRATORY (INHALATION)
Status: DISCONTINUED | OUTPATIENT
Start: 2025-04-19 | End: 2025-04-23 | Stop reason: HOSPADM

## 2025-04-19 RX ORDER — ACETAMINOPHEN 650 MG/1
650 SUPPOSITORY RECTAL EVERY 4 HOURS PRN
Status: DISCONTINUED | OUTPATIENT
Start: 2025-04-19 | End: 2025-04-23 | Stop reason: HOSPADM

## 2025-04-19 RX ORDER — IOPAMIDOL 755 MG/ML
100 INJECTION, SOLUTION INTRAVASCULAR
Status: COMPLETED | OUTPATIENT
Start: 2025-04-19 | End: 2025-04-19

## 2025-04-19 RX ORDER — SODIUM CHLORIDE 0.9 % (FLUSH) 0.9 %
10 SYRINGE (ML) INJECTION EVERY 12 HOURS SCHEDULED
Status: DISCONTINUED | OUTPATIENT
Start: 2025-04-19 | End: 2025-04-23 | Stop reason: HOSPADM

## 2025-04-19 RX ORDER — FUROSEMIDE 10 MG/ML
20 INJECTION INTRAMUSCULAR; INTRAVENOUS ONCE
Status: COMPLETED | OUTPATIENT
Start: 2025-04-19 | End: 2025-04-19

## 2025-04-19 RX ORDER — NITROGLYCERIN 0.4 MG/1
0.4 TABLET SUBLINGUAL
Status: DISCONTINUED | OUTPATIENT
Start: 2025-04-19 | End: 2025-04-23 | Stop reason: HOSPADM

## 2025-04-19 RX ORDER — SODIUM CHLORIDE 9 MG/ML
40 INJECTION, SOLUTION INTRAVENOUS AS NEEDED
Status: DISCONTINUED | OUTPATIENT
Start: 2025-04-19 | End: 2025-04-23 | Stop reason: HOSPADM

## 2025-04-19 RX ORDER — ACETAMINOPHEN 160 MG/5ML
650 SOLUTION ORAL EVERY 4 HOURS PRN
Status: DISCONTINUED | OUTPATIENT
Start: 2025-04-19 | End: 2025-04-23 | Stop reason: HOSPADM

## 2025-04-19 RX ADMIN — FUROSEMIDE 20 MG: 10 INJECTION, SOLUTION INTRAMUSCULAR; INTRAVENOUS at 14:16

## 2025-04-19 RX ADMIN — SODIUM CHLORIDE 1000 MG: 900 INJECTION INTRAVENOUS at 23:51

## 2025-04-19 RX ADMIN — PANTOPRAZOLE SODIUM 40 MG: 40 INJECTION, POWDER, FOR SOLUTION INTRAVENOUS at 21:27

## 2025-04-19 RX ADMIN — ACETAMINOPHEN 650 MG: 325 TABLET, FILM COATED ORAL at 08:33

## 2025-04-19 RX ADMIN — SODIUM CHLORIDE 1000 ML: 9 INJECTION, SOLUTION INTRAVENOUS at 00:20

## 2025-04-19 RX ADMIN — AZITHROMYCIN DIHYDRATE 500 MG: 500 INJECTION, POWDER, LYOPHILIZED, FOR SOLUTION INTRAVENOUS at 00:31

## 2025-04-19 RX ADMIN — METHYLPREDNISOLONE SODIUM SUCCINATE 125 MG: 125 INJECTION, POWDER, FOR SOLUTION INTRAMUSCULAR; INTRAVENOUS at 00:32

## 2025-04-19 RX ADMIN — FLUTICASONE PROPIONATE 2 SPRAY: 50 SPRAY, METERED NASAL at 14:16

## 2025-04-19 RX ADMIN — IOPAMIDOL 100 ML: 755 INJECTION, SOLUTION INTRAVENOUS at 02:49

## 2025-04-19 RX ADMIN — Medication 10 ML: at 21:27

## 2025-04-19 RX ADMIN — IPRATROPIUM BROMIDE AND ALBUTEROL SULFATE 3 ML: 2.5; .5 SOLUTION RESPIRATORY (INHALATION) at 19:09

## 2025-04-19 RX ADMIN — IPRATROPIUM BROMIDE AND ALBUTEROL SULFATE 3 ML: 2.5; .5 SOLUTION RESPIRATORY (INHALATION) at 14:45

## 2025-04-19 RX ADMIN — SODIUM CHLORIDE 1000 MG: 900 INJECTION INTRAVENOUS at 00:34

## 2025-04-19 RX ADMIN — DOXYCYCLINE 100 MG: 100 INJECTION, POWDER, LYOPHILIZED, FOR SOLUTION INTRAVENOUS at 09:42

## 2025-04-19 RX ADMIN — ACETAMINOPHEN 650 MG: 325 TABLET, FILM COATED ORAL at 15:00

## 2025-04-19 RX ADMIN — ACETAMINOPHEN 650 MG: 325 TABLET, FILM COATED ORAL at 21:27

## 2025-04-19 RX ADMIN — Medication 10 ML: at 09:13

## 2025-04-19 RX ADMIN — AMIODARONE HYDROCHLORIDE 200 MG: 200 TABLET ORAL at 09:13

## 2025-04-19 RX ADMIN — PANTOPRAZOLE SODIUM 40 MG: 40 INJECTION, POWDER, FOR SOLUTION INTRAVENOUS at 09:42

## 2025-04-19 RX ADMIN — DOXYCYCLINE 100 MG: 100 INJECTION, POWDER, LYOPHILIZED, FOR SOLUTION INTRAVENOUS at 21:32

## 2025-04-19 RX ADMIN — METHYLPREDNISOLONE SODIUM SUCCINATE 40 MG: 40 INJECTION, POWDER, FOR SOLUTION INTRAMUSCULAR; INTRAVENOUS at 21:27

## 2025-04-19 NOTE — ED PROVIDER NOTES
"EMERGENCY DEPARTMENT ATTENDING NOTE    Patient Name: Dayron Lubin    Chief Complaint   Patient presents with    Shortness of Breath     SOB chest pain weak last few days       PATIENT PRESENTATION:  Dayron Lubin is a 69 y.o. male with PMH significant for coronary artery disease, hypertension, hyperlipidemia, prostate cancer, A-fib, on Eliquis who presents to the ED with progressive worsening shortness of breath over the past week.  Patient reports he feels like he is about to die.  Denies any chest pain but says he just cannot catch his breath.  Denies any leg swelling or weight gain.  EMS reports they gave him a DuoNeb and he was doing fine then he became very anxious.  Patient has not had any vomiting, abdominal pain, urinary symptoms.  Prior admission for alcohol withdrawal.  Patient did have runs of ventricular bigeminy, trigeminy and SVT on his Holter in July 2024.  Echo at that time with mild to moderate mitral valve regurgitation and aortic valve stenosis that is moderate.  Patient is not on oxygen at home.      PHYSICAL EXAM:   VS: /72   Pulse 78   Temp 97.6 °F (36.4 °C) (Axillary)   Resp 20   Ht 182.9 cm (72\")   Wt 82.6 kg (182 lb 1.6 oz)   SpO2 100%   BMI 24.70 kg/m²   GENERAL: Elderly, uncomfortable, in distress talking in full sentences with tachypnea  EYES: PERRL, sclerae anicteric, extraocular movements grossly intact, symmetric lids  EARS, NOSE, MOUTH, THROAT: atraumatic external nose and ears, moist mucous membranes  NECK: symmetric, trachea midline  RESPIRATORY: Coarse right-sided lung sounds, tachypneic  CARDIOVASCULAR: no murmurs, peripheral pulses 2+ and equal in all extremities  GI: soft, nontender, nondistended  MUSCULOSKELETAL/EXTREMITIES: extremities without obvious deformity  SKIN: warm and dry with no obvious rashes  NEUROLOGIC: moving all 4 extremities symmetrically, CN II-XII grossly intact  PSYCHIATRIC: alert, pleasant and cooperative. Appropriate mood and " affect.      MEDICAL DECISION MAKING:    Dayron Lubin is a 69 y.o. male who presented to the ED with worsening shortness of breath over the past week and acute worsening tonight.    Procedures    Differential Diagnosis Considered: Heart failure, pneumonia, COPD, atypical ACS, PE, aortic valve stenosis    Labs Ordered:  Labs Reviewed   COMPREHENSIVE METABOLIC PANEL - Abnormal; Notable for the following components:       Result Value    Glucose 220 (*)     CO2 19.0 (*)     All other components within normal limits    Narrative:     GFR Categories in Chronic Kidney Disease (CKD)      GFR Category          GFR (mL/min/1.73)    Interpretation  G1                     90 or greater         Normal or high (1)  G2                      60-89                Mild decrease (1)  G3a                   45-59                Mild to moderate decrease  G3b                   30-44                Moderate to severe decrease  G4                    15-29                Severe decrease  G5                    14 or less           Kidney failure          (1)In the absence of evidence of kidney disease, neither GFR category G1 or G2 fulfill the criteria for CKD.    eGFR calculation 2021 CKD-EPI creatinine equation, which does not include race as a factor   PROTIME-INR - Abnormal; Notable for the following components:    Protime 18.1 (*)     INR 1.42 (*)     All other components within normal limits   APTT - Abnormal; Notable for the following components:    PTT 38.8 (*)     All other components within normal limits    Narrative:     PTT = The equivalent PTT values for the therapeutic range of heparin levels at 0.3 to 0.7 U/ml are 77 - 99 seconds.   LIPASE - Abnormal; Notable for the following components:    Lipase 80 (*)     All other components within normal limits   TROPONIN - Abnormal; Notable for the following components:    HS Troponin T 22 (*)     All other components within normal limits    Narrative:     High Sensitive Troponin T  Reference Range:  <14.0 ng/L- Negative Female for AMI  <22.0 ng/L- Negative Male for AMI  >=14 - Abnormal Female indicating possible myocardial injury.  >=22 - Abnormal Male indicating possible myocardial injury.   Clinicians would have to utilize clinical acumen, EKG, Troponin, and serial changes to determine if it is an Acute Myocardial Infarction or myocardial injury due to an underlying chronic condition.        CBC WITH AUTO DIFFERENTIAL - Abnormal; Notable for the following components:    WBC 24.18 (*)     RBC 2.86 (*)     Hemoglobin 7.6 (*)     Hematocrit 25.7 (*)     MCHC 29.6 (*)     RDW 16.6 (*)     RDW-SD 54.4 (*)     MPV 12.1 (*)     All other components within normal limits    Narrative:     The previously reported component NRBC is no longer being reported. Previous result was 0.2 /100 WBC (Reference Range: 0.0-0.2 /100 WBC) on 4/18/2025 at 2342 CDT.   BLOOD GAS, ARTERIAL W/CO-OXIMETRY - Abnormal; Notable for the following components:    pH, Arterial 7.158 (*)     pCO2, Arterial 57.7 (*)     pO2, Arterial 150.0 (*)     Base Excess, Arterial -7.8 (*)     O2 Saturation, Arterial >100.1 (*)     Hemoglobin, Blood Gas 7.4 (*)     Hematocrit, Blood Gas 22.6 (*)     Oxyhemoglobin 99.1 (*)     pH, Temp Corrected 7.158 (*)     pCO2, Temperature Corrected 57.7 (*)     pO2, Temperature Corrected 150 (*)     All other components within normal limits   LACTIC ACID, PLASMA - Abnormal; Notable for the following components:    Lactate 3.9 (*)     All other components within normal limits   MANUAL DIFFERENTIAL - Abnormal; Notable for the following components:    Neutrophil % 82.8 (*)     Lymphocyte % 12.1 (*)     Monocyte % 2.0 (*)     Neutrophils Absolute 20.52 (*)     All other components within normal limits   HIGH SENSITIVITIY TROPONIN T 1HR - Abnormal; Notable for the following components:    HS Troponin T 23 (*)     All other components within normal limits    Narrative:     High Sensitive Troponin T Reference  Range:  <14.0 ng/L- Negative Female for AMI  <22.0 ng/L- Negative Male for AMI  >=14 - Abnormal Female indicating possible myocardial injury.  >=22 - Abnormal Male indicating possible myocardial injury.   Clinicians would have to utilize clinical acumen, EKG, Troponin, and serial changes to determine if it is an Acute Myocardial Infarction or myocardial injury due to an underlying chronic condition.        BLOOD GAS, ARTERIAL W/CO-OXIMETRY - Abnormal; Notable for the following components:    pO2, Arterial 399.0 (*)     Base Excess, Arterial -2.2 (*)     O2 Saturation, Arterial >100.1 (*)     Hemoglobin, Blood Gas 6.0 (*)     Hematocrit, Blood Gas 18.4 (*)     Oxyhemoglobin 99.1 (*)     pO2, Temperature Corrected 399 (*)     All other components within normal limits   LACTIC ACID, REFLEX - Normal   BLOOD CULTURE   BLOOD CULTURE   BLOOD GAS, ARTERIAL   BLOOD GAS, ARTERIAL   CBC AND DIFFERENTIAL    Narrative:     The following orders were created for panel order CBC & Differential.  Procedure                               Abnormality         Status                     ---------                               -----------         ------                     CBC Auto Differential[[id number]]        Abnormal            Final result                 Please view results for these tests on the individual orders.        Imaging Ordered:   XR Chest 1 View   ED Interpretation   Hazy opacity in the right lower lobe, suspect possible pneumonia.      CT Angiogram Chest    (Results Pending)       Internal chart review:   Past Medical History:   Diagnosis Date    A-fib     Alcoholic hepatitis 07/13/2023    CAD (coronary artery disease)     History of external beam radiation therapy 05/12/2016    6840 cGy to prostate bed    Hyperlipidemia     Hypertension     Insomnia     Prostate cancer        Past Surgical History:   Procedure Laterality Date    CARDIAC CATHETERIZATION      CORONARY ANGIOPLASTY WITH STENT PLACEMENT      FRACTURE SURGERY       PROSTATE SURGERY      TONSILLECTOMY         No Known Allergies      Current Facility-Administered Medications:     [COMPLETED] Insert Peripheral IV, , , Once **AND** sodium chloride 0.9 % flush 10 mL, 10 mL, Intravenous, PRNHamlet Joseph, MD    Current Outpatient Medications:     amiodarone (PACERONE) 200 MG tablet, Take 1 tablet by mouth Daily., Disp: 90 tablet, Rfl: 1    apixaban (ELIQUIS) 5 MG tablet tablet, Take 1 tablet by mouth 2 (Two) Times a Day., Disp: , Rfl:     aspirin 81 MG EC tablet, Take 1 tablet by mouth Daily., Disp: , Rfl:     atorvastatin (LIPITOR) 20 MG tablet, Take 1 tablet by mouth Daily., Disp: , Rfl:     busPIRone (BUSPAR) 5 MG tablet, Take 1.5 tablets by mouth 2 (Two) Times a Day., Disp: 90 tablet, Rfl: 2    folic acid (FOLVITE) 1 MG tablet, Take 1 tablet by mouth Daily., Disp: 30 tablet, Rfl: 2    loperamide (IMODIUM) 2 MG capsule, Take 1 capsule by mouth 4 (Four) Times a Day As Needed for Diarrhea., Disp: 20 capsule, Rfl: 1    metoprolol succinate XL (TOPROL-XL) 25 MG 24 hr tablet, Take 0.5 tablets by mouth Daily for 30 days., Disp: 15 tablet, Rfl: 0    pantoprazole (PROTONIX) 40 MG EC tablet, Take 1 tablet by mouth Daily., Disp: , Rfl:     thiamine (VITAMIN B1) 100 MG tablet, Take 1 tablet by mouth Daily., Disp: 30 tablet, Rfl: 2    External documents reviewed: Admission in January for failure to thrive cellulitis and history of UTIs.  Patient was also admitted in December for an MIKEY.    My EKG interpretation: EKG sinus rhythm first-degree AV block, rate of 85, NM interval of 214 otherwise normal intervals no signs of ischemia or arrhythmia.    My lab interpretation: See below, elevated lactate, white count, consistent with pneumonia.     My imaging interpretation: CT and chest x-ray consistent with pneumonia    Discussed with: Patient and Dr. Mijares    ED Course and Re-evaluation: Patient's respiratory rate increased work of breathing improved after fluids, steroids and antibiotics.   Still required oxygen and admitted for his pneumonia and lactic acidosis.    ED Course as of 04/19/25 0437   Fri Apr 18, 2025   2305 Patient tachypneic on nonrebreather diaphoretic reporting severe shortness of breath and chest pain states he is feeling like he is going to die.  No hypoxia.  Labs and imaging pending. [JJ]   2310 Patient with respiratory acidosis, no hypoxia. [JJ]   2347 Patient possibly septic from his pneumonia.  And white count of 24.  Will start fluids but given history of heart failure, waiting on full 30 cc/kg fluid bolus.  Will start with just 1 L. [JJ]   Sat Apr 19, 2025   0121 Patient was slightly decreased hemoglobin from baseline around 8-9 with his chronic anemia.  Slightly elevated coags but no significant coagulopathy, negative delta troponin.  Actively treating for pneumonia, pending CT PE. [JJ]   0122 Patient does not have any bleeding symptoms do not suspect that hemoglobin is 6.0 and ABG is accurate with his CBC being 7.6. [JJ]   0419 CT consistent with pneumonia. [JJ]   0436 Discussed with Dr. Mijares and patient will be admitted due to his acute hypoxic respiratory failure with pneumonia on antibiotics.  Lactate has cleared. [JJ]      ED Course User Index  [JJ] Miki Mcnulty MD        ED Critical Care time:     ED Diagnosis:  (J18.9) Pneumonia of both lungs due to infectious organism, unspecified part of lung    (J96.01) Acute hypoxic respiratory failure     Disposition: Admitted for hypoxic respiratory failure and pneumonia        Signed:  Miki Mcnulty MD  Emergency Medicine Physician    Please note that portions of this note were completed with a voice recognition program.      Miki Mcnulty MD  04/19/25 0451

## 2025-04-19 NOTE — PROGRESS NOTES
Patient was seen earlier this morning by Dr. Mijares and his history and physical have been reviewed.    When patient initially presented to the emergency department his ABG revealed a pH of 7.16, pCO2 of 58.  Patient was started on BiPAP.  His most recent ABG obtained earlier this morning is markedly improved at 7.45 and a pCO2 of 32.  He is now on supplemental oxygen via nasal cannula at 3 L.    He did test positive for parainfluenza infection.      CT angiogram of the chest revealed findings concerning for pulmonary edema and also an infectious bronchiolitis.  Patient started on a trial of IV steroids in addition to scheduled breathing treatments.  He is also currently on empiric antibiotics with doxycycline and ceftriaxone.    In addition, he was found to have a hemoglobin of 6.1, and fecal occult blood testing was positive.  Patient is on aspirin in addition to Eliquis in the outpatient setting; these medications are currently on hold.  Gastroenterology consultation.  Plan to start clear liquids for now.  Gastroenterology consultation.  He does report having an EGD and colonoscopy in the past but states these occurred years ago.  Patient does have a component of chronic anemia.  Labs dating back to January were reviewed including Hgb in the 8's range.    Patient received 1 unit of packed red blood cells, and the second unit was getting ready to be administered when accident occurred with the IV pump, the unit of blood fell, and spilled onto the floor.    Plan to check H&H now.    Plan to give Lasix 20 mg IV x 1 now.    His most recent Echo dates back to 7/2024:  Results for orders placed during the hospital encounter of 07/29/24    Adult Transthoracic Echo Complete W/ Cont if Necessary Per Protocol    Interpretation Summary    Left ventricular systolic function is low normal. Left ventricular ejection fraction appears to be 51 - 55%.    Left ventricular wall thickness is consistent with moderate to severe  concentric hypertrophy.    Mild to moderate mitral valve regurgitation is present.    The aortic valve is thickened and calcified and there is some degree of aortic valve stenosis, likely moderate, most of the flow acceleration appears to be in the left ventricular outflow tract as a result of hypertrophic obstructive cardiomyopathy.    He denies any chest pain, chest pressure, or orthopnea.    He reports having a surgery scheduled with Dr. De La Rosa of ENT next month for a large nasal polyp.  He states having difficulty breathing through his nose at times.  He does request a nasal spray, and will start a trial of Flonase.    Workup ongoing.    Electronically signed by New Griffiths MD, 04/19/25, 12:50 CDT.

## 2025-04-19 NOTE — CONSULTS
"04/19/25  Dayron Lubin   1955    DATE OF ADMISSION:   LENGTH OF STAY:  4/18/2025     0 days    Consultation Regarding:     Evaluation and/or management for \"hemoglobin 6.1, FOBT positive\"    REQUESTING PHYSICIAN:  New Griffiths MD    HPI:     Dayron Lubin is an 69 y.o. male with past medical history that includes coronary artery disease status post cardiac stent, atrial fibrillation on long-term anticoagulation with Eliquis, hypertension, dyslipidemia, history of prostate cancer treated with radiation therapy who presented with shortness of air and chest pain and feeling weak for the last few days.    Upon presentation hemoglobin 7.6, now down to 6.1.  Initial white count 24,000 now 13,000 with a left shift.  INR 1.42.  CMP with metabolic acidosis with a CO2 of 19, BUN is normal, creatinine normal, LFTs normal but glucose 220.  Repeat LFTs today also normal.     Patient had a CT angiogram of the chest which revealed volume overload with interstitial edema and pleural effusions with tree-in-bud opacities in the left upper lobe and prominent  coronary artery calcification.    Patient has a documented history of alcoholic hepatitis but no overt cirrhosis.  Last imaging January 2025 revealed hepatic steatosis without intra-abdominal collaterals and colonic diverticulosis as well as mildly dilated gallbladder with questionable sludge.    Patient denies overt GI bleeding such as hematemesis, melena, or hematochezia.  Denies all other GI complaints.  Happens to be occult blood positive by guaiac.    Patient states that he was actively drinking but quit about 2 or 3 months ago.  He has had no alcohol.  No history of cirrhosis.    Patient relates that he has had iron deficiency anemia for years.  He has been off and on iron.  He did not have labs 6 weeks ago when he saw his primary care physician and has been off of iron for several months.  He had EGD and colonoscopy about 7 years ago at [facility] for iron " "deficiency.  He states \"they did not find anything\"      Past Medical History:   Diagnosis Date    A-fib     Alcoholic hepatitis 07/13/2023    CAD (coronary artery disease)     History of external beam radiation therapy 05/12/2016    6840 cGy to prostate bed    Hyperlipidemia     Hypertension     Insomnia     Prostate cancer        Past Surgical History:   Procedure Laterality Date    CARDIAC CATHETERIZATION      CORONARY ANGIOPLASTY WITH STENT PLACEMENT      FRACTURE SURGERY      PROSTATE SURGERY      TONSILLECTOMY         Allergies:   Patient has no known allergies.    Prior to Admission medications    Medication Sig Start Date End Date Taking? Authorizing Provider   amiodarone (PACERONE) 200 MG tablet Take 1 tablet by mouth Daily. 9/10/24   Italia Vasquez PA   apixaban (ELIQUIS) 5 MG tablet tablet Take 1 tablet by mouth 2 (Two) Times a Day.    ProviderSteve MD   aspirin 81 MG EC tablet Take 1 tablet by mouth Daily.    ProviderSteve MD   atorvastatin (LIPITOR) 20 MG tablet Take 1 tablet by mouth Daily.    ProviderSteve MD   busPIRone (BUSPAR) 5 MG tablet Take 1.5 tablets by mouth 2 (Two) Times a Day. 12/9/24   New Griffiths MD   folic acid (FOLVITE) 1 MG tablet Take 1 tablet by mouth Daily. 11/22/24   Holger Karimi DO   loperamide (IMODIUM) 2 MG capsule Take 1 capsule by mouth 4 (Four) Times a Day As Needed for Diarrhea. 12/9/24   New Griffiths MD   metoprolol succinate XL (TOPROL-XL) 25 MG 24 hr tablet Take 0.5 tablets by mouth Daily for 30 days. 12/28/24 4/9/25  Raul Gifford MD   pantoprazole (PROTONIX) 40 MG EC tablet Take 1 tablet by mouth Daily.    Provider, MD Steve   thiamine (VITAMIN B1) 100 MG tablet Take 1 tablet by mouth Daily. 11/22/24   Holger Karimi DO      Current Facility-Administered Medications   Medication Dose Route Frequency Provider Last Rate Last Admin    acetaminophen (TYLENOL) tablet 650 mg  650 mg Oral Q4H PRN Maryana, " Trish CARRENO MD   650 mg at 04/19/25 0833    Or    acetaminophen (TYLENOL) 160 MG/5ML oral solution 650 mg  650 mg Oral Q4H PRN Trish Mijares MD        Or    acetaminophen (TYLENOL) suppository 650 mg  650 mg Rectal Q4H PRN Trish Mijares MD        amiodarone (PACERONE) tablet 200 mg  200 mg Oral Daily Trish Mijares MD   200 mg at 04/19/25 0913    [Held by provider] apixaban (ELIQUIS) tablet 5 mg  5 mg Oral BID Trish Mijares MD        [Held by provider] aspirin EC tablet 81 mg  81 mg Oral Daily Trish Mijares MD        [START ON 4/20/2025] cefTRIAXone (ROCEPHIN) 1,000 mg in sodium chloride 0.9 % 100 mL MBP  1,000 mg Intravenous Q24H Trish Mijares MD        doxycycline (VIBRAMYCIN) 100 mg in sodium chloride 0.9 % 100 mL MBP  100 mg Intravenous Q12H Trish Mijares MD   100 mg at 04/19/25 0942    fluticasone (FLONASE) 50 MCG/ACT nasal spray 2 spray  2 spray Each Nare Daily New Griffiths MD        furosemide (LASIX) injection 20 mg  20 mg Intravenous Once New Griffiths MD        ipratropium-albuterol (DUO-NEB) nebulizer solution 3 mL  3 mL Nebulization 4x Daily - RT Trish Mijares MD        ipratropium-albuterol (DUO-NEB) nebulizer solution 3 mL  3 mL Nebulization Q4H PRN Trish Mijares MD        methylPREDNISolone sodium succinate (SOLU-Medrol) injection 40 mg  40 mg Intravenous Q24H Trish Mijares MD        [Held by provider] metoprolol succinate XL (TOPROL-XL) 24 hr tablet 12.5 mg  12.5 mg Oral Daily Trish Mijares MD        nitroglycerin (NITROSTAT) SL tablet 0.4 mg  0.4 mg Sublingual Q5 Min PRN Trish Mijares MD        pantoprazole (PROTONIX) injection 40 mg  40 mg Intravenous Q12H Trish Mijares MD   40 mg at 04/19/25 0942    sodium chloride 0.9 % flush 10 mL  10 mL Intravenous PRN Miki Mcnulty MD        sodium chloride 0.9 % flush 10 mL  10 mL Intravenous Q12H Trish Mijares MD   10 mL at 04/19/25 0913    sodium chloride 0.9 % flush 10 mL  10 mL Intravenous PRN Trish Mijares MD      "   sodium chloride 0.9 % infusion 40 mL  40 mL Intravenous PRN Trish Mijares MD            Social History     Socioeconomic History    Marital status: Single   Tobacco Use    Smoking status: Never    Smokeless tobacco: Never   Vaping Use    Vaping status: Never Used   Substance and Sexual Activity    Alcohol use: Not Currently     Alcohol/week: 50.0 standard drinks of alcohol     Types: 50 Shots of liquor per week    Drug use: Not Currently     Types: Marijuana    Sexual activity: Defer       Family History:  family history includes Coronary artery disease in his mother; Heart disease in his mother; Stroke in his mother.    Review of Systems:   The 12 point review of systems is otherwise unremarkable except for what is stated in the HPI.    Physical Examination:     Vital signs:   /72   Pulse 84   Temp 98.8 °F (37.1 °C) (Oral)   Resp 16   Ht 182.9 cm (72\")   Wt 82.6 kg (182 lb 1.6 oz)   SpO2 97%   BMI 24.70 kg/m²     General Appearance:  This is a 69 y.o. year old male in no acute distress.  HEENT: Normocephalic, atraumatic, pupils equal, round, reactive to light, Oral cavity clear  Neck: No adenopathy, thyromegaly or mass.   Chest: Good expansion, clear to auscultation bilaterally    Heart: Regular rate and rhythm. No murmurs, gallops or rubs  Abdomen: Soft, non-distended. Normal bowel sounds present throughout.  No tenderness, no guarding or rebound. No hepatosplenomegaly. No hernia.    Extremities: No edema.2+pulses peripherally  Skin: No rash or jaundice. No spider angiomata. No palmar erythema.  Neurologic:  Alert. Normal speech. Oriented. Nonfocal.  Psychiatric:  Normal affect.     Diagnostic Studies     Results from last 7 days   Lab Units 04/19/25  0622 04/18/25  2311   WBC 10*3/mm3 13.66* 24.18*   HEMOGLOBIN g/dL 6.1* 7.6*   HEMATOCRIT % 20.9* 25.7*   PLATELETS 10*3/mm3 145 233     Results from last 7 days   Lab Units 04/19/25  0557 04/19/25  0100 04/18/25  2311   SODIUM mmol/L 139  --  139 "   SODIUM, ARTERIAL mmol/L  --  142 143   POTASSIUM mmol/L 4.4  --  4.1   CHLORIDE mmol/L 106  --  105   CO2 mmol/L 21.0*  --  19.0*   BUN mg/dL 21  --  22   CREATININE mg/dL 0.81  --  1.07   CALCIUM mg/dL 8.8  --  9.3   BILIRUBIN mg/dL 0.2  --  0.3   ALK PHOS U/L 50  --  62   ALT (SGPT) U/L 6  --  8   AST (SGOT) U/L 15  --  17   GLUCOSE mg/dL 124*  --  220*     Results from last 7 days   Lab Units 04/18/25  2311   INR  1.42*     Lab Results   Lab Value Date/Time    LIPASE 80 (H) 04/18/2025 2311    LIPASE 47 01/18/2025 0457    LIPASE 88 (H) 01/17/2025 1313    LIPASE 64 (H) 12/21/2024 0154    LIPASE 113 (H) 11/16/2024 1115       STOOL OCCULT BLOOD  Lab Results   Component Value Date    OCCULTBLD Positive (A) 04/19/2025        IMAGING:  CT Angiogram Chest  Result Date: 4/19/2025  Narrative: CT ANGIOGRAM CHEST- 4/19/2025 1:37 AM  HISTORY: Patient with severe chest pain and shortness of breath, concern for PE.  COMPARISON: Chest CT dated 11/16/2024  DOSE LENGTH PRODUCT: 269.84 mGy.cm. Automated exposure control was also utilized to decrease patient radiation dose.  TECHNIQUE: Helical tomographic images of the chest were obtained after the administration of intravenous contrast following angiogram protocol. Additionally, 3D and multiplanar reformatted images were provided.   FINDINGS:  Pulmonary arteries: There is adequate enhancement of the pulmonary arteries to evaluate for central and segmental pulmonary emboli. There are no filling defects within the main, lobar, segmental or visualized subsegmental pulmonary arteries. The pulmonary arteries are within normal limits for size.  AORTA AND GREAT VESSELS: Thoracic aorta is normal in caliber. No dissection identified. No flow-limiting stenosis identified at the great vessel origins.  VISUALIZED NECK BASE: The imaged portion of the base of the neck appears unremarkable.  LUNGS: Bilateral interlobular septal thickening. There are clustered left upper lobe inflammatory  tree-in-bud opacities. Bilateral small layering pleural effusions with lower lobe atelectasis send additional lower lobe groundglass opacities. Airways are clear.  HEART: Enlarged left ventricle with left ventricular apical myocardial thinning. Prominent aortic valve calcification. Prominent coronary atheromatous calcification. No pericardial effusion.  MEDIASTINUM AND LYMPH NODEs: No suspicious hilar or mediastinal adenopathy..  SKELETAL AND SOFT TISSUES: Chest wall soft tissues are unremarkable. No acute bony abnormality.  UPPER ABDOMEN: The imaged portion of the upper abdomen demonstrates no acute process.      Impression: 1.  Volume overload with interstitial edema and mild alveolar edema in the lower lobes. Additional small pleural effusions with bilateral lower lobe dependent atelectasis. 2.  Clustered left upper lobe tree-in-bud opacities which appears to be an infectious bronchiolitis. 3.  Prominent coronary atheromatous calcification, left ventricular apical myocardial thinning and also prominent aortic valvular calcification.    This report was signed and finalized on 4/19/2025 11:15 AM by Dr Gregory Guillermo.      XR Chest 1 View  Result Date: 4/19/2025  Narrative: XR CHEST 1 VW- 4/18/2025 10:06 PM  HISTORY: Acute worsening shortness of breath.   COMPARISON: Chest x-ray dated 12/4/2024  FINDINGS: Upright frontal radiograph of the chest was obtained  Bilateral diffuse interstitial coarsening. Notable subpleural lines in the lung bases. Lungs are well expanded. Bilateral small pleural effusions with blunting of the costophrenic angles. No consolidation. Mild cardiomegaly. No pneumothorax. No acute bony abnormality.      Impression: 1.  Apparent volume overload with interstitial edema and bilateral small pleural effusions.  This report was signed and finalized on 4/19/2025 6:55 AM by Dr Gregory Guillermo.         ASSESSMENT/PLAN:   Acute on chronic symptomatic normocytic likely iron deficientr anemia   Occult blood  positive by guaiac  History of alcohol abuse with withdrawal as well as acute alcoholic hepatitis, no alcohol in 2 to 3 months  Hepatic steatosis without overt cirrhosis on imaging  Long-term use of anticoagulant, Eliquis  Coronary artery disease status post cardiac stents  Paroxysmal atrial fibrillation   HTN, DL  Pneumonia versus CHF-required BiPAP, now on 3 L    Patient is not optimized from a respiratory perspective and given no overt GI bleeding, endoscopic evaluation is not urgent, it is elective.  Okay to continue DOAC and aspirin as long as patient starts IV/p.o. iron replacement.  I will plan outpatient EGD/colonoscopy for him with the GI team here.    RECOMMENDATIONS:   Check iron studies on initial blood work  If iron studies are low, replace with IV iron  Pt to be discharged home on oral iron  Outpt EGD/colonoscopy  Ok to continue DOAC and ASA    Nothing further from the inpatient GI team.  I will sign off.    Thank you for this referral. Please call with any questions.    Paloma Lei MD, [facility]  13:53 CDT    DISCLAIMER:    This physician works through a [employer] as an inpatient consultant gastroenterologist only and has no outpatient clinic for patient follow up.  Any results not available at time of inpatient discharge and/or GI clinic follow up should be managed by the hospitalist team, PCP, or outpatient gastroenterologist.    Note to Patient:   The 21st Century Cures Act makes medical notes like this available to patients in the interest of transparency; however, please be advised this is a medical document.  It is intended as [id number] communication.  It is written in medical language and may contain abbreviations or verbiage that are unfamiliar.  It may appear blunt or direct.  Medical documents are intended to carry relevant information, facts as evident, and the clinical opinion of the practitioner.  This note may have been transcribed using a voice dictation system.   Voice-recognition errors may occur.  This should not be taken to alter the content or meaning of this note.

## 2025-04-19 NOTE — PLAN OF CARE
Goal Outcome Evaluation:  Plan of Care Reviewed With: patient        Progress: no change  Outcome Evaluation: Pt c/o mild headache; 1st unit of PRBC infused, will recheck h&h prior to 2nd unit; voiding per urinal; up with standby; safety maintained.

## 2025-04-19 NOTE — H&P
[facility] Medicine Services  HISTORY AND PHYSICAL    Date of Admission: 4/18/2025  Primary Care Physician: Herberth Nguyen Jr., MD    Subjective   Primary Historian: Patient    Chief Complaint: Shortness of breath    History of Present Illness  This is a 69-year-old male patient with a medical history of A-fib on Eliquis, history of alcohol use/withdrawal, hypertension, dyslipidemia, coronary artery disease, presenting to the ED for the evaluation of shortness of breath.  As reported, patient was having shortness of breath for the last week, that progressed acutely and suddenly he started feeling that he is about to die and not able to catch his breath.  He denies having chest pain, fever, chills, abdominal pain, but he was having some nausea.  EMS arrived and he was given DuoNeb on the way that made him feel a little better.      Review of Systems   Otherwise complete ROS reviewed and negative except as mentioned in the HPI.    Past Medical History:   Past Medical History:   Diagnosis Date    A-fib     Alcoholic hepatitis 07/13/2023    CAD (coronary artery disease)     History of external beam radiation therapy 05/12/2016    6840 cGy to prostate bed    Hyperlipidemia     Hypertension     Insomnia     Prostate cancer      Past Surgical History:  Past Surgical History:   Procedure Laterality Date    CARDIAC CATHETERIZATION      CORONARY ANGIOPLASTY WITH STENT PLACEMENT      FRACTURE SURGERY      PROSTATE SURGERY      TONSILLECTOMY       Social History:  reports that he has never smoked. He has never used smokeless tobacco. He reports that he does not currently use alcohol after a past usage of about 50.0 standard drinks of alcohol per week. He reports that he does not currently use drugs after having used the following drugs: Marijuana.    Family History: family history includes Coronary artery disease in his mother; Heart disease in his mother; Stroke in his mother.    "    Allergies:  No Known Allergies    Medications:  Prior to Admission medications    Medication Sig Start Date End Date Taking? Authorizing Provider   amiodarone (PACERONE) 200 MG tablet Take 1 tablet by mouth Daily. 9/10/24   Italia Vasquez PA   apixaban (ELIQUIS) 5 MG tablet tablet Take 1 tablet by mouth 2 (Two) Times a Day.    ProviderSteve MD   aspirin 81 MG EC tablet Take 1 tablet by mouth Daily.    Steve Devries MD   atorvastatin (LIPITOR) 20 MG tablet Take 1 tablet by mouth Daily.    ProviderSteve MD   busPIRone (BUSPAR) 5 MG tablet Take 1.5 tablets by mouth 2 (Two) Times a Day. 12/9/24   New Griffiths MD   folic acid (FOLVITE) 1 MG tablet Take 1 tablet by mouth Daily. 11/22/24   Holger Karimi DO   loperamide (IMODIUM) 2 MG capsule Take 1 capsule by mouth 4 (Four) Times a Day As Needed for Diarrhea. 12/9/24   New Griffiths MD   metoprolol succinate XL (TOPROL-XL) 25 MG 24 hr tablet Take 0.5 tablets by mouth Daily for 30 days. 12/28/24 4/9/25  Raul Gifford MD   pantoprazole (PROTONIX) 40 MG EC tablet Take 1 tablet by mouth Daily.    ProviderSteve MD   thiamine (VITAMIN B1) 100 MG tablet Take 1 tablet by mouth Daily. 11/22/24   Holger Karimi DO     I have utilized all available immediate resources to obtain, update, or review the patient's current medications (including all prescriptions, over-the-counter products, herbals, cannabis/cannabidiol products, and vitamin/mineral/dietary (nutritional) supplements).    Objective     Vital Signs: /71 (BP Location: Right arm, Patient Position: Lying)   Pulse 82   Temp 97.6 °F (36.4 °C) (Oral)   Resp 20   Ht 182.9 cm (72\")   Wt 82.6 kg (182 lb 1.6 oz)   SpO2 97%   BMI 24.70 kg/m²   Physical Exam  Constitutional:       Appearance: He is ill-appearing.   Cardiovascular:      Rate and Rhythm: Normal rate and regular rhythm.      Pulses: Normal pulses.      Heart sounds: Normal heart sounds. " No murmur heard.  Pulmonary:      Effort: Respiratory distress present.      Breath sounds: Wheezing present. No rales.   Abdominal:      General: Bowel sounds are normal. There is no distension.      Palpations: Abdomen is soft.      Tenderness: There is no abdominal tenderness. There is no guarding.   Musculoskeletal:      Right lower leg: No edema.      Left lower leg: No edema.   Skin:     General: Skin is warm.   Neurological:      Mental Status: He is alert and oriented to person, place, and time. Mental status is at baseline.              Results Reviewed:  Lab Results (last 24 hours)       Procedure Component Value Units Date/Time    Respiratory Panel PCR w/COVID-19(SARS-CoV-2) [facility]A/SAMUEL/PAD/COR/DESHAWN In-House, NP Swab in UTM/VTM, 2 HR TAT - Swab, Nasopharynx [[id number]]  (Abnormal) Collected: 04/19/25 0556    Specimen: Swab from Nasopharynx Updated: 04/19/25 0702     ADENOVIRUS, PCR Not Detected     Coronavirus 229E Not Detected     Coronavirus HKU1 Not Detected     Coronavirus NL63 Not Detected     Coronavirus OC43 Not Detected     COVID19 Not Detected     Human Metapneumovirus Not Detected     Human Rhinovirus/Enterovirus Not Detected     Influenza A PCR Not Detected     Influenza B PCR Not Detected     Parainfluenza Virus 1 Not Detected     Parainfluenza Virus 2 Not Detected     Parainfluenza Virus 3 Detected     Parainfluenza Virus 4 Not Detected     RSV, PCR Not Detected     Bordetella pertussis pcr Not Detected     Bordetella parapertussis PCR Not Detected     Chlamydophila pneumoniae PCR Not Detected     Mycoplasma pneumo by PCR Not Detected    Narrative:      In the setting of a positive respiratory panel with a viral infection PLUS a negative procalcitonin without other underlying concern for bacterial infection, consider observing off antibiotics or discontinuation of antibiotics and continue supportive care. If the respiratory panel is positive for atypical bacterial infection (Bordetella  pertussis, Chlamydophila pneumoniae, or Mycoplasma pneumoniae), consider antibiotic de-escalation to target atypical bacterial infection.    CBC (No Diff) [[id number]]  (Abnormal) Collected: 04/19/25 0622    Specimen: Blood Updated: 04/19/25 0634     WBC 13.66 10*3/mm3      RBC 2.36 10*6/mm3      Hemoglobin 6.1 g/dL      Hematocrit 20.9 %      MCV 88.6 fL      MCH 25.8 pg      MCHC 29.2 g/dL      RDW 16.8 %      RDW-SD 54.7 fl      MPV 11.9 fL      Platelets 145 10*3/mm3     Comprehensive Metabolic Panel [[id number]]  (Abnormal) Collected: 04/19/25 0557    Specimen: Blood Updated: 04/19/25 0629     Glucose 124 mg/dL      BUN 21 mg/dL      Creatinine 0.81 mg/dL      Sodium 139 mmol/L      Potassium 4.4 mmol/L      Chloride 106 mmol/L      CO2 21.0 mmol/L      Calcium 8.8 mg/dL      Total Protein 6.8 g/dL      Albumin 4.1 g/dL      ALT (SGPT) 6 U/L      AST (SGOT) 15 U/L      Alkaline Phosphatase 50 U/L      Total Bilirubin 0.2 mg/dL      Globulin 2.7 gm/dL      A/G Ratio 1.5 g/dL      BUN/Creatinine Ratio 25.9     Anion Gap 12.0 mmol/L      eGFR 94.8 mL/min/1.73     Narrative:      GFR Categories in Chronic Kidney Disease (CKD)      GFR Category          GFR (mL/min/1.73)    Interpretation  G1                     90 or greater         Normal or high (1)  G2                      60-89                Mild decrease (1)  G3a                   45-59                Mild to moderate decrease  G3b                   30-44                Moderate to severe decrease  G4                    15-29                Severe decrease  G5                    14 or less           Kidney failure          (1)In the absence of evidence of kidney disease, neither GFR category G1 or G2 fulfill the criteria for CKD.    eGFR calculation 2021 CKD-EPI creatinine equation, which does not include race as a factor    Blood Gas, Arterial - [[id number]]  (Abnormal) Collected: 04/19/25 0615    Specimen: Arterial Blood Updated: 04/19/25 0613     Site Right  Brachial     Garett's Test N/A     pH, Arterial 7.447 pH units      pCO2, Arterial 31.6 mm Hg      Comment: 84 Value below reference range        pO2, Arterial 79.9 mm Hg      Comment: 84 Value below reference range        HCO3, Arterial 21.8 mmol/L      Base Excess, Arterial -2.0 mmol/L      Comment: 84 Value below reference range        O2 Saturation, Arterial 97.9 %      Temperature 37.0     Barometric Pressure for Blood Gas 753 mmHg      Modality Nasal Cannula     Flow Rate 3.0 lpm      Ventilator Mode NA     Collected by [id number]     Comment: Meter: [id number]     :  Carolann Cosme, CRT        pCO2, Temperature Corrected 31.6 mm Hg      pH, Temp Corrected 7.447 pH Units      pO2, Temperature Corrected 79.9 mm Hg     STAT Lactic Acid, Reflex [[id number]]  (Normal) Collected: 04/19/25 0210    Specimen: Blood Updated: 04/19/25 0233     Lactate 1.5 mmol/L     Blood Gas, Arterial With Co-Ox [[id number]]  (Abnormal) Collected: 04/19/25 0100    Specimen: Arterial Blood Updated: 04/19/25 0100     Site Right Radial     Garett's Test Positive     pH, Arterial 7.391 pH units      pCO2, Arterial 37.2 mm Hg      pO2, Arterial 399.0 mm Hg      Comment: 83 Value above reference range        HCO3, Arterial 22.5 mmol/L      Base Excess, Arterial -2.2 mmol/L      Comment: 84 Value below reference range        O2 Saturation, Arterial >100.1 %      Comment: 93 Value above reportable range > 100.1        Hemoglobin, Blood Gas 6.0 g/dL      Comment: 85 Value below critical limit        Hematocrit, Blood Gas 18.4 %      Comment: 84 Value below reference range        Oxyhemoglobin 99.1 %      Comment: 83 Value above reference range        Methemoglobin 0.70 %      Carboxyhemoglobin 1.8 %      Temperature 37.0     Sodium, Arterial 142 mmol/L      Potassium, Arterial 3.9 mmol/L      Barometric Pressure for Blood Gas 752 mmHg      Modality BiPap     FIO2 100 %      Ventilator Mode NA     Set Mech Resp Rate 18.0     IPAP 20      Comment: Meter: [id number]     :  Zeny Andrews, RRT        EPAP 10     Notified Who JAY JAY CARLTON [facility]     Notified By Zeny Andrews RRT     Notified Time 04/19/2025 01:01     Collected by [id number]     pH, Temp Corrected 7.391 pH Units      pCO2, Temperature Corrected 37.2 mm Hg      pO2, Temperature Corrected 399 mm Hg     High Sensitivity Troponin T 1Hr [[id number]]  (Abnormal) Collected: 04/19/25 0026    Specimen: Blood Updated: 04/19/25 0049     HS Troponin T 23 ng/L      Troponin T Numeric Delta 1 ng/L      Troponin T % Delta 5    Narrative:      High Sensitive Troponin T Reference Range:  <14.0 ng/L- Negative Female for AMI  <22.0 ng/L- Negative Male for AMI  >=14 - Abnormal Female indicating possible myocardial injury.  >=22 - Abnormal Male indicating possible myocardial injury.   Clinicians would have to utilize clinical acumen, EKG, Troponin, and serial changes to determine if it is an Acute Myocardial Infarction or myocardial injury due to an underlying chronic condition.         Blood Culture - Blood, Arm, Right [[id number]] Collected: 04/18/25 2344    Specimen: Blood from Arm, Right Updated: 04/19/25 0009    Blood Culture - Blood, Hand, Left [[id number]] Collected: 04/18/25 2351    Specimen: Blood from Hand, Left Updated: 04/19/25 0008    CBC & Differential [[id number]]  (Abnormal) Collected: 04/18/25 2311    Specimen: Blood Updated: 04/18/25 2351    Narrative:      The following orders were created for panel order CBC & Differential.  Procedure                               Abnormality         Status                     ---------                               -----------         ------                     CBC Auto Differential[[id number]]        Abnormal            Final result                 Please view results for these tests on the individual orders.    CBC Auto Differential [[id number]]  (Abnormal) Collected: 04/18/25 2311    Specimen: Blood Updated: 04/18/25 2351     WBC 24.18 10*3/mm3       RBC 2.86 10*6/mm3      Hemoglobin 7.6 g/dL      Hematocrit 25.7 %      MCV 89.9 fL      MCH 26.6 pg      MCHC 29.6 g/dL      RDW 16.6 %      RDW-SD 54.4 fl      MPV 12.1 fL      Platelets 233 10*3/mm3     Narrative:      The previously reported component NRBC is no longer being reported. Previous result was 0.2 /100 WBC (Reference Range: 0.0-0.2 /100 WBC) on 4/18/2025 at 2342 CDT.    Manual Differential [[id number]]  (Abnormal) Collected: 04/18/25 2311    Specimen: Blood Updated: 04/18/25 2351     Neutrophil % 82.8 %      Lymphocyte % 12.1 %      Monocyte % 2.0 %      Eosinophil % 1.0 %      Basophil % 0.0 %      Bands %  2.0 %      Neutrophils Absolute 20.52 10*3/mm3      Lymphocytes Absolute 2.93 10*3/mm3      Monocytes Absolute 0.48 10*3/mm3      Eosinophils Absolute 0.24 10*3/mm3      Basophils Absolute 0.00 10*3/mm3      Anisocytosis Slight/1+     Hypochromia Slight/1+     Poikilocytes Slight/1+     Polychromasia Slight/1+     WBC Morphology Normal     Platelet Morphology Normal    Protime-INR [[id number]]  (Abnormal) Collected: 04/18/25 2311    Specimen: Blood Updated: 04/18/25 2348     Protime 18.1 Seconds      INR 1.42    aPTT [[id number]]  (Abnormal) Collected: 04/18/25 2311    Specimen: Blood Updated: 04/18/25 2348     PTT 38.8 seconds     Narrative:      PTT = The equivalent PTT values for the therapeutic range of heparin levels at 0.3 to 0.7 U/ml are 77 - 99 seconds.    Lactic Acid, Plasma [[id number]]  (Abnormal) Collected: 04/18/25 2311    Specimen: Blood Updated: 04/18/25 2344     Lactate 3.9 mmol/L     Comprehensive Metabolic Panel [[id number]]  (Abnormal) Collected: 04/18/25 2311    Specimen: Blood Updated: 04/18/25 2343     Glucose 220 mg/dL      BUN 22 mg/dL      Creatinine 1.07 mg/dL      Sodium 139 mmol/L      Potassium 4.1 mmol/L      Chloride 105 mmol/L      CO2 19.0 mmol/L      Calcium 9.3 mg/dL      Total Protein 7.8 g/dL      Albumin 4.5 g/dL      ALT (SGPT) 8 U/L      AST (SGOT) 17 U/L       Alkaline Phosphatase 62 U/L      Total Bilirubin 0.3 mg/dL      Globulin 3.3 gm/dL      A/G Ratio 1.4 g/dL      BUN/Creatinine Ratio 20.6     Anion Gap 15.0 mmol/L      eGFR 74.7 mL/min/1.73     Narrative:      GFR Categories in Chronic Kidney Disease (CKD)      GFR Category          GFR (mL/min/1.73)    Interpretation  G1                     90 or greater         Normal or high (1)  G2                      60-89                Mild decrease (1)  G3a                   45-59                Mild to moderate decrease  G3b                   30-44                Moderate to severe decrease  G4                    15-29                Severe decrease  G5                    14 or less           Kidney failure          (1)In the absence of evidence of kidney disease, neither GFR category G1 or G2 fulfill the criteria for CKD.    eGFR calculation 2021 CKD-EPI creatinine equation, which does not include race as a factor    High Sensitivity Troponin T [[id number]]  (Abnormal) Collected: 04/18/25 2311    Specimen: Blood Updated: 04/18/25 2341     HS Troponin T 22 ng/L     Narrative:      High Sensitive Troponin T Reference Range:  <14.0 ng/L- Negative Female for AMI  <22.0 ng/L- Negative Male for AMI  >=14 - Abnormal Female indicating possible myocardial injury.  >=22 - Abnormal Male indicating possible myocardial injury.   Clinicians would have to utilize clinical acumen, EKG, Troponin, and serial changes to determine if it is an Acute Myocardial Infarction or myocardial injury due to an underlying chronic condition.         Lipase [[id number]]  (Abnormal) Collected: 04/18/25 2311    Specimen: Blood Updated: 04/18/25 2338     Lipase 80 U/L     Blood Gas, Arterial With Co-Ox [[id number]]  (Abnormal) Collected: 04/18/25 2311    Specimen: Arterial Blood Updated: 04/18/25 2309     Site Right Brachial     Garett's Test N/A     pH, Arterial 7.158 pH units      Comment: 85 Value below critical limit        pCO2, Arterial 57.7 mm Hg       Comment: 83 Value above reference range        pO2, Arterial 150.0 mm Hg      Comment: 83 Value above reference range        HCO3, Arterial 20.5 mmol/L      Base Excess, Arterial -7.8 mmol/L      Comment: 84 Value below reference range        O2 Saturation, Arterial >100.1 %      Comment: 93 Value above reportable range > 100.1        Hemoglobin, Blood Gas 7.4 g/dL      Comment: 84 Value below reference range        Hematocrit, Blood Gas 22.6 %      Comment: 84 Value below reference range        Oxyhemoglobin 99.1 %      Comment: 83 Value above reference range        Methemoglobin 0.00 %      Comment: 84 Value below reference range        Carboxyhemoglobin 1.7 %      Temperature 37.0     Sodium, Arterial 143 mmol/L      Potassium, Arterial 3.9 mmol/L      Barometric Pressure for Blood Gas 752 mmHg      Modality NRB     Flow Rate 15.0 lpm      Ventilator Mode NA     Notified Who JAY JAY CARLTON MD     Notified By Zeny Andrews RRT     Notified Time 04/18/2025 23:12     Collected by [id number]     Comment: Meter: [id number]     :  Zeny Andrews RRT        pH, Temp Corrected 7.158 pH Units      pCO2, Temperature Corrected 57.7 mm Hg      pO2, Temperature Corrected 150 mm Hg           Imaging Results (Last 24 Hours)       Procedure Component Value Units Date/Time    XR Chest 1 View [[id number]] Collected: 04/19/25 0654     Updated: 04/19/25 0658    Narrative:      XR CHEST 1 VW- 4/18/2025 10:06 PM     HISTORY: Acute worsening shortness of breath.       COMPARISON: Chest x-ray dated 12/4/2024     FINDINGS:  Upright frontal radiograph of the chest was obtained     Bilateral diffuse interstitial coarsening. Notable subpleural lines in  the lung bases. Lungs are well expanded. Bilateral small pleural  effusions with blunting of the costophrenic angles. No consolidation.  Mild cardiomegaly. No pneumothorax. No acute bony abnormality.       Impression:      1.  Apparent volume overload with interstitial edema and  bilateral small  pleural effusions.     This report was signed and finalized on 4/19/2025 6:55 AM by Dr Gregory Guillermo.       CT Angiogram Chest [[id number]] Resulted: 04/18/25 2343     Updated: 04/19/25 0243          I have personally reviewed and interpreted the radiology studies and ECG obtained at time of admission.     Assessment / Plan   Assessment:   Active Hospital Problems    Diagnosis     **Bacterial pneumonia        Treatment Plan  The patient will be admitted to my service here at [facility].     Assessment and plan:  #Acute Hypercapnic respiratory failure.  #Pneumonia.  #Acute anemia.    -Admitting to floor.  - Patient received BiPAP in the ED, and his ABG improved significantly.  Will continue as needed.  - Continue empiric antibiotics for now.  - Continue with systemic steroids and nebulizers standing and as needed.  - With this acute drop in hemoglobin, starting him on PPI IV in case of a GI bleed.  Also keeping him n.p.o.  Patient did not report any melena or blood loss.  I also added FOBT.  - I ordered 2 units of packed RBCs.  Day team to follow on repeating hemoglobin during the day.  - I am holding Eliquis and aspirin for now.  - DVT prophylaxis with SCDs for now.    Medical Decision Making  Number and Complexity of problems: 3 acute and moderate  Differential Diagnosis: As above    Conditions and Status        Condition is worsening.     MDM Data  External documents reviewed: Yes  Cardiac tracing (EKG, telemetry) interpretation: Yes, 1st degree av block, holding metoprolol.  Radiology interpretation: Yes  Labs reviewed: Yes  Any tests that were considered but not ordered: No     Decision rules/scores evaluated (example [id number], Wells, etc): Already on Eliquis, held in the setting of acute anemia     Discussed with: Patient and ED provider     Care Planning  Shared decision making: Discussed the plan with the patient and he was agreeable  Code status and discussions: Full  code    Disposition  Social Determinants of Health that impact treatment or disposition: Not at the moment  Estimated length of stay is 3 days.     I confirmed that the patient's advanced care plan is present, code status is documented, and a surrogate decision maker is listed in the patient's medical record.       The patient was seen and examined by me on 4/19 at 5 AM.    Electronically signed by Trish Mijares MD, 04/19/25, 07:07 CDT.

## 2025-04-20 ENCOUNTER — APPOINTMENT (OUTPATIENT)
Dept: GENERAL RADIOLOGY | Facility: HOSPITAL | Age: 70
End: 2025-04-20
Payer: MEDICARE

## 2025-04-20 ENCOUNTER — APPOINTMENT (OUTPATIENT)
Dept: CARDIOLOGY | Facility: HOSPITAL | Age: 70
End: 2025-04-20
Payer: MEDICARE

## 2025-04-20 PROBLEM — B34.8 PARAINFLUENZA INFECTION: Status: ACTIVE | Noted: 2025-04-20

## 2025-04-20 PROBLEM — D64.9 SYMPTOMATIC ANEMIA: Status: ACTIVE | Noted: 2024-11-17

## 2025-04-20 PROBLEM — J96.02 ACUTE RESPIRATORY FAILURE WITH HYPERCAPNIA: Status: ACTIVE | Noted: 2025-04-20

## 2025-04-20 PROBLEM — R19.5 FECAL OCCULT BLOOD TEST POSITIVE: Status: ACTIVE | Noted: 2025-04-20

## 2025-04-20 PROBLEM — F10.11 HISTORY OF ALCOHOL ABUSE: Status: ACTIVE | Noted: 2025-04-20

## 2025-04-20 PROBLEM — Z79.01 CHRONIC ANTICOAGULATION: Status: ACTIVE | Noted: 2025-04-20

## 2025-04-20 LAB
ALBUMIN SERPL-MCNC: 4.3 G/DL (ref 3.5–5.2)
ALBUMIN/GLOB SERPL: 1.4 G/DL
ALP SERPL-CCNC: 51 U/L (ref 39–117)
ALT SERPL W P-5'-P-CCNC: 7 U/L (ref 1–41)
ANION GAP SERPL CALCULATED.3IONS-SCNC: 15 MMOL/L (ref 5–15)
AORTIC DIMENSIONLESS INDEX: 0.22 (DI)
AST SERPL-CCNC: 18 U/L (ref 1–40)
ATMOSPHERIC PRESS: 756 MMHG
AV MEAN PRESS GRAD SYS DOP V1V2: 87.4 MMHG
AV VMAX SYS DOP: 557.3 CM/SEC
BASE EXCESS BLDV CALC-SCNC: -2.6 MMOL/L (ref 0–2)
BDY SITE: ABNORMAL
BH BB BLOOD EXPIRATION DATE: NORMAL
BH BB BLOOD EXPIRATION DATE: NORMAL
BH BB BLOOD TYPE BARCODE: 6200
BH BB BLOOD TYPE BARCODE: 6200
BH BB DISPENSE STATUS: NORMAL
BH BB DISPENSE STATUS: NORMAL
BH BB PRODUCT CODE: NORMAL
BH BB PRODUCT CODE: NORMAL
BH BB UNIT NUMBER: NORMAL
BH BB UNIT NUMBER: NORMAL
BH CV ECHO MEAS - AO MAX PG: 124.3 MMHG
BH CV ECHO MEAS - AO ROOT DIAM: 3.3 CM
BH CV ECHO MEAS - AO V2 VTI: 124.3 CM
BH CV ECHO MEAS - AVA(I,D): 1.31 CM2
BH CV ECHO MEAS - EDV(CUBED): 98.1 ML
BH CV ECHO MEAS - EDV(MOD-SP2): 159.6 ML
BH CV ECHO MEAS - EDV(MOD-SP4): 198.1 ML
BH CV ECHO MEAS - EF(MOD-SP2): 54.6 %
BH CV ECHO MEAS - EF(MOD-SP4): 51.5 %
BH CV ECHO MEAS - ESV(CUBED): 17.5 ML
BH CV ECHO MEAS - ESV(MOD-SP2): 72.5 ML
BH CV ECHO MEAS - ESV(MOD-SP4): 96.1 ML
BH CV ECHO MEAS - FS: 43.8 %
BH CV ECHO MEAS - IVS/LVPW: 1.1 CM
BH CV ECHO MEAS - IVSD: 1.76 CM
BH CV ECHO MEAS - LA DIMENSION: 6 CM
BH CV ECHO MEAS - LAT PEAK E' VEL: 11.1 CM/SEC
BH CV ECHO MEAS - LV DIASTOLIC VOL/BSA (35-75): 96.5 CM2
BH CV ECHO MEAS - LV MASS(C)D: 341.2 GRAMS
BH CV ECHO MEAS - LV MAX PG: 8 MMHG
BH CV ECHO MEAS - LV MEAN PG: 3 MMHG
BH CV ECHO MEAS - LV SYSTOLIC VOL/BSA (12-30): 46.8 CM2
BH CV ECHO MEAS - LV V1 MAX: 141 CM/SEC
BH CV ECHO MEAS - LV V1 VTI: 27.7 CM
BH CV ECHO MEAS - LVIDD: 4.6 CM
BH CV ECHO MEAS - LVIDS: 2.6 CM
BH CV ECHO MEAS - LVOT AREA: 5.9 CM2
BH CV ECHO MEAS - LVOT DIAM: 2.7 CM
BH CV ECHO MEAS - LVPWD: 1.6 CM
BH CV ECHO MEAS - MED PEAK E' VEL: 7.5 CM/SEC
BH CV ECHO MEAS - MV A MAX VEL: 130.8 CM/SEC
BH CV ECHO MEAS - MV DEC SLOPE: 570.9 CM/SEC2
BH CV ECHO MEAS - MV DEC TIME: 0.17 SEC
BH CV ECHO MEAS - MV E MAX VEL: 97.1 CM/SEC
BH CV ECHO MEAS - MV E/A: 0.74
BH CV ECHO MEAS - MV MAX PG: 6.6 MMHG
BH CV ECHO MEAS - MV MEAN PG: 3.7 MMHG
BH CV ECHO MEAS - MV V2 VTI: 39.2 CM
BH CV ECHO MEAS - MVA(VTI): 4.1 CM2
BH CV ECHO MEAS - PA V2 MAX: 162.3 CM/SEC
BH CV ECHO MEAS - RAP SYSTOLE: 3 MMHG
BH CV ECHO MEAS - RVDD: 3.1 CM
BH CV ECHO MEAS - RVSP: 42.9 MMHG
BH CV ECHO MEAS - SV(LVOT): 162.6 ML
BH CV ECHO MEAS - SV(MOD-SP2): 87.1 ML
BH CV ECHO MEAS - SV(MOD-SP4): 101.9 ML
BH CV ECHO MEAS - SVI(LVOT): 79.2 ML/M2
BH CV ECHO MEAS - SVI(MOD-SP2): 42.5 ML/M2
BH CV ECHO MEAS - SVI(MOD-SP4): 49.7 ML/M2
BH CV ECHO MEAS - TAPSE (>1.6): 3.1 CM
BH CV ECHO MEAS - TR MAX PG: 39.9 MMHG
BH CV ECHO MEAS - TR MAX VEL: 315.7 CM/SEC
BH CV ECHO MEASUREMENTS AVERAGE E/E' RATIO: 10.44
BH CV XLRA - RV BASE: 4.2 CM
BH CV XLRA - RV LENGTH: 10.9 CM
BH CV XLRA - RV MID: 2.7 CM
BILIRUB SERPL-MCNC: 0.5 MG/DL (ref 0–1.2)
BODY TEMPERATURE: 37
BUN SERPL-MCNC: 24 MG/DL (ref 8–23)
BUN/CREAT SERPL: 28.2 (ref 7–25)
CALCIUM SPEC-SCNC: 9.4 MG/DL (ref 8.6–10.5)
CHLORIDE SERPL-SCNC: 100 MMOL/L (ref 98–107)
CO2 SERPL-SCNC: 21 MMOL/L (ref 22–29)
CREAT SERPL-MCNC: 0.85 MG/DL (ref 0.76–1.27)
CROSSMATCH INTERPRETATION: NORMAL
CROSSMATCH INTERPRETATION: NORMAL
DEPRECATED RDW RBC AUTO: 51 FL (ref 37–54)
EGFRCR SERPLBLD CKD-EPI 2021: 93.5 ML/MIN/1.73
EPAP: 6
ERYTHROCYTE [DISTWIDTH] IN BLOOD BY AUTOMATED COUNT: 16.4 % (ref 12.3–15.4)
GLOBULIN UR ELPH-MCNC: 3 GM/DL
GLUCOSE BLDC GLUCOMTR-MCNC: 138 MG/DL (ref 70–130)
GLUCOSE SERPL-MCNC: 169 MG/DL (ref 65–99)
HCO3 BLDV-SCNC: 23.1 MMOL/L (ref 22–28)
HCT VFR BLD AUTO: 21.5 % (ref 37.5–51)
HCT VFR BLD AUTO: 26.6 % (ref 37.5–51)
HCT VFR BLD AUTO: 27.2 % (ref 37.5–51)
HGB BLD-MCNC: 6.7 G/DL (ref 13–17.7)
HGB BLD-MCNC: 8.3 G/DL (ref 13–17.7)
HGB BLD-MCNC: 8.7 G/DL (ref 13–17.7)
INHALED O2 CONCENTRATION: 60 %
IPAP: 24
IRON 24H UR-MRATE: 30 MCG/DL (ref 59–158)
IRON SATN MFR SERPL: 6 % (ref 20–50)
LEFT ATRIUM VOLUME INDEX: 64.7 ML/M2
LEFT ATRIUM VOLUME: 133 ML
LV EF 2D ECHO EST: 50 %
Lab: ABNORMAL
MCH RBC QN AUTO: 27.2 PG (ref 26.6–33)
MCHC RBC AUTO-ENTMCNC: 32 G/DL (ref 31.5–35.7)
MCV RBC AUTO: 85 FL (ref 79–97)
MODALITY: ABNORMAL
PCO2 BLDV: 43.2 MM HG (ref 41–51)
PH BLDV: 7.34 PH UNITS (ref 7.32–7.42)
PLATELET # BLD AUTO: 169 10*3/MM3 (ref 140–450)
PMV BLD AUTO: 12.1 FL (ref 6–12)
PO2 BLDV: 32.8 MM HG (ref 27–53)
POTASSIUM SERPL-SCNC: 3.9 MMOL/L (ref 3.5–5.2)
PROT SERPL-MCNC: 7.3 G/DL (ref 6–8.5)
RBC # BLD AUTO: 3.2 10*6/MM3 (ref 4.14–5.8)
SAO2 % BLDCOV: 55.3 % (ref 45–75)
SET MECH RESP RATE: 18
SODIUM SERPL-SCNC: 136 MMOL/L (ref 136–145)
TIBC SERPL-MCNC: 478 MCG/DL (ref 298–536)
TRANSFERRIN SERPL-MCNC: 321 MG/DL (ref 200–360)
TRANSFUSION REACTION INTERPRETATION 1: NORMAL
UNIT  ABO: NORMAL
UNIT  ABO: NORMAL
UNIT  RH: NORMAL
UNIT  RH: NORMAL
VENTILATOR MODE: ABNORMAL
WBC NRBC COR # BLD AUTO: 15.59 10*3/MM3 (ref 3.4–10.8)

## 2025-04-20 PROCEDURE — 36430 TRANSFUSION BLD/BLD COMPNT: CPT

## 2025-04-20 PROCEDURE — 82948 REAGENT STRIP/BLOOD GLUCOSE: CPT

## 2025-04-20 PROCEDURE — 85014 HEMATOCRIT: CPT | Performed by: INTERNAL MEDICINE

## 2025-04-20 PROCEDURE — 86900 BLOOD TYPING SEROLOGIC ABO: CPT

## 2025-04-20 PROCEDURE — 86901 BLOOD TYPING SEROLOGIC RH(D): CPT

## 2025-04-20 PROCEDURE — 94799 UNLISTED PULMONARY SVC/PX: CPT

## 2025-04-20 PROCEDURE — 25010000002 METHYLPREDNISOLONE PER 125 MG

## 2025-04-20 PROCEDURE — 93306 TTE W/DOPPLER COMPLETE: CPT

## 2025-04-20 PROCEDURE — 94660 CPAP INITIATION&MGMT: CPT

## 2025-04-20 PROCEDURE — 85027 COMPLETE CBC AUTOMATED: CPT | Performed by: INTERNAL MEDICINE

## 2025-04-20 PROCEDURE — 25010000002 CEFTRIAXONE PER 250 MG

## 2025-04-20 PROCEDURE — 82805 BLOOD GASES W/O2 SATURATION: CPT

## 2025-04-20 PROCEDURE — 25010000002 DOXYCYCLINE 100 MG RECONSTITUTED SOLUTION 1 EACH VIAL

## 2025-04-20 PROCEDURE — 93306 TTE W/DOPPLER COMPLETE: CPT | Performed by: INTERNAL MEDICINE

## 2025-04-20 PROCEDURE — 25010000002 METHYLPREDNISOLONE PER 40 MG

## 2025-04-20 PROCEDURE — 86880 COOMBS TEST DIRECT: CPT

## 2025-04-20 PROCEDURE — 25510000001 PERFLUTREN 6.52 MG/ML SUSPENSION: Performed by: INTERNAL MEDICINE

## 2025-04-20 PROCEDURE — 94761 N-INVAS EAR/PLS OXIMETRY MLT: CPT

## 2025-04-20 PROCEDURE — 84466 ASSAY OF TRANSFERRIN: CPT | Performed by: INTERNAL MEDICINE

## 2025-04-20 PROCEDURE — 80053 COMPREHEN METABOLIC PANEL: CPT | Performed by: INTERNAL MEDICINE

## 2025-04-20 PROCEDURE — 85018 HEMOGLOBIN: CPT | Performed by: INTERNAL MEDICINE

## 2025-04-20 PROCEDURE — 25010000002 FUROSEMIDE PER 20 MG

## 2025-04-20 PROCEDURE — 71045 X-RAY EXAM CHEST 1 VIEW: CPT

## 2025-04-20 PROCEDURE — P9016 RBC LEUKOCYTES REDUCED: HCPCS

## 2025-04-20 PROCEDURE — 83540 ASSAY OF IRON: CPT | Performed by: INTERNAL MEDICINE

## 2025-04-20 RX ORDER — FUROSEMIDE 10 MG/ML
40 INJECTION INTRAMUSCULAR; INTRAVENOUS ONCE
Status: DISCONTINUED | OUTPATIENT
Start: 2025-04-20 | End: 2025-04-23 | Stop reason: HOSPADM

## 2025-04-20 RX ORDER — METHYLPREDNISOLONE SODIUM SUCCINATE 125 MG/2ML
INJECTION, POWDER, LYOPHILIZED, FOR SOLUTION INTRAMUSCULAR; INTRAVENOUS
Status: COMPLETED
Start: 2025-04-20 | End: 2025-04-20

## 2025-04-20 RX ORDER — FUROSEMIDE 10 MG/ML
INJECTION INTRAMUSCULAR; INTRAVENOUS
Status: COMPLETED
Start: 2025-04-20 | End: 2025-04-20

## 2025-04-20 RX ORDER — IPRATROPIUM BROMIDE AND ALBUTEROL SULFATE 2.5; .5 MG/3ML; MG/3ML
3 SOLUTION RESPIRATORY (INHALATION) ONCE AS NEEDED
Status: COMPLETED | OUTPATIENT
Start: 2025-04-20 | End: 2025-04-20

## 2025-04-20 RX ORDER — IPRATROPIUM BROMIDE AND ALBUTEROL SULFATE 2.5; .5 MG/3ML; MG/3ML
3 SOLUTION RESPIRATORY (INHALATION) ONCE
Status: DISCONTINUED | OUTPATIENT
Start: 2025-04-20 | End: 2025-04-23 | Stop reason: HOSPADM

## 2025-04-20 RX ORDER — METOPROLOL SUCCINATE 25 MG/1
25 TABLET, EXTENDED RELEASE ORAL DAILY
COMMUNITY

## 2025-04-20 RX ORDER — METHYLPREDNISOLONE SODIUM SUCCINATE 125 MG/2ML
125 INJECTION, POWDER, LYOPHILIZED, FOR SOLUTION INTRAMUSCULAR; INTRAVENOUS ONCE
Status: COMPLETED | OUTPATIENT
Start: 2025-04-20 | End: 2025-04-20

## 2025-04-20 RX ADMIN — ACETAMINOPHEN 650 MG: 325 TABLET, FILM COATED ORAL at 21:33

## 2025-04-20 RX ADMIN — Medication 10 ML: at 10:12

## 2025-04-20 RX ADMIN — FLUTICASONE PROPIONATE 2 SPRAY: 50 SPRAY, METERED NASAL at 10:16

## 2025-04-20 RX ADMIN — IPRATROPIUM BROMIDE AND ALBUTEROL SULFATE 3 ML: 2.5; .5 SOLUTION RESPIRATORY (INHALATION) at 10:12

## 2025-04-20 RX ADMIN — IPRATROPIUM BROMIDE AND ALBUTEROL SULFATE 3 ML: 2.5; .5 SOLUTION RESPIRATORY (INHALATION) at 19:15

## 2025-04-20 RX ADMIN — AMIODARONE HYDROCHLORIDE 200 MG: 200 TABLET ORAL at 09:40

## 2025-04-20 RX ADMIN — Medication 10 ML: at 21:39

## 2025-04-20 RX ADMIN — ACETAMINOPHEN 650 MG: 325 TABLET, FILM COATED ORAL at 15:15

## 2025-04-20 RX ADMIN — METHYLPREDNISOLONE SODIUM SUCCINATE 125 MG: 125 INJECTION, POWDER, FOR SOLUTION INTRAMUSCULAR; INTRAVENOUS at 05:42

## 2025-04-20 RX ADMIN — IPRATROPIUM BROMIDE AND ALBUTEROL SULFATE 3 ML: 2.5; .5 SOLUTION RESPIRATORY (INHALATION) at 05:56

## 2025-04-20 RX ADMIN — ACETAMINOPHEN 650 MG: 325 TABLET, FILM COATED ORAL at 09:40

## 2025-04-20 RX ADMIN — PERFLUTREN 65.2 MG: 6.52 INJECTION, SUSPENSION INTRAVENOUS at 12:04

## 2025-04-20 RX ADMIN — METHYLPREDNISOLONE SODIUM SUCCINATE 125 MG: 125 INJECTION, POWDER, FOR SOLUTION INTRAMUSCULAR; INTRAVENOUS at 09:39

## 2025-04-20 RX ADMIN — PANTOPRAZOLE SODIUM 40 MG: 40 INJECTION, POWDER, FOR SOLUTION INTRAVENOUS at 21:33

## 2025-04-20 RX ADMIN — IPRATROPIUM BROMIDE AND ALBUTEROL SULFATE 3 ML: 2.5; .5 SOLUTION RESPIRATORY (INHALATION) at 14:38

## 2025-04-20 RX ADMIN — METHYLPREDNISOLONE SODIUM SUCCINATE 40 MG: 40 INJECTION, POWDER, FOR SOLUTION INTRAMUSCULAR; INTRAVENOUS at 21:38

## 2025-04-20 RX ADMIN — SODIUM CHLORIDE 1000 MG: 900 INJECTION INTRAVENOUS at 23:31

## 2025-04-20 RX ADMIN — PANTOPRAZOLE SODIUM 40 MG: 40 INJECTION, POWDER, FOR SOLUTION INTRAVENOUS at 09:41

## 2025-04-20 RX ADMIN — DOXYCYCLINE 100 MG: 100 INJECTION, POWDER, LYOPHILIZED, FOR SOLUTION INTRAVENOUS at 21:39

## 2025-04-20 RX ADMIN — IPRATROPIUM BROMIDE AND ALBUTEROL SULFATE 3 ML: 2.5; .5 SOLUTION RESPIRATORY (INHALATION) at 05:50

## 2025-04-20 RX ADMIN — FUROSEMIDE 40 MG: 10 INJECTION, SOLUTION INTRAVENOUS at 05:41

## 2025-04-20 RX ADMIN — DOXYCYCLINE 100 MG: 100 INJECTION, POWDER, LYOPHILIZED, FOR SOLUTION INTRAVENOUS at 10:11

## 2025-04-20 NOTE — CASE MANAGEMENT/SOCIAL WORK
Discharge Planning Assessment  [facility]     Patient Name: Dayron Lubin  MRN: [id number]  Today's Date: 4/20/2025    Admit Date: 4/18/2025    Plan: [facility] vs home   Discharge Needs Assessment       Row Name 04/20/25 1013       Living Environment    People in Home facility resident    Name(s) of People in Home Has been at [facility] for approx 9 weeks (6 month program)    Current Living Arrangements other (see comments)    Duration at Residence approx 9 weeks    Potentially Unsafe Housing Conditions none    Primary Care Provided by self    Provides Primary Care For no one    Family Caregiver if Needed none    Quality of Family Relationships unable to assess    Able to Return to Prior Arrangements --  depends if he can get up stairs       Resource/Environmental Concerns    Transportation Concerns none       Transition Planning    Patient/Family Anticipates Transition to other (see comments)    Transportation Anticipated family or friend will provide       Discharge Needs Assessment    Readmission Within the Last 30 Days no previous admission in last 30 days    Equipment Currently Used at Home --  has RW available    Concerns to be Addressed basic needs;discharge planning    Equipment Needed After Discharge oxygen    Current Discharge Risk chronically ill;substance use/abuse                   Discharge Plan       Row Name 04/20/25 1017       Plan    Plan [facility] vs home    Patient/Family in Agreement with Plan yes    Plan Comments Spoke with pt to assess for d/c planning. Pt from [facility] and has been there approx 9 weeks of 6 month program, can return if able.  He stated they do not have an elevator and approx 10-12 steps x 2 to get to one meeting room.  He will need therapy here to determine if this is realistic when he gets discharged. He did state he has a home to return to if needed, lived alone prior to going to [facility].  Pt on high flow O2 here, may need O2 at d/c. Pt has RX  coverage/PCP.  Sister provides transportation as needed. Will follow.                  Selected Continued Care - Prior Encounters Includes continued care and service providers with selected services from prior encounters from 1/18/2025 to 4/20/2025      Discharged on 1/22/2025 Admission date: 1/17/2025 - Discharge disposition: Skilled Nursing Facility (DC - External)      Destination       Service Provider Services Address Phone Fax Patient Preferred    [facility] [address] [place] [address] [phone] --                             Demographic Summary    No documentation.                  Functional Status    No documentation.                  Psychosocial    No documentation.                  Abuse/Neglect    No documentation.                  Legal    No documentation.                  Substance Abuse    No documentation.                  Patient Forms    No documentation.                     ANUM SabaW

## 2025-04-20 NOTE — PLAN OF CARE
Goal Outcome Evaluation:  Plan of Care Reviewed With: patient        Progress: improving  Outcome Evaluation: BI PAP DC'D THIS AM AND PT. PLACED ON 3 L/M PER NASAL CANNULA. CONT. PULSE OX INTACT.      PT. VOIDING PER URINAL. BM TODAY. PRN PAIN MED AVAILABLE AND PT. HAS REQUIRED THIS SHIFT. HGB & HCT TO BE CHECKED EVERY 8 HOURS. NO DISTRESS NOTED.

## 2025-04-20 NOTE — NURSING NOTE
Patient received first unit of PRBC's per order. Upon starting the second unit of PRBC's the patient began complaining of SOA after using the urinal. The patient quickly became flushed and diaphoretic with dyspnea. The second unit of blood was stopped and the Hospitalist and Respiratory therapist were called. Hospitalist was at bedside with patient on non-rebreather at 15 L/min and patient was placed on BIPAP by MD and respiratory therapist.  Lasix and Solumedrol given per order.

## 2025-04-20 NOTE — PROGRESS NOTES
[facility] Medicine Services  INPATIENT PROGRESS NOTE    Patient Name: Dayron Lubin  Date of Admission: 4/18/2025  Today's Date: 04/20/25  Length of Stay: 1  Primary Care Physician: Herberth Nguyen Jr., MD    Subjective   Chief Complaint: Shortness of breath  HPI   Case discussed with Dr. Mijares regarding events from overnight.  Case discussed with bedside nurse this morning as well.  Patient had completed his first unit of packed red blood cells, and his second unit of packed red blood cells had just started.  He was on room air at the time.  The bedside nurse describes him having abrupt onset of wheezing and respiratory distress.  Dr. Mijares presented to the bedside.  His second unit was stopped.  A transfusion reaction protocol was initiated.  Patient received a dose of IV Lasix and IV Solu-Medrol, breathing treatment, and was started on BiPAP.    I saw him this morning and he indicated he was breathing much better.  Denies any pain symptoms.  He did indicate that he was having some worsening shortness of breath symptoms even prior to the second unit of blood being administered.  Denies any chest pain or chest pressure.    Review of Systems   All pertinent negatives and positives are as above. All other systems have been reviewed and are negative unless otherwise stated.     Objective    Temp:  [97.3 °F (36.3 °C)-99.4 °F (37.4 °C)] 99.4 °F (37.4 °C)  Heart Rate:  [] 87  Resp:  [16-33] 28  BP: (104-151)/() 106/66  Physical Exam  Vitals reviewed.   Constitutional:       Appearance: He is ill-appearing.   HENT:      Head: Normocephalic.      Mouth/Throat:      Comments: BiPAP mask in place  Eyes:      Pupils: Pupils are equal, round, and reactive to light.   Cardiovascular:      Rate and Rhythm: Normal rate.      Heart sounds: Murmur heard.   Pulmonary:      Effort: Pulmonary effort is normal.      Breath sounds: Rales present.      Comments: Currently on BiPAP;  breathing comfortably  Musculoskeletal:      Right lower leg: No edema.      Left lower leg: No edema.      Comments: SCDs in place   Skin:     General: Skin is warm.   Neurological:      General: No focal deficit present.      Mental Status: He is alert.   Psychiatric:         Mood and Affect: Mood normal.         Results Review:  I have reviewed the labs, radiology results, and diagnostic studies.    Laboratory Data:   Results from last 7 days   Lab Units 04/19/25  2350 04/19/25  1615 04/19/25  0622 04/18/25  2311   WBC 10*3/mm3  --   --  13.66* 24.18*   HEMOGLOBIN g/dL 6.7* 7.5* 6.1* 7.6*   HEMATOCRIT % 21.5* 23.2* 20.9* 25.7*   PLATELETS 10*3/mm3  --   --  145 233        Results from last 7 days   Lab Units 04/19/25  0557 04/19/25  0100 04/18/25  2311   SODIUM mmol/L 139  --  139   SODIUM, ARTERIAL mmol/L  --  142 143   POTASSIUM mmol/L 4.4  --  4.1   CHLORIDE mmol/L 106  --  105   CO2 mmol/L 21.0*  --  19.0*   BUN mg/dL 21  --  22   CREATININE mg/dL 0.81  --  1.07   CALCIUM mg/dL 8.8  --  9.3   BILIRUBIN mg/dL 0.2  --  0.3   ALK PHOS U/L 50  --  62   ALT (SGPT) U/L 6  --  8   AST (SGOT) U/L 15  --  17   GLUCOSE mg/dL 124*  --  220*       Culture Data:   Blood Culture   Date Value Ref Range Status   04/18/2025 No growth at 24 hours  Preliminary   04/18/2025 No growth at 24 hours  Preliminary       Radiology Data:   Imaging Results (Last 24 Hours)       Procedure Component Value Units Date/Time    XR Chest 1 View [[id number]] Collected: 04/20/25 0637     Updated: 04/20/25 0642    Narrative:      XR CHEST 1 VW- 4/20/2025 5:00 AM     HISTORY: Dyspnea; J18.9-Pneumonia, unspecified organism; J96.01-Acute  respiratory failure with hypoxia       COMPARISON: 1425     FINDINGS:  Upright frontal radiograph of the chest was obtained     Previously identified interstitial edema appears to be improving with  decreased interstitial coarsening and subpleural lines. Bilateral small  pleural effusions. No consolidation. Lungs are  well expanded. Heart size  appears normal. Bony elements are unremarkable.       Impression:      1.  Improving pulmonary edema. No lung consolidation. Bilateral small  pleural effusions.     This report was signed and finalized on 4/20/2025 6:39 AM by Dr Gregory Guillermo.       CT Angiogram Chest [[id number]] Collected: 04/19/25 1109     Updated: 04/19/25 1118    Narrative:      CT ANGIOGRAM CHEST- 4/19/2025 1:37 AM      HISTORY: Patient with severe chest pain and shortness of breath, concern  for PE.      COMPARISON: Chest CT dated 11/16/2024     DOSE LENGTH PRODUCT: 269.84 mGy.cm. Automated exposure control was also  utilized to decrease patient radiation dose.     TECHNIQUE: Helical tomographic images of the chest were obtained after  the administration of intravenous contrast following angiogram protocol.  Additionally, 3D and multiplanar reformatted images were provided.       FINDINGS:    Pulmonary arteries: There is adequate enhancement of the pulmonary  arteries to evaluate for central and segmental pulmonary emboli. There  are no filling defects within the main, lobar, segmental or visualized  subsegmental pulmonary arteries. The pulmonary arteries are within  normal limits for size.     AORTA AND GREAT VESSELS: Thoracic aorta is normal in caliber. No  dissection identified. No flow-limiting stenosis identified at the great  vessel origins.     VISUALIZED NECK BASE: The imaged portion of the base of the neck appears  unremarkable.     LUNGS: Bilateral interlobular septal thickening. There are clustered  left upper lobe inflammatory tree-in-bud opacities. Bilateral small  layering pleural effusions with lower lobe atelectasis send additional  lower lobe groundglass opacities. Airways are clear.     HEART: Enlarged left ventricle with left ventricular apical myocardial  thinning. Prominent aortic valve calcification. Prominent coronary  atheromatous calcification. No pericardial effusion.     MEDIASTINUM AND  LYMPH NODEs: No suspicious hilar or mediastinal  adenopathy..     SKELETAL AND SOFT TISSUES: Chest wall soft tissues are unremarkable. No  acute bony abnormality.     UPPER ABDOMEN: The imaged portion of the upper abdomen demonstrates no  acute process.       Impression:      1.  Volume overload with interstitial edema and mild alveolar edema in  the lower lobes. Additional small pleural effusions with bilateral lower  lobe dependent atelectasis.  2.  Clustered left upper lobe tree-in-bud opacities which appears to be  an infectious bronchiolitis.  3.  Prominent coronary atheromatous calcification, left ventricular  apical myocardial thinning and also prominent aortic valvular  calcification.           This report was signed and finalized on 4/19/2025 11:15 AM by Dr Gregory Guillermo.               I have reviewed the patient's current medications.     Assessment/Plan   Assessment  Active Hospital Problems    Diagnosis     **Bacterial pneumonia     Parainfluenza infection     Acute respiratory failure with hypercapnia     Fecal occult blood test positive     History of alcohol abuse     Chronic anticoagulation     Symptomatic anemia     Paroxysmal atrial fibrillation     Coronary artery disease involving native coronary artery of native heart without angina pectoris     HOCM (hypertrophic obstructive cardiomyopathy)        Treatment Plan  Case discussed with Dr. Mijares this morning regarding events from overnight  Hgb returned at 6.7 last night and 2 units of PRBCs were ordered  After 1st unit of blood had been given and 2nd unit had been started patient experienced some wheezing and respiratory distress; he was on room air at the time  Transfusion reaction order set sent  Lasix 40mg IV X 1 given  Solumedrol 125mg IV X 1 given  Scheduled neb treatments  BiPAP  VBG revealed stable pH and PC02  Repeat CXR as follows: pulmonary edema pattern improving compared to previous study      11.  Echocardiogram today  12.  Repeat  CXR in AM tomorrow  13.  Serial H/H - repeat CBC pending this morning  14.  Continue IV PPI for now  15.  Currently on empiric Abxs with Doxy and Rocephin  16.  Add iron profile to labs already sent  17.  Holding ASA and Eliquis  18.  GI recommendations reviewed - they have signed off    Medical Decision Making  Number and Complexity of problems: 3 acute; high complexity      Conditions and Status        Condition is worsening.     [facility] Data  External documents reviewed: none  Cardiac tracing (EKG, telemetry) interpretation: none  Radiology interpretation: see above  Labs reviewed: labs pending this AM  Any tests that were considered but not ordered: none     Decision rules/scores evaluated (example [id number], Wells, etc): none     Discussed with: patient, Dr. Mijares, overnight bedside nurse (Eleazar)     Care Planning  Shared decision making: Discussed with patient with agreement to proceed with treatment plan as outlined  Code status and discussions: Full code    Disposition  Social Determinants of Health that impact treatment or disposition: None apparent at this time  I expect the patient to be discharged: still TBD      Electronically signed by New Griffiths MD, 04/20/25, 07:09 CDT.

## 2025-04-20 NOTE — PLAN OF CARE
Goal Outcome Evaluation:  Plan of Care Reviewed With: patient      Patient A&O x4. Patient denies pain and nausea. Patient on room air. IV antibiotics given. Patient receiving PRBC's per order. Voiding per urinal. Tele on, sinus rhythm.

## 2025-04-20 NOTE — SIGNIFICANT NOTE
I was called on this patient for having acute respiratory distress and wheezing occurred at 5:25 AM. This was following the first unit of prbc transfusion. I am not sure if this is to be considered as a transfusion reaction or worsening of his primary condition. We ordered a transfusion reaction evaluation anyway to make sure not to miss anything.  Pt was given a dose of lasix 40, solumedrol 125, 2 doses of nebulizer back to back, bipap was applied and eventually pt started to feel better. CXR was done and I believe it has a component of congestion, pending report. ABGs were ordered, but it was a vbg sample with a pH of 7.33. I called my colleague, Art Sawyer from the ICU team, and discussed the situation. But since the patient was reporting feeling much better, we thought to continue monitoring him on the floor with re-assessment during the day.

## 2025-04-21 ENCOUNTER — APPOINTMENT (OUTPATIENT)
Dept: GENERAL RADIOLOGY | Facility: HOSPITAL | Age: 70
End: 2025-04-21
Payer: MEDICARE

## 2025-04-21 LAB
ALBUMIN SERPL-MCNC: 3.9 G/DL (ref 3.5–5.2)
ALBUMIN/GLOB SERPL: 1.5 G/DL
ALP SERPL-CCNC: 42 U/L (ref 39–117)
ALT SERPL W P-5'-P-CCNC: 8 U/L (ref 1–41)
ANION GAP SERPL CALCULATED.3IONS-SCNC: 12 MMOL/L (ref 5–15)
AST SERPL-CCNC: 16 U/L (ref 1–40)
BASOPHILS # BLD AUTO: 0.01 10*3/MM3 (ref 0–0.2)
BASOPHILS NFR BLD AUTO: 0.1 % (ref 0–1.5)
BH BB BLOOD EXPIRATION DATE: NORMAL
BH BB BLOOD EXPIRATION DATE: NORMAL
BH BB BLOOD TYPE BARCODE: 6200
BH BB BLOOD TYPE BARCODE: 6200
BH BB DISPENSE STATUS: NORMAL
BH BB DISPENSE STATUS: NORMAL
BH BB PRODUCT CODE: NORMAL
BH BB PRODUCT CODE: NORMAL
BH BB UNIT NUMBER: NORMAL
BH BB UNIT NUMBER: NORMAL
BILIRUB SERPL-MCNC: 0.3 MG/DL (ref 0–1.2)
BUN SERPL-MCNC: 29 MG/DL (ref 8–23)
BUN/CREAT SERPL: 34.5 (ref 7–25)
CALCIUM SPEC-SCNC: 9.4 MG/DL (ref 8.6–10.5)
CHLORIDE SERPL-SCNC: 103 MMOL/L (ref 98–107)
CO2 SERPL-SCNC: 24 MMOL/L (ref 22–29)
CREAT SERPL-MCNC: 0.84 MG/DL (ref 0.76–1.27)
CROSSMATCH INTERPRETATION: NORMAL
CROSSMATCH INTERPRETATION: NORMAL
DEPRECATED RDW RBC AUTO: 54.6 FL (ref 37–54)
EGFRCR SERPLBLD CKD-EPI 2021: 93.8 ML/MIN/1.73
EOSINOPHIL # BLD AUTO: 0 10*3/MM3 (ref 0–0.4)
EOSINOPHIL NFR BLD AUTO: 0 % (ref 0.3–6.2)
ERYTHROCYTE [DISTWIDTH] IN BLOOD BY AUTOMATED COUNT: 17.2 % (ref 12.3–15.4)
GLOBULIN UR ELPH-MCNC: 2.6 GM/DL
GLUCOSE SERPL-MCNC: 138 MG/DL (ref 65–99)
HCT VFR BLD AUTO: 25 % (ref 37.5–51)
HCT VFR BLD AUTO: 25.8 % (ref 37.5–51)
HGB BLD-MCNC: 7.7 G/DL (ref 13–17.7)
HGB BLD-MCNC: 7.8 G/DL (ref 13–17.7)
IMM GRANULOCYTES # BLD AUTO: 0.21 10*3/MM3 (ref 0–0.05)
IMM GRANULOCYTES NFR BLD AUTO: 1.2 % (ref 0–0.5)
LYMPHOCYTES # BLD AUTO: 0.66 10*3/MM3 (ref 0.7–3.1)
LYMPHOCYTES NFR BLD AUTO: 3.9 % (ref 19.6–45.3)
MCH RBC QN AUTO: 26.5 PG (ref 26.6–33)
MCHC RBC AUTO-ENTMCNC: 30.2 G/DL (ref 31.5–35.7)
MCV RBC AUTO: 87.8 FL (ref 79–97)
MONOCYTES # BLD AUTO: 0.51 10*3/MM3 (ref 0.1–0.9)
MONOCYTES NFR BLD AUTO: 3 % (ref 5–12)
NEUTROPHILS NFR BLD AUTO: 15.64 10*3/MM3 (ref 1.7–7)
NEUTROPHILS NFR BLD AUTO: 91.8 % (ref 42.7–76)
NRBC BLD AUTO-RTO: 0.4 /100 WBC (ref 0–0.2)
PLATELET # BLD AUTO: 178 10*3/MM3 (ref 140–450)
PMV BLD AUTO: 12.4 FL (ref 6–12)
POTASSIUM SERPL-SCNC: 4.2 MMOL/L (ref 3.5–5.2)
PROT SERPL-MCNC: 6.5 G/DL (ref 6–8.5)
RBC # BLD AUTO: 2.94 10*6/MM3 (ref 4.14–5.8)
SODIUM SERPL-SCNC: 139 MMOL/L (ref 136–145)
UNIT  ABO: NORMAL
UNIT  ABO: NORMAL
UNIT  RH: NORMAL
UNIT  RH: NORMAL
WBC NRBC COR # BLD AUTO: 17.03 10*3/MM3 (ref 3.4–10.8)

## 2025-04-21 PROCEDURE — 25010000002 CEFTRIAXONE PER 250 MG

## 2025-04-21 PROCEDURE — 94761 N-INVAS EAR/PLS OXIMETRY MLT: CPT

## 2025-04-21 PROCEDURE — 94799 UNLISTED PULMONARY SVC/PX: CPT

## 2025-04-21 PROCEDURE — 85025 COMPLETE CBC W/AUTO DIFF WBC: CPT | Performed by: INTERNAL MEDICINE

## 2025-04-21 PROCEDURE — 25010000002 METHYLPREDNISOLONE PER 40 MG

## 2025-04-21 PROCEDURE — 71045 X-RAY EXAM CHEST 1 VIEW: CPT

## 2025-04-21 PROCEDURE — 25010000002 DOXYCYCLINE 100 MG RECONSTITUTED SOLUTION 1 EACH VIAL

## 2025-04-21 PROCEDURE — 94664 DEMO&/EVAL PT USE INHALER: CPT

## 2025-04-21 PROCEDURE — 94660 CPAP INITIATION&MGMT: CPT

## 2025-04-21 PROCEDURE — 80053 COMPREHEN METABOLIC PANEL: CPT | Performed by: INTERNAL MEDICINE

## 2025-04-21 RX ADMIN — DOXYCYCLINE 100 MG: 100 INJECTION, POWDER, LYOPHILIZED, FOR SOLUTION INTRAVENOUS at 09:55

## 2025-04-21 RX ADMIN — SODIUM CHLORIDE 1000 MG: 900 INJECTION INTRAVENOUS at 23:33

## 2025-04-21 RX ADMIN — DOXYCYCLINE 100 MG: 100 INJECTION, POWDER, LYOPHILIZED, FOR SOLUTION INTRAVENOUS at 21:31

## 2025-04-21 RX ADMIN — ACETAMINOPHEN 650 MG: 325 TABLET, FILM COATED ORAL at 16:26

## 2025-04-21 RX ADMIN — PANTOPRAZOLE SODIUM 40 MG: 40 INJECTION, POWDER, FOR SOLUTION INTRAVENOUS at 09:54

## 2025-04-21 RX ADMIN — Medication 10 ML: at 23:33

## 2025-04-21 RX ADMIN — AMIODARONE HYDROCHLORIDE 200 MG: 200 TABLET ORAL at 09:54

## 2025-04-21 RX ADMIN — IPRATROPIUM BROMIDE AND ALBUTEROL SULFATE 3 ML: 2.5; .5 SOLUTION RESPIRATORY (INHALATION) at 09:48

## 2025-04-21 RX ADMIN — FLUTICASONE PROPIONATE 2 SPRAY: 50 SPRAY, METERED NASAL at 09:55

## 2025-04-21 RX ADMIN — METHYLPREDNISOLONE SODIUM SUCCINATE 40 MG: 40 INJECTION, POWDER, FOR SOLUTION INTRAMUSCULAR; INTRAVENOUS at 21:30

## 2025-04-21 RX ADMIN — IPRATROPIUM BROMIDE AND ALBUTEROL SULFATE 3 ML: 2.5; .5 SOLUTION RESPIRATORY (INHALATION) at 14:10

## 2025-04-21 RX ADMIN — ACETAMINOPHEN 650 MG: 325 TABLET, FILM COATED ORAL at 06:55

## 2025-04-21 RX ADMIN — IPRATROPIUM BROMIDE AND ALBUTEROL SULFATE 3 ML: 2.5; .5 SOLUTION RESPIRATORY (INHALATION) at 19:13

## 2025-04-21 RX ADMIN — IPRATROPIUM BROMIDE AND ALBUTEROL SULFATE 3 ML: 2.5; .5 SOLUTION RESPIRATORY (INHALATION) at 06:01

## 2025-04-21 RX ADMIN — PANTOPRAZOLE SODIUM 40 MG: 40 INJECTION, POWDER, FOR SOLUTION INTRAVENOUS at 21:23

## 2025-04-21 NOTE — PLAN OF CARE
Goal Outcome Evaluation:  Plan of Care Reviewed With: patient      Patient A&O x4. Patient denies pain and nausea. Patient on 3 L/min O2. IV antibiotics given per order. Continuous pulse ox in use. SCDs on. Tele on, sinus rhythm.

## 2025-04-21 NOTE — PROGRESS NOTES
[facility] Medicine Services  INPATIENT PROGRESS NOTE    Patient Name: Dayron Lubin  Date of Admission: 4/18/2025  Today's Date: 04/21/25  Length of Stay: 2  Primary Care Physician: Herberth Nguyen Jr., MD    Subjective   Chief Complaint: shortness of breath  HPI   Resting comfortably, not distressed. Oxygen 2 lpm NC (room air baseline). No reports of BBPR, stools dark. No abdominal pain. He had an episode of anemia 4-5 years ago with negative EGD/colonoscopy that required iron replacement and recovered. Reports occasional hemorrhoidal bleeding.     Review of Systems   All pertinent negatives and positives are as above. All other systems have been reviewed and are negative unless otherwise stated.     Objective    Temp:  [97.7 °F (36.5 °C)-98.5 °F (36.9 °C)] 98.5 °F (36.9 °C)  Heart Rate:  [71-94] 86  Resp:  [16-20] 16  BP: (103-121)/(58-77) 107/59  Physical Exam  Constitutional:       Appearance: He is well-developed. He is ill-appearing.   HENT:      Head: Normocephalic and atraumatic.      Right Ear: External ear normal.      Left Ear: External ear normal.      Nose: Nose normal.      Mouth/Throat:      Mouth: Mucous membranes are dry.   Eyes:      General:         Right eye: No discharge.         Left eye: No discharge.      Extraocular Movements: Extraocular movements intact.      Conjunctiva/sclera: Conjunctivae normal.      Pupils: Pupils are equal, round, and reactive to light.   Neck:      Vascular: No JVD.   Cardiovascular:      Rate and Rhythm: Normal rate and regular rhythm.      Heart sounds: Murmur heard.   Pulmonary:      Effort: No respiratory distress.      Breath sounds: Normal breath sounds. No wheezing or rales.   Chest:      Chest wall: No tenderness.   Abdominal:      General: Bowel sounds are normal. There is no distension.      Palpations: Abdomen is soft.      Tenderness: There is no abdominal tenderness. There is no guarding or rebound.    Musculoskeletal:         General: No tenderness or deformity. Normal range of motion.      Cervical back: Normal range of motion and neck supple. No rigidity.      Right lower leg: No edema.      Left lower leg: No edema.   Skin:     General: Skin is warm and dry.      Findings: No rash.   Neurological:      General: No focal deficit present.      Mental Status: He is alert and oriented to person, place, and time. Mental status is at baseline.      Cranial Nerves: No cranial nerve deficit.      Sensory: No sensory deficit.      Motor: No abnormal muscle tone.      Deep Tendon Reflexes: Reflexes normal.   Psychiatric:         Mood and Affect: Mood normal.         Behavior: Behavior normal.     Results Review:  I have reviewed the labs, radiology results, and diagnostic studies.    Laboratory Data:   Results from last 7 days   Lab Units 04/21/25  0920 04/20/25  2342 04/20/25  1620 04/20/25  0723 04/19/25  1615 04/19/25  0622   WBC 10*3/mm3 17.03*  --   --  15.59*  --  13.66*   HEMOGLOBIN g/dL 7.8* 7.7* 8.3* 8.7*   < > 6.1*   HEMATOCRIT % 25.8* 25.0* 26.6* 27.2*   < > 20.9*   PLATELETS 10*3/mm3 178  --   --  169  --  145    < > = values in this interval not displayed.        Results from last 7 days   Lab Units 04/21/25  0920 04/20/25  0723 04/19/25  0557   SODIUM mmol/L 139 136 139   POTASSIUM mmol/L 4.2 3.9 4.4   CHLORIDE mmol/L 103 100 106   CO2 mmol/L 24.0 21.0* 21.0*   BUN mg/dL 29* 24* 21   CREATININE mg/dL 0.84 0.85 0.81   CALCIUM mg/dL 9.4 9.4 8.8   BILIRUBIN mg/dL 0.3 0.5 0.2   ALK PHOS U/L 42 51 50   ALT (SGPT) U/L 8 7 6   AST (SGOT) U/L 16 18 15   GLUCOSE mg/dL 138* 169* 124*       Culture Data:   Blood Culture   Date Value Ref Range Status   04/18/2025 No growth at 2 days  Preliminary   04/18/2025 No growth at 2 days  Preliminary     Respiratory Culture   Date Value Ref Range Status   04/19/2025 Light growth (2+) Normal Respiratory Sary  Preliminary       Radiology Data:   Imaging Results (Last 24 Hours)        Procedure Component Value Units Date/Time    XR Chest 1 View [[id number]] Collected: 04/21/25 1104     Updated: 04/21/25 1110    Narrative:      EXAMINATION: XR CHEST 1 VW- 4/21/2025 11:04 AM     HISTORY: follow-up infiltrates.     REPORT: Frontal view of the chest was obtained.     COMPARISON: Chest x-ray 4/20/2025 0548 hours.     The lungs remain hypoaerated, with basilar atelectasis and vascular  crowding, there may be trace left pleural fluid. Heart size is normal.  No pneumothorax is identified. No lung consolidation. Stable patchy  interstitial infiltrate in the left upper lobe. The osseous structures  are acutely unremarkable.       Impression:      Mild improvement in bibasilar atelectasis, stable patchy  interstitial infiltrate in the left upper lobe, no lung consolidation.  Probable trace pleural effusion on the left.     This report was signed and finalized on 4/21/2025 11:07 AM by Dr. Nitin Stone MD.               I have reviewed the patient's current medications.     Assessment/Plan   Assessment  Active Hospital Problems    Diagnosis     **Bacterial pneumonia     Parainfluenza infection     Acute respiratory failure with hypercapnia     Fecal occult blood test positive     History of alcohol abuse     Chronic anticoagulation     Symptomatic anemia     Paroxysmal atrial fibrillation     Coronary artery disease involving native coronary artery of native heart without angina pectoris     HOCM (hypertrophic obstructive cardiomyopathy)        Treatment Plan  Acute hypoxemic respiratory failure viral/bacterial and TRALI  Vitals every 4 hours  Oxygen therapy > 2 lpm NC goal room air  BiPAP overnight with good results, anticipate no further need for NIPPV  Lasix prn  Neb treatment scheduled  Incentive spirometry  CXR noted  Empiric > Doxy/rocephin  Micro:  Respiratory PCR Parainfluenza 3 detected  Respiratory culture > Light growth (2+) Normal Respiratory Sary   Echo:     Left ventricular systolic  function is low normal. Estimated left ventricular EF = 50%    Left ventricular wall thickness is consistent with moderate to severe septal asymmetric hypertrophy.    The findings are consistent with obstructive, hypertrophic cardiomyopathy.  There is an obstructive gradient of greater than 80 mmHg.    The following left ventricular wall segments are aneurysmal: apical anterior, apical lateral, apical inferior, apical septal and apex.The following left ventricular wall segments are hyperkinetic: mid anterior, mid anterolateral, mid inferolateral, mid inferior, mid inferoseptal and mid anteroseptal.    Severely calcified aortic valve, producing some degree of stenosis though in light of hypertrophic obstructive cardiomyopathy, very difficult to discern what component of the elevated transvalvular gradient is from the aortic valve versus that which is from intracavitary gradient from HOCM.  I suspect the aortic stenosis is at least moderate.    Mild to moderate mitral regurgitation, with a jet that appears to be directed anteriorly.    The left atrial cavity is severely dilated.    Estimated right ventricular systolic pressure from tricuspid regurgitation is mildly elevated (35-45 mmHg).    Normal size and function of the right ventricle.    The right atrial cavity is dilated.    No other significant (worse than mild) valvular pathology.    Compared to prior exam from 7/29/2024, no significant change.     HOCM  Echo noted  Diuirese as needed  Consider cardiology follow up    Anemia/GIB  GI input noted, signed off  Occult bleed  Monitor CBC  Replace iron  IV PPI  Hold ASA/Eliquis for now  Probably outpatient EGD/Colonoscopy    DVT prophylaxis > SCD    Medical Decision Making  Number and Complexity of problems: 4 complex medical problems  Differential Diagnosis: see above    Conditions and Status        Condition is unchanged.     [facility] Data  External documents reviewed: [employer] EHR  Cardiac tracing (EKG, telemetry)  interpretation: Sinus rhythm with 1st degree AV block   Radiology interpretation: see above  Labs reviewed: see above  Any tests that were considered but not ordered: none     Decision rules/scores evaluated (example [id number], Wells, etc): N/A     Discussed with: Patient     Care Planning  Shared decision making: Patient  Code status and discussions: Full code    Disposition  Social Determinants of Health that impact treatment or disposition: none  I expect the patient to be discharged to home in 2-3 days.         Electronically signed by Shailesh Brownlee MD, 04/21/25, 13:58 CDT.

## 2025-04-21 NOTE — PLAN OF CARE
Goal Outcome Evaluation:  Plan of Care Reviewed With: patient        Progress: improving  Outcome Evaluation: O2 ON PER NASAL CANNULA. CONT. PULSE OX INTACT. IV ANTIBIOTICS PER ORDER. HGB = 7.8  HCT = 25.8  NO DISTRESS NOTED.

## 2025-04-22 LAB
ANION GAP SERPL CALCULATED.3IONS-SCNC: 11 MMOL/L (ref 5–15)
BACTERIA SPEC RESP CULT: NORMAL
BASOPHILS # BLD AUTO: 0.01 10*3/MM3 (ref 0–0.2)
BASOPHILS NFR BLD AUTO: 0.1 % (ref 0–1.5)
BUN SERPL-MCNC: 29 MG/DL (ref 8–23)
BUN/CREAT SERPL: 31.9 (ref 7–25)
CALCIUM SPEC-SCNC: 9.3 MG/DL (ref 8.6–10.5)
CHLORIDE SERPL-SCNC: 102 MMOL/L (ref 98–107)
CO2 SERPL-SCNC: 23 MMOL/L (ref 22–29)
CREAT SERPL-MCNC: 0.91 MG/DL (ref 0.76–1.27)
DEPRECATED RDW RBC AUTO: 53.7 FL (ref 37–54)
EGFRCR SERPLBLD CKD-EPI 2021: 90.7 ML/MIN/1.73
EOSINOPHIL # BLD AUTO: 0.01 10*3/MM3 (ref 0–0.4)
EOSINOPHIL NFR BLD AUTO: 0.1 % (ref 0.3–6.2)
ERYTHROCYTE [DISTWIDTH] IN BLOOD BY AUTOMATED COUNT: 17.2 % (ref 12.3–15.4)
GLUCOSE SERPL-MCNC: 128 MG/DL (ref 65–99)
GRAM STN SPEC: NORMAL
HCT VFR BLD AUTO: 25.6 % (ref 37.5–51)
HGB BLD-MCNC: 7.9 G/DL (ref 13–17.7)
IMM GRANULOCYTES # BLD AUTO: 0.22 10*3/MM3 (ref 0–0.05)
IMM GRANULOCYTES NFR BLD AUTO: 1.6 % (ref 0–0.5)
LYMPHOCYTES # BLD AUTO: 0.78 10*3/MM3 (ref 0.7–3.1)
LYMPHOCYTES NFR BLD AUTO: 5.7 % (ref 19.6–45.3)
MCH RBC QN AUTO: 26.6 PG (ref 26.6–33)
MCHC RBC AUTO-ENTMCNC: 30.9 G/DL (ref 31.5–35.7)
MCV RBC AUTO: 86.2 FL (ref 79–97)
MONOCYTES # BLD AUTO: 0.39 10*3/MM3 (ref 0.1–0.9)
MONOCYTES NFR BLD AUTO: 2.9 % (ref 5–12)
NEUTROPHILS NFR BLD AUTO: 12.16 10*3/MM3 (ref 1.7–7)
NEUTROPHILS NFR BLD AUTO: 89.6 % (ref 42.7–76)
NRBC BLD AUTO-RTO: 0.2 /100 WBC (ref 0–0.2)
PLATELET # BLD AUTO: 182 10*3/MM3 (ref 140–450)
PMV BLD AUTO: 12 FL (ref 6–12)
POTASSIUM SERPL-SCNC: 4.6 MMOL/L (ref 3.5–5.2)
RBC # BLD AUTO: 2.97 10*6/MM3 (ref 4.14–5.8)
SODIUM SERPL-SCNC: 136 MMOL/L (ref 136–145)
WBC NRBC COR # BLD AUTO: 13.57 10*3/MM3 (ref 3.4–10.8)

## 2025-04-22 PROCEDURE — 94664 DEMO&/EVAL PT USE INHALER: CPT

## 2025-04-22 PROCEDURE — 94760 N-INVAS EAR/PLS OXIMETRY 1: CPT

## 2025-04-22 PROCEDURE — 25010000002 METHYLPREDNISOLONE PER 40 MG

## 2025-04-22 PROCEDURE — 94660 CPAP INITIATION&MGMT: CPT

## 2025-04-22 PROCEDURE — 25010000002 DOXYCYCLINE 100 MG RECONSTITUTED SOLUTION 1 EACH VIAL

## 2025-04-22 PROCEDURE — 85025 COMPLETE CBC W/AUTO DIFF WBC: CPT | Performed by: FAMILY MEDICINE

## 2025-04-22 PROCEDURE — 94799 UNLISTED PULMONARY SVC/PX: CPT

## 2025-04-22 PROCEDURE — 80048 BASIC METABOLIC PNL TOTAL CA: CPT | Performed by: FAMILY MEDICINE

## 2025-04-22 PROCEDURE — 25010000002 CEFTRIAXONE PER 250 MG

## 2025-04-22 RX ORDER — PANTOPRAZOLE SODIUM 40 MG/1
40 TABLET, DELAYED RELEASE ORAL
Status: DISCONTINUED | OUTPATIENT
Start: 2025-04-23 | End: 2025-04-23 | Stop reason: HOSPADM

## 2025-04-22 RX ORDER — FUROSEMIDE 40 MG/1
20 TABLET ORAL DAILY
Status: DISCONTINUED | OUTPATIENT
Start: 2025-04-22 | End: 2025-04-23 | Stop reason: HOSPADM

## 2025-04-22 RX ORDER — ATORVASTATIN CALCIUM 10 MG/1
20 TABLET, FILM COATED ORAL DAILY
Status: DISCONTINUED | OUTPATIENT
Start: 2025-04-22 | End: 2025-04-23 | Stop reason: HOSPADM

## 2025-04-22 RX ADMIN — ACETAMINOPHEN 650 MG: 325 TABLET, FILM COATED ORAL at 09:52

## 2025-04-22 RX ADMIN — FUROSEMIDE 20 MG: 40 TABLET ORAL at 18:05

## 2025-04-22 RX ADMIN — Medication 10 ML: at 09:26

## 2025-04-22 RX ADMIN — ATORVASTATIN CALCIUM 20 MG: 10 TABLET, FILM COATED ORAL at 18:05

## 2025-04-22 RX ADMIN — FLUTICASONE PROPIONATE 2 SPRAY: 50 SPRAY, METERED NASAL at 09:26

## 2025-04-22 RX ADMIN — IPRATROPIUM BROMIDE AND ALBUTEROL SULFATE 3 ML: 2.5; .5 SOLUTION RESPIRATORY (INHALATION) at 09:35

## 2025-04-22 RX ADMIN — PANTOPRAZOLE SODIUM 40 MG: 40 INJECTION, POWDER, FOR SOLUTION INTRAVENOUS at 09:25

## 2025-04-22 RX ADMIN — DOXYCYCLINE 100 MG: 100 INJECTION, POWDER, LYOPHILIZED, FOR SOLUTION INTRAVENOUS at 22:03

## 2025-04-22 RX ADMIN — IPRATROPIUM BROMIDE AND ALBUTEROL SULFATE 3 ML: 2.5; .5 SOLUTION RESPIRATORY (INHALATION) at 20:07

## 2025-04-22 RX ADMIN — SODIUM CHLORIDE 1000 MG: 900 INJECTION INTRAVENOUS at 22:03

## 2025-04-22 RX ADMIN — DOXYCYCLINE 100 MG: 100 INJECTION, POWDER, LYOPHILIZED, FOR SOLUTION INTRAVENOUS at 09:26

## 2025-04-22 RX ADMIN — IPRATROPIUM BROMIDE AND ALBUTEROL SULFATE 3 ML: 2.5; .5 SOLUTION RESPIRATORY (INHALATION) at 14:02

## 2025-04-22 RX ADMIN — AMIODARONE HYDROCHLORIDE 200 MG: 200 TABLET ORAL at 09:26

## 2025-04-22 RX ADMIN — IPRATROPIUM BROMIDE AND ALBUTEROL SULFATE 3 ML: 2.5; .5 SOLUTION RESPIRATORY (INHALATION) at 06:05

## 2025-04-22 RX ADMIN — Medication 10 ML: at 22:03

## 2025-04-22 RX ADMIN — METHYLPREDNISOLONE SODIUM SUCCINATE 40 MG: 40 INJECTION, POWDER, FOR SOLUTION INTRAMUSCULAR; INTRAVENOUS at 22:03

## 2025-04-22 NOTE — PLAN OF CARE
Goal Outcome Evaluation:  Plan of Care Reviewed With: patient           Outcome Evaluation: currently on room air; voiding; BM today; IV abx per order; safety maintained

## 2025-04-22 NOTE — PROGRESS NOTES
[facility] Medicine Services  INPATIENT PROGRESS NOTE    Patient Name: Dayron Lubin  Date of Admission: 4/18/2025  Today's Date: 04/22/25  Length of Stay: 3  Primary Care Physician: Herberth Nguyen Jr., MD    Subjective   Chief Complaint: shortness of breath  HPI   Resting comfortably, not distressed. Oxygen 2 lpm NC (room air baseline). No reports of BBPR, stools dark. No abdominal pain. He had an episode of anemia 4-5 years ago with negative EGD/colonoscopy that required iron replacement and recovered. Reports occasional hemorrhoidal bleeding.     Today:  Patient still having with cough and requiring oxygen 1.5 L/min nasal cannula.  Otherwise no new complaints.  Has tolerated oral intake and move bowels without bleeding.    Review of Systems   All pertinent negatives and positives are as above. All other systems have been reviewed and are negative unless otherwise stated.     Objective    Temp:  [97.9 °F (36.6 °C)-98.3 °F (36.8 °C)] 98.3 °F (36.8 °C)  Heart Rate:  [68-88] 73  Resp:  [16-18] 18  BP: (101-140)/(62-83) 128/75  Physical Exam  Constitutional:       Appearance: He is well-developed. He is ill-appearing.   HENT:      Head: Normocephalic and atraumatic.      Right Ear: External ear normal.      Left Ear: External ear normal.      Nose: Nose normal.      Mouth/Throat:      Mouth: Mucous membranes are dry.   Eyes:      General:         Right eye: No discharge.         Left eye: No discharge.      Extraocular Movements: Extraocular movements intact.      Conjunctiva/sclera: Conjunctivae normal.      Pupils: Pupils are equal, round, and reactive to light.   Neck:      Vascular: No JVD.   Cardiovascular:      Rate and Rhythm: Normal rate and regular rhythm.      Heart sounds: Murmur heard.   Pulmonary:      Effort: No respiratory distress.      Breath sounds: Normal breath sounds. No wheezing or rales.   Chest:      Chest wall: No tenderness.   Abdominal:      General:  Bowel sounds are normal. There is no distension.      Palpations: Abdomen is soft.      Tenderness: There is no abdominal tenderness. There is no guarding or rebound.   Musculoskeletal:         General: No tenderness or deformity. Normal range of motion.      Cervical back: Normal range of motion and neck supple. No rigidity.      Right lower leg: No edema.      Left lower leg: No edema.   Skin:     General: Skin is warm and dry.      Findings: No rash.   Neurological:      General: No focal deficit present.      Mental Status: He is alert and oriented to person, place, and time. Mental status is at baseline.      Cranial Nerves: No cranial nerve deficit.      Sensory: No sensory deficit.      Motor: No abnormal muscle tone.      Deep Tendon Reflexes: Reflexes normal.   Psychiatric:         Mood and Affect: Mood normal.         Behavior: Behavior normal.     Results Review:  I have reviewed the labs, radiology results, and diagnostic studies.    Laboratory Data:   Results from last 7 days   Lab Units 04/22/25  0338 04/21/25  0920 04/20/25  2342 04/20/25  1620 04/20/25  0723   WBC 10*3/mm3 13.57* 17.03*  --   --  15.59*   HEMOGLOBIN g/dL 7.9* 7.8* 7.7*   < > 8.7*   HEMATOCRIT % 25.6* 25.8* 25.0*   < > 27.2*   PLATELETS 10*3/mm3 182 178  --   --  169    < > = values in this interval not displayed.        Results from last 7 days   Lab Units 04/22/25  0338 04/21/25  0920 04/20/25  0723 04/19/25  0557   SODIUM mmol/L 136 139 136 139   POTASSIUM mmol/L 4.6 4.2 3.9 4.4   CHLORIDE mmol/L 102 103 100 106   CO2 mmol/L 23.0 24.0 21.0* 21.0*   BUN mg/dL 29* 29* 24* 21   CREATININE mg/dL 0.91 0.84 0.85 0.81   CALCIUM mg/dL 9.3 9.4 9.4 8.8   BILIRUBIN mg/dL  --  0.3 0.5 0.2   ALK PHOS U/L  --  42 51 50   ALT (SGPT) U/L  --  8 7 6   AST (SGOT) U/L  --  16 18 15   GLUCOSE mg/dL 128* 138* 169* 124*       Culture Data:   Blood Culture   Date Value Ref Range Status   04/18/2025 No growth at 2 days  Preliminary   04/18/2025 No growth  at 2 days  Preliminary     Respiratory Culture   Date Value Ref Range Status   04/19/2025 Light growth (2+) Normal Respiratory Sary  Preliminary       Radiology Data:   Imaging Results (Last 24 Hours)       ** No results found for the last 24 hours. **            I have reviewed the patient's current medications.     Assessment/Plan   Assessment  Active Hospital Problems    Diagnosis     **Bacterial pneumonia     Parainfluenza infection     Acute respiratory failure with hypercapnia     Fecal occult blood test positive     History of alcohol abuse     Chronic anticoagulation     Symptomatic anemia     Paroxysmal atrial fibrillation     Coronary artery disease involving native coronary artery of native heart without angina pectoris     HOCM (hypertrophic obstructive cardiomyopathy)        Treatment Plan  Acute hypoxemic respiratory failure viral/bacterial and TRALI  Vitals every 4 hours  Oxygen therapy > 2 lpm NC goal room air  BiPAP overnight with good results, anticipate no further need for NIPPV  Lasix prn  Neb treatment scheduled  Incentive spirometry  CXR noted  Empiric > Doxy/rocephin  Micro:  Respiratory PCR Parainfluenza 3 detected  Respiratory culture > Light growth (2+) Normal Respiratory Sary   Echo:     Left ventricular systolic function is low normal. Estimated left ventricular EF = 50%    Left ventricular wall thickness is consistent with moderate to severe septal asymmetric hypertrophy.    The findings are consistent with obstructive, hypertrophic cardiomyopathy.  There is an obstructive gradient of greater than 80 mmHg.    The following left ventricular wall segments are aneurysmal: apical anterior, apical lateral, apical inferior, apical septal and apex.The following left ventricular wall segments are hyperkinetic: mid anterior, mid anterolateral, mid inferolateral, mid inferior, mid inferoseptal and mid anteroseptal.    Severely calcified aortic valve, producing some degree of stenosis though in  light of hypertrophic obstructive cardiomyopathy, very difficult to discern what component of the elevated transvalvular gradient is from the aortic valve versus that which is from intracavitary gradient from HOCM.  I suspect the aortic stenosis is at least moderate.    Mild to moderate mitral regurgitation, with a jet that appears to be directed anteriorly.    The left atrial cavity is severely dilated.    Estimated right ventricular systolic pressure from tricuspid regurgitation is mildly elevated (35-45 mmHg).    Normal size and function of the right ventricle.    The right atrial cavity is dilated.    No other significant (worse than mild) valvular pathology.    Compared to prior exam from 7/29/2024, no significant change.     HOCM  Echo noted  Diuresis: Lasix 20 mg p.o. daily  Patient is aware of last recommendation by electrophysiology for consideration of defibrillator but he is still undecided and does not like to pursue this option at this time.  Will refer to outpatient.  Resume Toprol at low-dose    Anemia iron deficiency without active GI bleeding  GI input noted, signed off  Occult bleed  Monitor CBC  Replace iron  IV PPI tomorrow  Hold ASA/Eliquis for now  Probably outpatient EGD/Colonoscopy    DVT prophylaxis > SCD    Medical Decision Making  Number and Complexity of problems: 4 complex medical problems  Differential Diagnosis: see above    Conditions and Status        Condition is unchanged.     MDM Data  External documents reviewed: [facility] EHR  Cardiac tracing (EKG, telemetry) interpretation: Sinus rhythm with 1st degree AV block   Radiology interpretation: see above  Labs reviewed: see above  Any tests that were considered but not ordered: none     Decision rules/scores evaluated (example [id number], Wells, etc): N/A     Discussed with: Patient     Care Planning  Shared decision making: Patient  Code status and discussions: Full code    Disposition  Social Determinants of Health that impact  treatment or disposition: none  I expect the patient to be discharged to home in 1-3 days.         Electronically signed by Shailesh Brownlee MD, 04/22/25, 13:22 CDT.

## 2025-04-22 NOTE — PLAN OF CARE
Goal Outcome Evaluation:  Plan of Care Reviewed With: patient      Patient A&O x4. Patient denies pain and nausea. Patient on 2 L/min O2. IV antibiotics given per order. Telemetry on. SCDs on. Repositioning independently.

## 2025-04-23 ENCOUNTER — READMISSION MANAGEMENT (OUTPATIENT)
Dept: CALL CENTER | Facility: HOSPITAL | Age: 70
End: 2025-04-23
Payer: MEDICARE

## 2025-04-23 VITALS
BODY MASS INDEX: 24.66 KG/M2 | TEMPERATURE: 98 F | RESPIRATION RATE: 16 BRPM | OXYGEN SATURATION: 100 % | SYSTOLIC BLOOD PRESSURE: 113 MMHG | HEART RATE: 80 BPM | HEIGHT: 72 IN | DIASTOLIC BLOOD PRESSURE: 68 MMHG | WEIGHT: 182.1 LBS

## 2025-04-23 PROBLEM — J96.02 ACUTE RESPIRATORY FAILURE WITH HYPERCAPNIA: Status: RESOLVED | Noted: 2025-04-20 | Resolved: 2025-04-23

## 2025-04-23 LAB
ANION GAP SERPL CALCULATED.3IONS-SCNC: 13 MMOL/L (ref 5–15)
BASOPHILS # BLD AUTO: 0.01 10*3/MM3 (ref 0–0.2)
BASOPHILS NFR BLD AUTO: 0.1 % (ref 0–1.5)
BUN SERPL-MCNC: 25 MG/DL (ref 8–23)
BUN/CREAT SERPL: 28.4 (ref 7–25)
CALCIUM SPEC-SCNC: 9.6 MG/DL (ref 8.6–10.5)
CHLORIDE SERPL-SCNC: 99 MMOL/L (ref 98–107)
CO2 SERPL-SCNC: 25 MMOL/L (ref 22–29)
CREAT SERPL-MCNC: 0.88 MG/DL (ref 0.76–1.27)
DEPRECATED RDW RBC AUTO: 53 FL (ref 37–54)
EGFRCR SERPLBLD CKD-EPI 2021: 92.5 ML/MIN/1.73
EOSINOPHIL # BLD AUTO: 0 10*3/MM3 (ref 0–0.4)
EOSINOPHIL NFR BLD AUTO: 0 % (ref 0.3–6.2)
ERYTHROCYTE [DISTWIDTH] IN BLOOD BY AUTOMATED COUNT: 17.1 % (ref 12.3–15.4)
GLUCOSE SERPL-MCNC: 138 MG/DL (ref 65–99)
HCT VFR BLD AUTO: 29.4 % (ref 37.5–51)
HGB BLD-MCNC: 9.1 G/DL (ref 13–17.7)
IMM GRANULOCYTES # BLD AUTO: 0.14 10*3/MM3 (ref 0–0.05)
IMM GRANULOCYTES NFR BLD AUTO: 1.3 % (ref 0–0.5)
LYMPHOCYTES # BLD AUTO: 0.84 10*3/MM3 (ref 0.7–3.1)
LYMPHOCYTES NFR BLD AUTO: 7.8 % (ref 19.6–45.3)
MCH RBC QN AUTO: 26.4 PG (ref 26.6–33)
MCHC RBC AUTO-ENTMCNC: 31 G/DL (ref 31.5–35.7)
MCV RBC AUTO: 85.2 FL (ref 79–97)
MONOCYTES # BLD AUTO: 0.39 10*3/MM3 (ref 0.1–0.9)
MONOCYTES NFR BLD AUTO: 3.6 % (ref 5–12)
NEUTROPHILS NFR BLD AUTO: 87.2 % (ref 42.7–76)
NEUTROPHILS NFR BLD AUTO: 9.38 10*3/MM3 (ref 1.7–7)
NRBC BLD AUTO-RTO: 0.5 /100 WBC (ref 0–0.2)
PLATELET # BLD AUTO: 219 10*3/MM3 (ref 140–450)
PMV BLD AUTO: 12.1 FL (ref 6–12)
POTASSIUM SERPL-SCNC: 4.9 MMOL/L (ref 3.5–5.2)
RBC # BLD AUTO: 3.45 10*6/MM3 (ref 4.14–5.8)
SODIUM SERPL-SCNC: 137 MMOL/L (ref 136–145)
WBC NRBC COR # BLD AUTO: 10.76 10*3/MM3 (ref 3.4–10.8)

## 2025-04-23 PROCEDURE — 80048 BASIC METABOLIC PNL TOTAL CA: CPT | Performed by: FAMILY MEDICINE

## 2025-04-23 PROCEDURE — 94799 UNLISTED PULMONARY SVC/PX: CPT

## 2025-04-23 PROCEDURE — 97161 PT EVAL LOW COMPLEX 20 MIN: CPT | Performed by: PHYSICAL THERAPIST

## 2025-04-23 PROCEDURE — 25010000002 DOXYCYCLINE 100 MG RECONSTITUTED SOLUTION 1 EACH VIAL

## 2025-04-23 PROCEDURE — 85025 COMPLETE CBC W/AUTO DIFF WBC: CPT | Performed by: FAMILY MEDICINE

## 2025-04-23 RX ORDER — PREDNISONE 10 MG/1
10 TABLET ORAL DAILY
Qty: 4 TABLET | Refills: 0 | Status: SHIPPED | OUTPATIENT
Start: 2025-04-23 | End: 2025-04-27

## 2025-04-23 RX ORDER — CEFDINIR 300 MG/1
300 CAPSULE ORAL 2 TIMES DAILY
Qty: 8 CAPSULE | Refills: 0 | Status: SHIPPED | OUTPATIENT
Start: 2025-04-23 | End: 2025-04-27

## 2025-04-23 RX ADMIN — IPRATROPIUM BROMIDE AND ALBUTEROL SULFATE 3 ML: 2.5; .5 SOLUTION RESPIRATORY (INHALATION) at 06:04

## 2025-04-23 RX ADMIN — FLUTICASONE PROPIONATE 2 SPRAY: 50 SPRAY, METERED NASAL at 09:22

## 2025-04-23 RX ADMIN — ATORVASTATIN CALCIUM 20 MG: 10 TABLET, FILM COATED ORAL at 09:21

## 2025-04-23 RX ADMIN — ACETAMINOPHEN 650 MG: 325 TABLET, FILM COATED ORAL at 11:38

## 2025-04-23 RX ADMIN — AMIODARONE HYDROCHLORIDE 200 MG: 200 TABLET ORAL at 09:21

## 2025-04-23 RX ADMIN — Medication 10 ML: at 09:25

## 2025-04-23 RX ADMIN — PANTOPRAZOLE SODIUM 40 MG: 40 TABLET, DELAYED RELEASE ORAL at 05:02

## 2025-04-23 RX ADMIN — IPRATROPIUM BROMIDE AND ALBUTEROL SULFATE 3 ML: 2.5; .5 SOLUTION RESPIRATORY (INHALATION) at 09:52

## 2025-04-23 RX ADMIN — IPRATROPIUM BROMIDE AND ALBUTEROL SULFATE 3 ML: 2.5; .5 SOLUTION RESPIRATORY (INHALATION) at 14:18

## 2025-04-23 RX ADMIN — DOXYCYCLINE 100 MG: 100 INJECTION, POWDER, LYOPHILIZED, FOR SOLUTION INTRAVENOUS at 09:21

## 2025-04-23 RX ADMIN — FUROSEMIDE 20 MG: 40 TABLET ORAL at 09:21

## 2025-04-23 NOTE — PLAN OF CARE
Goal Outcome Evaluation:                 Patient alert and oriented x4. Ambulating in room with standby assistance. Bowel movement noted this morning. Minimal pain due to headache being treated with prn medication. Discharging today. IV D/C without issue.

## 2025-04-23 NOTE — THERAPY EVALUATION
Patient Name: Dayron Lubin  : 1955    MRN: [id number]                              Today's Date: 2025       Admit Date: 2025    Visit Dx:     ICD-10-CM ICD-9-CM   1. Pneumonia of both lungs due to infectious organism, unspecified part of lung  J18.9 483.8   2. Acute hypoxic respiratory failure  J96.01 518.81   3. Impaired mobility [Z74.09]  Z74.09 799.89     Patient Active Problem List   Diagnosis    Prostate cancer    Hx of radiation therapy    Elevated PSA    Encounter for follow-up surveillance of prostate cancer    HOCM (hypertrophic obstructive cardiomyopathy)    Coronary artery disease involving native coronary artery of native heart without angina pectoris    Essential hypertension    Mixed hyperlipidemia    Class 1 obesity with alveolar hypoventilation, serious comorbidity, and body mass index (BMI) of 30.0 to 30.9 in adult    Displaced fracture of lateral malleolus of right fibula, initial encounter for closed fracture    Family history of colonic polyps    History of colon polyps    Elevated brain natriuretic peptide (BNP) level    Anxiety associated with depression    Alcohol abuse    Alcoholic hepatitis    Volume depletion    Anorexia    CHF (congestive heart failure)    Hypokalemia    Duodenitis    Gait instability    SVT (supraventricular tachycardia)    Physical debility    Atrial fibrillation with rapid ventricular response    Alcoholism in remission    Paroxysmal atrial fibrillation    Nasal polyposis    Nasal congestion    Estelle bullosa    Long-term use of high-risk medication    Multiple falls    Thrombocytopenia    Non-traumatic rhabdomyolysis    Alcohol withdrawal    Symptomatic anemia    Opiate abuse, episodic    Large left gluteal compartment intramuscular hematomas, currently still healing    Chronic anticoagulation    Cellulitis    Dermatitis neglecta    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    Failure to thrive in adult    Urinary tract infection    Diarrhea    Pressure  injury of buttock, stage 3    Bilateral impacted cerumen    Nasal turbinate hypertrophy    Bacterial pneumonia    Parainfluenza infection    Acute respiratory failure with hypercapnia    Fecal occult blood test positive    History of alcohol abuse    Chronic anticoagulation     Past Medical History:   Diagnosis Date    A-fib     Alcoholic hepatitis 07/13/2023    CAD (coronary artery disease)     History of external beam radiation therapy 05/12/2016    6840 cGy to prostate bed    Hyperlipidemia     Hypertension     Insomnia     Prostate cancer      Past Surgical History:   Procedure Laterality Date    CARDIAC CATHETERIZATION      CORONARY ANGIOPLASTY WITH STENT PLACEMENT      FRACTURE SURGERY      PROSTATE SURGERY      TONSILLECTOMY        General Information       Row Name 04/23/25 0811          Physical Therapy Time and Intention    Document Type evaluation  -SB     Mode of Treatment physical therapy  -SB       Row Name 04/23/25 0811          General Information    Patient Profile Reviewed yes  -SB     Prior Level of Function independent:;all household mobility;ADL's;home management;cooking;cleaning;driving  -SB     Existing Precautions/Restrictions fall  -SB     Barriers to Rehab none identified  -SB       Row Name 04/23/25 0811          Living Environment    Current Living Arrangements home  -SB     People in Home alone  -SB       Row Name 04/23/25 0811          Home Main Entrance    Number of Stairs, Main Entrance two  -SB     Stair Railings, Main Entrance railing on left side (ascending)  -SB       Row Name 04/23/25 0811          Stairs Within Home, Primary    Number of Stairs, Within Home, Primary none  -SB       Row Name 04/23/25 0811          Cognition    Orientation Status (Cognition) oriented x 4  -SB       Row Name 04/23/25 0811          Safety Issues/Impairments Affecting Functional Mobility    Impairments Affecting Function (Mobility) endurance/activity tolerance  -SB               User Key  (r) =  Recorded By, (t) = Taken By, (c) = Cosigned By      Initials Name Provider Type    SB Kristen Lainez, PT DPT Physical Therapist                   Mobility       Row Name 04/23/25 0811          Bed Mobility    Bed Mobility supine-sit  -SB     Supine-Sit Smith (Bed Mobility) modified independence  -SB     Assistive Device (Bed Mobility) head of bed elevated  -SB       Row Name 04/23/25 0811          Sit-Stand Transfer    Sit-Stand Smith (Transfers) supervision  -SB       Row Name 04/23/25 0811          Gait/Stairs (Locomotion)    Smith Level (Gait) standby assist  -SB     Patient was able to Ambulate yes  -SB     Distance in Feet (Gait) 50  -SB     Deviations/Abnormal Patterns (Gait) gait speed decreased  -SB               User Key  (r) = Recorded By, (t) = Taken By, (c) = Cosigned By      Initials Name Provider Type    Kristen Jones PT DPT Physical Therapist                   Obj/Interventions       Row Name 04/23/25 0811          Range of Motion Comprehensive    General Range of Motion bilateral lower extremity ROM WFL  -SB       Row Name 04/23/25 0811          Strength Comprehensive (MMT)    General Manual Muscle Testing (MMT) Assessment no strength deficits identified  -SB       Row Name 04/23/25 0811          Balance    Balance Assessment sitting static balance;sitting dynamic balance;standing static balance;standing dynamic balance  -SB     Static Sitting Balance independent  -SB     Dynamic Sitting Balance independent  -SB     Position, Sitting Balance unsupported;sitting edge of bed  -SB     Static Standing Balance standby assist  -SB     Dynamic Standing Balance standby assist  -SB     Position/Device Used, Standing Balance unsupported  -SB       Row Name 04/23/25 0811          Sensory Assessment (Somatosensory)    Sensory Assessment (Somatosensory) LE sensation intact  -SB               User Key  (r) = Recorded By, (t) = Taken By, (c) = Cosigned By      Initials Name Provider Type     SB Kristen Lainez, PT DPT Physical Therapist                   Goals/Plan       Row Name 04/23/25 0811          Bed Mobility Goal 1 (PT)    Activity/Assistive Device (Bed Mobility Goal 1, PT) sit to supine;supine to sit;rolling to left;rolling to right  -SB     [place] Level/Cues Needed (Bed Mobility Goal 1, PT) independent  -SB     Time Frame (Bed Mobility Goal 1, PT) long term goal (LTG)  -SB     Progress/Outcomes (Bed Mobility Goal 1, PT) new goal  -SB       Row Name 04/23/25 0811          Transfer Goal 1 (PT)    Activity/Assistive Device (Transfer Goal 1, PT) sit-to-stand/stand-to-sit;bed-to-chair/chair-to-bed  -SB     [place] Level/Cues Needed (Transfer Goal 1, PT) independent  -SB     Time Frame (Transfer Goal 1, PT) long term goal (LTG)  -SB     Progress/Outcome (Transfer Goal 1, PT) new goal  -SB       Row Name 04/23/25 0811          Gait Training Goal 1 (PT)    Activity/Assistive Device (Gait Training Goal 1, PT) gait (walking locomotion);increase endurance/gait distance;increase energy conservation  -SB     [place] Level (Gait Training Goal 1, PT) independent  -SB     Distance (Gait Training Goal 1, PT) 200  -SB     Time Frame (Gait Training Goal 1, PT) long term goal (LTG)  -SB     Progress/Outcome (Gait Training Goal 1, PT) new goal  -SB       Row Name 04/23/25 0811          Stairs Goal 1 (PT)    Activity/Assistive Device (Stairs Goal 1, PT) stairs, all skills;ascending stairs;descending stairs;using handrail, left  -SB     [place] Level/Cues Needed (Stairs Goal 1, PT) modified independence  -SB     Number of Stairs (Stairs Goal 1, PT) 2  -SB     Time Frame (Stairs Goal 1, PT) long term goal (LTG)  -SB     Progress/Outcome (Stairs Goal 1, PT) new goal  -SB       Row Name 04/23/25 0811          Therapy Assessment/Plan (PT)    Planned Therapy Interventions (PT) balance training;strengthening;bed mobility training;gait training;patient/family education;transfer training;stair training   -SB               User Key  (r) = Recorded By, (t) = Taken By, (c) = Cosigned By      Initials Name Provider Type    Kristen Jones, ENMA DPT Physical Therapist                   Clinical Impression       Row Name 04/23/25 0811          Pain    Pretreatment Pain Rating 0/10 - no pain  -SB     Posttreatment Pain Rating 0/10 - no pain  -SB     Pre/Posttreatment Pain Comment c/o weakness/fatigue  -SB       Row Name 04/23/25 0811          Plan of Care Review    Plan of Care Reviewed With patient  -SB     Progress no change  -SB     Outcome Evaluation PT eval completed. Pt alert and oriented x4 with c/o weakness and fatigue. Pt reports independence at home prior to arrival. Today, pt performs all mobility with SBA or better on RA with sats in 90s throughout session. Pt slightly unsteady during gait and reports feeling weaker than usual. Pt left sitting in chair and encouraged to perform OOB activity often to return to PLOF. Pt will benefit from skilled PT to improve strength and endurance. Rec d/c home.  -SB       Row Name 04/23/25 0811          Therapy Assessment/Plan (PT)    Patient/Family Therapy Goals Statement (PT) improve function  -SB     Rehab Potential (PT) good  -SB     Criteria for Skilled Interventions Met (PT) yes;meets criteria;skilled treatment is necessary  -SB     Therapy Frequency (PT) 2 times/day  -SB     Predicted Duration of Therapy Intervention (PT) until d/c or goals met  -SB       Row Name 04/23/25 0811          Vital Signs    Post SpO2 (%) 96  -SB       Row Name 04/23/25 0811          Positioning and Restraints    Pre-Treatment Position in bed  -SB     Post Treatment Position chair  -SB     In Chair sitting;call light within reach;encouraged to call for assist  -SB               User Key  (r) = Recorded By, (t) = Taken By, (c) = Cosigned By      Initials Name Provider Type    Kristen Jones, PT DPT Physical Therapist                   Outcome Measures       Row Name 04/23/25 0811           How much help from another person do you currently need...    Turning from your back to your side while in flat bed without using bedrails? 4  -SB     Moving from lying on back to sitting on the side of a flat bed without bedrails? 4  -SB     Moving to and from a bed to a chair (including a wheelchair)? 4  -SB     Standing up from a chair using your arms (e.g., wheelchair, bedside chair)? 4  -SB     Climbing 3-5 steps with a railing? 4  -SB     To walk in hospital room? 4  -SB     AM-PAC 6 Clicks Score (PT) 24  -SB     Highest Level of Mobility Goal 8 --> Walked 250 feet or more  -SB       Row Name 04/23/25 0811          Functional Assessment    Outcome Measure Options AM-PAC 6 Clicks Basic Mobility (PT)  -SB               User Key  (r) = Recorded By, (t) = Taken By, (c) = Cosigned By      Initials Name Provider Type    SB Kristen Lainez, PT DPT Physical Therapist                                 Physical Therapy Education       Title: PT OT SLP Therapies (In Progress)       Topic: Physical Therapy (In Progress)       Point: Mobility training (Done)       Learning Progress Summary            Patient Acceptance, E, VU by SB at 4/23/2025 0822    Comment: pt edu on POC, benefits of act and d/c plans                      Point: Home exercise program (Not Started)       Learner Progress:  Not documented in this visit.              Point: Body mechanics (Not Started)       Learner Progress:  Not documented in this visit.              Point: Precautions (Done)       Learning Progress Summary            Patient Acceptance, E, VU by SB at 4/23/2025 0822    Comment: pt edu on POC, benefits of act and d/c plans                                      User Key       Initials Effective Dates Name Provider Type Discipline    SB 07/11/23 -  Kristen Lainez, PT DPT Physical Therapist PT                  PT Recommendation and Plan  Planned Therapy Interventions (PT): balance training, strengthening, bed mobility training, gait training,  patient/family education, transfer training, stair training  Progress: no change  Outcome Evaluation: PT eval completed. Pt alert and oriented x4 with c/o weakness and fatigue. Pt reports independence at home prior to arrival. Today, pt performs all mobility with SBA or better on RA with sats in 90s throughout session. Pt slightly unsteady during gait and reports feeling weaker than usual. Pt left sitting in chair and encouraged to perform OOB activity often to return to PLOF. Pt will benefit from skilled PT to improve strength and endurance. Rec d/c home.     Time Calculation:         PT Charges       Row Name 04/23/25 1028             Time Calculation    Start Time 0811  -SB      Stop Time 0835  -SB      Time Calculation (min) 24 min  -SB      PT Received On 04/23/25  -SB      PT Goal Re-Cert Due Date 05/03/25  -SB         Untimed Charges    PT Eval/Re-eval Minutes 24  -SB         Total Minutes    Untimed Charges Total Minutes 24  -SB       Total Minutes 24  -SB                User Key  (r) = Recorded By, (t) = Taken By, (c) = Cosigned By      Initials Name Provider Type    SB Kristen Lainez, PT DPT Physical Therapist                  Therapy Charges for Today       Code Description Service Date Service Provider Modifiers Qty    [id number] HC PT EVAL LOW COMPLEXITY 2 4/23/2025 Kristen Lainez PT DPT GP 1            PT G-Codes  Outcome Measure Options: AM-PAC 6 Clicks Basic Mobility (PT)  AM-PAC 6 Clicks Score (PT): 24  PT Discharge Summary  Anticipated Discharge Disposition (PT): home with assist    ENMA MorseT  4/23/2025

## 2025-04-23 NOTE — PLAN OF CARE
Goal Outcome Evaluation:  Plan of Care Reviewed With: patient      Patient A&O x4. Patient denies pain and nausea. Patient on room air. IV antibiotics given per order. SCDs on. Voiding per urinal.

## 2025-04-23 NOTE — DISCHARGE SUMMARY
[facility] Medicine Services  DISCHARGE SUMMARY       Date of Admission: 4/18/2025  Date of Discharge:  4/23/2025  Primary Care Physician: Herberth Nguyen Jr., MD    Presenting Problem/History of Present Illness:  This is a 69-year-old male patient with a medical history of A-fib on Eliquis, history of alcohol use/withdrawal, hypertension, dyslipidemia, coronary artery disease, presenting to the ED for the evaluation of shortness of breath. As reported, patient was having shortness of breath for the last week, that progressed acutely and suddenly he started feeling that he is about to die and not able to catch his breath. He denies having chest pain, fever, chills, abdominal pain, but he was having some nausea. EMS arrived and he was given DuoNeb on the way that made him feel a little better.     Final Discharge Diagnoses:  Active Hospital Problems    Diagnosis     **Bacterial pneumonia     Parainfluenza infection     Acute respiratory failure with hypercapnia     Fecal occult blood test positive     History of alcohol abuse     Chronic anticoagulation     Symptomatic anemia     Paroxysmal atrial fibrillation     Coronary artery disease involving native coronary artery of native heart without angina pectoris     HOCM (hypertrophic obstructive cardiomyopathy)        Consults:   Dr Paloma Lei, GI    Procedures Performed: -    Pertinent Test Results:   Results for orders placed during the hospital encounter of 04/18/25    Adult Transthoracic Echo Complete W/ Cont if Necessary Per Protocol    Interpretation Summary    Left ventricular systolic function is low normal. Estimated left ventricular EF = 50%    Left ventricular wall thickness is consistent with moderate to severe septal asymmetric hypertrophy.    The findings are consistent with obstructive, hypertrophic cardiomyopathy.  There is an obstructive gradient of greater than 80 mmHg.    The following left ventricular  wall segments are aneurysmal: apical anterior, apical lateral, apical inferior, apical septal and apex.The following left ventricular wall segments are hyperkinetic: mid anterior, mid anterolateral, mid inferolateral, mid inferior, mid inferoseptal and mid anteroseptal.    Severely calcified aortic valve, producing some degree of stenosis though in light of hypertrophic obstructive cardiomyopathy, very difficult to discern what component of the elevated transvalvular gradient is from the aortic valve versus that which is from intracavitary gradient from HOCM.  I suspect the aortic stenosis is at least moderate.    Mild to moderate mitral regurgitation, with a jet that appears to be directed anteriorly.    The left atrial cavity is severely dilated.    Estimated right ventricular systolic pressure from tricuspid regurgitation is mildly elevated (35-45 mmHg).    Normal size and function of the right ventricle.    The right atrial cavity is dilated.    No other significant (worse than mild) valvular pathology.    Compared to prior exam from 7/29/2024, no significant change.      Imaging Results (All)       Procedure Component Value Units Date/Time    XR Chest 1 View [[id number]] Collected: 04/21/25 1104     Updated: 04/21/25 1110    Narrative:      EXAMINATION: XR CHEST 1 VW- 4/21/2025 11:04 AM     HISTORY: follow-up infiltrates.     REPORT: Frontal view of the chest was obtained.     COMPARISON: Chest x-ray 4/20/2025 0548 hours.     The lungs remain hypoaerated, with basilar atelectasis and vascular  crowding, there may be trace left pleural fluid. Heart size is normal.  No pneumothorax is identified. No lung consolidation. Stable patchy  interstitial infiltrate in the left upper lobe. The osseous structures  are acutely unremarkable.       Impression:      Mild improvement in bibasilar atelectasis, stable patchy  interstitial infiltrate in the left upper lobe, no lung consolidation.  Probable trace pleural effusion on  the left.     This report was signed and finalized on 4/21/2025 11:07 AM by Dr. Nitin Stone MD.       XR Chest 1 View [[id number]] Collected: 04/20/25 0637     Updated: 04/20/25 0642    Narrative:      XR CHEST 1 VW- 4/20/2025 5:00 AM     HISTORY: Dyspnea; J18.9-Pneumonia, unspecified organism; J96.01-Acute  respiratory failure with hypoxia       COMPARISON: 1425     FINDINGS:  Upright frontal radiograph of the chest was obtained     Previously identified interstitial edema appears to be improving with  decreased interstitial coarsening and subpleural lines. Bilateral small  pleural effusions. No consolidation. Lungs are well expanded. Heart size  appears normal. Bony elements are unremarkable.       Impression:      1.  Improving pulmonary edema. No lung consolidation. Bilateral small  pleural effusions.     This report was signed and finalized on 4/20/2025 6:39 AM by Dr Gregory Guillermo.       CT Angiogram Chest [[id number]] Collected: 04/19/25 1109     Updated: 04/19/25 1118    Narrative:      CT ANGIOGRAM CHEST- 4/19/2025 1:37 AM      HISTORY: Patient with severe chest pain and shortness of breath, concern  for PE.      COMPARISON: Chest CT dated 11/16/2024     DOSE LENGTH PRODUCT: 269.84 mGy.cm. Automated exposure control was also  utilized to decrease patient radiation dose.     TECHNIQUE: Helical tomographic images of the chest were obtained after  the administration of intravenous contrast following angiogram protocol.  Additionally, 3D and multiplanar reformatted images were provided.       FINDINGS:    Pulmonary arteries: There is adequate enhancement of the pulmonary  arteries to evaluate for central and segmental pulmonary emboli. There  are no filling defects within the main, lobar, segmental or visualized  subsegmental pulmonary arteries. The pulmonary arteries are within  normal limits for size.     AORTA AND GREAT VESSELS: Thoracic aorta is normal in caliber. No  dissection identified. No  flow-limiting stenosis identified at the great  vessel origins.     VISUALIZED NECK BASE: The imaged portion of the base of the neck appears  unremarkable.     LUNGS: Bilateral interlobular septal thickening. There are clustered  left upper lobe inflammatory tree-in-bud opacities. Bilateral small  layering pleural effusions with lower lobe atelectasis send additional  lower lobe groundglass opacities. Airways are clear.     HEART: Enlarged left ventricle with left ventricular apical myocardial  thinning. Prominent aortic valve calcification. Prominent coronary  atheromatous calcification. No pericardial effusion.     MEDIASTINUM AND LYMPH NODEs: No suspicious hilar or mediastinal  adenopathy..     SKELETAL AND SOFT TISSUES: Chest wall soft tissues are unremarkable. No  acute bony abnormality.     UPPER ABDOMEN: The imaged portion of the upper abdomen demonstrates no  acute process.       Impression:      1.  Volume overload with interstitial edema and mild alveolar edema in  the lower lobes. Additional small pleural effusions with bilateral lower  lobe dependent atelectasis.  2.  Clustered left upper lobe tree-in-bud opacities which appears to be  an infectious bronchiolitis.  3.  Prominent coronary atheromatous calcification, left ventricular  apical myocardial thinning and also prominent aortic valvular  calcification.           This report was signed and finalized on 4/19/2025 11:15 AM by Dr Gregory Guillermo.       XR Chest 1 View [[id number]] Collected: 04/19/25 0654     Updated: 04/19/25 0658    Narrative:      XR CHEST 1 VW- 4/18/2025 10:06 PM     HISTORY: Acute worsening shortness of breath.       COMPARISON: Chest x-ray dated 12/4/2024     FINDINGS:  Upright frontal radiograph of the chest was obtained     Bilateral diffuse interstitial coarsening. Notable subpleural lines in  the lung bases. Lungs are well expanded. Bilateral small pleural  effusions with blunting of the costophrenic angles. No  consolidation.  Mild cardiomegaly. No pneumothorax. No acute bony abnormality.       Impression:      1.  Apparent volume overload with interstitial edema and bilateral small  pleural effusions.     This report was signed and finalized on 4/19/2025 6:55 AM by Dr Gregory Guillermo.             LAB RESULTS:      Lab 04/23/25  0258 04/22/25  0338 04/21/25  0920 04/20/25  2342 04/20/25  1620 04/20/25  0723 04/19/25  1615 04/19/25  0622 04/19/25  0210 04/18/25  2311   WBC 10.76 13.57* 17.03*  --   --  15.59*  --  13.66*  --  24.18*   HEMOGLOBIN 9.1* 7.9* 7.8* 7.7* 8.3* 8.7*   < > 6.1*  --  7.6*   HEMATOCRIT 29.4* 25.6* 25.8* 25.0* 26.6* 27.2*   < > 20.9*  --  25.7*   PLATELETS 219 182 178  --   --  169  --  145  --  233   NEUTROS ABS 9.38* 12.16* 15.64*  --   --   --   --   --   --  20.52*   IMMATURE GRANS (ABS) 0.14* 0.22* 0.21*  --   --   --   --   --   --   --    LYMPHS ABS 0.84 0.78 0.66*  --   --   --   --   --   --   --    MONOS ABS 0.39 0.39 0.51  --   --   --   --   --   --   --    EOS ABS 0.00 0.01 0.00  --   --   --   --   --   --  0.24   MCV 85.2 86.2 87.8  --   --  85.0  --  88.6  --  89.9   LACTATE  --   --   --   --   --   --   --   --  1.5 3.9*   PROTIME  --   --   --   --   --   --   --   --   --  18.1*   APTT  --   --   --   --   --   --   --   --   --  38.8*    < > = values in this interval not displayed.         Lab 04/23/25  0258 04/22/25  0338 04/21/25  0920 04/20/25  0723 04/19/25  0557   SODIUM 137 136 139 136 139   POTASSIUM 4.9 4.6 4.2 3.9 4.4   CHLORIDE 99 102 103 100 106   CO2 25.0 23.0 24.0 21.0* 21.0*   ANION GAP 13.0 11.0 12.0 15.0 12.0   BUN 25* 29* 29* 24* 21   CREATININE 0.88 0.91 0.84 0.85 0.81   EGFR 92.5 90.7 93.8 93.5 94.8   GLUCOSE 138* 128* 138* 169* 124*   CALCIUM 9.6 9.3 9.4 9.4 8.8         Lab 04/21/25  0920 04/20/25  0723 04/19/25  0557 04/18/25  2311   TOTAL PROTEIN 6.5 7.3 6.8 7.8   ALBUMIN 3.9 4.3 4.1 4.5   GLOBULIN 2.6 3.0 2.7 3.3   ALT (SGPT) 8 7 6 8   AST (SGOT) 16 18 15 17    BILIRUBIN 0.3 0.5 0.2 0.3   ALK PHOS 42 51 50 62   LIPASE  --   --   --  80*         Lab 04/19/25  0026 04/18/25  2311   HSTROP T 23* 22*   PROTIME  --  18.1*   INR  --  1.42*             Lab 04/20/25  0723 04/19/25  0649   IRON 30*  --    IRON SATURATION (TSAT) 6*  --    TIBC 478  --    TRANSFERRIN 321  --    ABO TYPING  --  A   RH TYPING  --  Positive   ANTIBODY SCREEN  --  Negative         Lab 04/20/25  0559 04/19/25  0615 04/19/25  0100 04/18/25  2311   PH, ARTERIAL  --  7.447 7.391 7.158*   PCO2, ARTERIAL  --  31.6* 37.2 57.7*   PO2 ART  --  79.9* 399.0* 150.0*   O2 SATURATION ART  --  97.9 >100.1* >100.1*   FIO2 60  --  100  --    HCO3 ART  --  21.8 22.5 20.5   BASE EXCESS ART  --  -2.0* -2.2* -7.8*   CARBOXYHEMOGLOBIN  --   --  1.8 1.7     Brief Urine Lab Results  (Last result in the past 365 days)        Color   Clarity   Blood   Leuk Est   Nitrite   Protein   CREAT   Urine HCG        03/18/25 0826 Yellow   Clear   Negative   Negative   Negative   Negative                 Microbiology Results (last 10 days)       Procedure Component Value - Date/Time    Respiratory Culture - Sputum, Cough [[id number]] Collected: 04/19/25 1417    Lab Status: Final result Specimen: Sputum from Cough Updated: 04/22/25 0920     Respiratory Culture Light growth (2+) Normal Respiratory Sierra     Gram Stain Moderate (3+) WBCs per low power field      Rare (1+) Epithelial cells per low power field      Rare (1+) Mixed gram positive sierra    Respiratory Panel PCR w/COVID-19(SARS-CoV-2) [facility] In-House, NP Swab in UT/VTM, 2 HR TAT - Swab, Nasopharynx [[id number]]  (Abnormal) Collected: 04/19/25 0556    Lab Status: Final result Specimen: Swab from Nasopharynx Updated: 04/19/25 0702     ADENOVIRUS, PCR Not Detected     Coronavirus 229E Not Detected     Coronavirus HKU1 Not Detected     Coronavirus NL63 Not Detected     Coronavirus OC43 Not Detected     COVID19 Not Detected     Human Metapneumovirus Not Detected      Human Rhinovirus/Enterovirus Not Detected     Influenza A PCR Not Detected     Influenza B PCR Not Detected     Parainfluenza Virus 1 Not Detected     Parainfluenza Virus 2 Not Detected     Parainfluenza Virus 3 Detected     Parainfluenza Virus 4 Not Detected     RSV, PCR Not Detected     Bordetella pertussis pcr Not Detected     Bordetella parapertussis PCR Not Detected     Chlamydophila pneumoniae PCR Not Detected     Mycoplasma pneumo by PCR Not Detected    Narrative:      In the setting of a positive respiratory panel with a viral infection PLUS a negative procalcitonin without other underlying concern for bacterial infection, consider observing off antibiotics or discontinuation of antibiotics and continue supportive care. If the respiratory panel is positive for atypical bacterial infection (Bordetella pertussis, Chlamydophila pneumoniae, or Mycoplasma pneumoniae), consider antibiotic de-escalation to target atypical bacterial infection.    Blood Culture - Blood, Hand, Left [[id number]]  (Normal) Collected: 04/18/25 2351    Lab Status: Preliminary result Specimen: Blood from Hand, Left Updated: 04/23/25 0015     Blood Culture No growth at 4 days    Blood Culture - Blood, Arm, Right [[id number]]  (Normal) Collected: 04/18/25 2344    Lab Status: Preliminary result Specimen: Blood from Arm, Right Updated: 04/23/25 0015     Blood Culture No growth at 4 days            Hospital Course:   Acute hypoxemic respiratory failure viral/bacterial and TRALI  Admitted to medical floor with remote teletry.   Oxygen therapy. Required 50% high flow post transfusion and improved to 3 lpm overnight with BiPAP and steroids. Continued to wean to 2 lpm NC and room air today. Dyspnea resolved.     Lasix prn  Neb treatment scheduled  Incentive spirometry  CXR noted  Empiric > Doxy/rocephin 5 days completed. Will discharge on Omnicef for 3 days.   Micro:  Respiratory PCR Parainfluenza 3 detected  Respiratory culture > Light growth (2+)  Normal Respiratory Sary   Echo:     Left ventricular systolic function is low normal. Estimated left ventricular EF = 50%    Left ventricular wall thickness is consistent with moderate to severe septal asymmetric hypertrophy.    The findings are consistent with obstructive, hypertrophic cardiomyopathy.  There is an obstructive gradient of greater than 80 mmHg.    The following left ventricular wall segments are aneurysmal: apical anterior, apical lateral, apical inferior, apical septal and apex.The following left ventricular wall segments are hyperkinetic: mid anterior, mid anterolateral, mid inferolateral, mid inferior, mid inferoseptal and mid anteroseptal.    Severely calcified aortic valve, producing some degree of stenosis though in light of hypertrophic obstructive cardiomyopathy, very difficult to discern what component of the elevated transvalvular gradient is from the aortic valve versus that which is from intracavitary gradient from HOCM.  I suspect the aortic stenosis is at least moderate.    Mild to moderate mitral regurgitation, with a jet that appears to be directed anteriorly.    The left atrial cavity is severely dilated.    Estimated right ventricular systolic pressure from tricuspid regurgitation is mildly elevated (35-45 mmHg).    Normal size and function of the right ventricle.    The right atrial cavity is dilated.    No other significant (worse than mild) valvular pathology.    Compared to prior exam from 7/29/2024, no significant change.     HOC  Echo noted  Diuresis: Lasix 20 mg p.o. daily  Patient is aware of last recommendation by electrophysiology for consideration of defibrillator but he is still undecided and does not like to pursue this option at this time.  Will refer to outpatient cardiology, patient aware and agreeable.   Resume Toprol at low-dose     Anemia iron deficiency without active GI bleeding  GI input noted, signed off. Patient not actively bleeding, considered iron  "deficiency.   Occult bleed  Replace iron  IV PPI to oral.   Hold ASA/Eliquis for now  Probably outpatient EGD/Colonoscopy routine.     Patient is stable for discharge.     Physical Exam on Discharge:  /68 (BP Location: Left arm, Patient Position: Lying)   Pulse 74   Temp 98 °F (36.7 °C) (Oral)   Resp 16   Ht 182.9 cm (72\")   Wt 82.6 kg (182 lb 1.6 oz)   SpO2 94%   BMI 24.70 kg/m²   Physical Exam  Constitutional:       Appearance: Normal appearance.   HENT:      Head: Normocephalic and atraumatic.      Right Ear: External ear normal.      Left Ear: External ear normal.      Nose: Nose normal.      Mouth/Throat:      Mouth: Mucous membranes are moist.   Eyes:      Extraocular Movements: Extraocular movements intact.      Conjunctiva/sclera: Conjunctivae normal.      Pupils: Pupils are equal, round, and reactive to light.   Cardiovascular:      Rate and Rhythm: Normal rate and regular rhythm.      Heart sounds: Murmur heard.   Pulmonary:      Effort: Pulmonary effort is normal. No respiratory distress.      Breath sounds: Normal breath sounds. No wheezing, rhonchi or rales.   Abdominal:      General: Abdomen is flat. Bowel sounds are normal. There is no distension.      Palpations: Abdomen is soft.   Musculoskeletal:         General: Normal range of motion.      Cervical back: Normal range of motion and neck supple.      Right lower leg: No edema.      Left lower leg: No edema.   Skin:     General: Skin is warm and dry.      Capillary Refill: Capillary refill takes less than 2 seconds.   Neurological:      General: No focal deficit present.      Mental Status: He is alert and oriented to person, place, and time. Mental status is at baseline.      Motor: No weakness.   Psychiatric:         Mood and Affect: Mood normal.         Behavior: Behavior normal.     Condition on Discharge:   Stable     Discharge Disposition:  Home    Discharge Medications:     Discharge Medications        New Medications        " Instructions Start Date   cefdinir 300 MG capsule  Commonly known as: OMNICEF   300 mg, Oral, 2 Times Daily      predniSONE 10 MG tablet  Commonly known as: DELTASONE   10 mg, Oral, Daily             Continue These Medications        Instructions Start Date   amiodarone 200 MG tablet  Commonly known as: PACERONE   200 mg, Oral, Daily      apixaban 5 MG tablet tablet  Commonly known as: ELIQUIS   5 mg, Oral, 2 Times Daily      aspirin 81 MG EC tablet   81 mg, Oral, Daily      atorvastatin 20 MG tablet  Commonly known as: LIPITOR   20 mg, Oral, Daily      metoprolol succinate XL 25 MG 24 hr tablet  Commonly known as: TOPROL-XL   25 mg, Daily      pantoprazole 40 MG EC tablet  Commonly known as: PROTONIX   1 tablet, Oral, Daily             Discharge Diet:   Cardiac    Activity at Discharge:   Resume usual activity    Follow-up Appointments:   Future Appointments   Date Time Provider Department Center   4/29/2025 10:30 AM Robin De La Rosa MD [facility]   4/30/2025 12:30 PM [facility] PAT 31 RM 2 [facility] PAT PAD   6/12/2025 10:30 AM Cyndee Sears APRN [facility]   9/18/2025  8:00 AM Miki Cavazos PA [facility]       Test Results Pending at Discharge: -    Electronically signed by Shailesh Brownlee MD, 04/23/25, 13:23 CDT.    Time: 39 minutes.

## 2025-04-23 NOTE — PLAN OF CARE
Goal Outcome Evaluation:  Plan of Care Reviewed With: patient        Progress: no change  Outcome Evaluation: PT eval completed. Pt alert and oriented x4 with c/o weakness and fatigue. Pt reports independence at home prior to arrival. Today, pt performs all mobility with SBA or better on RA with sats in 90s throughout session. Pt slightly unsteady during gait and reports feeling weaker than usual. Pt left sitting in chair and encouraged to perform OOB activity often to return to PLOF. Pt will benefit from skilled PT to improve strength and endurance. Rec d/c home.    Anticipated Discharge Disposition (PT): home with assist

## 2025-04-24 LAB
BACTERIA SPEC AEROBE CULT: NORMAL
BACTERIA SPEC AEROBE CULT: NORMAL

## 2025-04-24 NOTE — THERAPY DISCHARGE NOTE
Acute Care - Physical Therapy Discharge Summary  [facility]       Patient Name: Dayron Lubin  : 1955  MRN: [id number]    Today's Date: 2025                 Admit Date: 2025      PT Recommendation and Plan    Visit Dx:    ICD-10-CM ICD-9-CM   1. Pneumonia of both lungs due to infectious organism, unspecified part of lung  J18.9 483.8   2. Acute hypoxic respiratory failure  J96.01 518.81   3. Impaired mobility [Z74.09]  Z74.09 799.89                PT Rehab Goals       Row Name 25 0800             Bed Mobility Goal 1 (PT)    Activity/Assistive Device (Bed Mobility Goal 1, PT) sit to supine;supine to sit;rolling to left;rolling to right  -AB      Gage Level/Cues Needed (Bed Mobility Goal 1, PT) independent  -AB      Time Frame (Bed Mobility Goal 1, PT) long term goal (LTG)  -AB      Progress/Outcomes (Bed Mobility Goal 1, PT) goal not met  -AB         Transfer Goal 1 (PT)    Activity/Assistive Device (Transfer Goal 1, PT) sit-to-stand/stand-to-sit;bed-to-chair/chair-to-bed  -AB      Gage Level/Cues Needed (Transfer Goal 1, PT) independent  -AB      Time Frame (Transfer Goal 1, PT) long term goal (LTG)  -AB      Progress/Outcome (Transfer Goal 1, PT) goal not met  -AB         Gait Training Goal 1 (PT)    Activity/Assistive Device (Gait Training Goal 1, PT) gait (walking locomotion);increase endurance/gait distance;increase energy conservation  -AB      Gage Level (Gait Training Goal 1, PT) independent  -AB      Distance (Gait Training Goal 1, PT) 200  -AB      Time Frame (Gait Training Goal 1, PT) long term goal (LTG)  -AB      Progress/Outcome (Gait Training Goal 1, PT) goal not met  -AB         Stairs Goal 1 (PT)    Activity/Assistive Device (Stairs Goal 1, PT) stairs, all skills;ascending stairs;descending stairs;using handrail, left  -AB      Gage Level/Cues Needed (Stairs Goal 1, PT) modified independence  -AB      Number of Stairs (Stairs Goal 1, PT) 2   -AB      Time Frame (Stairs Goal 1, PT) long term goal (LTG)  -AB      Progress/Outcome (Stairs Goal 1, PT) goal not met  -AB                User Key  (r) = Recorded By, (t) = Taken By, (c) = Cosigned By      Initials Name Provider Type Discipline    Edda Ambrose, PTA Physical Therapist Assistant PT                        PT Discharge Summary  Anticipated Discharge Disposition (PT): home with assist  Reason for Discharge: Discharge from facility  Outcomes Achieved: Refer to plan of care for updates on goals achieved  Discharge Destination: Home with assist      Edda Moura PTA   4/24/2025

## 2025-04-24 NOTE — OUTREACH NOTE
Prep Survey      Flowsheet Row Responses   [facility] facility patient discharged from? [facility]   Is LACE score < 7 ? No   Eligibility Readm Mgmt   Discharge diagnosis Bacterial pneumonia   Does the patient have one of the following disease processes/diagnoses(primary or secondary)? Pneumonia   Does the patient have Home health ordered? No   Is there a DME ordered? No   Prep survey completed? Yes            MIGUE ARIZA - Registered Nurse

## 2025-04-30 ENCOUNTER — TELEPHONE (OUTPATIENT)
Dept: OTOLARYNGOLOGY | Facility: CLINIC | Age: 70
End: 2025-04-30
Payer: MEDICARE

## 2025-04-30 NOTE — TELEPHONE ENCOUNTER
Phone call to Dr. Herberth Nguyen requesting clearance for surgery.  They requested fax request.  Fax request sent

## 2025-05-05 ENCOUNTER — READMISSION MANAGEMENT (OUTPATIENT)
Dept: CALL CENTER | Facility: HOSPITAL | Age: 70
End: 2025-05-05
Payer: MEDICARE

## 2025-05-05 ENCOUNTER — TELEPHONE (OUTPATIENT)
Dept: OTOLARYNGOLOGY | Facility: CLINIC | Age: 70
End: 2025-05-05

## 2025-05-05 NOTE — TELEPHONE ENCOUNTER
Caller: Yuliya Stone    Relationship to patient: Emergency Contact    Best call back number: [phone]    Patient is needing: DR [facility] DECLINED CARDIAC CLEARANCE AND RECOMMENDS THAT PT F/U WITH A CARDIOLOGY PROVIDER.

## 2025-05-05 NOTE — OUTREACH NOTE
COPD/PN Week 2 Survey      Flowsheet Row Responses   [facility] facility patient discharged from? [facility]   Does the patient have one of the following disease processes/diagnoses(primary or secondary)? Pneumonia   Week 2 attempt successful? No   Unsuccessful attempts Attempt 1            Janel TSE - Registered Nurse

## 2025-05-09 ENCOUNTER — READMISSION MANAGEMENT (OUTPATIENT)
Dept: CALL CENTER | Facility: HOSPITAL | Age: 70
End: 2025-05-09
Payer: MEDICARE

## 2025-05-09 NOTE — OUTREACH NOTE
COPD/PN Week 2 Survey      Flowsheet Row Responses   [facility] facility patient discharged from? [facility]   Does the patient have one of the following disease processes/diagnoses(primary or secondary)? Pneumonia   Week 2 attempt successful? No   Unsuccessful attempts Attempt 2            Aaliyah JEFFREY - Licensed Nurse

## 2025-05-14 ENCOUNTER — APPOINTMENT (OUTPATIENT)
Dept: GENERAL RADIOLOGY | Facility: HOSPITAL | Age: 70
End: 2025-05-14
Payer: MEDICARE

## 2025-05-14 PROCEDURE — 71046 X-RAY EXAM CHEST 2 VIEWS: CPT

## 2025-05-20 ENCOUNTER — TELEPHONE (OUTPATIENT)
Dept: OTOLARYNGOLOGY | Facility: CLINIC | Age: 70
End: 2025-05-20
Payer: MEDICARE

## 2025-05-20 ENCOUNTER — READMISSION MANAGEMENT (OUTPATIENT)
Dept: CALL CENTER | Facility: HOSPITAL | Age: 70
End: 2025-05-20
Payer: MEDICARE

## 2025-05-20 ENCOUNTER — OFFICE VISIT (OUTPATIENT)
Dept: OTOLARYNGOLOGY | Facility: CLINIC | Age: 70
End: 2025-05-20
Payer: MEDICARE

## 2025-05-20 VITALS — BODY MASS INDEX: 26.6 KG/M2 | HEIGHT: 71 IN | WEIGHT: 190 LBS

## 2025-05-20 DIAGNOSIS — R79.1 ABNORMAL COAGULATION PROFILE: ICD-10-CM

## 2025-05-20 DIAGNOSIS — R09.81 NASAL CONGESTION: ICD-10-CM

## 2025-05-20 DIAGNOSIS — J34.3 NASAL TURBINATE HYPERTROPHY: ICD-10-CM

## 2025-05-20 DIAGNOSIS — I42.1 HOCM (HYPERTROPHIC OBSTRUCTIVE CARDIOMYOPATHY): Primary | Chronic | ICD-10-CM

## 2025-05-20 DIAGNOSIS — J34.89 CONCHA BULLOSA: ICD-10-CM

## 2025-05-20 DIAGNOSIS — J33.9 NASAL POLYPOSIS: Primary | ICD-10-CM

## 2025-05-20 RX ORDER — FERROUS GLUCONATE 324(38)MG
324 TABLET ORAL
COMMUNITY

## 2025-05-20 NOTE — TELEPHONE ENCOUNTER
Patient having sinus surgery on 6/25, requesting cardiac clearance.  Patient is also on blood thinners and would like for patient to be off ASA 7 days prior and Eliquis 2 days prior to surgery.  Please advise

## 2025-05-20 NOTE — OUTREACH NOTE
COPD/PN Week 3 Survey      Flowsheet Row Responses   [facility] facility patient discharged from? [place]   Does the patient have one of the following disease processes/diagnoses(primary or secondary)? Pneumonia   Week 3 attempt successful? No   Unsuccessful attempts Attempt 1   Revoke Other            Natalia H - Registered Nurse

## 2025-05-20 NOTE — PROGRESS NOTES
YOB: 1955  Location: [facility]  Location Address: [address] [place] [address],  [address], [address] [place] [address]  Location Phone: [phone]    Chief Complaint   Patient presents with    Sinus Problem     Discuss sinus surgery       History of Present Illness  Dayron Lubin is a 69 y.o. male.  Dayron Lubin is here for follow up of ENT complaints. The patient has had problems with issues.  He complains of nasal congestion and difficulty breathing through the nose.  Symptoms have been present for the last several months.  He does have a known left nasal polyp.  He was previously scheduled for sinus surgery but this was canceled due to hospitalization.  Primary care has released him pending cardiac clearance.  He has been on several medications including oral antihistamines and nasal sprays in the past.    Reviewed:    Study Result    Narrative & Impression   CT SINUS WO CONTRAST- 2024 11:57 AM     HISTORY: nasal polyp; J33.9-Nasal polyp, unspecified     COMPARISON: None available     DOSE LENGTH PRODUCT: 392.55 mGy.cm. Automated exposure control was also  utilized to decrease patient radiation dose.     TECHNIQUE: Helical tomographic images of the sinuses were obtained  without contrast. Multiplanar reformatted images were provided for  review.     FINDINGS:     There is mucosal thickening at the bilateral frontal sinuses left  greater than right. There is mucosal thickening at the bilateral ethmoid  air cells, sphenoid sinus, and bilateral maxillary sinuses. The mastoid  air cells are clear. No air-fluid levels are identified.     Small osteoma is noted posteriorly in the right ethmoid air cells. No  bony erosive or destructive changes are seen. Visualized scalp soft  tissues are unremarkable. Bilateral orbits and globes are unremarkable.  Soft tissues of the face and visualized neck are grossly unremarkable.  There is bilateral carotid calcification which is partially imaged.     There is nasal  septal deviation to the right. Mucosal thickening narrows  the ostia at the bilateral ostiomeatal complexes. There is angeles  bullosa deformity of the left middle turbinate.     IMPRESSION:     1. Paranasal sinus mucosal thickening worse at the ethmoid air cells.  2. Nasal septal deviation to the right and mucosal thickening narrows  the ostia of the bilateral ostiomeatal complexes.  3. No air-fluid levels or erosive changes are identified.                 This report was signed and finalized on 8/12/2024 1:45 PM by Vincent Alvarez.        Past Medical History:   Diagnosis Date    A-fib     Alcoholic hepatitis 07/13/2023    CAD (coronary artery disease)     History of external beam radiation therapy 05/12/2016    6840 cGy to prostate bed    History of transfusion     Hyperlipidemia     Hypertension     Insomnia     Prostate cancer        Past Surgical History:   Procedure Laterality Date    CARDIAC CATHETERIZATION      CORONARY ANGIOPLASTY WITH STENT PLACEMENT      FRACTURE SURGERY      PROSTATE SURGERY      TONSILLECTOMY         Outpatient Medications Marked as Taking for the 5/20/25 encounter (Office Visit) with Robin De La Rosa MD   Medication Sig Dispense Refill    amiodarone (PACERONE) 200 MG tablet Take 1 tablet by mouth Daily. 90 tablet 1    apixaban (ELIQUIS) 5 MG tablet tablet Take 1 tablet by mouth 2 (Two) Times a Day.      aspirin 81 MG EC tablet Take 1 tablet by mouth Daily.      atorvastatin (LIPITOR) 20 MG tablet Take 1 tablet by mouth Daily.      azelastine (ASTELIN) 0.1 % nasal spray Administer 2 sprays into the nostril(s) as directed by provider 2 (Two) Times a Day for 30 days. Use in each nostril as directed 30 mL 0    azithromycin (ZITHROMAX) 250 MG tablet Take 2 tablets the first day, then 1 tablet daily for 4 days. 6 tablet 0    busPIRone (BUSPAR) 5 MG tablet Take 1 tablet by mouth 3 (Three) Times a Day.      ferrous gluconate (FERGON) 324 MG tablet Take 1 tablet by mouth Daily With  Breakfast.      folic acid (FOLVITE) 1 MG tablet Take 1 tablet by mouth Daily.      metoprolol succinate XL (TOPROL-XL) 25 MG 24 hr tablet Take 1 tablet by mouth Daily.      pantoprazole (PROTONIX) 40 MG EC tablet Take 1 tablet by mouth Daily.      thiamine (VITAMIN B-1) 100 MG tablet  tablet Take 1 tablet by mouth Daily.         Patient has no known allergies.    Family History   Problem Relation Age of Onset    Heart disease Mother     Coronary artery disease Mother     Stroke Mother        Social History     Socioeconomic History    Marital status: Single   Tobacco Use    Smoking status: Never    Smokeless tobacco: Never   Vaping Use    Vaping status: Never Used   Substance and Sexual Activity    Alcohol use: Not Currently     Alcohol/week: 50.0 standard drinks of alcohol     Types: 50 Shots of liquor per week    Drug use: Not Currently     Types: Marijuana    Sexual activity: Defer       Review of Systems   Constitutional: Negative.    HENT:  Positive for congestion, sinus pressure and sinus pain.        There were no vitals filed for this visit.    Body mass index is 26.5 kg/m².    Objective     Physical Exam  Vitals reviewed.   Constitutional:       Appearance: Normal appearance.   HENT:      Head: Normocephalic.      Right Ear: External ear normal.      Left Ear: External ear normal.      Nose:      Comments: Large polyp noted to the left nostril     Mouth/Throat:      Lips: Pink.      Mouth: Mucous membranes are moist.      Pharynx: Oropharynx is clear.   Musculoskeletal:      Cervical back: Full passive range of motion without pain.   Neurological:      Mental Status: He is alert.   Psychiatric:         Behavior: Behavior is cooperative.         Assessment & Plan   Diagnoses and all orders for this visit:    1. Nasal polyposis (Primary)    2. Nasal turbinate hypertrophy    3. Estelle bullosa    4. Nasal congestion    5. Abnormal coagulation profile      * Surgery not found *  No orders of the defined types  were placed in this encounter.    We will obtain cardiac clearance prior to surgery.  Tentatively on schedule for June 25.  This was discussed at length with patient.    FUNCTIONAL ENDOSCOPIC SINUS SURGERY with angeles bullosa resection and left polypectomy: A functional endoscopic sinus surgery was recommended. The risks and benefits were explained including but not limited to pain, bleeding (with the possible need for nasal packing), infection, risks of the general anesthesia, orbital injury with blurred vision or visual loss, cerebrospinal fluid leak, persistent disease, scarring, synichiae and the possibility for the need of reoperation. Possibilities of additional sinus work or less sinus work depending on the status of the nose at the time of the operation was discussed. Alternatives were discussed. No guarantees were made or implied. Questions were asked appropriately answered.       SEPTOPLASTY AND TURBINOPLASTY: A septoplasty and inferior turbinoplasty were recommended. The risks and benefits were explained including but not limited to pain, bleeding, infection, risks of the general anesthesia, continued septal deviation, crusting, congestion and septal perforation. Possibilities of continued preoperative symptoms and the possible need for revision surgery and or medical therapy were discussed. Alternatives were discussed. No guarantees were made or implied. Questions were asked appropriately answered.       Return for post op.       Patient Instructions   FUNCTIONAL ENDOSCOPIC SINUS SURGERY with angeles bullosa resection and left polypectomy: A functional endoscopic sinus surgery was recommended. The risks and benefits were explained including but not limited to pain, bleeding (with the possible need for nasal packing), infection, risks of the general anesthesia, orbital injury with blurred vision or visual loss, cerebrospinal fluid leak, persistent disease, scarring, synichiae and the possibility for the  need of reoperation. Possibilities of additional sinus work or less sinus work depending on the status of the nose at the time of the operation was discussed. Alternatives were discussed. No guarantees were made or implied. Questions were asked appropriately answered.     SEPTOPLASTY AND TURBINOPLASTY: A septoplasty and inferior turbinoplasty were recommended. The risks and benefits were explained including but not limited to pain, bleeding, infection, risks of the general anesthesia, continued septal deviation, crusting, congestion and septal perforation. Possibilities of continued preoperative symptoms and the possible need for revision surgery and or medical therapy were discussed. Alternatives were discussed. No guarantees were made or implied. Questions were asked appropriately answered.

## 2025-05-20 NOTE — PATIENT INSTRUCTIONS
FUNCTIONAL ENDOSCOPIC SINUS SURGERY with angeles bullosa resection and left polypectomy: A functional endoscopic sinus surgery was recommended. The risks and benefits were explained including but not limited to pain, bleeding (with the possible need for nasal packing), infection, risks of the general anesthesia, orbital injury with blurred vision or visual loss, cerebrospinal fluid leak, persistent disease, scarring, synichiae and the possibility for the need of reoperation. Possibilities of additional sinus work or less sinus work depending on the status of the nose at the time of the operation was discussed. Alternatives were discussed. No guarantees were made or implied. Questions were asked appropriately answered.     SEPTOPLASTY AND TURBINOPLASTY: A septoplasty and inferior turbinoplasty were recommended. The risks and benefits were explained including but not limited to pain, bleeding, infection, risks of the general anesthesia, continued septal deviation, crusting, congestion and septal perforation. Possibilities of continued preoperative symptoms and the possible need for revision surgery and or medical therapy were discussed. Alternatives were discussed. No guarantees were made or implied. Questions were asked appropriately answered.

## 2025-05-21 ENCOUNTER — TELEPHONE (OUTPATIENT)
Dept: OTOLARYNGOLOGY | Facility: CLINIC | Age: 70
End: 2025-05-21
Payer: MEDICARE

## 2025-05-21 NOTE — TELEPHONE ENCOUNTER
Left message for patient advising that cardiology would not clear for surgery until seen in the CHF clinic.  Advised they will be calling to get an appt set up and we would adjust surgery date if needed to based of when seen in cardiology clinic.  Advised to call with any questions

## 2025-05-21 NOTE — TELEPHONE ENCOUNTER
Read Dr. Haque recommendations and discussed with Dr. Stephens. Due to the patient's recent echo results needs to be seen in CHF clinic. As it relates to his aspirin, we recommended that he maintain aspirin 81 mg daily without interruption due to his prior cardiac stent.    Orders placed for referral. I will have our office to reach out to the patient to update him of the recommendations.     Edda Li, BRI  05/21/25  13:18 CDT

## 2025-05-22 ENCOUNTER — HOSPITAL ENCOUNTER (OUTPATIENT)
Dept: CARDIOLOGY | Facility: HOSPITAL | Age: 70
Discharge: HOME | End: 2025-05-22
Admitting: HOSPITALIST
Payer: MEDICARE

## 2025-05-22 VITALS
HEART RATE: 74 BPM | WEIGHT: 191 LBS | BODY MASS INDEX: 26.64 KG/M2 | SYSTOLIC BLOOD PRESSURE: 128 MMHG | OXYGEN SATURATION: 99 % | DIASTOLIC BLOOD PRESSURE: 65 MMHG

## 2025-05-22 DIAGNOSIS — I42.1 HOCM (HYPERTROPHIC OBSTRUCTIVE CARDIOMYOPATHY): Primary | ICD-10-CM

## 2025-05-22 DIAGNOSIS — I10 ESSENTIAL HYPERTENSION: ICD-10-CM

## 2025-05-22 DIAGNOSIS — I48.0 PAROXYSMAL ATRIAL FIBRILLATION: ICD-10-CM

## 2025-05-22 LAB
ABSOLUTE LUNG FLUID CONTENT: 32 % (ref 20–35)
ANION GAP SERPL CALCULATED.3IONS-SCNC: 11 MMOL/L (ref 5–15)
BUN SERPL-MCNC: 19 MG/DL (ref 8–23)
BUN/CREAT SERPL: 21.6 (ref 7–25)
CALCIUM SPEC-SCNC: 9.1 MG/DL (ref 8.6–10.5)
CHLORIDE SERPL-SCNC: 103 MMOL/L (ref 98–107)
CO2 SERPL-SCNC: 25 MMOL/L (ref 22–29)
CREAT SERPL-MCNC: 0.88 MG/DL (ref 0.76–1.27)
EGFRCR SERPLBLD CKD-EPI 2021: 92.5 ML/MIN/1.73
GLUCOSE SERPL-MCNC: 119 MG/DL (ref 65–99)
NT-PROBNP SERPL-MCNC: 691.5 PG/ML (ref 0–900)
POTASSIUM SERPL-SCNC: 4.1 MMOL/L (ref 3.5–5.2)
QT INTERVAL: 466 MS
QTC INTERVAL: 469 MS
SODIUM SERPL-SCNC: 139 MMOL/L (ref 136–145)

## 2025-05-22 PROCEDURE — 93005 ELECTROCARDIOGRAM TRACING: CPT | Performed by: HOSPITALIST

## 2025-05-22 PROCEDURE — 80048 BASIC METABOLIC PNL TOTAL CA: CPT | Performed by: HOSPITALIST

## 2025-05-22 PROCEDURE — 83880 ASSAY OF NATRIURETIC PEPTIDE: CPT

## 2025-05-22 PROCEDURE — 94726 PLETHYSMOGRAPHY LUNG VOLUMES: CPT | Performed by: HOSPITALIST

## 2025-05-22 RX ORDER — METOPROLOL SUCCINATE 25 MG/1
37.5 TABLET, EXTENDED RELEASE ORAL DAILY
Qty: 45 TABLET | Refills: 11 | Status: SHIPPED | OUTPATIENT
Start: 2025-05-22

## 2025-05-22 RX ORDER — LOSARTAN POTASSIUM 25 MG/1
25 TABLET ORAL DAILY
COMMUNITY

## 2025-05-22 NOTE — PROGRESS NOTES
Reason For Visit:  HOCM    Subjective        Dayron Lubin is a 69 y.o. male with the below pertinent PMH who is referred for HOCM.    The patient follows with Dr. Stephens but has not been seen in general cardiology clinic since 12/19/2022.  He has more recently been following up with the EP.  He was last seen in EP clinic 10/23/2024 at which time he was being maintained on amiodarone for atrial fibrillation.  There were discussions about an ICD, but the patient was very nervous about implantation.  There were also discussions about an ablation potentially in 2025.    More recently, the patient reports that he was hospitalized for pneumonia and had issues with anemia around that time as well.  He was having more shortness of breath with the pneumonia as well as increased fatigue related to the anemia.  Hemoglobin levels have been improving, and his fatigue has been improving.  He feels like his breathing is back to his baseline.  Over the course of the past year, he states that outside of those acute issues he has not had a lot of trouble with any new/worsening shortness of breath.  That being said, he does endorse a relatively sedentary baseline activity level.  He has not had any exertional chest discomfort or lightheadedness.  He denies a history of syncope.  He denies any significant issues with peripheral edema.    ROS: Pertinent findings are included above.    Cardiac Studies  Stress echo 10/7/2014: Negative for inducible ischemia.  Severe LVH.  Echo 12/18/2019: LVEF 61 to 65%, moderate concentric LVH, findings consistent with HOCM, G1DD, LA significant valvular abnormality identified.  Echo 8/12/2023: LVEF 46-50%.  HOCM with LVOT peak gradient 86.  AV heavily calcified but unclear severity given complexity of assessment with LVOT gradient.  Apical akinesis.  G1DD.  LA mildly to moderately dilated.  RVSP moderately elevated.  Normal RV size/function.  Echo 7/29/2024: LVEF 51-55%.  Moderate to severe  concentric LVH.  Mild to moderate MR.  AV thickening calcified with some degree of AV stenosis, likely moderate, most of the flow acceleration appears to be in the LVOT as a result of HOCM.  Cardiac monitor 8/13/2024: Abnormal but with no high risk features.  Predominantly sinus rhythm with average rate 76.  Rare PACs with a few short runs of asymptomatic SVT lasting up to 9 seconds.  Occasional PVCs.  No prolonged atrial or ventricular arrhythmias.  No symptoms reported.  Cardiac MRI 10/4/2024: Evidence of hypertrophic cardiomyopathy with LVOT obstruction.  IV septum 2.28 cm compared to 1 cm in the inferior lateral wall.  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  No evidence of LV thrombus.  LGE of the RV insertion sites as can be seen in hypertrophic cardiomyopathy.  Posteriorly directed MR.  Moderate thickening of the AV leaflets noted with elevated velocities (peak velocity to 71.13 cm/s suggestive of mild to moderate AV stenosis).    Echo 4/20/25: LVEF 50%.  Moderate to severe asymmetric septal hypertrophy.  Findings consistent with HOCM; LVOT gradient greater than 80 mmHg.  LV apical aneurysm with hyperdynamic mid ventricular contractility.  Severely calcified AV with some degree of stenosis although difficult to assess transvalvular gradient in the context of LVOT gradient but suspected to be at least moderate AV stenosis.  Mild to moderate MR anteriorly directed.  LA severely dilated.  Normal RV size/function.  RA dilated.  No other significant valvular pathology.  Mild pulmonary hypertension.  No significant change compared to 7/29/2024.    Pertinent PMH  Hypertrophic obstructive cardiomyopathy with apical aneurysm  CAD s/p RCA PCI  CHF with recovered LVEF  Moderate AV stenosis  Hypertension  Hyperlipidemia  Paroxysmal atrial fibrillation  Frequent PVCs  Alcohol use disorder  Prostate cancer    Pertinent past medical, surgical, family, and social history were reviewed and updated in the  "EMR.      Current Outpatient Medications:     amiodarone (PACERONE) 200 MG tablet, Take 1 tablet by mouth Daily., Disp: 90 tablet, Rfl: 1    apixaban (ELIQUIS) 5 MG tablet tablet, Take 1 tablet by mouth 2 (Two) Times a Day., Disp: , Rfl:     aspirin 81 MG EC tablet, Take 1 tablet by mouth Daily., Disp: , Rfl:     atorvastatin (LIPITOR) 20 MG tablet, Take 1 tablet by mouth Daily., Disp: , Rfl:     azelastine (ASTELIN) 0.1 % nasal spray, Administer 2 sprays into the nostril(s) as directed by provider 2 (Two) Times a Day for 30 days. Use in each nostril as directed, Disp: 30 mL, Rfl: 0    azithromycin (ZITHROMAX) 250 MG tablet, Take 2 tablets the first day, then 1 tablet daily for 4 days., Disp: 6 tablet, Rfl: 0    busPIRone (BUSPAR) 5 MG tablet, Take 1 tablet by mouth 3 (Three) Times a Day., Disp: , Rfl:     ferrous gluconate (FERGON) 324 MG tablet, Take 1 tablet by mouth Daily With Breakfast., Disp: , Rfl:     folic acid (FOLVITE) 1 MG tablet, Take 1 tablet by mouth Daily., Disp: , Rfl:     metoprolol succinate XL (TOPROL-XL) 25 MG 24 hr tablet, Take 1 tablet by mouth Daily., Disp: , Rfl:     pantoprazole (PROTONIX) 40 MG EC tablet, Take 1 tablet by mouth Daily., Disp: , Rfl:     thiamine (VITAMIN B-1) 100 MG tablet  tablet, Take 1 tablet by mouth Daily., Disp: , Rfl:      Objective   Vital Signs:  /65 (BP Location: Left arm, Patient Position: Sitting)   Pulse 74   Wt 86.6 kg (191 lb)   SpO2 99%   BMI 26.64 kg/m²   Estimated body mass index is 26.64 kg/m² as calculated from the following:    Height as of 5/20/25: 180.3 cm (71\").    Weight as of this encounter: 86.6 kg (191 lb).      Constitutional:       Appearance: Healthy appearance. Not in distress.   Neck:      Vascular: JVD normal.   Pulmonary:      Effort: Pulmonary effort is normal.      Breath sounds: Normal breath sounds.   Cardiovascular:      Normal rate. Regular rhythm.      Murmurs: There is a grade 3/6 systolic murmur at the LLSB. The " intensity increases with Valsalva.      No gallop.  No click. No rub.   Edema:     Peripheral edema absent.   Abdominal:      General: There is no distension.      Palpations: Abdomen is soft.      Tenderness: There is no abdominal tenderness.   Skin:     General: Skin is warm and dry.   Neurological:      Mental Status: Alert and oriented to person, place and time.        Result Review :  The following data was reviewed by: Herberth Clemons MD on 05/22/2025:  BMP   BMP          4/22/2025    03:38 4/23/2025    02:58 5/22/2025    10:18   BMP   BUN 29  25  19    Creatinine 0.91  0.88  0.88    Sodium 136  137  139    Potassium 4.6  4.9  4.1    Chloride 102  99  103    CO2 23.0  25.0  25.0    Calcium 9.3  9.6  9.1      Pro-BNP       Lab 05/22/25  1018   PROBNP 691.5          Assessment and Plan   Diagnoses and all orders for this visit:    1. HOCM (hypertrophic obstructive cardiomyopathy) (Primary)  2. Essential hypertension  3. Paroxysmal atrial fibrillation  - The majority of the visit was spent in a long discussion regarding education on HOCM and the different aspects of HOCM management including reducing LVOT gradient, symptom control, and sudden cardiac death risk.  We also did discuss the interplay between atrial fibrillation and HOCM.  He does appear to have possibly NYHA class II CHF symptoms with his HOCM although has a relatively sedentary lifestyle and it is difficult to discern if he may have more symptoms if he was doing more activity.  He likely would be a poor candidate for Camzyos given his baseline low LV function with history of mildly reduced LV systolic function.  Additionally, current symptom level likely would not warrant any more aggressive therapy.  I do think that he would benefit from titrating up the dose of his beta-blocker or potentially adding a calcium channel blocker, but this may be limited by HR.  I did strongly encourage him to follow-up with EP and consider an ablation followed by  discontinuation of amiodarone.  He then may be able to tolerate more agents to help with his HOCM.  Additionally, given his recurrent anemia issues, I do think that he would be a good candidate for a combined watchman procedure.  I had a long discussion with him regarding risk/benefits of this, and he would like to proceed.  I will work to coordinate this.  I did also encourage him to proceed with a defibrillator, and he is still going to consider this.  - Increase Toprol-XL to 37.5 mg daily  - Continue losartan 25 mg daily  - Continue Eliquis 5 mg twice daily  - Continue amiodarone 200 mg daily  - Follow-up with EP  - Counseled on screening for first-degree relatives as well as potential genetic testing.  He would like to pursue genetic testing.    Follow Up   Return in about 3 months (around 8/22/2025).  Patient was given instructions and counseling regarding his condition or for health maintenance advice. Please see specific information pulled into the AVS if appropriate.     I spent 65 minutes caring for Dayron on this date of service. This time includes time spent by me in the following activities:preparing for the visit, reviewing tests, performing a medically appropriate examination and/or evaluation , counseling and educating the patient/family/caregiver, ordering medications, tests, or procedures, documenting information in the medical record, and care coordination  Time spent on the separately reported service of ECG and ReDS interpretation/documentation is not included in the time used to support the E/M service also reported today.    EMR Dragon/Transcription disclaimer: Much of this encounter note is an electronic transcription/translation of spoken language to printed text. The electronic translation of spoken language may permit erroneous, or at times, nonsensical words or phrases to be inadvertently transcribed; although I have reviewed the note for such errors, some may still exist.

## 2025-05-22 NOTE — PROGRESS NOTES
Heart Failure Clinic    Date:  05/22/25     Vitals:    05/22/25 1009   BP: 128/65   Pulse: 74   SpO2: 99%        Indication:  Heart Failure    Procedure:  ReDS device sensor unit applied to right side of chest and right side of back.  Appropriate positioning confirmed based off of the unit's calculation.  Chest measurement obtained with the chest size ruler.  Measurement session performed over 45 seconds.      Results:  ReDS Value=32    Interpretation:  25-35% - normal/ideal lung fluid content    Melyssa Fu RN 05/22/25 10:24 CDT

## 2025-05-28 ENCOUNTER — TELEPHONE (OUTPATIENT)
Dept: CARDIOLOGY | Facility: CLINIC | Age: 70
End: 2025-05-28

## 2025-05-28 NOTE — TELEPHONE ENCOUNTER
Caller: Yuliya Stone    Relationship: Emergency Contact    Best call back number: [phone]    What is the best time to reach you: ANY    Who are you requesting to speak with (clinical staff, provider,  specific staff member): HERNESTO    What was the call regarding: PT'S SISTER CALLED IN RETURNING A CALL TO [place] - POTENTIALLY HAVING TO DO WITH AN APPOINTMENT? THERE IS NO NOTE LEFT OF CALL IN CHART SO UNABLE TO FIND ANY FURTHER DETAIL. PLS CALL SISTER AT ABOVE NUMBER - THANKS

## 2025-06-03 ENCOUNTER — TELEPHONE (OUTPATIENT)
Dept: OTOLARYNGOLOGY | Facility: CLINIC | Age: 70
End: 2025-06-03
Payer: MEDICARE

## 2025-06-03 NOTE — TELEPHONE ENCOUNTER
Checking status of the cardiac clearance request for patient. Patient having sinus surgery on 6/25.  Patient is also on blood thinners and would like for patient to be off ASA 7 days prior and Eliquis 2 days prior to surgery.  Please advise

## 2025-06-04 DIAGNOSIS — J33.9 NASAL POLYPOSIS: Primary | ICD-10-CM

## 2025-06-04 DIAGNOSIS — R79.1 ABNORMAL COAGULATION PROFILE: ICD-10-CM

## 2025-06-04 DIAGNOSIS — R09.81 NASAL CONGESTION: ICD-10-CM

## 2025-06-04 DIAGNOSIS — J34.89 CONCHA BULLOSA: ICD-10-CM

## 2025-06-04 DIAGNOSIS — J34.3 NASAL TURBINATE HYPERTROPHY: ICD-10-CM

## 2025-06-04 RX ORDER — OXYMETAZOLINE HYDROCHLORIDE 0.05 G/100ML
2 SPRAY NASAL
OUTPATIENT
Start: 2025-06-04 | End: 2025-06-04

## 2025-06-04 NOTE — TELEPHONE ENCOUNTER
Patient sister notified to hold Eliquis 2 days prior to surgery .  Sister voiced understanding

## 2025-06-04 NOTE — TELEPHONE ENCOUNTER
Herberth Clemons MD  You; My Haque MD12 hours ago (9:43 PM)       I have not received a cardiac clearance request for him; it may have gone to his primary cardiologist. That being said, based on his recent visit with me I would consider the patient at elevated but not preclusive perioperative cardiac risk given his history of CAD and CHF. That being said, he is currently well compensated and does not have any angina, and there are no further tests or interventions recommended that would meaningfully mitigate his risk status. I agree with the blood thinner recommendations as outlined by Dr. Haque. Thank you!     My Haque MD  You; Herberth Clemons MD13 hours ago (8:52 PM)       It is reasonable to stop his apixaban 48h prior to surgery and resume once able.  I recommend against stopping aspirin given his history of RCA PCI.    Thanks,  My Haque

## 2025-06-12 ENCOUNTER — OFFICE VISIT (OUTPATIENT)
Dept: GASTROENTEROLOGY | Facility: CLINIC | Age: 70
End: 2025-06-12
Payer: MEDICARE

## 2025-06-12 VITALS
WEIGHT: 191 LBS | TEMPERATURE: 97.7 F | HEART RATE: 59 BPM | BODY MASS INDEX: 26.74 KG/M2 | SYSTOLIC BLOOD PRESSURE: 122 MMHG | OXYGEN SATURATION: 99 % | HEIGHT: 71 IN | DIASTOLIC BLOOD PRESSURE: 82 MMHG

## 2025-06-12 DIAGNOSIS — K21.9 GASTROESOPHAGEAL REFLUX DISEASE, UNSPECIFIED WHETHER ESOPHAGITIS PRESENT: ICD-10-CM

## 2025-06-12 DIAGNOSIS — Z79.01 CHRONIC ANTICOAGULATION: ICD-10-CM

## 2025-06-12 DIAGNOSIS — Z86.0101 HISTORY OF ADENOMATOUS POLYP OF COLON: ICD-10-CM

## 2025-06-12 DIAGNOSIS — D50.9 IRON DEFICIENCY ANEMIA, UNSPECIFIED IRON DEFICIENCY ANEMIA TYPE: Primary | ICD-10-CM

## 2025-06-12 DIAGNOSIS — F10.10 ALCOHOL ABUSE: ICD-10-CM

## 2025-06-12 NOTE — PROGRESS NOTES
Primary Physician: Herberth Nguyen Jr., MD    Chief Complaint   Patient presents with    Anemia     Pt presents today for hospital follow up-was in [facility] 4/18/2025 with pneumonia and was found to be anemic-Dr. Lei recommended outpatient endo/colon; Pt's last endo/colon was 6/29/2020 by Dr. Chris De La Rosa; Pt states he is feeling good-had labs for PCP about 6 weeks ago and was started on iron        Subjective     Dayron Lubin is a 69 y.o. male.    HPI  Iron deficiency anemia  Patient seen in the office today for further evaluation of iron deficiency anemia.    Pt denies any bright red blood in his stools.  No melena stools.  No abdominal pain.  Pt reports he has 1-2 BM's per day.  No change in bowel habits.  No diarrhea/constipation.  No family hx colon cancer    He has had reflux in the past.  Pt takes Protonix once daily and this helps heartburn.  No dysphagia. No N/V.    4/23/2025 H&H 9.1/29.4  4/22/2025 H&H 7.9/25.6  4/21/2025 H&H 7.8/25.8  4/20/2025 H&H 7.7/25  4/19/2025 H&H 6.7/21.5    4/20/2025 iron profile showing serum iron 30 (59-1 58), iron saturation 6% (20-50)    6/29/2020 endoscopy/colonoscopy at [facility] for evaluation of anemia and heme positive stools.  Endoscopy showing mucosal changes suggestive of gastritis while the duodenum appeared normal.  Colonoscopy revealing a 1 cm sessile adenomatous polyp in the transverse colon.      Past Medical History:   Diagnosis Date    A-fib     Alcoholic hepatitis 07/13/2023    CAD (coronary artery disease)     Diverticulosis     Family history of colonic polyps     History of adenomatous polyp of colon     History of external beam radiation therapy 05/12/2016    6840 cGy to prostate bed    History of prostate cancer     History of transfusion     Hyperlipidemia     Hypertension     Insomnia        Past Surgical History:   Procedure Laterality Date    CARDIAC CATHETERIZATION      COLONOSCOPY  06/29/2020    Dr. Chris De La Rosa-The mucosa appeared normal  throughout the entire examined colon; One 1cm sessile tubular adenomatous polyp in the proximal transverse colon; Diverticular disease throughout the left colon; Repeat 3 years    COLONOSCOPY  12/06/2017    Dr. Boland-Diverticulosis; Otherwise normal; Repeat 5 years    CORONARY ANGIOPLASTY WITH STENT PLACEMENT      ENDOSCOPY  06/29/2020    Dr. Chris De La Rosa-Normal esophagus; Mild mucosal changes suggestive of gastritis noted-biopsied; Normal duodenum-biopsied    FRACTURE SURGERY      PROSTATE SURGERY      TONSILLECTOMY          Current Outpatient Medications:     amiodarone (PACERONE) 200 MG tablet, Take 1 tablet by mouth Daily., Disp: 90 tablet, Rfl: 1    apixaban (ELIQUIS) 5 MG tablet tablet, Take 1 tablet by mouth 2 (Two) Times a Day., Disp: , Rfl:     aspirin 81 MG EC tablet, Take 1 tablet by mouth Daily., Disp: , Rfl:     atorvastatin (LIPITOR) 20 MG tablet, Take 1 tablet by mouth Daily., Disp: , Rfl:     busPIRone (BUSPAR) 5 MG tablet, Take 1 tablet by mouth 3 (Three) Times a Day., Disp: , Rfl:     ferrous gluconate (FERGON) 324 MG tablet, Take 1 tablet by mouth Daily With Breakfast., Disp: , Rfl:     folic acid (FOLVITE) 1 MG tablet, Take 1 tablet by mouth Daily., Disp: , Rfl:     losartan (COZAAR) 25 MG tablet, Take 1 tablet by mouth Daily., Disp: , Rfl:     metoprolol succinate XL (TOPROL-XL) 25 MG 24 hr tablet, Take 1.5 tablets by mouth Daily., Disp: 45 tablet, Rfl: 11    pantoprazole (PROTONIX) 40 MG EC tablet, Take 1 tablet by mouth Daily., Disp: , Rfl:     No Known Allergies    Social History     Socioeconomic History    Marital status: Single   Tobacco Use    Smoking status: Never    Smokeless tobacco: Never   Vaping Use    Vaping status: Never Used   Substance and Sexual Activity    Alcohol use: Not Currently     Alcohol/week: 50.0 standard drinks of alcohol     Types: 50 Shots of liquor per week    Drug use: Not Currently     Types: Marijuana    Sexual activity: Defer       Family History   Problem  "Relation Age of Onset    Colon polyps Mother         < 60 years of age    Heart disease Mother     Coronary artery disease Mother     Stroke Mother     Colon cancer Neg Hx     Esophageal cancer Neg Hx     Liver cancer Neg Hx     Stomach cancer Neg Hx     Rectal cancer Neg Hx     Liver disease Neg Hx        Review of Systems   Constitutional:  Negative for fever and unexpected weight change.   Respiratory:  Negative for shortness of breath.    Cardiovascular:  Negative for chest pain.       Objective     /82 (BP Location: Left arm, Patient Position: Sitting, Cuff Size: Adult)   Pulse 59   Temp 97.7 °F (36.5 °C) (Infrared)   Ht 180.3 cm (71\")   Wt 86.6 kg (191 lb)   SpO2 99%   BMI 26.64 kg/m²     Physical Exam  Vitals reviewed.   Constitutional:       Appearance: Normal appearance.   Cardiovascular:      Rate and Rhythm: Normal rate and regular rhythm.      Heart sounds: Murmur heard.   Pulmonary:      Breath sounds: Normal breath sounds.   Neurological:      Mental Status: He is alert.         Lab Results - Last 18 Months   Lab Units 05/22/25  1018 04/23/25  0258 04/22/25  0338 04/21/25  0920 04/20/25  0723 04/19/25  0557 04/19/25  0100 04/18/25  2311 01/21/25  0403 01/20/25  0354 01/19/25  0448   GLUCOSE mg/dL 119* 138* 128* 138* 169* 124*  --  220*   < > 96 96   BUN mg/dL 19 25* 29* 29* 24* 21  --  22   < > 15 17   CREATININE mg/dL 0.88 0.88 0.91 0.84 0.85 0.81  --  1.07   < > 1.11 0.84   SODIUM mmol/L 139 137 136 139 136 139  --  139   < > 138 136   SODIUM, ARTERIAL   --   --   --   --   --   --    < > 143  --   --   --    POTASSIUM mmol/L 4.1 4.9 4.6 4.2 3.9 4.4  --  4.1   < > 3.6 3.6   CHLORIDE mmol/L 103 99 102 103 100 106  --  105   < > 103 99   CO2 mmol/L 25.0 25.0 23.0 24.0 21.0* 21.0*  --  19.0*   < > 27.0 26.0   TOTAL PROTEIN g/dL  --   --   --  6.5 7.3 6.8  --  7.8  --  6.2 6.0   ALBUMIN g/dL  --   --   --  3.9 4.3 4.1  --  4.5  --  3.3* 3.3*   ALT (SGPT) U/L  --   --   --  8 7 6  --  8  --  " 11 12   AST (SGOT) U/L  --   --   --  16 18 15  --  17  --  18 21   ALK PHOS U/L  --   --   --  42 51 50  --  62  --  66 66   BILIRUBIN mg/dL  --   --   --  0.3 0.5 0.2  --  0.3  --  0.5 0.5   GLOBULIN gm/dL  --   --   --  2.6 3.0 2.7  --  3.3  --  2.9 2.7    < > = values in this interval not displayed.       Lab Results - Last 18 Months   Lab Units 04/23/25  0258 04/22/25  0338 04/21/25  0920 04/20/25  2342 04/20/25  1620 04/20/25  0723 04/19/25  1615 04/19/25  0622 04/18/25  2311   HEMOGLOBIN g/dL 9.1* 7.9* 7.8* 7.7* 8.3* 8.7*   < > 6.1* 7.6*   HEMATOCRIT % 29.4* 25.6* 25.8* 25.0* 26.6* 27.2*   < > 20.9* 25.7*   MCV fL 85.2 86.2 87.8  --   --  85.0  --  88.6 89.9   WBC 10*3/mm3 10.76 13.57* 17.03*  --   --  15.59*  --  13.66* 24.18*   RDW % 17.1* 17.2* 17.2*  --   --  16.4*  --  16.8* 16.6*   MPV fL 12.1* 12.0 12.4*  --   --  12.1*  --  11.9 12.1*   PLATELETS 10*3/mm3 219 182 178  --   --  169  --  145 233   INR   --   --   --   --   --   --   --   --  1.42*    < > = values in this interval not displayed.       Lab Results - Last 18 Months   Lab Units 04/20/25  0723 01/19/25  0448 12/25/24  0528 12/21/24  0154 07/23/24  1411   IRON mcg/dL 30*  --  64  --   --    TIBC mcg/dL 478  --  286*  --   --    IRON SATURATION (TSAT) % 6*  --  22  --   --    FERRITIN ng/mL  --  768.90*  --   --   --    TSH uIU/mL  --   --   --  1.750  --    FOLATE ng/mL  --   --   --   --  11.4        Lab Results - Last 18 Months   Lab Units 01/19/25  0448   FERRITIN ng/mL 768.90*           IMPRESSION/PLAN:    Assessment & Plan      Problem List Items Addressed This Visit       History of adenomatous polyp of colon    Overview   6/29/2020 colonoscopy with a 1 cm sessile adenomatous polyp resected from transverse colon         Alcohol abuse    Overview   Currently residing at [facility]         Iron (Fe) deficiency anemia - Primary    Overview   4/23/2025 H&H 9.1/29.4  4/22/2025 H&H 7.9/25.6  4/21/2025 H&H 7.8/25.8  4/20/2025 H&H  7.7/25 4/19/2025 H&H 6.7/21.5    4/20/2025 iron profile showing serum iron 30 (59-1 58), iron saturation 6% (20-50)         Relevant Orders    Case Request (Completed)    Follow Anesthesia Guidelines / Protocol    GERD (gastroesophageal reflux disease)     Endoscopy and colonoscopy per Dr. Rivers  Miralax Prep  Patient will need Eliquis held for 2 days prior to procedure.  We will call Dr. Herberth Nguyen for the approval to safely do so.        ..The risks, benefits, and alternatives of colonoscopy were reviewed with the patient today.  Risks including perforation of the colon possibly requiring surgery or colostomy.  Additional risks include risk of bleeding from biopsies or removal of colon tissue.  There is also the risk of a drug reaction or problems with anesthesia.  This will be discussed with the further by the anesthesia team on the day of the procedure.  Lastly there is a possibility of missing a colon polyp or cancer.  The benefits include the diagnosis and management of disease of the colon and rectum.  Alternatives to colonoscopy include barium enema, laboratory testing, radiographic evaluation, or no intervention.  The patient verbalizes understanding and agrees.    In accordance with requirements under the Affordable Care Act, [facility] has provided pricing for all hospital services and items on each of its websites. However, a patient's actual cost may differ based on the services the patient receives to meet individual healthcare needs and based on the benefits provided under the patient’s insurance coverage.        Cyndee Sears, BRI  06/12/25  11:27 CDT    Part of this note may be an electronic transcription/translation of spoken language to printed text.

## 2025-06-18 ENCOUNTER — PRE-ADMISSION TESTING (OUTPATIENT)
Dept: PREADMISSION TESTING | Facility: HOSPITAL | Age: 70
End: 2025-06-18
Payer: MEDICARE

## 2025-06-18 ENCOUNTER — HOSPITAL ENCOUNTER (OUTPATIENT)
Dept: GENERAL RADIOLOGY | Facility: HOSPITAL | Age: 70
Discharge: HOME | End: 2025-06-18
Payer: MEDICARE

## 2025-06-18 VITALS
RESPIRATION RATE: 16 BRPM | BODY MASS INDEX: 27.96 KG/M2 | SYSTOLIC BLOOD PRESSURE: 135 MMHG | WEIGHT: 195.33 LBS | DIASTOLIC BLOOD PRESSURE: 67 MMHG | HEART RATE: 58 BPM | HEIGHT: 70 IN | OXYGEN SATURATION: 98 %

## 2025-06-18 DIAGNOSIS — J34.89 CONCHA BULLOSA: ICD-10-CM

## 2025-06-18 DIAGNOSIS — J33.9 NASAL POLYPOSIS: ICD-10-CM

## 2025-06-18 DIAGNOSIS — R79.1 ABNORMAL COAGULATION PROFILE: ICD-10-CM

## 2025-06-18 DIAGNOSIS — J34.3 NASAL TURBINATE HYPERTROPHY: ICD-10-CM

## 2025-06-18 DIAGNOSIS — R09.81 NASAL CONGESTION: ICD-10-CM

## 2025-06-18 LAB
ALBUMIN SERPL-MCNC: 4.8 G/DL (ref 3.5–5.2)
ALBUMIN/GLOB SERPL: 2.1 G/DL
ALP SERPL-CCNC: 47 U/L (ref 39–117)
ALT SERPL W P-5'-P-CCNC: 9 U/L (ref 1–41)
ANION GAP SERPL CALCULATED.3IONS-SCNC: 12 MMOL/L (ref 5–15)
APTT PPP: 33.9 SECONDS (ref 24.5–36)
AST SERPL-CCNC: 17 U/L (ref 1–40)
BILIRUB SERPL-MCNC: 0.3 MG/DL (ref 0–1.2)
BUN SERPL-MCNC: 20.6 MG/DL (ref 8–23)
BUN/CREAT SERPL: 26.8 (ref 7–25)
CALCIUM SPEC-SCNC: 9.4 MG/DL (ref 8.6–10.5)
CHLORIDE SERPL-SCNC: 107 MMOL/L (ref 98–107)
CO2 SERPL-SCNC: 22 MMOL/L (ref 22–29)
CREAT SERPL-MCNC: 0.77 MG/DL (ref 0.76–1.27)
DEPRECATED RDW RBC AUTO: 66.5 FL (ref 37–54)
EGFRCR SERPLBLD CKD-EPI 2021: 96.3 ML/MIN/1.73
ERYTHROCYTE [DISTWIDTH] IN BLOOD BY AUTOMATED COUNT: 19.8 % (ref 12.3–15.4)
GLOBULIN UR ELPH-MCNC: 2.3 GM/DL
GLUCOSE SERPL-MCNC: 92 MG/DL (ref 65–99)
HCT VFR BLD AUTO: 39.8 % (ref 37.5–51)
HGB BLD-MCNC: 12.1 G/DL (ref 13–17.7)
INR PPP: 1.32 (ref 0.91–1.09)
MCH RBC QN AUTO: 27.7 PG (ref 26.6–33)
MCHC RBC AUTO-ENTMCNC: 30.4 G/DL (ref 31.5–35.7)
MCV RBC AUTO: 91.1 FL (ref 79–97)
PLATELET # BLD AUTO: 146 10*3/MM3 (ref 140–450)
PMV BLD AUTO: 12.1 FL (ref 6–12)
POTASSIUM SERPL-SCNC: 4 MMOL/L (ref 3.5–5.2)
PROT SERPL-MCNC: 7.1 G/DL (ref 6–8.5)
PROTHROMBIN TIME: 17.1 SECONDS (ref 11.8–14.8)
QT INTERVAL: 468 MS
QTC INTERVAL: 455 MS
RBC # BLD AUTO: 4.37 10*6/MM3 (ref 4.14–5.8)
SODIUM SERPL-SCNC: 141 MMOL/L (ref 136–145)
WBC NRBC COR # BLD AUTO: 6.05 10*3/MM3 (ref 3.4–10.8)

## 2025-06-18 PROCEDURE — 71046 X-RAY EXAM CHEST 2 VIEWS: CPT

## 2025-06-18 PROCEDURE — 93005 ELECTROCARDIOGRAM TRACING: CPT

## 2025-06-18 PROCEDURE — 85027 COMPLETE CBC AUTOMATED: CPT

## 2025-06-18 PROCEDURE — 85610 PROTHROMBIN TIME: CPT

## 2025-06-18 PROCEDURE — 85730 THROMBOPLASTIN TIME PARTIAL: CPT

## 2025-06-18 PROCEDURE — 80053 COMPREHEN METABOLIC PANEL: CPT

## 2025-06-18 PROCEDURE — 36415 COLL VENOUS BLD VENIPUNCTURE: CPT

## 2025-06-18 NOTE — DISCHARGE INSTRUCTIONS

## 2025-06-25 ENCOUNTER — ANESTHESIA (OUTPATIENT)
Dept: PERIOP | Facility: HOSPITAL | Age: 70
End: 2025-06-25
Payer: MEDICARE

## 2025-06-25 ENCOUNTER — HOSPITAL ENCOUNTER (OUTPATIENT)
Facility: HOSPITAL | Age: 70
Setting detail: HOSPITAL OUTPATIENT SURGERY
Discharge: HOME | End: 2025-06-25
Attending: OTOLARYNGOLOGY | Admitting: OTOLARYNGOLOGY
Payer: MEDICARE

## 2025-06-25 ENCOUNTER — ANESTHESIA EVENT (OUTPATIENT)
Dept: PERIOP | Facility: HOSPITAL | Age: 70
End: 2025-06-25
Payer: MEDICARE

## 2025-06-25 VITALS
OXYGEN SATURATION: 96 % | HEART RATE: 60 BPM | TEMPERATURE: 97.9 F | RESPIRATION RATE: 20 BRPM | DIASTOLIC BLOOD PRESSURE: 81 MMHG | SYSTOLIC BLOOD PRESSURE: 151 MMHG

## 2025-06-25 DIAGNOSIS — J34.89 CONCHA BULLOSA: ICD-10-CM

## 2025-06-25 DIAGNOSIS — J33.9 NASAL POLYPOSIS: ICD-10-CM

## 2025-06-25 DIAGNOSIS — R09.81 NASAL CONGESTION: ICD-10-CM

## 2025-06-25 DIAGNOSIS — J34.3 NASAL TURBINATE HYPERTROPHY: ICD-10-CM

## 2025-06-25 PROCEDURE — 25010000002 DEXAMETHASONE PER 1 MG: Performed by: NURSE PRACTITIONER

## 2025-06-25 PROCEDURE — C9046 COCAINE HCL NASAL SOLUTION: HCPCS | Performed by: OTOLARYNGOLOGY

## 2025-06-25 PROCEDURE — 30520 REPAIR OF NASAL SEPTUM: CPT | Performed by: OTOLARYNGOLOGY

## 2025-06-25 PROCEDURE — 25810000003 LACTATED RINGERS PER 1000 ML: Performed by: OTOLARYNGOLOGY

## 2025-06-25 PROCEDURE — 88311 DECALCIFY TISSUE: CPT | Performed by: OTOLARYNGOLOGY

## 2025-06-25 PROCEDURE — 88305 TISSUE EXAM BY PATHOLOGIST: CPT | Performed by: OTOLARYNGOLOGY

## 2025-06-25 PROCEDURE — 25010000002 DEXAMETHASONE PER 1 MG: Performed by: NURSE ANESTHETIST, CERTIFIED REGISTERED

## 2025-06-25 PROCEDURE — 30802 ABLATE INF TURBINATE SUBMUC: CPT | Performed by: OTOLARYNGOLOGY

## 2025-06-25 PROCEDURE — 25010000002 COCAINE HCL 40 MG/ML SOLUTION: Performed by: OTOLARYNGOLOGY

## 2025-06-25 PROCEDURE — 25010000002 PROPOFOL 10 MG/ML EMULSION: Performed by: NURSE ANESTHETIST, CERTIFIED REGISTERED

## 2025-06-25 PROCEDURE — 25010000002 FENTANYL CITRATE (PF) 100 MCG/2ML SOLUTION: Performed by: NURSE ANESTHETIST, CERTIFIED REGISTERED

## 2025-06-25 PROCEDURE — 25010000002 ONDANSETRON PER 1 MG: Performed by: NURSE ANESTHETIST, CERTIFIED REGISTERED

## 2025-06-25 PROCEDURE — 31254 NSL/SINS NDSC W/PRTL ETHMDCT: CPT | Performed by: OTOLARYNGOLOGY

## 2025-06-25 PROCEDURE — C2625 STENT, NON-COR, TEM W/DEL SY: HCPCS | Performed by: OTOLARYNGOLOGY

## 2025-06-25 PROCEDURE — 25010000002 LIDOCAINE 1% - EPINEPHRINE 1:100000 1 %-1:100000 SOLUTION: Performed by: OTOLARYNGOLOGY

## 2025-06-25 PROCEDURE — 31256 EXPLORATION MAXILLARY SINUS: CPT | Performed by: OTOLARYNGOLOGY

## 2025-06-25 PROCEDURE — 25010000002 LIDOCAINE PF 2% 2 % SOLUTION: Performed by: NURSE ANESTHETIST, CERTIFIED REGISTERED

## 2025-06-25 DEVICE — STPLR SEPTL ENTACT .5X3MM: Type: IMPLANTABLE DEVICE | Site: NOSE | Status: FUNCTIONAL

## 2025-06-25 DEVICE — PROPEL SINUS IMPLANT
Type: IMPLANTABLE DEVICE | Site: NOSE | Status: FUNCTIONAL
Brand: PROPEL

## 2025-06-25 RX ORDER — HYDROMORPHONE HYDROCHLORIDE 1 MG/ML
0.5 INJECTION, SOLUTION INTRAMUSCULAR; INTRAVENOUS; SUBCUTANEOUS
Status: DISCONTINUED | OUTPATIENT
Start: 2025-06-25 | End: 2025-06-25 | Stop reason: HOSPADM

## 2025-06-25 RX ORDER — COCAINE HYDROCHLORIDE 40 MG/ML
SOLUTION NASAL
Status: DISCONTINUED
Start: 2025-06-25 | End: 2025-06-25 | Stop reason: HOSPADM

## 2025-06-25 RX ORDER — IBUPROFEN 600 MG/1
600 TABLET, FILM COATED ORAL EVERY 6 HOURS PRN
Status: DISCONTINUED | OUTPATIENT
Start: 2025-06-25 | End: 2025-06-25 | Stop reason: HOSPADM

## 2025-06-25 RX ORDER — DEXAMETHASONE SODIUM PHOSPHATE 10 MG/ML
8 INJECTION, SOLUTION INTRA-ARTICULAR; INTRALESIONAL; INTRAMUSCULAR; INTRAVENOUS; SOFT TISSUE
Status: COMPLETED | OUTPATIENT
Start: 2025-06-25 | End: 2025-06-25

## 2025-06-25 RX ORDER — EPHEDRINE SULFATE 50 MG/ML
INJECTION INTRAVENOUS AS NEEDED
Status: DISCONTINUED | OUTPATIENT
Start: 2025-06-25 | End: 2025-06-25 | Stop reason: SURG

## 2025-06-25 RX ORDER — SODIUM CHLORIDE 9 MG/ML
40 INJECTION, SOLUTION INTRAVENOUS AS NEEDED
Status: DISCONTINUED | OUTPATIENT
Start: 2025-06-25 | End: 2025-06-25 | Stop reason: HOSPADM

## 2025-06-25 RX ORDER — COCAINE HYDROCHLORIDE 40 MG/ML
SOLUTION NASAL AS NEEDED
Status: DISCONTINUED | OUTPATIENT
Start: 2025-06-25 | End: 2025-06-25 | Stop reason: HOSPADM

## 2025-06-25 RX ORDER — LIDOCAINE HYDROCHLORIDE 10 MG/ML
0.5 INJECTION, SOLUTION EPIDURAL; INFILTRATION; INTRACAUDAL; PERINEURAL ONCE AS NEEDED
Status: DISCONTINUED | OUTPATIENT
Start: 2025-06-25 | End: 2025-06-25 | Stop reason: HOSPADM

## 2025-06-25 RX ORDER — SODIUM CHLORIDE 0.9 % (FLUSH) 0.9 %
3-10 SYRINGE (ML) INJECTION AS NEEDED
Status: DISCONTINUED | OUTPATIENT
Start: 2025-06-25 | End: 2025-06-25 | Stop reason: HOSPADM

## 2025-06-25 RX ORDER — OXYCODONE AND ACETAMINOPHEN 10; 325 MG/1; MG/1
1 TABLET ORAL EVERY 4 HOURS PRN
Status: DISCONTINUED | OUTPATIENT
Start: 2025-06-25 | End: 2025-06-25 | Stop reason: HOSPADM

## 2025-06-25 RX ORDER — FLUMAZENIL 0.1 MG/ML
0.2 INJECTION INTRAVENOUS AS NEEDED
Status: DISCONTINUED | OUTPATIENT
Start: 2025-06-25 | End: 2025-06-25 | Stop reason: HOSPADM

## 2025-06-25 RX ORDER — NALOXONE HCL 0.4 MG/ML
0.04 VIAL (ML) INJECTION AS NEEDED
Status: DISCONTINUED | OUTPATIENT
Start: 2025-06-25 | End: 2025-06-25 | Stop reason: HOSPADM

## 2025-06-25 RX ORDER — ACETAMINOPHEN 500 MG
1000 TABLET ORAL ONCE
Status: DISCONTINUED | OUTPATIENT
Start: 2025-06-25 | End: 2025-06-25 | Stop reason: HOSPADM

## 2025-06-25 RX ORDER — OXYMETAZOLINE HYDROCHLORIDE 0.05 G/100ML
2 SPRAY NASAL
Status: COMPLETED | OUTPATIENT
Start: 2025-06-25 | End: 2025-06-25

## 2025-06-25 RX ORDER — SODIUM CHLORIDE 0.9 % (FLUSH) 0.9 %
3 SYRINGE (ML) INJECTION EVERY 12 HOURS SCHEDULED
Status: DISCONTINUED | OUTPATIENT
Start: 2025-06-25 | End: 2025-06-25 | Stop reason: HOSPADM

## 2025-06-25 RX ORDER — SODIUM CHLORIDE, SODIUM LACTATE, POTASSIUM CHLORIDE, CALCIUM CHLORIDE 600; 310; 30; 20 MG/100ML; MG/100ML; MG/100ML; MG/100ML
1000 INJECTION, SOLUTION INTRAVENOUS CONTINUOUS
Status: DISCONTINUED | OUTPATIENT
Start: 2025-06-25 | End: 2025-06-25 | Stop reason: HOSPADM

## 2025-06-25 RX ORDER — HYDROCODONE BITARTRATE AND ACETAMINOPHEN 5; 325 MG/1; MG/1
1 TABLET ORAL EVERY 8 HOURS PRN
Qty: 8 TABLET | Refills: 0 | Status: SHIPPED | OUTPATIENT
Start: 2025-06-25

## 2025-06-25 RX ORDER — FENTANYL CITRATE 50 UG/ML
INJECTION, SOLUTION INTRAMUSCULAR; INTRAVENOUS AS NEEDED
Status: DISCONTINUED | OUTPATIENT
Start: 2025-06-25 | End: 2025-06-25 | Stop reason: SURG

## 2025-06-25 RX ORDER — HYDROCODONE BITARTRATE AND ACETAMINOPHEN 5; 325 MG/1; MG/1
1 TABLET ORAL ONCE AS NEEDED
Refills: 0 | Status: DISCONTINUED | OUTPATIENT
Start: 2025-06-25 | End: 2025-06-25 | Stop reason: HOSPADM

## 2025-06-25 RX ORDER — LABETALOL HYDROCHLORIDE 5 MG/ML
5 INJECTION, SOLUTION INTRAVENOUS
Status: DISCONTINUED | OUTPATIENT
Start: 2025-06-25 | End: 2025-06-25 | Stop reason: HOSPADM

## 2025-06-25 RX ORDER — ONDANSETRON 2 MG/ML
INJECTION INTRAMUSCULAR; INTRAVENOUS AS NEEDED
Status: DISCONTINUED | OUTPATIENT
Start: 2025-06-25 | End: 2025-06-25 | Stop reason: SURG

## 2025-06-25 RX ORDER — ROCURONIUM BROMIDE 10 MG/ML
INJECTION, SOLUTION INTRAVENOUS AS NEEDED
Status: DISCONTINUED | OUTPATIENT
Start: 2025-06-25 | End: 2025-06-25 | Stop reason: SURG

## 2025-06-25 RX ORDER — ACETAMINOPHEN 500 MG
1000 TABLET ORAL EVERY 6 HOURS PRN
COMMUNITY

## 2025-06-25 RX ORDER — FENTANYL CITRATE 50 UG/ML
50 INJECTION, SOLUTION INTRAMUSCULAR; INTRAVENOUS
Status: DISCONTINUED | OUTPATIENT
Start: 2025-06-25 | End: 2025-06-25 | Stop reason: HOSPADM

## 2025-06-25 RX ORDER — MIDAZOLAM HYDROCHLORIDE 2 MG/2ML
0.5 INJECTION, SOLUTION INTRAMUSCULAR; INTRAVENOUS
Status: DISCONTINUED | OUTPATIENT
Start: 2025-06-25 | End: 2025-06-25 | Stop reason: HOSPADM

## 2025-06-25 RX ORDER — SODIUM CHLORIDE 0.9 % (FLUSH) 0.9 %
3 SYRINGE (ML) INJECTION AS NEEDED
Status: DISCONTINUED | OUTPATIENT
Start: 2025-06-25 | End: 2025-06-25 | Stop reason: HOSPADM

## 2025-06-25 RX ORDER — SODIUM CHLORIDE/ALOE VERA
GEL (GRAM) NASAL AS NEEDED
Status: DISCONTINUED | OUTPATIENT
Start: 2025-06-25 | End: 2025-06-25 | Stop reason: HOSPADM

## 2025-06-25 RX ORDER — LIDOCAINE HYDROCHLORIDE AND EPINEPHRINE 10; 10 MG/ML; UG/ML
INJECTION, SOLUTION INFILTRATION; PERINEURAL AS NEEDED
Status: DISCONTINUED | OUTPATIENT
Start: 2025-06-25 | End: 2025-06-25 | Stop reason: HOSPADM

## 2025-06-25 RX ORDER — DEXAMETHASONE SODIUM PHOSPHATE 4 MG/ML
INJECTION, SOLUTION INTRA-ARTICULAR; INTRALESIONAL; INTRAMUSCULAR; INTRAVENOUS; SOFT TISSUE AS NEEDED
Status: DISCONTINUED | OUTPATIENT
Start: 2025-06-25 | End: 2025-06-25 | Stop reason: SURG

## 2025-06-25 RX ORDER — PROPOFOL 10 MG/ML
VIAL (ML) INTRAVENOUS AS NEEDED
Status: DISCONTINUED | OUTPATIENT
Start: 2025-06-25 | End: 2025-06-25 | Stop reason: SURG

## 2025-06-25 RX ORDER — MAGNESIUM HYDROXIDE 1200 MG/15ML
LIQUID ORAL AS NEEDED
Status: DISCONTINUED | OUTPATIENT
Start: 2025-06-25 | End: 2025-06-25 | Stop reason: HOSPADM

## 2025-06-25 RX ORDER — AMOXICILLIN 500 MG/1
500 CAPSULE ORAL 3 TIMES DAILY
Qty: 30 CAPSULE | Refills: 0 | Status: SHIPPED | OUTPATIENT
Start: 2025-06-25 | End: 2025-07-05

## 2025-06-25 RX ORDER — SODIUM CHLORIDE, SODIUM LACTATE, POTASSIUM CHLORIDE, CALCIUM CHLORIDE 600; 310; 30; 20 MG/100ML; MG/100ML; MG/100ML; MG/100ML
100 INJECTION, SOLUTION INTRAVENOUS CONTINUOUS
Status: DISCONTINUED | OUTPATIENT
Start: 2025-06-25 | End: 2025-06-25 | Stop reason: HOSPADM

## 2025-06-25 RX ORDER — ONDANSETRON 2 MG/ML
4 INJECTION INTRAMUSCULAR; INTRAVENOUS
Status: DISCONTINUED | OUTPATIENT
Start: 2025-06-25 | End: 2025-06-25 | Stop reason: HOSPADM

## 2025-06-25 RX ORDER — LIDOCAINE HYDROCHLORIDE 20 MG/ML
INJECTION, SOLUTION EPIDURAL; INFILTRATION; INTRACAUDAL; PERINEURAL AS NEEDED
Status: DISCONTINUED | OUTPATIENT
Start: 2025-06-25 | End: 2025-06-25 | Stop reason: SURG

## 2025-06-25 RX ADMIN — DEXAMETHASONE SODIUM PHOSPHATE 8 MG: 10 INJECTION INTRAMUSCULAR; INTRAVENOUS at 10:38

## 2025-06-25 RX ADMIN — EPHEDRINE SULFATE 10 MG: 50 INJECTION INTRAVENOUS at 13:36

## 2025-06-25 RX ADMIN — SODIUM CHLORIDE, POTASSIUM CHLORIDE, SODIUM LACTATE AND CALCIUM CHLORIDE: 600; 310; 30; 20 INJECTION, SOLUTION INTRAVENOUS at 13:52

## 2025-06-25 RX ADMIN — Medication 2 SPRAY: at 11:22

## 2025-06-25 RX ADMIN — PROPOFOL 200 MG: 10 INJECTION, EMULSION INTRAVENOUS at 12:33

## 2025-06-25 RX ADMIN — ONDANSETRON 4 MG: 2 INJECTION INTRAMUSCULAR; INTRAVENOUS at 12:33

## 2025-06-25 RX ADMIN — OXYCODONE AND ACETAMINOPHEN 1 TABLET: 325; 10 TABLET ORAL at 14:52

## 2025-06-25 RX ADMIN — FENTANYL CITRATE 100 MCG: 50 INJECTION, SOLUTION INTRAMUSCULAR; INTRAVENOUS at 12:33

## 2025-06-25 RX ADMIN — Medication 2 SPRAY: at 11:12

## 2025-06-25 RX ADMIN — SODIUM CHLORIDE, POTASSIUM CHLORIDE, SODIUM LACTATE AND CALCIUM CHLORIDE 1000 ML: 600; 310; 30; 20 INJECTION, SOLUTION INTRAVENOUS at 09:25

## 2025-06-25 RX ADMIN — LIDOCAINE HYDROCHLORIDE 80 MG: 20 INJECTION, SOLUTION EPIDURAL; INFILTRATION; INTRACAUDAL; PERINEURAL at 12:33

## 2025-06-25 RX ADMIN — Medication 2 SPRAY: at 11:17

## 2025-06-25 RX ADMIN — DEXAMETHASONE SODIUM PHOSPHATE 4 MG: 4 INJECTION, SOLUTION INTRA-ARTICULAR; INTRALESIONAL; INTRAMUSCULAR; INTRAVENOUS; SOFT TISSUE at 12:33

## 2025-06-25 RX ADMIN — EPHEDRINE SULFATE 10 MG: 50 INJECTION INTRAVENOUS at 13:29

## 2025-06-25 RX ADMIN — ROCURONIUM BROMIDE 50 MG: 10 INJECTION, SOLUTION INTRAVENOUS at 12:33

## 2025-06-25 NOTE — H&P
YOB: 1955  Location: [facility]  Location Address: [address] [place] [address],  [address], [address] [place] [address]  Location Phone: [phone]          Chief Complaint   Patient presents with    Sinus Problem       Discuss sinus surgery         History of Present Illness  Dayron Lubin is a 69 y.o. male.  Dayron Lubin is here for follow up of ENT complaints. The patient has had problems with issues.  He complains of nasal congestion and difficulty breathing through the nose.  Symptoms have been present for the last several months.  He does have a known left nasal polyp.  He was previously scheduled for sinus surgery but this was canceled due to hospitalization.  Primary care has released him pending cardiac clearance.  He has been on several medications including oral antihistamines and nasal sprays in the past.     Reviewed:     Study Result     Narrative & Impression   CT SINUS WO CONTRAST- 2024 11:57 AM     HISTORY: nasal polyp; J33.9-Nasal polyp, unspecified     COMPARISON: None available     DOSE LENGTH PRODUCT: 392.55 mGy.cm. Automated exposure control was also  utilized to decrease patient radiation dose.     TECHNIQUE: Helical tomographic images of the sinuses were obtained  without contrast. Multiplanar reformatted images were provided for  review.     FINDINGS:     There is mucosal thickening at the bilateral frontal sinuses left  greater than right. There is mucosal thickening at the bilateral ethmoid  air cells, sphenoid sinus, and bilateral maxillary sinuses. The mastoid  air cells are clear. No air-fluid levels are identified.     Small osteoma is noted posteriorly in the right ethmoid air cells. No  bony erosive or destructive changes are seen. Visualized scalp soft  tissues are unremarkable. Bilateral orbits and globes are unremarkable.  Soft tissues of the face and visualized neck are grossly unremarkable.  There is bilateral carotid calcification which is partially imaged.      There is nasal septal deviation to the right. Mucosal thickening narrows  the ostia at the bilateral ostiomeatal complexes. There is angeles  bullosa deformity of the left middle turbinate.     IMPRESSION:     1. Paranasal sinus mucosal thickening worse at the ethmoid air cells.  2. Nasal septal deviation to the right and mucosal thickening narrows  the ostia of the bilateral ostiomeatal complexes.  3. No air-fluid levels or erosive changes are identified.                 This report was signed and finalized on 8/12/2024 1:45 PM by Vincent Alvarez.         Medical History        Past Medical History:   Diagnosis Date    A-fib      Alcoholic hepatitis 07/13/2023    CAD (coronary artery disease)      History of external beam radiation therapy 05/12/2016     6840 cGy to prostate bed    History of transfusion      Hyperlipidemia      Hypertension      Insomnia      Prostate cancer              Surgical History         Past Surgical History:   Procedure Laterality Date    CARDIAC CATHETERIZATION        CORONARY ANGIOPLASTY WITH STENT PLACEMENT        FRACTURE SURGERY        PROSTATE SURGERY        TONSILLECTOMY                Medications Taking          Outpatient Medications Marked as Taking for the 5/20/25 encounter (Office Visit) with Robin De La Rosa MD   Medication Sig Dispense Refill    amiodarone (PACERONE) 200 MG tablet Take 1 tablet by mouth Daily. 90 tablet 1    apixaban (ELIQUIS) 5 MG tablet tablet Take 1 tablet by mouth 2 (Two) Times a Day.        aspirin 81 MG EC tablet Take 1 tablet by mouth Daily.        atorvastatin (LIPITOR) 20 MG tablet Take 1 tablet by mouth Daily.        azelastine (ASTELIN) 0.1 % nasal spray Administer 2 sprays into the nostril(s) as directed by provider 2 (Two) Times a Day for 30 days. Use in each nostril as directed 30 mL 0    azithromycin (ZITHROMAX) 250 MG tablet Take 2 tablets the first day, then 1 tablet daily for 4 days. 6 tablet 0    busPIRone (BUSPAR) 5 MG tablet Take  1 tablet by mouth 3 (Three) Times a Day.        ferrous gluconate (FERGON) 324 MG tablet Take 1 tablet by mouth Daily With Breakfast.        folic acid (FOLVITE) 1 MG tablet Take 1 tablet by mouth Daily.        metoprolol succinate XL (TOPROL-XL) 25 MG 24 hr tablet Take 1 tablet by mouth Daily.        pantoprazole (PROTONIX) 40 MG EC tablet Take 1 tablet by mouth Daily.        thiamine (VITAMIN B-1) 100 MG tablet  tablet Take 1 tablet by mouth Daily.                Patient has no known allergies.           Family History   Problem Relation Age of Onset    Heart disease Mother      Coronary artery disease Mother      Stroke Mother           Social History   Social History            Socioeconomic History    Marital status: Single   Tobacco Use    Smoking status: Never    Smokeless tobacco: Never   Vaping Use    Vaping status: Never Used   Substance and Sexual Activity    Alcohol use: Not Currently       Alcohol/week: 50.0 standard drinks of alcohol       Types: 50 Shots of liquor per week    Drug use: Not Currently       Types: Marijuana    Sexual activity: Defer            Review of Systems   Constitutional: Negative.    HENT:  Positive for congestion, sinus pressure and sinus pain.          There were no vitals filed for this visit.     Body mass index is 26.5 kg/m².     Objective  Physical Exam  Vitals reviewed.   Constitutional:       Appearance: Normal appearance.   HENT:      Head: Normocephalic.      Right Ear: External ear normal.      Left Ear: External ear normal.      Nose:      Comments: Large polyp noted to the left nostril     Mouth/Throat:      Lips: Pink.      Mouth: Mucous membranes are moist.      Pharynx: Oropharynx is clear.   Musculoskeletal:      Cervical back: Full passive range of motion without pain.   Neurological:      Mental Status: He is alert.   Psychiatric:         Behavior: Behavior is cooperative.            Assessment & Plan  Diagnoses and all orders for this visit:     1. Nasal  polyposis (Primary)     2. Nasal turbinate hypertrophy     3. Angeles bullosa     4. Nasal congestion     5. Abnormal coagulation profile        * Surgery not found *  No orders of the defined types were placed in this encounter.     We will obtain cardiac clearance prior to surgery.  Tentatively on schedule for June 25.  This was discussed at length with patient.     FUNCTIONAL ENDOSCOPIC SINUS SURGERY with angeles bullosa resection and left polypectomy: A functional endoscopic sinus surgery was recommended. The risks and benefits were explained including but not limited to pain, bleeding (with the possible need for nasal packing), infection, risks of the general anesthesia, orbital injury with blurred vision or visual loss, cerebrospinal fluid leak, persistent disease, scarring, synichiae and the possibility for the need of reoperation. Possibilities of additional sinus work or less sinus work depending on the status of the nose at the time of the operation was discussed. Alternatives were discussed. No guarantees were made or implied. Questions were asked appropriately answered.        SEPTOPLASTY AND TURBINOPLASTY: A septoplasty and inferior turbinoplasty were recommended. The risks and benefits were explained including but not limited to pain, bleeding, infection, risks of the general anesthesia, continued septal deviation, crusting, congestion and septal perforation. Possibilities of continued preoperative symptoms and the possible need for revision surgery and or medical therapy were discussed. Alternatives were discussed. No guarantees were made or implied. Questions were asked appropriately answered.        Return for post op.          Patient Instructions   FUNCTIONAL ENDOSCOPIC SINUS SURGERY with angeles bullosa resection and left polypectomy: A functional endoscopic sinus surgery was recommended. The risks and benefits were explained including but not limited to pain, bleeding (with the possible need for  nasal packing), infection, risks of the general anesthesia, orbital injury with blurred vision or visual loss, cerebrospinal fluid leak, persistent disease, scarring, synichiae and the possibility for the need of reoperation. Possibilities of additional sinus work or less sinus work depending on the status of the nose at the time of the operation was discussed. Alternatives were discussed. No guarantees were made or implied. Questions were asked appropriately answered.     SEPTOPLASTY AND TURBINOPLASTY: A septoplasty and inferior turbinoplasty were recommended. The risks and benefits were explained including but not limited to pain, bleeding, infection, risks of the general anesthesia, continued septal deviation, crusting, congestion and septal perforation. Possibilities of continued preoperative symptoms and the possible need for revision surgery and or medical therapy were discussed. Alternatives were discussed. No guarantees were made or implied. Questions were asked appropriately answered

## 2025-06-25 NOTE — ANESTHESIA POSTPROCEDURE EVALUATION
Patient: Dayron Lubin    Procedure Summary       Date: 06/25/25 Room / Location:  [facility] OR 02 /  [facility]    Anesthesia Start: 1228 Anesthesia Stop: 1407    Procedure: ENDOSCOPIC FUNCTIONAL SINUS SURGERY W TRACKING, SINUPLASTY, SEPTOPLASTY, RESECT INFERIOR TURBINATES VIA COBLATION, LEFT STACY BULLOSA RESECTION, AND LEFT NASAL POLYPECTOMY (Nose) Diagnosis:       Nasal polyposis      Nasal turbinate hypertrophy      Stacy bullosa      Nasal congestion      (Nasal polyposis [J33.9])      (Nasal turbinate hypertrophy [J34.3])      (Stacy bullosa [J34.89])      (Nasal congestion [R09.81])    Surgeons: Robin De La Rosa MD Provider: Caitlin Cazares CRNA    Anesthesia Type: general ASA Status: 3            Anesthesia Type: general    Vitals  Vitals Value Taken Time   /68 06/25/25 14:55   Temp 97.9 °F (36.6 °C) 06/25/25 14:05   Pulse 66 06/25/25 14:56   Resp 16 06/25/25 14:55   SpO2 94 % 06/25/25 14:56   Vitals shown include unfiled device data.        Post Anesthesia Care and Evaluation    Patient location during evaluation: PACU  Patient participation: complete - patient participated  Level of consciousness: awake and alert  Pain management: adequate    Airway patency: patent  Anesthetic complications: No anesthetic complications  PONV Status: none  Cardiovascular status: acceptable and hemodynamically stable  Respiratory status: acceptable  Hydration status: acceptable    Comments: Blood pressure 159/85, pulse 73, temperature 97.9 °F (36.6 °C), temperature source Temporal, resp. rate 20, SpO2 96%.    Patient discharged from PACU based upon Humera score. Please see RN notes for further details

## 2025-06-25 NOTE — ANESTHESIA PREPROCEDURE EVALUATION
Anesthesia Evaluation     Patient summary reviewed   no history of anesthetic complications:   NPO Solid Status: > 8 hours             Airway   Dental      Pulmonary    Cardiovascular     PT is on anticoagulation therapy  Patient on routine beta blocker    (+) hypertension, CAD, cardiac stents , dysrhythmias, CHF       Neuro/Psych  GI/Hepatic/Renal/Endo    (+) GERD, liver disease    Musculoskeletal     Abdominal    Substance History      OB/GYN          Other                          Anesthesia Plan    ASA 3     general     (HOCM; anticoagulant held; betablocker taken, aspirin yesterday; )  intravenous induction     Anesthetic plan, risks, benefits, and alternatives have been provided, discussed and informed consent has been obtained with: patient.        CODE STATUS:

## 2025-06-25 NOTE — OP NOTE
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]  Nasal congestion [R09.81]    POST-OPERATIVE DIAGNOSIS:   Post-Op Diagnosis Codes:     * Nasal polyposis [J33.9]     * Nasal turbinate hypertrophy [J34.3]     * [place] bullosa [J34.89]     * Nasal congestion [R09.81]    PROCEDURE PERFORMED:   Bilateral functional endoscopic anterior ethmoidectomy and bilateral middle meatal antrostomy  Bilateral nasal polypectomy  Left angeles bullosa resection  Septoplasty  Bilateral inferior turbinate reduction via Coblation    SURGEON:   Robin De La Rosa MD    ASSISTANT(S):   None    ANESTHESIA:   General Anesthesia via Endotracheal Tube    INDICATIONS: The patient is a 69 y.o. 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     Image guidance with the [employer] image guidance sinus surgery system following registration was utilized during the procedure.     Approximately 4 mL of 4% cocaine solution impregnating Codman neurosurgical pledgets were placed intranasally and 5 minutes allowed to pass by the clock.  The pledgets were removed and a hemitransfixion incision accomplished 12 mm cephalad to the caudal margin of the quadrangular cartilage.  Septal flaps were elevated bilaterally consisting of the mucoperichondrium.  The deviated portion of quadrangular cartilage was resected with care taken to leave 10 mm of quadrangular cartilage for dorsal support.  The dissection progressed posteriorly to the perpendicular plate of the ethmoid bone and a cartilaginous spur was removed with Guillaume forceps and a [place] elevator on the right.   "Subsequently the caudal incision was closed with interrupted 4-0 plain gut and the ENTrigue surgical stapler utilized to place \"whip\" stitches.    Rigid nasal endoscopy was subsequently performed with the Silk 0° endoscope.  On the left a angeles bullosa deformity was subluxed medially utilizing a [place] elevator and injection accomplished with 1 mL 1% Xylocaine with epinephrine.  The turbinate was transected from its lateral nasal wall attachments utilizing Bellucci scissors and further removed with Guillaume forceps and the ground lamella attachments resected utilizing Kwadwo-Cut forceps.  No significant bleeding was encountered at the ground lamella and the little bleeding which was encountered was controlled with the Weck suction cautery.    An anterior ethmoidectomy was performed with the RAD-12microdebrider.  The uncinate process remnant was removed after the natural opening of the maxillary sinus was identified with the blunt ostium seeker.  The ethmoid bulla was infractured and unroofed utilizing the microdebrider.  Relatively extensive intranasal polyposis was appreciated in the anterior ethmoids.  The frontal recess appeared patent and so no dissection was performed in this area.  The dissection progressed posteriorly to the anterior portion of the posterior ethmoids.  Edematous mucosa was noted in the antrum but no antral polyposis was appreciated.    Inferior turbinate reduction was accomplished with the Coblator through a single penetration via the anterior portion of the inferior turbinate  intramurally or deep into the mucosa and 3 lesions were subsequently created with the Coblator as the Coblator wand was gently withdrawn following full insertion.  No significant bleeding was encountered.    A Regular Propel implant was placed near the antrostomy.   Staberger sinus foam dressing was also placed.  No significant bleeding was noted.   Attention was then turned to the opposite side where a similar " procedure was performed with similar findings the exception that the right middle turbinate was not consistent with a angeles bullosa deformity.    The patient was transported upon extubation to the postanesthesia care unit in stable condition.    SPECIMENS:  Ethmoid bony mucosa with septal cartilage and bone and bilateral nasal polyps    COMPLICATIONS: NONE    ESTIMATED BLOOD LOSS:  < 25 cc           Robin De La Rosa MD  6/25/2025

## 2025-06-25 NOTE — ANESTHESIA PROCEDURE NOTES
Airway  Reason: elective    Date/Time: 6/25/2025 12:33 PM  Airway not difficult    General Information and Staff    Patient location during procedure: OR  CRNA/CAA: Apollo Mireles CRNA    Indications and Patient Condition  Indications for airway management: airway protection    Preoxygenated: yes  MILS maintained throughout    Mask difficulty assessment: 1 - vent by mask    Final Airway Details    Final airway type: endotracheal airway      Successful airway: ETT  Cuffed: yes   Successful intubation technique: direct laryngoscopy  Adjuncts used in placement: intubating stylet  Endotracheal tube insertion site: oral  Blade: Mishra  Blade size: 4  ETT size (mm): 7.5  Cormack-Lehane Classification: grade I - full view of glottis  Placement verified by: chest auscultation and capnometry   Cuff volume (mL): 8  Measured from: lips  ETT/EBT  to lips (cm): 21  Number of attempts at approach: 1

## 2025-06-27 ENCOUNTER — OFFICE VISIT (OUTPATIENT)
Dept: CARDIOLOGY | Facility: CLINIC | Age: 70
End: 2025-06-27
Payer: MEDICARE

## 2025-06-27 VITALS
HEIGHT: 70 IN | WEIGHT: 191 LBS | HEART RATE: 61 BPM | BODY MASS INDEX: 27.35 KG/M2 | OXYGEN SATURATION: 99 % | DIASTOLIC BLOOD PRESSURE: 86 MMHG | SYSTOLIC BLOOD PRESSURE: 138 MMHG

## 2025-06-27 DIAGNOSIS — I48.0 PAROXYSMAL ATRIAL FIBRILLATION: ICD-10-CM

## 2025-06-27 DIAGNOSIS — I42.1 HOCM (HYPERTROPHIC OBSTRUCTIVE CARDIOMYOPATHY): Primary | Chronic | ICD-10-CM

## 2025-06-27 NOTE — PROGRESS NOTES
"Chief Complaint  Atrial Fibrillation    Subjective        History of Present Illness    EP problems:   Paroxysmal atrial fibrillation  Frequent PVCs  -7/30/24: Holter 1.4% PVCs     Cardiology problems  Hypertrophic cardiomyopathy  CAD   -2009: [facility] s/p PCI to RCA  LV dysfunction  -8/12/23: EF 46-50%  -7/29/24: Echo 51-55%  4.  Apical aneurysm  5.  Moderate aortic stenosis  6.  Hypertension  7.  Hyperlipidemia     Medical problems:   Alcohol use disorder  Prostate cancer   Poor wound healing     History of Present Illness  The patient is a 69-year-old male who presents to the clinic for follow-up of atrial fibrillation and hypertrophic cardiomyopathy.    He reports no episodes of syncope. He was previously informed that ablation could potentially allow him to discontinue anticoagulant therapy.    He has expressed concerns regarding the use of a defibrillator, citing information he read suggesting it only benefits 10% of patients. He is curious about the recovery period following defibrillator implantation.    Supplemental Information  He had polyps removed in his nose yesterday and is still feeling under the weather.    FAMILY HISTORY  The patient reports a family history of heart problems, particularly on his mother's side, where all of her siblings had heart trouble.    MEDICATIONS  Current: amiodarone      Objective   Vital Signs:  /86   Pulse 61   Ht 178 cm (70.08\")   Wt 86.6 kg (191 lb)   SpO2 99%   BMI 27.34 kg/m²   Estimated body mass index is 27.34 kg/m² as calculated from the following:    Height as of this encounter: 178 cm (70.08\").    Weight as of this encounter: 86.6 kg (191 lb).      Physical Exam  Vitals reviewed.   Constitutional:       Appearance: Normal appearance.   Cardiovascular:      Rate and Rhythm: Normal rate and regular rhythm.      Pulses: Normal pulses.      Heart sounds: Normal heart sounds.   Pulmonary:      Effort: Pulmonary effort is normal.      Breath sounds: Normal breath " sounds.   Musculoskeletal:         General: No swelling.   Neurological:      Mental Status: He is alert.   Psychiatric:         Mood and Affect: Mood normal.         Judgment: Judgment normal.          Physical Exam      Result Review :  The following data was reviewed by: My Haque MD on today's date:    Lab Results   Component Value Date    WBC 6.05 06/18/2025    HGB 12.1 (L) 06/18/2025    HCT 39.8 06/18/2025    MCV 91.1 06/18/2025     06/18/2025      Lab Results   Component Value Date    GLUCOSE 92 06/18/2025    CALCIUM 9.4 06/18/2025     06/18/2025    K 4.0 06/18/2025    CO2 22.0 06/18/2025     06/18/2025    BUN 20.6 06/18/2025    CREATININE 0.77 06/18/2025    EGFR 96.3 06/18/2025    BCR 26.8 (H) 06/18/2025    ANIONGAP 12.0 06/18/2025      ECG from 5/22/25:  Sinus rhythm, 1st degree AV block, ST changes             Assessment and Plan   Diagnoses and all orders for this visit:    1. HOCM (hypertrophic obstructive cardiomyopathy) (Primary)    2. Paroxysmal atrial fibrillation        He has both HOCM and PAF.  In regards to his HOCM, he has multiple risk factors for sudden cardiac death (EF <50%, apical aneurysm).  We discussed that given these findings, he has a 2A indication for ICD implant for prevention of sudden cardiac death.  He is still not sure whether or not he would like to proceed and will need additional time to make this decision.    In regards to his AF, we discussed that given his young age, amiodarone is not ideal for chronic management.  With this in mind, we discussed alternative treatment options including other AADs or ablation  I do think that ablation likely has separate benefits and would likely be the best option      There has also been discussion about watchman implant as a means of coming off OAC.  While this is not unreasonable, there is limited data regarding watchman implant inpatients with HOCM, and in particular I do worry about thrombotic risks of the large  apical aneurysm off anticoagulation.  As such, I do think that continued anticoagulation is likely the best treatment option for now.  Assessment & Plan  1. HOCM  - Risks and benefits of defibrillator implant discussed in clinic  - Defibrillator implant offered  - He will think more about this and let us know what he decides    2. Paroxysmal atrial fibrillation  - Discussed risks and benefits of ablation with and without watchman as above  - Ablation offered  - Recommended to continue anticoagulation therapy due to aneurysm prone to clotting  - He will think more about these options and let us know what he decides    Follow-up  - Patient will follow up in 6 months    I spent 45 minutes caring for Dayron Lubin on this date of service (excluding time spent on ECG interpretation and documentation). This time includes time spent by me in the following activities:  preparing for the visit, reviewing tests, obtaining and/or reviewing a separately obtained history, performing a medically appropriate examination and/or evaluation, counseling and educating the patient/family/caregiver, and documenting information in the medical record.              Follow Up   Return in about 6 months (around 12/27/2025).  Patient was given instructions and counseling regarding his condition or for health maintenance advice. Please see specific information pulled into the AVS if appropriate.     Part of this note may be an electronic transcription/translation of spoken language to printed text using the Dragon Dictation System.    Patient or patient representative verbalized consent for the use of Ambient Listening during the visit with  My Haque MD for chart documentation. 7/1/2025  00:44 CDT

## 2025-06-27 NOTE — PATIENT INSTRUCTIONS
1.  6 month follow up  2.  Call us if you decide to proceed with either defibrillator implant or ablation    Berkley Pang  [phone]

## 2025-07-02 LAB
CYTO UR: NORMAL
LAB AP CASE REPORT: NORMAL
Lab: NORMAL
PATH REPORT.FINAL DX SPEC: NORMAL
PATH REPORT.GROSS SPEC: NORMAL

## 2025-07-10 ENCOUNTER — OFFICE VISIT (OUTPATIENT)
Dept: OTOLARYNGOLOGY | Facility: CLINIC | Age: 70
End: 2025-07-10
Payer: MEDICARE

## 2025-07-10 VITALS — HEIGHT: 70 IN | BODY MASS INDEX: 27.35 KG/M2 | WEIGHT: 191 LBS

## 2025-07-10 DIAGNOSIS — J34.3 NASAL TURBINATE HYPERTROPHY: ICD-10-CM

## 2025-07-10 DIAGNOSIS — Z98.890 S/P FESS (FUNCTIONAL ENDOSCOPIC SINUS SURGERY): Primary | ICD-10-CM

## 2025-07-10 DIAGNOSIS — J33.9 NASAL POLYPOSIS: ICD-10-CM

## 2025-07-10 DIAGNOSIS — J32.9 CHRONIC RHINOSINUSITIS: ICD-10-CM

## 2025-07-10 NOTE — PROGRESS NOTES
"YOB: 1955  Location: [facility]  Location Address: [address] [place] [address], [facility], [address] [place] [address]  Location Phone: [phone]    Chief Complaint   Patient presents with   • Sinus Problem     Post op       History of Present Illness  Dayron Lubin is a 69 y.o. male.  Dayron Lubin is here for follow up of ENT complaints. The patient is status post endoscopic functional sinus surgery, septoplasty, resection of bilateral inferior turbinates, left angeles bullosa resection, and left nasal polypectomy on 2025.  He has had a relatively normal postoperative course and admits significant improvement in breathing.  Reviewed:  Final Diagnosis   \"Ethmoid bony mucosa with septal cartilage and bone and bilateral nasal polyps\":  - Segments of sinonasal tissue exhibiting mild to moderate chronic active inflammation.  - Fragments of bone and cartilage.  - No histologic evidence of malignancy.     Past Medical History:   Diagnosis Date   • A-fib    • Alcoholic hepatitis 2023   • Anemia    • Blood transfusion reaction     2025   • CAD (coronary artery disease)    • Diverticulosis    • Family history of colonic polyps    • GERD (gastroesophageal reflux disease)    • History of adenomatous polyp of colon    • History of external beam radiation therapy 2016    6840 cGy to prostate bed   • History of prostate cancer    • History of transfusion    • Hyperlipidemia    • Hypertension    • Insomnia    • Pneumonia     2025       Past Surgical History:   Procedure Laterality Date   • CARDIAC CATHETERIZATION     • COLONOSCOPY  2020    Dr. Chris De La Rosa-The mucosa appeared normal throughout the entire examined colon; One 1cm sessile tubular adenomatous polyp in the proximal transverse colon; Diverticular disease throughout the left colon; Repeat 3 years   • COLONOSCOPY  2017    Dr. Boland-Diverticulosis; Otherwise normal; Repeat 5 years   • CORONARY ANGIOPLASTY WITH STENT PLACEMENT "     • ENDOSCOPIC FUNCTIONAL SINUS SURGERY (FESS) N/A 6/25/2025    Procedure: ENDOSCOPIC FUNCTIONAL SINUS SURGERY W TRACKING, SINUPLASTY, SEPTOPLASTY, RESECT INFERIOR TURBINATES VIA COBLATION, LEFT STACY BULLOSA RESECTION, AND LEFT NASAL POLYPECTOMY;  Surgeon: Robin De La Rosa MD;  Location: [facility];  Service: ENT;  Laterality: N/A;   • ENDOSCOPY  06/29/2020    Dr. Chris De La Rosa-Normal esophagus; Mild mucosal changes suggestive of gastritis noted-biopsied; Normal duodenum-biopsied   • FRACTURE SURGERY     • PROSTATE SURGERY     • TONSILLECTOMY         Outpatient Medications Marked as Taking for the 7/10/25 encounter (Office Visit) with Robin De La Rosa MD   Medication Sig Dispense Refill   • acetaminophen (TYLENOL) 500 MG tablet Take 2 tablets by mouth Every 6 (Six) Hours As Needed for Mild Pain.     • amiodarone (PACERONE) 200 MG tablet Take 1 tablet by mouth Daily. 90 tablet 1   • apixaban (ELIQUIS) 5 MG tablet tablet Take 1 tablet by mouth 2 (Two) Times a Day.     • aspirin 81 MG EC tablet Take 1 tablet by mouth Daily.     • atorvastatin (LIPITOR) 20 MG tablet Take 1 tablet by mouth Daily.     • busPIRone (BUSPAR) 5 MG tablet Take 1 tablet by mouth 3 (Three) Times a Day.     • ferrous gluconate (FERGON) 324 MG tablet Take 1 tablet by mouth Daily With Breakfast.     • folic acid (FOLVITE) 1 MG tablet Take 1 tablet by mouth Daily.     • HYDROcodone-acetaminophen (NORCO) 5-325 MG per tablet Take 1 tablet by mouth Every 8 (Eight) Hours As Needed (Pain) for up to 8 doses. 8 tablet 0   • losartan (COZAAR) 25 MG tablet Take 1 tablet by mouth Daily.     • metoprolol succinate XL (TOPROL-XL) 25 MG 24 hr tablet Take 1.5 tablets by mouth Daily. 45 tablet 11   • mupirocin (BACTROBAN) 2 % nasal ointment Apply to the nose twice daily 3 g 0   • naloxone (NARCAN) 4 MG/0.1ML nasal spray Call 911. Don't prime. [place] in 1 nostril for overdose. Repeat in 2-3 minutes in other nostril if no or minimal breathing/responsiveness.  2 each 0   • pantoprazole (PROTONIX) 40 MG EC tablet Take 1 tablet by mouth Daily.         Patient has no known allergies.    Family History   Problem Relation Age of Onset   • Colon polyps Mother         < 60 years of age   • Heart disease Mother    • Coronary artery disease Mother    • Stroke Mother    • Colon cancer Neg Hx    • Esophageal cancer Neg Hx    • Liver cancer Neg Hx    • Stomach cancer Neg Hx    • Rectal cancer Neg Hx    • Liver disease Neg Hx        Social History     Socioeconomic History   • Marital status: Single   Tobacco Use   • Smoking status: Never   • Smokeless tobacco: Never   Vaping Use   • Vaping status: Never Used   Substance and Sexual Activity   • Alcohol use: Not Currently     Alcohol/week: 50.0 standard drinks of alcohol     Types: 50 Shots of liquor per week   • Drug use: Not Currently     Types: Marijuana   • Sexual activity: Defer       Review of Systems   Constitutional: Negative.    HENT: Negative.         There were no vitals filed for this visit.    Body mass index is 27.34 kg/m².    Objective     Physical Exam  Vitals reviewed.   Constitutional:       Appearance: Normal appearance.   HENT:      Head: Normocephalic.      Right Ear: External ear normal.      Left Ear: External ear normal.      Nose:      Comments: see endoscopy     Mouth/Throat:      Lips: Pink.      Mouth: Mucous membranes are moist.   Neurological:      Mental Status: He is alert.       Assessment & Plan   Diagnoses and all orders for this visit:    1. S/P FESS (functional endoscopic sinus surgery) (Primary)    2. Chronic rhinosinusitis    3. Nasal polyposis    4. Nasal turbinate hypertrophy      * Surgery not found *  No orders of the defined types were placed in this encounter.    We will plan to start Dupixent  Call return for problems  Continue Ocean Spray as needed  Continue Bactroban as prescribed for 2 weeks  Resume intranasal steroid spray and intranasal antihistamine spray  Follow-up in 3 to 4 weeks      Return in about 4 weeks (around 8/7/2025) for Recheck.       Patient Instructions   Call return for problems  Continue Ocean Spray as needed  Continue Bactroban as prescribed for 2 weeks  Resume intranasal steroid spray and intranasal antihistamine spray  Follow-up in 3 to 4 weeks

## 2025-07-10 NOTE — PROGRESS NOTES
Procedure Note    Dayron Lubin    DATE OF PROCEDURE: 7/10/2025    PROCEDURE:   Bilateral Rigid Nasal Endoscopy with debridement     PREPROCEDURE DIAGNOSIS:   Chronic rhinosinusitis S/P Bilateral functional endoscopic anterior ethmoidectomy and bilateral middle meatal antrostomy  Bilateral nasal polypectomy  Left angeles bullosa resection  Septoplasty  Bilateral inferior turbinate reduction via Coblation     POSTPROCEDURE DIAGNOSIS:  SAME     ANESTHESIA:   None       Procedure Details:    After topical anesthesia and decongestion, the patient was placed in the sitting position.  An endoscope was passed along the left nasal floor to the nasopharynx.  It was then passed into the region of the middle meatus, middle turbinate, and the sphenoethmoid region. An identical procedure was performed on the right side.  The following findings were noted as stated below:    Findings: Moderate debris was suctioned from both nasal cavities without difficulty.  Both antrostomies were patent with no evidence of recidivistic polyposis bilaterally.  No significant bleeding was encountered.  Patient experienced significant improvement in his ability to breathe bilaterally post procedure.      Condition:  Stable.  Patient tolerated procedure well.    Complications:  None      Photos from surgery demonstrating polyps

## 2025-07-23 ENCOUNTER — TELEPHONE (OUTPATIENT)
Dept: GASTROENTEROLOGY | Facility: CLINIC | Age: 70
End: 2025-07-23
Payer: MEDICARE

## 2025-07-23 RX ORDER — DUPILUMAB 300 MG/2ML
300 INJECTION, SOLUTION SUBCUTANEOUS
Qty: 2 ML | Refills: 56 | Status: SHIPPED | OUTPATIENT
Start: 2025-07-23

## 2025-07-23 NOTE — TELEPHONE ENCOUNTER
Pt's sister called to r/s his endo/colon to 8/29 @ 9:00 am. Pt is in [place] and they will not let him come home that night to prep. New eliquis last dose is 8/26 and she will relay that to him. Advised to call me back with any questions.

## 2025-08-01 DIAGNOSIS — J33.9 NASAL POLYPOSIS: Primary | ICD-10-CM

## 2025-08-01 DIAGNOSIS — J32.9 CHRONIC RHINOSINUSITIS: ICD-10-CM

## 2025-08-01 RX ORDER — DUPILUMAB 300 MG/2ML
300 INJECTION, SOLUTION SUBCUTANEOUS
Qty: 2 ML | Refills: 6 | Status: SHIPPED | OUTPATIENT
Start: 2025-08-01

## 2025-08-07 ENCOUNTER — OFFICE VISIT (OUTPATIENT)
Dept: OTOLARYNGOLOGY | Facility: CLINIC | Age: 70
End: 2025-08-07
Payer: MEDICARE

## 2025-08-07 VITALS
BODY MASS INDEX: 27.35 KG/M2 | HEART RATE: 70 BPM | WEIGHT: 191 LBS | SYSTOLIC BLOOD PRESSURE: 114 MMHG | HEIGHT: 70 IN | RESPIRATION RATE: 20 BRPM | TEMPERATURE: 98 F | DIASTOLIC BLOOD PRESSURE: 65 MMHG

## 2025-08-07 DIAGNOSIS — J32.9 CHRONIC RHINOSINUSITIS: ICD-10-CM

## 2025-08-07 DIAGNOSIS — J34.3 NASAL TURBINATE HYPERTROPHY: ICD-10-CM

## 2025-08-07 DIAGNOSIS — J33.9 NASAL POLYPOSIS: ICD-10-CM

## 2025-08-07 DIAGNOSIS — J34.89 CONCHA BULLOSA: ICD-10-CM

## 2025-08-07 DIAGNOSIS — R09.81 NASAL CONGESTION: ICD-10-CM

## 2025-08-07 DIAGNOSIS — Z98.890 S/P FESS (FUNCTIONAL ENDOSCOPIC SINUS SURGERY): Primary | ICD-10-CM

## 2025-08-07 RX ORDER — DUPILUMAB 300 MG/2ML
300 INJECTION, SOLUTION SUBCUTANEOUS
Qty: 2 ML | Refills: 6 | Status: SHIPPED | OUTPATIENT
Start: 2025-08-07

## 2025-08-22 DIAGNOSIS — J33.9 NASAL POLYPOSIS: ICD-10-CM

## 2025-08-22 DIAGNOSIS — J32.9 CHRONIC RHINOSINUSITIS: ICD-10-CM

## 2025-08-22 RX ORDER — DUPILUMAB 300 MG/2ML
300 INJECTION, SOLUTION SUBCUTANEOUS
Qty: 2 ML | Refills: 6 | Status: SHIPPED | OUTPATIENT
Start: 2025-08-22

## 2025-08-29 ENCOUNTER — ANESTHESIA (OUTPATIENT)
Dept: GASTROENTEROLOGY | Facility: HOSPITAL | Age: 70
End: 2025-08-29
Payer: MEDICARE

## 2025-08-29 ENCOUNTER — HOSPITAL ENCOUNTER (OUTPATIENT)
Facility: HOSPITAL | Age: 70
Setting detail: HOSPITAL OUTPATIENT SURGERY
Discharge: HOME | End: 2025-08-29
Attending: INTERNAL MEDICINE | Admitting: INTERNAL MEDICINE
Payer: MEDICARE

## 2025-08-29 ENCOUNTER — ANESTHESIA EVENT (OUTPATIENT)
Dept: GASTROENTEROLOGY | Facility: HOSPITAL | Age: 70
End: 2025-08-29
Payer: MEDICARE

## 2025-08-29 VITALS
OXYGEN SATURATION: 98 % | SYSTOLIC BLOOD PRESSURE: 125 MMHG | BODY MASS INDEX: 26.6 KG/M2 | HEIGHT: 71 IN | HEART RATE: 47 BPM | RESPIRATION RATE: 17 BRPM | DIASTOLIC BLOOD PRESSURE: 74 MMHG | TEMPERATURE: 96.7 F | WEIGHT: 190 LBS

## 2025-08-29 DIAGNOSIS — D50.9 IRON DEFICIENCY ANEMIA, UNSPECIFIED IRON DEFICIENCY ANEMIA TYPE: ICD-10-CM

## 2025-08-29 PROCEDURE — 25010000002 PROPOFOL 10 MG/ML EMULSION: Performed by: NURSE ANESTHETIST, CERTIFIED REGISTERED

## 2025-08-29 PROCEDURE — 25810000003 SODIUM CHLORIDE 0.9 % SOLUTION: Performed by: ANESTHESIOLOGY

## 2025-08-29 PROCEDURE — 25010000002 LIDOCAINE PF 2% 2 % SOLUTION: Performed by: NURSE ANESTHETIST, CERTIFIED REGISTERED

## 2025-08-29 PROCEDURE — 25810000003 SODIUM CHLORIDE 0.9 % SOLUTION: Performed by: NURSE ANESTHETIST, CERTIFIED REGISTERED

## 2025-08-29 DEVICE — DEV CLIP ENDO RESOLUTION360 CONTRL ROT 235CM: Type: IMPLANTABLE DEVICE | Site: ASCENDING COLON | Status: FUNCTIONAL

## 2025-08-29 RX ORDER — SODIUM CHLORIDE 9 MG/ML
500 INJECTION, SOLUTION INTRAVENOUS ONCE
Status: COMPLETED | OUTPATIENT
Start: 2025-08-29 | End: 2025-08-29

## 2025-08-29 RX ORDER — LIDOCAINE HYDROCHLORIDE 20 MG/ML
INJECTION, SOLUTION EPIDURAL; INFILTRATION; INTRACAUDAL; PERINEURAL AS NEEDED
Status: DISCONTINUED | OUTPATIENT
Start: 2025-08-29 | End: 2025-08-29 | Stop reason: SURG

## 2025-08-29 RX ORDER — LIDOCAINE HYDROCHLORIDE 10 MG/ML
0.5 INJECTION, SOLUTION EPIDURAL; INFILTRATION; INTRACAUDAL; PERINEURAL ONCE AS NEEDED
Status: DISCONTINUED | OUTPATIENT
Start: 2025-08-29 | End: 2025-08-29 | Stop reason: HOSPADM

## 2025-08-29 RX ORDER — PROPOFOL 10 MG/ML
VIAL (ML) INTRAVENOUS AS NEEDED
Status: DISCONTINUED | OUTPATIENT
Start: 2025-08-29 | End: 2025-08-29 | Stop reason: SURG

## 2025-08-29 RX ORDER — SODIUM CHLORIDE 9 MG/ML
INJECTION, SOLUTION INTRAVENOUS CONTINUOUS PRN
Status: DISCONTINUED | OUTPATIENT
Start: 2025-08-29 | End: 2025-08-29 | Stop reason: SURG

## 2025-08-29 RX ORDER — SODIUM CHLORIDE 0.9 % (FLUSH) 0.9 %
10 SYRINGE (ML) INJECTION AS NEEDED
Status: DISCONTINUED | OUTPATIENT
Start: 2025-08-29 | End: 2025-08-29 | Stop reason: HOSPADM

## 2025-08-29 RX ADMIN — PROPOFOL 600 MG: 10 INJECTION, EMULSION INTRAVENOUS at 10:46

## 2025-08-29 RX ADMIN — LIDOCAINE HYDROCHLORIDE 50 MG: 20 INJECTION, SOLUTION EPIDURAL; INFILTRATION; INTRACAUDAL; PERINEURAL at 10:45

## 2025-08-29 RX ADMIN — SODIUM CHLORIDE 500 ML: 9 INJECTION, SOLUTION INTRAVENOUS at 09:32

## 2025-08-29 RX ADMIN — SODIUM CHLORIDE: 9 INJECTION, SOLUTION INTRAVENOUS at 10:42

## (undated) DEVICE — THE CHANNEL CLEANING BRUSH IS A NYLON FLEXI BRUSH ATTACHED TO A FLEXIBLE PLASTIC SHEATH DESIGNED TO SAFELY REMOVE DEBRIS FROM FLEXIBLE ENDOSCOPES.

## (undated) DEVICE — C-ARM: Brand: UNBRANDED

## (undated) DEVICE — BIT DRL L110MM DIA2.5MM G QUIK CPL W/O STP REUSE

## (undated) DEVICE — FRCP BX RADJAW4 NDL 2.8 240 STD OG

## (undated) DEVICE — SURGICAL PROCEDURE PACK LOWER EXTREMITY LOURDES HOSP

## (undated) DEVICE — TRAP FLD MINIVAC MEGADYNE 100ML

## (undated) DEVICE — BLADE 1884012EM RAD12 4MM M4 ROTATE ROHS: Brand: FUSION®

## (undated) DEVICE — SOLUTION IV 1000ML 0.9% SOD CHL FOR IRRIG PLAS CONT

## (undated) DEVICE — SPLINT CAST W5XL30IN GRN STRENGTH PLSTR OF PARIS FAST SET

## (undated) DEVICE — SUT MNCRYL 5/0 P3 18IN UD MCP493G

## (undated) DEVICE — COLLECTION SOCK, GENERAL: Brand: DEROYAL

## (undated) DEVICE — INSTR TRACKER 9733533 EM ENT

## (undated) DEVICE — CUFF,BP,DISP,1 TUBE,ADULT,HP: Brand: MEDLINE

## (undated) DEVICE — CONMED SCOPE SAVER BITE BLOCK, 20X27 MM: Brand: SCOPE SAVER

## (undated) DEVICE — TUBE ET 7.5MM NSL ORAL BASIC CUF INTMED MURPHY EYE RADPQ

## (undated) DEVICE — NDL HYPO PRECISIONGLIDE/REG 18G 11/2 PNK

## (undated) DEVICE — SENSR O2 OXIMAX FNGR A/ 18IN NONSTR

## (undated) DEVICE — BIT DRL L140MM DIA2MM QUIK CPL 3 FLUT CALIB DEPTH MRK W/O

## (undated) DEVICE — AMBU AURA-I U SIZE 5, DISPOSABLE LARYNGEAL MASK: Brand: AURA-I

## (undated) DEVICE — UNDERGLOVE SURG SZ 8 FNGR THK0.21MIL GRN LTX BEAD CUF

## (undated) DEVICE — ZIMMER® STERILE DISPOSABLE TOURNIQUET CUFF WITH PLC, DUAL PORT, SINGLE BLADDER, 34 IN. (86 CM)

## (undated) DEVICE — C-ARMOR C-ARM EQUIPMENT COVERS CLEAR STERILE UNIVERSAL FIT 12 PER CASE: Brand: C-ARMOR

## (undated) DEVICE — Device: Brand: SINGLE USE ELECTROSURGICAL SNARE SD-400

## (undated) DEVICE — DEFENDO AIR WATER SUCTION AND BIOPSY VALVE KIT: Brand: DEFENDO AIR/WATER/SUCTION AND BIOPSY VALVE

## (undated) DEVICE — FORCEPS BX 240CM 2.4MM L NDL RAD JAW 4 M00513334

## (undated) DEVICE — SPONGE,NEURO,0.5"X3",XR,STRL,LF,10/PK: Brand: MEDLINE

## (undated) DEVICE — GLOVE SURG SZ 8 CRM LTX FREE POLYISOPRENE POLYMER BEAD ANTI

## (undated) DEVICE — POSITIONER,HEAD,MULTIRING,36CS: Brand: MEDLINE

## (undated) DEVICE — ENDO KIT,LOURDES HOSPITAL: Brand: MEDLINE INDUSTRIES, INC.

## (undated) DEVICE — MASK,OXYGEN,MED CONC,ADLT,7' TUB, UC: Brand: PENDING

## (undated) DEVICE — PK ENT HD AND NK 30

## (undated) DEVICE — LARYNGOSCOPE BLDE MAC HNDL M SZ 35 ST CURAPLEX CURAVIEW LED

## (undated) DEVICE — ELECTRD BLD EZ CLN MOD XLNG 2.75IN

## (undated) DEVICE — PATIENT TRACKER 9734887XOM NON-INVASIVE

## (undated) DEVICE — CHLORAPREP 26ML ORANGE

## (undated) DEVICE — BANDAGE COMPR W6INXL10YD ST M E WHITE/BEIGE

## (undated) DEVICE — 3M™ STERI-STRIP™ REINFORCED ADHESIVE SKIN CLOSURES, R1548, 1 IN X 5 IN (25 MM X 125 MM), 4 STRIPS/ENVELOPE: Brand: 3M™ STERI-STRIP™

## (undated) DEVICE — KIT,ANTI FOG,W/SPONGE & FLUID,SOFT PACK: Brand: MEDLINE

## (undated) DEVICE — SUTURE VCRL SZ 3-0 L27IN ABSRB UD L26MM SH 1/2 CIR J416H

## (undated) DEVICE — SOLUTION IV IRRIG POUR BRL 0.9% SODIUM CHL 2F7124

## (undated) DEVICE — GLV SURG BIOGEL M LTX PF 7 1/2

## (undated) DEVICE — TRAP POLYP ETRAP

## (undated) DEVICE — SUT GUT CHRM 4/0 P3 18IN 1654G

## (undated) DEVICE — TUBING 1895522 5PK STRAIGHTSHOT TO XPS: Brand: STRAIGHTSHOT®

## (undated) DEVICE — ARGYLE YANKAUER BULB TIP WITH VENT: Brand: ARGYLE

## (undated) DEVICE — 4-PORT MANIFOLD: Brand: NEPTUNE 2

## (undated) DEVICE — THE SINGLE USE ETRAP – POLYP TRAP IS USED FOR SUCTION RETRIEVAL OF ENDOSCOPICALLY REMOVED POLYPS.: Brand: ETRAP

## (undated) DEVICE — SINU FOAM: Brand: SINU-FOAM

## (undated) DEVICE — TUBING, SUCTION, 1/4" X 12', STRAIGHT: Brand: MEDLINE

## (undated) DEVICE — SUTURE ETHLN SZ 3-0 L18IN NONABSORBABLE BLK FS-1 L24MM 3/8 663H